# Patient Record
Sex: FEMALE | Race: WHITE | NOT HISPANIC OR LATINO | Employment: OTHER | URBAN - METROPOLITAN AREA
[De-identification: names, ages, dates, MRNs, and addresses within clinical notes are randomized per-mention and may not be internally consistent; named-entity substitution may affect disease eponyms.]

---

## 2017-01-03 ENCOUNTER — OFFICE VISIT (OUTPATIENT)
Dept: FAMILY MEDICINE | Facility: CLINIC | Age: 82
End: 2017-01-03
Payer: COMMERCIAL

## 2017-01-03 VITALS
HEART RATE: 69 BPM | OXYGEN SATURATION: 94 % | WEIGHT: 260 LBS | TEMPERATURE: 97.6 F | SYSTOLIC BLOOD PRESSURE: 138 MMHG | BODY MASS INDEX: 40.71 KG/M2 | DIASTOLIC BLOOD PRESSURE: 72 MMHG | RESPIRATION RATE: 18 BRPM

## 2017-01-03 DIAGNOSIS — I25.708: Primary | ICD-10-CM

## 2017-01-03 DIAGNOSIS — R20.2 NUMBNESS AND TINGLING OF HAND: ICD-10-CM

## 2017-01-03 DIAGNOSIS — E78.5 HYPERLIPIDEMIA WITH TARGET LDL LESS THAN 70: ICD-10-CM

## 2017-01-03 DIAGNOSIS — F41.9 ANXIETY: ICD-10-CM

## 2017-01-03 DIAGNOSIS — R20.0 NUMBNESS AND TINGLING OF HAND: ICD-10-CM

## 2017-01-03 DIAGNOSIS — I50.42 CHRONIC COMBINED SYSTOLIC AND DIASTOLIC CONGESTIVE HEART FAILURE (H): ICD-10-CM

## 2017-01-03 DIAGNOSIS — R73.09 ABNORMAL GLUCOSE: ICD-10-CM

## 2017-01-03 LAB
ALT SERPL W P-5'-P-CCNC: 42 U/L (ref 0–50)
ANION GAP SERPL CALCULATED.3IONS-SCNC: 8 MMOL/L (ref 3–14)
BUN SERPL-MCNC: 19 MG/DL (ref 7–30)
CALCIUM SERPL-MCNC: 10.1 MG/DL (ref 8.5–10.1)
CHLORIDE SERPL-SCNC: 103 MMOL/L (ref 94–109)
CO2 SERPL-SCNC: 26 MMOL/L (ref 20–32)
CREAT SERPL-MCNC: 0.82 MG/DL (ref 0.52–1.04)
ERYTHROCYTE [DISTWIDTH] IN BLOOD BY AUTOMATED COUNT: 14.2 % (ref 10–15)
GFR SERPL CREATININE-BSD FRML MDRD: 67 ML/MIN/1.7M2
GLUCOSE SERPL-MCNC: 117 MG/DL (ref 70–99)
HBA1C MFR BLD: 5.7 % (ref 4.3–6)
HCT VFR BLD AUTO: 44.8 % (ref 35–47)
HGB BLD-MCNC: 14.7 G/DL (ref 11.7–15.7)
MCH RBC QN AUTO: 28.3 PG (ref 26.5–33)
MCHC RBC AUTO-ENTMCNC: 32.8 G/DL (ref 31.5–36.5)
MCV RBC AUTO: 86 FL (ref 78–100)
PLATELET # BLD AUTO: 315 10E9/L (ref 150–450)
POTASSIUM SERPL-SCNC: 4.1 MMOL/L (ref 3.4–5.3)
RBC # BLD AUTO: 5.19 10E12/L (ref 3.8–5.2)
SODIUM SERPL-SCNC: 137 MMOL/L (ref 133–144)
WBC # BLD AUTO: 8.2 10E9/L (ref 4–11)

## 2017-01-03 PROCEDURE — 36415 COLL VENOUS BLD VENIPUNCTURE: CPT | Performed by: INTERNAL MEDICINE

## 2017-01-03 PROCEDURE — 99214 OFFICE O/P EST MOD 30 MIN: CPT | Performed by: INTERNAL MEDICINE

## 2017-01-03 PROCEDURE — 84460 ALANINE AMINO (ALT) (SGPT): CPT | Performed by: INTERNAL MEDICINE

## 2017-01-03 PROCEDURE — 83036 HEMOGLOBIN GLYCOSYLATED A1C: CPT | Performed by: INTERNAL MEDICINE

## 2017-01-03 PROCEDURE — 80048 BASIC METABOLIC PNL TOTAL CA: CPT | Performed by: INTERNAL MEDICINE

## 2017-01-03 PROCEDURE — 85027 COMPLETE CBC AUTOMATED: CPT | Performed by: INTERNAL MEDICINE

## 2017-01-03 RX ORDER — AMOXICILLIN 500 MG/1
CAPSULE ORAL
Refills: 3 | COMMUNITY
Start: 2016-10-12 | End: 2020-12-29

## 2017-01-03 ASSESSMENT — ANXIETY QUESTIONNAIRES
2. NOT BEING ABLE TO STOP OR CONTROL WORRYING: NOT AT ALL
6. BECOMING EASILY ANNOYED OR IRRITABLE: NOT AT ALL
GAD7 TOTAL SCORE: 0
3. WORRYING TOO MUCH ABOUT DIFFERENT THINGS: NOT AT ALL
7. FEELING AFRAID AS IF SOMETHING AWFUL MIGHT HAPPEN: NOT AT ALL
1. FEELING NERVOUS, ANXIOUS, OR ON EDGE: NOT AT ALL
5. BEING SO RESTLESS THAT IT IS HARD TO SIT STILL: NOT AT ALL
IF YOU CHECKED OFF ANY PROBLEMS ON THIS QUESTIONNAIRE, HOW DIFFICULT HAVE THESE PROBLEMS MADE IT FOR YOU TO DO YOUR WORK, TAKE CARE OF THINGS AT HOME, OR GET ALONG WITH OTHER PEOPLE: NOT DIFFICULT AT ALL

## 2017-01-03 ASSESSMENT — PAIN SCALES - GENERAL: PAINLEVEL: NO PAIN (0)

## 2017-01-03 ASSESSMENT — PATIENT HEALTH QUESTIONNAIRE - PHQ9: 5. POOR APPETITE OR OVEREATING: NOT AT ALL

## 2017-01-03 NOTE — NURSING NOTE
"Chief Complaint   Patient presents with     Recheck Medication       Initial /70 mmHg  Pulse 69  Temp(Src) 97.6  F (36.4  C) (Oral)  Resp 18  Wt 260 lb (117.935 kg)  SpO2 94%  Breastfeeding? No Estimated body mass index is 40.71 kg/(m^2) as calculated from the following:    Height as of 3/15/16: 5' 7\" (1.702 m).    Weight as of this encounter: 260 lb (117.935 kg).  BP completed using cuff size: nae Buenrostro CMA      "

## 2017-01-03 NOTE — MR AVS SNAPSHOT
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 Patty Shaw NP       7am-7pm  Monday - Thursday   7am-5pm  Fridays  [phone]  (Appointment scheduling available 24/7)    Questions about your visit?  Team Line  [phone]   Urgent Care - [place] - 11am-9pm Monday-Friday Saturday-Sunday- 9am-5pm   [place] - 5pm-9pm Monday-Friday Saturday-Sunday- 9am-5pm  [phone] - [place]   [phone] - [place]       What options do I have for visits at the clinic other than the traditional office visit?  To expand how we care for you, many of our providers are utilizing electronic visits (e-visits) and telephone visits, when medically appropriate, for interactions with their patients rather than a visit in the clinic.   We also offer nurse visits for many medical concerns. Just like any other service, we will bill your insurance company for this type of visit based on time spent on the phone with your provider. Not all insurance companies cover these visits. Please check with your medical insurance if this type of visit is covered. You will be responsible for any charges that are not paid  by your insurance.      E-visits via [employer]:  generally incur a $35.00 fee.  Telephone visits:  Time spent on the phone: *charged based on time that is spent on the phone in increments of 10 minutes. Estimated cost:   5-10 mins $30.00   11-20 mins. $59.00   21-30 mins. $85.00   Use [employer] (secure email communication and access to your chart) to send your primary care provider a message or make an appointment. Ask someone on your Team how to sign up for [employer].    For a Price Quote for your services, please call our [employer] Line at [phone].    As always, Thank you for trusting us with your health care needs!    Discharged by FIDEL FRANK          Follow-ups after your visit        Who to contact     If you have questions or need follow up information about today's clinic visit or your schedule please contact [facility] directly at [phone].  Normal or non-critical lab and imaging results will be communicated to you by [employer], letter or phone within 4 business days after the clinic has received the results. If you do not hear from us within 7 days, please contact the clinic through [employer] or phone. If you have a critical or abnormal lab result, we will notify you by phone as soon as possible.  Submit refill requests through [employer] or call your pharmacy and they will forward the refill request to us. Please allow 3 business days for your refill to be completed.          Additional Information About Your Visit        [employer] Information     [employer] gives you secure access to your electronic health record. If you see a primary care provider, you can also send messages to your care team and make appointments. If you have questions, please call your primary care clinic.  If you do not have a primary care provider, please call [phone] and they will assist you.        Care EveryWhere ID     This is your Care EveryWhere ID. This could be used by other organizations to access your [place]  medical records  [id number]        Your Vitals Were     Pulse Temperature Respirations Pulse Oximetry Breastfeeding?       69 97.6  F (36.4  C) (Oral) 18 94% No        Blood Pressure from Last 3 Encounters:   01/03/17 142/70   03/15/16 120/64   02/12/16 136/80    Weight from Last 3 Encounters:   01/03/17 260 lb (117.935 kg)   03/15/16 247 lb (112.038 kg)   02/12/16 252 lb (114.306 kg)              We Performed the Following     ALT     BASIC METABOLIC PANEL     CBC with platelets     DEPRESSION ACTION PLAN (DAP) Order [[id number]]     Hemoglobin A1c        Primary Care Provider Office Phone # Fax #    Ziyad Isaac [phone]       [address] [address]        Thank you!     Thank you for choosing [facility]  for your care. Our goal is always to provide you with excellent care. Hearing back from our patients is one way we can continue to improve our services. Please take a few minutes to complete the written survey that you may receive in the mail after your visit with us. Thank you!             Your Updated Medication List - Protect others around you: Learn how to safely use, store and throw away your medicines at www.disposemymeds.org.          This list is accurate as of: 1/3/17 10:50 AM.  Always use your most recent med list.                   Brand Name Dispense Instructions for use    amoxicillin 500 MG capsule    AMOXIL     TK 4 CS PO 1 HOUR BEFORE DENTAL APPOINTMENT       aspirin 162 MG EC tablet     90 tablet    Take 1 tablet by mouth daily.       betamethasone dipropionate 0.05 % lotion    DIPROSONE    60 mL    Apply  topically as needed.       Biotin 10 MG Tabs tablet      Take 10,000 mcg by mouth daily       citalopram 20 MG tablet    celeXA    90 tablet    TAKE 1 TABLET BY MOUTH DAILY       Fish Oil 1200 MG Caps      Take 1 capsule by mouth 2 times daily       ketoconazole 2 % shampoo    NIZORAL    120 mL    Apply  topically  daily as needed.       levothyroxine 125 MCG tablet    SYNTHROID/LEVOTHROID    90 tablet    TAKE ONE TABLET BY MOUTH EVERY DAY       LORazepam 0.5 MG tablet    ATIVAN    20 tablet    Take 1 tablet (0.5 mg) by mouth every 8 hours as needed for anxiety       losartan-hydrochlorothiazide 100-25 MG per tablet    HYZAAR    90 tablet    TAKE 1 TABLET BY MOUTH DAILY       metoprolol 50 MG tablet    LOPRESSOR    180 tablet    Take 1 tablet (50 mg) by mouth 2 times daily       order for DME          vitamin D 2000 UNITS tablet      Take 1 tablet by mouth daily.

## 2017-01-03 NOTE — Clinical Note
[address] [place] [address]    January 4, 2017    Lisa Ferguson  [address] [facility] [place] [address]          Dear Lisa,    These laboratory studies are fine. Specifically no evidence of a diabetes mellitus situation. Just a slightly elevated glucose [ sugar test ] with an hemoglobin a1c  [ diabetes test ] that is essentially normal. Good !    Lets keep an eye on things.  Results for orders placed or performed in visit on 01/03/17   ALT   Result Value Ref Range    ALT 42 0 - 50 U/L   BASIC METABOLIC PANEL   Result Value Ref Range    Sodium 137 133 - 144 mmol/L    Potassium 4.1 3.4 - 5.3 mmol/L    Chloride 103 94 - 109 mmol/L    Carbon Dioxide 26 20 - 32 mmol/L    Anion Gap 8 3 - 14 mmol/L    Glucose 117 (H) 70 - 99 mg/dL    Urea Nitrogen 19 7 - 30 mg/dL    Creatinine 0.82 0.52 - 1.04 mg/dL    GFR Estimate 67 >60 mL/min/1.7m2    GFR Estimate If Black 82 >60 mL/min/1.7m2    Calcium 10.1 8.5 - 10.1 mg/dL   Hemoglobin A1c   Result Value Ref Range    Hemoglobin A1C 5.7 4.3 - 6.0 %   CBC with platelets   Result Value Ref Range    WBC 8.2 4.0 - 11.0 10e9/L    RBC Count 5.19 3.8 - 5.2 10e12/L    Hemoglobin 14.7 11.7 - 15.7 g/dL    Hematocrit 44.8 35.0 - 47.0 %    MCV 86 78 - 100 fl    MCH 28.3 26.5 - 33.0 pg    MCHC 32.8 31.5 - 36.5 g/dL    RDW 14.2 10.0 - 15.0 %    Platelet Count 315 150 - 450 10e9/L       If you have any questions or concerns, please me or my clinic team at [phone].      Sincerely,        Ziyad Isaac MD/bt

## 2017-01-03 NOTE — PROGRESS NOTES
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 A 81 years old white female, history of coronary artery disease and congestive heart failure. Follows longterm with Dr. Carey Newell, [facility]. She is feeling some age related issues here and recognizes she's not as strong as she used to be , says she's \" on the downslide\" .    Musculoskeletal issues ; she's had a total knee replacement , right sided, this was in February of 2016. This never healed so well, with continued pain, 6-8 weeks of physical therapy were tried. She then stopped the Crestor [ rosuvastatin ] which even at 2 days per week, was too much for her she swears. She quit taking her Crestor [ rosuvastatin ] and then saw a dramatically improved status with her leg pain. Dr. Carey Newell had pushed her to take the statin medications, but now she's asking what about zetia [ ezetimibe ] ? As it turns out myalgias is a recorded side effect with this medication as well.. Perhaps equally importantly, this is apparently not covered by her health insurance  And it may cost her over a 100 dollars a month. She's not keen on this idea and is asking me questions about lipid lowering therapy. We also did discuss Repatha  (Evolocumab) the new mononclonal antibody derived infusion for lipid lowering therapy.     Depression and Anxiety Follow-Up    Status since last visit: No change    Other associated symptoms:None    Complicating factors:     Significant life event: No     Current substance abuse: None    Mostly " anxiety with panic attacks, can't decreased the medications !   PHQ-9 SCORE 3/25/2016 12/30/2016 1/3/2017   Total Score - - -   Total Score 0 0 3     MARY-7 SCORE 7/16/2015 1/3/2017   Total Score 7 -   Total Score - 0        PHQ-9  English      PHQ-9   Any Language     GAD7     There are some additional symptoms where she has had some hand numbness and foot numbness as well as some basilar thumb joint osteoarthritis symptoms for a few months .    Wt Readings from Last 5 Encounters:   01/03/17 260 lb (117.935 kg)   03/15/16 247 lb (112.038 kg)   02/12/16 252 lb (114.306 kg)   10/22/15 249 lb (112.946 kg)   07/09/15 245 lb (111.131 kg)     Body mass index is 40.71 kg/(m^2).    A lot more sedentary behavior some modest weight gain. Denies polydipsia, polyuria and polyphagia, she does have a family history of diabetes mellitus type 2 .     A1C      5.5   2/12/2016  A1C      5.6   10/22/2015  A1C      5.9   3/23/2015        Amount of exercise or physical activity: None    Problems taking medications regularly: No    Medication side effects: none    Diet: regular (no restrictions)      Problem list and histories reviewed & adjusted, as indicated.  Additional history: as documented    Patient Active Problem List   Diagnosis     Obesity     PUD (peptic ulcer disease)     Hyperlipidemia with target LDL less than 70     Mitral regurgitation     DJD (degenerative joint disease)     Fibroadenoma of breast     Diverticulosis     Sciatica neuralgia     IBS (irritable bowel syndrome)     RBBB (right bundle branch block)     GLEN (obstructive sleep apnea)     Balance disorder     Anxiety     Advanced directives, counseling/discussion     Ventricular tachycardia (paroxysmal) (H)     Corneal ulcer of left eye     Blepharitis     Hypercalcemia     Disorder of skin or subcutaneous tissue     Scar condition and fibrosis of skin     OA (osteoarthritis) of knee - bilateral     Fracture of proximal humerus     CHF (congestive heart failure)  (H)     Shoulder fracture     Abnormal glucose     Coronary artery disease involving 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)     S/p total knee replacement, bilateral     Acquired hypothyroidism     Essential hypertension with goal blood pressure less than 130/80     Past Surgical History   Procedure Laterality Date     Surgical history of -        bilateral meniscal tears and surgery on these     Hc dilation/curettage diag/ther non ob       Hc excis interdigital neuroma,ea       mortons neuroma excision     C cabg, arterial, three  1999     LIMA-LAD, SVG-DIagnoal, SVG-OM, previous LAD-PCI, sees Dr Banuelos     Surgical history of -   1999 or so     one parathyroid removed     C appendectomy       Tonsillectomy       Tubal ligation       C removal gallbladder  1994     Hernia repair, inguinal rt/lt       Arthroscopy knee rt/lt       bilateral     Colonoscopy  6/2010     negative       Social History   Substance Use Topics     Smoking status: Former Smoker -- 20 years     Types: Cigarettes     Quit date: 07/11/1978     Smokeless tobacco: Never Used     Alcohol Use: Yes      Comment: Rare alcohol use.     Family History   Problem Relation Age of Onset     C.A.D. Mother      Arthritis Mother      Cardiovascular Mother      HEART DISEASE Mother      Obesity Mother      Thyroid Disease Mother      C.A.D. Father      DIABETES Father      Hypertension Father      Alcohol/Drug Father      Alzheimer Disease Father      Cardiovascular Father      Circulatory Father      GASTROINTESTINAL DISEASE Father      HEART DISEASE Father      Lipids Father      Obesity Father      Cardiovascular Maternal Grandmother      Depression Maternal Grandmother      Obesity Maternal Grandmother      Thyroid Disease Maternal Grandmother      Cardiovascular Maternal Grandfather      HEART DISEASE Maternal Grandfather      Obesity Maternal Grandfather      Obesity Paternal Grandmother      DIABETES Paternal Grandfather       Alcohol/Drug Paternal Grandfather      Obesity Paternal Grandfather      Breast Cancer Sister      CANCER Sister      Obesity Sister      Thyroid Disease Sister      Alcohol/Drug Son      Allergies Son      Allergies Daughter      Depression Sister      Eye Disorder Sister      GASTROINTESTINAL DISEASE Sister      Thyroid Disease Sister      Obesity No family hx of          Current Outpatient Prescriptions   Medication Sig Dispense Refill     order for DME        losartan-hydrochlorothiazide (HYZAAR) 100-25 MG per tablet TAKE 1 TABLET BY MOUTH DAILY 90 tablet 0     citalopram (CELEXA) 20 MG tablet TAKE 1 TABLET BY MOUTH DAILY 90 tablet 0     levothyroxine (SYNTHROID, LEVOTHROID) 125 MCG tablet TAKE ONE TABLET BY MOUTH EVERY DAY 90 tablet 0     metoprolol (LOPRESSOR) 50 MG tablet Take 1 tablet (50 mg) by mouth 2 times daily 180 tablet 3     Omega-3 Fatty Acids (FISH OIL) 1200 MG CAPS Take 1 capsule by mouth 2 times daily       LORazepam (ATIVAN) 0.5 MG tablet Take 1 tablet (0.5 mg) by mouth every 8 hours as needed for anxiety 20 tablet 0     Biotin 10 MG TABS tablet Take 10,000 mcg by mouth daily       betamethasone dipropionate (DIPROSONE) 0.05 % lotion Apply  topically as needed. 60 mL 1     ketoconazole (NIZORAL) 2 % shampoo Apply  topically daily as needed. 120 mL 1     aspirin 162 MG EC tablet Take 1 tablet by mouth daily. 90 tablet 3     Cholecalciferol (VITAMIN D) 2000 UNITS tablet Take 1 tablet by mouth daily.       amoxicillin (AMOXIL) 500 MG capsule TK 4 CS PO 1 HOUR BEFORE DENTAL APPOINTMENT  3     Allergies   Allergen Reactions     Atorvastatin Other (See Comments)     Myalgia     Cortisone      Insomnia, burning in chest, flushed     Nickel      rash     Tetracycline Diarrhea     Vicodin [Hydrocodone-Acetaminophen] Other (See Comments)     Hallucinations     Recent Labs   Lab Test  01/03/17   1106  10/21/16   1030  02/12/16   1127  02/12/16   1108  10/22/15   1254  03/23/15   1213   07/08/14   1419   04/23/13   A1C  5.7   --   5.5   --   5.6  5.9   < >   --    --    --    LDL   --    --    --   128*   --   69   --    --    --   66   HDL   --    --    --   48*   --   50*   --    --    --   51   TRIG   --    --    --   237*   --   217*   --    --    --   194   ALT  42   --    --    --    --   36   --   43   < >  23   CR  0.82   --   0.71   --   0.73  0.75   --   0.84   < >   --    GFRESTIMATED  67   --   80   --   77  75   --   66   < >   --    GFRESTBLACK  82   --   >90   GFR Calc     --   >90   GFR Calc    >90   GFR Calc     --   80   < >   --    POTASSIUM  4.1   --   4.0   --   3.9  4.1   --   4.1   < >   --    TSH   --   4.10*   --    --    --   4.84*   --    --    < >   --     < > = values in this interval not displayed.      BP Readings from Last 3 Encounters:   01/03/17 142/70   03/15/16 120/64   02/12/16 136/80    Wt Readings from Last 3 Encounters:   01/03/17 260 lb (117.935 kg)   03/15/16 247 lb (112.038 kg)   02/12/16 252 lb (114.306 kg)                  Labs reviewed in EPIC  Problem list, Medication list, Allergies, and Medical/Social/Surgical histories reviewed in [facility] and updated as appropriate.    ROS:  Constitutional, HEENT, cardiovascular, pulmonary, gi and gu systems are negative, except as otherwise noted.    OBJECTIVE:                                                    /72 mmHg  Pulse 69  Temp(Src) 97.6  F (36.4  C) (Oral)  Resp 18  Wt 260 lb (117.935 kg)  SpO2 94%  Breastfeeding? No  Body mass index is 40.71 kg/(m^2).  GENERAL APPEARANCE: healthy, alert and no distress, appears her stated age , answers all questions appropriately, talkative and appropriate, normal grooming and affect   EYES: Eyes grossly normal to inspection, PERRL and conjunctivae and sclerae normal  RESP: lungs clear to auscultation - no rales, rhonchi or wheezes  CV: regular rates and rhythm, normal S1 S2, no S3 or S4 and no murmur, click or rub  MS: extremities  normal- no gross deformities noted, mild tenderness to palpation of the bilateral basilar thumb joints, negative Tinels and Phalen's test bilateral.   PSYCH: mentation appears normal and affect normal/bright    Diagnostic test results:  Diagnostic Test Results:  Orders Placed This Encounter   Procedures     DEPRESSION ACTION PLAN (DAP) Order [[id number]]     ALT     BASIC METABOLIC PANEL     Hemoglobin A1c     CBC with platelets          ASSESSMENT/PLAN:                                                    1. 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 Follow up with Dr. Banuelos in September 2017 unless she would follow up sooner regarding her questions about lipid lowering therapy   - BASIC METABOLIC PANEL  - CBC with platelets    2. Hyperlipidemia with target LDL less than 70  This led to a long discussion. She says she plans to wait to see if the zetia [ ezetimibe ] goes generic which it is supposed to do in a few months   - ALT    3. Anxiety  She feels she is in a stable phase of chronic illness here  - DEPRESSION ACTION PLAN (DAP) Order [[id number]]    4. Numbness and tingling of hand  We agreed to screen for diabetes mellitus. Further follow up depending on the test results   - CBC with platelets    5. Abnormal glucose  As above   - BASIC METABOLIC PANEL  - Hemoglobin A1c    6. Chronic combined systolic and diastolic congestive heart failure (H)  Noted as a point of historical importance , she is in a stable phase of chronic illness here today       Follow up with Provider - 6 -12 months / depending on the test results      Ziyad Isaac MD  [facility]

## 2017-01-03 NOTE — PATIENT INSTRUCTIONS
[facility]    If you have any questions regarding to your visit please contact your care team:     Team Pink:   Clinic Hours Telephone Number   Internal Medicine:  Dr. Patty Shaw NP       7am-7pm  Monday - Thursday   7am-5pm  Fridays  [phone]  (Appointment scheduling available 24/7)    Questions about your visit?  Team Line  [phone]   Urgent Care - [place] [facility] - 11am-9pm Monday-Friday Saturday-Sunday- 9am-5pm   [place] - 5pm-9pm Monday-Friday Saturday-Sunday- 9am-5pm  [phone] - [place]   [phone] - [place]       What options do I have for visits at the clinic other than the traditional office visit?  To expand how we care for you, many of our providers are utilizing electronic visits (e-visits) and telephone visits, when medically appropriate, for interactions with their patients rather than a visit in the clinic.   We also offer nurse visits for many medical concerns. Just like any other service, we will bill your insurance company for this type of visit based on time spent on the phone with your provider. Not all insurance companies cover these visits. Please check with your medical insurance if this type of visit is covered. You will be responsible for any charges that are not paid by your insurance.      E-visits via [employer]:  generally incur a $35.00 fee.  Telephone visits:  Time spent on the phone: *charged based on time that is spent on the phone in increments of 10 minutes. Estimated cost:   5-10 mins $30.00   11-20 mins. $59.00   21-30 mins. $85.00   Use [employer] (secure email communication and access to your chart) to send your primary care provider a message or make an appointment. Ask someone on your Team how to sign up for [employer].    For a Price Quote for your services, please call our Consumer Price Line at [phone].    As always, Thank you for trusting us with your health care  needs!    Discharged by SK, CMA

## 2017-01-04 ASSESSMENT — ANXIETY QUESTIONNAIRES: GAD7 TOTAL SCORE: 0

## 2017-01-04 ASSESSMENT — PATIENT HEALTH QUESTIONNAIRE - PHQ9: SUM OF ALL RESPONSES TO PHQ QUESTIONS 1-9: 3

## 2017-02-01 DIAGNOSIS — E03.9 ACQUIRED HYPOTHYROIDISM: Primary | ICD-10-CM

## 2017-02-02 NOTE — TELEPHONE ENCOUNTER
levothyroxine (SYNTHROID, LEVOTHROID) 125 MCG tablet    Last Written Prescription Date: 9/19/16  Last Quantity: 90, # refills: 0  Last Office Visit with [facility], [facility] or [facility] prescribing provider: 1/3/17        TSH   Date Value Ref Range Status   10/21/2016 4.10* 0.40 - 4.00 mU/L Final

## 2017-02-03 RX ORDER — LEVOTHYROXINE SODIUM 125 UG/1
TABLET ORAL
Qty: 90 TABLET | Refills: 1 | Status: SHIPPED | OUTPATIENT
Start: 2017-02-03 | End: 2017-08-03

## 2017-02-03 NOTE — TELEPHONE ENCOUNTER
Signed Prescriptions:                        Disp   Refills    levothyroxine (SYNTHROID/LEVOTHROID) 125 M*90 tab*1        Sig: TAKE 1 TABLET BY MOUTH EVERY DAY  Authorizing Provider: BRANDEE MAHER  Ordering User: YANG DUKE RN refilled requested medication per [facility] protocol.  Yang Duke RN

## 2017-03-24 DIAGNOSIS — I25.708: ICD-10-CM

## 2017-03-24 DIAGNOSIS — I10 ESSENTIAL HYPERTENSION WITH GOAL BLOOD PRESSURE LESS THAN 130/80: ICD-10-CM

## 2017-03-24 NOTE — TELEPHONE ENCOUNTER
losartan-hydrochlorothiazide (HYZAAR) 100-25 MG per tablet      Last Written Prescription Date: 12/30/16  Last Fill Quantity: 90, # refills: 0  Last Office Visit with [facility], [facility] or [facility] prescribing provider: 1/3/17       Potassium   Date Value Ref Range Status   01/03/2017 4.1 3.4 - 5.3 mmol/L Final     Creatinine   Date Value Ref Range Status   01/03/2017 0.82 0.52 - 1.04 mg/dL Final     BP Readings from Last 3 Encounters:   01/03/17 138/72   03/15/16 120/64   02/12/16 136/80

## 2017-03-27 RX ORDER — LOSARTAN POTASSIUM AND HYDROCHLOROTHIAZIDE 25; 100 MG/1; MG/1
TABLET ORAL
Qty: 90 TABLET | Refills: 3 | Status: SHIPPED | OUTPATIENT
Start: 2017-03-27 | End: 2018-03-30

## 2017-03-28 DIAGNOSIS — F41.9 ANXIETY: ICD-10-CM

## 2017-03-29 NOTE — TELEPHONE ENCOUNTER
citalopram (CELEXA) 20 MG tablet     Last Written Prescription Date: 12/30/2016  Last Fill Quantity: 90, # refills: 0  Last Office Visit with [facility] primary care provider:  1/3/2017        Last PHQ-9 score on record=   PHQ-9 SCORE 1/3/2017   Total Score -   Total Score 3

## 2017-03-30 RX ORDER — CITALOPRAM HYDROBROMIDE 20 MG/1
TABLET ORAL
Qty: 90 TABLET | Refills: 1 | Status: SHIPPED | OUTPATIENT
Start: 2017-03-30 | End: 2017-09-26

## 2017-03-30 NOTE — TELEPHONE ENCOUNTER
Prescription approved per [facility] Refill Protocol.  Bj Rolle RN

## 2017-04-06 ENCOUNTER — TELEPHONE (OUTPATIENT)
Dept: FAMILY MEDICINE | Facility: CLINIC | Age: 82
End: 2017-04-06

## 2017-04-06 NOTE — TELEPHONE ENCOUNTER
Reason for Call:  Other call back    Detailed comments: Patient calling and stating her handicap sign will be  in May and would like it renewed. Patient would like to know what she needs to do. Please contact patient to discuss further.    Phone Number Patient can be reached at: Home number on file [phone] (home)    Best Time: any time    Can we leave a detailed message on this number? YES    Call taken on 2017 at 4:39 PM by Crystal Simon

## 2017-04-10 DIAGNOSIS — I10 HYPERTENSION GOAL BP (BLOOD PRESSURE) < 130/80: ICD-10-CM

## 2017-04-10 NOTE — TELEPHONE ENCOUNTER
Called patient left message that new form was filled out and that I would be mailing it to her home so she can finish filling out the top half and sign and date.  Ariana Rollins,

## 2017-04-10 NOTE — TELEPHONE ENCOUNTER
metoprolol (LOPRESSOR) 50 MG tablet      Last Written Prescription Date: 03/31/2016  Last Fill Quantity: 180, # refills: 3    Last Office Visit with OMAR, FELICIA or [facility] prescribing provider:  01/03/2017   Future Office Visit:        BP Readings from Last 3 Encounters:   01/03/17 138/72   03/15/16 120/64   02/12/16 136/80     Christy Lilly MA

## 2017-04-11 RX ORDER — METOPROLOL TARTRATE 50 MG
TABLET ORAL
Qty: 180 TABLET | Refills: 2 | Status: SHIPPED | OUTPATIENT
Start: 2017-04-11 | End: 2018-01-11

## 2017-04-11 NOTE — TELEPHONE ENCOUNTER
Prescription approved per [facility] Refill Protocol.  Dee Louie, RN - BC

## 2017-07-11 ENCOUNTER — TRANSFERRED RECORDS (OUTPATIENT)
Dept: HEALTH INFORMATION MANAGEMENT | Facility: CLINIC | Age: 82
End: 2017-07-11

## 2017-07-18 ENCOUNTER — MEDICAL CORRESPONDENCE (OUTPATIENT)
Dept: HEALTH INFORMATION MANAGEMENT | Facility: CLINIC | Age: 82
End: 2017-07-18

## 2017-07-21 ENCOUNTER — MEDICAL CORRESPONDENCE (OUTPATIENT)
Dept: HEALTH INFORMATION MANAGEMENT | Facility: CLINIC | Age: 82
End: 2017-07-21

## 2017-07-25 NOTE — PROGRESS NOTES
[facility]  [address] [place] [id number]  [phone]  Dept: [phone]    PRE-OP EVALUATION:  Today's date: 2017    Lisa Ferguson (: 1935) presents for pre-operative evaluation assessment as requested by Dr. Zach Tavares.  She requires evaluation and anesthesia risk assessment prior to undergoing surgery/procedure for treatment of Back .  Proposed procedure: Rt L3/4 hemilaminotomy and excision of cyst     Date of Surgery/ Procedure: 17  Time of Surgery/ Procedure: 7:30 AM  Hospital/Surgical Facility: [facility]  Fax number for surgical facility: [phone]  Primary Physician: Ziyad Isaac  Type of Anesthesia Anticipated: to be determined    Patient has a Health Care Directive or Living Will:  YES     1. YES - DO YOU HAVE A HISTORY OF HEART ATTACK, STROKE, STENT, BYPASS OR SURGERY ON AN ARTERY IN THE HEAD, NECK, HEART OR LEG? History of bypass and stents. Quiescent disease at this point and very rare angina pectoralis that resolves with rest only within 3-5 minutes.  2.YES - Do you ever have any pain or discomfort in your chest? Mild angina intermittently  3. YES - Do you have a history of  Heart Failure? History of congestive heart failure, she is following up with cardiologist and is currently stable.  4. YES - Are you troubled by shortness of breath when: walking on the level, up a slight hill or at night? Deconditioning / multifactorial   5. NO - Do you currently have a cold, bronchitis or other respiratory infection?  6. YES - Do you have a cough, shortness of breath or wheezing? She has some SOB.  7. YES - Do you sometimes get pains in the calves of your legs when you walk? Aging and decondition, no evidence of PVD.  8. NO - Do you or anyone in your family have previous history of blood clots?  9. NO - Do you or does anyone in your family have a serious bleeding problem such as prolonged bleeding following surgeries or cuts?  10. NO - Have you  ever had problems with anemia or been told to take iron pills?  11. YES - Have you had any abnormal blood loss such as black, tarry or bloody stools, or abnormal vaginal bleeding? Distant history only  12. NO - Have you ever had a blood transfusion?  13. NO - Have you or any of your relatives ever had problems with anesthesia?  14. YES - Do you have sleep apnea, excessive snoring or daytime drowsiness?  15. NO - Do you have any prosthetic heart valves?  16. YES - Do you have prosthetic joints? Bilateral knee arthroplasty  17. NO - Is there any chance that you may be pregnant?    HPI:                                                      Brief HPI related to upcoming procedure: Lisa Ferguson is a 81 year old female who presents to the clinic for a pre op for a right sided L3-4 hemilaminotomy. She has pain and sciatica and has tried cortisone injections.       BP Readings from Last 3 Encounters:   08/01/17 128/66   01/03/17 138/72   03/15/16 120/64     See problem list for active medical problems.  Problems all longstanding and stable, except as noted/documented.  See ROS for pertinent symptoms related to these conditions.                                                MEDICAL HISTORY:                                                    Patient Active Problem List    Diagnosis Date Noted     Essential hypertension with goal blood pressure less than 130/80 09/28/2016     Priority: Medium     Acquired hypothyroidism 09/19/2016     Priority: Medium     S/p total knee replacement, bilateral 03/15/2016     Priority: Medium     Coronary artery disease involving 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) 02/12/2016     Priority: Medium     Coronary angiogram in 2002 and a total of 3 coronary angiograms in all. History of 3 vessel coronary artery bypass surgery in 1998 , preceded by 2 stents [ they wanted to do 4 bypasses but had only 3 due to technical problems ]. So there is always worry about angina  pectoralis and recurrent ischemic coronary artery disease symptoms so despite several workups for chest symptoms , no documented recurrence of ischemic coronary artery disease lesions have been noted. See care everywhere for [facility] office visit notes.       Abnormal glucose 03/23/2015     Priority: Medium     Problem list name updated by automated process. Provider to review       Shoulder fracture 08/08/2014     Priority: Medium     CHF (congestive heart failure) (H) 07/08/2014     Priority: Medium     Exam Date: 02/20/2014 10:17 Gender: F Sonographer: ADELE  Accession #: [id number] Height: 66 in BSA: 2.25 m  BP: 128 / 62  Weight: 263 lbs BMI: 42.4 kg/m   Location: Outpatient  Procedure: ECHO COMPLETE W CONTRAST Rhythm: Normal Sinus Rhythm  Indications: Mitral regurgitation  Technical Quality: Fair Contrast: Definity    Final Conclusion  Technically difficult study. Definity contrast was used to enhance endocardial definition due   to suboptimal image quality.  Normal LV size and systolic function with estimated ejection fraction of 60-65%.  Mild concentric LVH.  Moderate left atrial dilatation.  Mild mitral regurgitation.  Mild right ventricular dilatation; unable to accurately assess RV systolic function due to   image quality.  Right ventricular systolic pressure estimated to be elevated at 35 mmHg + RAP.  Estimated EF: 60-65%         Fracture of proximal humerus 07/01/2014     Priority: Medium     OA (osteoarthritis) of knee - bilateral 11/11/2013     Priority: Medium     Scar condition and fibrosis of skin 11/07/2013     Priority: Medium     Disorder of skin or subcutaneous tissue 09/19/2013     Priority: Medium     Problem list name updated by automated process. Provider to review       Corneal ulcer of left eye 09/03/2013     Priority: Medium     Blepharitis 09/03/2013     Priority: Medium     Imo Update utility  Problem list name updated by automated process. Provider to review        Hypercalcemia 09/03/2013     Priority: Medium     Diagnosis updated by automated process. Provider to review and confirm.       Advanced directives, counseling/discussion 10/08/2012     Priority: Medium     Patient states has Advance Directive and will bring in a copy to clinic. 10/8/2012          Ventricular tachycardia (paroxysmal) (H) 10/08/2012     Priority: Medium     Had an Electrophysiology study 2013 and her ventricular tachycardia was not inducible so no indication for automatic implanted cardio-defibrillator , per Dr. Bernard Rolle, [facility], electrophysiology cardiologist         Balance disorder 07/12/2012     Priority: Medium     Patient blames this on her spinal stenosis  History. 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 Also has spinal stenosis and some chronic low back pain issues and slight affect on her balance       Obesity      Priority: Medium     PUD (peptic ulcer disease)      Priority: Medium     h/o duodenal ulcer       Hyperlipidemia with target LDL less than 70      Priority: Medium     CHOL      153   1/17/2011  HDL       42   1/17/2011  LDL       75   1/17/2011  TRIG      179    1/17/2011  CHOLHDLRATIO      3.6   1/17/2011    Diagnosis updated by automated process. Provider to review and confirm.       Mitral regurgitation      Priority: Medium     Diverticulosis 01/01/2000     Priority: Medium     Noted on colonoscopy , is asymptomatic         Past Medical History:   Diagnosis Date     Anxiety      CAD (coronary artery disease)     angio 2002, h/o cabg, sees Luisana Nelson NP, they follow the cholesterol     CHF (congestive heart failure) (H) 7/8/2014     DJD (degenerative joint disease)      History of colonoscopy jan 2000    due jan 2010     Hyperlipidemia LDL goal < 70     sees [facility]     Hypertension goal BP (blood pressure) < 140/90      Hypothyroidism      IBS (irritable bowel syndrome)      Mitral regurgitation      Obesity      GLEN (obstructive sleep apnea) 7/11/2011     PUD (peptic ulcer disease)     h/o duodenal ulcer     RBBB (right bundle branch block)      Sciatica neuralgia november 209    MRI shows spinal stenosis and a cyst on the spine, has received an epidural steroid injection     Past Surgical History:   Procedure Laterality Date     ARTHROSCOPY KNEE RT/LT      bilateral     C APPENDECTOMY       C CABG, ARTERIAL, THREE  1999    LIMA-LAD, SVG-DIagnoal, SVG-OM, previous LAD-PCI, sees Dr Banuelos     COLONOSCOPY  6/2010    negative     HC DILATION/CURETTAGE DIAG/THER NON OB       HC EXCIS INTERDIGITAL NEUROMA,EA      mortons neuroma excision     HC REMOVAL GALLBLADDER  1994     HERNIA REPAIR, INGUINAL RT/LT       SURGICAL HISTORY OF -       bilateral meniscal tears and surgery on these     SURGICAL HISTORY OF -   1999 or so    one parathyroid removed     TONSILLECTOMY       TUBAL LIGATION       Current Outpatient Prescriptions   Medication Sig Dispense Refill     LORazepam (ATIVAN) 0.5 MG tablet Take 1 tablet (0.5 mg) by mouth every 8 hours as needed for anxiety 20 tablet 0     ezetimibe (ZETIA) 10 MG tablet Take 10 mg by mouth       metoprolol (LOPRESSOR) 50 MG  tablet TAKE 1 TABLET BY MOUTH TWICE DAILY 180 tablet 2     citalopram (CELEXA) 20 MG tablet TAKE 1 TABLET BY MOUTH EVERY DAY 90 tablet 1     losartan-hydrochlorothiazide (HYZAAR) 100-25 MG per tablet TAKE 1 TABLET BY MOUTH DAILY 90 tablet 3     levothyroxine (SYNTHROID/LEVOTHROID) 125 MCG tablet TAKE 1 TABLET BY MOUTH EVERY DAY 90 tablet 1     order for DME        amoxicillin (AMOXIL) 500 MG capsule TK 4 CS PO 1 HOUR BEFORE DENTAL APPOINTMENT  3     Omega-3 Fatty Acids (FISH OIL) 1200 MG CAPS Take 1 capsule by mouth 2 times daily       Biotin 10 MG TABS tablet Take 10,000 mcg by mouth daily       betamethasone dipropionate (DIPROSONE) 0.05 % lotion Apply  topically as needed. 60 mL 1     Cholecalciferol (VITAMIN D) 2000 UNITS tablet Take 1 tablet by mouth daily.       ketoconazole (NIZORAL) 2 % shampoo Apply  topically daily as needed. (Patient not taking: Reported on 8/1/2017) 120 mL 1     aspirin 162 MG EC tablet Take 1 tablet by mouth daily. 90 tablet 3     OTC products: None, except as noted above    Allergies   Allergen Reactions     Atorvastatin Other (See Comments)     Myalgia     Cortisone      Insomnia, burning in chest, flushed     Nickel      rash     Tetracycline Diarrhea     Vicodin [Hydrocodone-Acetaminophen] Other (See Comments)     Hallucinations      Latex Allergy: NO    Social History   Substance Use Topics     Smoking status: Former Smoker     Years: 20.00     Types: Cigarettes     Quit date: 7/11/1978     Smokeless tobacco: Never Used     Alcohol use Yes      Comment: Rare alcohol use.     History   Drug Use No       REVIEW OF SYSTEMS:                                                    Constitutional, HEENT, cardiovascular, pulmonary, GI, , musculoskeletal, neuro, skin, endocrine and psych systems are negative, except as in HPI or otherwise noted     This document serves as a record of the services and decisions personally performed and made by Ziyad Isaac MD. It was created on their behalf  "by Denny Nunn, a trained medical scribe. The creation of this document is based the provider's statements to the medical scribe.  Denny Nunn August 1, 2017 10:59 AM    EXAM:                                                    /66  Pulse 65  Temp 97  F (36.1  C) (Oral)  Ht 1.676 m (5' 6\")  Wt 117.5 kg (259 lb)  SpO2 94%  BMI 41.8 kg/m2    GENERAL APPEARANCE: healthy, alert and no distress, appears her stated age , talkative and appropriate      EYES: EOMI, PERRL     HENT: ear canals and TM's normal and nose and mouth without ulcers or lesions, no posterior oropharynx     NECK: no adenopathy, no asymmetry, masses, or scars and thyroid normal to palpation     RESP: lungs clear to auscultation - no rales, rhonchi or wheezes     CV: regular rates and rhythm, normal S1 S2, no S3 or S4 and no murmur, click or rub     ABDOMEN:  soft, nontender, no HSM or masses and bowel sounds normal, normal liver edge     MS: extremities normal- no gross deformities noted, no evidence of inflammation in joints, FROM in all extremities., trivial bilateral lower extremity edema     SKIN: no suspicious lesions or rashes to visible skin, saphenous vein dissection scar on right lower extremity     NEURO: mentation intact and speech normal     PSYCH: mentation appears normal. and affect normal/bright     LYMPHATICS: No axillary, cervical, or supraclavicular nodes    DIAGNOSTICS:                                                      Orders Placed This Encounter   Procedures     BASIC METABOLIC PANEL     Lipid panel reflex to direct LDL     Hemoglobin A1c     CBC with platelets differential     TSH with free T4 reflex     EKG 12-lead complete w/read - Clinics         Recent Labs   Lab Test  01/03/17   1106  02/12/16   1127   04/13/15   1443   HGB  14.7  14.0   < >  10.1*   PLT  315   --    --   699*   NA  137  136   < >   --    POTASSIUM  4.1  4.0   < >   --    CR  0.82  0.71   < >   --    A1C  5.7  5.5   < >   --     < > = " values in this interval not displayed.     IMPRESSION:                                                    Reason for surgery/procedure: back pain  Diagnosis/reason for consult: assess and minimize preoperative risk per consulting surgeon     The proposed surgical procedure is considered INTERMEDIATE risk.    REVISED CARDIAC RISK INDEX  The patient has the following serious cardiovascular risks for perioperative complications such as (MI, PE, VFib and 3  AV Block):  Coronary Artery Disease (MI, positive stress test, angina, Qs on EKG)  INTERPRETATION: 2 risks: Class III (moderate risk - 6.6% complication rate)    The patient has the following additional risks for perioperative complications:  No identified additional risks      ICD-10-CM    1. Preop general physical exam Z01.818 BASIC METABOLIC PANEL     EKG 12-lead complete w/read - Clinics     CBC with platelets differential   2. Acquired hypothyroidism E03.9 BASIC METABOLIC PANEL     TSH with free T4 reflex   3. Abnormal glucose R73.09 BASIC METABOLIC PANEL     Hemoglobin A1c   4. Hyperlipidemia with target LDL less than 70 E78.5 Lipid panel reflex to direct LDL   5. Anxiety F41.9 LORazepam (ATIVAN) 0.5 MG tablet     RECOMMENDATIONS:                                                      --Consult hospital rounder / IM to assist post-op medical management    Cardiovascular Risk  Patient is already on a Beta Blocker. Continue Betablocker therapy after surgery, using Beta blocker order set as necessary for NPO status.      Obstructive Sleep Apnea (or suspected sleep apnea)  Patient is to bring their home CPAP with them on the day of surgery      --Patient is to take all scheduled medications on the day of surgery EXCEPT for modifications listed below.    APPROVAL GIVEN to proceed with proposed procedure, without further diagnostic evaluation   The information in this document, created by the medical scribe for me, accurately reflects the services I personally  performed and the decisions made by me. I have reviewed and approved this document for accuracy.   Ziyad Isaac MD     Signed Electronically by: Ziyad Isaac MD    Copy of this evaluation report is provided to requesting physician.    Clear Preop Guidelines

## 2017-07-28 ENCOUNTER — MEDICAL CORRESPONDENCE (OUTPATIENT)
Dept: HEALTH INFORMATION MANAGEMENT | Facility: CLINIC | Age: 82
End: 2017-07-28

## 2017-08-01 ENCOUNTER — OFFICE VISIT (OUTPATIENT)
Dept: INTERNAL MEDICINE | Facility: CLINIC | Age: 82
End: 2017-08-01
Payer: COMMERCIAL

## 2017-08-01 VITALS
WEIGHT: 259 LBS | HEART RATE: 65 BPM | DIASTOLIC BLOOD PRESSURE: 66 MMHG | TEMPERATURE: 97 F | OXYGEN SATURATION: 94 % | SYSTOLIC BLOOD PRESSURE: 128 MMHG | HEIGHT: 66 IN | BODY MASS INDEX: 41.62 KG/M2

## 2017-08-01 DIAGNOSIS — F41.9 ANXIETY: ICD-10-CM

## 2017-08-01 DIAGNOSIS — Z01.818 PREOP GENERAL PHYSICAL EXAM: Primary | ICD-10-CM

## 2017-08-01 DIAGNOSIS — E78.5 HYPERLIPIDEMIA WITH TARGET LDL LESS THAN 70: ICD-10-CM

## 2017-08-01 DIAGNOSIS — E03.9 ACQUIRED HYPOTHYROIDISM: ICD-10-CM

## 2017-08-01 DIAGNOSIS — R73.09 ABNORMAL GLUCOSE: ICD-10-CM

## 2017-08-01 LAB
ANION GAP SERPL CALCULATED.3IONS-SCNC: 14 MMOL/L (ref 3–14)
BASOPHILS # BLD AUTO: 0.1 10E9/L (ref 0–0.2)
BASOPHILS NFR BLD AUTO: 0.7 %
BUN SERPL-MCNC: 15 MG/DL (ref 7–30)
CALCIUM SERPL-MCNC: 10.1 MG/DL (ref 8.5–10.1)
CHLORIDE SERPL-SCNC: 101 MMOL/L (ref 94–109)
CHOLEST SERPL-MCNC: 235 MG/DL
CO2 SERPL-SCNC: 22 MMOL/L (ref 20–32)
CREAT SERPL-MCNC: 0.75 MG/DL (ref 0.52–1.04)
DIFFERENTIAL METHOD BLD: NORMAL
EOSINOPHIL # BLD AUTO: 0.4 10E9/L (ref 0–0.7)
EOSINOPHIL NFR BLD AUTO: 5.2 %
ERYTHROCYTE [DISTWIDTH] IN BLOOD BY AUTOMATED COUNT: 14.1 % (ref 10–15)
GFR SERPL CREATININE-BSD FRML MDRD: 74 ML/MIN/1.7M2
GLUCOSE SERPL-MCNC: 108 MG/DL (ref 70–99)
HBA1C MFR BLD: 5.4 % (ref 4.3–6)
HCT VFR BLD AUTO: 42.2 % (ref 35–47)
HDLC SERPL-MCNC: 45 MG/DL
HGB BLD-MCNC: 14.4 G/DL (ref 11.7–15.7)
LDLC SERPL CALC-MCNC: 129 MG/DL
LYMPHOCYTES # BLD AUTO: 2.8 10E9/L (ref 0.8–5.3)
LYMPHOCYTES NFR BLD AUTO: 33.3 %
MCH RBC QN AUTO: 29.8 PG (ref 26.5–33)
MCHC RBC AUTO-ENTMCNC: 34.1 G/DL (ref 31.5–36.5)
MCV RBC AUTO: 87 FL (ref 78–100)
MONOCYTES # BLD AUTO: 1.2 10E9/L (ref 0–1.3)
MONOCYTES NFR BLD AUTO: 14.5 %
NEUTROPHILS # BLD AUTO: 3.9 10E9/L (ref 1.6–8.3)
NEUTROPHILS NFR BLD AUTO: 46.3 %
NONHDLC SERPL-MCNC: 190 MG/DL
PLATELET # BLD AUTO: 311 10E9/L (ref 150–450)
POTASSIUM SERPL-SCNC: 3.8 MMOL/L (ref 3.4–5.3)
RBC # BLD AUTO: 4.84 10E12/L (ref 3.8–5.2)
SODIUM SERPL-SCNC: 137 MMOL/L (ref 133–144)
TRIGL SERPL-MCNC: 306 MG/DL
TSH SERPL DL<=0.005 MIU/L-ACNC: 2.66 MU/L (ref 0.4–4)
WBC # BLD AUTO: 8.5 10E9/L (ref 4–11)

## 2017-08-01 PROCEDURE — 83036 HEMOGLOBIN GLYCOSYLATED A1C: CPT | Performed by: INTERNAL MEDICINE

## 2017-08-01 PROCEDURE — 93000 ELECTROCARDIOGRAM COMPLETE: CPT | Performed by: INTERNAL MEDICINE

## 2017-08-01 PROCEDURE — 85025 COMPLETE CBC W/AUTO DIFF WBC: CPT | Performed by: INTERNAL MEDICINE

## 2017-08-01 PROCEDURE — 99214 OFFICE O/P EST MOD 30 MIN: CPT | Performed by: INTERNAL MEDICINE

## 2017-08-01 PROCEDURE — 80048 BASIC METABOLIC PNL TOTAL CA: CPT | Performed by: INTERNAL MEDICINE

## 2017-08-01 PROCEDURE — 36415 COLL VENOUS BLD VENIPUNCTURE: CPT | Performed by: INTERNAL MEDICINE

## 2017-08-01 PROCEDURE — 84443 ASSAY THYROID STIM HORMONE: CPT | Performed by: INTERNAL MEDICINE

## 2017-08-01 PROCEDURE — 80061 LIPID PANEL: CPT | Performed by: INTERNAL MEDICINE

## 2017-08-01 RX ORDER — LORAZEPAM 0.5 MG/1
0.5 TABLET ORAL EVERY 8 HOURS PRN
Qty: 20 TABLET | Refills: 0 | Status: SHIPPED | OUTPATIENT
Start: 2017-08-01 | End: 2020-07-29

## 2017-08-01 RX ORDER — EZETIMIBE 10 MG/1
10 TABLET ORAL
COMMUNITY
Start: 2017-07-13 | End: 2019-10-28

## 2017-08-01 NOTE — MR AVS SNAPSHOT
After Visit Summary   8/1/2017    Lisa Ferguson    MRN: [id number]           Patient Information     Date Of Birth          1935        Visit Information        Provider Department      8/1/2017 10:10 AM Ziyad Isaac MD [facility]        Today's Diagnoses     Preop general physical exam    -  1    Acquired hypothyroidism        Abnormal glucose        Hyperlipidemia with target LDL less than 70        Anxiety          Care Instructions    - Take all medications normally, unless told otherwise by your surgeon.    Before Your Surgery      Call your surgeon if there is any change in your health. This includes signs of a cold or flu (such as a sore throat, runny nose, cough, rash or fever).    Do not smoke, drink alcohol or take over the counter medicine (unless your surgeon or primary care doctor tells you to) for the 24 hours before and after surgery.    If you take prescribed drugs: Follow your doctor s orders about which medicines to take and which to stop until after surgery.    Eating and drinking prior to surgery: follow the instructions from your surgeon    Take a shower or bath the night before surgery. Use the soap your surgeon gave you to gently clean your skin. If you do not have soap from your surgeon, use your regular soap. Do not shave or scrub the surgery site.  Wear clean pajamas and have clean sheets on your bed.     [facility]    If you have any questions regarding to your visit please contact your care team:     Team Pink:   Clinic Hours Telephone Number   Internal Medicine:  Dr. Patty Shaw NP       7am-7pm  Monday - Thursday   7am-5pm  Fridays  [phone]  (Appointment scheduling available 24/7)    Questions about your visit?  Team Line  [phone]   Urgent Care - [place] - 11am-9pm Monday-Friday Saturday-Sunday- 9am-5pm   [place] - 5pm-9pm Monday-Friday Saturday-Sunday-  9am-5pm  [phone] - [place]   [phone] - [place]       What options do I have for visits at the clinic other than the traditional office visit?  To expand how we care for you, many of our providers are utilizing electronic visits (e-visits) and telephone visits, when medically appropriate, for interactions with their patients rather than a visit in the clinic.   We also offer nurse visits for many medical concerns. Just like any other service, we will bill your insurance company for this type of visit based on time spent on the phone with your provider. Not all insurance companies cover these visits. Please check with your medical insurance if this type of visit is covered. You will be responsible for any charges that are not paid by your insurance.      E-visits via [employer]:  generally incur a $35.00 fee.  Telephone visits:  Time spent on the phone: *charged based on time that is spent on the phone in increments of 10 minutes. Estimated cost:   5-10 mins $30.00   11-20 mins. $59.00   21-30 mins. $85.00   Use [employer] (secure email communication and access to your chart) to send your primary care provider a message or make an appointment. Ask someone on your Team how to sign up for [employer].    For a Price Quote for your services, please call our [employer] Price Line at [phone].    As always, Thank you for trusting us with your health care needs!    Lavern Sesay, [facility]            Follow-ups after your visit        Who to contact     If you have questions or need follow up information about today's clinic visit or your schedule please contact [facility] directly at [phone].  Normal or non-critical lab and imaging results will be communicated to you by MyChart, letter or phone within 4 business days after the clinic has received the results. If you do not hear from us within 7 days, please contact the clinic through Emotifyhart or phone. If you have a critical or abnormal lab result, we will  "notify you by phone as soon as possible.  Submit refill requests through Rapidlea or call your pharmacy and they will forward the refill request to us. Please allow 3 business days for your refill to be completed.          Additional Information About Your Visit        InfogramharKngroo Information     Rapidlea gives you secure access to your electronic health record. If you see a primary care provider, you can also send messages to your care team and make appointments. If you have questions, please call your primary care clinic.  If you do not have a primary care provider, please call [phone] and they will assist you.        Care EveryWhere ID     This is your Care EveryWhere ID. This could be used by other organizations to access your [facility] medical records  [id number]        Your Vitals Were     Pulse Temperature Height Pulse Oximetry BMI (Body Mass Index)       65 97  F (36.1  C) (Oral) 5' 6\" (1.676 m) 94% 41.8 kg/m2        Blood Pressure from Last 3 Encounters:   08/01/17 128/66   01/03/17 138/72   03/15/16 120/64    Weight from Last 3 Encounters:   08/01/17 259 lb (117.5 kg)   01/03/17 260 lb (117.9 kg)   03/15/16 247 lb (112 kg)              We Performed the Following     BASIC METABOLIC PANEL     CBC with platelets differential     EKG 12-lead complete w/read - Clinics     Hemoglobin A1c     Lipid panel reflex to direct LDL          Where to get your medicines      Some of these will need a paper prescription and others can be bought over the counter.  Ask your nurse if you have questions.     Bring a paper prescription for each of these medications     LORazepam 0.5 MG tablet          Primary Care Provider Office Phone # Fax #    Ziyad Isaac [phone]       [address] [address]        Equal Access to Services     RIC BELLA AH: Robert Teixeira, shiv kramer, mikaela jose. " So [facility] [phone].    ATENCIÓN: Si karlie ortiz, tiene a hogan disposición servicios gratuitos de asistencia lingüística. Eva paula [phone].    We comply with applicable federal civil rights laws and [place] laws. We do not discriminate on the basis of race, color, national origin, age, disability sex, sexual orientation or gender identity.            Thank you!     Thank you for choosing [facility]  for your care. Our goal is always to provide you with excellent care. Hearing back from our patients is one way we can continue to improve our services. Please take a few minutes to complete the written survey that you may receive in the mail after your visit with us. Thank you!             Your Updated Medication List - Protect others around you: Learn how to safely use, store and throw away your medicines at www.disposemymeds.org.          This list is accurate as of: 8/1/17 11:07 AM.  Always use your most recent med list.                   Brand Name Dispense Instructions for use Diagnosis    amoxicillin 500 MG capsule    AMOXIL     TK 4 CS PO 1 HOUR BEFORE DENTAL APPOINTMENT        aspirin 162 MG EC tablet     90 tablet    Take 1 tablet by mouth daily.        betamethasone dipropionate 0.05 % lotion    DIPROSONE    60 mL    Apply  topically as needed.    Seborrhea       Biotin 10 MG Tabs tablet      Take 10,000 mcg by mouth daily        citalopram 20 MG tablet    celeXA    90 tablet    TAKE 1 TABLET BY MOUTH EVERY DAY    Anxiety       ezetimibe 10 MG tablet    ZETIA     Take 10 mg by mouth        Fish Oil 1200 MG Caps      Take 1 capsule by mouth 2 times daily        ketoconazole 2 % shampoo    NIZORAL    120 mL    Apply  topically daily as needed.    Seborrhea       levothyroxine 125 MCG tablet    SYNTHROID/LEVOTHROID    90 tablet    TAKE 1 TABLET BY MOUTH EVERY DAY    Acquired hypothyroidism       LORazepam 0.5 MG tablet    ATIVAN    20 tablet    Take 1 tablet (0.5 mg) by mouth every 8 hours as  needed for anxiety    Anxiety       losartan-hydrochlorothiazide 100-25 MG per tablet    HYZAAR    90 tablet    TAKE 1 TABLET BY MOUTH DAILY    Essential hypertension with goal blood pressure less than 130/80, Coronary artery disease involving 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)       metoprolol 50 MG tablet    LOPRESSOR    180 tablet    TAKE 1 TABLET BY MOUTH TWICE DAILY    Hypertension goal BP (blood pressure) < 130/80       order for DME           vitamin D 2000 UNITS tablet      Take 1 tablet by mouth daily.

## 2017-08-01 NOTE — Clinical Note
I believe she said she doesn't use CPAP [ continuous positive airway pressure] device anymore but I am reminding her that if she has obstructive sleep apnea they are going to be all over that at the hospital so she should consider bringing the machine if she has it.  Preoperative history and physical examination done 8/1/2017

## 2017-08-01 NOTE — LETTER
[facility]  [address] [place] [address]. [place] [address]    August 2, 2017    Lisa Ferguson  [address] [place] [address]          Dear Lisa,  All of these tests are within acceptable limits , ok for surgery.  Results for orders placed or performed in visit on 08/01/17   BASIC METABOLIC PANEL   Result Value Ref Range    Sodium 137 133 - 144 mmol/L    Potassium 3.8 3.4 - 5.3 mmol/L    Chloride 101 94 - 109 mmol/L    Carbon Dioxide 22 20 - 32 mmol/L    Anion Gap 14 3 - 14 mmol/L    Glucose 108 (H) 70 - 99 mg/dL    Urea Nitrogen 15 7 - 30 mg/dL    Creatinine 0.75 0.52 - 1.04 mg/dL    GFR Estimate 74 >60 mL/min/1.7m2    GFR Estimate If Black 89 >60 mL/min/1.7m2    Calcium 10.1 8.5 - 10.1 mg/dL   Lipid panel reflex to direct LDL   Result Value Ref Range    Cholesterol 235 (H) <200 mg/dL    Triglycerides 306 (H) <150 mg/dL    HDL Cholesterol 45 (L) >49 mg/dL    LDL Cholesterol Calculated 129 (H) <100 mg/dL    Non HDL Cholesterol 190 (H) <130 mg/dL   Hemoglobin A1c   Result Value Ref Range    Hemoglobin A1C 5.4 4.3 - 6.0 %   CBC with platelets differential   Result Value Ref Range    WBC 8.5 4.0 - 11.0 10e9/L    RBC Count 4.84 3.8 - 5.2 10e12/L    Hemoglobin 14.4 11.7 - 15.7 g/dL    Hematocrit 42.2 35.0 - 47.0 %    MCV 87 78 - 100 fl    MCH 29.8 26.5 - 33.0 pg    MCHC 34.1 31.5 - 36.5 g/dL    RDW 14.1 10.0 - 15.0 %    Platelet Count 311 150 - 450 10e9/L    Diff Method Automated Method     % Neutrophils 46.3 %    % Lymphocytes 33.3 %    % Monocytes 14.5 %    % Eosinophils 5.2 %    % Basophils 0.7 %    Absolute Neutrophil 3.9 1.6 - 8.3 10e9/L    Absolute Lymphocytes 2.8 0.8 - 5.3 10e9/L    Absolute Monocytes 1.2 0.0 - 1.3 10e9/L    Absolute Eosinophils 0.4 0.0 - 0.7 10e9/L    Absolute Basophils 0.1 0.0 - 0.2 10e9/L   TSH with free T4 reflex   Result Value Ref Range    TSH 2.66 0.40 - 4.00 mU/L       If you have any questions or concerns,  please me or my clinic team at [phone].      Sincerely,        Ziyad Isaac MD/bt

## 2017-08-01 NOTE — NURSING NOTE
"Chief Complaint   Patient presents with     Pre-Op Exam     8/16/2017, Abelardo Concepcion       Initial /66  Pulse 65  Temp 97  F (36.1  C) (Oral)  Ht 5' 6\" (1.676 m)  Wt 259 lb (117.5 kg)  SpO2 94%  BMI 41.8 kg/m2 Estimated body mass index is 41.8 kg/(m^2) as calculated from the following:    Height as of this encounter: 5' 6\" (1.676 m).    Weight as of this encounter: 259 lb (117.5 kg).  Medication Reconciliation: complete   Discharged By:  BOUBACAR ROD    "

## 2017-08-03 DIAGNOSIS — E03.9 ACQUIRED HYPOTHYROIDISM: ICD-10-CM

## 2017-08-03 RX ORDER — LEVOTHYROXINE SODIUM 125 UG/1
TABLET ORAL
Qty: 90 TABLET | Refills: 0 | Status: SHIPPED | OUTPATIENT
Start: 2017-08-03 | End: 2017-11-09

## 2017-08-03 NOTE — TELEPHONE ENCOUNTER
levothyroxine (SYNTHROID/LEVOTHROID) 125 MCG tablet  Last Written Prescription Date: 02/03/2017  Last Quantity: 90, # refills: 1  Last Office Visit with [facility], [facility] or [facility] prescribing provider: 01/03/2017        TSH   Date Value Ref Range Status   08/01/2017 2.66 0.40 - 4.00 mU/L Final     Rachel Mitchell MA

## 2017-08-03 NOTE — TELEPHONE ENCOUNTER
Prescription approved per [facility] Refill Protocol.  Dee Louie, RN - BC

## 2017-08-16 ENCOUNTER — TRANSFERRED RECORDS (OUTPATIENT)
Dept: HEALTH INFORMATION MANAGEMENT | Facility: CLINIC | Age: 82
End: 2017-08-16

## 2017-08-20 ENCOUNTER — TRANSFERRED RECORDS (OUTPATIENT)
Dept: HEALTH INFORMATION MANAGEMENT | Facility: CLINIC | Age: 82
End: 2017-08-20

## 2017-08-21 ENCOUNTER — TRANSFERRED RECORDS (OUTPATIENT)
Dept: HEALTH INFORMATION MANAGEMENT | Facility: CLINIC | Age: 82
End: 2017-08-21

## 2017-08-22 NOTE — PROGRESS NOTES
SUBJECTIVE:   Lisa Ferguson is a 81 year old female who presents to clinic today for the following health issues:          Hospital Follow-up Visit:    Hospital/Nursing Home/IP Rehab Facility: [facility]   Date of Admission: 08/16/2017 in and out of hospital   Date of Discharge: 08/23/2017  Reason(s) for Admission: Synovial cyst, blood clot             Problems taking medications regularly:  None       Medication changes since discharge: see current med list       Problems adhering to non-medication therapy:  None    Summary of hospitalization:  CareEverywhere information obtained and reviewed  Diagnostic Tests/Treatments reviewed.  Follow up needed: none  Other Healthcare Providers Involved in Patient s Care:         Homecare  Update since discharge: improved.     From Care Everywhere 8/16:    BRIEF HOSPITAL COURSE:   This 81 y.o. female was admitted to [facility] on 8/16/2017 to undergo the above listed procedure. Intraoperatively, there were no complications and the patient tolerated procedure well. After recovery in the PACU, the patient was then transferred to [address] for continued nursing care. The patient had a stable post operative course. Standard prophylactic antibiotics were administered and the patient received DVT prophylaxis per service protocol. The patient's pain was initially controlled on intravenous pain medications and then weaned to oral medications prior to discharge. Strength, sensation, and radicular pain improved following surgery. The patient was advised on activity limitations, wound care, and instructed to follow up in the office in 4 weeks with Dr. Tavares. Ultimately, the patient was discharged in good condition with good prognosis.     From Care Everywhere 8/20    BRIEF HOSPITAL COURSE     Lisa Ferguson is a 81 y.o. female with a history of HTN, CAD s/p CABG, CHF, GLEN, Depression, DJD, knee replacement, and recent right L3-L4 synocial cyst removal  with hemilaminectomy was admitted to [facility] on 08/21/17 after presenting to [facility] ED for evaluation of back pain with associated headaches, nausea, and leg spasms.    Neurosurgery consulted. MR lumbar spine done on 8/21 and showed small hematoma likely compressing the nerve. She was given steroids and improved overnight and discharged with a short course of steroids. She will have HHC: PT, aide, RN visits.      Patient is doing well at home.  Her pain has mostly subsided since starting the steroid.  She is weaning off of dilauded.  She denies weakness, numbness, tingling to her bilateral lower extremities.  She denies drainage from her incision or any surrounding erythema.  Patient denies constipation, dysuria.  She is eating and drinking per normal.    Post Discharge Medication Reconciliation: discharge medications reconciled and changed, per note/orders (see AVS).  Plan of care communicated with patient and family     Coding guidelines for this visit:  Type of Medical   Decision Making Face-to-Face Visit       within 7 Days of discharge Face-to-Face Visit        within 14 days of discharge   Moderate Complexity 34304 [id number]   High Complexity 38529 25055              Problem list and histories reviewed & adjusted, as indicated.  Additional history: as documented    Patient Active Problem List   Diagnosis     Obesity     PUD (peptic ulcer disease)     Hyperlipidemia with target LDL less than 70     Mitral regurgitation     DJD (degenerative joint disease)     Fibroadenoma of breast     Diverticulosis     Sciatica neuralgia     IBS (irritable bowel syndrome)     RBBB (right bundle branch block)     GLEN (obstructive sleep apnea)     Balance disorder     Anxiety     Advanced directives, counseling/discussion     Ventricular tachycardia (paroxysmal) (H)     Corneal ulcer of left eye     Blepharitis     Hypercalcemia     Disorder of skin or subcutaneous tissue     Scar condition and fibrosis of skin     OA (osteoarthritis) of  knee - bilateral     Fracture of proximal humerus     CHF (congestive heart failure) (H)     Shoulder fracture     Abnormal glucose     Coronary artery disease involving 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)     S/p total knee replacement, bilateral     Acquired hypothyroidism     Essential hypertension with goal blood pressure less than 130/80     Morbid obesity (H)     Past Surgical History:   Procedure Laterality Date     ARTHROSCOPY KNEE RT/LT      bilateral     C APPENDECTOMY       C CABG, ARTERIAL, THREE  1999    LIMA-LAD, SVG-DIagnoal, SVG-OM, previous LAD-PCI, sees Dr Banuelos     COLONOSCOPY  6/2010    negative     HC DILATION/CURETTAGE DIAG/THER NON OB       HC EXCIS INTERDIGITAL NEUROMA,EA      mortons neuroma excision     HC REMOVAL GALLBLADDER  1994     HERNIA REPAIR, INGUINAL RT/LT       SURGICAL HISTORY OF -       bilateral meniscal tears and surgery on these     SURGICAL HISTORY OF -   1999 or so    one parathyroid removed     TONSILLECTOMY       TUBAL LIGATION         Social History   Substance Use Topics     Smoking status: Former Smoker     Years: 20.00     Types: Cigarettes     Quit date: 7/11/1978     Smokeless tobacco: Never Used     Alcohol use Yes      Comment: Rare alcohol use.     Family History   Problem Relation Age of Onset     C.A.D. Mother      Arthritis Mother      Cardiovascular Mother      HEART DISEASE Mother      Obesity Mother      Thyroid Disease Mother      C.A.D. Father      DIABETES Father      Hypertension Father      Alcohol/Drug Father      Alzheimer Disease Father      Cardiovascular Father      Circulatory Father      GASTROINTESTINAL DISEASE Father      HEART DISEASE Father      Lipids Father      Obesity Father      Cardiovascular Maternal Grandmother      Depression Maternal Grandmother      Obesity Maternal Grandmother      Thyroid Disease Maternal Grandmother      Cardiovascular Maternal Grandfather      HEART DISEASE Maternal Grandfather       Obesity Maternal Grandfather      Obesity Paternal Grandmother      DIABETES Paternal Grandfather      Alcohol/Drug Paternal Grandfather      Obesity Paternal Grandfather      Breast Cancer Sister      CANCER Sister      Obesity Sister      Thyroid Disease Sister      Alcohol/Drug Son      Allergies Son      Allergies Daughter      Depression Sister      Eye Disorder Sister      GASTROINTESTINAL DISEASE Sister      Thyroid Disease Sister          Current Outpatient Prescriptions   Medication Sig Dispense Refill     HYDROmorphone (DILAUDID) 2 MG tablet Take 1 mg by mouth       dexamethasone (DECADRON) 2 MG tablet Take 2 mg by mouth       acetaminophen (TYLENOL) 500 MG tablet Take 1,000 mg by mouth       levothyroxine (SYNTHROID/LEVOTHROID) 125 MCG tablet TAKE 1 TABLET BY MOUTH EVERY DAY 90 tablet 0     LORazepam (ATIVAN) 0.5 MG tablet Take 1 tablet (0.5 mg) by mouth every 8 hours as needed for anxiety 20 tablet 0     ezetimibe (ZETIA) 10 MG tablet Take 10 mg by mouth       metoprolol (LOPRESSOR) 50 MG tablet TAKE 1 TABLET BY MOUTH TWICE DAILY 180 tablet 2     citalopram (CELEXA) 20 MG tablet TAKE 1 TABLET BY MOUTH EVERY DAY 90 tablet 1     losartan-hydrochlorothiazide (HYZAAR) 100-25 MG per tablet TAKE 1 TABLET BY MOUTH DAILY 90 tablet 3     order for DME        amoxicillin (AMOXIL) 500 MG capsule TK 4 CS PO 1 HOUR BEFORE DENTAL APPOINTMENT  3     Omega-3 Fatty Acids (FISH OIL) 1200 MG CAPS Take 1 capsule by mouth 2 times daily       Biotin 10 MG TABS tablet Take 10,000 mcg by mouth daily       betamethasone dipropionate (DIPROSONE) 0.05 % lotion Apply  topically as needed. 60 mL 1     ketoconazole (NIZORAL) 2 % shampoo Apply  topically daily as needed. 120 mL 1     aspirin 162 MG EC tablet Take 1 tablet by mouth daily. 90 tablet 3     Cholecalciferol (VITAMIN D) 2000 UNITS tablet Take 1 tablet by mouth daily.       Allergies   Allergen Reactions     Atorvastatin Other (See Comments)     Myalgia     Cortisone       Insomnia, burning in chest, flushed     Nickel      rash     Tetracycline Diarrhea     Vicodin [Hydrocodone-Acetaminophen] Other (See Comments)     Hallucinations     BP Readings from Last 3 Encounters:   08/25/17 136/64   08/01/17 128/66   01/03/17 138/72    Wt Readings from Last 3 Encounters:   08/25/17 253 lb (114.8 kg)   08/01/17 259 lb (117.5 kg)   01/03/17 260 lb (117.9 kg)                  Labs reviewed in EPIC        Reviewed and updated as needed this visit by clinical staff       Reviewed and updated as needed this visit by Provider         ROS:  Constitutional, HEENT, cardiovascular, pulmonary, gi and gu systems are negative, except as otherwise noted.      OBJECTIVE:   /64  Pulse 63  Temp 98.4  F (36.9  C) (Oral)  Wt 253 lb (114.8 kg)  SpO2 95%  BMI 40.84 kg/m2  Body mass index is 40.84 kg/(m^2).  GENERAL: healthy, alert and no distress  RESP: lungs clear to auscultation - no rales, rhonchi or wheezes  CV: regular rate and rhythm, normal S1 S2, no S3 or S4, no murmur, click or rub, no peripheral edema and peripheral pulses strong  MS: no gross musculoskeletal defects noted, no edema  SKIN: incision is clean, dry, and intact, no drainage, no surrounding erythema or warmth noted  NEURO: Normal strength and tone, sensory exam grossly normal and mentation intact    Diagnostic Test Results:  none     ASSESSMENT/PLAN:     1. Synovial cyst  Patient is doing well s/p removal and subsequent hematoma.  If pain worsens over the weekend she will contact neurosurgery or primary care.  Patient verbalized understanding.     2. Hematoma of lumbar muscle, subsequent encounter  As above.       FUTURE APPOINTMENTS:       - Follow-up for annual visit or as needed    MARY Hannon [facility]

## 2017-08-23 ENCOUNTER — TELEPHONE (OUTPATIENT)
Dept: INTERNAL MEDICINE | Facility: CLINIC | Age: 82
End: 2017-08-23

## 2017-08-23 NOTE — TELEPHONE ENCOUNTER
Reason for Call:  Other returning call    Detailed comments:  Patient returning the call. Please call her.     Phone Number Patient can be reached at: Home number on file [phone] (home)    Best Time:  Any     Can we leave a detailed message on this number? YES    Call taken on 8/23/2017 at 1:23 PM by Nohemy Rodrigues

## 2017-08-23 NOTE — TELEPHONE ENCOUNTER
Reason for Call:  Other prescription    Detailed comments:  Patient calling. She was at [facility]. They did not give her any of the pain medications that she was to have. Please call to advise.     Phone Number Patient can be reached at: Home number on file [phone] (home)    Best Time:  Any     Can we leave a detailed message on this number? YES    Call taken on 8/23/2017 at 11:08 AM by Nohemy Rodrigues

## 2017-08-23 NOTE — TELEPHONE ENCOUNTER
Detailed message left on patient's VM.  Is this post op pain?  If so, she should contact her surgeon's office and update them on her pain and request pain meds   RN hotline number [phone] given for patient to call back with any further questions    Bj Rolle RN

## 2017-08-24 ENCOUNTER — TELEPHONE (OUTPATIENT)
Dept: FAMILY MEDICINE | Facility: CLINIC | Age: 82
End: 2017-08-24

## 2017-08-24 NOTE — TELEPHONE ENCOUNTER
Spoke to pt. Pain meds were taken care of by hospital. Pt does not need anything at this time.   Portia Kline RN

## 2017-08-24 NOTE — TELEPHONE ENCOUNTER
Left message on Sushma's VM with verbal orders as requested.   RN hotline number given for further questions.    Bj Rolle RN

## 2017-08-24 NOTE — TELEPHONE ENCOUNTER
Reason for call:  Order   Order or referral being requested: delayed order to start care on 8/26/2017  Reason for request: patient request  Date needed: as soon as possible  Has the patient been seen by the PCP for this problem? Not Applicable    Additional comments: verbal order needed today      Phone number to reach patient:  Other phone number:  [phone]*    Best Time:  Anytime till 430    Can we leave a detailed message on this number?  YES

## 2017-08-25 ENCOUNTER — OFFICE VISIT (OUTPATIENT)
Dept: INTERNAL MEDICINE | Facility: CLINIC | Age: 82
End: 2017-08-25
Payer: COMMERCIAL

## 2017-08-25 VITALS
SYSTOLIC BLOOD PRESSURE: 136 MMHG | WEIGHT: 253 LBS | TEMPERATURE: 98.4 F | BODY MASS INDEX: 40.84 KG/M2 | HEART RATE: 63 BPM | OXYGEN SATURATION: 95 % | DIASTOLIC BLOOD PRESSURE: 64 MMHG

## 2017-08-25 DIAGNOSIS — M71.30 SYNOVIAL CYST: Primary | ICD-10-CM

## 2017-08-25 DIAGNOSIS — S30.0XXD HEMATOMA OF LUMBAR MUSCLE, SUBSEQUENT ENCOUNTER: ICD-10-CM

## 2017-08-25 PROBLEM — E66.01 MORBID OBESITY (H): Status: ACTIVE | Noted: 2017-08-25

## 2017-08-25 PROCEDURE — 99214 OFFICE O/P EST MOD 30 MIN: CPT | Performed by: NURSE PRACTITIONER

## 2017-08-25 RX ORDER — ACETAMINOPHEN 500 MG
1000 TABLET ORAL
COMMUNITY
End: 2020-12-18

## 2017-08-25 RX ORDER — DEXAMETHASONE 2 MG/1
2 TABLET ORAL
COMMUNITY
Start: 2017-08-22 | End: 2017-08-28

## 2017-08-25 RX ORDER — HYDROMORPHONE HYDROCHLORIDE 2 MG/1
1 TABLET ORAL
COMMUNITY
Start: 2017-08-22 | End: 2018-09-13

## 2017-08-25 NOTE — MR AVS SNAPSHOT
After Visit Summary   8/25/2017    Lisa Ferguson    MRN: [id number]           Patient Information     Date Of Birth          1935        Visit Information        Provider Department      8/25/2017 9:00 AM Shira Shaw APRN [facility]        Today's Diagnoses     Synovial cyst    -  1      Care Instructions    [facility]    If you have any questions regarding to your visit please contact your care team:     Team Pink:   Clinic Hours Telephone Number   Internal Medicine:  Dr. Patty Shaw, NP       7am-7pm  Monday - Thursday   7am-5pm  Fridays  [phone]  (Appointment scheduling available 24/7)    Questions about your visit?  Team Line  [phone]   Urgent Care - [place] and [place] - 11am-9pm Monday-Friday Saturday-Sunday- 9am-5pm   [place] - 5pm-9pm Monday-Friday Saturday-Sunday- 9am-5pm  [phone] - [place]   [phone] - [place]       What options do I have for visits at the clinic other than the traditional office visit?  To expand how we care for you, many of our providers are utilizing electronic visits (e-visits) and telephone visits, when medically appropriate, for interactions with their patients rather than a visit in the clinic.   We also offer nurse visits for many medical concerns. Just like any other service, we will bill your insurance company for this type of visit based on time spent on the phone with your provider. Not all insurance companies cover these visits. Please check with your medical insurance if this type of visit is covered. You will be responsible for any charges that are not paid by your insurance.      E-visits via [employer]:  generally incur a $35.00 fee.  Telephone visits:  Time spent on the phone: *charged based on time that is spent on the phone in increments of 10 minutes. Estimated cost:   5-10 mins $30.00   11-20 mins. $59.00   21-30 mins.  $85.00   Use Zeensharehart (secure email communication and access to your chart) to send your primary care provider a message or make an appointment. Ask someone on your Team how to sign up for Triptelligentt.    For a Price Quote for your services, please call our Consumer Price Line at [phone].    As always, Thank you for trusting us with your health care needs!    Renee HARRIGNTON CMA ([facility])            Follow-ups after your visit        Who to contact     If you have questions or need follow up information about today's clinic visit or your schedule please contact [facility] directly at [phone].  Normal or non-critical lab and imaging results will be communicated to you by MyChart, letter or phone within 4 business days after the clinic has received the results. If you do not hear from us within 7 days, please contact the clinic through Triptelligentt or phone. If you have a critical or abnormal lab result, we will notify you by phone as soon as possible.  Submit refill requests through [employer] or call your pharmacy and they will forward the refill request to us. Please allow 3 business days for your refill to be completed.          Additional Information About Your Visit        [employer] Information     Triptelligentt gives you secure access to your electronic health record. If you see a primary care provider, you can also send messages to your care team and make appointments. If you have questions, please call your primary care clinic.  If you do not have a primary care provider, please call [phone] and they will assist you.        Care EveryWhere ID     This is your Care EveryWhere ID. This could be used by other organizations to access your [place] medical records  [id number]        Your Vitals Were     Pulse Temperature Pulse Oximetry BMI (Body Mass Index)          63 98.4  F (36.9  C) (Oral) 95% 40.84 kg/m2         Blood Pressure from Last 3 Encounters:   08/25/17 136/64   08/01/17 128/66   01/03/17 138/72     Weight from Last 3 Encounters:   08/25/17 253 lb (114.8 kg)   08/01/17 259 lb (117.5 kg)   01/03/17 260 lb (117.9 kg)              Today, you had the following     No orders found for display       Primary Care Provider Office Phone # Fax #    Ziyad Isaac [phone]       [address] [facility] [address]        Equal Access to Services     [facility]: Hadii aad ku hadasho Soomaali, waaxda luqadaha, qaybta kaalmada adeegyada, waxay idiin hayaan adeeg khbrysonbenita laisatu . So [facility] [phone].    ATENCIÓN: Si karlie ortiz, tiene a hogan disposición servicios gratuitos de asistencia lingüística. [facility] [phone].    We comply with applicable federal civil rights laws and [place] laws. We do not discriminate on the basis of race, color, national origin, age, disability sex, sexual orientation or gender identity.            Thank you!     Thank you for choosing [facility]  for your care. Our goal is always to provide you with excellent care. Hearing back from our patients is one way we can continue to improve our services. Please take a few minutes to complete the written survey that you may receive in the mail after your visit with us. Thank you!             Your Updated Medication List - Protect others around you: Learn how to safely use, store and throw away your medicines at www.disposemymeds.org.          This list is accurate as of: 8/25/17  9:33 AM.  Always use your most recent med list.                   Brand Name Dispense Instructions for use Diagnosis    acetaminophen 500 MG tablet    TYLENOL     Take 1,000 mg by mouth        amoxicillin 500 MG capsule    AMOXIL     TK 4 CS PO 1 HOUR BEFORE DENTAL APPOINTMENT        aspirin 162 MG EC tablet     90 tablet    Take 1 tablet by mouth daily.        betamethasone dipropionate 0.05 % lotion    DIPROSONE    60 mL    Apply  topically as needed.    Seborrhea       Biotin 10 MG Tabs tablet      Take 10,000 mcg by mouth daily         citalopram 20 MG tablet    celeXA    90 tablet    TAKE 1 TABLET BY MOUTH EVERY DAY    Anxiety       dexamethasone 2 MG tablet    DECADRON     Take 2 mg by mouth        ezetimibe 10 MG tablet    ZETIA     Take 10 mg by mouth        Fish Oil 1200 MG Caps      Take 1 capsule by mouth 2 times daily        HYDROmorphone 2 MG tablet    DILAUDID     Take 1 mg by mouth        ketoconazole 2 % shampoo    NIZORAL    120 mL    Apply  topically daily as needed.    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

## 2017-08-25 NOTE — NURSING NOTE
"Chief Complaint   Patient presents with     Hospital F/U     PHQ-9       Initial /70  Pulse 63  Temp 98.4  F (36.9  C) (Oral)  Wt 253 lb (114.8 kg)  SpO2 95%  BMI 40.84 kg/m2 Estimated body mass index is 40.84 kg/(m^2) as calculated from the following:    Height as of 8/1/17: 5' 6\" (1.676 m).    Weight as of this encounter: 253 lb (114.8 kg).  Medication Reconciliation: complete   Renee HARRINGTON CMA ([facility])      "

## 2017-08-25 NOTE — PATIENT INSTRUCTIONS
[facility]    If you have any questions regarding to your visit please contact your care team:     Team Pink:   Clinic Hours Telephone Number   Internal Medicine:  Dr. Patty Shaw NP       7am-7pm  Monday - Thursday   7am-5pm  Fridays  [phone]  (Appointment scheduling available 24/7)    Questions about your visit?  Team Line  [phone]   Urgent Care - [place] [facility] - 11am-9pm Monday-Friday Saturday-Sunday- 9am-5pm   [place] - 5pm-9pm Monday-Friday Saturday-Sunday- 9am-5pm  [phone] - [place]   [phone] - [place]       What options do I have for visits at the clinic other than the traditional office visit?  To expand how we care for you, many of our providers are utilizing electronic visits (e-visits) and telephone visits, when medically appropriate, for interactions with their patients rather than a visit in the clinic.   We also offer nurse visits for many medical concerns. Just like any other service, we will bill your insurance company for this type of visit based on time spent on the phone with your provider. Not all insurance companies cover these visits. Please check with your medical insurance if this type of visit is covered. You will be responsible for any charges that are not paid by your insurance.      E-visits via [employer]:  generally incur a $35.00 fee.  Telephone visits:  Time spent on the phone: *charged based on time that is spent on the phone in increments of 10 minutes. Estimated cost:   5-10 mins $30.00   11-20 mins. $59.00   21-30 mins. $85.00   Use creadst (secure email communication and access to your chart) to send your primary care provider a message or make an appointment. Ask someone on your Team how to sign up for [employer].    For a Price Quote for your services, please call our Consumer Price Line at [phone].    As always, Thank you for trusting us with your health care  needs!    Renee HARRINGTON CMA ([facility])

## 2017-08-26 ENCOUNTER — TELEPHONE (OUTPATIENT)
Dept: NURSING | Facility: CLINIC | Age: 82
End: 2017-08-26

## 2017-08-26 ENCOUNTER — NURSE TRIAGE (OUTPATIENT)
Dept: NURSING | Facility: CLINIC | Age: 82
End: 2017-08-26

## 2017-08-26 NOTE — TELEPHONE ENCOUNTER
Clinic Action Needed:Yes, Please call Rachel from [facility] at     Reason for Call:Requesting the following orders on patient:    1. Approval for Home Care Skilled Nursing x 2 times per week for 3 weeks      Home Health Aide x 2 a week for shower x 3 weeks    2. Requesting to discontinue the following medications: Flexeril, Decadron, and Robaxin.      Routed to:Dr Isaac Care Team    Siena Ray RN  [facility]

## 2017-08-26 NOTE — TELEPHONE ENCOUNTER
Clinic Action Needed:Yes, Please call Rachel from [facility] at   Reason for Call:Requesting the following orders on patient:    1. Approval for Home Care Skilled Nursing x 2 times per week for 3 weeks  Home Health Aide x 2 a week for shower x 3 weeks  2. Requesting to discontinue the following medications: Flexeril, Decadron, and Robaxin.      Routed to:Dr Isaac Care Team    Siena Ray RN  [facility]

## 2017-08-28 NOTE — TELEPHONE ENCOUNTER
Verbal orders given as requested.  Rachel verbalized understanding.      FYI: per Rachel, patient reported that she is not using Flexeril, Decadron, and Robaxin. These meds will be d/c from med list    Bj Rolle RN

## 2017-09-15 ENCOUNTER — TRANSFERRED RECORDS (OUTPATIENT)
Dept: HEALTH INFORMATION MANAGEMENT | Facility: CLINIC | Age: 82
End: 2017-09-15

## 2017-09-26 DIAGNOSIS — F41.9 ANXIETY: ICD-10-CM

## 2017-09-26 NOTE — TELEPHONE ENCOUNTER
citalopram (CELEXA) 20 MG tablet   Last Written Prescription Date: 03/30/2017  Last Fill Quantity: 90, # refills: 1  Last Office Visit with [facility] primary care provider:  08/25/2017        Last PHQ-9 score on record=   PHQ-9 SCORE 1/3/2017   Total Score -   Total Score 3     Rachel Mitchell MA

## 2017-09-28 RX ORDER — CITALOPRAM HYDROBROMIDE 20 MG/1
TABLET ORAL
Qty: 90 TABLET | Refills: 1 | Status: SHIPPED | OUTPATIENT
Start: 2017-09-28 | End: 2018-03-30

## 2017-09-28 NOTE — TELEPHONE ENCOUNTER
Prescription approved per [facility] Refill Protocol.    Signed Prescriptions:                        Disp   Refills    citalopram (CELEXA) 20 MG tablet           90 tab*1        Sig: TAKE 1 TABLET BY MOUTH EVERY DAY  Authorizing Provider: BRANDEE MAHER  Ordering User: OSITO JORGENSEN, RN, BSN

## 2017-11-01 ENCOUNTER — TRANSFERRED RECORDS (OUTPATIENT)
Dept: HEALTH INFORMATION MANAGEMENT | Facility: CLINIC | Age: 82
End: 2017-11-01

## 2017-11-08 ENCOUNTER — ALLIED HEALTH/NURSE VISIT (OUTPATIENT)
Dept: NURSING | Facility: CLINIC | Age: 82
End: 2017-11-08
Payer: COMMERCIAL

## 2017-11-08 DIAGNOSIS — Z23 NEED FOR PROPHYLACTIC VACCINATION AND INOCULATION AGAINST INFLUENZA: Primary | ICD-10-CM

## 2017-11-08 PROCEDURE — 99207 ZZC NO CHARGE NURSE ONLY: CPT

## 2017-11-08 PROCEDURE — 90662 IIV NO PRSV INCREASED AG IM: CPT

## 2017-11-08 PROCEDURE — G0008 ADMIN INFLUENZA VIRUS VAC: HCPCS

## 2017-11-08 NOTE — MR AVS SNAPSHOT
After Visit Summary   11/8/2017    Lisa Ferguson    MRN: [id number]           Patient Information     Date Of Birth          1935        Visit Information        Provider Department      11/8/2017 10:00 AM [facility] ANCILLARY [facility]        Today's Diagnoses     Need for prophylactic vaccination and inoculation against influenza    -  1       Follow-ups after your visit        Who to contact     If you have questions or need follow up information about today's clinic visit or your schedule please contact [facility] directly at [phone].  Normal or non-critical lab and imaging results will be communicated to you by FlatClubhart, letter or phone within 4 business days after the clinic has received the results. If you do not hear from us within 7 days, please contact the clinic through Connect[employer] or phone. If you have a critical or abnormal lab result, we will notify you by phone as soon as possible.  Submit refill requests through [employer]aya or call your pharmacy and they will forward the refill request to us. Please allow 3 business days for your refill to be completed.          Additional Information About Your Visit        MyChart Information     navigaya gives you secure access to your electronic health record. If you see a primary care provider, you can also send messages to your care team and make appointments. If you have questions, please call your primary care clinic.  If you do not have a primary care provider, please call [phone] and they will assist you.        Care EveryWhere ID     This is your Care EveryWhere ID. This could be used by other organizations to access your [place] medical records  [id number]         Blood Pressure from Last 3 Encounters:   08/25/17 136/64   08/01/17 128/66   01/03/17 138/72    Weight from Last 3 Encounters:   08/25/17 253 lb (114.8 kg)   08/01/17 259 lb (117.5 kg)   01/03/17 260 lb (117.9 kg)              We Performed the  Following     FLU VACCINE, INCREASED ANTIGEN, PRESV FREE, AGE 65+ [56912]     Vaccine Administration, Initial [24711]        Primary Care Provider Office Phone # Fax #    Ziyad Isaac [phone] [address] [facility] [address]        Equal Access to Services     [facility] : Hadii aad ku hadasho Soomaali, waaxda luqadaha, qaybta kaalmada adeegyada, waxay josephin haysusann aderonan juddbenita laisatu . So [facility] [phone].    ATENCIÓN: Si habla español, tiene a hogan disposición servicios gratuitos de asistencia lingüística. [facility] [phone].    We comply with applicable federal civil rights laws and [place] laws. We do not discriminate on the basis of race, color, national origin, age, disability, sex, sexual orientation, or gender identity.            Thank you!     Thank you for choosing [facility]  for your care. Our goal is always to provide you with excellent care. Hearing back from our patients is one way we can continue to improve our services. Please take a few minutes to complete the written survey that you may receive in the mail after your visit with us. Thank you!             Your Updated Medication List - Protect others around you: Learn how to safely use, store and throw away your medicines at www.disposemymeds.org.          This list is accurate as of: 11/8/17 10:09 AM.  Always use your most recent med list.                   Brand Name Dispense Instructions for use Diagnosis    acetaminophen 500 MG tablet    TYLENOL     Take 1,000 mg by mouth        amoxicillin 500 MG capsule    AMOXIL     TK 4 CS PO 1 HOUR BEFORE DENTAL APPOINTMENT        aspirin 162 MG EC tablet     90 tablet    Take 1 tablet by mouth daily.        betamethasone dipropionate 0.05 % lotion    DIPROSONE    60 mL    Apply  topically as needed.    Seborrhea       Biotin 10 MG Tabs tablet      Take 10,000 mcg by mouth daily        citalopram 20 MG tablet    celeXA    90 tablet    TAKE 1 TABLET  BY MOUTH EVERY DAY    Anxiety       ezetimibe 10 MG tablet    ZETIA     Take 10 mg by mouth        fish Oil 1200 MG capsule      Take 1 capsule by mouth 2 times daily        HYDROmorphone 2 MG tablet    DILAUDID     Take 1 mg by mouth        ketoconazole 2 % shampoo    NIZORAL    120 mL    Apply  topically daily as needed.    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

## 2017-11-08 NOTE — NURSING NOTE
Prior to injection verified patient identity using patient's name and date of birth.  Ana Rosa CHANEY CMA ([facility])

## 2017-11-09 DIAGNOSIS — E03.9 ACQUIRED HYPOTHYROIDISM: ICD-10-CM

## 2017-11-13 RX ORDER — LEVOTHYROXINE SODIUM 125 UG/1
TABLET ORAL
Qty: 90 TABLET | Refills: 2 | Status: SHIPPED | OUTPATIENT
Start: 2017-11-13 | End: 2018-07-28

## 2017-11-13 NOTE — TELEPHONE ENCOUNTER
Prescription approved per [facility] Refill Protocol.    Signed Prescriptions:                        Disp   Refills    levothyroxine (SYNTHROID/LEVOTHROID) 125 M*90 tab*2        Sig: TAKE 1 TABLET BY MOUTH EVERY DAY  Authorizing Provider: BRANDEE MAHER  Ordering User: LATANYA LY, CIPRIANO

## 2018-04-11 ENCOUNTER — TRANSFERRED RECORDS (OUTPATIENT)
Dept: HEALTH INFORMATION MANAGEMENT | Facility: CLINIC | Age: 83
End: 2018-04-11

## 2018-04-16 ENCOUNTER — TELEPHONE (OUTPATIENT)
Dept: INTERNAL MEDICINE | Facility: CLINIC | Age: 83
End: 2018-04-16

## 2018-04-16 DIAGNOSIS — R04.0 EPISTAXIS: Primary | ICD-10-CM

## 2018-04-16 NOTE — TELEPHONE ENCOUNTER
"Per patient, she has been having daily nosebleeds for the past 6 days except on yesterday.  Episodes occur once a day upon waking up and getting out of bed.  She states that there is no nose picking, or blowing of the nose.  She uses a Cpap and it is \"well humidified\"  Patient is able to control bleeding but sitting up, tilting head forward, and pinching the nose for 10min x 2.  She stated that her nasal drainage is pink tinged without active bleeding or dripping of blood x 1.5 hours before the drainage is clear again.  She stated that she continues on ASA daily for many years now and the frequent nosebleeds are new to her    Please advise if she should come in to see PCP or specialist  No symptoms at present    Bj Rolle RN        "

## 2018-04-16 NOTE — TELEPHONE ENCOUNTER
Called patient and gave number to call and make appoointment and will call if anything comes up.     Renee HARRINGTON CMA ([facility])

## 2018-04-16 NOTE — TELEPHONE ENCOUNTER
Reason for call:  Symptom   Symptom or request: Bloody nose    Duration (how long have symptoms been present): the last 6 days  Have you been treated for this before? No    Additional comments: Patient states she has never had a bloody nose in her 82 years.  She said the bloody nose starts when she puts her feet on the floor.  It takes her about 1 and 1/2 hours to get it under control.  She also had one at about 3 a.m., in the morning and woke up because of the gurgling sound.    Patient would like call back to discuss further.    Phone number to reach patient:  Home number on file [phone] (home)    Best Time:  Anytime    Can we leave a detailed message on this number?  YES

## 2018-04-24 ENCOUNTER — OFFICE VISIT (OUTPATIENT)
Dept: OTOLARYNGOLOGY | Facility: CLINIC | Age: 83
End: 2018-04-24
Payer: COMMERCIAL

## 2018-04-24 VITALS
BODY MASS INDEX: 41.37 KG/M2 | DIASTOLIC BLOOD PRESSURE: 67 MMHG | SYSTOLIC BLOOD PRESSURE: 127 MMHG | OXYGEN SATURATION: 94 % | HEIGHT: 66 IN | WEIGHT: 257.4 LBS | RESPIRATION RATE: 20 BRPM | HEART RATE: 73 BPM

## 2018-04-24 DIAGNOSIS — R04.0 EPISTAXIS: Primary | ICD-10-CM

## 2018-04-24 PROCEDURE — 99203 OFFICE O/P NEW LOW 30 MIN: CPT | Performed by: OTOLARYNGOLOGY

## 2018-04-24 NOTE — PATIENT INSTRUCTIONS
General Scheduling Information  To schedule your CT/MRI scan, please contact [place] [facility] at [phone]   [address] [place] [address]    To schedule your Surgery, please contact our Specialty Schedulers at [phone]    ENT Clinic Locations Clinic Hours Telephone Number     [facility]  [address] [place] [address]   Tuesday:       8:00am -- 4:00pm    Wednesday:  8:00am - 4:00pm   To schedule an appointment with   Dr. Johnson,   please contact our   Specialty Scheduling Department at:     [phone]       Cindi Gaitan  [address] [place] [address]   Friday:          8:00am - 4:00pm         Urgent Care Locations Clinic Hours Telephone Numbers     [facility] Nix  [address] [place] [address]     Monday-Friday:     11:00pm - 9:00pm    Saturday-Sunday:  9:00am - 5:00pm   [phone]     [facility] Gaitan  [address] [place] [address]     Monday-Friday:      5:00pm - 9:00pm     Saturday-Sunday:  9:00am - 5:00pm   [phone]

## 2018-04-24 NOTE — LETTER
4/24/2018         RE: Lisa Ferguson  [address]  [place] [address]        Dear Colleague,    Thank you for referring your patient, Lisa Ferguson, to the [facility]. Please see a copy of my visit note below.    Chief Complaint - Epistaxis    History of Present Illness - Lisa Ferguson is a 82 year old female presents with approximately 2 weeks of epistaxis. It occurs on the right side. It usually only comes out the front of the nose, but it has ran down the back of the throat at times. The bleeding occurs approximately daily, but has stopped the last few days. The patient can get the bleeding to stop by pressure. The patient has never gone to the emergency department or required a blood transfusion due to nose bleeding. The patient has no personal or family history of bleeding disorders. The patient takes 1/2 aspirin daily as a blood-thinning medication. The patient has tried moisture in her CPAP. Never had nose bleeds before. Had a recent eye aneurysm treated.     Past Medical History -   Patient Active Problem List   Diagnosis     Obesity     PUD (peptic ulcer disease)     Hyperlipidemia with target LDL less than 70     Mitral regurgitation     DJD (degenerative joint disease)     Fibroadenoma of breast     Diverticulosis     Sciatica neuralgia     IBS (irritable bowel syndrome)     RBBB (right bundle branch block)     GLEN (obstructive sleep apnea)     Balance disorder     Anxiety     Advanced directives, counseling/discussion     Ventricular tachycardia (paroxysmal) (H)     Corneal ulcer of left eye     Blepharitis     Hypercalcemia     Disorder of skin or subcutaneous tissue     Scar condition and fibrosis of skin     OA (osteoarthritis) of knee - bilateral     Fracture of proximal humerus     CHF (congestive heart failure) (H)     Shoulder fracture     Abnormal glucose     Coronary artery disease involving coronary bypass graft with other forms of angina pectoris     S/p total  knee replacement, bilateral     Acquired hypothyroidism     Essential hypertension with goal blood pressure less than 130/80     Morbid obesity (H)       Current Medications -   Current Outpatient Prescriptions:      acetaminophen (TYLENOL) 500 MG tablet, Take 1,000 mg by mouth, Disp: , Rfl:      amoxicillin (AMOXIL) 500 MG capsule, TK 4 CS PO 1 HOUR BEFORE DENTAL APPOINTMENT, Disp: , Rfl: 3     aspirin 162 MG EC tablet, Take 1 tablet by mouth daily., Disp: 90 tablet, Rfl: 3     betamethasone dipropionate (DIPROSONE) 0.05 % lotion, Apply  topically as needed., Disp: 60 mL, Rfl: 1     Biotin 10 MG TABS tablet, Take 10,000 mcg by mouth daily, Disp: , Rfl:      Cholecalciferol (VITAMIN D) 2000 UNITS tablet, Take 1 tablet by mouth daily., Disp: , Rfl:      citalopram (CELEXA) 20 MG tablet, TAKE 1 TABLET BY MOUTH EVERY DAY, Disp: 90 tablet, Rfl: 0     ezetimibe (ZETIA) 10 MG tablet, Take 10 mg by mouth, Disp: , Rfl:      HYDROmorphone (DILAUDID) 2 MG tablet, Take 1 mg by mouth, Disp: , Rfl:      ketoconazole (NIZORAL) 2 % shampoo, Apply  topically daily as needed., Disp: 120 mL, Rfl: 1     levothyroxine (SYNTHROID/LEVOTHROID) 125 MCG tablet, TAKE 1 TABLET BY MOUTH EVERY DAY, Disp: 90 tablet, Rfl: 2     LORazepam (ATIVAN) 0.5 MG tablet, Take 1 tablet (0.5 mg) by mouth every 8 hours as needed for anxiety, Disp: 20 tablet, Rfl: 0     losartan-hydrochlorothiazide (HYZAAR) 100-25 MG per tablet, TAKE 1 TABLET BY MOUTH DAILY, Disp: 90 tablet, Rfl: 0     metoprolol tartrate (LOPRESSOR) 50 MG tablet, TAKE 1 TABLET BY MOUTH TWICE DAILY, Disp: 180 tablet, Rfl: 1     Omega-3 Fatty Acids (FISH OIL) 1200 MG CAPS, Take 1 capsule by mouth 2 times daily, Disp: , Rfl:      order for DME, , Disp: , Rfl:     Allergies -   Allergies   Allergen Reactions     Atorvastatin Other (See Comments)     Myalgia     Cortisone      Insomnia, burning in chest, flushed     Nickel      rash     Tetracycline Diarrhea     Vicodin [Hydrocodone-Acetaminophen]  "Other (See Comments)     Hallucinations       Social History -   Social History     Social History     Marital status:      Spouse name: N/A     Number of children: N/A     Years of education: N/A     Social History Main Topics     Smoking status: Former Smoker     Years: 20.00     Types: Cigarettes     Quit date: 7/11/1978     Smokeless tobacco: Never Used     Alcohol use Yes      Comment: Rare alcohol use.     Drug use: No     Sexual activity: Not Currently     Partners: Male     Other Topics Concern     Not on file     Social History Narrative       Family History -   Family History   Problem Relation Age of Onset     C.A.D. Mother      Arthritis Mother      Cardiovascular Mother      HEART DISEASE Mother      Obesity Mother      Thyroid Disease Mother      C.A.D. Father      DIABETES Father      Hypertension Father      Alcohol/Drug Father      Alzheimer Disease Father      Cardiovascular Father      Circulatory Father      GASTROINTESTINAL DISEASE Father      HEART DISEASE Father      Lipids Father      Obesity Father      Cardiovascular Maternal Grandmother      Depression Maternal Grandmother      Obesity Maternal Grandmother      Thyroid Disease Maternal Grandmother      Cardiovascular Maternal Grandfather      HEART DISEASE Maternal Grandfather      Obesity Maternal Grandfather      Obesity Paternal Grandmother      DIABETES Paternal Grandfather      Alcohol/Drug Paternal Grandfather      Obesity Paternal Grandfather      Breast Cancer Sister      CANCER Sister      Obesity Sister      Thyroid Disease Sister      Alcohol/Drug Son      Allergies Son      Allergies Daughter      Depression Sister      Eye Disorder Sister      GASTROINTESTINAL DISEASE Sister      Thyroid Disease Sister        Review of Systems - As per HPI and PMHx, otherwise 7 system review of the head and neck negative.    Physical Exam  /67  Pulse 73  Resp 20  Ht 1.676 m (5' 6\")  Wt 116.8 kg (257 lb 6.4 oz)  SpO2 94%  " BMI 41.55 kg/m2  General - The patient is in no distress.  Alert and oriented x3, answers questions and cooperates with examination appropriately.   Voice and Breathing - The patient was breathing comfortably without the use of accessory muscles. There was no wheezing, stridor, or stertor.  The patients voice was clear and strong, with no dysphonia.  Head and Face - Normocephalic and atraumatic.    Eyes - Extraocular movements intact. Sclera were not icteric or injected, conjunctiva were pink and moist.  Neurologic - Cranial nerves II-XII are grossly intact. Specifically, the facial nerve is intact, House-Brackmann grade 1 of 6.   Nose - No significant external deformity. The nasal mucosa along the anterior septum on the right side show a small blood vessel superficially located. The left side was mostly normal.  The septum was midline, turbinates are of normal size and position.  No polyps, masses, or purulence.  Mouth - Examination of the oral cavity showed pink, healthy oral mucosa. No lesions or ulcerations noted.  The tongue was mobile and protrudes midline.   Oropharynx - The walls of the oropharynx were smooth, symmetric, and had no lesions or ulcerations. The uvula was midline and the palate raised symmetrically. No blood.  Neck -  Large neck. Palpation of the occipital, submental, submandibular, internal jugular chain, and supraclavicular nodes did not demonstrate any abnormal lymph nodes or masses. The parotid glands were without masses. Palpation of the thyroid was soft and smooth, with no nodules or goiter appreciated.  The trachea was midline.    A/P - Lisa Ferguson is a 82 year old female with epistaxis. This is almost certainly coming from the right anterior septum. We discussed restrictions on manipulation and picking of the nose. I explained using vaseline twice daily to the anterior septum, nasal saline spray 3-5 times per day, and a humidifier at the bedside at night.    I also explained  applying digital pressure to the nasal tip for a minimum of 15-20 minutes to stop a nose bleed. If that doesn't stop the bleeding the patient needs to proceed to the nearest emergency department. Return for cautery if this continues to bleed.     Xander Johnson MD  Otolaryngology  [facility]      Again, thank you for allowing me to participate in the care of your patient.        Sincerely,        Xander Johnson MD

## 2018-04-24 NOTE — MR AVS SNAPSHOT
After Visit Summary   4/24/2018    Lisa Ferguson    MRN: [id number]           Patient Information     Date Of Birth          1935        Visit Information        Provider Department      4/24/2018 1:45 PM Xander Johnson MD [facility]        Today's Diagnoses     Epistaxis    -  1      Care Instructions    General Scheduling Information  To schedule your CT/MRI scan, please contact [place] [facility] at [phone] [address] [place] [address]    To schedule your Surgery, please contact our Specialty Schedulers at [phone]    ENT Clinic Locations Clinic Hours Telephone Number     [facility] [place] [place]  [address]  [place] [address]   Tuesday:       8:00am -- 4:00pm    Wednesday:  8:00am - 4:00pm   To schedule an appointment with   Dr. Johnson,   please contact our   Specialty Scheduling Department at:     [phone]       [facility]  [address] [place] [address]   Friday:          8:00am - 4:00pm         Urgent Care Locations Clinic Hours Telephone Numbers     [facility] [place]  [address] [place] [address]     Monday-Friday:     11:00pm - 9:00pm    Saturday-Sunday:  9:00am - 5:00pm   [phone]     [facility] [place]  [address]   [place] [address]     Monday-Friday:      5:00pm - 9:00pm     Saturday-Sunday:  9:00am - 5:00pm   [phone]                 Follow-ups after your visit        Who to contact     If you have questions or need follow up information about today's clinic visit or your schedule please contact [facility] directly at [phone].  Normal or non-critical lab and imaging results will be communicated to you by MyChart, letter or phone within 4 business days after the clinic has received the results. If you do not hear from us within 7 days, please contact the clinic through MyChart or phone. If you have a critical or abnormal lab result, we will notify you by phone as soon  "as possible.  Submit refill requests through [employer] or call your pharmacy and they will forward the refill request to us. Please allow 3 business days for your refill to be completed.          Additional Information About Your Visit        [employer] Information     [employer] gives you secure access to your electronic health record. If you see a primary care provider, you can also send messages to your care team and make appointments. If you have questions, please call your primary care clinic.  If you do not have a primary care provider, please call [phone] and they will assist you.        Care EveryWhere ID     This is your Care EveryWhere ID. This could be used by other organizations to access your [place] medical records  [id number]        Your Vitals Were     Pulse Respirations Height Pulse Oximetry BMI (Body Mass Index)       73 20 1.676 m (5' 6\") 94% 41.55 kg/m2        Blood Pressure from Last 3 Encounters:   04/24/18 127/67   08/25/17 136/64   08/01/17 128/66    Weight from Last 3 Encounters:   04/24/18 116.8 kg (257 lb 6.4 oz)   08/25/17 114.8 kg (253 lb)   08/01/17 117.5 kg (259 lb)              Today, you had the following     No orders found for display       Primary Care Provider Office Phone # Fax #    Ziyad Isaac [phone] [address] [address]        Equal Access to Services     [facility]: Hadii aad ku hadasho Soomaali, waaxda luqadaha, qaybta kaalmada adeegyada, mikaela calero . So [facility] [phone].    ATENCIÓN: Si habla español, tiene a hogan disposición servicios gratuitos de asistencia lingüística. Llame al [phone].    We comply with applicable federal civil rights laws and [place] laws. We do not discriminate on the basis of race, color, national origin, age, disability, sex, sexual orientation, or gender identity.            Thank you!     Thank you for choosing [facility]  for your care. Our goal " is always to provide you with excellent care. Hearing back from our patients is one way we can continue to improve our services. Please take a few minutes to complete the written survey that you may receive in the mail after your visit with us. Thank you!             Your Updated Medication List - Protect others around you: Learn how to safely use, store and throw away your medicines at www.disposemymeds.org.          This list is accurate as of 4/24/18  2:11 PM.  Always use your most recent med list.                   Brand Name Dispense Instructions for use Diagnosis    acetaminophen 500 MG tablet    TYLENOL     Take 1,000 mg by mouth        amoxicillin 500 MG capsule    AMOXIL     TK 4 CS PO 1 HOUR BEFORE DENTAL APPOINTMENT        aspirin 162 MG EC tablet     90 tablet    Take 1 tablet by mouth daily.        betamethasone dipropionate 0.05 % lotion    DIPROSONE    60 mL    Apply  topically as needed.    Seborrhea       Biotin 10 MG Tabs tablet      Take 10,000 mcg by mouth twice a week        citalopram 20 MG tablet    celeXA    90 tablet    TAKE 1 TABLET BY MOUTH EVERY DAY    Anxiety       ezetimibe 10 MG tablet    ZETIA     Take 10 mg by mouth        fish Oil 1200 MG capsule      Take 1 capsule by mouth 2 times daily        HYDROmorphone 2 MG tablet    DILAUDID     Take 1 mg by mouth        ketoconazole 2 % shampoo    NIZORAL    120 mL    Apply  topically daily as needed.    Seborrhea       levothyroxine 125 MCG tablet    SYNTHROID/LEVOTHROID    90 tablet    TAKE 1 TABLET BY MOUTH EVERY DAY    Acquired hypothyroidism       LORazepam 0.5 MG tablet    ATIVAN    20 tablet    Take 1 tablet (0.5 mg) by mouth every 8 hours as needed for anxiety    Anxiety       losartan-hydrochlorothiazide 100-25 MG per tablet    HYZAAR    90 tablet    TAKE 1 TABLET BY MOUTH DAILY    Essential hypertension with goal blood pressure less than 130/80       metoprolol tartrate 50 MG tablet    LOPRESSOR    180 tablet    TAKE 1 TABLET BY  MOUTH TWICE DAILY    Hypertension goal BP (blood pressure) < 130/80       order for DME           vitamin D 2000 units tablet      Take 1 tablet by mouth daily.

## 2018-04-24 NOTE — PROGRESS NOTES
Chief Complaint - Epistaxis    History of Present Illness - Lisa Ferguson is a 82 year old female presents with approximately 2 weeks of epistaxis. It occurs on the right side. It usually only comes out the front of the nose, but it has ran down the back of the throat at times. The bleeding occurs approximately daily, but has stopped the last few days. The patient can get the bleeding to stop by pressure. The patient has never gone to the emergency department or required a blood transfusion due to nose bleeding. The patient has no personal or family history of bleeding disorders. The patient takes 1/2 aspirin daily as a blood-thinning medication. The patient has tried moisture in her CPAP. Never had nose bleeds before. Had a recent eye aneurysm treated.     Past Medical History -   Patient Active Problem List   Diagnosis     Obesity     PUD (peptic ulcer disease)     Hyperlipidemia with target LDL less than 70     Mitral regurgitation     DJD (degenerative joint disease)     Fibroadenoma of breast     Diverticulosis     Sciatica neuralgia     IBS (irritable bowel syndrome)     RBBB (right bundle branch block)     GLEN (obstructive sleep apnea)     Balance disorder     Anxiety     Advanced directives, counseling/discussion     Ventricular tachycardia (paroxysmal) (H)     Corneal ulcer of left eye     Blepharitis     Hypercalcemia     Disorder of skin or subcutaneous tissue     Scar condition and fibrosis of skin     OA (osteoarthritis) of knee - bilateral     Fracture of proximal humerus     CHF (congestive heart failure) (H)     Shoulder fracture     Abnormal glucose     Coronary artery disease involving coronary bypass graft with other forms of angina pectoris     S/p total knee replacement, bilateral     Acquired hypothyroidism     Essential hypertension with goal blood pressure less than 130/80     Morbid obesity (H)       Current Medications -   Current Outpatient Prescriptions:      acetaminophen (TYLENOL) 500  MG tablet, Take 1,000 mg by mouth, Disp: , Rfl:      amoxicillin (AMOXIL) 500 MG capsule, TK 4 CS PO 1 HOUR BEFORE DENTAL APPOINTMENT, Disp: , Rfl: 3     aspirin 162 MG EC tablet, Take 1 tablet by mouth daily., Disp: 90 tablet, Rfl: 3     betamethasone dipropionate (DIPROSONE) 0.05 % lotion, Apply  topically as needed., Disp: 60 mL, Rfl: 1     Biotin 10 MG TABS tablet, Take 10,000 mcg by mouth daily, Disp: , Rfl:      Cholecalciferol (VITAMIN D) 2000 UNITS tablet, Take 1 tablet by mouth daily., Disp: , Rfl:      citalopram (CELEXA) 20 MG tablet, TAKE 1 TABLET BY MOUTH EVERY DAY, Disp: 90 tablet, Rfl: 0     ezetimibe (ZETIA) 10 MG tablet, Take 10 mg by mouth, Disp: , Rfl:      HYDROmorphone (DILAUDID) 2 MG tablet, Take 1 mg by mouth, Disp: , Rfl:      ketoconazole (NIZORAL) 2 % shampoo, Apply  topically daily as needed., Disp: 120 mL, Rfl: 1     levothyroxine (SYNTHROID/LEVOTHROID) 125 MCG tablet, TAKE 1 TABLET BY MOUTH EVERY DAY, Disp: 90 tablet, Rfl: 2     LORazepam (ATIVAN) 0.5 MG tablet, Take 1 tablet (0.5 mg) by mouth every 8 hours as needed for anxiety, Disp: 20 tablet, Rfl: 0     losartan-hydrochlorothiazide (HYZAAR) 100-25 MG per tablet, TAKE 1 TABLET BY MOUTH DAILY, Disp: 90 tablet, Rfl: 0     metoprolol tartrate (LOPRESSOR) 50 MG tablet, TAKE 1 TABLET BY MOUTH TWICE DAILY, Disp: 180 tablet, Rfl: 1     Omega-3 Fatty Acids (FISH OIL) 1200 MG CAPS, Take 1 capsule by mouth 2 times daily, Disp: , Rfl:      order for DME, , Disp: , Rfl:     Allergies -   Allergies   Allergen Reactions     Atorvastatin Other (See Comments)     Myalgia     Cortisone      Insomnia, burning in chest, flushed     Nickel      rash     Tetracycline Diarrhea     Vicodin [Hydrocodone-Acetaminophen] Other (See Comments)     Hallucinations       Social History -   Social History     Social History     Marital status:      Spouse name: N/A     Number of children: N/A     Years of education: N/A     Social History Main Topics      "Smoking status: Former Smoker     Years: 20.00     Types: Cigarettes     Quit date: 7/11/1978     Smokeless tobacco: Never Used     Alcohol use Yes      Comment: Rare alcohol use.     Drug use: No     Sexual activity: Not Currently     Partners: Male     Other Topics Concern     Not on file     Social History Narrative       Family History -   Family History   Problem Relation Age of Onset     C.A.D. Mother      Arthritis Mother      Cardiovascular Mother      HEART DISEASE Mother      Obesity Mother      Thyroid Disease Mother      C.A.D. Father      DIABETES Father      Hypertension Father      Alcohol/Drug Father      Alzheimer Disease Father      Cardiovascular Father      Circulatory Father      GASTROINTESTINAL DISEASE Father      HEART DISEASE Father      Lipids Father      Obesity Father      Cardiovascular Maternal Grandmother      Depression Maternal Grandmother      Obesity Maternal Grandmother      Thyroid Disease Maternal Grandmother      Cardiovascular Maternal Grandfather      HEART DISEASE Maternal Grandfather      Obesity Maternal Grandfather      Obesity Paternal Grandmother      DIABETES Paternal Grandfather      Alcohol/Drug Paternal Grandfather      Obesity Paternal Grandfather      Breast Cancer Sister      CANCER Sister      Obesity Sister      Thyroid Disease Sister      Alcohol/Drug Son      Allergies Son      Allergies Daughter      Depression Sister      Eye Disorder Sister      GASTROINTESTINAL DISEASE Sister      Thyroid Disease Sister        Review of Systems - As per HPI and PMHx, otherwise 7 system review of the head and neck negative.    Physical Exam  /67  Pulse 73  Resp 20  Ht 1.676 m (5' 6\")  Wt 116.8 kg (257 lb 6.4 oz)  SpO2 94%  BMI 41.55 kg/m2  General - The patient is in no distress.  Alert and oriented x3, answers questions and cooperates with examination appropriately.   Voice and Breathing - The patient was breathing comfortably without the use of accessory " muscles. There was no wheezing, stridor, or stertor.  The patients voice was clear and strong, with no dysphonia.  Head and Face - Normocephalic and atraumatic.    Eyes - Extraocular movements intact. Sclera were not icteric or injected, conjunctiva were pink and moist.  Neurologic - Cranial nerves II-XII are grossly intact. Specifically, the facial nerve is intact, House-Brackmann grade 1 of 6.   Nose - No significant external deformity. The nasal mucosa along the anterior septum on the right side show a small blood vessel superficially located. The left side was mostly normal.  The septum was midline, turbinates are of normal size and position.  No polyps, masses, or purulence.  Mouth - Examination of the oral cavity showed pink, healthy oral mucosa. No lesions or ulcerations noted.  The tongue was mobile and protrudes midline.   Oropharynx - The walls of the oropharynx were smooth, symmetric, and had no lesions or ulcerations. The uvula was midline and the palate raised symmetrically. No blood.  Neck -  Large neck. Palpation of the occipital, submental, submandibular, internal jugular chain, and supraclavicular nodes did not demonstrate any abnormal lymph nodes or masses. The parotid glands were without masses. Palpation of the thyroid was soft and smooth, with no nodules or goiter appreciated.  The trachea was midline.    A/P - Lisa Ferguson is a 82 year old female with epistaxis. This is almost certainly coming from the right anterior septum. We discussed restrictions on manipulation and picking of the nose. I explained using vaseline twice daily to the anterior septum, nasal saline spray 3-5 times per day, and a humidifier at the bedside at night.    I also explained applying digital pressure to the nasal tip for a minimum of 15-20 minutes to stop a nose bleed. If that doesn't stop the bleeding the patient needs to proceed to the nearest emergency department. Return for cautery if this continues to bleed.      Xander Johnson MD  Otolaryngology  [facility]

## 2018-07-09 ENCOUNTER — OFFICE VISIT (OUTPATIENT)
Dept: FAMILY MEDICINE | Facility: CLINIC | Age: 83
End: 2018-07-09
Payer: COMMERCIAL

## 2018-07-09 VITALS
DIASTOLIC BLOOD PRESSURE: 64 MMHG | TEMPERATURE: 97 F | HEART RATE: 72 BPM | SYSTOLIC BLOOD PRESSURE: 130 MMHG | RESPIRATION RATE: 16 BRPM | BODY MASS INDEX: 40.82 KG/M2 | WEIGHT: 254 LBS | HEIGHT: 66 IN | OXYGEN SATURATION: 95 %

## 2018-07-09 DIAGNOSIS — N39.0 URINARY TRACT INFECTION WITHOUT HEMATURIA, SITE UNSPECIFIED: Primary | ICD-10-CM

## 2018-07-09 PROCEDURE — 99213 OFFICE O/P EST LOW 20 MIN: CPT | Performed by: INTERNAL MEDICINE

## 2018-07-09 ASSESSMENT — ANXIETY QUESTIONNAIRES
GAD7 TOTAL SCORE: 3
6. BECOMING EASILY ANNOYED OR IRRITABLE: NOT AT ALL
IF YOU CHECKED OFF ANY PROBLEMS ON THIS QUESTIONNAIRE, HOW DIFFICULT HAVE THESE PROBLEMS MADE IT FOR YOU TO DO YOUR WORK, TAKE CARE OF THINGS AT HOME, OR GET ALONG WITH OTHER PEOPLE: NOT DIFFICULT AT ALL
7. FEELING AFRAID AS IF SOMETHING AWFUL MIGHT HAPPEN: NOT AT ALL
1. FEELING NERVOUS, ANXIOUS, OR ON EDGE: SEVERAL DAYS
2. NOT BEING ABLE TO STOP OR CONTROL WORRYING: SEVERAL DAYS
3. WORRYING TOO MUCH ABOUT DIFFERENT THINGS: SEVERAL DAYS
5. BEING SO RESTLESS THAT IT IS HARD TO SIT STILL: NOT AT ALL

## 2018-07-09 ASSESSMENT — PATIENT HEALTH QUESTIONNAIRE - PHQ9: 5. POOR APPETITE OR OVEREATING: NOT AT ALL

## 2018-07-09 NOTE — MR AVS SNAPSHOT
After Visit Summary   7/9/2018    Lisa Ferguson    MRN: [id number]           Patient Information     Date Of Birth          1935        Visit Information        Provider Department      7/9/2018 3:50 PM Ziyad Isaac MD [facility]        Today's Diagnoses     Urinary tract infection without hematuria, site unspecified    -  1      Care Instructions    We discussed urinary tract infections and sometimes it's a good idea to recheck urine analysis 10-14 days after completion of all antibiotics [ to exclude the possibility of a bladder colonization picture ]        [facility]    If you have any questions regarding to your visit please contact your care team:     Team Pink:   Clinic Hours Telephone Number   Internal Medicine:  Dr. Patty Shaw NP       7am-7pm  Monday - Thursday   7am-5pm  Fridays  [phone]  (Appointment scheduling available 24/7)    Questions about your recent visit?  Team Line  [phone]   Urgent Care - [place] - 11am-9pm Monday-Friday Saturday-Sunday- 9am-5pm   [place] - 5pm-9pm Monday-Friday Saturday-Sunday- 9am-5pm  [phone] - [place]  [phone] - [place]       What options do I have for a visit other than an office visit? We offer electronic visits (e-visits) and telephone visits, when medically appropriate.  Please check with your medical insurance to see if these types of visits are covered, as you will be responsible for any charges that are not paid by your insurance.      You can use [employer] (secure electronic communication) to access to your chart, send your primary care provider a message, or make an appointment. Ask a team member how to get started.     For a price quote for your services, please call our Consumer Price Line at [phone] or our Imaging Cost estimation line at [phone] (for imaging tests).  Renee HARRINGTON CMA ([facility])             "Follow-ups after your visit        Future tests that were ordered for you today     Open Future Orders        Priority Expected Expires Ordered    UA with Microscopic reflex to Culture Routine 7/9/2018 7/9/2019 7/9/2018            Who to contact     If you have questions or need follow up information about today's clinic visit or your schedule please contact [facility] [place] directly at [phone].  Normal or non-critical lab and imaging results will be communicated to you by [facility] [facility], letter or phone within 4 business days after the clinic has received the results. If you do not hear from us within 7 days, please contact the clinic through [email or web address] or phone. If you have a critical or abnormal lab result, we will notify you by phone as soon as possible.  Submit refill requests through [employer] or call your pharmacy and they will forward the refill request to us. Please allow 3 business days for your refill to be completed.          Additional Information About Your Visit        [employer] Information     [employer] gives you secure access to your electronic health record. If you see a primary care provider, you can also send messages to your care team and make appointments. If you have questions, please call your primary care clinic.  If you do not have a primary care provider, please call [phone] and they will assist you.        Care EveryWhere ID     This is your Care EveryWhere ID. This could be used by other organizations to access your [facility] medical records  [id number]        Your Vitals Were     Pulse Temperature Respirations Height Pulse Oximetry BMI (Body Mass Index)    72 97  F (36.1  C) (Oral) 16 5' 6\" (1.676 m) 95% 41 kg/m2       Blood Pressure from Last 3 Encounters:   07/09/18 130/64   04/24/18 127/67   08/25/17 136/64    Weight from Last 3 Encounters:   07/09/18 254 lb (115.2 kg)   04/24/18 257 lb 6.4 oz (116.8 kg)   08/25/17 253 lb (114.8 kg)               Primary Care Provider Office Phone " # Fax #    Ziyad Isaac [phone] [address] [facility]  [place] [address]        Equal Access to Services     BONITACECI JENA : Hadchilo michelle andrade lilliamo Sosondra, waaxda luqadaha, qaybta kaalmada madyson, mikaela millard laManasaiesha carter. So [facility] [phone].    ATENCIÓN: Si habla español, tiene a hogan disposición servicios gratuitos de asistencia lingüística. Llame al [phone].    We comply with applicable federal civil rights laws and [place] laws. We do not discriminate on the basis of race, color, national origin, age, disability, sex, sexual orientation, or gender identity.            Thank you!     Thank you for choosing [facility]  for your care. Our goal is always to provide you with excellent care. Hearing back from our patients is one way we can continue to improve our services. Please take a few minutes to complete the written survey that you may receive in the mail after your visit with us. Thank you!             Your Updated Medication List - Protect others around you: Learn how to safely use, store and throw away your medicines at www.disposemymeds.org.          This list is accurate as of 7/9/18  4:29 PM.  Always use your most recent med list.                   Brand Name Dispense Instructions for use Diagnosis    acetaminophen 500 MG tablet    TYLENOL     Take 1,000 mg by mouth        amoxicillin 500 MG capsule    AMOXIL     TK 4 CS PO 1 HOUR BEFORE DENTAL APPOINTMENT        aspirin 162 MG EC tablet     90 tablet    Take 1 tablet by mouth daily.        betamethasone dipropionate 0.05 % lotion    DIPROSONE    60 mL    Apply  topically as needed.    Seborrhea       Biotin 10 MG Tabs tablet      Take 10,000 mcg by mouth twice a week        citalopram 20 MG tablet    celeXA    90 tablet    TAKE 1 TABLET BY MOUTH EVERY DAY    Anxiety       ezetimibe 10 MG tablet    ZETIA     Take 10 mg by mouth        fish Oil 1200 MG capsule      Take 1 capsule by mouth 2  times daily        HYDROmorphone 2 MG tablet    DILAUDID     Take 1 mg by mouth        ketoconazole 2 % shampoo    NIZORAL    120 mL    Apply  topically daily as needed.    Seborrhea       levothyroxine 125 MCG tablet    SYNTHROID/LEVOTHROID    90 tablet    TAKE 1 TABLET BY MOUTH EVERY DAY    Acquired hypothyroidism       LORazepam 0.5 MG tablet    ATIVAN    20 tablet    Take 1 tablet (0.5 mg) by mouth every 8 hours as needed for anxiety    Anxiety       losartan-hydrochlorothiazide 100-25 MG per tablet    HYZAAR    90 tablet    TAKE 1 TABLET BY MOUTH DAILY    Essential hypertension with goal blood pressure less than 130/80       metoprolol tartrate 50 MG tablet    LOPRESSOR    180 tablet    TAKE 1 TABLET BY MOUTH TWICE DAILY    Hypertension goal BP (blood pressure) < 130/80       order for DME           vitamin D 2000 units tablet      Take 1 tablet by mouth daily.

## 2018-07-09 NOTE — LETTER
My Depression Action Plan  Name: Lisa Ferguson   Date of Birth 1935  Date: 7/9/2018    My doctor: Ziyad Isaac   My clinic: [address]  [place] [address]  [phone]          GREEN    ZONE   Good Control    What it looks like:     Things are going generally well. You have normal up s and down s. You may even feel depressed from time to time, but bad moods usually last less than a day.   What you need to do:  1. Continue to care for yourself (see self care plan)  2. Check your depression survival kit and update it as needed  3. Follow your physician s recommendations including any medication.  4. Do not stop taking medication unless you consult with your physician first.           YELLOW         ZONE Getting Worse    What it looks like:     Depression is starting to interfere with your life.     It may be hard to get out of bed; you may be starting to isolate yourself from others.    Symptoms of depression are starting to last most all day and this has happened for several days.     You may have suicidal thoughts but they are not constant.   What you need to do:     1. Call your care team, your response to treatment will improve if you keep your care team informed of your progress. Yellow periods are signs an adjustment may need to be made.     2. Continue your self-care, even if you have to fake it!    3. Talk to someone in your support network    4. Open up your depression survival kit           RED    ZONE Medical Alert - Get Help    What it looks like:     Depression is seriously interfering with your life.     You may experience these or other symptoms: You can t get out of bed most days, can t work or engage in other necessary activities, you have trouble taking care of basic hygiene, or basic responsibilities, thoughts of suicide or death that will not go away, self-injurious behavior.     What you need to do:  1. Call your care team and request a  same-day appointment. If they are not available (weekends or after hours) call your local crisis line, emergency room or 911.            Depression Self Care Plan / Survival Kit    Self-Care for Depression  Here s the deal. Your body and mind are really not as separate as most people think.  What you do and think affects how you feel and how you feel influences what you do and think. This means if you do things that people who feel good do, it will help you feel better.  Sometimes this is all it takes.  There is also a place for medication and therapy depending on how severe your depression is, so be sure to consult with your medical provider and/ or Behavioral Health Consultant if your symptoms are worsening or not improving.     In order to better manage my stress, I will:    Exercise  Get some form of exercise, every day. This will help reduce pain and release endorphins, the  feel good  chemicals in your brain. This is almost as good as taking antidepressants!  This is not the same as joining a gym and then never going! (they count on that by the way ) It can be as simple as just going for a walk or doing some gardening, anything that will get you moving.      Hygiene   Maintain good hygiene (Get out of bed in the morning, Make your bed, Brush your teeth, Take a shower, and Get dressed like you were going to work, even if you are unemployed).  If your clothes don't fit try to get ones that do.    Diet  I will strive to eat foods that are good for me, drink plenty of water, and avoid excessive sugar, caffeine, alcohol, and other mood-altering substances.  Some foods that are helpful in depression are: complex carbohydrates, B vitamins, flaxseed, fish or fish oil, fresh fruits and vegetables.    Psychotherapy  I agree to participate in Individual Therapy (if recommended).    Medication  If prescribed medications, I agree to take them.  Missing doses can result in serious side effects.  I understand that drinking  alcohol, or other illicit drug use, may cause potential side effects.  I will not stop my medication abruptly without first discussing it with my provider.    Staying Connected With Others  I will stay in touch with my friends, family members, and my primary care provider/team.    Use your imagination  Be creative.  We all have a creative side; it doesn t matter if it s oil painting, sand castles, or mud pies! This will also kick up the endorphins.    Witness Beauty  (AKA stop and smell the roses) Take a look outside, even in mid-winter. Notice colors, textures. Watch the squirrels and birds.     Service to others  Be of service to others.  There is always someone else in need.  By helping others we can  get out of ourselves  and remember the really important things.  This also provides opportunities for practicing all the other parts of the program.    Humor  Laugh and be silly!  Adjust your TV habits for less news and crime-drama and more comedy.    Control your stress  Try breathing deep, massage therapy, biofeedback, and meditation. Find time to relax each day.     My support system    Clinic Contact:  Phone number:    Contact 1:  Phone number:    Contact 2:  Phone number:    Orthodoxy/:  Phone number:    Therapist:  Phone number:    Local crisis center:    Phone number:    Other community support:  Phone number:

## 2018-07-09 NOTE — PROGRESS NOTES
SUBJECTIVE:   Lisa Ferguson is a 82 year old female who presents to clinic today for the following health issues:    Urinary tract infection without hematuria, site unspecified      ED/UC Followup:    Facility:  [facility]   Date of visit: 07/05/2018  Reason for visit: UTI  Current Status: better still tired with cramping       Symptoms - see care everywhere. Patient had dysuria and bladder symptoms. Is status post total abdominal hysterectomy, once had a situation with septic shock from urine so she's got a heightened sense of concern. Admittedly the urine at the emergency room was not cultured, but there was increased white blood cell count and positive leucocyte esterase [ moderate ]. She was treated with antibiotics and feels better. She wanted to follow up with me and review the issues . She's actually due for further follow up but declines laboratory studies and other steps today. We agree to need for a further follow up with me in roughly 1-2 months       Problem list and histories reviewed & adjusted, as indicated.  Additional history: as documented    Patient Active Problem List   Diagnosis     Obesity     PUD (peptic ulcer disease)     Hyperlipidemia with target LDL less than 70     Mitral regurgitation     DJD (degenerative joint disease)     Fibroadenoma of breast     Diverticulosis     Sciatica neuralgia     IBS (irritable bowel syndrome)     RBBB (right bundle branch block)     GLEN (obstructive sleep apnea)     Balance disorder     Anxiety     Advanced directives, counseling/discussion     Ventricular tachycardia (paroxysmal) (H)     Corneal ulcer of left eye     Blepharitis     Hypercalcemia     Disorder of skin or subcutaneous tissue     Scar condition and fibrosis of skin     OA (osteoarthritis) of knee - bilateral     Fracture of proximal humerus     CHF (congestive heart failure) (H)     Shoulder fracture     Abnormal glucose     Coronary artery disease involving coronary bypass graft with  other forms of angina pectoris     S/p total knee replacement, bilateral     Acquired hypothyroidism     Essential hypertension with goal blood pressure less than 130/80     Morbid obesity (H)     Past Surgical History:   Procedure Laterality Date     ARTHROSCOPY KNEE RT/LT      bilateral     C APPENDECTOMY       C CABG, ARTERIAL, THREE  1999    LIMA-LAD, SVG-DIagnoal, SVG-OM, previous LAD-PCI, sees Dr Banuelos     COLONOSCOPY  6/2010    negative     HC DILATION/CURETTAGE DIAG/THER NON OB       HC EXCIS INTERDIGITAL NEUROMA,EA      mortons neuroma excision     HC REMOVAL GALLBLADDER  1994     HERNIA REPAIR, INGUINAL RT/LT       SURGICAL HISTORY OF -       bilateral meniscal tears and surgery on these     SURGICAL HISTORY OF -   1999 or so    one parathyroid removed     TONSILLECTOMY       TUBAL LIGATION         Social History   Substance Use Topics     Smoking status: Former Smoker     Years: 20.00     Types: Cigarettes     Quit date: 7/11/1978     Smokeless tobacco: Never Used     Alcohol use Yes      Comment: Rare alcohol use.     Family History   Problem Relation Age of Onset     C.A.D. Mother      Arthritis Mother      Cardiovascular Mother      HEART DISEASE Mother      Obesity Mother      Thyroid Disease Mother      C.A.D. Father      Diabetes Father      Hypertension Father      Alcohol/Drug Father      Alzheimer Disease Father      Cardiovascular Father      Circulatory Father      GASTROINTESTINAL DISEASE Father      HEART DISEASE Father      Lipids Father      Obesity Father      Cardiovascular Maternal Grandmother      Depression Maternal Grandmother      Obesity Maternal Grandmother      Thyroid Disease Maternal Grandmother      Cardiovascular Maternal Grandfather      HEART DISEASE Maternal Grandfather      Obesity Maternal Grandfather      Obesity Paternal Grandmother      Diabetes Paternal Grandfather      Alcohol/Drug Paternal Grandfather      Obesity Paternal Grandfather      Breast Cancer Sister       Cancer Sister      Obesity Sister      Thyroid Disease Sister      Alcohol/Drug Son      Allergies Son      Allergies Daughter      Depression Sister      Eye Disorder Sister      GASTROINTESTINAL DISEASE Sister      Thyroid Disease Sister          Current Outpatient Prescriptions   Medication Sig Dispense Refill     acetaminophen (TYLENOL) 500 MG tablet Take 1,000 mg by mouth       amoxicillin (AMOXIL) 500 MG capsule TK 4 CS PO 1 HOUR BEFORE DENTAL APPOINTMENT  3     aspirin 162 MG EC tablet Take 1 tablet by mouth daily. 90 tablet 3     betamethasone dipropionate (DIPROSONE) 0.05 % lotion Apply  topically as needed. 60 mL 1     Biotin 10 MG TABS tablet Take 10,000 mcg by mouth twice a week        Cholecalciferol (VITAMIN D) 2000 UNITS tablet Take 1 tablet by mouth daily.       citalopram (CELEXA) 20 MG tablet TAKE 1 TABLET BY MOUTH EVERY DAY 90 tablet 0     ezetimibe (ZETIA) 10 MG tablet Take 10 mg by mouth       HYDROmorphone (DILAUDID) 2 MG tablet Take 1 mg by mouth       ketoconazole (NIZORAL) 2 % shampoo Apply  topically daily as needed. 120 mL 1     levothyroxine (SYNTHROID/LEVOTHROID) 125 MCG tablet TAKE 1 TABLET BY MOUTH EVERY DAY 90 tablet 2     LORazepam (ATIVAN) 0.5 MG tablet Take 1 tablet (0.5 mg) by mouth every 8 hours as needed for anxiety 20 tablet 0     losartan-hydrochlorothiazide (HYZAAR) 100-25 MG per tablet TAKE 1 TABLET BY MOUTH DAILY 90 tablet 0     metoprolol tartrate (LOPRESSOR) 50 MG tablet TAKE 1 TABLET BY MOUTH TWICE DAILY 180 tablet 1     Omega-3 Fatty Acids (FISH OIL) 1200 MG CAPS Take 1 capsule by mouth 2 times daily       order for DME        Allergies   Allergen Reactions     Atorvastatin Other (See Comments)     Myalgia     Cortisone      Insomnia, burning in chest, flushed     Nickel      rash     Tetracycline Diarrhea     Vicodin [Hydrocodone-Acetaminophen] Other (See Comments)     Hallucinations     Recent Labs   Lab Test  08/01/17   1117  01/03/17   1106  10/21/16   1030   "02/12/16   1127  02/12/16   1108   03/23/15   1213  07/08/14   1419   A1C  5.4  5.7   --   5.5   --    < >  5.9   --    LDL  129*   --    --    --   128*   --   69   --    HDL  45*   --    --    --   48*   --   50*   --    TRIG  306*   --    --    --   237*   --   217*   --    ALT   --   42   --    --    --    --   36  43   CR  0.75  0.82   --   0.71   --    < >  0.75  0.84   GFRESTIMATED  74  67   --   80   --    < >  75  66   GFRESTBLACK  89  82   --   >90   GFR Calc     --    < >  >90   GFR Calc    80   POTASSIUM  3.8  4.1   --   4.0   --    < >  4.1  4.1   TSH  2.66   --   4.10*   --    --    --   4.84*   --     < > = values in this interval not displayed.      BP Readings from Last 3 Encounters:   07/09/18 130/64   04/24/18 127/67   08/25/17 136/64    Wt Readings from Last 3 Encounters:   07/09/18 254 lb (115.2 kg)   04/24/18 257 lb 6.4 oz (116.8 kg)   08/25/17 253 lb (114.8 kg)                  Labs reviewed in EPIC    Reviewed and updated as needed this visit by clinical staff       Reviewed and updated as needed this visit by Provider         ROS:  Constitutional, HEENT, cardiovascular, pulmonary, gi and gu systems are negative, except as otherwise noted.    OBJECTIVE:                                                    /64  Pulse 72  Temp 97  F (36.1  C) (Oral)  Resp 16  Ht 5' 6\" (1.676 m)  Wt 254 lb (115.2 kg)  SpO2 95%  BMI 41 kg/m2  Body mass index is 41 kg/(m^2).  GENERAL APPEARANCE: healthy, alert and no distress  ABDOMEN: soft, nontender, without hepatosplenomegaly or masses and bowel sounds normal    Diagnostic test results:  Diagnostic Test Results:  none      ASSESSMENT/PLAN:                                                    1. Urinary tract infection without hematuria, site unspecified  Orders Placed This Encounter   Procedures     UA with Microscopic reflex to Culture     We agreed to do a recheck urine analysis and urine culture in roughly 10-20 days " to establish with greater confidence that the pathogenic bacteria  are eradicated. Otherwise will recheck with patient as detailed above    - UA with Microscopic reflex to Culture; Future      Follow up with Provider - as above      Ziyad Isaac MD  [facility]

## 2018-07-09 NOTE — PATIENT INSTRUCTIONS
We discussed urinary tract infections and sometimes it's a good idea to recheck urine analysis 10-14 days after completion of all antibiotics [ to exclude the possibility of a bladder colonization picture ]        [facility]    If you have any questions regarding to your visit please contact your care team:     Team Pink:   Clinic Hours Telephone Number   Internal Medicine:  Dr. Patty Shaw, NP       7am-7pm  Monday - Thursday   7am-5pm  Fridays  [phone]  (Appointment scheduling available 24/7)    Questions about your recent visit?  Team Line  [phone]   Urgent Care - [place] and [facility] - 11am-9pm Monday-Friday Saturday-Sunday- 9am-5pm   [place] - 5pm-9pm Monday-Friday Saturday-Sunday- 9am-5pm  [phone] - [place]  [phone] - [place]       What options do I have for a visit other than an office visit? We offer electronic visits (e-visits) and telephone visits, when medically appropriate.  Please check with your medical insurance to see if these types of visits are covered, as you will be responsible for any charges that are not paid by your insurance.      You can use [employer] (secure electronic communication) to access to your chart, send your primary care provider a message, or make an appointment. Ask a team member how to get started.     For a price quote for your services, please call our Consumer Price Line at [phone] or our Imaging Cost estimation line at [phone] (for imaging tests).  Renee HARRINGTON CMA ([facility])

## 2018-07-10 ASSESSMENT — ANXIETY QUESTIONNAIRES: GAD7 TOTAL SCORE: 3

## 2018-07-10 ASSESSMENT — PATIENT HEALTH QUESTIONNAIRE - PHQ9: SUM OF ALL RESPONSES TO PHQ QUESTIONS 1-9: 1

## 2018-07-19 DIAGNOSIS — I10 HYPERTENSION GOAL BP (BLOOD PRESSURE) < 130/80: ICD-10-CM

## 2018-07-19 RX ORDER — METOPROLOL TARTRATE 50 MG
TABLET ORAL
Qty: 180 TABLET | Refills: 3 | Status: SHIPPED | OUTPATIENT
Start: 2018-07-19 | End: 2019-07-09

## 2018-07-19 NOTE — TELEPHONE ENCOUNTER
"Requested Prescriptions   Signed Prescriptions Disp Refills     metoprolol tartrate (LOPRESSOR) 50 MG tablet 180 tablet 3     Sig: TAKE 1 TABLET BY MOUTH TWICE DAILY    Beta-Blockers Protocol Passed    7/19/2018  1:45 PM       Passed - Blood pressure under 140/90 in past 12 months    BP Readings from Last 3 Encounters:   07/09/18 130/64   04/24/18 127/67   08/25/17 136/64                Passed - Patient is age 6 or older       Passed - Recent (12 mo) or future (30 days) visit within the authorizing provider's specialty    Patient had office visit in the last 12 months or has a visit in the next 30 days with authorizing provider or within the authorizing provider's specialty.  See \"Patient Info\" tab in inbasket, or \"Choose Columns\" in Meds & Orders section of the refill encounter.            Medication filled per [facility] protocol.     Ruma Mesa RN    "

## 2018-07-24 DIAGNOSIS — N39.0 URINARY TRACT INFECTION WITHOUT HEMATURIA, SITE UNSPECIFIED: ICD-10-CM

## 2018-07-24 LAB
ALBUMIN UR-MCNC: NEGATIVE MG/DL
APPEARANCE UR: CLEAR
BILIRUB UR QL STRIP: NEGATIVE
COLOR UR AUTO: YELLOW
GLUCOSE UR STRIP-MCNC: NEGATIVE MG/DL
HGB UR QL STRIP: NEGATIVE
KETONES UR STRIP-MCNC: NEGATIVE MG/DL
LEUKOCYTE ESTERASE UR QL STRIP: ABNORMAL
NITRATE UR QL: NEGATIVE
PH UR STRIP: 5.5 PH (ref 5–7)
RBC #/AREA URNS AUTO: ABNORMAL /HPF
SOURCE: ABNORMAL
SP GR UR STRIP: 1.02 (ref 1–1.03)
UROBILINOGEN UR STRIP-ACNC: 0.2 EU/DL (ref 0.2–1)
WBC #/AREA URNS AUTO: ABNORMAL /HPF

## 2018-07-24 PROCEDURE — 81001 URINALYSIS AUTO W/SCOPE: CPT | Performed by: INTERNAL MEDICINE

## 2018-09-13 ENCOUNTER — OFFICE VISIT (OUTPATIENT)
Dept: FAMILY MEDICINE | Facility: CLINIC | Age: 83
End: 2018-09-13
Payer: COMMERCIAL

## 2018-09-13 VITALS
TEMPERATURE: 98.4 F | BODY MASS INDEX: 41.62 KG/M2 | HEART RATE: 76 BPM | OXYGEN SATURATION: 96 % | WEIGHT: 259 LBS | RESPIRATION RATE: 16 BRPM | DIASTOLIC BLOOD PRESSURE: 82 MMHG | SYSTOLIC BLOOD PRESSURE: 136 MMHG | HEIGHT: 66 IN

## 2018-09-13 DIAGNOSIS — E78.5 HYPERLIPIDEMIA WITH TARGET LDL LESS THAN 70: ICD-10-CM

## 2018-09-13 DIAGNOSIS — I10 ESSENTIAL HYPERTENSION WITH GOAL BLOOD PRESSURE LESS THAN 130/80: ICD-10-CM

## 2018-09-13 DIAGNOSIS — Z23 NEED FOR SHINGLES VACCINE: ICD-10-CM

## 2018-09-13 DIAGNOSIS — E83.52 HYPERCALCEMIA: ICD-10-CM

## 2018-09-13 DIAGNOSIS — Z90.89 H/O PARATHYROIDECTOMY: ICD-10-CM

## 2018-09-13 DIAGNOSIS — I50.42 CHRONIC COMBINED SYSTOLIC AND DIASTOLIC CONGESTIVE HEART FAILURE (H): ICD-10-CM

## 2018-09-13 DIAGNOSIS — E66.01 MORBID OBESITY (H): ICD-10-CM

## 2018-09-13 DIAGNOSIS — G47.33 OSA (OBSTRUCTIVE SLEEP APNEA): ICD-10-CM

## 2018-09-13 DIAGNOSIS — Z95.1 S/P CABG (CORONARY ARTERY BYPASS GRAFT): ICD-10-CM

## 2018-09-13 DIAGNOSIS — Z98.890 H/O PARATHYROIDECTOMY: ICD-10-CM

## 2018-09-13 DIAGNOSIS — Z23 NEED FOR PROPHYLACTIC VACCINATION AND INOCULATION AGAINST INFLUENZA: ICD-10-CM

## 2018-09-13 DIAGNOSIS — H93.13 TINNITUS, BILATERAL: ICD-10-CM

## 2018-09-13 DIAGNOSIS — R73.09 ABNORMAL GLUCOSE: ICD-10-CM

## 2018-09-13 DIAGNOSIS — Z00.00 ROUTINE GENERAL MEDICAL EXAMINATION AT A HEALTH CARE FACILITY: Primary | ICD-10-CM

## 2018-09-13 DIAGNOSIS — Z12.31 ENCOUNTER FOR SCREENING MAMMOGRAM FOR BREAST CANCER: ICD-10-CM

## 2018-09-13 DIAGNOSIS — E03.9 ACQUIRED HYPOTHYROIDISM: ICD-10-CM

## 2018-09-13 LAB
ALT SERPL W P-5'-P-CCNC: 35 U/L (ref 0–50)
ANION GAP SERPL CALCULATED.3IONS-SCNC: 8 MMOL/L (ref 3–14)
BASOPHILS # BLD AUTO: 0.1 10E9/L (ref 0–0.2)
BASOPHILS NFR BLD AUTO: 0.7 %
BUN SERPL-MCNC: 21 MG/DL (ref 7–30)
CALCIUM SERPL-MCNC: 10 MG/DL (ref 8.5–10.1)
CHLORIDE SERPL-SCNC: 103 MMOL/L (ref 94–109)
CO2 SERPL-SCNC: 27 MMOL/L (ref 20–32)
CREAT SERPL-MCNC: 0.71 MG/DL (ref 0.52–1.04)
DIFFERENTIAL METHOD BLD: ABNORMAL
EOSINOPHIL # BLD AUTO: 0.7 10E9/L (ref 0–0.7)
EOSINOPHIL NFR BLD AUTO: 7.5 %
ERYTHROCYTE [DISTWIDTH] IN BLOOD BY AUTOMATED COUNT: 14.4 % (ref 10–15)
GFR SERPL CREATININE-BSD FRML MDRD: 78 ML/MIN/1.7M2
GLUCOSE SERPL-MCNC: 106 MG/DL (ref 70–99)
HBA1C MFR BLD: 5.7 % (ref 0–5.6)
HCT VFR BLD AUTO: 40.9 % (ref 35–47)
HGB BLD-MCNC: 13.8 G/DL (ref 11.7–15.7)
LYMPHOCYTES # BLD AUTO: 2.8 10E9/L (ref 0.8–5.3)
LYMPHOCYTES NFR BLD AUTO: 31 %
MCH RBC QN AUTO: 29.5 PG (ref 26.5–33)
MCHC RBC AUTO-ENTMCNC: 33.7 G/DL (ref 31.5–36.5)
MCV RBC AUTO: 87 FL (ref 78–100)
MONOCYTES # BLD AUTO: 1.4 10E9/L (ref 0–1.3)
MONOCYTES NFR BLD AUTO: 15.7 %
NEUTROPHILS # BLD AUTO: 4.1 10E9/L (ref 1.6–8.3)
NEUTROPHILS NFR BLD AUTO: 45.1 %
PLATELET # BLD AUTO: 291 10E9/L (ref 150–450)
POTASSIUM SERPL-SCNC: 3.9 MMOL/L (ref 3.4–5.3)
PTH-INTACT SERPL-MCNC: 89 PG/ML (ref 18–80)
RBC # BLD AUTO: 4.68 10E12/L (ref 3.8–5.2)
SODIUM SERPL-SCNC: 138 MMOL/L (ref 133–144)
TSH SERPL DL<=0.005 MIU/L-ACNC: 2.66 MU/L (ref 0.4–4)
WBC # BLD AUTO: 9.1 10E9/L (ref 4–11)

## 2018-09-13 PROCEDURE — G0008 ADMIN INFLUENZA VIRUS VAC: HCPCS | Performed by: INTERNAL MEDICINE

## 2018-09-13 PROCEDURE — 85025 COMPLETE CBC W/AUTO DIFF WBC: CPT | Performed by: INTERNAL MEDICINE

## 2018-09-13 PROCEDURE — 36415 COLL VENOUS BLD VENIPUNCTURE: CPT | Performed by: INTERNAL MEDICINE

## 2018-09-13 PROCEDURE — 99397 PER PM REEVAL EST PAT 65+ YR: CPT | Mod: 25 | Performed by: INTERNAL MEDICINE

## 2018-09-13 PROCEDURE — 82306 VITAMIN D 25 HYDROXY: CPT | Performed by: INTERNAL MEDICINE

## 2018-09-13 PROCEDURE — 83970 ASSAY OF PARATHORMONE: CPT | Performed by: INTERNAL MEDICINE

## 2018-09-13 PROCEDURE — 83036 HEMOGLOBIN GLYCOSYLATED A1C: CPT | Performed by: INTERNAL MEDICINE

## 2018-09-13 PROCEDURE — 90662 IIV NO PRSV INCREASED AG IM: CPT | Performed by: INTERNAL MEDICINE

## 2018-09-13 PROCEDURE — 84460 ALANINE AMINO (ALT) (SGPT): CPT | Performed by: INTERNAL MEDICINE

## 2018-09-13 PROCEDURE — 84443 ASSAY THYROID STIM HORMONE: CPT | Performed by: INTERNAL MEDICINE

## 2018-09-13 PROCEDURE — 80048 BASIC METABOLIC PNL TOTAL CA: CPT | Performed by: INTERNAL MEDICINE

## 2018-09-13 NOTE — PATIENT INSTRUCTIONS
Preventive Health Recommendations    Female Ages 65 +    Yearly exam:     See your health care provider every year in order to  o Review health changes.   o Discuss preventive care.    o Review your medicines if your doctor has prescribed any.      You no longer need a yearly Pap test unless you've had an abnormal Pap test in the past 10 years. If you have vaginal symptoms, such as bleeding or discharge, be sure to talk with your provider about a Pap test.      Every 1 to 2 years, have a mammogram.  If you are over 69, talk with your health care provider about whether or not you want to continue having screening mammograms.      Every 10 years, have a colonoscopy. Or, have a yearly FIT test (stool test). These exams will check for colon cancer.       Have a cholesterol test every 5 years, or more often if your doctor advises it.       Have a diabetes test (fasting glucose) every three years. If you are at risk for diabetes, you should have this test more often.       At age 65, have a bone density scan (DEXA) to check for osteoporosis (brittle bone disease).    Shots:    Get a flu shot each year.    Get a tetanus shot every 10 years.    Talk to your doctor about your pneumonia vaccines. There are now two you should receive - Pneumovax (PPSV 23) and Prevnar (PCV 13).    Talk to your pharmacist about the shingles vaccine.    Talk to your doctor about the hepatitis B vaccine.    Nutrition:     Eat at least 5 servings of fruits and vegetables each day.      Eat whole-grain bread, whole-wheat pasta and brown rice instead of white grains and rice.      Get adequate Calcium and Vitamin D.     Lifestyle    Exercise at least 150 minutes a week (30 minutes a day, 5 days a week). This will help you control your weight and prevent disease.      Limit alcohol to one drink per day.      No smoking.       Wear sunscreen to prevent skin cancer.       See your dentist twice a year for an exam and cleaning.      See your eye doctor  every 1 to 2 years to screen for conditions such as glaucoma, macular degeneration and cataracts.      [facility]    If you have any questions regarding to your visit please contact your care team:     Team Pink:   Clinic Hours Telephone Number   Internal Medicine:  Dr. Patty Shaw NP 7am-7pm  Monday - Thursday   7am-5pm  Fridays  [phone]  (Appointment scheduling available 24/7)   Urgent Care - [place] [facility] - 11am-9pm Monday-Friday Saturday-Sunday- 9am-5pm   [place] - 5pm-9pm Monday-Friday Saturday-Sunday- 9am-5pm  [phone] - [place]  [phone] - [place]       What options do I have for a visit other than an office visit? We offer electronic visits (e-visits) and telephone visits, when medically appropriate.  Please check with your medical insurance to see if these types of visits are covered, as you will be responsible for any charges that are not paid by your insurance.      You can use diaDexus (secure electronic communication) to access to your chart, send your primary care provider a message, or make an appointment. Ask a team member how to get started.     For a price quote for your services, please call our Consumer Price Line at [phone] or our Imaging Cost estimation line at [phone] (for imaging tests).  Renee HARRINGTON CMA ([facility])

## 2018-09-13 NOTE — PROGRESS NOTES
SUBJECTIVE:   Lisa Ferguson is a 83 year old female who presents for Preventive Visit.    Are you in the first 12 months of your Medicare Part B coverage?  No    Healthy Habits:    Do you get at least three servings of calcium containing foods daily (dairy, green leafy vegetables, etc.)? yes    Amount of exercise or daily activities, outside of work: none     Problems taking medications regularly No    Medication side effects: No    Have you had an eye exam in the past two years? yes    Do you see a dentist twice per year? yes    Do you have sleep apnea, excessive snoring or daytime drowsiness?yes      Ability to successfully perform activities of daily living: No, needs assistance with: Housework (cleaning). Who helps? House keeper once a month for deep cleaning     Home safety:  none identified     Hearing impairment: Yes, Difficulty following a conversation in a noisy restaurant or crowded room.    Difficult to understand a speaker at a public meeting or [employer] service. Has tinnitis, getting worse     Fall risk:  Fallen 2 or more times in the past year?: No  Any fall with injury in the past year?: No    COGNITIVE SCREEN  1) Repeat 3 items (Leader, Season, Table)    2) Clock draw: NORMAL  3) 3 item recall: Recalls 3 objects  Results: 3 items recalled: COGNITIVE IMPAIRMENT LESS LIKELY    Mini-CogTM Copyright S Olya. Licensed by the author for use in [facility]; reprinted with permission (gloria@.[email or web address]). All rights reserved.      Concerns:  She was going to chair yoga, but the instructor left and it was replaced with an aerobics class. She has been looking into some classes, but hasn't christie very ena finding something within her driving range.   She has some concerns about a mild cognitive impairment. Notes that when she is having a conversation she has a hard time remembering the words she wanted to say. Her friends have to fill in for her.   She does have a diagnosis of Sleep Apnea, but  hasn't been in to see a sleep specialist for at least 8 years.   She also mentions that she still deals with tinnitus, and it is getting worse during there day.   She would like a flu shot today, but declines a shingles shot.       Reviewed and updated as needed this visit by clinical staff  Tobacco  Allergies  Med Hx  Surg Hx  Fam Hx  Soc Hx      Reviewed and updated as needed this visit by Provider        Social History   Substance Use Topics     Smoking status: Former Smoker     Years: 20.00     Types: Cigarettes     Quit date: 7/11/1978     Smokeless tobacco: Never Used     Alcohol use Yes      Comment: Rare alcohol use.       If you drink alcohol do you typically have >3 drinks per day or >7 drinks per week? No                        Today's PHQ-2 Score:   PHQ-2 ( 1999 Pfizer) 9/13/2018 2/12/2016   Q1: Little interest or pleasure in doing things 0 0   Q2: Feeling down, depressed or hopeless 0 0   PHQ-2 Score 0 0     Do you feel safe in your environment - Yes    Do you have a Health Care Directive?: Yes: Patient states has Advance Directive and will bring in a copy to clinic.    Current providers sharing in care for this patient include:   Patient Care Team:  Ziyad Isaac MD as PCP - General    The following health maintenance items are reviewed in Epic and correct as of today:  Health Maintenance   Topic Date Due     ADVANCE DIRECTIVE PLANNING Q5 YRS  10/08/2017     ALT Q1 YR  01/03/2018     BMP Q6 MOS  02/01/2018     HF ACTION PLAN Q3 YR  07/09/2018     TSH Q1 YEAR  08/01/2018     BMP Q1 YR  08/01/2018     LIPID MONITORING Q1 YEAR  08/01/2018     CBC Q1 YR  08/01/2018     INFLUENZA VACCINE (1) 09/01/2018     PHQ-9 Q6 MONTHS  01/09/2019     FALL RISK ASSESSMENT  07/09/2019     DEPRESSION ACTION PLAN Q1 YR  07/09/2019     TETANUS IMMUNIZATION (SYSTEM ASSIGNED)  07/11/2021     DEXA SCAN SCREENING (SYSTEM ASSIGNED)  Completed     PNEUMOCOCCAL  Completed     Labs reviewed in EPIC  BP Readings from Last 3  "Encounters:   09/13/18 136/82   07/09/18 130/64   04/24/18 127/67    Wt Readings from Last 3 Encounters:   09/13/18 117.5 kg (259 lb)   07/09/18 115.2 kg (254 lb)   04/24/18 116.8 kg (257 lb 6.4 oz)         Pneumonia Vaccine: Completed    ROS:  Constitutional, HEENT, cardiovascular, pulmonary, GI, , musculoskeletal, neuro, skin, endocrine and psych systems are negative, except as otherwise noted.    This document serves as a record of the services and decisions personally performed and made by Ziyad Isaac MD. It was created on his behalf by Daija Cartagena, a trained medical scribe. The creation of this document is based on the provider's statements to the medical scribe.  Daija Cartagena September 13, 2018 10:48 AM    OBJECTIVE:   Pulse 76  Temp 98.4  F (36.9  C) (Oral)  Resp 16  Ht 5' 6\" (1.676 m)  Wt 259 lb (117.5 kg)  SpO2 96%  BMI 41.8 kg/m2 Estimated body mass index is 41.8 kg/(m^2) as calculated from the following:    Height as of this encounter: 5' 6\" (1.676 m).    Weight as of this encounter: 259 lb (117.5 kg).  EXAM:   GENERAL APPEARANCE: healthy, alert and no distress, obese  EYES: Eyes grossly normal to inspection, PERRL and conjunctivae and sclerae normal  HENT: ear canals and TM's normal, nose and mouth without ulcers or lesions, oropharynx clear and oral mucous membranes moist  NECK: no adenopathy, no asymmetry, masses, or scars and thyroid normal to palpation  RESP: lungs clear to auscultation - no rales, rhonchi or wheezes  CV: regular rate and rhythm, normal S1 S2, no S3 or S4, no murmur, click or rub, no peripheral edema and peripheral pulses strong  ABDOMEN: soft, nontender, no hepatosplenomegaly, no masses and bowel sounds normal  MS: no musculoskeletal defects are noted and gait is age appropriate without ataxia  SKIN: dark colored seborrheic keratosis on upper back.   NEURO: Normal strength and tone, sensory exam grossly normal, mentation intact and speech normal  PSYCH: mentation appears " normal and affect normal/bright    Diagnostic Test Results:  No results found for this or any previous visit (from the past 24 hour(s)).    ASSESSMENT / PLAN:     (Z00.00) Routine general medical examination at a health care facility  (primary encounter diagnosis)  Comment: Normal screening exam.   Plan: ALT, BASIC METABOLIC PANEL, CBC with platelets         differential          (G47.33) GLEN (obstructive sleep apnea)  Comment: Patient has not been seen by sleep specialist in several years, will want to get her back in to talk to the sleep specialist and adjust her CPAP pressure as needed.   Plan: SLEEP EVALUATION & MANAGEMENT REFERRAL - [facility] Locations         - [phone]          (H93.13) Tinnitus, bilateral  Comment: discussed   Plan: further workup declines     (E78.5) Hyperlipidemia with target LDL less than 70  Comment: last fasting lipid panel was done with [facility] 2 months ago  Plan: will attempt to abstract    (Z23) Need for shingles vaccine  Comment: Defers shingles vaccine today  Plan: Offer at later date.     (Z95.1) S/P CABG (coronary artery bypass graft)  Comment: noted as a point of historical importance   Plan: see care everywhere for cardiology  notes    (E03.9) Acquired hypothyroidism  Comment: Recheck thyroid levels today. Will refill thyroid medication when results are available.   Plan: TSH WITH FREE T4 REFLEX          (I10) Essential hypertension with goal blood pressure less than 130/80  Comment: Due for annual BMP labs. Blood pressure today is within normal ranges, and historically within goal.   Plan: BASIC METABOLIC PANEL          (R73.09) Abnormal glucose  Comment: Recheck diabetes labs.   Plan: BASIC METABOLIC PANEL, Hemoglobin A1c          (I50.42) Chronic combined systolic and diastolic congestive heart failure (H)  Comment: see office visit notes with [facility] [place]   Plan: stable phase of chronic  "illness     (E83.52) Hypercalcemia  Comment: Labs today to recheck her calcium levels. Actually she has quite probably got continued hyperparathyroidism   Plan: BASIC METABOLIC PANEL, Parathyroid Hormone         Intact, Vitamin D Deficiency          (Z12.31) Encounter for screening mammogram for breast cancer  Comment: She would like to have her mammogram done today.   Plan: *MA Screening Digital Bilateral        Same day scheduling if possible    (E66.01) Morbid obesity (H)  Comment: Discussed regular exercise routine and encouraged her to find an exercise class that is low impact and appeals to her. Recommended she look into [employer] or something similar if she cannot find a yoga class.   Plan: as above     (E89.2) H/O parathyroidectomy (H)  Comment: prior to 2008. Recheck today.   Plan: Parathyroid Hormone Intact, Vitamin D         Deficiency          End of Life Planning:  Patient currently has an advanced directive: Yes: Patient states has Advance Directive and will bring in a copy to clinic.    COUNSELING:  Reviewed preventive health counseling, as reflected in patient instructions    BP Readings from Last 1 Encounters:   07/09/18 130/64     Estimated body mass index is 41.8 kg/(m^2) as calculated from the following:    Height as of this encounter: 5' 6\" (1.676 m).    Weight as of this encounter: 259 lb (117.5 kg).      Weight management plan: Discussed healthy diet and exercise guidelines and patient will follow up in 12 months in clinic to re-evaluate.     reports that she quit smoking about 40 years ago. Her smoking use included Cigarettes. She quit after 20.00 years of use. She has never used smokeless tobacco.      Appropriate preventive services were discussed with this patient, including applicable screening as appropriate for cardiovascular disease, diabetes, osteopenia/osteoporosis, and glaucoma.  As appropriate for age/gender, discussed screening for colorectal cancer, prostate cancer, breast cancer, " and cervical cancer. Checklist reviewing preventive services available has been given to the patient.    Reviewed patients plan of care and provided an AVS. The Basic Care Plan (routine screening as documented in Health Maintenance) for Lisa meets the Care Plan requirement. This Care Plan has been established and reviewed with the Patient.    Counseling Resources:  ATP IV Guidelines  Pooled Cohorts Equation Calculator  Breast Cancer Risk Calculator  FRAX Risk Assessment  ICSI Preventive Guidelines  Dietary Guidelines for Americans, 2010  USDA's MyPlate  ASA Prophylaxis  Lung CA Screening    The information in this document, created by the medical scribe for me, accurately reflects the services I personally performed and the decisions made by me. I have reviewed and approved this document for accuracy.   MD Ziyad Lam MD  [facility]

## 2018-09-13 NOTE — PROGRESS NOTES

## 2018-09-13 NOTE — MR AVS SNAPSHOT
After Visit Summary   9/13/2018    Lisa Ferguson    MRN: [id number]           Patient Information     Date Of Birth          1935        Visit Information        Provider Department      9/13/2018 10:30 AM Ziyad Isaac MD [facility]        Today's Diagnoses     Routine general medical examination at a health care facility    -  1    GLEN (obstructive sleep apnea)        Tinnitus, bilateral        Hyperlipidemia with target LDL less than 70        Need for shingles vaccine        S/P CABG (coronary artery bypass graft)        Acquired hypothyroidism        Essential hypertension with goal blood pressure less than 130/80        Abnormal glucose        Chronic combined systolic and diastolic congestive heart failure (H)        Hypercalcemia        Encounter for screening mammogram for breast cancer        Morbid obesity (H)        H/O parathyroidectomy (H)          Care Instructions      Preventive Health Recommendations    Female Ages 65 +    Yearly exam:     See your health care provider every year in order to  o Review health changes.   o Discuss preventive care.    o Review your medicines if your doctor has prescribed any.      You no longer need a yearly Pap test unless you've had an abnormal Pap test in the past 10 years. If you have vaginal symptoms, such as bleeding or discharge, be sure to talk with your provider about a Pap test.      Every 1 to 2 years, have a mammogram.  If you are over 69, talk with your health care provider about whether or not you want to continue having screening mammograms.      Every 10 years, have a colonoscopy. Or, have a yearly FIT test (stool test). These exams will check for colon cancer.       Have a cholesterol test every 5 years, or more often if your doctor advises it.       Have a diabetes test (fasting glucose) every three years. If you are at risk for diabetes, you should have this test more often.       At age 65, have a bone density  scan (DEXA) to check for osteoporosis (brittle bone disease).    Shots:    Get a flu shot each year.    Get a tetanus shot every 10 years.    Talk to your doctor about your pneumonia vaccines. There are now two you should receive - Pneumovax (PPSV 23) and Prevnar (PCV 13).    Talk to your pharmacist about the shingles vaccine.    Talk to your doctor about the hepatitis B vaccine.    Nutrition:     Eat at least 5 servings of fruits and vegetables each day.      Eat whole-grain bread, whole-wheat pasta and brown rice instead of white grains and rice.      Get adequate Calcium and Vitamin D.     Lifestyle    Exercise at least 150 minutes a week (30 minutes a day, 5 days a week). This will help you control your weight and prevent disease.      Limit alcohol to one drink per day.      No smoking.       Wear sunscreen to prevent skin cancer.       See your dentist twice a year for an exam and cleaning.      See your eye doctor every 1 to 2 years to screen for conditions such as glaucoma, macular degeneration and cataracts.      [facility]    If you have any questions regarding to your visit please contact your care team:     Team Pink:   Clinic Hours Telephone Number   Internal Medicine:  Dr. Patty Shaw, NP 7am-7pm  Monday - Thursday   7am-5pm  Fridays  [phone]  (Appointment scheduling available 24/7)   [facility] - 11am-9pm Monday-Friday Saturday-Sunday- 9am-5pm   [place] - 5pm-9pm Monday-Friday Saturday-Sunday- 9am-5pm  [phone] - [place]  [phone] - [place]       What options do I have for a visit other than an office visit? We offer electronic visits (e-visits) and telephone visits, when medically appropriate.  Please check with your medical insurance to see if these types of visits are covered, as you will be responsible for any charges that are not paid by your insurance.      You can use JourneyPure  (secure electronic communication) to access to your chart, send your primary care provider a message, or make an appointment. Ask a team member how to get started.     For a price quote for your services, please call our Consumer Price Line at [phone] or our Imaging Cost estimation line at [phone] (for imaging tests).  Renee HARRINGTON CMA ([facility])            Follow-ups after your visit        Additional Services     SLEEP EVALUATION & MANAGEMENT REFERRAL - [facility] - [phone]       Please be aware that coverage of these services is subject to the terms and limitations of your health insurance plan.  Call member services at your health plan with any benefit or coverage questions.      Please bring the following to your appointment:    >>   List of current medications   >>   This referral request   >>   Any documents/labs given to you for this referral                      Future tests that were ordered for you today     Open Future Orders        Priority Expected Expires Ordered    SLEEP EVALUATION & MANAGEMENT REFERRAL - [facility] - [phone] Routine  9/13/2019 9/13/2018    *MA Screening Digital Bilateral Routine  9/13/2019 9/13/2018            Who to contact     If you have questions or need follow up information about today's clinic visit or your schedule please contact [facility] directly at [phone].  Normal or non-critical lab and imaging results will be communicated to you by MyChart, letter or phone within 4 business days after the clinic has received the results. If you do not hear from us within 7 days, please contact the clinic through MyChart or phone. If you have a critical or abnormal lab result, we will notify you by phone as soon as possible.  Submit refill requests through [employer] or call your pharmacy and they will forward the refill request to us. Please allow 3 business days for your  "refill to be completed.          Additional Information About Your Visit        MyChart Information     TourPal gives you secure access to your electronic health record. If you see a primary care provider, you can also send messages to your care team and make appointments. If you have questions, please call your primary care clinic.  If you do not have a primary care provider, please call [phone] and they will assist you.        Care EveryWhere ID     This is your Care EveryWhere ID. This could be used by other organizations to access your [place] medical records  [id number]        Your Vitals Were     Pulse Temperature Respirations Height Pulse Oximetry BMI (Body Mass Index)    76 98.4  F (36.9  C) (Oral) 16 5' 6\" (1.676 m) 96% 41.8 kg/m2       Blood Pressure from Last 3 Encounters:   09/13/18 136/82   07/09/18 130/64   04/24/18 127/67    Weight from Last 3 Encounters:   09/13/18 259 lb (117.5 kg)   07/09/18 254 lb (115.2 kg)   04/24/18 257 lb 6.4 oz (116.8 kg)              We Performed the Following     ALT     BASIC METABOLIC PANEL     CBC with platelets differential     Hemoglobin A1c     Parathyroid Hormone Intact     TSH WITH FREE T4 REFLEX     Vitamin D Deficiency        Primary Care Provider Office Phone # Fax #    Ziyad Isaac [phone]       [address] [address]        Equal Access to Services     [facility]: Hadii aad ku hadasho Soomaali, waaxda luqadaha, qaybta kaalmada adeegyada, mikaela calero . So [facility] [phone].    ATENCIÓN: Si habla español, tiene a hogan disposición servicios gratuitos de asistencia lingüística. Llame al [phone].    We comply with applicable federal civil rights laws and [place] laws. We do not discriminate on the basis of race, color, national origin, age, disability, sex, sexual orientation, or gender identity.            Thank you!     Thank you for choosing [facility]  for your " care. Our goal is always to provide you with excellent care. Hearing back from our patients is one way we can continue to improve our services. Please take a few minutes to complete the written survey that you may receive in the mail after your visit with us. Thank you!             Your Updated Medication List - Protect others around you: Learn how to safely use, store and throw away your medicines at www.disposemymeds.org.          This list is accurate as of 9/13/18 11:10 AM.  Always use your most recent med list.                   Brand Name Dispense Instructions for use Diagnosis    acetaminophen 500 MG tablet    TYLENOL     Take 1,000 mg by mouth        amoxicillin 500 MG capsule    AMOXIL     TK 4 CS PO 1 HOUR BEFORE DENTAL APPOINTMENT        aspirin 162 MG EC tablet     90 tablet    Take 1 tablet by mouth daily.        betamethasone dipropionate 0.05 % lotion    DIPROSONE    60 mL    Apply  topically as needed.    Seborrhea       Biotin 10 MG Tabs tablet      Take 10,000 mcg by mouth twice a week        citalopram 20 MG tablet    celeXA    90 tablet    TAKE 1 TABLET BY MOUTH EVERY DAY    Anxiety       ezetimibe 10 MG tablet    ZETIA     Take 10 mg by mouth        fish Oil 1200 MG capsule      Take 1 capsule by mouth 2 times daily        levothyroxine 125 MCG tablet    SYNTHROID/LEVOTHROID    90 tablet    TAKE 1 TABLET BY MOUTH EVERY DAY    Acquired hypothyroidism       LORazepam 0.5 MG tablet    ATIVAN    20 tablet    Take 1 tablet (0.5 mg) by mouth every 8 hours as needed for anxiety    Anxiety       losartan-hydrochlorothiazide 100-25 MG per tablet    HYZAAR    90 tablet    TAKE 1 TABLET BY MOUTH DAILY    Essential hypertension with goal blood pressure less than 130/80       metoprolol tartrate 50 MG tablet    LOPRESSOR    180 tablet    TAKE 1 TABLET BY MOUTH TWICE DAILY    Hypertension goal BP (blood pressure) < 130/80       order for DME           vitamin D 2000 units tablet      Take 1 tablet by mouth  daily.

## 2018-09-13 NOTE — LETTER
[address] [place] [address]    September 17, 2018    Lisa Ferguson  [address] [facility] [place] [address]          Dear Lisa,    All of these tests are within acceptable limits . There are some nonspecific trivial variations from the normal range but we can recheck these in 6-12 months     Enclosed is a copy of your results.     Results for orders placed or performed in visit on 09/13/18   ALT   Result Value Ref Range    ALT 35 0 - 50 U/L   BASIC METABOLIC PANEL   Result Value Ref Range    Sodium 138 133 - 144 mmol/L    Potassium 3.9 3.4 - 5.3 mmol/L    Chloride 103 94 - 109 mmol/L    Carbon Dioxide 27 20 - 32 mmol/L    Anion Gap 8 3 - 14 mmol/L    Glucose 106 (H) 70 - 99 mg/dL    Urea Nitrogen 21 7 - 30 mg/dL    Creatinine 0.71 0.52 - 1.04 mg/dL    GFR Estimate 78 >60 mL/min/1.7m2    GFR Estimate If Black >90 >60 mL/min/1.7m2    Calcium 10.0 8.5 - 10.1 mg/dL   TSH WITH FREE T4 REFLEX   Result Value Ref Range    TSH 2.66 0.40 - 4.00 mU/L   Hemoglobin A1c   Result Value Ref Range    Hemoglobin A1C 5.7 (H) 0 - 5.6 %   CBC with platelets differential   Result Value Ref Range    WBC 9.1 4.0 - 11.0 10e9/L    RBC Count 4.68 3.8 - 5.2 10e12/L    Hemoglobin 13.8 11.7 - 15.7 g/dL    Hematocrit 40.9 35.0 - 47.0 %    MCV 87 78 - 100 fl    MCH 29.5 26.5 - 33.0 pg    MCHC 33.7 31.5 - 36.5 g/dL    RDW 14.4 10.0 - 15.0 %    Platelet Count 291 150 - 450 10e9/L    % Neutrophils 45.1 %    % Lymphocytes 31.0 %    % Monocytes 15.7 %    % Eosinophils 7.5 %    % Basophils 0.7 %    Absolute Neutrophil 4.1 1.6 - 8.3 10e9/L    Absolute Lymphocytes 2.8 0.8 - 5.3 10e9/L    Absolute Monocytes 1.4 (H) 0.0 - 1.3 10e9/L    Absolute Eosinophils 0.7 0.0 - 0.7 10e9/L    Absolute Basophils 0.1 0.0 - 0.2 10e9/L    Diff Method Automated Method    Parathyroid Hormone Intact   Result Value Ref Range    Parathyroid Hormone Intact 89 (H) 18 - 80 pg/mL    Vitamin D Deficiency   Result Value Ref Range    Vitamin D Deficiency screening 34 20 - 75 ug/L       If you have any questions or concerns, please call myself or my nurse at [phone].      Sincerely,        Ziyad Isaac MD/byron

## 2018-09-14 LAB — DEPRECATED CALCIDIOL+CALCIFEROL SERPL-MC: 34 UG/L (ref 20–75)

## 2018-09-18 ENCOUNTER — MEDICAL CORRESPONDENCE (OUTPATIENT)
Dept: HEALTH INFORMATION MANAGEMENT | Facility: CLINIC | Age: 83
End: 2018-09-18

## 2018-09-19 ENCOUNTER — RADIANT APPOINTMENT (OUTPATIENT)
Dept: MAMMOGRAPHY | Facility: CLINIC | Age: 83
End: 2018-09-19
Attending: INTERNAL MEDICINE
Payer: COMMERCIAL

## 2018-09-19 DIAGNOSIS — Z12.31 ENCOUNTER FOR SCREENING MAMMOGRAM FOR BREAST CANCER: ICD-10-CM

## 2018-09-19 PROCEDURE — 77067 SCR MAMMO BI INCL CAD: CPT | Mod: TC

## 2018-09-26 DIAGNOSIS — I10 ESSENTIAL HYPERTENSION WITH GOAL BLOOD PRESSURE LESS THAN 130/80: ICD-10-CM

## 2018-09-26 DIAGNOSIS — F41.9 ANXIETY: ICD-10-CM

## 2018-09-26 RX ORDER — CITALOPRAM HYDROBROMIDE 20 MG/1
TABLET ORAL
Qty: 90 TABLET | Refills: 1 | Status: SHIPPED | OUTPATIENT
Start: 2018-09-26 | End: 2019-03-22

## 2018-09-27 RX ORDER — LOSARTAN POTASSIUM AND HYDROCHLOROTHIAZIDE 25; 100 MG/1; MG/1
TABLET ORAL
Qty: 90 TABLET | Refills: 3 | Status: SHIPPED | OUTPATIENT
Start: 2018-09-27 | End: 2019-07-08

## 2018-10-12 ENCOUNTER — TRANSFERRED RECORDS (OUTPATIENT)
Dept: HEALTH INFORMATION MANAGEMENT | Facility: CLINIC | Age: 83
End: 2018-10-12

## 2018-10-17 ENCOUNTER — OFFICE VISIT (OUTPATIENT)
Dept: FAMILY MEDICINE | Facility: CLINIC | Age: 83
End: 2018-10-17
Payer: COMMERCIAL

## 2018-10-17 VITALS
RESPIRATION RATE: 20 BRPM | TEMPERATURE: 99 F | SYSTOLIC BLOOD PRESSURE: 138 MMHG | OXYGEN SATURATION: 94 % | HEART RATE: 68 BPM | DIASTOLIC BLOOD PRESSURE: 70 MMHG

## 2018-10-17 DIAGNOSIS — M10.072 ACUTE IDIOPATHIC GOUT OF LEFT FOOT: Primary | ICD-10-CM

## 2018-10-17 LAB
BASOPHILS # BLD AUTO: 0 10E9/L (ref 0–0.2)
BASOPHILS NFR BLD AUTO: 0.3 %
DIFFERENTIAL METHOD BLD: ABNORMAL
EOSINOPHIL # BLD AUTO: 0.2 10E9/L (ref 0–0.7)
EOSINOPHIL NFR BLD AUTO: 1.9 %
ERYTHROCYTE [DISTWIDTH] IN BLOOD BY AUTOMATED COUNT: 14.1 % (ref 10–15)
HCT VFR BLD AUTO: 40.2 % (ref 35–47)
HGB BLD-MCNC: 13.4 G/DL (ref 11.7–15.7)
LYMPHOCYTES # BLD AUTO: 2.7 10E9/L (ref 0.8–5.3)
LYMPHOCYTES NFR BLD AUTO: 23 %
MCH RBC QN AUTO: 29.6 PG (ref 26.5–33)
MCHC RBC AUTO-ENTMCNC: 33.3 G/DL (ref 31.5–36.5)
MCV RBC AUTO: 89 FL (ref 78–100)
MONOCYTES # BLD AUTO: 1.7 10E9/L (ref 0–1.3)
MONOCYTES NFR BLD AUTO: 14.2 %
NEUTROPHILS # BLD AUTO: 7.2 10E9/L (ref 1.6–8.3)
NEUTROPHILS NFR BLD AUTO: 60.6 %
PLATELET # BLD AUTO: 392 10E9/L (ref 150–450)
RBC # BLD AUTO: 4.53 10E12/L (ref 3.8–5.2)
URATE SERPL-MCNC: 8.6 MG/DL (ref 2.6–6)
WBC # BLD AUTO: 11.9 10E9/L (ref 4–11)

## 2018-10-17 PROCEDURE — 99213 OFFICE O/P EST LOW 20 MIN: CPT | Performed by: NURSE PRACTITIONER

## 2018-10-17 PROCEDURE — 84550 ASSAY OF BLOOD/URIC ACID: CPT | Performed by: NURSE PRACTITIONER

## 2018-10-17 PROCEDURE — 85025 COMPLETE CBC W/AUTO DIFF WBC: CPT | Performed by: NURSE PRACTITIONER

## 2018-10-17 PROCEDURE — 36415 COLL VENOUS BLD VENIPUNCTURE: CPT | Performed by: NURSE PRACTITIONER

## 2018-10-17 RX ORDER — PREDNISONE 20 MG/1
20 TABLET ORAL DAILY
Qty: 5 TABLET | Refills: 0 | Status: SHIPPED | OUTPATIENT
Start: 2018-10-17 | End: 2019-07-01

## 2018-10-17 ASSESSMENT — PAIN SCALES - GENERAL: PAINLEVEL: SEVERE PAIN (6)

## 2018-10-17 NOTE — PATIENT INSTRUCTIONS
Gout Diet  Gout is a painful condition caused by an excess of uric acid, a waste product made by the body. Uric acid forms crystals that collect in the joints. The immune response to these crystals brings on symptoms of joint pain and swelling. This is called a gout attack. Often, medications and diet changes are combined to manage gout. Below are some guidelines for changing your diet to help you manage gout and prevent attacks. Your health care provider will help you determine the best eating plan for you.     Eating to manage gout  Weight loss for those who are overweight may help reduce gout attacks.  Eat less of these foods  Eating too many foods containing purines may raise the levels of uric acid in your body. This raises your risk for a gout attack. Try to limit these foods and drinks:    Alcohol, such as beer and red wine. You may be told to avoid alcohol completely.    Soft drinks that contain sugar or high fructose corn syrup    Certain fish, including anchovies, sardines, fish eggs, and herring    Shellfish    Certain meats, such as red meat, hot dogs, luncheon meats, and turkey    Organ meats, such as liver, kidneys, and sweetbreads    Legumes, such as dried beans and peas    Other high fat foods such as gravy, whole milk, and high fat cheeses    Vegetables such as asparagus, cauliflower, spinach, and mushrooms used to be thought to contribute to an increased risk for a gout attack, but recent studies show that high purine vegetables don't increase the risk for a gout attack.  Eat more of these foods  Other foods may be helpful for people with gout. Add some of these foods to your diet:    Cherries contain chemicals that may lower uric acid.    Omega fatty acids. These are found in some fatty fish such as salmon, certain oils (flax, olive, or nut), and nuts themselves. Omega fatty acids may help prevent inflammation due to gout.    Dairy products that are low-fat or fat-free, such as cheese and  yogurt    Complex carbohydrate foods, including whole grains, brown rice, oats, and beans    Coffee, in moderation    Water, approximately 64 ounces per day  Follow-up care  Follow up with your healthcare provider as advised.  When to seek medical advice  Call your healthcare provider right away if any of these occur:    Return of gout symptoms, usually at night:    Severe pain, swelling, and heat in a joint, especially the base of the big toe    Affected joint is hard to move    Skin of the affected joint is purple or red    Fever of 100.4 F (38 C) or higher    Pain that doesn't get better even with prescribed medicine   Date Last Reviewed: 1/12/2016 2000-2017 [employer]. [address] [place] [address], [place] [address]. All rights reserved. This information is not intended as a substitute for professional medical care. Always follow your healthcare professional's instructions.      [facility]    If you have any questions regarding to your visit please contact your care team:     Team Pink:   Clinic Hours Telephone Number   Internal Medicine:  Dr. Patty Shaw NP 7am-7pm  Monday - Thursday   7am-5pm  Fridays  [phone]  (Appointment scheduling available 24/7)   Urgent Care - [place] and [place] - 11am-9pm Monday-Friday Saturday-Sunday- 9am-5pm   [place] - 5pm-9pm Monday-Friday Saturday-Sunday- 9am-5pm  [phone] - [place]  [phone] - [place]       What options do I have for a visit other than an office visit? We offer electronic visits (e-visits) and telephone visits, when medically appropriate.  Please check with your medical insurance to see if these types of visits are covered, as you will be responsible for any charges that are not paid by your insurance.      You can use Shutl (secure electronic communication) to access to your chart, send your primary care provider a message, or make an appointment. Ask a  team member how to get started.     For a price quote for your services, please call our Consumer Price Line at [phone] or our Imaging Cost estimation line at [phone] (for imaging tests).    BOUBACAR Lynn

## 2018-10-17 NOTE — PROGRESS NOTES
SUBJECTIVE:   Lisa Ferguson is a 83 year old female who presents to clinic today for the following health issues:        Joint Pain    Onset: last wednesday    Description:   Location: left foot  Character: Sharp and Burning    Intensity: severe    Progression of Symptoms: worse    Accompanying Signs & Symptoms:  Other symptoms: numbness, warmth, swelling and redness    History:   Previous similar pain: no       Precipitating factors:   Trauma or overuse: YES    Alleviating factors:  Improved by: immobilization    Therapies Tried and outcome: Tylenols extra strength      Patient saw [facility] over the weekend and had x-rays and was diagnosed with a sprain. She was given a Cam Walker boot.  She notes continued erythema, warmth, pain.  She denies fever.      Problem list and histories reviewed & adjusted, as indicated.  Additional history: as documented    Patient Active Problem List   Diagnosis     PUD (peptic ulcer disease)     Hyperlipidemia with target LDL less than 70     Mitral regurgitation     DJD (degenerative joint disease)     Fibroadenoma of breast     Diverticulosis     Sciatica neuralgia     IBS (irritable bowel syndrome)     RBBB (right bundle branch block)     GLEN (obstructive sleep apnea)     Balance disorder     Anxiety     Advanced directives, counseling/discussion     Ventricular tachycardia (paroxysmal) (H)     Corneal ulcer of left eye     Blepharitis     Hypercalcemia     Disorder of skin or subcutaneous tissue     Scar condition and fibrosis of skin     OA (osteoarthritis) of knee - bilateral     Fracture of proximal humerus     CHF (congestive heart failure) (H)     Shoulder fracture     Abnormal glucose     S/P CABG (coronary artery bypass graft)     S/p total knee replacement, bilateral     Acquired hypothyroidism     Essential hypertension with goal blood pressure less than 130/80     Morbid obesity (H)     H/O parathyroidectomy     Past Surgical History:   Procedure  Laterality Date     ARTHROSCOPY KNEE RT/LT      bilateral     C APPENDECTOMY       C CABG, ARTERIAL, THREE  1999    LIMA-LAD, SVG-DIagnoal, SVG-OM, previous LAD-PCI, sees Dr Banuelos     COLONOSCOPY  6/2010    negative     HC DILATION/CURETTAGE DIAG/THER NON OB       HC EXCIS INTERDIGITAL NEUROMA,EA      mortons neuroma excision     HC REMOVAL GALLBLADDER  1994     HERNIA REPAIR, INGUINAL RT/LT       SURGICAL HISTORY OF -       bilateral meniscal tears and surgery on these     SURGICAL HISTORY OF -   1999 or so    one parathyroid removed     TONSILLECTOMY       TUBAL LIGATION         Social History   Substance Use Topics     Smoking status: Former Smoker     Years: 20.00     Types: Cigarettes     Quit date: 7/11/1978     Smokeless tobacco: Never Used     Alcohol use Yes      Comment: Rare alcohol use.     Family History   Problem Relation Age of Onset     C.A.D. Mother      Arthritis Mother      Cardiovascular Mother      HEART DISEASE Mother      Obesity Mother      Thyroid Disease Mother      C.A.D. Father      Diabetes Father      Hypertension Father      Alcohol/Drug Father      Alzheimer Disease Father      Cardiovascular Father      Circulatory Father      GASTROINTESTINAL DISEASE Father      HEART DISEASE Father      Lipids Father      Obesity Father      Cardiovascular Maternal Grandmother      Depression Maternal Grandmother      Obesity Maternal Grandmother      Thyroid Disease Maternal Grandmother      Cardiovascular Maternal Grandfather      HEART DISEASE Maternal Grandfather      Obesity Maternal Grandfather      Obesity Paternal Grandmother      Diabetes Paternal Grandfather      Alcohol/Drug Paternal Grandfather      Obesity Paternal Grandfather      Breast Cancer Sister      Cancer Sister      Obesity Sister      Thyroid Disease Sister      Alcohol/Drug Son      Allergies Son      Allergies Daughter      Depression Sister      Eye Disorder Sister      GASTROINTESTINAL DISEASE Sister      Thyroid  Disease Sister          Current Outpatient Prescriptions   Medication Sig Dispense Refill     acetaminophen (TYLENOL) 500 MG tablet Take 1,000 mg by mouth       amoxicillin (AMOXIL) 500 MG capsule TK 4 CS PO 1 HOUR BEFORE DENTAL APPOINTMENT  3     aspirin 162 MG EC tablet Take 1 tablet by mouth daily. 90 tablet 3     betamethasone dipropionate (DIPROSONE) 0.05 % lotion Apply  topically as needed. 60 mL 1     Biotin 10 MG TABS tablet Take 10,000 mcg by mouth twice a week        Cholecalciferol (VITAMIN D) 2000 UNITS tablet Take 1 tablet by mouth daily.       citalopram (CELEXA) 20 MG tablet TAKE 1 TABLET BY MOUTH EVERY DAY 90 tablet 1     ezetimibe (ZETIA) 10 MG tablet Take 10 mg by mouth       levothyroxine (SYNTHROID/LEVOTHROID) 125 MCG tablet TAKE 1 TABLET BY MOUTH EVERY DAY 90 tablet 0     LORazepam (ATIVAN) 0.5 MG tablet Take 1 tablet (0.5 mg) by mouth every 8 hours as needed for anxiety 20 tablet 0     losartan-hydrochlorothiazide (HYZAAR) 100-25 MG per tablet TAKE 1 TABLET BY MOUTH DAILY 90 tablet 3     metoprolol tartrate (LOPRESSOR) 50 MG tablet TAKE 1 TABLET BY MOUTH TWICE DAILY 180 tablet 3     Omega-3 Fatty Acids (FISH OIL) 1200 MG CAPS Take 1 capsule by mouth 2 times daily       order for DME        predniSONE (DELTASONE) 20 MG tablet Take 1 tablet (20 mg) by mouth daily 5 tablet 0     Allergies   Allergen Reactions     Atorvastatin Other (See Comments) and Muscle Pain (Myalgia)     lipitor  Myalgia     Cortisone      Insomnia, burning in chest, flushed     Nickel Other (See Comments)     Raw weepy skin  rash     Tetracycline Diarrhea     Vicodin [Hydrocodone-Acetaminophen] Other (See Comments)     Hallucinations     BP Readings from Last 3 Encounters:   10/17/18 138/70   09/13/18 136/82   07/09/18 130/64    Wt Readings from Last 3 Encounters:   09/13/18 259 lb (117.5 kg)   07/09/18 254 lb (115.2 kg)   04/24/18 257 lb 6.4 oz (116.8 kg)                  Labs reviewed in EPIC    Reviewed and updated as  needed this visit by clinical staff       Reviewed and updated as needed this visit by Provider         ROS:  Constitutional, HEENT, cardiovascular, pulmonary, gi and gu systems are negative, except as otherwise noted.    OBJECTIVE:     /70  Pulse 68  Temp 99  F (37.2  C) (Oral)  Resp 20  SpO2 94%  There is no height or weight on file to calculate BMI.  GENERAL: healthy, alert and no distress  RESP: lungs clear to auscultation - no rales, rhonchi or wheezes  CV: regular rate and rhythm, normal S1 S2, no S3 or S4, no murmur, click or rub, no peripheral edema and peripheral pulses strong  MS: Erythema, warmth, tenderness noted to palpation of left medial foot    Diagnostic Test Results:  pending    ASSESSMENT/PLAN:     1. Acute idiopathic gout of left foot  Patient to monitor closely for s/s of CHF exacerbation with prednisone.  She had an isolated incident of hypertension with cortisone injection in the past.  She will monitor blood pressure at home.  - Uric acid  - CBC with platelets and differential  - predniSONE (DELTASONE) 20 MG tablet; Take 1 tablet (20 mg) by mouth daily  Dispense: 5 tablet; Refill: 0    FUTURE APPOINTMENTS:       - Follow-up for annual visit or as needed    MARY Hannon CNP  [facility]

## 2018-10-17 NOTE — MR AVS SNAPSHOT
After Visit Summary   10/17/2018    Lisa Ferguson    MRN: [id number]           Patient Information     Date Of Birth          1935        Visit Information        Provider Department      10/17/2018 11:40 AM Shira Shaw APRN [facility]        Today's Diagnoses     Acute idiopathic gout of left foot    -  1      Care Instructions      Gout Diet  Gout is a painful condition caused by an excess of uric acid, a waste product made by the body. Uric acid forms crystals that collect in the joints. The immune response to these crystals brings on symptoms of joint pain and swelling. This is called a gout attack. Often, medications and diet changes are combined to manage gout. Below are some guidelines for changing your diet to help you manage gout and prevent attacks. Your health care provider will help you determine the best eating plan for you.     Eating to manage gout  Weight loss for those who are overweight may help reduce gout attacks.  Eat less of these foods  Eating too many foods containing purines may raise the levels of uric acid in your body. This raises your risk for a gout attack. Try to limit these foods and drinks:    Alcohol, such as beer and red wine. You may be told to avoid alcohol completely.    Soft drinks that contain sugar or high fructose corn syrup    Certain fish, including anchovies, sardines, fish eggs, and herring    Shellfish    Certain meats, such as red meat, hot dogs, luncheon meats, and turkey    Organ meats, such as liver, kidneys, and sweetbreads    Legumes, such as dried beans and peas    Other high fat foods such as gravy, whole milk, and high fat cheeses    Vegetables such as asparagus, cauliflower, spinach, and mushrooms used to be thought to contribute to an increased risk for a gout attack, but recent studies show that high purine vegetables don't increase the risk for a gout attack.  Eat more of these foods  Other foods may be  helpful for people with gout. Add some of these foods to your diet:    Cherries contain chemicals that may lower uric acid.    Omega fatty acids. These are found in some fatty fish such as salmon, certain oils (flax, olive, or nut), and nuts themselves. Omega fatty acids may help prevent inflammation due to gout.    Dairy products that are low-fat or fat-free, such as cheese and yogurt    Complex carbohydrate foods, including whole grains, brown rice, oats, and beans    Coffee, in moderation    Water, approximately 64 ounces per day  Follow-up care  Follow up with your healthcare provider as advised.  When to seek medical advice  Call your healthcare provider right away if any of these occur:    Return of gout symptoms, usually at night:    Severe pain, swelling, and heat in a joint, especially the base of the big toe    Affected joint is hard to move    Skin of the affected joint is purple or red    Fever of 100.4 F (38 C) or higher    Pain that doesn't get better even with prescribed medicine   Date Last Reviewed: 1/12/2016 2000-2017 [employer]. [address] [place] [address]. All rights reserved. This information is not intended as a substitute for professional medical care. Always follow your healthcare professional's instructions.      [facility]    If you have any questions regarding to your visit please contact your care team:     Team Pink:   Clinic Hours Telephone Number   Internal Medicine:  Dr. Patty Shaw, NP 7am-7pm  Monday - Thursday   7am-5pm  Fridays  [phone]  (Appointment scheduling available 24/7)   Urgent Care - [place] and [place] - 11am-9pm Monday-Friday Saturday-Sunday- 9am-5pm   [place] - 5pm-9pm Monday-Friday Saturday-Sunday- 9am-5pm  [phone] - [place]  [phone] - [place]       What options do I have for a visit other than an office visit? We offer electronic visits  (e-visits) and telephone visits, when medically appropriate.  Please check with your medical insurance to see if these types of visits are covered, as you will be responsible for any charges that are not paid by your insurance.      You can use ePantry (secure electronic communication) to access to your chart, send your primary care provider a message, or make an appointment. Ask a team member how to get started.     For a price quote for your services, please call our Consumer Price Line at [phone] or our Imaging Cost estimation line at [phone] (for imaging tests).    BOUBACAR Lynn            Follow-ups after your visit        Who to contact     If you have questions or need follow up information about today's clinic visit or your schedule please contact [facility] directly at [phone].  Normal or non-critical lab and imaging results will be communicated to you by Proximexhart, letter or phone within 4 business days after the clinic has received the results. If you do not hear from us within 7 days, please contact the clinic through BlenderHouset or phone. If you have a critical or abnormal lab result, we will notify you by phone as soon as possible.  Submit refill requests through ePantry or call your pharmacy and they will forward the refill request to us. Please allow 3 business days for your refill to be completed.          Additional Information About Your Visit        ePantry Information     ePantry gives you secure access to your electronic health record. If you see a primary care provider, you can also send messages to your care team and make appointments. If you have questions, please call your primary care clinic.  If you do not have a primary care provider, please call [phone] and they will assist you.        Care EveryWhere ID     This is your Care EveryWhere ID. This could be used by other organizations to access your [place] medical records  [id number]        Your Vitals Were      Pulse Temperature Respirations Pulse Oximetry          68 99  F (37.2  C) (Oral) 20 94%         Blood Pressure from Last 3 Encounters:   10/17/18 138/70   09/13/18 136/82   07/09/18 130/64    Weight from Last 3 Encounters:   09/13/18 259 lb (117.5 kg)   07/09/18 254 lb (115.2 kg)   04/24/18 257 lb 6.4 oz (116.8 kg)              We Performed the Following     CBC with platelets and differential     Uric acid          Today's Medication Changes          These changes are accurate as of 10/17/18 12:16 PM.  If you have any questions, ask your nurse or doctor.               Start taking these medicines.        Dose/Directions    predniSONE 20 MG tablet   Commonly known as:  DELTASONE   Used for:  Acute idiopathic gout of left foot   Started by:  Shira Shaw APRN CNP        Dose:  20 mg   Take 1 tablet (20 mg) by mouth daily   Quantity:  5 tablet   Refills:  0            Where to get your medicines      [facility] [address] [place] [address] [facility] AT [facility] [address]  [address] [facility] [address]     Phone:  [phone]     predniSONE 20 MG tablet                Primary Care Provider Office Phone # Fax #    Ziyad Isaac [phone]        [facility] [address]        Equal Access to Services     [facility] AH: Hadii aad ku hadasho Soomaali, waaxda luqadaha, qaybta kaalmada adeegyada, mikaela franco hayiesha calero . So [facility] [phone].    ATENCIÓN: Si habla español, tiene a hogan disposición servicios gratuitos de asistencia lingüística. Llame al [phone].    We comply with applicable federal civil rights laws and [place] laws. We do not discriminate on the basis of race, color, national origin, age, disability, sex, sexual orientation, or gender identity.            Thank you!     Thank you for choosing [facility]  for your care. Our goal is always to provide you with  excellent care. Hearing back from our patients is one way we can continue to improve our services. Please take a few minutes to complete the written survey that you may receive in the mail after your visit with us. Thank you!             Your Updated Medication List - Protect others around you: Learn how to safely use, store and throw away your medicines at www.disposemymeds.org.          This list is accurate as of 10/17/18 12:16 PM.  Always use your most recent med list.                   Brand Name Dispense Instructions for use Diagnosis    acetaminophen 500 MG tablet    TYLENOL     Take 1,000 mg by mouth        amoxicillin 500 MG capsule    AMOXIL     TK 4 CS PO 1 HOUR BEFORE DENTAL APPOINTMENT        aspirin 162 MG EC tablet     90 tablet    Take 1 tablet by mouth daily.        betamethasone dipropionate 0.05 % lotion    DIPROSONE    60 mL    Apply  topically as needed.    Seborrhea       Biotin 10 MG Tabs tablet      Take 10,000 mcg by mouth twice a week        citalopram 20 MG tablet    celeXA    90 tablet    TAKE 1 TABLET BY MOUTH EVERY DAY    Anxiety       ezetimibe 10 MG tablet    ZETIA     Take 10 mg by mouth        fish Oil 1200 MG capsule      Take 1 capsule by mouth 2 times daily        levothyroxine 125 MCG tablet    SYNTHROID/LEVOTHROID    90 tablet    TAKE 1 TABLET BY MOUTH EVERY DAY    Acquired hypothyroidism       LORazepam 0.5 MG tablet    ATIVAN    20 tablet    Take 1 tablet (0.5 mg) by mouth every 8 hours as needed for anxiety    Anxiety       losartan-hydrochlorothiazide 100-25 MG per tablet    HYZAAR    90 tablet    TAKE 1 TABLET BY MOUTH DAILY    Essential hypertension with goal blood pressure less than 130/80       metoprolol tartrate 50 MG tablet    LOPRESSOR    180 tablet    TAKE 1 TABLET BY MOUTH TWICE DAILY    Hypertension goal BP (blood pressure) < 130/80       order for DME           predniSONE 20 MG tablet    DELTASONE    5 tablet    Take 1 tablet (20 mg) by mouth daily    Acute  idiopathic gout of left foot       vitamin D 2000 units tablet      Take 1 tablet by mouth daily.

## 2018-10-18 NOTE — PROGRESS NOTES
Dear Lisa,    Your recent test results are attached.      Your uric acid level is elevated, consistent with gout.  Please let me know if your pain is not improving.  Your WBC count is mildly elevated as well.  I am not overly concerned about this at this time.  Please let me know if you develop a fever.    If you have any questions please feel free to contact [phone] or myself via [employer] CRM[employer].    Sincerely,  Shira Shaw, CNP

## 2018-11-03 DIAGNOSIS — E03.9 ACQUIRED HYPOTHYROIDISM: ICD-10-CM

## 2018-11-05 RX ORDER — LEVOTHYROXINE SODIUM 125 UG/1
TABLET ORAL
Qty: 90 TABLET | Refills: 3 | Status: SHIPPED | OUTPATIENT
Start: 2018-11-05 | End: 2019-07-23

## 2018-12-11 ENCOUNTER — TELEPHONE (OUTPATIENT)
Dept: FAMILY MEDICINE | Facility: CLINIC | Age: 83
End: 2018-12-11

## 2018-12-11 DIAGNOSIS — M10.9 GOUT: Primary | ICD-10-CM

## 2018-12-11 RX ORDER — PREDNISONE 20 MG/1
20 TABLET ORAL 2 TIMES DAILY
Qty: 10 TABLET | Refills: 0 | Status: SHIPPED | OUTPATIENT
Start: 2018-12-11 | End: 2018-12-16

## 2018-12-11 NOTE — TELEPHONE ENCOUNTER
Called and spoke with patient.   Reports she thinks she's having another gout flare up. Seen on 10/17/18 for same issue.  Reports it is in the arch of her R foot (same as last time) that started yesterday.  Reports it is becoming very painful and hard to walk/stand on it.   Pt denies fever, redness/swelling to area.  Pt wanting to know if she can get medication again for this before it gets as bad as it did last time.    Please advise.     Cecelia Chacko, RN    
Called and spoke with patient. Gave information below.   Scheduled pt for f/u appt and Rx sent to pharmacy.   Pt verbalized understanding & no further questions.     Cecelia Chacko RN    
Please have patient start prednisone 20 mg x 5 days.  Please have her schedule follow-up to discuss gout prophylaxis at her convenience or follow-up sooner if not improving.    Thanks,  Shira Shaw, CNP    
Reason for call:  Patient reporting a symptom    Symptom or request: Gout     Duration (how long have symptoms been present): 24 hours     Have you been treated for this before? Yes    Additional comments: Patient was originally requesting a phone visit but is just wondering if she can get medication today. Please advise.     Phone Number patient can be reached at:  Home number on file [phone] (home)    Best Time:  Any     Can we leave a detailed message on this number:  YES    Call taken on 12/11/2018 at 9:05 AM by Jack Groves    
Universal Safety Interventions

## 2018-12-19 ENCOUNTER — OFFICE VISIT (OUTPATIENT)
Dept: FAMILY MEDICINE | Facility: CLINIC | Age: 83
End: 2018-12-19
Payer: COMMERCIAL

## 2018-12-19 VITALS
RESPIRATION RATE: 13 BRPM | OXYGEN SATURATION: 99 % | SYSTOLIC BLOOD PRESSURE: 132 MMHG | WEIGHT: 258 LBS | DIASTOLIC BLOOD PRESSURE: 84 MMHG | BODY MASS INDEX: 41.46 KG/M2 | HEIGHT: 66 IN | TEMPERATURE: 96.6 F | HEART RATE: 65 BPM

## 2018-12-19 DIAGNOSIS — M10.071 ACUTE IDIOPATHIC GOUT OF RIGHT FOOT: Primary | ICD-10-CM

## 2018-12-19 PROCEDURE — 99213 OFFICE O/P EST LOW 20 MIN: CPT | Performed by: NURSE PRACTITIONER

## 2018-12-19 RX ORDER — PREDNISONE 20 MG/1
TABLET ORAL
Qty: 8 TABLET | Refills: 0 | Status: SHIPPED | OUTPATIENT
Start: 2018-12-19 | End: 2018-12-29

## 2018-12-19 RX ORDER — ALLOPURINOL 100 MG/1
100 TABLET ORAL DAILY
Qty: 90 TABLET | Refills: 0 | Status: SHIPPED | OUTPATIENT
Start: 2018-12-19 | End: 2019-03-12

## 2018-12-19 ASSESSMENT — MIFFLIN-ST. JEOR: SCORE: 1652.03

## 2018-12-19 NOTE — PATIENT INSTRUCTIONS
Complete course of prednisone.  Start allopurinol after gout has fully resolved.  Return 1 month after start of allopurinol for lab only appointment for uric acid level check.    [facility] (formerly [facility])[3]    If you have any questions regarding to your visit please contact your care team:     Team Pink:   Clinic Hours Telephone Number   Internal Medicine:  Dr. Patty Shaw, NP       7am-7pm  Monday - Thursday   7am-5pm  Fridays  [phone]  (Appointment scheduling available 24/7)    Questions about your visit?  Team Line  [phone]   Urgent Care - [place] - 11am-9pm Monday-Friday Saturday-Sunday- 9am-5pm   [place] - 5pm-9pm Monday-Friday Saturday-Sunday- 9am-5pm  [phone] - [place]   [phone] - [place]       What options do I have for visits at the clinic other than the traditional office visit?  To expand how we care for you, many of our providers are utilizing electronic visits (e-visits) and telephone visits, when medically appropriate, for interactions with their patients rather than a visit in the clinic.   We also offer nurse visits for many medical concerns. Just like any other service, we will bill your insurance company for this type of visit based on time spent on the phone with your provider. Not all insurance companies cover these visits. Please check with your medical insurance if this type of visit is covered. You will be responsible for any charges that are not paid by your insurance.      E-visits via Intal[employer]:  generally incur a $35.00 fee.  Telephone visits:  Time spent on the phone: *charged based on time that is spent on the phone in increments of 10 minutes. Estimated cost:   5-10 mins $30.00   11-20 mins. $59.00   21-30 mins. $85.00   Use [employer] (secure email communication and access to your chart) to send your primary care provider a message or make an appointment. Ask someone on your Team how to sign up for  Agustín.    For a Price Quote for your services, please call our Consumer Price Line at [phone].    As always, Thank you for trusting us with your health care needs    Lavern Sesay CMA

## 2018-12-19 NOTE — NURSING NOTE
"Chief Complaint   Patient presents with     Arthritis     Initial /84 (BP Location: Left arm, Patient Position: Chair, Cuff Size: Adult Regular)   Pulse 65   Temp 96.6  F (35.9  C) (Oral)   Resp 13   Ht 1.676 m (5' 6\")   Wt 117 kg (258 lb)   SpO2 99%   Breastfeeding? No   BMI 41.64 kg/m   Estimated body mass index is 41.64 kg/m  as calculated from the following:    Height as of this encounter: 1.676 m (5' 6\").    Weight as of this encounter: 117 kg (258 lb).  BP completed using cuff size: regular    Michael Rubalcava  "

## 2018-12-19 NOTE — PROGRESS NOTES
SUBJECTIVE:   Lisa Ferguson is a 83 year old female who presents to clinic today for the following health issues:    Gout  Duration: x 2 weeks  Has kidney stones so uric acid has been elevated   Description   Location: foot - started in the left, and now the right  Joint Swelling: no   Redness: YES  Pain intensity:  moderate  Accompanying signs and symptoms: pain in movement, Fatigue cause of having troubles sleeping  History  Previous history of gout: YES- over 10 years ago in the left knee   Trauma to the area: no   Precipitating factors:   Alcohol usage: Occassional  Diuretic use: YES- Losartan/HCTZ  Recent illness: no   Therapies tried and outcome: Tylenol    Patient notes pain improved to right great toe with prednisone use.  After stopping prednisone, her pain returned.  She still notes some intermittent pain to left midfoot.       Problem list and histories reviewed & adjusted, as indicated.  Additional history: as documented    Patient Active Problem List   Diagnosis     PUD (peptic ulcer disease)     Hyperlipidemia with target LDL less than 70     Mitral regurgitation     DJD (degenerative joint disease)     Fibroadenoma of breast     Diverticulosis     Sciatica neuralgia     IBS (irritable bowel syndrome)     RBBB (right bundle branch block)     GLEN (obstructive sleep apnea)     Balance disorder     Anxiety     Advanced directives, counseling/discussion     Ventricular tachycardia (paroxysmal) (H)     Corneal ulcer of left eye     Blepharitis     Hypercalcemia     Disorder of skin or subcutaneous tissue     Scar condition and fibrosis of skin     OA (osteoarthritis) of knee - bilateral     Fracture of proximal humerus     CHF (congestive heart failure) (H)     Shoulder fracture     Abnormal glucose     S/P CABG (coronary artery bypass graft)     S/p total knee replacement, bilateral     Acquired hypothyroidism     Essential hypertension with goal blood pressure less than 130/80     Morbid obesity (H)      H/O parathyroidectomy     Past Surgical History:   Procedure Laterality Date     ARTHROSCOPY KNEE RT/LT      bilateral     C APPENDECTOMY       C CABG, ARTERIAL, THREE      LIMA-LAD, SVG-DIagnoal, SVG-OM, previous LAD-PCI, sees Dr Banuelos     COLONOSCOPY  2010    negative     HC DILATION/CURETTAGE DIAG/THER NON OB       HC EXCIS INTERDIGITAL NEUROMA,EA      mortons neuroma excision     HC REMOVAL GALLBLADDER       HERNIA REPAIR, INGUINAL RT/LT       SURGICAL HISTORY OF -       bilateral meniscal tears and surgery on these     SURGICAL HISTORY OF -    or so    one parathyroid removed     TONSILLECTOMY       TUBAL LIGATION         Social History     Tobacco Use     Smoking status: Former Smoker     Years: 20.00     Types: Cigarettes     Last attempt to quit: 1978     Years since quittin.4     Smokeless tobacco: Never Used   Substance Use Topics     Alcohol use: Yes     Comment: Rare alcohol use.     Family History   Problem Relation Age of Onset     C.A.D. Mother      Arthritis Mother      Cardiovascular Mother      Heart Disease Mother      Obesity Mother      Thyroid Disease Mother      C.A.D. Father      Diabetes Father      Hypertension Father      Alcohol/Drug Father      Alzheimer Disease Father      Cardiovascular Father      Circulatory Father      Gastrointestinal Disease Father      Heart Disease Father      Lipids Father      Obesity Father      Cardiovascular Maternal Grandmother      Depression Maternal Grandmother      Obesity Maternal Grandmother      Thyroid Disease Maternal Grandmother      Cardiovascular Maternal Grandfather      Heart Disease Maternal Grandfather      Obesity Maternal Grandfather      Obesity Paternal Grandmother      Diabetes Paternal Grandfather      Alcohol/Drug Paternal Grandfather      Obesity Paternal Grandfather      Breast Cancer Sister      Cancer Sister      Obesity Sister      Thyroid Disease Sister      Alcohol/Drug Son      Allergies Son       Allergies Daughter      Depression Sister      Eye Disorder Sister      Gastrointestinal Disease Sister      Thyroid Disease Sister          Current Outpatient Medications   Medication Sig Dispense Refill     acetaminophen (TYLENOL) 500 MG tablet Take 1,000 mg by mouth       allopurinol (ZYLOPRIM) 100 MG tablet Take 1 tablet (100 mg) by mouth daily 90 tablet 0     amoxicillin (AMOXIL) 500 MG capsule TK 4 CS PO 1 HOUR BEFORE DENTAL APPOINTMENT  3     aspirin 162 MG EC tablet Take 81 mg by mouth daily  90 tablet 3     betamethasone dipropionate (DIPROSONE) 0.05 % lotion Apply  topically as needed. 60 mL 1     Biotin 10 MG TABS tablet Take 10,000 mcg by mouth twice a week        Cholecalciferol (VITAMIN D) 2000 UNITS tablet Take 1 tablet by mouth daily.       citalopram (CELEXA) 20 MG tablet TAKE 1 TABLET BY MOUTH EVERY DAY 90 tablet 1     ezetimibe (ZETIA) 10 MG tablet Take 10 mg by mouth       levothyroxine (SYNTHROID/LEVOTHROID) 125 MCG tablet TAKE 1 TABLET BY MOUTH EVERY DAY 90 tablet 3     LORazepam (ATIVAN) 0.5 MG tablet Take 1 tablet (0.5 mg) by mouth every 8 hours as needed for anxiety 20 tablet 0     losartan-hydrochlorothiazide (HYZAAR) 100-25 MG per tablet TAKE 1 TABLET BY MOUTH DAILY 90 tablet 3     metoprolol tartrate (LOPRESSOR) 50 MG tablet TAKE 1 TABLET BY MOUTH TWICE DAILY 180 tablet 3     Omega-3 Fatty Acids (FISH OIL) 1200 MG CAPS Take 1 capsule by mouth 2 times daily       order for DME        predniSONE (DELTASONE) 20 MG tablet Take 20 mg by mouth daily for 5 days, THEN 10 mg daily for 5 days. 8 tablet 0     predniSONE (DELTASONE) 20 MG tablet Take 1 tablet (20 mg) by mouth daily 5 tablet 0     Allergies   Allergen Reactions     Atorvastatin Other (See Comments) and Muscle Pain (Myalgia)     lipitor  Myalgia     Cortisone      Insomnia, burning in chest, flushed     Nickel Other (See Comments)     Raw weepy skin  rash     Tetracycline Diarrhea     Vicodin [Hydrocodone-Acetaminophen] Other (See  "Comments)     Hallucinations     BP Readings from Last 3 Encounters:   12/19/18 132/84   10/17/18 138/70   09/13/18 136/82    Wt Readings from Last 3 Encounters:   12/19/18 117 kg (258 lb)   09/13/18 117.5 kg (259 lb)   07/09/18 115.2 kg (254 lb)                  Labs reviewed in EPIC    Reviewed and updated as needed this visit by clinical staff  Tobacco  Allergies  Meds  Med Hx  Surg Hx  Fam Hx  Soc Hx      Reviewed and updated as needed this visit by Provider       ROS:  Constitutional, HEENT, cardiovascular, pulmonary, gi and gu systems are negative, except as otherwise noted.    OBJECTIVE:     /84 (BP Location: Left arm, Patient Position: Chair, Cuff Size: Adult Regular)   Pulse 65   Temp 96.6  F (35.9  C) (Oral)   Resp 13   Ht 1.676 m (5' 6\")   Wt 117 kg (258 lb)   SpO2 99%   Breastfeeding? No   BMI 41.64 kg/m    Body mass index is 41.64 kg/m .  GENERAL: healthy, alert and no distress  RESP: lungs clear to auscultation - no rales, rhonchi or wheezes  CV: regular rate and rhythm, normal S1 S2, no S3 or S4, no murmur, click or rub, no peripheral edema and peripheral pulses strong  MS: Right 1st MTP joint- full range of motion with pain, erythema and tenderness present    Diagnostic Test Results:  none     ASSESSMENT/PLAN:     1. Acute idiopathic gout of right foot  Patient to start allopurinol at low dose after acute flare resolves.  Will recheck uric acid in 1 month and titrate up allopurinol as needed.  - predniSONE (DELTASONE) 20 MG tablet; Take 20 mg by mouth daily for 5 days, THEN 10 mg daily for 5 days.  Dispense: 8 tablet; Refill: 0  - allopurinol (ZYLOPRIM) 100 MG tablet; Take 1 tablet (100 mg) by mouth daily  Dispense: 90 tablet; Refill: 0  - Uric acid; Future    FUTURE APPOINTMENTS:       - Follow-up for annual visit or as needed    MARY Hannon [facility]  "

## 2019-03-11 ENCOUNTER — TELEPHONE (OUTPATIENT)
Dept: FAMILY MEDICINE | Facility: CLINIC | Age: 84
End: 2019-03-11

## 2019-03-11 DIAGNOSIS — M10.071 ACUTE IDIOPATHIC GOUT OF RIGHT FOOT: ICD-10-CM

## 2019-03-11 PROCEDURE — 36415 COLL VENOUS BLD VENIPUNCTURE: CPT | Performed by: NURSE PRACTITIONER

## 2019-03-11 PROCEDURE — 84550 ASSAY OF BLOOD/URIC ACID: CPT | Performed by: NURSE PRACTITIONER

## 2019-03-11 NOTE — TELEPHONE ENCOUNTER
Patient left message on RN Hotline returning call  Called patient back    She asked that Shira Shaw NP review her uric acid labs she had drawn today and advise further  She is hoping that Shira Shaw NP address this without asking her to come in for an OV    Bj Rolle RN

## 2019-03-11 NOTE — TELEPHONE ENCOUNTER
Patient was started on Allopurinol in Dec 2018 and was to return for uric acid recheck in Jan 2019  However, d/t the winter weather, she did not come in for this.  She has a lab appointment today  Has been taking Allopurinol daily consistently   Noted pain to the top of the right foot.  No redness noted and not sure if there is swelling, however, the skin on the foot is itchy and she thinks this is because there is possible swelling and stretching of the skin causing the itchiness    She is requesting meds for gout flare up  Please advise    [facility]    Bj Rolle RN

## 2019-03-11 NOTE — TELEPHONE ENCOUNTER
FYI, pain in the foot is present (see note below)  No redness noted  Possibly some swelling but patient unsure.    LM on patient's VM advising her to schedule an OV for further assessment./  Scheduling number left on her VM  If patient calls back, please help her schedule an OV with a provider    Bj Rolle RN

## 2019-03-11 NOTE — TELEPHONE ENCOUNTER
Reason for call:  Symptom   Symptom or request: Gout    Duration (how long have symptoms been present): 5 days  Have you been treated for this before? Yes    Additional comments: Patient would like to get a prescription for the gout attack. She is coming in for lab work today. Please call.    Phone number to reach patient:  Home number on file [phone] (home)    Best Time:  Before 11:45 am today    Can we leave a detailed message on this number?  YES

## 2019-03-11 NOTE — TELEPHONE ENCOUNTER
Gout flares are red and painful.  A nonpainful swollen foot that itches would not be gout.  Should be seen.  Dr. Ponce

## 2019-03-12 DIAGNOSIS — M10.071 ACUTE IDIOPATHIC GOUT OF RIGHT FOOT: ICD-10-CM

## 2019-03-12 LAB — URATE SERPL-MCNC: 6.6 MG/DL (ref 2.6–6)

## 2019-03-12 RX ORDER — ALLOPURINOL 100 MG/1
200 TABLET ORAL DAILY
Qty: 90 TABLET | Refills: 1 | Status: SHIPPED | OUTPATIENT
Start: 2019-03-12 | End: 2019-03-12

## 2019-03-12 NOTE — TELEPHONE ENCOUNTER
Called and spoke with patient and gave information.   Rx sent to pharmacy and lab order placed. Stated she would call back to schedule appointment.   Offered to schedule appointment with PCP but patient declined and stated she would wait to see how things go and if they improve or not.   No further questions.     Cecelia Chacko RN

## 2019-03-12 NOTE — TELEPHONE ENCOUNTER
Without swelling and redness, I do not think her symptoms are gout.  I would agree with dr. Ponce that she would need to be seen for an accurate diagnosis.  Her recent uric acid level was much improved to 6.6 (last was 8.6).  I would like her to increase her allopurinol to 200 mg daily  And repeat a uric acid level again in 1 month (#90, 1 RF, indication gout).    Shira Shaw, CNP

## 2019-03-13 RX ORDER — ALLOPURINOL 100 MG/1
TABLET ORAL
Qty: 180 TABLET | Refills: 1 | Status: SHIPPED | OUTPATIENT
Start: 2019-03-13 | End: 2019-05-23

## 2019-03-22 DIAGNOSIS — F41.9 ANXIETY: ICD-10-CM

## 2019-03-22 RX ORDER — CITALOPRAM HYDROBROMIDE 20 MG/1
TABLET ORAL
Qty: 90 TABLET | Refills: 1 | Status: SHIPPED | OUTPATIENT
Start: 2019-03-22 | End: 2019-07-23

## 2019-03-22 NOTE — TELEPHONE ENCOUNTER
Prescription approved per [facility].    Erika Abel RN

## 2019-05-16 ENCOUNTER — TRANSFERRED RECORDS (OUTPATIENT)
Dept: HEALTH INFORMATION MANAGEMENT | Facility: CLINIC | Age: 84
End: 2019-05-16

## 2019-05-22 DIAGNOSIS — M10.071 ACUTE IDIOPATHIC GOUT OF RIGHT FOOT: ICD-10-CM

## 2019-05-22 LAB — URATE SERPL-MCNC: 6.3 MG/DL (ref 2.6–6)

## 2019-05-22 PROCEDURE — 84550 ASSAY OF BLOOD/URIC ACID: CPT | Performed by: NURSE PRACTITIONER

## 2019-05-22 PROCEDURE — 36415 COLL VENOUS BLD VENIPUNCTURE: CPT | Performed by: NURSE PRACTITIONER

## 2019-05-23 ENCOUNTER — TELEPHONE (OUTPATIENT)
Dept: INTERNAL MEDICINE | Facility: CLINIC | Age: 84
End: 2019-05-23

## 2019-05-23 DIAGNOSIS — M10.071 ACUTE IDIOPATHIC GOUT OF RIGHT FOOT: ICD-10-CM

## 2019-05-23 RX ORDER — ALLOPURINOL 300 MG/1
300 TABLET ORAL DAILY
Qty: 90 TABLET | Refills: 1 | Status: SHIPPED | OUTPATIENT
Start: 2019-05-23 | End: 2019-07-23

## 2019-05-23 NOTE — TELEPHONE ENCOUNTER
Left message on voicemail for patient to return call to RN hotline at # [phone].    [place] message sent  Prescription sent for Allopurinol 300mg dose    Bj Rolle RN

## 2019-05-23 NOTE — TELEPHONE ENCOUNTER
----- Message from MARY Andres CNP sent at 5/23/2019  7:10 AM CDT -----  Please call patient-    Her uric acid level has improved, but is not yet normal.  I would recommend increasing her allopurinol to 300 mg at this time.  Uric acid can be rechecked at her physical in September (#90, 1 RF, indication gout).    Thanks,  Shira Shaw CNP

## 2019-05-28 NOTE — TELEPHONE ENCOUNTER
Patient notified of providers message as written.  Patient verbalized understanding, no further questions or concerns.    Erika Abel RN

## 2019-06-26 ENCOUNTER — TELEPHONE (OUTPATIENT)
Dept: INTERNAL MEDICINE | Facility: CLINIC | Age: 84
End: 2019-06-26

## 2019-06-26 NOTE — TELEPHONE ENCOUNTER
I received call from Dr. Arias, podiatrist that he was starting patient on medrol dose camille for recurrent gout.  He is also sending her ultrasound to rule out DVT.  He advised her to follow-up in primary care.    Shira Shaw, CNP

## 2019-07-01 ENCOUNTER — TELEPHONE (OUTPATIENT)
Dept: FAMILY MEDICINE | Facility: CLINIC | Age: 84
End: 2019-07-01

## 2019-07-01 DIAGNOSIS — M10.071 ACUTE IDIOPATHIC GOUT OF RIGHT FOOT: Primary | ICD-10-CM

## 2019-07-01 RX ORDER — PREDNISONE 10 MG/1
TABLET ORAL
Qty: 15 TABLET | Refills: 0 | Status: SHIPPED | OUTPATIENT
Start: 2019-07-01 | End: 2019-07-23

## 2019-07-01 NOTE — TELEPHONE ENCOUNTER
Detailed message left on patient's VM with note as written below.  Asked that she call the RN hotline back to verify pharmacy    Bj Rolle RN

## 2019-07-01 NOTE — TELEPHONE ENCOUNTER
Let's have patient try a taper of prednisone.  I've tarsha'd up dose, please verify pharmacy and send.    Shira Shaw, CNP

## 2019-07-01 NOTE — TELEPHONE ENCOUNTER
Patient returned call to RN hotline. States she uses [facility] in [place].   Rx sent.     Cecelia Chacko RN

## 2019-07-01 NOTE — TELEPHONE ENCOUNTER
"Called and spoke with patient. Reports she is still dealing with her gout in her R foot and unsure of next steps.   Reports that the pain is OK in the morning but then by the afternoon it's back to being painful but states the pain isn't as bad as it was in the past but still very bothersome.  Reports the pain is mostly in the arch of her foot and is mildly swollen. Denies any redness or pain in toes.  Patient unable to describe pain, states it \"just hurts\".   Patient saw Dr. Wayne Arias at [facility].  States she is still taking allopurinol daily and was prescribed medrol dose camille but the pain is still present.  Patient has appointment scheduled for 7/8/19.    Cecelia Chacko RN  "

## 2019-07-01 NOTE — TELEPHONE ENCOUNTER
Reason for call:  Patient reporting a symptom    Symptom or request: gout     Duration (how long have symptoms been present):  14 days     Have you been treated for this before? Yes    Additional comments: still using walker. Foot painful.     Phone Number patient can be reached at:  Home number on file [phone] (home)    Best Time:  Any     Can we leave a detailed message on this number:  YES    Call taken on 7/1/2019 at 8:18 AM by Nohemy Rodrigues

## 2019-07-05 NOTE — PROGRESS NOTES
Subjective     Lisa Ferguson is a 83 year old female who presents to clinic today for the following health issues:    HPI   Chief Complaint   Patient presents with     Arthritis     in both feet, both knees,lower back pain, and stiffness in feets x 3 weeks     Health Maintenance     Zoster immun, ACD, HF Action Plan, Lipid, Phq9, BMP     Patient is here for follow-up of gout to her right mid foot diagnosed by her podiatrist.  She is completing a second course of prednisone and notes pain has nearly resolved.  She is on allopurinol for elevated uric acid and notes 4 episode of gout in the past year.  She is avoiding foods that my increase risk of gout.  She wonders what else she can do to decrease her risk of recurrence.      Patient notes lower abdomen cramping for the past 3 days.  She notes some discomfort with bowel movements.  She denies constipation, diarrhea, melena, hematochezia, hematuria, dysuria.  She denies fever.        Patient Active Problem List   Diagnosis     PUD (peptic ulcer disease)     Hyperlipidemia with target LDL less than 70     Mitral regurgitation     DJD (degenerative joint disease)     Fibroadenoma of breast     Diverticulosis     Sciatica neuralgia     IBS (irritable bowel syndrome)     RBBB (right bundle branch block)     GLEN (obstructive sleep apnea)     Balance disorder     Anxiety     Advanced directives, counseling/discussion     Ventricular tachycardia (paroxysmal) (H)     Corneal ulcer of left eye     Blepharitis     Hypercalcemia     Disorder of skin or subcutaneous tissue     Scar condition and fibrosis of skin     OA (osteoarthritis) of knee - bilateral     Fracture of proximal humerus     CHF (congestive heart failure) (H)     Shoulder fracture     Abnormal glucose     S/P CABG (coronary artery bypass graft)     S/p total knee replacement, bilateral     Acquired hypothyroidism     Essential hypertension with goal blood pressure less than 130/80     Morbid obesity (H)     H/O  parathyroidectomy     Past Surgical History:   Procedure Laterality Date     ARTHROSCOPY KNEE RT/LT      bilateral     C APPENDECTOMY       C CABG, ARTERIAL, THREE      LIMA-LAD, SVG-DIagnoal, SVG-OM, previous LAD-PCI, sees Dr Banuelos     COLONOSCOPY  2010    negative     HC DILATION/CURETTAGE DIAG/THER NON OB       HC EXCIS INTERDIGITAL NEUROMA,EA      mortons neuroma excision     HC REMOVAL GALLBLADDER       HERNIA REPAIR, INGUINAL RT/LT       SURGICAL HISTORY OF -       bilateral meniscal tears and surgery on these     SURGICAL HISTORY OF -    or so    one parathyroid removed     TONSILLECTOMY       TUBAL LIGATION         Social History     Tobacco Use     Smoking status: Former Smoker     Years: 20.00     Types: Cigarettes     Last attempt to quit: 1978     Years since quittin.0     Smokeless tobacco: Never Used   Substance Use Topics     Alcohol use: Yes     Comment: Rare alcohol use.     Family History   Problem Relation Age of Onset     C.A.D. Mother      Arthritis Mother      Cardiovascular Mother      Heart Disease Mother      Obesity Mother      Thyroid Disease Mother      C.A.D. Father      Diabetes Father      Hypertension Father      Alcohol/Drug Father      Alzheimer Disease Father      Cardiovascular Father      Circulatory Father      Gastrointestinal Disease Father      Heart Disease Father      Lipids Father      Obesity Father      Cardiovascular Maternal Grandmother      Depression Maternal Grandmother      Obesity Maternal Grandmother      Thyroid Disease Maternal Grandmother      Cardiovascular Maternal Grandfather      Heart Disease Maternal Grandfather      Obesity Maternal Grandfather      Obesity Paternal Grandmother      Diabetes Paternal Grandfather      Alcohol/Drug Paternal Grandfather      Obesity Paternal Grandfather      Breast Cancer Sister      Cancer Sister      Obesity Sister      Thyroid Disease Sister      Alcohol/Drug Son      Allergies Son       Allergies Daughter      Depression Sister      Eye Disorder Sister      Gastrointestinal Disease Sister      Thyroid Disease Sister          Current Outpatient Medications   Medication Sig Dispense Refill     acetaminophen (TYLENOL) 500 MG tablet Take 1,000 mg by mouth       allopurinol (ZYLOPRIM) 300 MG tablet Take 1 tablet (300 mg) by mouth daily 90 tablet 1     amoxicillin (AMOXIL) 500 MG capsule TK 4 CS PO 1 HOUR BEFORE DENTAL APPOINTMENT  3     aspirin 162 MG EC tablet Take 81 mg by mouth daily  90 tablet 3     betamethasone dipropionate (DIPROSONE) 0.05 % lotion Apply  topically as needed. 60 mL 1     Biotin 10 MG TABS tablet Take 10,000 mcg by mouth twice a week        Cholecalciferol (VITAMIN D) 2000 UNITS tablet Take 1 tablet by mouth daily.       citalopram (CELEXA) 20 MG tablet TAKE 1 TABLET BY MOUTH EVERY DAY 90 tablet 1     ezetimibe (ZETIA) 10 MG tablet Take 10 mg by mouth       gemfibrozil (LOPID) 600 MG tablet Take 600 mg by mouth       levothyroxine (SYNTHROID/LEVOTHROID) 125 MCG tablet TAKE 1 TABLET BY MOUTH EVERY DAY 90 tablet 3     LORazepam (ATIVAN) 0.5 MG tablet Take 1 tablet (0.5 mg) by mouth every 8 hours as needed for anxiety 20 tablet 0     losartan (COZAAR) 100 MG tablet Take 1 tablet (100 mg) by mouth daily 90 tablet 0     metoprolol tartrate (LOPRESSOR) 50 MG tablet TAKE 1 TABLET BY MOUTH TWICE DAILY 180 tablet 3     Omega-3 Fatty Acids (FISH OIL) 1200 MG CAPS Take 1 capsule by mouth 2 times daily       order for DME        predniSONE (DELTASONE) 10 MG tablet Take 2 tablets (20 mg) by mouth daily for 5 days, THEN 1 tablet (10 mg) daily for 5 days. 15 tablet 0     Allergies   Allergen Reactions     Atorvastatin Other (See Comments) and Muscle Pain (Myalgia)     lipitor  Myalgia     Cortisone      Insomnia, burning in chest, flushed     Nickel Other (See Comments)     Raw weepy skin  rash     Tetracycline Diarrhea     Vicodin [Hydrocodone-Acetaminophen] Other (See Comments)      "Hallucinations     BP Readings from Last 3 Encounters:   07/08/19 130/80   12/19/18 132/84   10/17/18 138/70    Wt Readings from Last 3 Encounters:   07/08/19 114.9 kg (253 lb 3.2 oz)   12/19/18 117 kg (258 lb)   09/13/18 117.5 kg (259 lb)                    Reviewed and updated as needed this visit by Provider         Review of Systems   ROS COMP: Constitutional, HEENT, cardiovascular, pulmonary, gi and gu systems are negative, except as otherwise noted.      Objective    /80   Pulse 76   Temp 99.2  F (37.3  C) (Oral)   Resp 16   Ht 1.676 m (5' 6\")   Wt 114.9 kg (253 lb 3.2 oz)   SpO2 96%   BMI 40.87 kg/m    Body mass index is 40.87 kg/m .  Physical Exam   GENERAL: healthy, alert and no distress  RESP: lungs clear to auscultation - no rales, rhonchi or wheezes  CV: regular rate and rhythm, normal S1 S2, no S3 or S4, no murmur, click or rub, no peripheral edema and peripheral pulses strong  ABDOMEN: tenderness suprapubic, no organomegaly or masses, bowel sounds normal and no palpable or pulsatile masses  MS: Mild tenderness to palpation of right mid foot, no erythema, edema noted to area.    Diagnostic Test Results:  Labs reviewed in Epic        Assessment & Plan     1. Acute idiopathic gout of right foot  Patient to stop losartan/hctz to see if discontinuing thiazide diuretic will decrease gout incident.  Patient to closely monitor for weight gain, edema with stopping diuretic.  She also has a history of calcium oxalate renal calculi and will monitor for recurrence of stones with stopping thiazide.  Patient to continue allopurinol.  Will recheck uric acid at follow-up appointment once acute attack has passed and titrate allopurinol is able.  Consider rheumatology follow-up if gout attacks continue.  - Urine Microscopic    2. Bilateral lower abdominal cramping  Patient declines additional workup beyond UA today.  She would like to monitor.  - UA reflex to Microscopic and Culture    3. Essential " hypertension with goal blood pressure less than 130/80  As above.   - losartan (COZAAR) 100 MG tablet; Take 1 tablet (100 mg) by mouth daily  Dispense: 90 tablet; Refill: 0    4. Abnormal urine finding    - Urine Culture Aerobic Bacterial               Return in about 2 weeks (around 7/22/2019) for BP Recheck.    MARY Hannon CNP  [facility]

## 2019-07-08 ENCOUNTER — OFFICE VISIT (OUTPATIENT)
Dept: FAMILY MEDICINE | Facility: CLINIC | Age: 84
End: 2019-07-08
Payer: MEDICARE

## 2019-07-08 VITALS
HEIGHT: 66 IN | BODY MASS INDEX: 40.69 KG/M2 | HEART RATE: 76 BPM | SYSTOLIC BLOOD PRESSURE: 130 MMHG | WEIGHT: 253.2 LBS | OXYGEN SATURATION: 96 % | RESPIRATION RATE: 16 BRPM | TEMPERATURE: 99.2 F | DIASTOLIC BLOOD PRESSURE: 80 MMHG

## 2019-07-08 DIAGNOSIS — R82.90 ABNORMAL URINE FINDING: ICD-10-CM

## 2019-07-08 DIAGNOSIS — R10.32 BILATERAL LOWER ABDOMINAL CRAMPING: ICD-10-CM

## 2019-07-08 DIAGNOSIS — M10.071 ACUTE IDIOPATHIC GOUT OF RIGHT FOOT: Primary | ICD-10-CM

## 2019-07-08 DIAGNOSIS — R10.31 BILATERAL LOWER ABDOMINAL CRAMPING: ICD-10-CM

## 2019-07-08 DIAGNOSIS — I10 ESSENTIAL HYPERTENSION WITH GOAL BLOOD PRESSURE LESS THAN 130/80: ICD-10-CM

## 2019-07-08 LAB
ALBUMIN UR-MCNC: NEGATIVE MG/DL
APPEARANCE UR: CLEAR
BACTERIA #/AREA URNS HPF: ABNORMAL /HPF
BILIRUB UR QL STRIP: NEGATIVE
COLOR UR AUTO: YELLOW
GLUCOSE UR STRIP-MCNC: NEGATIVE MG/DL
HGB UR QL STRIP: NEGATIVE
KETONES UR STRIP-MCNC: NEGATIVE MG/DL
LEUKOCYTE ESTERASE UR QL STRIP: ABNORMAL
NITRATE UR QL: NEGATIVE
NON-SQ EPI CELLS #/AREA URNS LPF: ABNORMAL /LPF
PH UR STRIP: 5.5 PH (ref 5–7)
RBC #/AREA URNS AUTO: ABNORMAL /HPF
SOURCE: ABNORMAL
SP GR UR STRIP: 1.01 (ref 1–1.03)
UROBILINOGEN UR STRIP-ACNC: 0.2 EU/DL (ref 0.2–1)
WBC #/AREA URNS AUTO: ABNORMAL /HPF

## 2019-07-08 PROCEDURE — 99214 OFFICE O/P EST MOD 30 MIN: CPT | Performed by: NURSE PRACTITIONER

## 2019-07-08 PROCEDURE — 87086 URINE CULTURE/COLONY COUNT: CPT | Performed by: NURSE PRACTITIONER

## 2019-07-08 PROCEDURE — 81001 URINALYSIS AUTO W/SCOPE: CPT | Performed by: NURSE PRACTITIONER

## 2019-07-08 RX ORDER — GEMFIBROZIL 600 MG/1
600 TABLET, FILM COATED ORAL
COMMUNITY
Start: 2018-12-05 | End: 2020-12-08

## 2019-07-08 RX ORDER — LOSARTAN POTASSIUM 100 MG/1
100 TABLET ORAL DAILY
Qty: 90 TABLET | Refills: 0 | Status: SHIPPED | OUTPATIENT
Start: 2019-07-08 | End: 2020-12-29

## 2019-07-08 ASSESSMENT — MIFFLIN-ST. JEOR: SCORE: 1630.26

## 2019-07-08 ASSESSMENT — PATIENT HEALTH QUESTIONNAIRE - PHQ9: SUM OF ALL RESPONSES TO PHQ QUESTIONS 1-9: 6

## 2019-07-08 NOTE — RESULT ENCOUNTER NOTE
Dear Lisa,    Your recent test results are attached.      No blood in urine, but some white blood cells. I am sending your urine for culture to evaluate for infection.  I think kidney stones are unlikely at this time.    If you have any questions please feel free to contact [phone] or myself via [employer].    Sincerely,  Shira Shaw, CNP

## 2019-07-09 DIAGNOSIS — I10 HYPERTENSION GOAL BP (BLOOD PRESSURE) < 130/80: ICD-10-CM

## 2019-07-09 LAB
BACTERIA SPEC CULT: NO GROWTH
SPECIMEN SOURCE: NORMAL

## 2019-07-09 NOTE — RESULT ENCOUNTER NOTE
Dear Lisa,    Your recent test results are attached.      No urinary tract infection present.    If you have any questions please feel free to contact [phone] or myself via [employer].    Sincerely,  Shira Shaw, CNP

## 2019-07-11 RX ORDER — METOPROLOL TARTRATE 50 MG
TABLET ORAL
Qty: 180 TABLET | Refills: 0 | Status: SHIPPED | OUTPATIENT
Start: 2019-07-11 | End: 2019-07-23

## 2019-07-11 NOTE — TELEPHONE ENCOUNTER
Prescription approved per [facility] Refill Protocol.  Portia Kline RN

## 2019-07-23 ENCOUNTER — OFFICE VISIT (OUTPATIENT)
Dept: FAMILY MEDICINE | Facility: CLINIC | Age: 84
End: 2019-07-23
Payer: MEDICARE

## 2019-07-23 VITALS
HEART RATE: 73 BPM | DIASTOLIC BLOOD PRESSURE: 56 MMHG | BODY MASS INDEX: 41.19 KG/M2 | OXYGEN SATURATION: 98 % | TEMPERATURE: 97 F | RESPIRATION RATE: 18 BRPM | WEIGHT: 255.2 LBS | SYSTOLIC BLOOD PRESSURE: 114 MMHG

## 2019-07-23 DIAGNOSIS — I10 HYPERTENSION GOAL BP (BLOOD PRESSURE) < 130/80: ICD-10-CM

## 2019-07-23 DIAGNOSIS — I50.42 CHRONIC COMBINED SYSTOLIC AND DIASTOLIC CONGESTIVE HEART FAILURE (H): ICD-10-CM

## 2019-07-23 DIAGNOSIS — E66.01 MORBID OBESITY (H): ICD-10-CM

## 2019-07-23 DIAGNOSIS — I25.708 ATHEROSCLEROSIS OF CORONARY ARTERY BYPASS GRAFT(S), UNSPECIFIED, WITH OTHER FORMS OF ANGINA PECTORIS (H): ICD-10-CM

## 2019-07-23 DIAGNOSIS — E03.9 ACQUIRED HYPOTHYROIDISM: ICD-10-CM

## 2019-07-23 DIAGNOSIS — E78.5 HYPERLIPIDEMIA WITH TARGET LDL LESS THAN 70: ICD-10-CM

## 2019-07-23 DIAGNOSIS — M10.071 ACUTE IDIOPATHIC GOUT OF RIGHT FOOT: Primary | ICD-10-CM

## 2019-07-23 DIAGNOSIS — F41.9 ANXIETY: ICD-10-CM

## 2019-07-23 DIAGNOSIS — I10 ESSENTIAL HYPERTENSION WITH GOAL BLOOD PRESSURE LESS THAN 130/80: ICD-10-CM

## 2019-07-23 DIAGNOSIS — Z71.89 ADVANCED DIRECTIVES, COUNSELING/DISCUSSION: ICD-10-CM

## 2019-07-23 DIAGNOSIS — G47.33 OSA (OBSTRUCTIVE SLEEP APNEA): ICD-10-CM

## 2019-07-23 DIAGNOSIS — Z23 NEED FOR SHINGLES VACCINE: ICD-10-CM

## 2019-07-23 PROCEDURE — 99214 OFFICE O/P EST MOD 30 MIN: CPT | Performed by: INTERNAL MEDICINE

## 2019-07-23 RX ORDER — LOSARTAN POTASSIUM AND HYDROCHLOROTHIAZIDE 25; 100 MG/1; MG/1
1 TABLET ORAL
Status: CANCELLED | OUTPATIENT
Start: 2019-07-23

## 2019-07-23 RX ORDER — ALLOPURINOL 300 MG/1
300 TABLET ORAL DAILY
Qty: 90 TABLET | Refills: 1 | Status: SHIPPED | OUTPATIENT
Start: 2019-07-23 | End: 2020-03-13

## 2019-07-23 RX ORDER — METOPROLOL TARTRATE 50 MG
50 TABLET ORAL 2 TIMES DAILY
Qty: 180 TABLET | Refills: 0 | Status: SHIPPED | OUTPATIENT
Start: 2019-07-23 | End: 2020-01-10

## 2019-07-23 RX ORDER — ALLOPURINOL 100 MG/1
100 TABLET ORAL DAILY
Qty: 90 TABLET | Refills: 3 | Status: SHIPPED | OUTPATIENT
Start: 2019-07-23 | End: 2020-07-01

## 2019-07-23 RX ORDER — LEVOTHYROXINE SODIUM 125 UG/1
125 TABLET ORAL DAILY
Qty: 90 TABLET | Refills: 3 | Status: SHIPPED | OUTPATIENT
Start: 2019-07-23 | End: 2020-03-13

## 2019-07-23 RX ORDER — LOSARTAN POTASSIUM AND HYDROCHLOROTHIAZIDE 25; 100 MG/1; MG/1
1 TABLET ORAL
COMMUNITY
Start: 2013-08-26 | End: 2019-10-28

## 2019-07-23 RX ORDER — CITALOPRAM HYDROBROMIDE 20 MG/1
20 TABLET ORAL DAILY
Qty: 90 TABLET | Refills: 1 | Status: SHIPPED | OUTPATIENT
Start: 2019-07-23 | End: 2020-03-10

## 2019-07-23 RX ORDER — HYDROCHLOROTHIAZIDE 25 MG/1
25 TABLET ORAL DAILY
Qty: 90 TABLET | Refills: 0 | Status: SHIPPED | OUTPATIENT
Start: 2019-07-23 | End: 2020-07-29

## 2019-07-23 NOTE — LETTER
My Heart Failure Action Plan   Name: Lisa Ferguson    YOB: 1935   Date: 7/22/2019    My doctor: Ziyad Isaac[address]  [place] [id number] [phone]  My Diagnosis: Combined - EF: Over 50%   My Exercise Goal: 30 minutes daily  .     My Weight Plan:   Wt Readings from Last 2 Encounters:   07/08/19 114.9 kg (253 lb 3.2 oz)   12/19/18 117 kg (258 lb)     Weigh yourself daily using the same scale. If you gain more than 2 pounds in 24 hours or 5 pounds in a week call the clinic    My Diet Goal: No added salt    Emergency Room Visits:    Our goal is to improve your quality of life and help you avoid a visit to the emergency room or hospital.  If we work together, we can achieve this goal. But, if you feel you need to call 911 or go to the emergency room, please do so.  If you go to the emergency room, please bring your list of medicines and your daily weight chart with you.       GREEN ZONE     Doing well today    Weight gained is no more than 2 pounds a day or 5 pounds a week.    No swelling in feet, ankles, legs or stomach.    No more swelling than usual.    No more trouble breathing than usual.    No change in my sleep.    No other problems. Actions:    I am doing fine.  I will take my medicine, follow my diet, see my doctor, exercise, and watch for symptoms.           YELLOW ZONE         Having a bad day or flare up    Weight gain of more than 2 pounds in one day or 5 pounds in one week.    New swelling in ankle, leg, knee or thigh.    Bloating in belly, pants feel tighter.    Swelling in hands or face.    Coughing or trouble breathing while walking or talking.    Harder to breathe last night.    Have trouble sleeping, wake up short of breath.    Much more tired than usual.    Not eating.    Pain in my chest or bad leg cramps.    Feel weak or dizzy. Actions:    I need to take action and call my doctor or nurse today.                 RED ZONE          Need medical care now    Weight gain of 5 pounds overnight.    Chest pain or pressure that does not go away.    Feel less alert.    Wheezing or have trouble breathing when at rest.    Cannot sleep lying down.    Cannot take my water pill.    Pass out or faint. Actions:    I need to call my doctor or nurse now!    Call 911 if I have chest pain or cannot breathe.

## 2019-07-23 NOTE — PROGRESS NOTES
Subjective     Lisa Ferguson is a 83 year old female who presents to clinic today for the following health issues:    HPI   Obstructive Sleep Apnea   She has a diagnosis of Sleep Apnea and we reviewed her diagnosis at today's visit. She is in compliance with use of her CPAP machine which gives her much benefit. We will fax a copy of today's note to Doreen Garcia at [phone].    Gout Flares  She reports that hydrochlorothiazide was stopped after last gout flare. She noted weight gain with stopping this medication, which seemed to be in the abdomen rather than the lower extremities. She returned to using hydrochlorothiazide. Denies weight loss, unexplained bruising. Over the last 9 months she has had 4 attacks of gout. She notes that her symptoms are often with the entire foot and have been with the large toe once. I note her uric acid level  Isn't sufficiently reduced yet and see assessment and plan section of this office visit note.    Wt Readings from Last 5 Encounters:   07/23/19 115.8 kg (255 lb 3.2 oz)   07/08/19 114.9 kg (253 lb 3.2 oz)   12/19/18 117 kg (258 lb)   09/13/18 117.5 kg (259 lb)   07/09/18 115.2 kg (254 lb)     CHF, Hypertension   She reports that she is often short of breath when she tries to exercise and so she is not exerting herself.  See notes with Dr. Carey Newell, [facility] on care everywhere     BP Readings from Last 6 Encounters:   07/23/19 114/56   07/08/19 130/80   12/19/18 132/84   10/17/18 138/70   09/13/18 136/82   07/09/18 130/64     Additional Information   New shingrix vaccine discussed with patient today.     Patient Active Problem List   Diagnosis     PUD (peptic ulcer disease)     Hyperlipidemia with target LDL less than 70     Mitral regurgitation     DJD (degenerative joint disease)     Fibroadenoma of breast     Diverticulosis     Sciatica neuralgia     IBS (irritable bowel syndrome)     RBBB (right bundle branch block)     GLEN (obstructive  sleep apnea)     Balance disorder     Anxiety     Advanced directives, counseling/discussion     Ventricular tachycardia (paroxysmal) (H)     Corneal ulcer of left eye     Blepharitis     Hypercalcemia     Disorder of skin or subcutaneous tissue     Scar condition and fibrosis of skin     OA (osteoarthritis) of knee - bilateral     Fracture of proximal humerus     CHF (congestive heart failure) (H)     Shoulder fracture     Abnormal glucose     S/P CABG (coronary artery bypass graft)     S/p total knee replacement, bilateral     Acquired hypothyroidism     Essential hypertension with goal blood pressure less than 130/80     Morbid obesity (H)     H/O parathyroidectomy     Past Surgical History:   Procedure Laterality Date     ARTHROSCOPY KNEE RT/LT      bilateral     C APPENDECTOMY       C CABG, ARTERIAL, THREE      LIMA-LAD, SVG-DIagnoal, SVG-OM, previous LAD-PCI, sees Dr Banuelos     COLONOSCOPY  2010    negative     HC DILATION/CURETTAGE DIAG/THER NON OB       HC EXCIS INTERDIGITAL NEUROMA,EA      mortons neuroma excision     HC REMOVAL GALLBLADDER       HERNIA REPAIR, INGUINAL RT/LT       SURGICAL HISTORY OF -       bilateral meniscal tears and surgery on these     SURGICAL HISTORY OF -    or so    one parathyroid removed     TONSILLECTOMY       TUBAL LIGATION         Social History     Tobacco Use     Smoking status: Former Smoker     Years: 20.00     Types: Cigarettes     Last attempt to quit: 1978     Years since quittin.0     Smokeless tobacco: Never Used   Substance Use Topics     Alcohol use: Yes     Comment: Rare alcohol use.     Family History   Problem Relation Age of Onset     C.A.D. Mother      Arthritis Mother      Cardiovascular Mother      Heart Disease Mother      Obesity Mother      Thyroid Disease Mother      C.A.D. Father      Diabetes Father      Hypertension Father      Alcohol/Drug Father      Alzheimer Disease Father      Cardiovascular Father      Circulatory Father       Gastrointestinal Disease Father      Heart Disease Father      Lipids Father      Obesity Father      Cardiovascular Maternal Grandmother      Depression Maternal Grandmother      Obesity Maternal Grandmother      Thyroid Disease Maternal Grandmother      Cardiovascular Maternal Grandfather      Heart Disease Maternal Grandfather      Obesity Maternal Grandfather      Obesity Paternal Grandmother      Diabetes Paternal Grandfather      Alcohol/Drug Paternal Grandfather      Obesity Paternal Grandfather      Breast Cancer Sister      Cancer Sister      Obesity Sister      Thyroid Disease Sister      Alcohol/Drug Son      Allergies Son      Allergies Daughter      Depression Sister      Eye Disorder Sister      Gastrointestinal Disease Sister      Thyroid Disease Sister          BP Readings from Last 3 Encounters:   07/23/19 114/56   07/08/19 130/80   12/19/18 132/84    Wt Readings from Last 3 Encounters:   07/23/19 115.8 kg (255 lb 3.2 oz)   07/08/19 114.9 kg (253 lb 3.2 oz)   12/19/18 117 kg (258 lb)         Reviewed and updated as needed this visit by Provider       Review of Systems   ROS COMP: Constitutional, HEENT, cardiovascular, pulmonary, GI, , musculoskeletal, neuro, skin, endocrine and psych systems are negative, except as otherwise noted.    This document serves as a record of the services and decisions personally performed and made by Ziyad Isaac MD. It was created on his behalf by Leon Cross, a trained medical scribe. The creation of this document is based on the provider's statements to the medical scribe.  Leon Cross 9:59 AM July 23, 2019      Objective    /56   Pulse 73   Temp 97  F (36.1  C) (Oral)   Resp 18   Wt 115.8 kg (255 lb 3.2 oz)   SpO2 98%   BMI 41.19 kg/m    Body mass index is 41.19 kg/m .  Physical Exam   GENERAL: healthy, alert and no distress  EYES: Eyes grossly normal to inspection, PERRL and conjunctivae and sclerae normal  SKIN: no suspicious lesions or  rashes to visible skin   NEURO: Normal strength and tone, mentation intact and speech normal  PSYCH: mentation appears normal, affect normal/bright    Diagnostic Test Results:  Labs reviewed in Epic  No results found for this or any previous visit (from the past 24 hour(s)).        Assessment & Plan     (M10.071) Acute idiopathic gout of right foot  (primary encounter diagnosis)  Comment: we are bump upwards with the Zyloprim (allopurinol) to 400 milligrams to try to get uric acid level  Below 5. We need a recheck uric acid level  In roughly 1-2 months , future ordered for this  Plan: allopurinol (ZYLOPRIM) 300 MG tablet,         allopurinol (ZYLOPRIM) 100 MG tablet, **Uric         acid FUTURE 2mo            (I10) Essential hypertension with goal blood pressure less than 130/80  Comment: controlled within acceptable limits. We gave medications to account for a shortfall with new losartan ( Cozaar ) prescription as opposed to Hyzaar (Losartan Potassium-Hydrochlorothiazide) which she had before  Plan: Basic metabolic panel, hydrochlorothiazide         (HYDRODIURIL) 25 MG tablet            (Z71.89) Advanced directives, counseling/discussion  Comment: discussed   Plan:     (E78.5) Hyperlipidemia with target LDL less than 70  Comment: recheck   Plan: Lipid panel reflex to direct LDL Fasting            (Z23) Need for shingles vaccine  Comment: pharmacy router form given   Plan:     (F41.9) Anxiety  Comment: refills provided   Plan: citalopram (CELEXA) 20 MG tablet            (E03.9) Acquired hypothyroidism  Comment: refills provided   Plan: levothyroxine (SYNTHROID/LEVOTHROID) 125 MCG         tablet            (I10) Hypertension goal BP (blood pressure) < 130/80  Comment: refills provided m  Plan: metoprolol tartrate (LOPRESSOR) 50 MG tablet            (I50.42) Chronic combined systolic and diastolic congestive heart failure (H)  Comment: noted as a point of historical importance   Plan:     (E66.01) Morbid obesity  (H)  Comment: noted as a point of historical importance   Plan:     (I25.708) Atherosclerosis of coronary artery bypass graft(s), unspecified, with other forms of angina pectoris (H)  Comment: stable phase of chronic illness, see care everywhere / [facility] [place]   Plan:     (G47.33) GLEN (obstructive sleep apnea)  Comment: as detailed above   Plan: continue current plan of care       No follow-ups on file.    The information in this document, created by the medical scribe for me, accurately reflects the services I personally performed and the decisions made by me. I have reviewed and approved this document for accuracy prior to leaving the patient care area.  July 23, 2019 9:59 AM    Ziyad Isaac MD  [facility]

## 2019-07-23 NOTE — PROGRESS NOTES
Last appointment with me was 9-    Preventative healthcare maintenance goals including      Health Maintenance   Topic Date Due     ZOSTER IMMUNIZATION (2 of 3) 09/18/2008     ADVANCE CARE PLANNING  10/08/2017     HF ACTION PLAN  07/09/2018     LIPID  08/01/2018     BMP  03/13/2019     INFLUENZA VACCINE (1) 09/01/2019     MEDICARE ANNUAL WELLNESS VISIT  09/13/2019     ALT  09/13/2019     TSH W/FREE T4 REFLEX  09/13/2019     FALL RISK ASSESSMENT  09/13/2019     CBC  10/17/2019     PHQ-9  01/08/2020     DTAP/TDAP/TD IMMUNIZATION (3 - Td) 07/11/2021     DEXA  Completed     DEPRESSION ACTION PLAN  Completed     IPV IMMUNIZATION  Aged Out     MENINGITIS IMMUNIZATION  Aged Out

## 2019-07-23 NOTE — PATIENT INSTRUCTIONS
1) You should increase allopurinol 100mg pill for a total of 400mg daily. Come back in the next 2 months to just see the lab to check uric acid level. This order is placed already and doesn't need to be scheduled as a face to face visit. You can do all your labs at this same visit.     2) If you go without a gout attack over the next 3 months we would likely start lisinopril hydrochlorothiazide combined medication as you were on before rather than keeping this separate.     3) Today we discussed the new shingles vaccine (ShingRx) which you can get at any pharmacy. You would then follow-up 2-6 months later for a second booster. Let me know if you decide to get this vaccine.     4) Schedule a physical exam in about 3 months.     5) See the diet sheet for gout below and if you have another episode of gout try to take notice of the things you have recently eaten.      Patient Education     Eating to Prevent Gout  Gout is a painful form of arthritis caused by an excess of uric acid. This is a waste product made by the body. It builds up in the body and forms crystals that collect in the joints, causing a gout attack. Alcohol and certain foods can trigger a gout attack. Below are some guidelines for changing your diet to help you manage gout. Your healthcare provider can work with you to determine the best eating plan for you. Know that diet is only one part of managing gout. Take your medicines as prescribed and follow the other guidelines your healthcare provider has given you.  Foods to limit  Eating too many foods containing purines may increase the levels of uric acid in your body and increase your risk for a gout attack. It may be best to limit these high-purine foods:    Alcohol (beer and red wine). You may be told to avoid alcohol completely.    Certain fish (anchovies, sardines, fish roes, herring, tuna, mussels, codfish, scallops, trout, and jasmin)    Certain meats (red meat, processed meat, de jesus, turkey,  wild game, and goose)    Sauces and gravies made with meat    Organ meats (such as liver, kidneys, sweetbreads, and tripe)    Legumes (such as dried beans and peas)    Mushrooms, spinach, asparagus, and cauliflower    Yeast and yeast extract supplements  Foods to try  Some foods may be helpful for people with gout. You may want to try adding some of the following foods to your diet:    Dark berries. These include blueberries, blackberries, and cherries. These berries contain chemicals that may lower uric acid.    Tofu. Tofu, which is made from soy, is a good source of protein. Studies have shown that it may be a better choice than meat for people with gout.    Omega fatty acids. These acids are found in fatty fish (such as salmon), certain oils (such as flax, olive, or nut oils), or nuts. They may help prevent inflammation due to gout.  The following guidelines are recommended by the American Medical Association for people with gout. Your diet should be:    High in fiber, whole grains, fruits, and vegetables.    Low in protein (15% of calories should come from protein. Choose lean sources, such as soy, lean meats, and poultry).    Low in fat (no more than 30% of calories should come from fat, with only 10% coming from animal, or saturated, fat).   Date Last Reviewed: 11/1/2017 2000-2018 [employer]. [address] [place] [address], [place] [address]. All rights reserved. This information is not intended as a substitute for professional medical care. Always follow your healthcare professional's instructions.

## 2019-08-15 ENCOUNTER — TELEPHONE (OUTPATIENT)
Dept: FAMILY MEDICINE | Facility: CLINIC | Age: 84
End: 2019-08-15

## 2019-08-15 DIAGNOSIS — G47.33 OSA (OBSTRUCTIVE SLEEP APNEA): Primary | ICD-10-CM

## 2019-08-15 NOTE — TELEPHONE ENCOUNTER
Reason for Call:  Other call back    Detailed comments:  [facility] calling. They need a Rx for the CPAP supplies, office visit from 7-23-19 for use of the CPAP.  Please fax to [phone].    Phone Number Patient can be reached at: Home number on file [phone] (home)    Best Time: any     Can we leave a detailed message on this number? NO    Call taken on 8/15/2019 at 8:47 AM by Nohemy Rodrigues

## 2019-09-21 DIAGNOSIS — I10 ESSENTIAL HYPERTENSION WITH GOAL BLOOD PRESSURE LESS THAN 130/80: ICD-10-CM

## 2019-09-23 RX ORDER — LOSARTAN POTASSIUM AND HYDROCHLOROTHIAZIDE 25; 100 MG/1; MG/1
TABLET ORAL
Qty: 90 TABLET | Refills: 0 | Status: SHIPPED | OUTPATIENT
Start: 2019-09-23 | End: 2019-10-28

## 2019-09-23 NOTE — TELEPHONE ENCOUNTER
Hydrochlorothiazide-losartan 100-25 was discontinued on 7/8/19 and patient was put onto hydrochlorothiazide 25 mg daily and losartan 100 mg daily.   Patient requesting to go back on combination pill as she thinks taking them individually has caused her to gain weight.     Cecelia Little RN

## 2019-09-23 NOTE — TELEPHONE ENCOUNTER
Please call patient and see if she is taking this medication. This was discontinued on 7/8/2019.    Cecelia Little RN

## 2019-09-23 NOTE — TELEPHONE ENCOUNTER
Spoke to patient and she stop losartan and hydrochlorothiazide the pills that are not combine together because it made her gain weight so she went back to the pill with the combination of two   Jordin Lilly CMA on 9/23/2019 at 1:45 PM

## 2019-09-30 DIAGNOSIS — L21.9 SEBORRHEA: ICD-10-CM

## 2019-09-30 NOTE — TELEPHONE ENCOUNTER
Reason for Call:  Medication or medication refill:    Do you use a [facility]?  Name of the pharmacy and phone number for the current request:  [facility] [phone]    Name of the medication requested:  betamethason dp aug solution    Other request:  Na     Can we leave a detailed message on this number? YES    Phone number patient can be reached at: Home number on file [phone] (home)    Best Time:  Any     Call taken on 9/30/2019 at 10:59 AM by Nohemy Rodrigues

## 2019-10-01 NOTE — TELEPHONE ENCOUNTER
Requested Prescriptions   Pending Prescriptions Disp Refills     betamethasone dipropionate (DIPROSONE) 0.05 % external lotion 60 mL 1     Sig: Apply topically as needed       There is no refill protocol information for this order        Routing refill request to provider for review/approval because:  Drug not on the [facility] refill protocol       Ruma Mesa RN

## 2019-10-03 RX ORDER — BETAMETHASONE DIPROPIONATE 0.5 MG/G
LOTION TOPICAL PRN
Qty: 60 ML | Refills: 1 | Status: SHIPPED | OUTPATIENT
Start: 2019-10-03 | End: 2022-06-30

## 2019-10-04 DIAGNOSIS — E78.5 HYPERLIPIDEMIA WITH TARGET LDL LESS THAN 70: ICD-10-CM

## 2019-10-04 DIAGNOSIS — I10 ESSENTIAL HYPERTENSION WITH GOAL BLOOD PRESSURE LESS THAN 130/80: ICD-10-CM

## 2019-10-04 DIAGNOSIS — M10.071 ACUTE IDIOPATHIC GOUT OF RIGHT FOOT: ICD-10-CM

## 2019-10-04 LAB
ANION GAP SERPL CALCULATED.3IONS-SCNC: 9 MMOL/L (ref 3–14)
BUN SERPL-MCNC: 22 MG/DL (ref 7–30)
CALCIUM SERPL-MCNC: 10.9 MG/DL (ref 8.5–10.1)
CHLORIDE SERPL-SCNC: 103 MMOL/L (ref 94–109)
CHOLEST SERPL-MCNC: 240 MG/DL
CO2 SERPL-SCNC: 26 MMOL/L (ref 20–32)
CREAT SERPL-MCNC: 0.95 MG/DL (ref 0.52–1.04)
GFR SERPL CREATININE-BSD FRML MDRD: 56 ML/MIN/{1.73_M2}
GLUCOSE SERPL-MCNC: 106 MG/DL (ref 70–99)
HDLC SERPL-MCNC: 56 MG/DL
LDLC SERPL CALC-MCNC: 161 MG/DL
NONHDLC SERPL-MCNC: 184 MG/DL
POTASSIUM SERPL-SCNC: 4 MMOL/L (ref 3.4–5.3)
SODIUM SERPL-SCNC: 138 MMOL/L (ref 133–144)
TRIGL SERPL-MCNC: 117 MG/DL
URATE SERPL-MCNC: 5.4 MG/DL (ref 2.6–6)

## 2019-10-04 PROCEDURE — 36415 COLL VENOUS BLD VENIPUNCTURE: CPT | Performed by: INTERNAL MEDICINE

## 2019-10-04 PROCEDURE — 80048 BASIC METABOLIC PNL TOTAL CA: CPT | Performed by: INTERNAL MEDICINE

## 2019-10-04 PROCEDURE — 80061 LIPID PANEL: CPT | Performed by: INTERNAL MEDICINE

## 2019-10-04 PROCEDURE — 84550 ASSAY OF BLOOD/URIC ACID: CPT | Performed by: INTERNAL MEDICINE

## 2019-10-07 DIAGNOSIS — R94.4 DECREASED GFR: Primary | ICD-10-CM

## 2019-10-25 DIAGNOSIS — R94.4 DECREASED GFR: ICD-10-CM

## 2019-10-25 LAB
ANION GAP SERPL CALCULATED.3IONS-SCNC: 8 MMOL/L (ref 3–14)
BUN SERPL-MCNC: 15 MG/DL (ref 7–30)
CALCIUM SERPL-MCNC: 10.4 MG/DL (ref 8.5–10.1)
CHLORIDE SERPL-SCNC: 106 MMOL/L (ref 94–109)
CO2 SERPL-SCNC: 25 MMOL/L (ref 20–32)
CREAT SERPL-MCNC: 0.67 MG/DL (ref 0.52–1.04)
GFR SERPL CREATININE-BSD FRML MDRD: 82 ML/MIN/{1.73_M2}
GLUCOSE SERPL-MCNC: 99 MG/DL (ref 70–99)
POTASSIUM SERPL-SCNC: 4.3 MMOL/L (ref 3.4–5.3)
SODIUM SERPL-SCNC: 139 MMOL/L (ref 133–144)

## 2019-10-25 PROCEDURE — 80048 BASIC METABOLIC PNL TOTAL CA: CPT | Performed by: INTERNAL MEDICINE

## 2019-10-25 PROCEDURE — 36415 COLL VENOUS BLD VENIPUNCTURE: CPT | Performed by: INTERNAL MEDICINE

## 2019-10-28 ENCOUNTER — OFFICE VISIT (OUTPATIENT)
Dept: INTERNAL MEDICINE | Facility: CLINIC | Age: 84
End: 2019-10-28
Payer: MEDICARE

## 2019-10-28 VITALS
DIASTOLIC BLOOD PRESSURE: 80 MMHG | TEMPERATURE: 98.1 F | HEIGHT: 66 IN | SYSTOLIC BLOOD PRESSURE: 138 MMHG | BODY MASS INDEX: 40.98 KG/M2 | WEIGHT: 255 LBS | OXYGEN SATURATION: 95 % | HEART RATE: 71 BPM

## 2019-10-28 DIAGNOSIS — Z23 NEED FOR SHINGLES VACCINE: ICD-10-CM

## 2019-10-28 DIAGNOSIS — I10 ESSENTIAL HYPERTENSION WITH GOAL BLOOD PRESSURE LESS THAN 130/80: ICD-10-CM

## 2019-10-28 DIAGNOSIS — Z00.00 ENCOUNTER FOR MEDICARE ANNUAL WELLNESS EXAM: Primary | ICD-10-CM

## 2019-10-28 DIAGNOSIS — E78.5 HYPERLIPIDEMIA WITH TARGET LDL LESS THAN 70: ICD-10-CM

## 2019-10-28 DIAGNOSIS — N25.81 SECONDARY HYPERPARATHYROIDISM OF RENAL ORIGIN (H): ICD-10-CM

## 2019-10-28 DIAGNOSIS — Z98.890 H/O PARATHYROIDECTOMY: ICD-10-CM

## 2019-10-28 DIAGNOSIS — I50.9 CONGESTIVE HEART FAILURE, UNSPECIFIED HF CHRONICITY, UNSPECIFIED HEART FAILURE TYPE (H): ICD-10-CM

## 2019-10-28 DIAGNOSIS — E83.52 HYPERCALCEMIA: ICD-10-CM

## 2019-10-28 DIAGNOSIS — R73.09 ABNORMAL GLUCOSE: ICD-10-CM

## 2019-10-28 DIAGNOSIS — G47.33 OSA (OBSTRUCTIVE SLEEP APNEA): ICD-10-CM

## 2019-10-28 DIAGNOSIS — E03.9 ACQUIRED HYPOTHYROIDISM: ICD-10-CM

## 2019-10-28 DIAGNOSIS — Z90.89 H/O PARATHYROIDECTOMY: ICD-10-CM

## 2019-10-28 DIAGNOSIS — Z23 NEEDS FLU SHOT: ICD-10-CM

## 2019-10-28 LAB
ALT SERPL W P-5'-P-CCNC: 35 U/L (ref 0–50)
BASOPHILS # BLD AUTO: 0.1 10E9/L (ref 0–0.2)
BASOPHILS NFR BLD AUTO: 0.8 %
DIFFERENTIAL METHOD BLD: ABNORMAL
EOSINOPHIL # BLD AUTO: 0.4 10E9/L (ref 0–0.7)
EOSINOPHIL NFR BLD AUTO: 4.1 %
ERYTHROCYTE [DISTWIDTH] IN BLOOD BY AUTOMATED COUNT: 15.3 % (ref 10–15)
HCT VFR BLD AUTO: 40.5 % (ref 35–47)
HGB BLD-MCNC: 13.1 G/DL (ref 11.7–15.7)
LYMPHOCYTES # BLD AUTO: 2.8 10E9/L (ref 0.8–5.3)
LYMPHOCYTES NFR BLD AUTO: 31.4 %
MCH RBC QN AUTO: 29.4 PG (ref 26.5–33)
MCHC RBC AUTO-ENTMCNC: 32.3 G/DL (ref 31.5–36.5)
MCV RBC AUTO: 91 FL (ref 78–100)
MONOCYTES # BLD AUTO: 1.4 10E9/L (ref 0–1.3)
MONOCYTES NFR BLD AUTO: 15.6 %
NEUTROPHILS # BLD AUTO: 4.3 10E9/L (ref 1.6–8.3)
NEUTROPHILS NFR BLD AUTO: 48.1 %
PLATELET # BLD AUTO: 362 10E9/L (ref 150–450)
PTH-INTACT SERPL-MCNC: 90 PG/ML (ref 18–80)
RBC # BLD AUTO: 4.46 10E12/L (ref 3.8–5.2)
TSH SERPL DL<=0.005 MIU/L-ACNC: 1.75 MU/L (ref 0.4–4)
WBC # BLD AUTO: 8.9 10E9/L (ref 4–11)

## 2019-10-28 PROCEDURE — G0439 PPPS, SUBSEQ VISIT: HCPCS | Performed by: INTERNAL MEDICINE

## 2019-10-28 PROCEDURE — 84443 ASSAY THYROID STIM HORMONE: CPT | Performed by: INTERNAL MEDICINE

## 2019-10-28 PROCEDURE — 83970 ASSAY OF PARATHORMONE: CPT | Performed by: INTERNAL MEDICINE

## 2019-10-28 PROCEDURE — 84460 ALANINE AMINO (ALT) (SGPT): CPT | Performed by: INTERNAL MEDICINE

## 2019-10-28 PROCEDURE — 36415 COLL VENOUS BLD VENIPUNCTURE: CPT | Performed by: INTERNAL MEDICINE

## 2019-10-28 PROCEDURE — 82306 VITAMIN D 25 HYDROXY: CPT | Performed by: INTERNAL MEDICINE

## 2019-10-28 PROCEDURE — 99213 OFFICE O/P EST LOW 20 MIN: CPT | Mod: 25 | Performed by: INTERNAL MEDICINE

## 2019-10-28 PROCEDURE — 85025 COMPLETE CBC W/AUTO DIFF WBC: CPT | Performed by: INTERNAL MEDICINE

## 2019-10-28 ASSESSMENT — ENCOUNTER SYMPTOMS
FEVER: 0
ARTHRALGIAS: 1
HEMATOCHEZIA: 0
DYSURIA: 0
CONSTIPATION: 0
PALPITATIONS: 0
NAUSEA: 0
SORE THROAT: 0
HEARTBURN: 0
FREQUENCY: 0
ABDOMINAL PAIN: 0
HEADACHES: 1
WEAKNESS: 0
BREAST MASS: 0
COUGH: 1
MYALGIAS: 1
EYE PAIN: 1
NERVOUS/ANXIOUS: 1
DIARRHEA: 0
DIZZINESS: 0
JOINT SWELLING: 1
CHILLS: 0
HEMATURIA: 0
SHORTNESS OF BREATH: 1
PARESTHESIAS: 1

## 2019-10-28 ASSESSMENT — ACTIVITIES OF DAILY LIVING (ADL): CURRENT_FUNCTION: NO ASSISTANCE NEEDED

## 2019-10-28 ASSESSMENT — MIFFLIN-ST. JEOR: SCORE: 1633.42

## 2019-10-28 NOTE — LETTER
[address] [place] [address]    October 29, 2019    Lisa Ferguson  [address] [facility] [place] [address]          Dear Lisa,    All of these tests are within acceptable limits , things look good !     Enclosed is a copy of your results.     Results for orders placed or performed in visit on 10/28/19   CBC with platelets differential   Result Value Ref Range    WBC 8.9 4.0 - 11.0 10e9/L    RBC Count 4.46 3.8 - 5.2 10e12/L    Hemoglobin 13.1 11.7 - 15.7 g/dL    Hematocrit 40.5 35.0 - 47.0 %    MCV 91 78 - 100 fl    MCH 29.4 26.5 - 33.0 pg    MCHC 32.3 31.5 - 36.5 g/dL    RDW 15.3 (H) 10.0 - 15.0 %    Platelet Count 362 150 - 450 10e9/L    % Neutrophils 48.1 %    % Lymphocytes 31.4 %    % Monocytes 15.6 %    % Eosinophils 4.1 %    % Basophils 0.8 %    Absolute Neutrophil 4.3 1.6 - 8.3 10e9/L    Absolute Lymphocytes 2.8 0.8 - 5.3 10e9/L    Absolute Monocytes 1.4 (H) 0.0 - 1.3 10e9/L    Absolute Eosinophils 0.4 0.0 - 0.7 10e9/L    Absolute Basophils 0.1 0.0 - 0.2 10e9/L    Diff Method Automated Method    ALT   Result Value Ref Range    ALT 35 0 - 50 U/L   TSH with free T4 reflex   Result Value Ref Range    TSH 1.75 0.40 - 4.00 mU/L   Parathyroid Hormone Intact   Result Value Ref Range    Parathyroid Hormone Intact 90 (H) 18 - 80 pg/mL   Vitamin D Deficiency   Result Value Ref Range    Vitamin D Deficiency screening 38 20 - 75 ug/L       If you have any questions or concerns, please call myself or my nurse at [phone].      Sincerely,        Ziyad Isaac MD/byron     SUBJECTIVE:   Linda Campuzano is a 55 year old female who presents to clinic today for the following health issues:      Acute Illness   Acute illness concerns: fever/chills, muscle aches, cough  Onset: 5 days    Fever: YES    Chills/Sweats: YES    Headache (location?): no    Sinus Pressure:no    Conjunctivitis:  no    Ear Pain: no    Rhinorrhea: YES    Congestion: YES    Sore Throat: no     Cough: YES    Wheeze: no    Decreased Appetite: YES    Nausea: YES    Vomiting: no    Diarrhea:  no    Dysuria/Freq.: no    Fatigue/Achiness: YES    Sick/Strep Exposure: no     Therapies Tried and outcome: Tyl 500mg daily and Aleve         Problem list and histories reviewed & adjusted, as indicated.  Additional history: as documented    Patient Active Problem List   Diagnosis     Other kyphoscoliosis and scoliosis     Family history of colon cancer     Schatzki's ring     Oral contraceptive pill surveillance     Past Surgical History:   Procedure Laterality Date     COLONOSCOPY      + FH. NL- repeat 5 years     HC ENLARGE BREAST WITH IMPLANT      saline     HC REPAIR ING HERNIA,6MO-5YR,REDUC      bilateral     LASIK BILATERAL       UPPER GI ENDOSCOPY      acid reflux and Schatzki's ring       Social History   Substance Use Topics     Smoking status: Never Smoker     Smokeless tobacco: Never Used     Alcohol use 3.0 - 3.6 oz/week     5 - 6 Standard drinks or equivalent per week      Comment: weekends moderate     Family History   Problem Relation Age of Onset     CANCER Father      lung & brain     Cancer - colorectal Father       age 52     Hypertension Mother      GASTROINTESTINAL DISEASE Mother       of ischemic bowel, age 75     C.A.D. Maternal Grandfather          Current Outpatient Prescriptions   Medication Sig Dispense Refill     azithromycin (ZITHROMAX) 250 MG tablet Two tablets first day, then one tablet daily for four days. 6 tablet 0     levonorgestrel-ethinyl estradiol (ALTAVERA) 0.15-30 MG-MCG  per tablet Take 1 tablet by mouth daily 84 tablet 3     albuterol (PROAIR HFA) 108 (90 BASE) MCG/ACT Inhaler Inhale 2 puffs into the lungs every 4 hours as needed 1 Inhaler 3     Calcium-Magnesium-Vitamin D (CALCIUM MAGNESIUM PO) Take by mouth daily       MULTIVITAMINS OR TABS 1 Daily 0 0     VITAMIN E 400 IU OR CAPS 1 Daily 0 0     VITAMIN C TABS   OR 1-2 Daily 0 0       Reviewed and updated as needed this visit by clinical staff       Reviewed and updated as needed this visit by Provider         ROS:  CONSTITUTIONAL: NEGATIVE for fever, chills, change in weight  ENT/MOUTH: NEGATIVE for ear, mouth and throat problems  RESP:POSITIVE for cough-non productive  CV: NEGATIVE for chest pain, palpitations or peripheral edema    OBJECTIVE:                                                    /60  Pulse 69  Temp 99  F (37.2  C) (Tympanic)  Wt 122 lb (55.3 kg)  LMP 03/04/2018  SpO2 98%  BMI 23.05 kg/m2  Body mass index is 23.05 kg/(m^2).   GENERAL: healthy, alert, well nourished, well hydrated, no distress  HENT: ear canals- normal; TMs- normal; Nose- normal; Mouth- no ulcers, no lesions  NECK: no tenderness, no adenopathy, no asymmetry, no masses, no stiffness; thyroid- normal to palpation  RESP: lungs clear to auscultation - no rales, no rhonchi, no wheezes  CV: regular rates and rhythm, normal S1 S2, no S3 or S4 and no murmur, no click or rub -         ASSESSMENT/PLAN:                                                        ICD-10-CM    1. Acute bronchitis, unspecified organism J20.9 azithromycin (ZITHROMAX) 250 MG tablet     Patient has symptoms of upper respiratory most likely bronchitis.  I would suggest patient to use azithromycin if symptoms are not improving in the next 2 days.  Continue using symptomatic care.  Follow-up if symptoms are not getting better.  Suhail Duran MD  [facility]

## 2019-10-28 NOTE — PATIENT INSTRUCTIONS
1) Remember that we have an audiology service to evaluate hearing loss if you feel that hearing aids might be a good idea.     2) Today we discussed and advised the new shingles vaccine (ShingRx) which you can get at any pharmacy. You will then follow-up 2-6 months later for a second booster. Consider getting this vaccine.     3) We will include PTH (parathyroid hormone) to your last blood sample. If these labs are still abnormal then I will contact you with your options regarding this.      4) Return to use of hydrochlorothiazide. This is known to be a trigger for gout in rare cases and so if you have a new gout flare on this we should stop taking this and we will consider a different medication. If shortness of breath symptoms don't improve in the next 2 months when back on hydrochlorothiazide then we should evaluate with further testing.     5) Follow-up with Neurosurgery if burning and numbness symptoms with the right leg are worse or painful.     Patient Education   Personalized Prevention Plan  You are due for the preventive services outlined below.  Your care team is available to assist you in scheduling these services.  If you have already completed any of these items, please share that information with your care team to update in your medical record.  Health Maintenance Due   Topic Date Due     Zoster (Shingles) Vaccine (2 of 3) 09/18/2008     Flu Vaccine (1) 09/01/2019     Annual Wellness Visit  09/13/2019     Liver Monitoring Lab  09/13/2019     Thyroid Function Lab  09/13/2019     FALL RISK ASSESSMENT  09/13/2019     Complete Blood Count  10/17/2019

## 2019-10-28 NOTE — LETTER
[address] [place] [address]    October 29, 2019    Lisa Ferguson  [address] [facility] [place] [address]          Dear Lisa,    All of these tests are within acceptable limits , things look good !     Enclosed is a copy of your results.     Results for orders placed or performed in visit on 10/28/19   CBC with platelets differential   Result Value Ref Range    WBC 8.9 4.0 - 11.0 10e9/L    RBC Count 4.46 3.8 - 5.2 10e12/L    Hemoglobin 13.1 11.7 - 15.7 g/dL    Hematocrit 40.5 35.0 - 47.0 %    MCV 91 78 - 100 fl    MCH 29.4 26.5 - 33.0 pg    MCHC 32.3 31.5 - 36.5 g/dL    RDW 15.3 (H) 10.0 - 15.0 %    Platelet Count 362 150 - 450 10e9/L    % Neutrophils 48.1 %    % Lymphocytes 31.4 %    % Monocytes 15.6 %    % Eosinophils 4.1 %    % Basophils 0.8 %    Absolute Neutrophil 4.3 1.6 - 8.3 10e9/L    Absolute Lymphocytes 2.8 0.8 - 5.3 10e9/L    Absolute Monocytes 1.4 (H) 0.0 - 1.3 10e9/L    Absolute Eosinophils 0.4 0.0 - 0.7 10e9/L    Absolute Basophils 0.1 0.0 - 0.2 10e9/L    Diff Method Automated Method    ALT   Result Value Ref Range    ALT 35 0 - 50 U/L   TSH with free T4 reflex   Result Value Ref Range    TSH 1.75 0.40 - 4.00 mU/L   Parathyroid Hormone Intact   Result Value Ref Range    Parathyroid Hormone Intact 90 (H) 18 - 80 pg/mL   Vitamin D Deficiency   Result Value Ref Range    Vitamin D Deficiency screening 38 20 - 75 ug/L       If you have any questions or concerns, please call myself or my nurse at [phone].      Sincerely,        Ziyad Isaac MD/byron

## 2019-10-28 NOTE — PROGRESS NOTES
"SUBJECTIVE:   Lisa Ferguson is a 84 year old female who presents for Preventive Visit.  Are you in the first 12 months of your Medicare coverage?  No    Healthy Habits:     In general, how would you rate your overall health?  Fair    Frequency of exercise:  2-3 days/week    Duration of exercise:  15-30 minutes    Do you usually eat at least 4 servings of fruit and vegetables a day, include whole grains    & fiber and avoid regularly eating high fat or \"junk\" foods?  No    Taking medications regularly:  Yes    Medication side effects:  No significant flushing    Ability to successfully perform activities of daily living:  No assistance needed    Home Safety:  No safety concerns identified    Hearing Impairment:  Difficulty following a conversation in a noisy restaurant or crowded room, feel that people are mumbling or not speaking clearly, need to ask people to speak up or repeat themselves and difficulty understanding soft or whispered speech    In the past 6 months, have you been bothered by leaking of urine?  No    In general, how would you rate your overall mental or emotional health?  Good      PHQ-2 Total Score: 1    Additional concerns today:  Yes    Shortness of breath   Stopped hydrochlorothiazide and since then she has appreciated increased shortness of breath symptoms. Continues with losartan. She appreciated some lower extremity edema also when she stopped hydrochlorothiazide but this seemed to resolve over the last few weeks. Weight has been stable. Blood pressure is up a bit but still in the normal range. With last cardiology appointment she was told she is in a stable phase as of last December 2018. CAD with CABG 20 years ago without recurrent procedures. Has some very occasional chest pain symptoms. Heart failure with preserved EF diagnosis since 2013. Her last echocardiogram it appears was 2014. Has pectus excavatum. Denies lung issues, but has 20 years of smoking history. Has a history of a " parathyroidectomy which raises the possibility that her elevated calcium was actually recurrent primary hyperparathyroidism      BP Readings from Last 6 Encounters:   10/28/19 138/80   19 114/56   19 130/80   18 132/84   10/17/18 138/70   18 136/82     Hearing Loss  Reports that this doesnt seem likely to warrant hearing aids at this time.     Obstructive Sleep Apnea   Treats this sleep apnea with CPAP machine nightly.     Fall  Tripped over a cord a few weeks ago and fell face first. Still having some minor residual pain with the left hip.     Right thigh numbness  Had a synovial cyst procedure with spine. Over the last year she has had symptoms of burning and numbness with the right buttock to the knee. These symptoms often lasts 15-30 minutes probably along the L2-L3 distribution.     Do you feel safe in your environment? Yes    Do you have a Health Care Directive? Yes: Advance Directive has been received and scanned.    Fall risk  Fallen 2 or more times in the past year?: No  Any fall with injury in the past year?: No  click delete button to remove this line now  Cognitive Screening   1) Repeat 3 items (Leader, Season, Table)    2) Clock draw: NORMAL  3) 3 item recall: Recalls 1 object   Results: NORMAL clock, 1-2 items recalled: COGNITIVE IMPAIRMENT LESS LIKELY    Mini-CogTM Copyright MICHAEL Dobson. Licensed by the author for use in [facility]; reprinted with permission (gloria@.[facility]). All rights reserved.      Do you have sleep apnea, excessive snoring or daytime drowsiness?: Sleep Apnea    Reviewed and updated as needed this visit by clinical staff  Tobacco  Allergies  Meds       Reviewed and updated as needed this visit by Provider        Social History     Tobacco Use     Smoking status: Former Smoker     Years: 20.00     Types: Cigarettes     Last attempt to quit: 1978     Years since quittin.3     Smokeless tobacco: Never Used   Substance Use Topics     Alcohol  use: Yes     Comment: Rare alcohol use.     If you drink alcohol do you typically have >3 drinks per day or >7 drinks per week? Not applicable    Alcohol Use 10/28/2019   Prescreen: >3 drinks/day or >7 drinks/week? Not Applicable   Prescreen: >3 drinks/day or >7 drinks/week? -   No flowsheet data found.    -------------------------------------    Current providers sharing in care for this patient include:   Patient Care Team:  Ziyad Isaac MD as PCP - General  Shira Shaw APRN CNP as Assigned PCP    The following health maintenance items are reviewed in Epic and correct as of today:  Health Maintenance   Topic Date Due     ZOSTER IMMUNIZATION (2 of 3) 09/18/2008     INFLUENZA VACCINE (1) 09/01/2019     MEDICARE ANNUAL WELLNESS VISIT  09/13/2019     ALT  09/13/2019     TSH W/FREE T4 REFLEX  09/13/2019     FALL RISK ASSESSMENT  09/13/2019     CBC  10/17/2019     PHQ-9  01/08/2020     BMP  04/25/2020     LIPID  10/04/2020     DTAP/TDAP/TD IMMUNIZATION (3 - Td) 07/11/2021     HF ACTION PLAN  07/23/2022     ADVANCE CARE PLANNING  07/23/2024     DEXA  Completed     DEPRESSION ACTION PLAN  Completed     PNEUMOCOCCAL IMMUNIZATION 65+ LOW/MEDIUM RISK  Completed     IPV IMMUNIZATION  Aged Out     MENINGITIS IMMUNIZATION  Aged Out     BP Readings from Last 3 Encounters:   10/28/19 138/80   07/23/19 114/56   07/08/19 130/80    Wt Readings from Last 3 Encounters:   10/28/19 115.7 kg (255 lb)   07/23/19 115.8 kg (255 lb 3.2 oz)   07/08/19 114.9 kg (253 lb 3.2 oz)           Patient Active Problem List   Diagnosis     PUD (peptic ulcer disease)     Hyperlipidemia with target LDL less than 70     Mitral regurgitation     DJD (degenerative joint disease)     Fibroadenoma of breast     Diverticulosis     Sciatica neuralgia     IBS (irritable bowel syndrome)     RBBB (right bundle branch block)     GLEN (obstructive sleep apnea)     Balance disorder     Anxiety     Advanced directives, counseling/discussion      Ventricular tachycardia (paroxysmal) (H)     Corneal ulcer of left eye     Blepharitis     Hypercalcemia     Disorder of skin or subcutaneous tissue     Scar condition and fibrosis of skin     OA (osteoarthritis) of knee - bilateral     Fracture of proximal humerus     CHF (congestive heart failure) (H)     Shoulder fracture     Abnormal glucose     S/P CABG (coronary artery bypass graft)     S/p total knee replacement, bilateral     Acquired hypothyroidism     Essential hypertension with goal blood pressure less than 130/80     Morbid obesity (H)     H/O parathyroidectomy     Atherosclerosis of coronary artery bypass graft(s), unspecified, with other forms of angina pectoris (H)     Past Surgical History:   Procedure Laterality Date     ARTHROSCOPY KNEE RT/LT      bilateral     C APPENDECTOMY       C CABG, ARTERIAL, THREE      LIMA-LAD, SVG-DIagnoal, SVG-OM, previous LAD-PCI, sees Dr Banuelos     COLONOSCOPY  2010    negative     HC DILATION/CURETTAGE DIAG/THER NON OB       HC EXCIS INTERDIGITAL NEUROMA,EA      mortons neuroma excision     HC REMOVAL GALLBLADDER       HERNIA REPAIR, INGUINAL RT/LT       SURGICAL HISTORY OF -       bilateral meniscal tears and surgery on these     SURGICAL HISTORY OF -    or so    one parathyroid removed     TONSILLECTOMY       TUBAL LIGATION         Social History     Tobacco Use     Smoking status: Former Smoker     Years: 20.00     Types: Cigarettes     Last attempt to quit: 1978     Years since quittin.3     Smokeless tobacco: Never Used   Substance Use Topics     Alcohol use: Yes     Comment: Rare alcohol use.     Family History   Problem Relation Age of Onset     C.A.D. Mother      Arthritis Mother      Cardiovascular Mother      Heart Disease Mother      Obesity Mother      Thyroid Disease Mother      C.A.D. Father      Diabetes Father      Hypertension Father      Alcohol/Drug Father      Alzheimer Disease Father      Cardiovascular Father       Circulatory Father      Gastrointestinal Disease Father      Heart Disease Father      Lipids Father      Obesity Father      Cardiovascular Maternal Grandmother      Depression Maternal Grandmother      Obesity Maternal Grandmother      Thyroid Disease Maternal Grandmother      Cardiovascular Maternal Grandfather      Heart Disease Maternal Grandfather      Obesity Maternal Grandfather      Obesity Paternal Grandmother      Diabetes Paternal Grandfather      Alcohol/Drug Paternal Grandfather      Obesity Paternal Grandfather      Breast Cancer Sister      Cancer Sister      Obesity Sister      Thyroid Disease Sister      Alcohol/Drug Son      Allergies Son      Allergies Daughter      Depression Sister      Eye Disorder Sister      Gastrointestinal Disease Sister      Thyroid Disease Sister          Review of Systems   Constitutional: Negative for chills and fever.   HENT: Positive for hearing loss. Negative for congestion, ear pain and sore throat.    Eyes: Positive for pain. Negative for visual disturbance.   Respiratory: Positive for cough and shortness of breath.    Cardiovascular: Positive for peripheral edema. Negative for chest pain and palpitations.   Gastrointestinal: Negative for abdominal pain, constipation, diarrhea, heartburn, hematochezia and nausea.   Breasts:  Negative for tenderness, breast mass and discharge.   Genitourinary: Positive for urgency. Negative for dysuria, frequency, genital sores, hematuria, pelvic pain, vaginal bleeding and vaginal discharge.   Musculoskeletal: Positive for arthralgias, joint swelling and myalgias.   Skin: Positive for rash.   Neurological: Positive for headaches and paresthesias. Negative for dizziness and weakness.   Psychiatric/Behavioral: Negative for mood changes. The patient is nervous/anxious.      This document serves as a record of the services and decisions personally performed and made by Ziyad Isaac MD. It was created on his behalf by trudy Flaherty  "trained medical scribe. The creation of this document is based on the provider's statements to the medical scribe.  Leon Cross 10:47 AM October 28, 2019    OBJECTIVE:   /80   Pulse 71   Temp 98.1  F (36.7  C) (Oral)   Ht 1.676 m (5' 6\")   Wt 115.7 kg (255 lb)   SpO2 95%   BMI 41.16 kg/m   Estimated body mass index is 41.16 kg/m  as calculated from the following:    Height as of this encounter: 1.676 m (5' 6\").    Weight as of this encounter: 115.7 kg (255 lb).  Physical Exam  GENERAL: healthy, alert and no distress, morbid obesity body habitus   EYES: Eyes grossly normal to inspection, PERRL and conjunctivae and sclerae normal  HENT: ear canals and TM's normal, nose and mouth without ulcers or lesions  NECK: no adenopathy, no asymmetry, masses, or scars and thyroid normal to palpation  RESP: lungs clear to auscultation - no rales, rhonchi or wheezes  CV: regular rate and rhythm, normal S1 S2, no S3 or S4, no murmur, click or rub, no peripheral edema and peripheral pulses strong  ABDOMEN: soft, nontender, no hepatosplenomegaly, no masses and bowel sounds normal  MS: no gross musculoskeletal defects noted, 1+ pitting pedal edema bilaterally   SKIN: eczema with the external ear canals bilaterally, scars on abdomen from hernia repair and 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   NEURO: Normal strength and tone, mentation intact and speech normal  PSYCH: mentation appears normal, affect normal/bright    Diagnostic Test Results:  Labs reviewed in Epic  No results found for this or any previous visit (from the past 24 hour(s)).    ASSESSMENT / PLAN:   (Z00.00) Encounter for Medicare annual wellness exam  (primary encounter diagnosis)  Comment: negative screening physical exam today, except for edema and benign skin findings    Plan:  Follow-up in 1 year      (E78.5) Hyperlipidemia with target LDL less than 70  Comment:   LDL Cholesterol Calculated   Date Value Ref Range Status   10/04/2019 161 (H) <100 " mg/dL Final     Comment:     Above desirable:  100-129 mg/dl  Borderline High:  130-159 mg/dL  High:             160-189 mg/dL  Very high:       >189 mg/dl       Plan: ALT [ liver test ], fasting lipid panel already measured last week    (G47.33) GLEN (obstructive sleep apnea)  Comment: uses CPAP machine with great benefit  Plan: continue current treatment     (E83.52) Hypercalcemia  Comment: possibly secondary to hydrochlorothiazide or with recurrent hyperparathyroidism.   Plan: return to use of hydrochlorothiazide. Will follow-up with results of any abnormal labs.    (I50.9) Congestive heart failure, unspecified HF chronicity, unspecified heart failure type (H)  Comment: stable at last cardiology appointment almost a year ago per cardiologist. Last echocardiogram normal EF in 2014. Having more shortness of breath since off hydrochlorothiazide.  Plan: CBC with platelets differential, ALT        Return to use of hydrochlorothiazide and lets monitor shortness of breath symptoms. If not improved consider further workup and probably a repeat echocardiogram.     (R73.09) Abnormal glucose  Comment: last blood glucose 99 and hemoglobin A1C tests have been normal  Plan: continue monitoring.    (E03.9) Acquired hypothyroidism  Comment:   TSH   Date Value Ref Range Status   09/13/2018 2.66 0.40 - 4.00 mU/L Final     Plan: TSH with free T4 reflex            (I10) Essential hypertension with goal blood pressure less than 130/80  Comment: borderline controlled.  Plan: return to hydrochlorothiazide as above. Recheck blood pressure in the next 4 weeks.    (Z90.09) H/O parathyroidectomy  Comment: as detailed above   Plan: Parathyroid Hormone Intact, Vitamin D         Deficiency            (N25.81) Secondary hyperparathyroidism of renal origin (H)   Comment: as above   Plan: Vitamin D Deficiency            (Z23) Needs flu shot  Comment: given   Plan: HC FLU VACCINE, INCREASED ANTIGEN, PRESV FREE         [[id number]]            (Z23) Need  "for shingles vaccine  Comment: pharmacy router form given   Plan:     End of Life Planning:  Patient currently has an advanced directive: Yes.  Practitioner is supportive of decision.    COUNSELING:  Reviewed preventive health counseling, as reflected in patient instructions  Special attention given to:       Regular exercise       Healthy diet/nutrition       Bladder control    Estimated body mass index is 41.16 kg/m  as calculated from the following:    Height as of this encounter: 1.676 m (5' 6\").    Weight as of this encounter: 115.7 kg (255 lb).    Weight management plan: Discussed healthy diet and exercise guidelines     reports that she quit smoking about 41 years ago. Her smoking use included cigarettes. She quit after 20.00 years of use. She has never used smokeless tobacco.    Appropriate preventive services were discussed with this patient, including applicable screening as appropriate for cardiovascular disease, diabetes, osteopenia/osteoporosis, and glaucoma.  As appropriate for age/gender, discussed screening for colorectal cancer, prostate cancer, breast cancer, and cervical cancer. Checklist reviewing preventive services available has been given to the patient.    Reviewed patients plan of care and provided an AVS. The Complex Care Plan (for patients with higher acuity and needing more deliberate coordination of services) for Lisa meets the Care Plan requirement. This Care Plan has been established and reviewed with the Patient.    Counseling Resources:  ATP IV Guidelines  Pooled Cohorts Equation Calculator  Breast Cancer Risk Calculator  FRAX Risk Assessment  ICSI Preventive Guidelines  Dietary Guidelines for Americans, 2010  USDA's MyPlate  ASA Prophylaxis  Lung CA Screening    The information in this document, created by the medical scribe for me, accurately reflects the services I personally performed and the decisions made by me. I have reviewed and approved this document for accuracy prior to " leaving the patient care area.  October 28, 2019 11:18 AM     Ziyad Isaac MD  [facility]    Identified Health Risks:

## 2019-10-29 LAB — DEPRECATED CALCIDIOL+CALCIFEROL SERPL-MC: 38 UG/L (ref 20–75)

## 2019-11-04 ENCOUNTER — HEALTH MAINTENANCE LETTER (OUTPATIENT)
Age: 84
End: 2019-11-04

## 2019-11-21 ENCOUNTER — TRANSFERRED RECORDS (OUTPATIENT)
Dept: HEALTH INFORMATION MANAGEMENT | Facility: CLINIC | Age: 84
End: 2019-11-21

## 2019-12-09 ENCOUNTER — MEDICAL CORRESPONDENCE (OUTPATIENT)
Dept: HEALTH INFORMATION MANAGEMENT | Facility: CLINIC | Age: 84
End: 2019-12-09

## 2019-12-27 ENCOUNTER — OFFICE VISIT (OUTPATIENT)
Dept: URGENT CARE | Facility: URGENT CARE | Age: 84
End: 2019-12-27
Payer: MEDICARE

## 2019-12-27 VITALS
WEIGHT: 252 LBS | BODY MASS INDEX: 40.67 KG/M2 | OXYGEN SATURATION: 95 % | HEART RATE: 77 BPM | RESPIRATION RATE: 12 BRPM | SYSTOLIC BLOOD PRESSURE: 128 MMHG | DIASTOLIC BLOOD PRESSURE: 78 MMHG | TEMPERATURE: 98.9 F

## 2019-12-27 DIAGNOSIS — R30.0 DYSURIA: ICD-10-CM

## 2019-12-27 DIAGNOSIS — N30.00 ACUTE CYSTITIS WITHOUT HEMATURIA: Primary | ICD-10-CM

## 2019-12-27 DIAGNOSIS — R82.90 NONSPECIFIC FINDING ON EXAMINATION OF URINE: ICD-10-CM

## 2019-12-27 LAB

## 2019-12-27 PROCEDURE — 99213 OFFICE O/P EST LOW 20 MIN: CPT | Performed by: PHYSICIAN ASSISTANT

## 2019-12-27 PROCEDURE — 81001 URINALYSIS AUTO W/SCOPE: CPT | Performed by: PHYSICIAN ASSISTANT

## 2019-12-27 PROCEDURE — 87086 URINE CULTURE/COLONY COUNT: CPT | Performed by: PHYSICIAN ASSISTANT

## 2019-12-27 RX ORDER — LOSARTAN POTASSIUM AND HYDROCHLOROTHIAZIDE 25; 100 MG/1; MG/1
TABLET ORAL
COMMUNITY
Start: 2019-12-18 | End: 2020-12-18

## 2019-12-27 RX ORDER — SULFAMETHOXAZOLE/TRIMETHOPRIM 800-160 MG
1 TABLET ORAL 2 TIMES DAILY
Qty: 14 TABLET | Refills: 0 | Status: SHIPPED | OUTPATIENT
Start: 2019-12-27 | End: 2020-01-03

## 2019-12-27 NOTE — PROGRESS NOTES
S: 84-year-old female presents for evaluation of pubic and bladder pressure on and off for the last 2 weeks but worse over the last day or so.  She denies any dysuria or frequency.  No flank pain.  No fever.  No nausea or vomiting.  Had many kidney stones in 2017.  Has had some chills today and sweats early this morning.  No chest pain or shortness of breath.  Her urologist is Dr. Corbin over by [facility].        Allergies   Allergen Reactions     Adhesive Tape      Atorvastatin Other (See Comments) and Muscle Pain (Myalgia)     lipitor  Myalgia     Cortisone      Insomnia, burning in chest, flushed     Nickel Other (See Comments)     Raw weepy skin  rash     Tetracycline Diarrhea     Vicodin [Hydrocodone-Acetaminophen] Other (See Comments)     Hallucinations     Liquid Adhesive      Other reaction(s): Contact Dermatitis       Past Medical History:   Diagnosis Date     Anxiety      CAD (coronary artery disease)     angio 2002, h/o cabg, sees Luisana Nelson NP, they follow the cholesterol     CHF (congestive heart failure) (H) 7/8/2014     DJD (degenerative joint disease)      History of colonoscopy jan 2000    due jan 2010     Hyperlipidemia LDL goal < 70     sees [facility]     Hypertension goal BP (blood pressure) < 140/90      Hypothyroidism      IBS (irritable bowel syndrome)      Mitral regurgitation      Obesity      GLEN (obstructive sleep apnea) 7/11/2011     PUD (peptic ulcer disease)     h/o duodenal ulcer     RBBB (right bundle branch block)      Sciatica neuralgia november 209    MRI shows spinal stenosis and a cyst on the spine, has received an epidural steroid injection       allopurinol (ZYLOPRIM) 100 MG tablet, Take 1 tablet (100 mg) by mouth daily  allopurinol (ZYLOPRIM) 300 MG tablet, Take 1 tablet (300 mg) by mouth daily  aspirin 162 MG EC tablet, Take 81 mg by mouth daily   Biotin 10 MG TABS tablet, Take 10,000 mcg by mouth twice a week   Cholecalciferol (VITAMIN D) 2000 UNITS tablet,  Take 1 tablet by mouth daily.  citalopram (CELEXA) 20 MG tablet, Take 1 tablet (20 mg) by mouth daily  gemfibrozil (LOPID) 600 MG tablet, Take 600 mg by mouth  levothyroxine (SYNTHROID/LEVOTHROID) 125 MCG tablet, Take 1 tablet (125 mcg) by mouth daily  losartan-hydrochlorothiazide (HYZAAR) 100-25 MG tablet,   metoprolol tartrate (LOPRESSOR) 50 MG tablet, Take 1 tablet (50 mg) by mouth 2 times daily  Omega-3 Fatty Acids (FISH OIL) 1200 MG CAPS, Take 1 capsule by mouth 2 times daily  order for DME, Equipment being ordered: CPAP  acetaminophen (TYLENOL) 500 MG tablet, Take 1,000 mg by mouth  amoxicillin (AMOXIL) 500 MG capsule, TK 4 CS PO 1 HOUR BEFORE DENTAL APPOINTMENT  betamethasone dipropionate (DIPROSONE) 0.05 % external lotion, Apply topically as needed (Patient not taking: Reported on 2019)  hydrochlorothiazide (HYDRODIURIL) 25 MG tablet, Take 1 tablet (25 mg) by mouth daily (Patient not taking: Reported on 10/28/2019)  LORazepam (ATIVAN) 0.5 MG tablet, Take 1 tablet (0.5 mg) by mouth every 8 hours as needed for anxiety (Patient not taking: Reported on 2019)  losartan (COZAAR) 100 MG tablet, Take 1 tablet (100 mg) by mouth daily (Patient not taking: Reported on 2019)    No current facility-administered medications on file prior to visit.       Social History     Tobacco Use     Smoking status: Former Smoker     Years: 20.00     Types: Cigarettes     Last attempt to quit: 1978     Years since quittin.4     Smokeless tobacco: Never Used   Substance Use Topics     Alcohol use: Yes     Comment: Rare alcohol use.     Drug use: No       ROS:  General: negative for fever  ABD: Denies abd pain  : as above    OBJECTIVE:  /78   Pulse 77   Temp 98.9  F (37.2  C) (Oral)   Resp 12   Wt 114.3 kg (252 lb)   SpO2 95%   BMI 40.67 kg/m     General:   awake, alert, and cooperative.  NAD.   Head: Normocephalic, atraumatic.  Eyes: Conjunctiva clear, non icteric.   ABD: soft, mild suprapubic  and pubic tenderness to palpation.  No rigidity, guarding or rebound . No CVAT.  Normal active bowel sounds.  Neuro: Alert and oriented - normal speech.   Results for orders placed or performed in visit on 12/27/19   UA reflex to Microscopic and Culture     Status: Abnormal   Result Value Ref Range    Color Urine Yellow     Appearance Urine Clear     Glucose Urine Negative NEG^Negative mg/dL    Bilirubin Urine Negative NEG^Negative    Ketones Urine Negative NEG^Negative mg/dL    Specific Gravity Urine 1.020 1.003 - 1.035    Blood Urine Moderate (A) NEG^Negative    pH Urine 6.0 5.0 - 7.0 pH    Protein Albumin Urine Negative NEG^Negative mg/dL    Urobilinogen Urine 0.2 0.2 - 1.0 EU/dL    Nitrite Urine Negative NEG^Negative    Leukocyte Esterase Urine Trace (A) NEG^Negative    Source Midstream Urine    Urine Microscopic     Status: Abnormal   Result Value Ref Range    WBC Urine 10-25 (A) OTO5^0 - 5 /HPF    RBC Urine 5-10 (A) OTO2^O - 2 /HPF    Squamous Epithelial /LPF Urine Few FEW^Few /LPF    Bacteria Urine Few (A) NEG^Negative /HPF       ASSESSMENT:      ICD-10-CM    1. Acute cystitis without hematuria N30.00 sulfamethoxazole-trimethoprim (BACTRIM DS) 800-160 MG tablet   2. Dysuria R30.0 UA reflex to Microscopic and Culture     Urine Microscopic   3. Nonspecific finding on examination of urine R82.90 Urine Culture Aerobic Bacterial             PLAN: Had a normal creatinine in October 2018.  Urine culture is pending.  She does have suprapubic tenderness today.  No flank pain.  See Dr. Pope next week if not improving.  He may need to do further test to evaluate for stones.  As per ordered above.  Drink plenty of fluids.  Prevention and treatment of UTI's discussed. Follow up with primary care physician if not improving.  Advised about symptoms which might herald more serious problems.      Drea Talamantes PA-C

## 2019-12-29 LAB
BACTERIA SPEC CULT: NORMAL
SPECIMEN SOURCE: NORMAL

## 2020-01-09 DIAGNOSIS — I10 HYPERTENSION GOAL BP (BLOOD PRESSURE) < 130/80: ICD-10-CM

## 2020-01-09 NOTE — TELEPHONE ENCOUNTER
Disp Refills Start End IRIS   metoprolol tartrate (LOPRESSOR) 50 MG tablet 180 tablet 0 7/23/2019  No   Sig - Route: Take 1 tablet (50 mg) by mouth 2 times daily - Oral   Sent to pharmacy as: metoprolol tartrate (LOPRESSOR) 50 MG tablet   Class: E-Prescribe   Order: [id number]   E-Prescribing Status: Receipt confirmed by pharmacy (7/23/2019 10:16 AM CDT)     Cailin Little MA on 1/9/2020 at 10:46 AM

## 2020-01-10 RX ORDER — METOPROLOL TARTRATE 50 MG
TABLET ORAL
Qty: 180 TABLET | Refills: 1 | Status: SHIPPED | OUTPATIENT
Start: 2020-01-10 | End: 2020-07-01

## 2020-02-06 ENCOUNTER — OFFICE VISIT (OUTPATIENT)
Dept: FAMILY MEDICINE | Facility: CLINIC | Age: 85
End: 2020-02-06
Payer: MEDICARE

## 2020-02-06 VITALS
SYSTOLIC BLOOD PRESSURE: 131 MMHG | BODY MASS INDEX: 39.7 KG/M2 | DIASTOLIC BLOOD PRESSURE: 59 MMHG | OXYGEN SATURATION: 92 % | TEMPERATURE: 100.7 F | WEIGHT: 247 LBS | HEART RATE: 77 BPM | HEIGHT: 66 IN

## 2020-02-06 DIAGNOSIS — I25.708 ATHEROSCLEROSIS OF CORONARY ARTERY BYPASS GRAFT(S), UNSPECIFIED, WITH OTHER FORMS OF ANGINA PECTORIS (H): ICD-10-CM

## 2020-02-06 DIAGNOSIS — J40 BRONCHITIS: Primary | ICD-10-CM

## 2020-02-06 DIAGNOSIS — I50.9 CONGESTIVE HEART FAILURE, UNSPECIFIED HF CHRONICITY, UNSPECIFIED HEART FAILURE TYPE (H): ICD-10-CM

## 2020-02-06 DIAGNOSIS — E66.01 MORBID OBESITY (H): ICD-10-CM

## 2020-02-06 DIAGNOSIS — G47.33 OSA (OBSTRUCTIVE SLEEP APNEA): ICD-10-CM

## 2020-02-06 PROCEDURE — 99214 OFFICE O/P EST MOD 30 MIN: CPT | Performed by: FAMILY MEDICINE

## 2020-02-06 RX ORDER — AZITHROMYCIN 250 MG/1
TABLET, FILM COATED ORAL
Qty: 6 TABLET | Refills: 0 | Status: SHIPPED | OUTPATIENT
Start: 2020-02-06 | End: 2020-07-29

## 2020-02-06 RX ORDER — TORSEMIDE 10 MG/1
10 TABLET ORAL
COMMUNITY
Start: 2020-01-08 | End: 2020-12-29

## 2020-02-06 RX ORDER — ROSUVASTATIN CALCIUM 5 MG/1
2.5 TABLET, COATED ORAL
COMMUNITY
Start: 2020-01-23 | End: 2020-12-08

## 2020-02-06 ASSESSMENT — PAIN SCALES - GENERAL: PAINLEVEL: NO PAIN (0)

## 2020-02-06 ASSESSMENT — MIFFLIN-ST. JEOR: SCORE: 1599.38

## 2020-02-06 NOTE — PROGRESS NOTES
Subjective     Lisa Ferguson is a 84 year old female who presents to clinic today for the following health issues:    HPI   Acute Illness   Acute illness concerns: Cough  Onset: 4 days    Fever: YES- 100.7, 102.0 temp on last Monday    Chills/Sweats: YES    Headache (location?): no    Sinus Pressure:no    Conjunctivitis:  no    Ear Pain: no    Rhinorrhea: no    Congestion: YES- chest    Sore Throat: no     Cough: YES-non-productive    Wheeze: YES    Decreased Appetite: YES    Nausea: no    Vomiting: no    Diarrhea:  no    Dysuria/Freq.: no    Fatigue/Achiness: YES    Sick/Strep Exposure: YES- family     Therapies Tried and outcome: nothing but tylenol    She did have an influenza vaccine in the fall.  She is not especially prone to pulmonary infections.  She does have a history of CHF and other cardiac issues as per her chart.  She is on numerous baseline medications as listed below.  She normally receives care from Dr. Isaac at the [facility].    Patient Active Problem List   Diagnosis     PUD (peptic ulcer disease)     Hyperlipidemia with target LDL less than 70     Mitral regurgitation     DJD (degenerative joint disease)     Fibroadenoma of breast     Diverticulosis     Sciatica neuralgia     IBS (irritable bowel syndrome)     RBBB (right bundle branch block)     GLEN (obstructive sleep apnea)     Balance disorder     Anxiety     Advanced directives, counseling/discussion     Ventricular tachycardia (paroxysmal) (H)     Corneal ulcer of left eye     Blepharitis     Hypercalcemia     Disorder of skin or subcutaneous tissue     Scar condition and fibrosis of skin     OA (osteoarthritis) of knee - bilateral     Fracture of proximal humerus     CHF (congestive heart failure) (H)     Shoulder fracture     Abnormal glucose     S/P CABG (coronary artery bypass graft)     S/p total knee replacement, bilateral     Acquired hypothyroidism     Essential hypertension with goal blood pressure less than 130/80      Morbid obesity (H)     H/O parathyroidectomy     Atherosclerosis of coronary artery bypass graft(s), unspecified, with other forms of angina pectoris (H)     Past Surgical History:   Procedure Laterality Date     ARTHROSCOPY KNEE RT/LT      bilateral     C APPENDECTOMY       C CABG, ARTERIAL, THREE      LIMA-LAD, SVG-DIagnoal, SVG-OM, previous LAD-PCI, sees Dr Banuelos     COLONOSCOPY  2010    negative     HC DILATION/CURETTAGE DIAG/THER NON OB       HC EXCIS INTERDIGITAL NEUROMA,EA      mortons neuroma excision     HC REMOVAL GALLBLADDER       HERNIA REPAIR, INGUINAL RT/LT       SURGICAL HISTORY OF -       bilateral meniscal tears and surgery on these     SURGICAL HISTORY OF -    or so    one parathyroid removed     TONSILLECTOMY       TUBAL LIGATION         Social History     Tobacco Use     Smoking status: Former Smoker     Years: 20.00     Types: Cigarettes     Last attempt to quit: 1978     Years since quittin.6     Smokeless tobacco: Never Used   Substance Use Topics     Alcohol use: Yes     Comment: Rare alcohol use.     Family History   Problem Relation Age of Onset     C.A.D. Mother      Arthritis Mother      Cardiovascular Mother      Heart Disease Mother      Obesity Mother      Thyroid Disease Mother      C.A.D. Father      Diabetes Father      Hypertension Father      Alcohol/Drug Father      Alzheimer Disease Father      Cardiovascular Father      Circulatory Father      Gastrointestinal Disease Father      Heart Disease Father      Lipids Father      Obesity Father      Cardiovascular Maternal Grandmother      Depression Maternal Grandmother      Obesity Maternal Grandmother      Thyroid Disease Maternal Grandmother      Cardiovascular Maternal Grandfather      Heart Disease Maternal Grandfather      Obesity Maternal Grandfather      Obesity Paternal Grandmother      Diabetes Paternal Grandfather      Alcohol/Drug Paternal Grandfather      Obesity Paternal Grandfather       Breast Cancer Sister      Cancer Sister      Obesity Sister      Thyroid Disease Sister      Alcohol/Drug Son      Allergies Son      Allergies Daughter      Depression Sister      Eye Disorder Sister      Gastrointestinal Disease Sister      Thyroid Disease Sister          Current Outpatient Medications   Medication Sig Dispense Refill     acetaminophen (TYLENOL) 500 MG tablet Take 1,000 mg by mouth       allopurinol (ZYLOPRIM) 100 MG tablet Take 1 tablet (100 mg) by mouth daily 90 tablet 3     allopurinol (ZYLOPRIM) 300 MG tablet Take 1 tablet (300 mg) by mouth daily 90 tablet 1     aspirin 162 MG EC tablet Take 81 mg by mouth daily  90 tablet 3       0     betamethasone dipropionate (DIPROSONE) 0.05 % external lotion Apply topically as needed 60 mL 1     Biotin 10 MG TABS tablet Take 10,000 mcg by mouth twice a week        Cholecalciferol (VITAMIN D) 2000 UNITS tablet Take 1 tablet by mouth daily.       citalopram (CELEXA) 20 MG tablet Take 1 tablet (20 mg) by mouth daily 90 tablet 1     hydrochlorothiazide (HYDRODIURIL) 25 MG tablet Take 1 tablet (25 mg) by mouth daily 90 tablet 0     levothyroxine (SYNTHROID/LEVOTHROID) 125 MCG tablet Take 1 tablet (125 mcg) by mouth daily 90 tablet 3     losartan (COZAAR) 100 MG tablet Take 1 tablet (100 mg) by mouth daily 90 tablet 0     metoprolol tartrate (LOPRESSOR) 50 MG tablet TAKE 1 TABLET(50 MG) BY MOUTH TWICE DAILY 180 tablet 1     Omega-3 Fatty Acids (FISH OIL) 1200 MG CAPS Take 1 capsule by mouth 2 times daily       order for DME Equipment being ordered: CPAP 1 each 0     rosuvastatin (CRESTOR) 5 MG tablet Take 2.5 mg by mouth every 7 days       torsemide (DEMADEX) 10 MG tablet Take 10 mg by mouth       amoxicillin (AMOXIL) 500 MG capsule TK 4 CS PO 1 HOUR BEFORE DENTAL APPOINTMENT  3     gemfibrozil (LOPID) 600 MG tablet Take 600 mg by mouth       LORazepam (ATIVAN) 0.5 MG tablet Take 1 tablet (0.5 mg) by mouth every 8 hours as needed for anxiety (Patient not  "taking: Reported on 2/6/2020) 20 tablet 0     losartan-hydrochlorothiazide (HYZAAR) 100-25 MG tablet        Allergies   Allergen Reactions     Adhesive Tape      Atorvastatin Other (See Comments) and Muscle Pain (Myalgia)     lipitor  Myalgia     Cortisone      Insomnia, burning in chest, flushed     Nickel Other (See Comments)     Raw weepy skin  rash     Tetracycline Diarrhea     Vicodin [Hydrocodone-Acetaminophen] Other (See Comments)     Hallucinations     Liquid Adhesive      Other reaction(s): Contact Dermatitis         Reviewed and updated as needed this visit by Provider         Review of Systems   ROS COMP: Constitutional, HEENT, cardiovascular, pulmonary, gi and gu systems are negative, except as otherwise noted.      Objective    /59 (BP Location: Left arm, Patient Position: Chair, Cuff Size: Adult Large)   Pulse 77   Temp 100.7  F (38.2  C) (Oral)   Ht 1.68 m (5' 6.14\")   Wt 112 kg (247 lb)   SpO2 92%   Breastfeeding No   BMI 39.70 kg/m    Body mass index is 39.7 kg/m .  Physical Exam   GENERAL: Pleasant, alert, no distress and obese  EYES: Eyes grossly normal to inspection, PERRL and conjunctivae and sclerae normal  HENT: ear canals and TM's normal, nose and mouth without ulcers or lesions  NECK: no adenopathy, no asymmetry, masses, or scars and thyroid normal to palpation  RESP: Some scattered wheezes, but no rales or rhonchi    Diagnostic Test Results:  none         Assessment & Plan       ICD-10-CM    1. Bronchitis J40 azithromycin (ZITHROMAX) 250 MG tablet   2. Congestive heart failure, unspecified HF chronicity, unspecified heart failure type (H) I50.9    3. Morbid obesity (H) E66.01    4. Atherosclerosis of coronary artery bypass graft(s), unspecified, with other forms of angina pectoris (H) I25.708    5. GLEN (obstructive sleep apnea) G47.33      She is febrile with a mildly reduced oxygen saturation  Discussed that this may represent influenza and we considered doing lab and x-ray " testing, but given her symptoms and vital signs and underlying comorbidities as listed above, I think she warrants treatment with a course of antibiotics regardless of what those studies would show, so we will proceed with empiric treatment with azithromycin today as above  She will continue her same baseline meds  We will look for improvement over the next few days    Return for a recheck if symptoms worsen or fail to improve.    Wayne Downs MD  [facility]

## 2020-03-06 DIAGNOSIS — F41.9 ANXIETY: ICD-10-CM

## 2020-03-10 RX ORDER — CITALOPRAM HYDROBROMIDE 20 MG/1
TABLET ORAL
Qty: 90 TABLET | Refills: 1 | Status: SHIPPED | OUTPATIENT
Start: 2020-03-10 | End: 2020-08-20

## 2020-03-11 DIAGNOSIS — M10.071 ACUTE IDIOPATHIC GOUT OF RIGHT FOOT: ICD-10-CM

## 2020-03-11 DIAGNOSIS — I10 HYPERTENSION GOAL BP (BLOOD PRESSURE) < 130/80: ICD-10-CM

## 2020-03-11 DIAGNOSIS — E03.9 ACQUIRED HYPOTHYROIDISM: ICD-10-CM

## 2020-03-11 DIAGNOSIS — F41.9 ANXIETY: ICD-10-CM

## 2020-03-11 RX ORDER — ALLOPURINOL 100 MG/1
TABLET ORAL
Qty: 90 TABLET | Refills: 3 | OUTPATIENT
Start: 2020-03-11

## 2020-03-11 NOTE — TELEPHONE ENCOUNTER
Duplicate, 1st request.    citalopram (CELEXA) 20 MG tablet  90 tablet  1  3/10/2020   No    Sig: TAKE 1 TABLET(20 MG) BY MOUTH DAILY    Sent to pharmacy as: citalopram (CELEXA) 20 MG tablet    Class: E-Prescribe    Order: [id number]    E-Prescribing Status: Receipt confirmed by pharmacy (3/10/2020 10:32 AM CDT)      metoprolol tartrate (LOPRESSOR) 50 MG tablet  180 tablet  1  1/10/2020   No    Sig: TAKE 1 TABLET(50 MG) BY MOUTH TWICE DAILY    Sent to pharmacy as: metoprolol tartrate (LOPRESSOR) 50 MG tablet    Class: E-Prescribe    Order: [id number]    E-Prescribing Status: Receipt confirmed by pharmacy (1/10/2020 10:12 AM CST)      levothyroxine (SYNTHROID/LEVOTHROID) 125 MCG tablet  90 tablet  3  7/23/2019   No    Sig - Route: Take 1 tablet (125 mcg) by mouth daily - Oral    Sent to pharmacy as: levothyroxine (SYNTHROID/LEVOTHROID) 125 MCG tablet    Class: E-Prescribe    Order: [id number]    E-Prescribing Status: Receipt confirmed by pharmacy (7/23/2019 10:16 AM CDT)        Ana Rosa CHANEY CMA ([facility])

## 2020-03-11 NOTE — TELEPHONE ENCOUNTER
Duplicate, 1st request.    allopurinol (ZYLOPRIM) 100 MG tablet  90 tablet  3  7/23/2019   --    Sig - Route: Take 1 tablet (100 mg) by mouth daily - Oral    Sent to pharmacy as: allopurinol (ZYLOPRIM) 100 MG tablet    Class: E-Prescribe    Order: [id number]    E-Prescribing Status: Receipt confirmed by pharmacy (7/23/2019 10:16 AM CDT)      Ana Rosa CHANEY CMA ([facility])

## 2020-03-13 RX ORDER — CITALOPRAM HYDROBROMIDE 20 MG/1
TABLET ORAL
Qty: 90 TABLET | Refills: 1 | OUTPATIENT
Start: 2020-03-13

## 2020-03-13 RX ORDER — ALLOPURINOL 300 MG/1
TABLET ORAL
Qty: 90 TABLET | Refills: 0 | Status: SHIPPED | OUTPATIENT
Start: 2020-03-13 | End: 2020-07-06

## 2020-03-13 RX ORDER — METOPROLOL TARTRATE 50 MG
TABLET ORAL
Qty: 180 TABLET | Refills: 1 | OUTPATIENT
Start: 2020-03-13

## 2020-03-13 RX ORDER — LEVOTHYROXINE SODIUM 125 UG/1
TABLET ORAL
Qty: 90 TABLET | Refills: 0 | Status: SHIPPED | OUTPATIENT
Start: 2020-03-13 | End: 2020-07-05

## 2020-03-13 NOTE — TELEPHONE ENCOUNTER
"Prescription approved per [facility] Refill Protocol.    Declined celexa with note to pharmacy: Rx was sent electronically for a 6 month supply 03/10/2020. Please dispense. E-Prescribing Status: Receipt confirmed by pharmacy (3/10/2020 10:32 AM CDT)     Declined metoprolol with note: Rx was sent 01/10/2020 for a 6 month supply. Please dispense. E-Prescribing Status: Receipt confirmed by pharmacy (1/10/2020 10:12 AM CST)    Requested Prescriptions   Pending Prescriptions Disp Refills     levothyroxine (SYNTHROID/LEVOTHROID) 125 MCG tablet [Pharmacy Med Name: LEVOTHYROXINE 0.125MG (125MCG) TAB] 90 tablet 3     Sig: TAKE 1 TABLET BY MOUTH EVERY DAY       Thyroid Protocol Passed - 3/11/2020  7:31 AM        Passed - Patient is 12 years or older        Passed - Recent (12 mo) or future (30 days) visit within the authorizing provider's specialty     Patient has had an office visit with the authorizing provider or a provider within the authorizing providers department within the previous 12 mos or has a future within next 30 days. See \"Patient Info\" tab in inbasket, or \"Choose Columns\" in Meds & Orders section of the refill encounter.              Passed - Medication is active on med list        Passed - Normal TSH on file in past 12 months     Recent Labs   Lab Test 10/28/19  1135   TSH 1.75              Passed - No active pregnancy on record     If patient is pregnant or has had a positive pregnancy test, please check TSH.          Passed - No positive pregnancy test in past 12 months     If patient is pregnant or has had a positive pregnancy test, please check TSH.             allopurinol (ZYLOPRIM) 300 MG tablet [Pharmacy Med Name: ALLOPURINOL 300MG TABLETS] 90 tablet 1     Sig: TAKE 1 TABLET(300 MG) BY MOUTH DAILY       Gout Agents Protocol Passed - 3/11/2020  7:31 AM        Passed - CBC on file in past 12 months     Recent Labs   Lab Test 10/28/19  1135   WBC 8.9   RBC 4.46   HGB 13.1   HCT 40.5                    " "Passed - ALT on file in past 12 months     Recent Labs   Lab Test 10/28/19  1135   ALT 35             Passed - Has Uric Acid on file in past 12 months and value is less than 6     Recent Labs   Lab Test 10/04/19  0952   URIC 5.4     If level is 6mg/dL or greater, ok to refill one time and refer to provider.           Passed - Recent (12 mo) or future (30 days) visit within the authorizing provider's specialty     Patient has had an office visit with the authorizing provider or a provider within the authorizing providers department within the previous 12 mos or has a future within next 30 days. See \"Patient Info\" tab in inbasket, or \"Choose Columns\" in Meds & Orders section of the refill encounter.              Passed - Medication is active on med list        Passed - Patient is age 18 or older        Passed - No active pregnancy on record        Passed - Normal serum creatinine on file in the past 12 months     Recent Labs   Lab Test 10/25/19  0947   CR 0.67       Ok to refill medication if creatinine is low          Passed - No positive pregnancy test in past year           metoprolol tartrate (LOPRESSOR) 50 MG tablet [Pharmacy Med Name: METOPROLOL TARTRATE 50MG TABLETS] 180 tablet 1     Sig: TAKE 1 TABLET BY MOUTH TWICE DAILY       Beta-Blockers Protocol Passed - 3/11/2020  7:31 AM        Passed - Blood pressure under 140/90 in past 12 months     BP Readings from Last 3 Encounters:   02/06/20 131/59   12/27/19 128/78   10/28/19 138/80                 Passed - Patient is age 6 or older        Passed - Recent (12 mo) or future (30 days) visit within the authorizing provider's specialty     Patient has had an office visit with the authorizing provider or a provider within the authorizing providers department within the previous 12 mos or has a future within next 30 days. See \"Patient Info\" tab in inbasket, or \"Choose Columns\" in Meds & Orders section of the refill encounter.              Passed - Medication is active " "on med list           citalopram (CELEXA) 20 MG tablet [Pharmacy Med Name: CITALOPRAM 20MG TABLETS] 90 tablet 1     Sig: TAKE 1 TABLET BY MOUTH EVERY DAY       SSRIs Protocol Passed - 3/11/2020  7:31 AM        Passed - Recent (12 mo) or future (30 days) visit within the authorizing provider's specialty     Patient has had an office visit with the authorizing provider or a provider within the authorizing providers department within the previous 12 mos or has a future within next 30 days. See \"Patient Info\" tab in inbasket, or \"Choose Columns\" in Meds & Orders section of the refill encounter.              Passed - Medication is active on med list        Passed - Patient is age 18 or older        Passed - No active pregnancy on record        Passed - No positive pregnancy test in last 12 months           "

## 2020-05-28 ENCOUNTER — TRANSFERRED RECORDS (OUTPATIENT)
Dept: HEALTH INFORMATION MANAGEMENT | Facility: CLINIC | Age: 85
End: 2020-05-28

## 2020-07-01 DIAGNOSIS — I10 HYPERTENSION GOAL BP (BLOOD PRESSURE) < 130/80: ICD-10-CM

## 2020-07-01 DIAGNOSIS — M10.071 ACUTE IDIOPATHIC GOUT OF RIGHT FOOT: ICD-10-CM

## 2020-07-01 RX ORDER — METOPROLOL TARTRATE 50 MG
TABLET ORAL
Qty: 180 TABLET | Refills: 0 | Status: SHIPPED | OUTPATIENT
Start: 2020-07-01 | End: 2020-12-18

## 2020-07-01 RX ORDER — ALLOPURINOL 100 MG/1
TABLET ORAL
Qty: 90 TABLET | Refills: 0 | Status: SHIPPED | OUTPATIENT
Start: 2020-07-01 | End: 2020-07-06

## 2020-07-01 NOTE — TELEPHONE ENCOUNTER
07/23/19:  (M10.071) Acute idiopathic gout of right foot  (primary encounter diagnosis)  Comment: we are bump upwards with the Zyloprim (allopurinol) to 400 milligrams to try to get uric acid level  Below 5. We need a recheck uric acid level  In roughly 1-2 months , future ordered for this  Plan: allopurinol (ZYLOPRIM) 300 MG tablet,         allopurinol (ZYLOPRIM) 100 MG tablet, **Uric         acid FUTURE 2mo

## 2020-07-02 DIAGNOSIS — M10.071 ACUTE IDIOPATHIC GOUT OF RIGHT FOOT: ICD-10-CM

## 2020-07-03 DIAGNOSIS — E03.9 ACQUIRED HYPOTHYROIDISM: ICD-10-CM

## 2020-07-05 RX ORDER — LEVOTHYROXINE SODIUM 125 UG/1
TABLET ORAL
Qty: 90 TABLET | Refills: 0 | Status: SHIPPED | OUTPATIENT
Start: 2020-07-05 | End: 2020-09-21

## 2020-07-05 NOTE — TELEPHONE ENCOUNTER
Routing refill request to provider for review/approval because:  Please clarify dose-med list has 2 different strengths listed.  Portia Kline RN

## 2020-07-05 NOTE — TELEPHONE ENCOUNTER
Prescription approved per [facility] Refill Protocol.  Portia Kline RN

## 2020-07-06 DIAGNOSIS — M10.071 ACUTE IDIOPATHIC GOUT OF RIGHT FOOT: ICD-10-CM

## 2020-07-06 RX ORDER — ALLOPURINOL 100 MG/1
100 TABLET ORAL DAILY
Qty: 90 TABLET | Refills: 0 | Status: SHIPPED | OUTPATIENT
Start: 2020-07-06 | End: 2020-12-18

## 2020-07-06 RX ORDER — ALLOPURINOL 300 MG/1
1 TABLET ORAL DAILY
Qty: 90 TABLET | Refills: 0 | Status: SHIPPED | OUTPATIENT
Start: 2020-07-06 | End: 2020-09-23

## 2020-07-07 RX ORDER — ALLOPURINOL 300 MG/1
TABLET ORAL
Qty: 90 TABLET | Refills: 0 | OUTPATIENT
Start: 2020-07-07

## 2020-07-28 ENCOUNTER — TELEPHONE (OUTPATIENT)
Dept: FAMILY MEDICINE | Facility: CLINIC | Age: 85
End: 2020-07-28

## 2020-07-28 NOTE — TELEPHONE ENCOUNTER
Patient thinks she has a gout flare, for 3 days she has had pain in her right foot. She takes 400mg of allopurinol daily and denies missing any doses.  She has swelling in right foot, slightly pink. She feels a sharp pain that radiates across the top of her foot. She would like order for prednisone to help with the flare.     Erika Abel RN

## 2020-07-28 NOTE — TELEPHONE ENCOUNTER
Reason for call:  Other   Patient called regarding (reason for call): prescription  Additional comments: Patient is calling requesting prednisone regarding her gout flare up,please call to advise.     Phone number to reach patient:  Home number on file [phone] (home)    Best Time:  Any     Can we leave a detailed message on this number?  YES    Travel screening: Not Applicable

## 2020-07-29 ENCOUNTER — OFFICE VISIT (OUTPATIENT)
Dept: FAMILY MEDICINE | Facility: CLINIC | Age: 85
End: 2020-07-29
Payer: MEDICARE

## 2020-07-29 VITALS
BODY MASS INDEX: 41.29 KG/M2 | SYSTOLIC BLOOD PRESSURE: 120 MMHG | RESPIRATION RATE: 14 BRPM | OXYGEN SATURATION: 94 % | DIASTOLIC BLOOD PRESSURE: 68 MMHG | TEMPERATURE: 98.3 F | HEART RATE: 73 BPM | WEIGHT: 247.8 LBS | HEIGHT: 65 IN

## 2020-07-29 DIAGNOSIS — M10.9 ACUTE GOUTY ARTHRITIS: Primary | ICD-10-CM

## 2020-07-29 PROCEDURE — 99213 OFFICE O/P EST LOW 20 MIN: CPT | Performed by: NURSE PRACTITIONER

## 2020-07-29 RX ORDER — PREDNISONE 20 MG/1
20 TABLET ORAL DAILY
Qty: 5 TABLET | Refills: 0 | Status: SHIPPED | OUTPATIENT
Start: 2020-07-29 | End: 2020-08-03

## 2020-07-29 ASSESSMENT — PAIN SCALES - GENERAL: PAINLEVEL: MODERATE PAIN (4)

## 2020-07-29 ASSESSMENT — MIFFLIN-ST. JEOR: SCORE: 1587.11

## 2020-07-29 NOTE — PROGRESS NOTES
Subjective     Lisa Ferguson is a 84 year old female who presents to clinic today for the following health issues:    HPI       Musculoskeletal problem/pain       Duration: 5 days     Description  Location: Right ankle    Intensity:  4/10,last night 10/10    Accompanying signs and symptoms: warmth, swelling and redness    History  Previous similar problem: YES, history of gout.  Previous evaluation:  none    Precipitating or alleviating factors:  Trauma or overuse: no   Aggravating factors include: walking    Therapies tried and outcome: acetaminophen, ice and allopurinol helps a liitle.     Patient denies fever.  She feels that calves may be swollen.      Patient Active Problem List   Diagnosis     PUD (peptic ulcer disease)     Hyperlipidemia with target LDL less than 70     Mitral regurgitation     DJD (degenerative joint disease)     Fibroadenoma of breast     Diverticulosis     Sciatica neuralgia     IBS (irritable bowel syndrome)     RBBB (right bundle branch block)     GLEN (obstructive sleep apnea)     Balance disorder     Anxiety     Advanced directives, counseling/discussion     Ventricular tachycardia (paroxysmal) (H)     Corneal ulcer of left eye     Blepharitis     Hypercalcemia     Disorder of skin or subcutaneous tissue     Scar condition and fibrosis of skin     OA (osteoarthritis) of knee - bilateral     Fracture of proximal humerus     CHF (congestive heart failure) (H)     Shoulder fracture     Abnormal glucose     S/P CABG (coronary artery bypass graft)     S/p total knee replacement, bilateral     Acquired hypothyroidism     Essential hypertension with goal blood pressure less than 130/80     Morbid obesity (H)     H/O parathyroidectomy     Atherosclerosis of coronary artery bypass graft(s), unspecified, with other forms of angina pectoris (H)     Past Surgical History:   Procedure Laterality Date     ARTHROSCOPY KNEE RT/LT      bilateral     C APPENDECTOMY       C CABG, ARTERIAL, THREE  1999     LIMA-LAD, SVG-DIagnoal, SVG-OM, previous LAD-PCI, sees Dr Banuelos     COLONOSCOPY  2010    negative     HC DILATION/CURETTAGE DIAG/THER NON OB       HC EXCIS INTERDIGITAL NEUROMA,EA      mortons neuroma excision     HC REMOVAL GALLBLADDER       HERNIA REPAIR, INGUINAL RT/LT       SURGICAL HISTORY OF -       bilateral meniscal tears and surgery on these     SURGICAL HISTORY OF -    or so    one parathyroid removed     TONSILLECTOMY       TUBAL LIGATION         Social History     Tobacco Use     Smoking status: Former Smoker     Years: 20.00     Types: Cigarettes     Last attempt to quit: 1978     Years since quittin.0     Smokeless tobacco: Never Used   Substance Use Topics     Alcohol use: Yes     Comment: Rare alcohol use.     Family History   Problem Relation Age of Onset     C.A.D. Mother      Arthritis Mother      Cardiovascular Mother      Heart Disease Mother      Obesity Mother      Thyroid Disease Mother      C.A.D. Father      Diabetes Father      Hypertension Father      Alcohol/Drug Father      Alzheimer Disease Father      Cardiovascular Father      Circulatory Father      Gastrointestinal Disease Father      Heart Disease Father      Lipids Father      Obesity Father      Cardiovascular Maternal Grandmother      Depression Maternal Grandmother      Obesity Maternal Grandmother      Thyroid Disease Maternal Grandmother      Cardiovascular Maternal Grandfather      Heart Disease Maternal Grandfather      Obesity Maternal Grandfather      Obesity Paternal Grandmother      Diabetes Paternal Grandfather      Alcohol/Drug Paternal Grandfather      Obesity Paternal Grandfather      Breast Cancer Sister      Cancer Sister      Obesity Sister      Thyroid Disease Sister      Alcohol/Drug Son      Allergies Son      Allergies Daughter      Depression Sister      Eye Disorder Sister      Gastrointestinal Disease Sister      Thyroid Disease Sister          Current Outpatient Medications    Medication Sig Dispense Refill     acetaminophen (TYLENOL) 500 MG tablet Take 1,000 mg by mouth       allopurinol (ZYLOPRIM) 100 MG tablet Take 1 tablet (100 mg) by mouth daily Taken with 300 milligram tab to = total daily dose of 400 milligrams 90 tablet 0     allopurinol (ZYLOPRIM) 300 MG tablet Take 1 tablet (300 mg) by mouth daily Taken with 100 milligram tab to = total daily dose of 400 milligrams 90 tablet 0     aspirin 162 MG EC tablet Take 81 mg by mouth daily  90 tablet 3     betamethasone dipropionate (DIPROSONE) 0.05 % external lotion Apply topically as needed 60 mL 1     Biotin 10 MG TABS tablet Take 10,000 mcg by mouth twice a week        Cholecalciferol (VITAMIN D) 2000 UNITS tablet Take 1 tablet by mouth daily.       citalopram (CELEXA) 20 MG tablet TAKE 1 TABLET(20 MG) BY MOUTH DAILY 90 tablet 1     levothyroxine (SYNTHROID/LEVOTHROID) 125 MCG tablet TAKE 1 TABLET BY MOUTH EVERY DAY 90 tablet 0     losartan (COZAAR) 100 MG tablet Take 1 tablet (100 mg) by mouth daily 90 tablet 0     metoprolol tartrate (LOPRESSOR) 50 MG tablet TAKE 1 TABLET(50 MG) BY MOUTH TWICE DAILY 180 tablet 0     order for DME Equipment being ordered: CPAP 1 each 0     predniSONE (DELTASONE) 20 MG tablet Take 1 tablet (20 mg) by mouth daily for 5 days 5 tablet 0     torsemide (DEMADEX) 10 MG tablet Take 10 mg by mouth       amoxicillin (AMOXIL) 500 MG capsule TK 4 CS PO 1 HOUR BEFORE DENTAL APPOINTMENT  3     gemfibrozil (LOPID) 600 MG tablet Take 600 mg by mouth       losartan-hydrochlorothiazide (HYZAAR) 100-25 MG tablet        Omega-3 Fatty Acids (FISH OIL) 1200 MG CAPS Take 1 capsule by mouth 2 times daily       rosuvastatin (CRESTOR) 5 MG tablet Take 2.5 mg by mouth every 7 days       Allergies   Allergen Reactions     Adhesive Tape      Atorvastatin Other (See Comments) and Muscle Pain (Myalgia)     lipitor  Myalgia     Cortisone      Insomnia, burning in chest, flushed     Nickel Other (See Comments)     Raw weepy  "skin  rash     Tetracycline Diarrhea     Vicodin [Hydrocodone-Acetaminophen] Other (See Comments)     Hallucinations     Liquid Adhesive      Other reaction(s): Contact Dermatitis     BP Readings from Last 3 Encounters:   07/29/20 120/68   02/06/20 131/59   12/27/19 128/78    Wt Readings from Last 3 Encounters:   07/29/20 112.4 kg (247 lb 12.8 oz)   02/06/20 112 kg (247 lb)   12/27/19 114.3 kg (252 lb)                    Reviewed and updated as needed this visit by Provider  Tobacco  Allergies  Meds  Problems  Med Hx  Surg Hx  Fam Hx         Review of Systems   Constitutional, HEENT, cardiovascular, pulmonary, gi and gu systems are negative, except as otherwise noted.      Objective    /68   Pulse 73   Temp 98.3  F (36.8  C) (Oral)   Resp 14   Ht 1.655 m (5' 5.14\")   Wt 112.4 kg (247 lb 12.8 oz)   SpO2 94%   BMI 41.06 kg/m    Body mass index is 41.06 kg/m .  Physical Exam   GENERAL: healthy, alert and no distress  RESP: lungs clear to auscultation - no rales, rhonchi or wheezes  CV: regular rate and rhythm, normal S1 S2, no S3 or S4, no murmur, click or rub, no peripheral edema and peripheral pulses strong  MS: bilateral calves measure 42 cm; erythema and tenderness noted to right medial malleolus; pain present with range of motion.    Diagnostic Test Results:  none         Assessment & Plan     1. Acute gouty arthritis    - predniSONE (DELTASONE) 20 MG tablet; Take 1 tablet (20 mg) by mouth daily for 5 days  Dispense: 5 tablet; Refill: 0            Return in about 3 months (around 10/29/2020) for Medication Recheck, with PCP..    MARY Hannon [facility]    "

## 2020-07-29 NOTE — TELEPHONE ENCOUNTER
Called patient. Left detailed VM with Hernando's reply and scheduling number to call [phone].

## 2020-08-20 DIAGNOSIS — F41.9 ANXIETY: ICD-10-CM

## 2020-08-20 RX ORDER — CITALOPRAM HYDROBROMIDE 20 MG/1
TABLET ORAL
Qty: 90 TABLET | Refills: 0 | Status: SHIPPED | OUTPATIENT
Start: 2020-08-20 | End: 2020-11-24

## 2020-09-21 DIAGNOSIS — E03.9 ACQUIRED HYPOTHYROIDISM: ICD-10-CM

## 2020-09-22 DIAGNOSIS — M10.071 ACUTE IDIOPATHIC GOUT OF RIGHT FOOT: ICD-10-CM

## 2020-09-23 RX ORDER — ALLOPURINOL 300 MG/1
1 TABLET ORAL DAILY
Qty: 90 TABLET | Refills: 0 | Status: SHIPPED | OUTPATIENT
Start: 2020-09-23 | End: 2021-01-04

## 2020-09-23 RX ORDER — LEVOTHYROXINE SODIUM 125 UG/1
125 TABLET ORAL DAILY
Qty: 90 TABLET | Refills: 0 | Status: SHIPPED | OUTPATIENT
Start: 2020-09-23 | End: 2020-12-18

## 2020-09-23 NOTE — TELEPHONE ENCOUNTER
Medication is being filled for 1 time refill only due to:  Patient needs to be seen because need recheck.   Portia Kline RN

## 2020-10-16 ENCOUNTER — ALLIED HEALTH/NURSE VISIT (OUTPATIENT)
Dept: NURSING | Facility: CLINIC | Age: 85
End: 2020-10-16
Payer: MEDICARE

## 2020-10-16 VITALS
DIASTOLIC BLOOD PRESSURE: 48 MMHG | TEMPERATURE: 98 F | RESPIRATION RATE: 16 BRPM | HEART RATE: 64 BPM | SYSTOLIC BLOOD PRESSURE: 132 MMHG

## 2020-10-16 DIAGNOSIS — Z01.30 BLOOD PRESSURE CHECK: Primary | ICD-10-CM

## 2020-10-16 DIAGNOSIS — Z23 NEED FOR PROPHYLACTIC VACCINATION AND INOCULATION AGAINST INFLUENZA: ICD-10-CM

## 2020-10-16 PROCEDURE — 90662 IIV NO PRSV INCREASED AG IM: CPT

## 2020-10-16 PROCEDURE — G0008 ADMIN INFLUENZA VIRUS VAC: HCPCS

## 2020-10-16 NOTE — PROGRESS NOTES
Lisa Ferguson is a 85 year old patient who comes in today for a Blood Pressure check.  Initial BP:  /48   Pulse 64   Temp 98  F (36.7  C)   Resp 16      Data Unavailable  Disposition: results routed to provider    Patient blood pressure reading on left arm was 148/75 @1:34pm and 140/72 @ 1:37pm    Maricarmen Garza. MA

## 2020-11-12 ENCOUNTER — TELEPHONE (OUTPATIENT)
Dept: FAMILY MEDICINE | Facility: CLINIC | Age: 85
End: 2020-11-12

## 2020-11-12 DIAGNOSIS — N64.59 ABNORMAL BREAST TISSUE: Primary | ICD-10-CM

## 2020-11-12 NOTE — TELEPHONE ENCOUNTER
Diagnostic 3D mammogram and accompanying Left breast US placed    Please notify patient    Bj Wilder RN

## 2020-11-12 NOTE — TELEPHONE ENCOUNTER
Reason for Call: Request for an order or referral:    Order or referral being requested: 3D Mammogram    Date needed: as soon as possible    Has the patient been seen by the PCP for this problem? YES    Additional comments: na    Phone number Patient can be reached at:  Home number on file [phone] (home)    Best Time:  any    Can we leave a detailed message on this number?  YES    Call taken on 11/12/2020 at 11:14 AM by Toyin Dorantes

## 2020-11-12 NOTE — TELEPHONE ENCOUNTER
Called patient and informed of message below and nothing else needed.         Renee HERRERA CMA ([facility])

## 2020-11-12 NOTE — TELEPHONE ENCOUNTER
Patient called to schedule mammogram and was told it would be best if she had a 3D mammogram.  She recently noticed a dimple on her left breast and feels like the muscle is thickening on this side.  Denies pain or feeling any lumps.   Erika Abel RN

## 2020-11-16 ENCOUNTER — ANCILLARY PROCEDURE (OUTPATIENT)
Dept: MAMMOGRAPHY | Facility: CLINIC | Age: 85
End: 2020-11-16
Attending: INTERNAL MEDICINE
Payer: MEDICARE

## 2020-11-16 ENCOUNTER — ANCILLARY PROCEDURE (OUTPATIENT)
Dept: ULTRASOUND IMAGING | Facility: CLINIC | Age: 85
End: 2020-11-16
Attending: INTERNAL MEDICINE
Payer: MEDICARE

## 2020-11-16 DIAGNOSIS — N64.59 ABNORMAL BREAST TISSUE: ICD-10-CM

## 2020-11-16 PROCEDURE — 76642 ULTRASOUND BREAST LIMITED: CPT | Mod: LT | Performed by: RADIOLOGY

## 2020-11-16 PROCEDURE — G0279 TOMOSYNTHESIS, MAMMO: HCPCS | Performed by: RADIOLOGY

## 2020-11-16 PROCEDURE — 77066 DX MAMMO INCL CAD BI: CPT | Performed by: RADIOLOGY

## 2020-11-20 ENCOUNTER — ANCILLARY PROCEDURE (OUTPATIENT)
Dept: ULTRASOUND IMAGING | Facility: CLINIC | Age: 85
End: 2020-11-20
Attending: INTERNAL MEDICINE
Payer: MEDICARE

## 2020-11-20 ENCOUNTER — ANCILLARY PROCEDURE (OUTPATIENT)
Dept: MAMMOGRAPHY | Facility: CLINIC | Age: 85
End: 2020-11-20
Attending: INTERNAL MEDICINE
Payer: MEDICARE

## 2020-11-20 DIAGNOSIS — N63.21 MASS OF UPPER OUTER QUADRANT OF LEFT BREAST: ICD-10-CM

## 2020-11-20 DIAGNOSIS — N63.22 MASS OF UPPER INNER QUADRANT OF LEFT BREAST: ICD-10-CM

## 2020-11-20 PROCEDURE — 88342 IMHCHEM/IMCYTCHM 1ST ANTB: CPT | Mod: 59 | Performed by: RADIOLOGY

## 2020-11-20 PROCEDURE — 19084 BX BREAST ADD LESION US IMAG: CPT | Mod: LT | Performed by: RADIOLOGY

## 2020-11-20 PROCEDURE — 19083 BX BREAST 1ST LESION US IMAG: CPT | Mod: LT | Performed by: RADIOLOGY

## 2020-11-20 PROCEDURE — 88305 TISSUE EXAM BY PATHOLOGIST: CPT | Performed by: RADIOLOGY

## 2020-11-20 PROCEDURE — 88377 M/PHMTRC ALYS ISHQUANT/SEMIQ: CPT | Performed by: RADIOLOGY

## 2020-11-20 PROCEDURE — 88360 TUMOR IMMUNOHISTOCHEM/MANUAL: CPT | Performed by: RADIOLOGY

## 2020-11-20 PROCEDURE — 99N1019 PR STATISTIC H-FISH PROCESS B/S: Performed by: RADIOLOGY

## 2020-11-20 PROCEDURE — 99N1020 PR STATISTIC H-SEND OUTS PREP: Performed by: RADIOLOGY

## 2020-11-20 RX ORDER — LIDOCAINE HYDROCHLORIDE 10 MG/ML
9 INJECTION, SOLUTION EPIDURAL; INFILTRATION; INTRACAUDAL; PERINEURAL ONCE
Status: COMPLETED | OUTPATIENT
Start: 2020-11-20 | End: 2020-11-20

## 2020-11-20 RX ADMIN — LIDOCAINE HYDROCHLORIDE 9 ML: 10 INJECTION, SOLUTION EPIDURAL; INFILTRATION; INTRACAUDAL; PERINEURAL at 09:59

## 2020-11-24 ENCOUNTER — TELEPHONE (OUTPATIENT)
Dept: GENERAL RADIOLOGY | Facility: CLINIC | Age: 85
End: 2020-11-24

## 2020-11-24 DIAGNOSIS — C50.919 BREAST CANCER (H): ICD-10-CM

## 2020-11-24 DIAGNOSIS — F41.9 ANXIETY: ICD-10-CM

## 2020-11-24 RX ORDER — CITALOPRAM HYDROBROMIDE 20 MG/1
20 TABLET ORAL DAILY
Qty: 30 TABLET | Refills: 0 | Status: SHIPPED | OUTPATIENT
Start: 2020-11-24 | End: 2021-01-20

## 2020-11-24 NOTE — TELEPHONE ENCOUNTER
Routing refill request to provider for review/approval because:  Patient needs to be seen because it has been more than 1 year since last office visit.    No appointment pending at this time.  Routing to provider to advise.    Linda Fletcher BSN, RN

## 2020-11-24 NOTE — TELEPHONE ENCOUNTER
Spoke with patient regarding left breast biopsy results, which indicate two areas of invasive cancer, one is lobular and the other is ductal. Notified pt that the radiologist recommendation is surgical and oncologic consultations. Pt verbalized understanding of these results and all questions answered to her satisfaction. She has been scheduled for surgical consultation with Dr. Garcia on 12/10 at [place]. Referral placed for oncology scheduling.

## 2020-11-25 LAB
COPATH REPORT: NORMAL
COPATH REPORT: NORMAL

## 2020-11-27 ENCOUNTER — PRE VISIT (OUTPATIENT)
Dept: ONCOLOGY | Facility: CLINIC | Age: 85
End: 2020-11-27

## 2020-11-27 NOTE — TELEPHONE ENCOUNTER
ONCOLOGY INTAKE: Records Information      APPT INFORMATION:  Referring provider:  Dr. Garcia  Referring provider s clinic:  [facility]  Reason for visit/diagnosis:  breast cancer  Has patient been notified of appointment date and time?: yes    RECORDS INFORMATION:  Were the records received with the referral (via [employer])? no    Has patient been seen for any external appt for this diagnosis? no    If yes, where? n/a    Has patient had any imaging or procedures outside of [facility] for this condition? no      If Yes, where? n/a    ADDITIONAL INFORMATION:  none

## 2020-11-30 NOTE — TELEPHONE ENCOUNTER
RECORDS STATUS - BREAST    RECORDS REQUESTED FROM: EPIC   DATE REQUESTED: 12/8/2020   NOTES DETAILS STATUS   OFFICE NOTE from referring provider     OFFICE NOTE from medical oncologist N/A    OFFICE NOTE from surgeon Complete See Biopsy Report Below    OFFICE NOTE from radiation oncologist     DISCHARGE SUMMARY from hospital N/A    DISCHARGE REPORT from the ER     OPERATIVE REPORT Complete See Biopsy Report Below    MEDICATION LIST Complete [facility]   CLINICAL TRIAL TREATMENTS TO DATE     LABS     PATHOLOGY REPORTS  (Tissue diagnosis, Stage, ER/OK percentage positive and intensity of staining, HER2 IHC, FISH, and all biopsies from breast and any distant metastasis)                 Complete 11/20/2020 Surgical Path   GENONOMIC TESTING     TYPE:   (Next Generation Sequencing, including Foundation One testing, and Oncotype score) Complete Her 2 Manish Fish 11/20/2020   IMAGING (NEED IMAGES & REPORT)     CT SCANS     MRI     MAMMO Complete MA Post 11/20/2020 11/16/2020,  9/19/2018   ULTRASOUND COmplete US Breast Biopsy 11/20/2020 11/16/2020    PET     BONE SCAN     BRAIN MRI

## 2020-12-08 ENCOUNTER — PRE VISIT (OUTPATIENT)
Dept: ONCOLOGY | Facility: CLINIC | Age: 85
End: 2020-12-08

## 2020-12-08 ENCOUNTER — VIRTUAL VISIT (OUTPATIENT)
Dept: ONCOLOGY | Facility: CLINIC | Age: 85
End: 2020-12-08
Attending: INTERNAL MEDICINE
Payer: MEDICARE

## 2020-12-08 VITALS — BODY MASS INDEX: 39.37 KG/M2 | WEIGHT: 245 LBS | HEIGHT: 66 IN

## 2020-12-08 DIAGNOSIS — Z17.0 MALIGNANT NEOPLASM OF LEFT BREAST IN FEMALE, ESTROGEN RECEPTOR POSITIVE, UNSPECIFIED SITE OF BREAST (H): Primary | ICD-10-CM

## 2020-12-08 DIAGNOSIS — C50.912 MALIGNANT NEOPLASM OF LEFT BREAST IN FEMALE, ESTROGEN RECEPTOR POSITIVE, UNSPECIFIED SITE OF BREAST (H): Primary | ICD-10-CM

## 2020-12-08 PROCEDURE — 99205 OFFICE O/P NEW HI 60 MIN: CPT | Mod: 95 | Performed by: INTERNAL MEDICINE

## 2020-12-08 ASSESSMENT — MIFFLIN-ST. JEOR: SCORE: 1583.06

## 2020-12-08 ASSESSMENT — PAIN SCALES - GENERAL: PAINLEVEL: NO PAIN (0)

## 2020-12-08 NOTE — NURSING NOTE
SYMPTOM QUESTIONNAIRE    Pain: no    Nausea/Vomiting: no    Mouth Sores: no    Shortness of Breath: no    Smoking: no    Fever or Chills: no    Hard Stools: no    Soft Stools: no    Weight Loss: no    Weakness: no    Burning, numbness or tingling in hands or feet: feet- not new    Problems with skin or swelling: no    Memory Loss: no    Anxiety or Depression: anxiety    Trouble Sleeping: occasional    Carol Elias LPN on 12/8/2020 at 12:38 PM

## 2020-12-08 NOTE — LETTER
12/8/2020         RE: Lisa Ferguson  [address]  [place] [address]        Dear Colleague,    Thank you for referring your patient, Lisa Freguson, to the [facility]. Please see a copy of my visit note below.    Note Dictated      Again, thank you for allowing me to participate in the care of your patient.        Sincerely,        Mimi Rodriguez MD

## 2020-12-09 NOTE — PROGRESS NOTES
"Visit Date:   2020            Ziyad Isaac MD   [facility] [address] [place] [address]      [facility]   [address]   [place] [address]      PATIENT:  Lisa Ferguson   MRN:  [id number]   :  1935      Dear Dr. Isaac and [facility]:      Thank you for asking us to see your patient, Lisa Ferguson, in consultation for further evaluation and management of her recently diagnosed left breast cancer.  Please see enclosed note for details of our visit on 2020, as well as recommendations.      HISTORY OF PRESENT ILLNESS:  Lisa Ferguson is an 85-year-old female who presents to [facility] for further evaluation of her left breast cancer.  The patient states a few weeks ago, while brushing her teeth, she noticed thickening in the left breast.  It had a \"rope-like\" appearance.  There were no nipple changes or discharge.  Did think there was slight dimpling of the left breast.  She went on to have this further evaluated. Diagnostic mammogram was performed 2020, as well as ultrasound, which showed at the 11 o'clock position 10 cm from the nipple, a 1.5 x 1.1 x 1.4 cm hypoechoic mass at the 3 o'clock position.  There was a 1.9 x 1.4 x 1.6 cm irregular hypoechoic mass and a 2.3 cm medial to this at the 3 o'clock position 8 cm from the nipple and additional tiny irregular hypoechoic mass, measuring 4 x 5 x 4 mm, likely a satellite lesion.  The patient underwent a biopsy, which showed at the 3 o'clock position, an invasive lobular carcinoma with pleomorphic features, Ute grade 2, at least 10 mm in extent.  At the 11 o'clock position was invasive ductal carcinoma, grade 2, at least 11 mm in extent.  The second specimen was estrogen, progesterone receptor-positive.  HER2 was equivocal by IHC and in group 4, pathology comment was this is negative.  The patient now " presents to clinic to discuss next steps.  On today's visit, the patient reports some numbness in her feet.  This is not new and is longstanding.  Otherwise, has some anxiety.  Denies any fevers, chills, nausea, vomiting, chest pain, shortness of breath, or cough.  The remainder of comprehensive review of systems is negative.      PAST MEDICAL HISTORY:   1.  Newly diagnosed left breast cancer, see above.   2.  Coronary artery disease.   3.  Obesity.   4.  Peptic ulcer disease.   5.  Anxiety.   6.  Hyperlipidemia.   7.  Mitral regurgitation.   8.  Hypertension.   9.  DJD.   10.  Right bundle branch block.   11.  Irritable bowel syndrome.   12.  Sciatica.   13.  Hypothyroidism.   14.  Obstructive sleep apnea.   15.  Congestive heart failure.   16.  Gout.     17.  Nephrolithiasis.      ALLERGIES:  ADHESIVE TAPE, ATORVASTATIN, CORTISONE, NICKEL, TETRACYCLINE, VICODIN, LIQUID ADHESIVE.      FAMILY HISTORY:  Sister  at 62 of breast cancer.  Maternal first cousin  at 35 of breast cancer.  Niece was found to have BRCA mutation and had bilateral mastectomies.      SOCIAL HISTORY:  , has 3 children.  Retired RN.  Quit tobacco 40 years ago.  Occasional alcohol use. The patient is currently on Doximity video visit with Vasquez and Waleska aceves.        GYNECOLOGIC HISTORY:  Menarche at 15.  Menopause in her 50s.   4, para 4.  One child  prematurely after birth.  No history of fertility drugs.  Did use hormone replacement therapy for 8 years.      PHYSICAL EXAM:  GENERAL: Comfortable, no acute distress.   HEAD: Atraumatic/Normocephalic.  EYES: Sclera non-icteric. Grossly normal.  RESPIRATORY: Normal breathing, non-labored. No audible wheezes/cough.  SKIN: No jaundice, discoloration or obvious lesions.  NERUO: No tremors visible. Normal speech.  PSYCH: Alert, oriented. Answered questions appropriately.    The rest of a comprehensive physical examination is deferred due to PHE (public health emergency) video  visit restrictions.     IMAGING:  As stated in the HPI.      PATHOLOGY:  As stated in the HPI.      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  On today's visit, I reviewed with the patient the pathology findings.  I explained that the patient would require surgery, plus or minus chemotherapy, plus or minus radiation and endocrine therapy.  Typically surgery is first.  However, if our surgeon does not feel they would be able to completely excise the entire tumor, we do sometimes consider neoadjuvant chemotherapy. I will be discussing the patient's case with my colleague, Dr. Garcia and hopefully, we will be able to take her straight to surgery.  The patient already has an appointment scheduled with Dr. Garcia this coming Friday, 12/10.  I will plan to see the patient the following Tuesday to finalize plan.      On today's visit, we discussed the importance of healthy lifestyle with diet and exercise.  Also discussed the importance of following COVID precautions.  The patient states that she had a quiet Thanksgiving with her neighbor and friend Vasquez and Vasquez's parents.  She will have the same members for Wilber as well.         PLAN:   1.  Genetics referral.   2.  The patient will meet with Dr. Garcia this coming Thursday.  We will plan to follow-up with the patient the following Tuesday.  If the patient is going straight to surgery, we can reschedule this appointment until after her surgery.      At the end of the discussion, all questions addressed to the best of my ability.  The patient has verbalized understanding and was agreeable with the plan.      Thank you for giving me the opportunity to participate in Ms. Ferguson's care.  If you have any questions, please feel free to contact me.      Video-Visit Details    Type of service:  Video Visit    Video Start and End Time:  1:04 p.m. to  1:34 p.m.    Originating Location (pt. Location): Home    Distant Location (provider location):  [facility]     Mode of Communication:  Video Conference via  Doximity video visit from her homoe. The patient was in her home with her friend, Vasquez present.      RITA CULP MD             D: 2020   T: 2020   MT:       Name:     ROBERT RIZO   MRN:      [id number]        Account:      [id number]   :      1935           Visit Date:   2020      Document: [id number]       cc: Copy for Provider        [facility]        Ziyad Isaac MD

## 2020-12-10 ENCOUNTER — OFFICE VISIT (OUTPATIENT)
Dept: SURGERY | Facility: CLINIC | Age: 85
End: 2020-12-10
Payer: MEDICARE

## 2020-12-10 DIAGNOSIS — Z17.0 MALIGNANT NEOPLASM OF UPPER-OUTER QUADRANT OF LEFT BREAST IN FEMALE, ESTROGEN RECEPTOR POSITIVE (H): Primary | ICD-10-CM

## 2020-12-10 DIAGNOSIS — C50.412 MALIGNANT NEOPLASM OF UPPER-OUTER QUADRANT OF LEFT BREAST IN FEMALE, ESTROGEN RECEPTOR POSITIVE (H): Primary | ICD-10-CM

## 2020-12-10 PROCEDURE — 99205 OFFICE O/P NEW HI 60 MIN: CPT | Performed by: SURGERY

## 2020-12-10 NOTE — LETTER
12/10/2020         RE: Lias Ferguson  [address]  [place] [address]        Dear Colleague,    Thank you for referring your patient, Lisa Ferguson, to the [facility]. Please see a copy of my visit note below.    [facility] Breast Surgery Consultation    HPI:   Lisa Ferguson is a 85 year old female who is seen in consultation at the request of Dr. Isaacfor evaluation of newly diagnosed multifocal left breast cancer. She had biopsy of two areas on her left breast on 2020. Biopsy revealed at 3:00, 9cm FN a grade 2 invasive LOBULAR carcinoma measuring 1.9cm and at 11:00, 10cm FN grade 2 invasive DUCTAL carcinoma measuring 1.5cm, ER 80% positive, ME 80% positive, HER 2 negative (equivocal by both IHC and FISH).     She presented for a diagnostic mammogram on 2020 due to left lateral breast dimpling which she had noticed a few weeks prior. She has family history of breast cancer including a sister and niece who are BRCA 1 positive. She reports her breast feeling more ropey laterally. No nipple drainage or discharge. She has had a prior excisional biopsy on the right breast which was benign. No prior needle biopsies.     Mammogram revealed in the left upper inner breast a spiculated mass measuring 1.9cm as well as an additional irregular mass with calcifications in the outer left breast measuring 2.2cm. US revealed at 11:00, 10cm FN a 1.5cm hypoechoic mass and at 3:00, 9cm FN a 1.9cm hypoechoic mass as well as at 3:00, 8cm FN an additional 5mm hypoechoic mass (this was NOT biopsied) and felt to represent a satellite lesion. US of the left axilla did not reveal any adenopathy.     Hormonal history:  menarche 15, 4 children,  Post menopausal, 8 years HRT, no fertility treatment.     Family history of breast cancer: Yes - sister  at 62, maternal first cousin  at 35, niece BRCA 1 positive  Family history of ovarian cancer:  No    Past Medical History:    has a past medical history of Anxiety, CAD (coronary artery disease), CHF (congestive heart failure) (H) (7/8/2014), DJD (degenerative joint disease), History of colonoscopy (jan 2000), Hyperlipidemia LDL goal < 70, Hypertension goal BP (blood pressure) < 140/90, Hypothyroidism, IBS (irritable bowel syndrome), Mitral regurgitation, Obesity, GLEN (obstructive sleep apnea) (7/11/2011), PUD (peptic ulcer disease), RBBB (right bundle branch block), and Sciatica neuralgia (november 209).      Current Outpatient Medications:      acetaminophen (TYLENOL) 500 MG tablet, Take 1,000 mg by mouth, Disp: , Rfl:      allopurinol (ZYLOPRIM) 100 MG tablet, Take 1 tablet (100 mg) by mouth daily Taken with 300 milligram tab to = total daily dose of 400 milligrams, Disp: 90 tablet, Rfl: 0     allopurinol (ZYLOPRIM) 300 MG tablet, Take 1 tablet (300 mg) by mouth daily +++Need recheck+++Taken with 100 milligram tab to = total daily dose of 400 milligrams, Disp: 90 tablet, Rfl: 0     amoxicillin (AMOXIL) 500 MG capsule, TK 4 CS PO 1 HOUR BEFORE DENTAL APPOINTMENT, Disp: , Rfl: 3     aspirin 162 MG EC tablet, Take 81 mg by mouth daily , Disp: 90 tablet, Rfl: 3     betamethasone dipropionate (DIPROSONE) 0.05 % external lotion, Apply topically as needed (Patient not taking: Reported on 12/8/2020), Disp: 60 mL, Rfl: 1     Biotin 10 MG TABS tablet, Take 10,000 mcg by mouth twice a week , Disp: , Rfl:      Cholecalciferol (VITAMIN D) 2000 UNITS tablet, Take 1 tablet by mouth daily., Disp: , Rfl:      citalopram (CELEXA) 20 MG tablet, Take 1 tablet (20 mg) by mouth daily Please make appointment within 30 days, Disp: 30 tablet, Rfl: 0     levothyroxine (SYNTHROID/LEVOTHROID) 125 MCG tablet, Take 1 tablet (125 mcg) by mouth daily, Disp: 90 tablet, Rfl: 0     losartan (COZAAR) 100 MG tablet, Take 1 tablet (100 mg) by mouth daily (Patient taking differently: Take 50 mg by mouth daily ), Disp: 90 tablet, Rfl: 0     losartan-hydrochlorothiazide  (HYZAAR) 100-25 MG tablet, , Disp: , Rfl:      metoprolol tartrate (LOPRESSOR) 50 MG tablet, TAKE 1 TABLET(50 MG) BY MOUTH TWICE DAILY, Disp: 180 tablet, Rfl: 0     order for DME, Equipment being ordered: CPAP, Disp: 1 each, Rfl: 0     torsemide (DEMADEX) 10 MG tablet, Take 10 mg by mouth, Disp: , Rfl:     Past Surgical History:  Past Surgical History:   Procedure Laterality Date     ARTHROSCOPY KNEE RT/LT      bilateral     C APPENDECTOMY       C CABG, ARTERIAL, THREE  1999    LIMA-LAD, SVG-DIagnoal, SVG-OM, previous LAD-PCI, sees Dr Banuelos     COLONOSCOPY  6/2010    negative     HC DILATION/CURETTAGE DIAG/THER NON OB       HC EXCIS INTERDIGITAL NEUROMA,EA      mortons neuroma excision     HC REMOVAL GALLBLADDER  1994     HERNIA REPAIR, INGUINAL RT/LT       SURGICAL HISTORY OF -       bilateral meniscal tears and surgery on these     SURGICAL HISTORY OF -   1999 or so    one parathyroid removed     TONSILLECTOMY       TUBAL LIGATION             Allergies   Allergen Reactions     Adhesive Tape      Atorvastatin Other (See Comments) and Muscle Pain (Myalgia)     lipitor  Myalgia     Cortisone      Insomnia, burning in chest, flushed     Nickel Other (See Comments)     Raw weepy skin  rash     Tetracycline Diarrhea     Vicodin [Hydrocodone-Acetaminophen] Other (See Comments)     Hallucinations     Liquid Adhesive      Other reaction(s): Contact Dermatitis        Social History:  Social History     Socioeconomic History     Marital status:      Spouse name: Not on file     Number of children: Not on file     Years of education: Not on file     Highest education level: Not on file   Occupational History     Not on file   Social Needs     Financial resource strain: Not on file     Food insecurity     Worry: Not on file     Inability: Not on file     Transportation needs     Medical: Not on file     Non-medical: Not on file   Tobacco Use     Smoking status: Former Smoker     Years: 20.00     Types: Cigarettes      Quit date: 1978     Years since quittin.4     Smokeless tobacco: Never Used   Substance and Sexual Activity     Alcohol use: Yes     Comment: Rare alcohol use.     Drug use: No     Sexual activity: Not Currently     Partners: Male   Lifestyle     Physical activity     Days per week: Not on file     Minutes per session: Not on file     Stress: Not on file   Relationships     Social connections     Talks on phone: Not on file     Gets together: Not on file     Attends [employer]ism service: Not on file     Active member of club or organization: Not on file     Attends meetings of clubs or organizations: Not on file     Relationship status: Not on file     Intimate partner violence     Fear of current or ex partner: Not on file     Emotionally abused: Not on file     Physically abused: Not on file     Forced sexual activity: Not on file   Other Topics Concern     Parent/sibling w/ CABG, MI or angioplasty before 65F 55M? Not Asked   Social History Narrative     Not on file        ROS:  The 10 point review of systems is negative other than noted in the HPI and above.    PE:  General appearance: well-nourished, sitting comfortably, no apparent distress  Psych: normal affect, pleasant  HEENT:  Head normocephalic and atraumatic, pupils equal and round, conjunctivae clear, mucous membranes moist, external ears and nose normal  Neck: Supple without thyromegaly or masses  Lungs: Respirations unlabored  Lymphatic: No cervical, or supraclavicular lymphadenopathy  Extremities: Without edema  Musculoskeletal:  Normal station and gait  Neurologic: nonfocal, grossly intact times four extremities, alert and oriented times three  Psychiatric: Mood and affect are appropriate  Skin: Without lesions or rashes    Breast:  A bilateral breast exam was performed in the supine position.. Bilateral breasts were palpated in a circumferential clockwise fashion including the supraclavicular and axillary areas.   Left breast is slightly  larger than right breast. In sitting position, there is mild dimpling of the skin noted laterally. The skin itself is normal. No skin thickening or erythema. In supine position, there is a palpable mass at 3:00, 8-10cm FN which feels to be 3-4cm in size. On the left upper inner breast is a smaller palpable mass which is 2cm in size. There is no skin ecchymosis. Right breast is normal. Nipples are everted bilaterally. Right breast tissue is heterogeneously dense.     Lymph:       No supraclavicular/infraclavicular adenopathy.   Axillary adenopathy: none    Assessment:  multifocal left breast cancer. She had biopsy of two areas on her left breast on 11/20/2020. Biopsy revealed at 3:00, 9cm FN a grade 2 invasive LOBULAR carcinoma measuring 1.9cm and at 11:00, 10cm FN grade 2 invasive DUCTAL carcinoma measuring 1.5cm, ER 80% positive, HI 80% positive, HER 2 negative (equivocal by both IHC and FISH).     Plan:   Lisa Ferguson is a 85 year old female has newly diagnosed multifocal left breast cancer.  I reviewed the imaging and pathology reports with her and explained the findings.  We talked about the fact that this is invasive ductal carcinoma as well as invasive lobular carcinoma  that is multifocal and in separate quadrants of the breast.    We discussed the receptor status of ER/HI positive, HER 2 negative. We discussed that breast cancer is treated in a multidisciplinary fashion and she will also meet with oncology as well as radiation oncology pending surgical decision making and final pathology results. She is well aware of this as she has already met with Dr. Rodriguez with medical oncology.     We next discussed the surgical options for treatment.  I described the procedures for lumpectomy with sentinel lymph node biopsy and mastectomy with sentinel lymph node biopsy, possible axillary node dissection including the details of the procedures, the risks, anesthesia and expected recovery.  Given the multi focality  of the cancer and site of respective lesions relative to her breast size, I would recommend mastectomy. Typically with lobular cancer, I would recommend breast MRI prior to surgery: however, as she is not a candidate for lumpectomy, this is not necessary. Her right breast has been imaged with diagnostic mammogram and was benign.     I advised that lymph node biopsy is recommended whenever we are dealing with invasive breast cancer and described the procedure for sentinel lymph node biopsy.  We talked about the risk for lymphedema which is small with removal of only a few nodes, but certainly not zero.  we discussed that the lymph nodes would be sent for frozen evaluation and if positive, I would remove additional nodes and lymphedema risk is higher in this situation. We discussed that I would leave a drain in place at the time of surgery.     We talked about post-lumpectomy radiation, the course and usual side effects. We discussed that with lumpectomy, radiation is typically recommended to decrease risk of recurrence. It may be necessary following mastectomy depending on final pathology and if marisela involvement is present.     We also talked about post-mastectomy reconstruction and the stages involved. She is not interested in reconstruction.     In addition, I have recommended genetic counseling. A referral was already placed by Dr. Rodriguez.  She would be a candidate in that situation based on family history and relative known to be BRCA 1 positive.  The natural history of BRCA mutations and breast cancer were discussed with the patient.  We also reviewed the risk reduction benefits of a prophylactic mastectomy in this situation. She is interested in bilateral mastectomies regardless of her genetic testing as she never wants to go through this again. We discussed the risk/benefit of prophylactic right mastectomy given her age and comorbidities and she would like to proceed.     We discussed the role of oncotype for  hormone positive, HER 2 negative cancers with negative sentinel nodes or 1-3 positive nodes.     Plan:   Bilateral mastectomies with left sentinel lymph node biopsy, possible node dissection    Time spent with the patient with greater that 50% of the time in discussion was 60 minutes.    Beata Garcia MD      Please route or send letter to:  Primary Care Provider (PCP) and Referring Provider           Again, thank you for allowing me to participate in the care of your patient.        Sincerely,        Beata Garcia MD

## 2020-12-10 NOTE — PROGRESS NOTES
[facility] Breast Surgery Consultation    HPI:   Lisa Ferguson is a 85 year old female who is seen in consultation at the request of Dr. Isaacfor evaluation of newly diagnosed multifocal left breast cancer. She had biopsy of two areas on her left breast on 2020. Biopsy revealed at 3:00, 9cm FN a grade 2 invasive LOBULAR carcinoma measuring 1.9cm and at 11:00, 10cm FN grade 2 invasive DUCTAL carcinoma measuring 1.5cm, ER 80% positive, LA 80% positive, HER 2 negative (equivocal by both IHC and FISH).     She presented for a diagnostic mammogram on 2020 due to left lateral breast dimpling which she had noticed a few weeks prior. She has family history of breast cancer including a sister and niece who are BRCA 1 positive. She reports her breast feeling more ropey laterally. No nipple drainage or discharge. She has had a prior excisional biopsy on the right breast which was benign. No prior needle biopsies.     Mammogram revealed in the left upper inner breast a spiculated mass measuring 1.9cm as well as an additional irregular mass with calcifications in the outer left breast measuring 2.2cm. US revealed at 11:00, 10cm FN a 1.5cm hypoechoic mass and at 3:00, 9cm FN a 1.9cm hypoechoic mass as well as at 3:00, 8cm FN an additional 5mm hypoechoic mass (this was NOT biopsied) and felt to represent a satellite lesion. US of the left axilla did not reveal any adenopathy.     Hormonal history:  menarche 15, 4 children,  Post menopausal, 8 years HRT, no fertility treatment.     Family history of breast cancer: Yes - sister  at 62, maternal first cousin  at 35, niece BRCA 1 positive  Family history of ovarian cancer:  No    Past Medical History:   has a past medical history of Anxiety, CAD (coronary artery disease), CHF (congestive heart failure) (H) (2014), DJD (degenerative joint disease), History of colonoscopy (2000), Hyperlipidemia LDL goal < 70, Hypertension goal BP (blood pressure) <  140/90, Hypothyroidism, IBS (irritable bowel syndrome), Mitral regurgitation, Obesity, GLEN (obstructive sleep apnea) (7/11/2011), PUD (peptic ulcer disease), RBBB (right bundle branch block), and Sciatica neuralgia (november 209).      Current Outpatient Medications:      acetaminophen (TYLENOL) 500 MG tablet, Take 1,000 mg by mouth, Disp: , Rfl:      allopurinol (ZYLOPRIM) 100 MG tablet, Take 1 tablet (100 mg) by mouth daily Taken with 300 milligram tab to = total daily dose of 400 milligrams, Disp: 90 tablet, Rfl: 0     allopurinol (ZYLOPRIM) 300 MG tablet, Take 1 tablet (300 mg) by mouth daily +++Need recheck+++Taken with 100 milligram tab to = total daily dose of 400 milligrams, Disp: 90 tablet, Rfl: 0     amoxicillin (AMOXIL) 500 MG capsule, TK 4 CS PO 1 HOUR BEFORE DENTAL APPOINTMENT, Disp: , Rfl: 3     aspirin 162 MG EC tablet, Take 81 mg by mouth daily , Disp: 90 tablet, Rfl: 3     betamethasone dipropionate (DIPROSONE) 0.05 % external lotion, Apply topically as needed (Patient not taking: Reported on 12/8/2020), Disp: 60 mL, Rfl: 1     Biotin 10 MG TABS tablet, Take 10,000 mcg by mouth twice a week , Disp: , Rfl:      Cholecalciferol (VITAMIN D) 2000 UNITS tablet, Take 1 tablet by mouth daily., Disp: , Rfl:      citalopram (CELEXA) 20 MG tablet, Take 1 tablet (20 mg) by mouth daily Please make appointment within 30 days, Disp: 30 tablet, Rfl: 0     levothyroxine (SYNTHROID/LEVOTHROID) 125 MCG tablet, Take 1 tablet (125 mcg) by mouth daily, Disp: 90 tablet, Rfl: 0     losartan (COZAAR) 100 MG tablet, Take 1 tablet (100 mg) by mouth daily (Patient taking differently: Take 50 mg by mouth daily ), Disp: 90 tablet, Rfl: 0     losartan-hydrochlorothiazide (HYZAAR) 100-25 MG tablet, , Disp: , Rfl:      metoprolol tartrate (LOPRESSOR) 50 MG tablet, TAKE 1 TABLET(50 MG) BY MOUTH TWICE DAILY, Disp: 180 tablet, Rfl: 0     order for DME, Equipment being ordered: CPAP, Disp: 1 each, Rfl: 0     torsemide (DEMADEX) 10 MG  tablet, Take 10 mg by mouth, Disp: , Rfl:     Past Surgical History:  Past Surgical History:   Procedure Laterality Date     ARTHROSCOPY KNEE RT/LT      bilateral     C APPENDECTOMY       C CABG, ARTERIAL, THREE      LIMA-LAD, SVG-DIagnoal, SVG-OM, previous LAD-PCI, sees Dr Banuelos     COLONOSCOPY  2010    negative     HC DILATION/CURETTAGE DIAG/THER NON OB       HC EXCIS INTERDIGITAL NEUROMA,EA      mortons neuroma excision     HC REMOVAL GALLBLADDER       HERNIA REPAIR, INGUINAL RT/LT       SURGICAL HISTORY OF -       bilateral meniscal tears and surgery on these     SURGICAL HISTORY OF -    or so    one parathyroid removed     TONSILLECTOMY       TUBAL LIGATION             Allergies   Allergen Reactions     Adhesive Tape      Atorvastatin Other (See Comments) and Muscle Pain (Myalgia)     lipitor  Myalgia     Cortisone      Insomnia, burning in chest, flushed     Nickel Other (See Comments)     Raw weepy skin  rash     Tetracycline Diarrhea     Vicodin [Hydrocodone-Acetaminophen] Other (See Comments)     Hallucinations     Liquid Adhesive      Other reaction(s): Contact Dermatitis        Social History:  Social History     Socioeconomic History     Marital status:      Spouse name: Not on file     Number of children: Not on file     Years of education: Not on file     Highest education level: Not on file   Occupational History     Not on file   Social Needs     Financial resource strain: Not on file     Food insecurity     Worry: Not on file     Inability: Not on file     Transportation needs     Medical: Not on file     Non-medical: Not on file   Tobacco Use     Smoking status: Former Smoker     Years: 20.00     Types: Cigarettes     Quit date: 1978     Years since quittin.4     Smokeless tobacco: Never Used   Substance and Sexual Activity     Alcohol use: Yes     Comment: Rare alcohol use.     Drug use: No     Sexual activity: Not Currently     Partners: Male   Lifestyle      Physical activity     Days per week: Not on file     Minutes per session: Not on file     Stress: Not on file   Relationships     Social connections     Talks on phone: Not on file     Gets together: Not on file     Attends Yarsanism service: Not on file     Active member of club or organization: Not on file     Attends meetings of clubs or organizations: Not on file     Relationship status: Not on file     Intimate partner violence     Fear of current or ex partner: Not on file     Emotionally abused: Not on file     Physically abused: Not on file     Forced sexual activity: Not on file   Other Topics Concern     Parent/sibling w/ CABG, MI or angioplasty before 65F 55M? Not Asked   Social History Narrative     Not on file        ROS:  The 10 point review of systems is negative other than noted in the HPI and above.    PE:  General appearance: well-nourished, sitting comfortably, no apparent distress  Psych: normal affect, pleasant  HEENT:  Head normocephalic and atraumatic, pupils equal and round, conjunctivae clear, mucous membranes moist, external ears and nose normal  Neck: Supple without thyromegaly or masses  Lungs: Respirations unlabored  Lymphatic: No cervical, or supraclavicular lymphadenopathy  Extremities: Without edema  Musculoskeletal:  Normal station and gait  Neurologic: nonfocal, grossly intact times four extremities, alert and oriented times three  Psychiatric: Mood and affect are appropriate  Skin: Without lesions or rashes    Breast:  A bilateral breast exam was performed in the supine position.. Bilateral breasts were palpated in a circumferential clockwise fashion including the supraclavicular and axillary areas.   Left breast is slightly larger than right breast. In sitting position, there is mild dimpling of the skin noted laterally. The skin itself is normal. No skin thickening or erythema. In supine position, there is a palpable mass at 3:00, 8-10cm FN which feels to be 3-4cm in size. On the  left upper inner breast is a smaller palpable mass which is 2cm in size. There is no skin ecchymosis. Right breast is normal. Nipples are everted bilaterally. Right breast tissue is heterogeneously dense.     Lymph:       No supraclavicular/infraclavicular adenopathy.   Axillary adenopathy: none    Assessment:  multifocal left breast cancer. She had biopsy of two areas on her left breast on 11/20/2020. Biopsy revealed at 3:00, 9cm FN a grade 2 invasive LOBULAR carcinoma measuring 1.9cm and at 11:00, 10cm FN grade 2 invasive DUCTAL carcinoma measuring 1.5cm, ER 80% positive, NM 80% positive, HER 2 negative (equivocal by both IHC and FISH).     Plan:   Lisa Ferguson is a 85 year old female has newly diagnosed multifocal left breast cancer.  I reviewed the imaging and pathology reports with her and explained the findings.  We talked about the fact that this is invasive ductal carcinoma as well as invasive lobular carcinoma  that is multifocal and in separate quadrants of the breast.    We discussed the receptor status of ER/NM positive, HER 2 negative. We discussed that breast cancer is treated in a multidisciplinary fashion and she will also meet with oncology as well as radiation oncology pending surgical decision making and final pathology results. She is well aware of this as she has already met with Dr. Rodriguez with medical oncology.     We next discussed the surgical options for treatment.  I described the procedures for lumpectomy with sentinel lymph node biopsy and mastectomy with sentinel lymph node biopsy, possible axillary node dissection including the details of the procedures, the risks, anesthesia and expected recovery.  Given the multi focality of the cancer and site of respective lesions relative to her breast size, I would recommend mastectomy. Typically with lobular cancer, I would recommend breast MRI prior to surgery: however, as she is not a candidate for lumpectomy, this is not necessary. Her  right breast has been imaged with diagnostic mammogram and was benign.     I advised that lymph node biopsy is recommended whenever we are dealing with invasive breast cancer and described the procedure for sentinel lymph node biopsy.  We talked about the risk for lymphedema which is small with removal of only a few nodes, but certainly not zero.  we discussed that the lymph nodes would be sent for frozen evaluation and if positive, I would remove additional nodes and lymphedema risk is higher in this situation. We discussed that I would leave a drain in place at the time of surgery.     We talked about post-lumpectomy radiation, the course and usual side effects. We discussed that with lumpectomy, radiation is typically recommended to decrease risk of recurrence. It may be necessary following mastectomy depending on final pathology and if marisela involvement is present.     We also talked about post-mastectomy reconstruction and the stages involved. She is not interested in reconstruction.     In addition, I have recommended genetic counseling. A referral was already placed by Dr. Rodriguez.  She would be a candidate in that situation based on family history and relative known to be BRCA 1 positive.  The natural history of BRCA mutations and breast cancer were discussed with the patient.  We also reviewed the risk reduction benefits of a prophylactic mastectomy in this situation. She is interested in bilateral mastectomies regardless of her genetic testing as she never wants to go through this again. We discussed the risk/benefit of prophylactic right mastectomy given her age and comorbidities and she would like to proceed.     We discussed the role of oncotype for hormone positive, HER 2 negative cancers with negative sentinel nodes or 1-3 positive nodes.     Plan:   Bilateral mastectomies with left sentinel lymph node biopsy, possible node dissection    Time spent with the patient with greater that 50% of the time in  discussion was 60 minutes.    Beata Garcia MD      Please route or send letter to:  Primary Care Provider (PCP) and Referring Provider

## 2020-12-10 NOTE — NURSING NOTE
Lisa Ferguson's goals for this visit include:   Chief Complaint   Patient presents with     Consult     breast cancer        She requests these members of her care team be copied on today's visit information: no    PCP: Ziyad Isaac    Referring Provider:  No referring provider defined for this encounter.    There were no vitals taken for this visit.    Do you need any medication refills at today's visit? No    Bela Abraham LPN

## 2020-12-11 ENCOUNTER — TELEPHONE (OUTPATIENT)
Dept: SURGERY | Facility: CLINIC | Age: 85
End: 2020-12-11

## 2020-12-11 DIAGNOSIS — Z17.0 MALIGNANT NEOPLASM OF UPPER-OUTER QUADRANT OF LEFT BREAST IN FEMALE, ESTROGEN RECEPTOR POSITIVE (H): Primary | ICD-10-CM

## 2020-12-11 DIAGNOSIS — C50.412 MALIGNANT NEOPLASM OF UPPER-OUTER QUADRANT OF LEFT BREAST IN FEMALE, ESTROGEN RECEPTOR POSITIVE (H): Primary | ICD-10-CM

## 2020-12-11 NOTE — TELEPHONE ENCOUNTER
Call placed to Lisa at request of Dr. Garcia. Introduced self and explain role as breast nurse navigator to Lisa.    The following information was mailed to [employer]: For Women Facing Breast Cancer, What You Need to Know about Mastectomy Surgery, Fighting Cancer through Diet and Lifestyle, and Cancer Risk Management.     At the end of the consultation, we reviewed plan of care and education. Lisa is interested in bilateral mastectomies with left sentinel lymph node biopsy.     Lisa has my contact information and knows to contact me in the future with any questions/concerns. I will follow up with Lisa once surgery has been scheduled.     Thu Chen RN, BSN, OCN  Oncology Care Coordinator  [facility]  [facility]   Phone: [phone]

## 2020-12-13 ENCOUNTER — TELEPHONE (OUTPATIENT)
Dept: SURGERY | Facility: PHYSICIAN GROUP | Age: 85
End: 2020-12-13

## 2020-12-13 DIAGNOSIS — Z11.59 ENCOUNTER FOR SCREENING FOR OTHER VIRAL DISEASES: Primary | ICD-10-CM

## 2020-12-13 NOTE — TELEPHONE ENCOUNTER
Type of surgery: Bilateral mastectomy with left sentinel lymph node biosy  Location of surgery: [facility]  Date and time of surgery: 1/6/21 at 1:30pm  Surgeon: Dr. Beata Garcia  Pre-Op Appt Date: Patient to schedule  Post-Op Appt Date: Patient to schedule   Packet sent out: Yes  Pre-cert/Authorization completed:  Not Applicable  Date: 12/13/20

## 2020-12-16 DIAGNOSIS — E03.9 ACQUIRED HYPOTHYROIDISM: ICD-10-CM

## 2020-12-16 DIAGNOSIS — M10.071 ACUTE IDIOPATHIC GOUT OF RIGHT FOOT: ICD-10-CM

## 2020-12-17 ENCOUNTER — VIRTUAL VISIT (OUTPATIENT)
Dept: ONCOLOGY | Facility: CLINIC | Age: 85
End: 2020-12-17
Attending: INTERNAL MEDICINE
Payer: MEDICARE

## 2020-12-17 DIAGNOSIS — Z80.0 FAMILY HISTORY OF MALIGNANT NEOPLASM OF STOMACH: ICD-10-CM

## 2020-12-17 DIAGNOSIS — Z84.81 FAMILY HISTORY OF CARRIER OF GENETIC DISEASE: ICD-10-CM

## 2020-12-17 DIAGNOSIS — C50.912 MALIGNANT NEOPLASM OF LEFT BREAST IN FEMALE, ESTROGEN RECEPTOR POSITIVE, UNSPECIFIED SITE OF BREAST (H): Primary | ICD-10-CM

## 2020-12-17 DIAGNOSIS — Z80.3 FAMILY HISTORY OF MALIGNANT NEOPLASM OF BREAST: ICD-10-CM

## 2020-12-17 DIAGNOSIS — Z17.0 MALIGNANT NEOPLASM OF LEFT BREAST IN FEMALE, ESTROGEN RECEPTOR POSITIVE, UNSPECIFIED SITE OF BREAST (H): Primary | ICD-10-CM

## 2020-12-17 PROCEDURE — 98968 PH1 ASSMT&MGMT NQHP 21-30: CPT | Performed by: GENETIC COUNSELOR, MS

## 2020-12-17 NOTE — PATIENT INSTRUCTIONS
Assessing Cancer Risk  Only about 5-10% of cancers are thought to be due to an inherited cancer susceptibility gene.    These families often have:    Several people with the same or related types of cancer    Cancers diagnosed at a young age (before age 50)    Individuals with more than one primary cancer    Multiple generations of the family affected with cancer    Some people may be candidates for genetic testing of more than one gene.  For these families, genetic testing using a cancer panel may be offered.  These panels will test different genes known to increase the risk for breast, ovarian, uterine, and/or other cancers. All of the genes discussed below have published clinical management guidelines for individuals who are found to carry a mutation. The purpose of this handout is to serve as a brief summary of the genes analyzed by the panels used to inquire about hereditary breast and gynecologic cancer:  FERNANDEZ, BRCA1, BRCA2, BRIP1, CDH1, CHEK2, MLH1, MSH2, MSH6, PMS2, EPCAM, PTEN, PALB2, RAD51C, RAD51D, and TP53.  ______________________________________________________________________________  Hereditary Breast and Ovarian Cancer Syndrome   (BRCA1 and BRCA2)  A single mutation in one of the copies of BRCA1 or BRCA2 increases the risk for breast and ovarian cancer, among others.  The risk for pancreatic cancer and melanoma may also be slightly increased in some families.  The chart below shows the chance that someone with a BRCA mutation would develop cancer in his or her lifetime1,2,3,4.        A person s ethnic background is also important to consider, as individuals of Ashkenazi Bahai ancestry have a higher chance of having a BRCA gene mutation.  There are three BRCA mutations that occur more frequently in this population.    Amaro Syndrome   (MLH1, MSH2, MSH6, PMS2, and EPCAM)  Currently five genes are known to cause Amaro Syndrome: MLH1, MSH2, MSH6, PMS2, and EPCAM.  A single mutation in one of the  Amaro Syndrome genes increases the risk for colon, endometrial, ovarian, and stomach cancers.  Other cancers that occur less commonly in Amaro Syndrome include urinary tract, skin, and brain cancers.  The chart below shows the chance that a person with Amaro syndrome would develop cancer in his or her lifetime5.      *Cancer risk varies depending on Amaro syndrome gene found    Cowden Syndrome   (PTEN)  Cowden syndrome is a hereditary condition that increases the risk for breast, thyroid, endometrial, colon, and kidney cancer.  Cowden syndrome is caused by a mutation in the PTEN gene.  A single mutation in one of the copies of PTEN causes Cowden syndrome and increases cancer risk.  The chart below shows the chance that someone with a PTEN mutation would develop cancer in their lifetime6,7.  Other benign features seen in some individuals with Cowden syndrome include benign skin lesions (facial papules, keratoses, lipomas), learning disability, autism, thyroid nodules, colon polyps, and larger head size.      *One recent study found breast cancer risk to be increased to 85%    Li-Fraumeni Syndrome   (TP53)  Li-Fraumeni Syndrome (LFS) is a cancer predisposition syndrome caused by a mutation in the TP53 gene. A single mutation in one of the copies of TP53 increases the risk for multiple cancers. Individuals with LFS are at an increased risk for developing cancer at a young age. The lifetime risk for development of a LFS-associated cancer is 50% by age 30 and 90% by age 60.   Core Cancers: Sarcomas, Breast, Brain, Lung, Leukemias/Lymphomas, Adrenocortical carcinomas  Other Cancers: Gastrointestinal, Thyroid, Skin, Genitourinary    Hereditary Diffuse Gastric Cancer   (CDH1)  Currently, one gene is known to cause hereditary diffuse gastric cancer (HDGC): CDH1.  Individuals with HDGC are at increased risk for diffuse gastric cancer and lobular breast cancer. Of people diagnosed with HDGC, 30-50% have a mutation in the CDH1  gene.  This suggests there are likely other genes that may cause HDGC that have not been identified yet.      Lifetime Cancer Risks    General Population HDGC    Diffuse Gastric  <1% ~80%   Breast 12% 39-52%         Additional Genes  FERNANDEZ  FERNANDEZ is a moderate-risk breast cancer gene. Women who have a mutation in FERNANDEZ can have between a 2-4 fold increased risk for breast cancer compared to the general population8. FERNANDEZ mutations have also been associated with increased risk for pancreatic cancer, however an estimate of this cancer risk is not well understood9. Individuals who inherit two FERNANDEZ mutations have a condition called ataxia-telangiectasia (AT).  This rare autosomal recessive condition affects the nervous system and immune system, and is associated with progressive cerebellar ataxia beginning in childhood.  Individuals with ataxia-telangiectasia often have a weakened immune system and have an increased risk for childhood cancers.    PALB2  Mutations in PALB2 have been shown to increase the risk of breast cancer up to 33-58% in some families; where individuals fall within this risk range is dependent upon family [id number]. PALB2 mutations have also been associated with increased risk for pancreatic cancer, although this risk has not been quantified yet.  Individuals who inherit two PALB2 mutations--one from their mother and one from their father--have a condition called Fanconi Anemia.  This rare autosomal recessive condition is associated with short stature, developmental delay, bone marrow failure, and increased risk for childhood cancers.    CHEK2   CHEK2 is a moderate-risk breast cancer gene.  Women who have a mutation in CHEK2 have around a 2-fold increased risk for breast cancer compared to the general population, and this risk may be higher depending upon family history.11,12,13 Mutations in CHEK2 have also been shown to increase the risk of a number of other cancers, including colon and prostate, however  these cancer risks are currently not well understood.    BRIP1, RAD51C and RAD51D  Mutations in BRIP1, RAD51C, and RAD51D have been shown to increase the risk of ovarian cancer and possibly female breast cancer as well14,15 .       Lifetime Cancer Risk    General Population BRIP1 RAD51C RAD51D   Ovarian 1-2% ~5-8% ~5-9% ~7-15%           Inheritance  All of the cancer syndromes reviewed above are inherited in an autosomal dominant pattern.  This means that if a parent has a mutation, each of his or her children will have a 50% chance of inheriting that same mutation.  Therefore, each child--male or female--would have a 50% chance of being at increased risk for developing cancer.      Image obtained from Genetics Home Reference, 2013     Mutations in some genes can occur de katie, which means that a person s mutation occurred for the first time in them and was not inherited from a parent.  Now that they have the mutation, however, it can be passed on to future generations.    Genetic Testing  Genetic testing involves a blood test and will look at the genetic information in the FERNANDEZ, BRCA1, BRCA2, BRIP1, CDH1, CHEK2, MLH1, MSH2, MSH6, PMS2, EPCAM, PTEN, PALB2, RAD51C, RAD51D, and TP53 genes for any harmful mutations that are associated with increased cancer risk.  If possible, it is recommended that the person(s) who has had cancer be tested before other family members.  That person will give us the most useful information about whether or not a specific gene is associated with the cancer in the family.    Results  There are three possible results of genetic testing:    Positive--a harmful mutation was identified in one or more of the genes    Negative--no mutation was identified in any of the genes on this panel    Variant of unknown significance--a variation in one of the genes was identified, but it is unclear how this impacts cancer risk in the family    Advantages and Disadvantages   There are advantages and  disadvantages to genetic testing.    Advantages    May clarify your cancer risk    Can help you make medical decisions    May explain the cancers in your family    May give useful information to your family members (if you share your results)    Disadvantages    Possible negative emotional impact of learning about inherited cancer risk    Uncertainty in interpreting a negative test result in some situations    Possible genetic discrimination concerns (see below)    Genetic Information Nondiscrimination Act (FRENCH)  FRENCH is a federal law that protects individuals from health insurance or employment discrimination based on a genetic test result alone.  Although rare, there are currently no legal discrimination protections in terms of life insurance, long term care, or disability insurances.  Visit the [employer] website to learn more.    Reducing Cancer Risk  All of the genes described above have nationally recognized cancer screening guidelines that would be recommended for individuals who test positive.  In addition to increased cancer screening, surgeries may be offered or recommended to reduce cancer risk.  Recommendations are based upon an individual s genetic test result as well as their personal and family history of cancer.    Questions to Think About Regarding Genetic Testing:    What effect will the test result have on me and my relationship with my family members if I have an inherited gene mutation?  If I don t have a gene mutation?    Should I share my test results, and how will my family react to this news, which may also affect them?    Are my children ready to learn new information that may one day affect their own health?    Hereditary Cancer Resources    FORCE: Facing Our Risk of Cancer Empowered facingourrisk.org   Bright Pink bebrightpink.org   Li-Fraumeni Syndrome Association lfsassociation.org   PTEN World PTENworld.com   No stomach for cancer, Inc.  nostomachforcancer.org   Stomach cancer relief network Scrnet.org   Collaborative Group of the Americas on Inherited Colorectal Cancer (CGA) cgaicc.com    Cancer Care cancercare.org   American Cancer Society (ACS) cancer.org   National Cancer [place] (NCI) cancer.gov     Please call us if you have any questions or concerns.   Cancer Risk Management Program [phone]-CANCER ([phone])  ? Hernando Nelson, MS, [facility] [phone]  ? Maribeth Fitzgerald, MS, [facility]  [phone]  ? Marni Ventura, MS, [facility]  [phone]  ? Lena Quinones, MS, [facility] [phone]  ? Mattie Danitza, MS, [facility] [phone]  ? Antoine Armstrong, MS, [facility]  [phone]  ? Regine Linares, MS, [facility]  [phone]    References  1. Thuy DONNELLY, Joellen PDP, Jurgen S, Loyda AMADOR, Law JE, Lidia JL, Sherrie N, Pedro H, Kaden O, Richard A, Edinini B, Radigordon P, Mananthonykizacarias S, Brooke DM, Prieto N, Jesi E, Juju H, Alex E, Jermaine J, Gronzuleima J, Antonio B, Kassidyus H, Thorlacius S, Eerola H, Nevzacariaslinna H, Zohra K, Nayely OP. Average risks of breast and ovarian cancer associated with BRCA1 or BRCA2 mutations detected in case series unselected for family history: a combined analysis of 222 studies. Am J Hum Jade. 2003;72:1117-30.  2. Mohan N, Caren M, Richardson G.  BRCA1 and BRCA2 Hereditary Breast and Ovarian Cancer. Gene Reviews online. 2013.  3. Meliton YC, Carson S, Geoffrey G, Gallegos S. Breast cancer risk among male BRCA1 and BRCA2 mutation carriers. J Natl Cancer Inst. 2007;99:1811-4.  4. Natalio WATKINS, Therese I, Dequan J, Fabian E, Luz Maria ER, Patricia F. Risk of breast cancer in male BRCA2 carriers. J Med Jade. 2010;47:710-1.  5. National Comprehensive Cancer Network. Clinical practice guidelines in oncology, colorectal cancer screening. Available online (registration required). 2015.  6. Cali WADSWORTH, Haylee J, Randi J, Charli LA, Marlon LATIF, Maddi C. Lifetime cancer risks in individuals with germline PTEN mutations. Clin Cancer Res. 2012;18:400-7.  7. Pilarski R. Cowden  Syndrome: A Critical Review of the Clinical Literature. J Jade . 2009:18:13-27.  8. Josue A, Augustin D, Mami S, Angelique P, Halley T, Jeannine M, Pablito B, Teddy H, Michael R, Sammy K, Karen L, Natalio DG, Brooke D, Rosalino DF, Quyen MR, The Breast Cancer Susceptibility Collaboration ([place]) & Andrade CARMONA. FERNANDEZ mutations that cause ataxia-telangiectasia are breast cancer susceptibility alleles. Nature Genetics. 2006;38:873-875  9. Iftikhar N , Leroy Y, Kristina J, Vidal L, Navjot GM , Gina ML, Gallinger S, Alcala AG, Syngal S, Lesvia ML, Kandace J , Socorro R, Yanelis SZ, Lul JR, Sebastián VE, Modesto M, Vobruna B, Joy N, Vilma RH, Mindy KW, and Violet AP. FERNANDEZ mutations in patients with hereditary pancreatic cancer. Cancer Discover. 2012;2:41-46  10. Thuy GONZALES, et al. Breast-Cancer Risk in Families with Mutations in PALB2. NEJM. 2014; 371(6):497-506.  11. CHEK2 Breast Cancer Case-Control Consortium. CHEK2*1100delC and susceptibility to breast cancer: A collaborative analysis involving 10,860 breast cancer cases and 9,065 controls from 10 studies. Am J Hum Jade, 74 (2004), pp. 9516-3810  12. Chris T, Fernando S, Josue K, et al. Spectrum of Mutations in BRCA1, BRCA2, CHEK2, and TP53 in Families at High Risk of Breast Cancer. EVER. 2006;295(12):6609-8121.   13. Terrell C, Deandra D, Marilyn A, et al. Risk of breast cancer in women with a CHEK2 mutation with and without a family history of breast cancer. J Clin Oncol. 2011;29:6070-9144.  14. Sarabjit H, Sherri E, Rebeca SJ, et al. Contribution of germline mutations in the RAD51B, RAD51C, and RAD51D genes to ovarian cancer in the population. J Clin Oncol. 2015;33(26):0947-0105. Doi:10.1200/JCO.2015.61.2408.  15. eCd T, Kellee SIDDIQUI, Avi P, et al. Mutations in BRIP1 confer high risk of ovarian cancer. Jessica Jade. 2011;43(11):7800-8699. doi:10.1038/ng.955.

## 2020-12-17 NOTE — LETTER
Assessing Cancer Risk  Only about 5-10% of cancers are thought to be due to an inherited cancer susceptibility gene.    These families often have:    Several people with the same or related types of cancer    Cancers diagnosed at a young age (before age 50)    Individuals with more than one primary cancer    Multiple generations of the family affected with cancer    Some people may be candidates for genetic testing of more than one gene.  For these families, genetic testing using a cancer panel may be offered.  These panels will test different genes known to increase the risk for breast, ovarian, uterine, and/or other cancers. All of the genes discussed below have published clinical management guidelines for individuals who are found to carry a mutation. The purpose of this handout is to serve as a brief summary of the genes analyzed by the panels used to inquire about hereditary breast and gynecologic cancer:  FERNANDEZ, BRCA1, BRCA2, BRIP1, CDH1, CHEK2, MLH1, MSH2, MSH6, PMS2, EPCAM, PTEN, PALB2, RAD51C, RAD51D, and TP53.  ______________________________________________________________________________  Hereditary Breast and Ovarian Cancer Syndrome   (BRCA1 and BRCA2)  A single mutation in one of the copies of BRCA1 or BRCA2 increases the risk for breast and ovarian cancer, among others.  The risk for pancreatic cancer and melanoma may also be slightly increased in some families.  The chart below shows the chance that someone with a BRCA mutation would develop cancer in his or her lifetime1,2,3,4.        A person s ethnic background is also important to consider, as individuals of Ashkenazi Christian ancestry have a higher chance of having a BRCA gene mutation.  There are three BRCA mutations that occur more frequently in this population.    Amaro Syndrome   (MLH1, MSH2, MSH6, PMS2, and EPCAM)  Currently five genes are known to cause Amaro Syndrome: MLH1, MSH2, MSH6, PMS2, and EPCAM.  A single mutation in one of the Amaro  Syndrome genes increases the risk for colon, endometrial, ovarian, and stomach cancers.  Other cancers that occur less commonly in Amaro Syndrome include urinary tract, skin, and brain cancers.  The chart below shows the chance that a person with Amaro syndrome would develop cancer in his or her lifetime5.      *Cancer risk varies depending on Amaro syndrome gene found    Cowden Syndrome   (PTEN)  Cowden syndrome is a hereditary condition that increases the risk for breast, thyroid, endometrial, colon, and kidney cancer.  Cowden syndrome is caused by a mutation in the PTEN gene.  A single mutation in one of the copies of PTEN causes Cowden syndrome and increases cancer risk.  The chart below shows the chance that someone with a PTEN mutation would develop cancer in their lifetime6,7.  Other benign features seen in some individuals with Cowden syndrome include benign skin lesions (facial papules, keratoses, lipomas), learning disability, autism, thyroid nodules, colon polyps, and larger head size.      *One recent study found breast cancer risk to be increased to 85%    Li-Fraumeni Syndrome   (TP53)  Li-Fraumeni Syndrome (LFS) is a cancer predisposition syndrome caused by a mutation in the TP53 gene. A single mutation in one of the copies of TP53 increases the risk for multiple cancers. Individuals with LFS are at an increased risk for developing cancer at a young age. The lifetime risk for development of a LFS-associated cancer is 50% by age 30 and 90% by age 60.   Core Cancers: Sarcomas, Breast, Brain, Lung, Leukemias/Lymphomas, Adrenocortical carcinomas  Other Cancers: Gastrointestinal, Thyroid, Skin, Genitourinary    Hereditary Diffuse Gastric Cancer   (CDH1)  Currently, one gene is known to cause hereditary diffuse gastric cancer (HDGC): CDH1.  Individuals with HDGC are at increased risk for diffuse gastric cancer and lobular breast cancer. Of people diagnosed with HDGC, 30-50% have a mutation in the CDH1 gene.   This suggests there are likely other genes that may cause HDGC that have not been identified yet.      Lifetime Cancer Risks    General Population HDGC    Diffuse Gastric  <1% ~80%   Breast 12% 39-52%           Additional Genes  FERNANDEZ  FERNANDEZ is a moderate-risk breast cancer gene. Women who have a mutation in FERNANDEZ can have between a 2-4 fold increased risk for breast cancer compared to the general population8. FERNANDEZ mutations have also been associated with increased risk for pancreatic cancer, however an estimate of this cancer risk is not well understood9. Individuals who inherit two FERNANDEZ mutations have a condition called ataxia-telangiectasia (AT).  This rare autosomal recessive condition affects the nervous system and immune system, and is associated with progressive cerebellar ataxia beginning in childhood.  Individuals with ataxia-telangiectasia often have a weakened immune system and have an increased risk for childhood cancers.    PALB2  Mutations in PALB2 have been shown to increase the risk of breast cancer up to 33-58% in some families; where individuals fall within this risk range is dependent upon family [id number]. PALB2 mutations have also been associated with increased risk for pancreatic cancer, although this risk has not been quantified yet.  Individuals who inherit two PALB2 mutations--one from their mother and one from their father--have a condition called Fanconi Anemia.  This rare autosomal recessive condition is associated with short stature, developmental delay, bone marrow failure, and increased risk for childhood cancers.    CHEK2   CHEK2 is a moderate-risk breast cancer gene.  Women who have a mutation in CHEK2 have around a 2-fold increased risk for breast cancer compared to the general population, and this risk may be higher depending upon family history.11,12,13 Mutations in CHEK2 have also been shown to increase the risk of a number of other cancers, including colon and prostate, however these  cancer risks are currently not well understood.    BRIP1, RAD51C and RAD51D  Mutations in BRIP1, RAD51C, and RAD51D have been shown to increase the risk of ovarian cancer and possibly female breast cancer as well14,15 .       Lifetime Cancer Risk    General Population BRIP1 RAD51C RAD51D   Ovarian 1-2% ~5-8% ~5-9% ~7-15%           Inheritance  All of the cancer syndromes reviewed above are inherited in an autosomal dominant pattern.  This means that if a parent has a mutation, each of his or her children will have a 50% chance of inheriting that same mutation.  Therefore, each child--male or female--would have a 50% chance of being at increased risk for developing cancer.      Image obtained from Genetics Home Reference, 2013     Mutations in some genes can occur de katie, which means that a person s mutation occurred for the first time in them and was not inherited from a parent.  Now that they have the mutation, however, it can be passed on to future generations.    Genetic Testing  Genetic testing involves a blood test and will look at the genetic information in the FERNANDEZ, BRCA1, BRCA2, BRIP1, CDH1, CHEK2, MLH1, MSH2, MSH6, PMS2, EPCAM, PTEN, PALB2, RAD51C, RAD51D, and TP53 genes for any harmful mutations that are associated with increased cancer risk.  If possible, it is recommended that the person(s) who has had cancer be tested before other family members.  That person will give us the most useful information about whether or not a specific gene is associated with the cancer in the family.    Results  There are three possible results of genetic testing:    Positive--a harmful mutation was identified in one or more of the genes    Negative--no mutation was identified in any of the genes on this panel    Variant of unknown significance--a variation in one of the genes was identified, but it is unclear how this impacts cancer risk in the family    Advantages and Disadvantages   There are advantages and disadvantages  to genetic testing.    Advantages    May clarify your cancer risk    Can help you make medical decisions    May explain the cancers in your family    May give useful information to your family members (if you share your results)    Disadvantages    Possible negative emotional impact of learning about inherited cancer risk    Uncertainty in interpreting a negative test result in some situations    Possible genetic discrimination concerns (see below)    Genetic Information Nondiscrimination Act (FRENCH)  FRENCH is a federal law that protects individuals from health insurance or employment discrimination based on a genetic test result alone.  Although rare, there are currently no legal discrimination protections in terms of life insurance, long term care, or disability insurances.  Visit the [employer] Wiley website to learn more.    Reducing Cancer Risk  All of the genes described above have nationally recognized cancer screening guidelines that would be recommended for individuals who test positive.  In addition to increased cancer screening, surgeries may be offered or recommended to reduce cancer risk.  Recommendations are based upon an individual s genetic test result as well as their personal and family history of cancer.    Questions to Think About Regarding Genetic Testing:    What effect will the test result have on me and my relationship with my family members if I have an inherited gene mutation?  If I don t have a gene mutation?    Should I share my test results, and how will my family react to this news, which may also affect them?    Are my children ready to learn new information that may one day affect their own health?    Hereditary Cancer Resources    FORCE: Facing Our Risk of Cancer Empowered facingourrisk.org   Bright Pink bebrightpink.org   Li-Fraumeni Syndrome Association lfsassociation.org   PTEN World PTENworld.[email or web address]   No stomach for cancer, Inc. nostomachforcancer.org   Stomach  cancer relief network Scrnet.org   Collaborative Group of the Americas on Inherited Colorectal Cancer (CGA) cgaicc.com    Cancer Care cancercare.org   American Cancer Society (ACS) cancer.org   National Cancer [place] (NCI) cancer.gov     Please call us if you have any questions or concerns.   Cancer Risk Management Program [phone]-[phone]-CANCER ([phone])  ? Hernando Nelson, MS, [facility] [phone]  ? Maribeth Fitzgerald, MS, [facility]  [phone]  ? Marni Ventura, MS, [facility]  [phone]  ? Lena Quinones, MS, [facility] [phone]  ? Mattie Danitza, MS, [facility] [phone]  ? Antoine Armstrong, MS, [facility]  [phone]  ? Regine Linares, MS, [facility]  [phone]    References  1. Thuy A, Joellen PDP, Jurgen S, Loyda AMADOR, Law JE, Lidia JL, Sherrie N, Pedro H, Kaden O, Richard A, Lul B, Radigordon P, Manmalachi S, Brooke DM, Prieto N, Jesi E, Juju H, Alex E, Jermaine J, Gronzuleima J, Antonio B, Kiara H, Thorlacius S, Eerola H, Nevmeghnana H, Zohra K, Nayely OP. Average risks of breast and ovarian cancer associated with BRCA1 or BRCA2 mutations detected in case series unselected for family history: a combined analysis of 222 studies. Am J Hum Jade. 2003;72:1117-30.  2. Mohan N, Caren M, Debra G.  BRCA1 and BRCA2 Hereditary Breast and Ovarian Cancer. Gene Reviews online. 2013.  3. Meliton YC, Carson S, Geoffrey G, Gallegos S. Breast cancer risk among male BRCA1 and BRCA2 mutation carriers. J Natl Cancer Inst. 2007;99:1811-4.  4. Natalio WATKINS, Therese I, Dequan J, Fabian E, Luz Maria ER, Patricia F. Risk of breast cancer in male BRCA2 carriers. J Med Jade. 2010;47:710-1.  5. National Comprehensive Cancer Network. Clinical practice guidelines in oncology, colorectal cancer screening. Available online (registration required). 2015.  6. Cali WADSWORTH, Haylee J, Randi J, Charli LA, Marlon LATIF, Eng C. Lifetime cancer risks in individuals with germline PTEN mutations. Clin Cancer Res. 2012;18:400-7.  7. Laina BRADSHAW. Cowden Syndrome: A Critical Review of  the Clinical Literature. J Jade . 2009:18:13-27.  8. Josue DONNELLY, Augustin D, Mami S, Angelique P, Halley T, Jeannine M, Pablito B, Teddy H, Michael R, Sammy K, Karen L, Natalio DG, Brooke D, Rosalino DF, Quyen MR, The Breast Cancer Susceptibility Collaboration () & Andrade CARMONA. FERNANDEZ mutations that cause ataxia-telangiectasia are breast cancer susceptibility alleles. Nature Genetics. 2006;38:873-875  9. Iftikhar N , Leroy Y, Kristina J, Vidal L, Navjot GM , Gina ML, Gallinger S, Alcala AG, Syngal S, Lesvia ML, Kandace J , Socorro R, Yanelis SZ, Lul JR, Sebastián VE, Modesto M, Vobruna B, Joy N, Vilma RH, Mindy KW, and Violet AP. FERNANDEZ mutations in patients with hereditary pancreatic cancer. Cancer Discover. 2012;2:41-46  10. Thuy GONZALES, et al. Breast-Cancer Risk in Families with Mutations in PALB2. NEJM. 2014; 371(6):497-506.  11. CHEK2 Breast Cancer Case-Control Consortium. CHEK2*1100delC and susceptibility to breast cancer: A collaborative analysis involving 10,860 breast cancer cases and 9,065 controls from 10 studies. Am J Hum Jade, 74 (2004), pp. 9746-1503  12. Chris T, Fernando S, Josue K, et al. Spectrum of Mutations in BRCA1, BRCA2, CHEK2, and TP53 in Families at High Risk of Breast Cancer. EVER. 2006;295(12):7657-3363.   13. Terrell C, Deandra D, Marilyn A, et al. Risk of breast cancer in women with a CHEK2 mutation with and without a family history of breast cancer. J Clin Oncol. 2011;29:3722-8805.  14. Sarabjit H, Sherri E, Rebeca SJ, et al. Contribution of germline mutations in the RAD51B, RAD51C, and RAD51D genes to ovarian cancer in the population. J Clin Oncol. 2015;33(26):5368-1854. Doi:10.1200/JCO.2015.61.2408.  15. Ced T, Kellee SIDDIQUI, Avi P, et al. Mutations in BRIP1 confer high risk of ovarian cancer. Jessica Jade. 2011;43(11):2448-2164. doi:10.1038/ng.955.

## 2020-12-17 NOTE — LETTER
"    Cancer Risk Management  Program [facility]  [facility]  Mailing Address  Cancer Risk Management Program  [facility]  [address] [facility] [address]  [place] [address]    New patient appointments  [phone]  December 17, 2020    Lisa Ferguson  [address] [facility]  [place] [address]    Dear Lisa,    It was a pleasure speaking with you on the phone on 12/17/2020. Here is a copy of the progress note from our discussion. If you have any additional questions, please feel free to call.    Lisa Ferguson is a 85 year old female who is being evaluated via a billable telephone visit. This was initially scheduled as a video visit but was switched to a telephone appointment given issues with audio.    The patient has been notified of following:     \"This telephone visit will be conducted via a call between you and your physician/provider. We have found that certain health care needs can be provided without the need for a physical exam.  This service lets us provide the care you need with a short phone conversation.  If a prescription is necessary we can send it directly to your pharmacy.  If lab work is needed we can place an order for that and you can then stop by our lab to have the test done at a later time.    Telephone visits are billed at different rates depending on your insurance coverage. During this emergency period, for some insurers they may be billed the same as an in-person visit.  Please reach out to your insurance provider with any questions.    If during the course of the call the physician/provider feels a telephone visit is not appropriate, you will not be charged for this service.\"    Patient has given verbal consent for Telephone visit?  Yes    What phone number would you like to be contacted at? " [phone]    How would you like to obtain your AVS? [facility]    Referring Provider: Mimi Rodriguez MD    Presenting Information:   Given concerns regarding the potential for COVID-19 exposure during a clinic visit, Lisa elected for a telephone genetic counseling visit through the Cancer Risk Management Program to discuss her personal history of breast cancer and family history of breast and stomach cancer. Today we reviewed this history, cancer screening recommendations, and available genetic testing options.    Personal History:  Lisa is a 85 year old year old female. 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 The cancer cells were noted to be ER and WV positive and HER2 negative. Treatment will include a bilateral mastectomy on 21.    She had her first menstrual period at age 15, her first child at age 28, and she completed menopause in her 50's. 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 She reports that she has used hormone replacement therapy for 8 years to help with menopausal symptoms.      She has regular clinical breast exams and mammograms; her most recent mammogram in 2020 found a spiculated mass and a irregular mass in her left breast that were identified to be ILC and IDC respectively. Lisa's most recent colonoscopy in 2010 found diverticulosis and follow-up was recommended in 10 years. She does not regularly do any other cancer screening at this time.     Family History: (Please see scanned pedigree for detailed family history information)    Lisa's sister was diagnosed with breast cancer in her early 50's and  at age 62. She is reported to either have a BRCA1 or BRCA2 mutation. Lisa does not have access to these records.    A maternal cousin  from breast cancer at age 35.    A paternal aunt  from stomach cancer at an unknown age.    A  paternal uncle  from throat cancer at an unknown age.    Her maternal and paternal ethnicity is Kyr[place]z. There is no known Ashkenazi Mormon ancestry on either side of her family. There is no reported consanguinity.    Discussion:    Lisa's personal and family history of breast and stomach cancer is suggestive of a hereditary cancer syndrome.    We reviewed the features of sporadic, familial, and hereditary cancers. We discussed that mutations in either BRCA1 or BRCA2 could be possible hereditary explanations for her family history of breast cancer. Mutations in the BRCA1 or BRCA2 gene are known to cause Hereditary Breast and Ovarian Cancer Syndrome (HBOC). HBOC typically presents with multiple family members diagnosed with breast cancer before age 50 and/or ovarian cancer. Other cancer risks associated with HBOC include male breast cancer, prostate cancer, pancreatic cancer, and melanoma.     We discussed her sister's genetic test results. Lisa reports that she was tested a long time ago and that she has no way of access any of her records. Given that, Lisa could consider more comprehensive genetic testing to better understand her risk for hereditary cancer.    We discussed the natural history and genetics of hereditary cancer. A detailed handout regarding hereditary cancer, along with the other information we discussed, will be mailed to Lisa at the end of our appointment today and can be found in the after visit summary. Topics included: inheritance pattern, cancer risks, cancer screening recommendations, and also risks, benefits and limitations of testing.    Based on her personal and family history, Lisa meets current National Comprehensive Cancer Network (NCCN) criteria for genetic testing of BRCA1/2 along with other high-penetrance breast and/or ovarian cancer susceptibility genes.    We discussed that there are additional genes that could cause increased risk for breast and/or stomach cancer. As  many of these genes present with overlapping features in a family and accurate cancer risk cannot always be established based upon the pedigree analysis alone, it would be reasonable for Lisa to consider panel genetic testing to analyze multiple genes at once.    The [facility] high/moderate risk breast and gynecologic cancer panel includes the following 19 genes: FERNANDEZ, BRCA1, BRCA2, BRIP1, CDH1, CHEK2, EPCAM, MLH1, MSH2, MSH6, NBN, NF1, PALB2, PMS2, PTEN, RAD51C, RAD51D, STK11, and TP53. This custom panel is a combination of the BRCANext panel + STK11 gene.    Some of the genes on this custom panel are associated with specific hereditary cancer syndromes and have published management guidelines: Hereditary Breast and Ovarian Cancer syndrome (BRCA1, BRCA2), Amaro syndrome (EPCAM, MLH1, MSH2, MSH6, PMS2), Hereditary Diffuse Gastric Cancer (CDH1), Cowden Syndrome (PTEN), Peutz-Jeghers syndrome, Neurofibromatosis type 1 (NF1), and Li Fraumeni syndrome (TP53).       Risk-reducing salpingo-oophorectomy can be considered in women with mutations in BRIP1, RAD51C, or RAD51D. FERNANDEZ, CHEK2, NBN, and PALB2 are associated with breast cancer risk and have published screening guidelines.    Lisa would like to submit a blood sample for her genetic testing and would like this performed at the [facility]. I will message the scheduling team there to call the patient and get a blood draw appointment scheduled.    Verbal consent was given over the phone and written on the consent form due to COVID-19 restrictions. Turnaround time is 4-5 weeks once the lab receives the sample.    Medical Management: For Lisa, we reviewed that the information from genetic testing may determine:    surgery to treat Lisa's active cancer diagnosis (i.e. lumpectomy versus bilateral mastectomy),    additional cancer screening for which Lisa may qualify (i.e. mammogram and breast MRI, more frequent colonoscopies, more frequent  dermatologic exams, etc.),    options for risk reducing surgeries Lisa could consider (i.e. bilateral mastectomy),      and targeted chemotherapies for Lisa's active cancer, or if she were to develop certain cancers in the future (i.e. immunotherapy for individuals with Amaro syndrome, PARP inhibitors, etc.).     These recommendations and possible targeted chemotherapies will be discussed in detail once genetic testing is completed.     Plan:  1) Today Lisa elected to proceed with [facility].  2) A copy of the consent form and the after visit summary will be mailed to Lisa.  3) This information should be available in 4-5 weeks, once the lab receives the saliva sample.  4) I will call Lisa with the results once they become available.    Time spent on the phone: 40 minutes    Hernando Nelson MS, [facility]  Licensed, Certified Genetic Counselor  (P): [phone]  (F): [phone]

## 2020-12-17 NOTE — PROGRESS NOTES
"12/17/2020    Lisa Ferguson is a 85 year old female who is being evaluated via a billable telephone visit. This was initially scheduled as a video visit but was switched to a telephone appointment given issues with audio.    The patient has been notified of following:     \"This telephone visit will be conducted via a call between you and your physician/provider. We have found that certain health care needs can be provided without the need for a physical exam.  This service lets us provide the care you need with a short phone conversation.  If a prescription is necessary we can send it directly to your pharmacy.  If lab work is needed we can place an order for that and you can then stop by our lab to have the test done at a later time.    Telephone visits are billed at different rates depending on your insurance coverage. During this emergency period, for some insurers they may be billed the same as an in-person visit.  Please reach out to your insurance provider with any questions.    If during the course of the call the physician/provider feels a telephone visit is not appropriate, you will not be charged for this service.\"    Patient has given verbal consent for Telephone visit?  Yes    What phone number would you like to be contacted at? [phone]    How would you like to obtain your AVS? MyChart    Referring Provider: Mimi Rodriguez MD    Presenting Information:   Given concerns regarding the potential for COVID-19 exposure during a clinic visit, Lisa elected for a telephone genetic counseling visit through the [facility] to discuss her personal history of breast cancer and family history of breast and stomach cancer. Today we reviewed this history, cancer screening recommendations, and available genetic testing options.    Personal History:  Lisa is a 85 year old year old female. 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 The cancer cells were noted to be ER and AZ positive and HER2 negative. Treatment will include a bilateral mastectomy on 21.    She had her first menstrual period at age 15, her first child at age 28, and she completed menopause in her 50's. 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 She reports that she has used hormone replacement therapy for 8 years to help with menopausal symptoms.      She has regular clinical breast exams and mammograms; her most recent mammogram in 2020 found a spiculated mass and a irregular mass in her left breast that were identified to be ILC and IDC respectively. Lisa's most recent colonoscopy in 2010 found diverticulosis and follow-up was recommended in 10 years. She does not regularly do any other cancer screening at this time.     Family History: (Please see scanned pedigree for detailed family history information)    Lisa's sister was diagnosed with breast cancer in her early 50's and  at age 62. She is reported to either have a BRCA1 or BRCA2 mutation. Lisa does not have access to these records.    A maternal cousin  from breast cancer at age 35.    A paternal aunt  from stomach cancer at an unknown age.    A paternal uncle  from throat cancer at an unknown age.    Her maternal and paternal ethnicity is [place]. There is no known Ashkenazi Caodaism ancestry on either side of her family. There is no reported consanguinity.    Discussion:    Lisa's personal and family history of breast and stomach cancer is suggestive of a hereditary cancer syndrome.    We reviewed the features of sporadic, familial, and hereditary cancers. We discussed that mutations in either BRCA1 or BRCA2 could be possible hereditary explanations for her family history of breast cancer. Mutations in the BRCA1 or BRCA2 gene are known to cause Hereditary Breast and Ovarian Cancer Syndrome (HBOC). HBOC typically  presents with multiple family members diagnosed with breast cancer before age 50 and/or ovarian cancer. Other cancer risks associated with HBOC include male breast cancer, prostate cancer, pancreatic cancer, and melanoma.     We discussed her sister's genetic test results. Lisa reports that she was tested a long time ago and that she has no way of access any of her records. Given that, Lisa could consider more comprehensive genetic testing to better understand her risk for hereditary cancer.    We discussed the natural history and genetics of hereditary cancer. A detailed handout regarding hereditary cancer, along with the other information we discussed, will be mailed to Lisa at the end of our appointment today and can be found in the after visit summary. Topics included: inheritance pattern, cancer risks, cancer screening recommendations, and also risks, benefits and limitations of testing.    Based on her personal and family history, Lisa meets current National Comprehensive Cancer Network (NCCN) criteria for genetic testing of BRCA1/2 along with other high-penetrance breast and/or ovarian cancer susceptibility genes.    We discussed that there are additional genes that could cause increased risk for breast and/or stomach cancer. As many of these genes present with overlapping features in a family and accurate cancer risk cannot always be established based upon the pedigree analysis alone, it would be reasonable for Lisa to consider panel genetic testing to analyze multiple genes at once.    The [facility] high/moderate risk breast and gynecologic cancer panel includes the following 19 genes: FERNANDEZ, BRCA1, BRCA2, BRIP1, CDH1, CHEK2, EPCAM, MLH1, MSH2, MSH6, NBN, NF1, PALB2, PMS2, PTEN, RAD51C, RAD51D, STK11, and TP53. This custom panel is a combination of the BRCANext panel + STK11 gene.    Some of the genes on this custom panel are associated with specific hereditary cancer syndromes and have  published management guidelines: Hereditary Breast and Ovarian Cancer syndrome (BRCA1, BRCA2), Amaro syndrome (EPCAM, MLH1, MSH2, MSH6, PMS2), Hereditary Diffuse Gastric Cancer (CDH1), Cowden Syndrome (PTEN), Peutz-Jeghers syndrome, Neurofibromatosis type 1 (NF1), and Li Fraumeni syndrome (TP53).       Risk-reducing salpingo-oophorectomy can be considered in women with mutations in BRIP1, RAD51C, or RAD51D. FERNANDEZ, CHEK2, NBN, and PALB2 are associated with breast cancer risk and have published screening guidelines.    Lisa would like to submit a blood sample for her genetic testing and would like this performed at the [facility]. I will message the scheduling team there to call the patient and get a blood draw appointment scheduled.    Verbal consent was given over the phone and written on the consent form due to COVID-19 restrictions. Turnaround time is 4-5 weeks once the lab receives the sample.    Medical Management: For Lisa, we reviewed that the information from genetic testing may determine:    surgery to treat Lisa's active cancer diagnosis (i.e. lumpectomy versus bilateral mastectomy),    additional cancer screening for which Lisa may qualify (i.e. mammogram and breast MRI, more frequent colonoscopies, more frequent dermatologic exams, etc.),    options for risk reducing surgeries Lisa could consider (i.e. bilateral mastectomy),      and targeted chemotherapies for Lisa's active cancer, or if she were to develop certain cancers in the future (i.e. immunotherapy for individuals with Amaro syndrome, PARP inhibitors, etc.).     These recommendations and possible targeted chemotherapies will be discussed in detail once genetic testing is completed.     Plan:  1) Today Lisa elected to proceed with [facility].  2) A copy of the consent form and the after visit summary will be mailed to Lisa.  3) This information should be available in 4-5 weeks, once the lab receives the saliva  sample.  4) I will call Lisa with the results once they become available.    Time spent on the phone: 40 minutes    Hernando Nelson MS, [facility] [place]  Licensed, Certified Genetic Counselor  (P): [phone]  (F): [phone]

## 2020-12-18 DIAGNOSIS — R73.09 ABNORMAL GLUCOSE: ICD-10-CM

## 2020-12-18 DIAGNOSIS — M10.071 ACUTE IDIOPATHIC GOUT OF RIGHT FOOT: ICD-10-CM

## 2020-12-18 DIAGNOSIS — I10 ESSENTIAL HYPERTENSION WITH GOAL BLOOD PRESSURE LESS THAN 130/80: ICD-10-CM

## 2020-12-18 DIAGNOSIS — E03.9 ACQUIRED HYPOTHYROIDISM: ICD-10-CM

## 2020-12-18 DIAGNOSIS — E78.5 HYPERLIPIDEMIA WITH TARGET LDL LESS THAN 70: Primary | ICD-10-CM

## 2020-12-18 RX ORDER — LEVOTHYROXINE SODIUM 125 UG/1
125 TABLET ORAL DAILY
Qty: 30 TABLET | Refills: 0 | Status: SHIPPED | OUTPATIENT
Start: 2020-12-18 | End: 2020-12-23

## 2020-12-18 RX ORDER — ALLOPURINOL 100 MG/1
100 TABLET ORAL DAILY
Qty: 30 TABLET | Refills: 0 | Status: SHIPPED | OUTPATIENT
Start: 2020-12-18 | End: 2020-12-23

## 2020-12-18 NOTE — TELEPHONE ENCOUNTER
"Requested Prescriptions   Pending Prescriptions Disp Refills    levothyroxine (SYNTHROID/LEVOTHROID) 125 MCG tablet 90 tablet 0     Sig: Take 1 tablet (125 mcg) by mouth daily       Thyroid Protocol Failed - 12/16/2020  6:07 PM        Failed - Normal TSH on file in past 12 months     Recent Labs   Lab Test 10/28/19  1135   TSH 1.75              Passed - Patient is 12 years or older        Passed - Recent (12 mo) or future (30 days) visit within the authorizing provider's specialty     Patient has had an office visit with the authorizing provider or a provider within the authorizing providers department within the previous 12 mos or has a future within next 30 days. See \"Patient Info\" tab in inbasket, or \"Choose Columns\" in Meds & Orders section of the refill encounter.              Passed - Medication is active on med list        Passed - No active pregnancy on record     If patient is pregnant or has had a positive pregnancy test, please check TSH.          Passed - No positive pregnancy test in past 12 months     If patient is pregnant or has had a positive pregnancy test, please check TSH.            allopurinol (ZYLOPRIM) 100 MG tablet 90 tablet 0     Sig: Take 1 tablet (100 mg) by mouth daily Taken with 300 milligram tab to = total daily dose of 400 milligrams       Gout Agents Protocol Failed - 12/16/2020  6:07 PM        Failed - CBC on file in past 12 months     Recent Labs   Lab Test 10/28/19  1135   WBC 8.9   RBC 4.46   HGB 13.1   HCT 40.5                    Failed - ALT on file in past 12 months     Recent Labs   Lab Test 10/28/19  1135   ALT 35             Failed - Has Uric Acid on file in past 12 months and value is less than 6     Recent Labs   Lab Test 10/04/19  0952   URIC 5.4     If level is 6mg/dL or greater, ok to refill one time and refer to provider.           Failed - Normal serum creatinine on file in the past 12 months     Recent Labs   Lab Test 10/25/19  0947   CR 0.67       Ok to " "refill medication if creatinine is low          Passed - Recent (12 mo) or future (30 days) visit within the authorizing provider's specialty     Patient has had an office visit with the authorizing provider or a provider within the authorizing providers department within the previous 12 mos or has a future within next 30 days. See \"Patient Info\" tab in inbasket, or \"Choose Columns\" in Meds & Orders section of the refill encounter.              Passed - Medication is active on med list        Passed - Patient is age 18 or older        Passed - No active pregnancy on record        Passed - No positive pregnancy test in past year           Routing refill request to provider for review/approval because:  Failed protocol.  Ashley SHINN-RN  [facility]    "

## 2020-12-22 DIAGNOSIS — Z84.81 FAMILY HISTORY OF CARRIER OF GENETIC DISEASE: ICD-10-CM

## 2020-12-22 DIAGNOSIS — C50.912 MALIGNANT NEOPLASM OF LEFT BREAST IN FEMALE, ESTROGEN RECEPTOR POSITIVE, UNSPECIFIED SITE OF BREAST (H): ICD-10-CM

## 2020-12-22 DIAGNOSIS — Z17.0 MALIGNANT NEOPLASM OF LEFT BREAST IN FEMALE, ESTROGEN RECEPTOR POSITIVE, UNSPECIFIED SITE OF BREAST (H): ICD-10-CM

## 2020-12-22 DIAGNOSIS — Z80.3 FAMILY HISTORY OF MALIGNANT NEOPLASM OF BREAST: ICD-10-CM

## 2020-12-22 DIAGNOSIS — Z80.0 FAMILY HISTORY OF MALIGNANT NEOPLASM OF STOMACH: ICD-10-CM

## 2020-12-22 LAB — MISCELLANEOUS TEST: NORMAL

## 2020-12-23 RX ORDER — ALLOPURINOL 100 MG/1
TABLET ORAL
Qty: 90 TABLET | Refills: 0 | Status: SHIPPED | OUTPATIENT
Start: 2020-12-23 | End: 2021-03-25

## 2020-12-23 RX ORDER — LEVOTHYROXINE SODIUM 125 UG/1
TABLET ORAL
Qty: 90 TABLET | Refills: 0 | Status: SHIPPED | OUTPATIENT
Start: 2020-12-23 | End: 2020-12-30

## 2020-12-29 ENCOUNTER — OFFICE VISIT (OUTPATIENT)
Dept: INTERNAL MEDICINE | Facility: CLINIC | Age: 85
End: 2020-12-29
Payer: MEDICARE

## 2020-12-29 VITALS
DIASTOLIC BLOOD PRESSURE: 62 MMHG | RESPIRATION RATE: 16 BRPM | HEART RATE: 77 BPM | OXYGEN SATURATION: 94 % | TEMPERATURE: 97.8 F | BODY MASS INDEX: 40.35 KG/M2 | SYSTOLIC BLOOD PRESSURE: 133 MMHG | WEIGHT: 250 LBS

## 2020-12-29 DIAGNOSIS — R25.1 TREMOR: ICD-10-CM

## 2020-12-29 DIAGNOSIS — E78.5 HYPERLIPIDEMIA WITH TARGET LDL LESS THAN 70: ICD-10-CM

## 2020-12-29 DIAGNOSIS — R73.09 ABNORMAL GLUCOSE: ICD-10-CM

## 2020-12-29 DIAGNOSIS — Z87.39 HISTORY OF GOUT: ICD-10-CM

## 2020-12-29 DIAGNOSIS — Z79.2 PREVENTIVE ANTIBIOTIC: ICD-10-CM

## 2020-12-29 DIAGNOSIS — E03.9 ACQUIRED HYPOTHYROIDISM: ICD-10-CM

## 2020-12-29 DIAGNOSIS — F41.9 ANXIETY: ICD-10-CM

## 2020-12-29 DIAGNOSIS — Z01.818 PREOP GENERAL PHYSICAL EXAM: Primary | ICD-10-CM

## 2020-12-29 DIAGNOSIS — I10 ESSENTIAL HYPERTENSION WITH GOAL BLOOD PRESSURE LESS THAN 130/80: ICD-10-CM

## 2020-12-29 LAB
ANION GAP SERPL CALCULATED.3IONS-SCNC: 5 MMOL/L (ref 3–14)
BASOPHILS # BLD AUTO: 0.1 10E9/L (ref 0–0.2)
BASOPHILS NFR BLD AUTO: 0.6 %
BUN SERPL-MCNC: 20 MG/DL (ref 7–30)
CALCIUM SERPL-MCNC: 9.9 MG/DL (ref 8.5–10.1)
CHLORIDE SERPL-SCNC: 102 MMOL/L (ref 94–109)
CHOLEST SERPL-MCNC: 296 MG/DL
CO2 SERPL-SCNC: 29 MMOL/L (ref 20–32)
CREAT SERPL-MCNC: 0.8 MG/DL (ref 0.52–1.04)
DIFFERENTIAL METHOD BLD: NORMAL
EOSINOPHIL # BLD AUTO: 0.5 10E9/L (ref 0–0.7)
EOSINOPHIL NFR BLD AUTO: 4.3 %
ERYTHROCYTE [DISTWIDTH] IN BLOOD BY AUTOMATED COUNT: 15 % (ref 10–15)
GFR SERPL CREATININE-BSD FRML MDRD: 68 ML/MIN/{1.73_M2}
GLUCOSE SERPL-MCNC: 97 MG/DL (ref 70–99)
HBA1C MFR BLD: 5.7 % (ref 0–5.6)
HCT VFR BLD AUTO: 40.5 % (ref 35–47)
HDLC SERPL-MCNC: 51 MG/DL
HGB BLD-MCNC: 12.9 G/DL (ref 11.7–15.7)
LDLC SERPL CALC-MCNC: 200 MG/DL
LYMPHOCYTES # BLD AUTO: 3 10E9/L (ref 0.8–5.3)
LYMPHOCYTES NFR BLD AUTO: 27.7 %
MCH RBC QN AUTO: 28.9 PG (ref 26.5–33)
MCHC RBC AUTO-ENTMCNC: 31.9 G/DL (ref 31.5–36.5)
MCV RBC AUTO: 91 FL (ref 78–100)
MONOCYTES # BLD AUTO: 1 10E9/L (ref 0–1.3)
MONOCYTES NFR BLD AUTO: 9.3 %
NEUTROPHILS # BLD AUTO: 6.3 10E9/L (ref 1.6–8.3)
NEUTROPHILS NFR BLD AUTO: 58.1 %
NONHDLC SERPL-MCNC: 245 MG/DL
PLATELET # BLD AUTO: 337 10E9/L (ref 150–450)
POTASSIUM SERPL-SCNC: 4.2 MMOL/L (ref 3.4–5.3)
RBC # BLD AUTO: 4.46 10E12/L (ref 3.8–5.2)
SODIUM SERPL-SCNC: 136 MMOL/L (ref 133–144)
T4 FREE SERPL-MCNC: 0.59 NG/DL (ref 0.76–1.46)
TRIGL SERPL-MCNC: 227 MG/DL
TSH SERPL DL<=0.005 MIU/L-ACNC: 23.61 MU/L (ref 0.4–4)
URATE SERPL-MCNC: 4.3 MG/DL (ref 2.6–6)
WBC # BLD AUTO: 10.8 10E9/L (ref 4–11)

## 2020-12-29 PROCEDURE — 85025 COMPLETE CBC W/AUTO DIFF WBC: CPT | Performed by: INTERNAL MEDICINE

## 2020-12-29 PROCEDURE — 80048 BASIC METABOLIC PNL TOTAL CA: CPT | Performed by: INTERNAL MEDICINE

## 2020-12-29 PROCEDURE — 84443 ASSAY THYROID STIM HORMONE: CPT | Performed by: INTERNAL MEDICINE

## 2020-12-29 PROCEDURE — 84550 ASSAY OF BLOOD/URIC ACID: CPT | Performed by: INTERNAL MEDICINE

## 2020-12-29 PROCEDURE — 83036 HEMOGLOBIN GLYCOSYLATED A1C: CPT | Performed by: INTERNAL MEDICINE

## 2020-12-29 PROCEDURE — 84439 ASSAY OF FREE THYROXINE: CPT | Performed by: INTERNAL MEDICINE

## 2020-12-29 PROCEDURE — 36415 COLL VENOUS BLD VENIPUNCTURE: CPT | Performed by: INTERNAL MEDICINE

## 2020-12-29 PROCEDURE — 80061 LIPID PANEL: CPT | Performed by: INTERNAL MEDICINE

## 2020-12-29 PROCEDURE — 93000 ELECTROCARDIOGRAM COMPLETE: CPT | Performed by: INTERNAL MEDICINE

## 2020-12-29 PROCEDURE — 99215 OFFICE O/P EST HI 40 MIN: CPT | Performed by: INTERNAL MEDICINE

## 2020-12-29 RX ORDER — METOPROLOL TARTRATE 25 MG/1
TABLET, FILM COATED ORAL
Qty: 90 TABLET | Refills: 0
Start: 2020-12-29 | End: 2021-01-05

## 2020-12-29 RX ORDER — TORSEMIDE 10 MG/1
30 TABLET ORAL DAILY
Qty: 90 TABLET | Refills: 0
Start: 2020-12-29 | End: 2021-01-05

## 2020-12-29 RX ORDER — LOSARTAN POTASSIUM 50 MG/1
50 TABLET ORAL DAILY
Qty: 30 TABLET | Refills: 0
Start: 2020-12-29 | End: 2021-01-05

## 2020-12-29 RX ORDER — AMOXICILLIN 500 MG/1
CAPSULE ORAL
Qty: 4 CAPSULE | Refills: 3 | Status: SHIPPED | OUTPATIENT
Start: 2020-12-29 | End: 2022-07-06

## 2020-12-29 RX ORDER — LORAZEPAM 0.5 MG/1
0.5 TABLET ORAL EVERY 6 HOURS PRN
Qty: 10 TABLET | Refills: 1 | Status: SHIPPED | OUTPATIENT
Start: 2020-12-29 | End: 2022-10-04

## 2020-12-29 NOTE — PROGRESS NOTES
[facility]  [address]  [facility] [address]  [phone]  Dept: [phone]    PRE-OP EVALUATION:  Today's date: 2020    Lisa Ferguson (: 1935) presents for pre-operative evaluation assessment as requested by Dr. Garcia.  She requires evaluation and anesthesia risk assessment prior to undergoing surgery/procedure for treatment of breast cancer .    Fax number for surgical facility: [facility]   Primary Physician: Ziyad Isaac  Type of Anesthesia Anticipated: General    Preop Questionnnaire:  Pre-op Questionnaire 2020   1. Have you ever had a heart attack or stroke? No   2. Have you ever had surgery on your heart or blood vessels, such as a stent placement, a coronary artery bypass, or surgery on an artery in your head, neck, heart, or legs? YES - history of 3 vessel coronary artery bypass surgery , prior to CABG had some intraluminal coronary stent .    3. Do you have chest pain with activity? YES - has chronic stable angina pectoralis, not even using nitroglycerin these days. Her anginal symptoms typically only come on with unusual level of exertion and resolve just with rest within 5 minutes    4. Do you have a history of  heart failure? YES - echocardiogram within normal limits as of 2020. Her occasional volume overloaded status is managed adequately just with outpatient diuretic therapy.    5. Do you currently have a cold, bronchitis or symptoms of other infection? No   6. Do you have a cough, shortness of breath, or wheezing? YES - she has chronic shortness of breath , worsened with physical activity . This is chronic and stable. No symptoms now and not even using any gastroesophageal reflux disease medications   7. Do you or anyone in your family have previous history of blood clots? No   8. Do you or does anyone in your family have a serious bleeding problem such as prolonged bleeding following surgeries or cuts? No   9.  Have you ever had problems with anemia or been told to take iron pills? YES - remote history of duodenal ulcer    10. Have you had any abnormal blood loss such as black, tarry or bloody stools, or abnormal vaginal bleeding? YES - remote history . Most recent previous hemoglobin was 13.5   11. Have you ever had a blood transfusion? No   12. Are you willing to have a blood transfusion if it is medically needed before, during, or after your surgery? Yes   13. Have you or any of your relatives ever had problems with anesthesia? UNKNOWN -    14. Do you have sleep apnea, excessive snoring or daytime drowsiness? YES - yes and patient uses CPAP [ continuous positive airway pressure] religiously and will bring CPAP [ continuous positive airway pressure] machine,   14a. Do you have a CPAP machine? Yes   15. Do you have any artifical heart valves or other implanted medical devices like a pacemaker, defibrillator, or continuous glucose monitor? No   16. Do you have artificial joints? YES - bilateral total knee replacements   17. Are you allergic to latex? No         HPI:     HPI related to upcoming procedure: new breast cancer detected by patient self-exam with follow up mammogram       See problem list for active medical problems.  Problems all longstanding and stable, except as noted/documented.  See ROS for pertinent symptoms related to these conditions.      MEDICAL HISTORY:     Patient Active Problem List    Diagnosis Date Noted     Malignant neoplasm of upper-outer quadrant of left breast in female, estrogen receptor positive (H) 12/11/2020     Priority: Medium     Added automatically from request for surgery [id number]       Atherosclerosis of coronary artery bypass graft(s), unspecified, with other forms of angina pectoris (H) 07/23/2019     Priority: Medium     H/O parathyroidectomy 09/13/2018     Priority: Medium     prior to 2008       Morbid obesity (H) 08/25/2017     Priority: Medium     Essential hypertension with  goal blood pressure less than 130/80 09/28/2016     Priority: Medium     Acquired hypothyroidism 09/19/2016     Priority: Medium     S/p total knee replacement, bilateral 03/15/2016     Priority: Medium     S/P CABG (coronary artery bypass graft) 02/12/2016     Priority: Medium     Coronary angiogram in 2002 and a total of 3 coronary angiograms in all. History of 3 vessel coronary artery bypass surgery in 1998 , preceded by 2 stents [ they wanted to do 4 bypasses but had only 3 due to technical problems ]. So there is always worry about angina pectoralis and recurrent ischemic coronary artery disease symptoms so despite several workups for chest symptoms , no documented recurrence of ischemic coronary artery disease lesions have been noted. See care everywhere for [facility] office visit notes.       Abnormal glucose 03/23/2015     Priority: Medium     Problem list name updated by automated process. Provider to review       Shoulder fracture 08/08/2014     Priority: Medium     CHF (congestive heart failure) (H) 07/08/2014     Priority: Medium     Exam Date: 02/20/2014 10:17 Gender: F Sonographer: ADELE  Accession #: [id number] Height: 66 in BSA: 2.25 m  BP: 128 / 62  Weight: 263 lbs BMI: 42.4 kg/m   Location: Outpatient  Procedure: ECHO COMPLETE W CONTRAST Rhythm: Normal Sinus Rhythm  Indications: Mitral regurgitation  Technical Quality: Fair Contrast: Definity    Final Conclusion  Technically difficult study. Definity contrast was used to enhance endocardial definition due   to suboptimal image quality.  Normal LV size and systolic function with estimated ejection fraction of 60-65%.  Mild concentric LVH.  Moderate left atrial dilatation.  Mild mitral regurgitation.  Mild right ventricular dilatation; unable to accurately assess RV systolic function due to   image quality.  Right ventricular systolic pressure estimated to be elevated at 35 mmHg + RAP.  Estimated EF: 60-65%         Fracture of  proximal humerus 07/01/2014     Priority: Medium     OA (osteoarthritis) of knee - bilateral 11/11/2013     Priority: Medium     Scar condition and fibrosis of skin 11/07/2013     Priority: Medium     Disorder of skin or subcutaneous tissue 09/19/2013     Priority: Medium     Problem list name updated by automated process. Provider to review       Corneal ulcer of left eye 09/03/2013     Priority: Medium     Blepharitis 09/03/2013     Priority: Medium     Imo Update utility  Problem list name updated by automated process. Provider to review       Hypercalcemia 09/03/2013     Priority: Medium     Diagnosis updated by automated process. Provider to review and confirm.       Advanced directives, counseling/discussion 10/08/2012     Priority: Medium     Patient states has Advance Directive and will bring in a copy to clinic. 10/8/2012          Ventricular tachycardia (paroxysmal) (H) 10/08/2012     Priority: Medium     Had an Electrophysiology study 2013 and her ventricular tachycardia was not inducible so no indication for automatic implanted cardio-defibrillator , per Dr. Bernard Rolle, [facility], electrophysiology cardiologist         Balance disorder 07/12/2012     Priority: Medium     Patient blames this on her spinal stenosis  History. We mostly reviewed this at the office visit from 7/12/2012       Anxiety 07/12/2012     Priority: Medium     GLEN (obstructive sleep apnea) 07/11/2011     Priority: Medium     Last visit with a sleep disorder specialist was about 2010       Sciatica neuralgia      Priority: Medium     MRI shows spinal stenosis and a cyst on the spine, has received an epidural steroid injection       IBS (irritable bowel syndrome)      Priority: Medium     Chronic recurrent gastrointestinal symptoms , but negative workups       RBBB (right bundle branch block)      Priority: Medium     Fibroadenoma of breast 12/01/2010     Priority: Medium     Discovered during workup for an  abnormal mammogram        DJD (degenerative joint disease)      Priority: Medium     Ankles and feet cause problems , especially since the knee surgery [ she had a medial meniscus tear surgery bilateral , see past surgical history ]. Also has spinal stenosis and some chronic low back pain issues and slight affect on her balance       PUD (peptic ulcer disease)      Priority: Medium     h/o duodenal ulcer       Hyperlipidemia with target LDL less than 70      Priority: Medium     CHOL      153   1/17/2011  HDL       42   1/17/2011  LDL       75   1/17/2011  TRIG      179   1/17/2011  CHOLHDLRATIO      3.6   1/17/2011    Diagnosis updated by automated process. Provider to review and confirm.       Mitral regurgitation      Priority: Medium     Diverticulosis 01/01/2000     Priority: Medium     Noted on colonoscopy , is asymptomatic         Past Medical History:   Diagnosis Date     Anxiety      CAD (coronary artery disease)     angio 2002, h/o cabg, sees Luisana Nelson NP, they follow the cholesterol     CHF (congestive heart failure) (H) 7/8/2014     DJD (degenerative joint disease)      History of colonoscopy jan 2000    due jan 2010     Hyperlipidemia LDL goal < 70     sees [facility]     Hypertension goal BP (blood pressure) < 140/90      Hypothyroidism      IBS (irritable bowel syndrome)      Mitral regurgitation      Obesity      GLEN (obstructive sleep apnea) 7/11/2011     PUD (peptic ulcer disease)     h/o duodenal ulcer     RBBB (right bundle branch block)      Sciatica neuralgia november 209    MRI shows spinal stenosis and a cyst on the spine, has received an epidural steroid injection     Past Surgical History:   Procedure Laterality Date     ARTHROSCOPY KNEE RT/LT      bilateral     C APPENDECTOMY       C CABG, ARTERIAL, THREE  1999    LIMA-LAD, SVG-DIagnoal, SVG-OM, previous LAD-PCI, sees Dr Banuelos     COLONOSCOPY  6/2010    negative     HC DILATION/CURETTAGE DIAG/THER NON OB       HC EXCIS  INTERDIGITAL NEUROMA,EA      mortons neuroma excision     HC REMOVAL GALLBLADDER  1994     HERNIA REPAIR, INGUINAL RT/LT       SURGICAL HISTORY OF -       bilateral meniscal tears and surgery on these     SURGICAL HISTORY OF -   1999 or so    one parathyroid removed     TONSILLECTOMY       TUBAL LIGATION       Current Outpatient Medications   Medication Sig Dispense Refill     allopurinol (ZYLOPRIM) 100 MG tablet TAKE 1 TABLET BY MOUTH DAILY. TAKE ALONG WITH THE 300MG TABLET FOR A TOTAL DAILY DOSE OF 400MG. 90 tablet 0     allopurinol (ZYLOPRIM) 300 MG tablet Take 1 tablet (300 mg) by mouth daily +++Need recheck+++Taken with 100 milligram tab to = total daily dose of 400 milligrams 90 tablet 0     amoxicillin (AMOXIL) 500 MG capsule TK 4 CS PO 1 HOUR BEFORE DENTAL APPOINTMENT  3     betamethasone dipropionate (DIPROSONE) 0.05 % external lotion Apply topically as needed 60 mL 1     citalopram (CELEXA) 20 MG tablet Take 1 tablet (20 mg) by mouth daily Please make appointment within 30 days 30 tablet 0     levothyroxine (SYNTHROID/LEVOTHROID) 125 MCG tablet TAKE 1 TABLET(125 MCG) BY MOUTH DAILY 90 tablet 0     order for DME Equipment being ordered: CPAP 1 each 0     losartan (COZAAR) 100 MG tablet Take 1 tablet (100 mg) by mouth daily (Patient taking differently: Take 50 mg by mouth daily ) 90 tablet 0     torsemide (DEMADEX) 10 MG tablet Take 10 mg by mouth       OTC products: None, except as noted above    Allergies   Allergen Reactions     Adhesive Tape      Atorvastatin Other (See Comments) and Muscle Pain (Myalgia)     lipitor  Myalgia     Cortisone      Insomnia, burning in chest, flushed     Nickel Other (See Comments)     Raw weepy skin  rash     Tetracycline Diarrhea     Vicodin [Hydrocodone-Acetaminophen] Other (See Comments)     Hallucinations     Liquid Adhesive      Other reaction(s): Contact Dermatitis      Latex Allergy: NO    Social History     Tobacco Use     Smoking status: Former Smoker     Years:  20.00     Types: Cigarettes     Quit date: 1978     Years since quittin.4     Smokeless tobacco: Never Used   Substance Use Topics     Alcohol use: Yes     Comment: Rare alcohol use.     History   Drug Use No       REVIEW OF SYSTEMS:   CONSTITUTIONAL: NEGATIVE for fever, chills, change in weight  ENT/MOUTH: NEGATIVE for ear, mouth and throat problems  RESP: NEGATIVE for significant cough or SOB  CV: NEGATIVE for chest pain, palpitations or peripheral edema  Remainder of the review of systems is negative     EXAM:   /62   Pulse 77   Temp 97.8  F (36.6  C) (Oral)   Resp 16   Wt 113.4 kg (250 lb)   SpO2 94%   BMI 40.35 kg/m    GENERAL APPEARANCE: healthy, alert and no distress  HENT: ear canals and TM's normal and nose and mouth without ulcers or lesions  RESP: lungs clear to auscultation - no rales, rhonchi or wheezes  CV: regular rate and rhythm, normal S1 S2, no S3 or S4 and no murmur, click or rub   ABDOMEN: soft, nontender, no HSM or masses and bowel sounds normal  NEURO: Normal strength and tone, sensory exam grossly normal, mentation intact and speech normal    DIAGNOSTICS:       Recent Labs   Lab Test 10/28/19  1135 10/25/19  0947 10/04/19  0952 10/17/18  1220 18  1122 17  1117   HGB 13.1  --   --  13.4 13.8 14.4     --   --  392 291 311   NA  --  139 138  --  138 137   POTASSIUM  --  4.3 4.0  --  3.9 3.8   CR  --  0.67 0.95  --  0.71 0.75   A1C  --   --   --   --  5.7* 5.4        IMPRESSION:   Reason for surgery/procedure: breast cancer   Diagnosis/reason for consult: assess and minimize preoperative risk per consulting surgeon     The proposed surgical procedure is considered INTERMEDIATE risk.    REVISED CARDIAC RISK INDEX  The patient has the following serious cardiovascular risks for perioperative complications such as (MI, PE, VFib and 3  AV Block):  Coronary Artery Disease (MI, positive stress test, angina, Qs on EKG)  Congestive Heart Failure (pulmonary edema,  PND, s3 keegan, CXR with pulmonary congestion, basilar rales)  INTERPRETATION: 2 risks: Class III (moderate risk - 6.6% complication rate)    The patient has the following additional risks for perioperative complications:  Morbid obesity      ICD-10-CM    1. Preop general physical exam  Z01.818 EKG 12-lead complete w/read - Clinics     Basic metabolic panel     CBC with platelets and differential   2. Preventive antibiotic  Z79.2 amoxicillin (AMOXIL) 500 MG capsule   3. Essential hypertension with goal blood pressure less than 130/80  I10 losartan (COZAAR) 50 MG tablet     torsemide (DEMADEX) 10 MG tablet     Basic metabolic panel   4. Anxiety  F41.9 LORazepam (ATIVAN) 0.5 MG tablet   5. Tremor  R25.1 metoprolol tartrate (LOPRESSOR) 25 MG tablet   6. Hyperlipidemia with target LDL less than 70  E78.5 Lipid panel reflex to direct LDL Fasting   7. Acquired hypothyroidism  E03.9 TSH with free T4 reflex   8. Abnormal glucose  R73.09 Basic metabolic panel     Hemoglobin A1c   9. History of gout  Z87.39 Uric acid       RECOMMENDATIONS:     --Consult hospital rounder / IM to assist post-op medical management    --Patient is to take all scheduled medications on the day of surgery EXCEPT for modifications listed below.  Skip Demadex (Torsemide) morning of the surgery   Take prophylactic antibiotic 1 hour before the planned procedure     APPROVAL GIVEN to proceed with proposed procedure, without further diagnostic evaluation       Signed Electronically by: Ziyad Isaac MD    Copy of this evaluation report is provided to requesting physician.    [place] Preop Guidelines    Revised Cardiac Risk Index

## 2020-12-30 ENCOUNTER — MYC MEDICAL ADVICE (OUTPATIENT)
Dept: INTERNAL MEDICINE | Facility: CLINIC | Age: 85
End: 2020-12-30

## 2020-12-30 DIAGNOSIS — E78.5 HYPERLIPIDEMIA WITH TARGET LDL LESS THAN 70: ICD-10-CM

## 2020-12-30 DIAGNOSIS — E03.9 ACQUIRED HYPOTHYROIDISM: ICD-10-CM

## 2020-12-30 DIAGNOSIS — E03.9 ACQUIRED HYPOTHYROIDISM: Primary | ICD-10-CM

## 2020-12-30 DIAGNOSIS — E87.5 HYPERKALEMIA: Primary | ICD-10-CM

## 2020-12-30 DIAGNOSIS — E87.5 SERUM POTASSIUM ELEVATED: ICD-10-CM

## 2020-12-30 RX ORDER — LEVOTHYROXINE SODIUM 137 UG/1
137 TABLET ORAL DAILY
Qty: 90 TABLET | Refills: 0 | Status: SHIPPED | OUTPATIENT
Start: 2020-12-30 | End: 2020-12-31

## 2020-12-31 ENCOUNTER — MYC MEDICAL ADVICE (OUTPATIENT)
Dept: INTERNAL MEDICINE | Facility: CLINIC | Age: 85
End: 2020-12-31

## 2020-12-31 ENCOUNTER — TELEPHONE (OUTPATIENT)
Dept: INTERNAL MEDICINE | Facility: CLINIC | Age: 85
End: 2020-12-31

## 2020-12-31 ENCOUNTER — TELEPHONE (OUTPATIENT)
Dept: SURGERY | Facility: CLINIC | Age: 85
End: 2020-12-31

## 2020-12-31 DIAGNOSIS — E03.9 ACQUIRED HYPOTHYROIDISM: ICD-10-CM

## 2020-12-31 DIAGNOSIS — M10.071 ACUTE IDIOPATHIC GOUT OF RIGHT FOOT: ICD-10-CM

## 2020-12-31 RX ORDER — ROSUVASTATIN CALCIUM 10 MG/1
10 TABLET, COATED ORAL DAILY
Qty: 90 TABLET | Refills: 1 | Status: SHIPPED | OUTPATIENT
Start: 2020-12-31 | End: 2021-02-18

## 2020-12-31 RX ORDER — LEVOTHYROXINE SODIUM 125 UG/1
125 TABLET ORAL DAILY
Qty: 30 TABLET | Refills: 1 | Status: SHIPPED | OUTPATIENT
Start: 2020-12-31 | End: 2021-01-03

## 2020-12-31 NOTE — TELEPHONE ENCOUNTER
"Pt called. States she noticed in her mychart that her potassium is at 4.2. States her potassium has always been around 4.1-4.2, so it's not elevated and doesn't think she would need to recheck this. Pt has a lab appt today at 2pm and doesn't want to do the lab unless it is necessary. Please advise.    Per result note from 12/30/20:  \"These labs show us the following things ;      1. Basic metabolic panel shows an elevated potassium. While this is not a marked elevation it's concerning nevertheless. I would think your surgeon would want to see this corrected prior to the planned procedure . I am recommending therefore that you try to increase fluid intake to 4-6 glasses of liquid per day, make sure to avoid all non-steroidal anti-inflammatory drugs and lets recheck your basic metabolic panel either tomorrow or Monday      2. Your TSH ( thyroid test ) is way off. This means we should change your levothyroxine as follows     A. Stop current dose of 125 micrograms   B. Start new dose of 137 micrograms   C. Come back for TSH ( thyroid test ) recheck in approximately 6-8 weeks for potentially further levothyroxine dosing adjustments     3. You have a truly bad looking set of cholesterol panel numbers . According to my understanding you're currently taking no Statins (or HMG-CoA reductase inhibitor) secondary to a history of some side effects to atorvastatin [ lipitor ] . If this is true then we won't be starting any new treatments but I would wish you could tolerate something , such as Crestor [ rosuvastatin ] or even a mid or low potency Statin (or HMG-CoA reductase inhibitor) such as pravastatin or simvistatin [Zocor]. Let me know what you think. If we don't start any treatment then we need to add a \"statin intentionally not prescribed \" entry to your medication list.       Otherwise these tests are ok.     Ziyad Isaac MD\"      Ana Rosa Hassan RN  [facility]    "

## 2020-12-31 NOTE — TELEPHONE ENCOUNTER
Lisa is scheduled for bilateral mastectomies, left sentinel lymph node biopsy on 1/6/2021 with Dr. Garcia. Pre procedure call placed to patient.     Reviewed the following with Lisa:   -Proceed with COVID-19 testing as scheduled.   -No visitors allowed on hospital campus.   -Please wear a mask to your appointment.   -Please check in at Welcome Desk within [facility] on 1/6/2021 at 11:30 am.  -Post op appointment with Dr. Garcia on 11/21 at 10:45 am.  -Will follow-up with Lisa after surgery to discuss timing of drain removal.    Lisa verbalized understanding to above.    Thu Chen RN, BSN, OCN  Oncology Care Coordinator  [facility]  [facility]  Phone: [phone]

## 2020-12-31 NOTE — TELEPHONE ENCOUNTER
"Because there were 3 different issues here I did call patient and we reviewed some newer information and thoughts on her case    1. With her hypothyroidism she found out she had been inadvertently forgetting to take the levothyroxine. She assumes she missed at the very least 5 full days worth and possibly more of the daily levothyroxine 125 micrograms dosing  . So she doesn't want to take the 137 micrograms dose/ she wants to a. Stay with the same 125 micrograms 1 time per day and b. Return to the laboratory for a TSH ( thyroid test ) recheck in 6-8 weeks , dependent upon we can THEN adjust her levothyroxine if necessary     2. She agrees to start Crestor [ rosuvastatin ] 10 milligrams a day and we therefore won't add \" statin intentionally not prescribed\" to her chart at this point . Further follow up depending on how things go     3. As it turns out the elevated potassium was in fact thought to be secondary to hemolysis and so we are not needing to recheck this level.    Ziyad Isaac MD    "

## 2020-12-31 NOTE — TELEPHONE ENCOUNTER
This message has been resolved. I called patient and corrected all information . A new medication, Crestor [ rosuvastatin ] has been called into her pharmacy     We are staying with the current levothyroxine 125 micrograms and will recheck TSH ( thyroid test ) in 6-8 weeks    The potassium level was a false positive from hemolysis , she it's ok with me for surgery     Ziyad Isaac MD

## 2021-01-02 RX ORDER — ROSUVASTATIN CALCIUM 5 MG/1
TABLET, COATED ORAL
Qty: 10 TABLET | OUTPATIENT
Start: 2021-01-02

## 2021-01-03 DIAGNOSIS — J40 BRONCHITIS: ICD-10-CM

## 2021-01-03 RX ORDER — LEVOTHYROXINE SODIUM 125 UG/1
TABLET ORAL
Qty: 90 TABLET | Refills: 1 | Status: SHIPPED | OUTPATIENT
Start: 2021-01-03 | End: 2021-01-22

## 2021-01-04 DIAGNOSIS — E03.9 ACQUIRED HYPOTHYROIDISM: ICD-10-CM

## 2021-01-04 DIAGNOSIS — Z11.59 ENCOUNTER FOR SCREENING FOR OTHER VIRAL DISEASES: ICD-10-CM

## 2021-01-04 LAB
SARS-COV-2 RNA SPEC QL NAA+PROBE: NORMAL
SPECIMEN SOURCE: NORMAL

## 2021-01-04 PROCEDURE — U0005 INFEC AGEN DETEC AMPLI PROBE: HCPCS | Performed by: SURGERY

## 2021-01-04 PROCEDURE — U0003 INFECTIOUS AGENT DETECTION BY NUCLEIC ACID (DNA OR RNA); SEVERE ACUTE RESPIRATORY SYNDROME CORONAVIRUS 2 (SARS-COV-2) (CORONAVIRUS DISEASE [COVID-19]), AMPLIFIED PROBE TECHNIQUE, MAKING USE OF HIGH THROUGHPUT TECHNOLOGIES AS DESCRIBED BY CMS-2020-01-R: HCPCS | Performed by: SURGERY

## 2021-01-04 RX ORDER — ALLOPURINOL 300 MG/1
1 TABLET ORAL DAILY
Qty: 90 TABLET | Refills: 0 | Status: SHIPPED | OUTPATIENT
Start: 2021-01-04 | End: 2021-03-25

## 2021-01-05 ENCOUNTER — ANESTHESIA EVENT (OUTPATIENT)
Dept: SURGERY | Facility: CLINIC | Age: 86
End: 2021-01-05
Payer: MEDICARE

## 2021-01-05 ENCOUNTER — TELEPHONE (OUTPATIENT)
Dept: SURGERY | Facility: CLINIC | Age: 86
End: 2021-01-05

## 2021-01-05 LAB
LABORATORY COMMENT REPORT: NORMAL
SARS-COV-2 RNA SPEC QL NAA+PROBE: NEGATIVE
SPECIMEN SOURCE: NORMAL

## 2021-01-05 RX ORDER — METOPROLOL TARTRATE 25 MG/1
25 TABLET, FILM COATED ORAL AT BEDTIME
COMMUNITY
End: 2021-12-29

## 2021-01-05 RX ORDER — ASPIRIN 81 MG/1
81 TABLET ORAL EVERY MORNING
COMMUNITY

## 2021-01-05 RX ORDER — METOPROLOL TARTRATE 25 MG/1
TABLET, FILM COATED ORAL
COMMUNITY

## 2021-01-05 RX ORDER — TORSEMIDE 10 MG/1
20 TABLET ORAL EVERY MORNING
COMMUNITY
End: 2022-07-06

## 2021-01-05 RX ORDER — LOSARTAN POTASSIUM 100 MG/1
50 TABLET ORAL EVERY MORNING
COMMUNITY
End: 2022-07-06

## 2021-01-05 RX ORDER — MULTIPLE VITAMINS W/ MINERALS TAB 9MG-400MCG
1 TAB ORAL EVERY MORNING
COMMUNITY
End: 2022-10-04

## 2021-01-05 RX ORDER — TORSEMIDE 10 MG/1
10 TABLET ORAL
COMMUNITY
End: 2021-12-29

## 2021-01-05 NOTE — TELEPHONE ENCOUNTER
Left message for Lisa,    Component 1d ago   SARS-CoV-2 Virus Specimen Source Nasopharyngeal    SARS-CoV-2 PCR Result NEGATIVE    Comment: SARS-CoV2 (COVID-19) RNA not detected, presumed negative.     Ok to proceed with surgery as scheduled. Please check in at welcome desk withing [facility] at 11:30 am. Please wear a mask to your appointment. No visitors are allowed on hospital campus. Encouraged call back with questions.    Thu Chen RN, BSN, OCN  Oncology Care Coordinator  [facility]  [facility]  Phone: [phone]

## 2021-01-05 NOTE — PROGRESS NOTES
PTA medications updated by Medication Scribe prior to surgery via phone call with patient      -LAST DOSES ENTERED BY NURSE-    Comments:    Medication history sources: Patient, Surescripts and H&P  Medication history source reliability: Good  Adherence assessment: N/A Not Observed    Significant changes made to the medication list:  Patient taking meds differently than prescribed; See PTA entries for: Torsemide.      Patient states will start taking rosuvastatin rx post surgery 1/6/2021.      Additional medication history information:   Patient brought own home meds: Patient states will bring amoxicillin in original rx bottle to take 1 hour prior to surgery 1/6/2021 per pre op instructions.        Prior to Admission medications    Medication Sig Last Dose Taking? Auth Provider   allopurinol (ZYLOPRIM) 100 MG tablet TAKE 1 TABLET BY MOUTH DAILY. TAKE ALONG WITH THE 300MG TABLET FOR A TOTAL DAILY DOSE OF 400MG.  Patient taking differently: Take 100 mg by mouth At Bedtime (in addition to 300 mg dose to = 400 mg dose total)  at HS Yes Ziyad Isaac MD   allopurinol (ZYLOPRIM) 300 MG tablet Take 1 tablet (300 mg) by mouth daily Taken with 100 milligram tab to = total daily dose of 400 milligrams  Patient taking differently: Take 300 mg by mouth At Bedtime (Taken with 100 milligram tab to = total daily dose of 400 milligrams)  at HS Yes Ziyad Isaac MD   amoxicillin (AMOXIL) 500 MG capsule TK 4 CS PO 1 HOUR BEFORE DENTAL APPOINTMENT  Patient taking differently: Take 2,000 mg by mouth daily as needed TK 4 CS PO 1 HOUR BEFORE DENTAL APPOINTMENT  at AM Yes Ziyad Isaac MD   aspirin 81 MG EC tablet Take 81 mg by mouth every morning 1/1/2021 at AM Yes Reported, Patient   betamethasone dipropionate (DIPROSONE) 0.05 % external lotion Apply topically as needed  Patient taking differently: Apply topically daily as needed   at PRN Yes Ziyad Isaac MD   citalopram (CELEXA) 20 MG tablet Take 1 tablet (20 mg) by mouth daily  Please make appointment within 30 days  Patient taking differently: Take 20 mg by mouth At Bedtime Please make appointment within 30 days  at HS Yes Ziyad Isaac MD   levothyroxine (SYNTHROID/LEVOTHROID) 125 MCG tablet TAKE 1 TABLET BY MOUTH DAILY. RECHECK TSH(THYROID TEST) IN 6 TO 8 WEEKS  Patient taking differently: Take 125 mcg by mouth every morning   at AM Yes Ziyad Isaac MD   LORazepam (ATIVAN) 0.5 MG tablet Take 1 tablet (0.5 mg) by mouth every 6 hours as needed for anxiety  at PRN Yes Ziyad Isaac MD   losartan (COZAAR) 100 MG tablet Take 50 mg by mouth every morning (0.5 X 100 mg)  at AM Yes Reported, Patient   metoprolol tartrate (LOPRESSOR) 25 MG tablet Take 50 mg by mouth every morning (2 X 25 mg)  (in addition to bedtime dose)  at AM Yes Reported, Patient   metoprolol tartrate (LOPRESSOR) 25 MG tablet Take 25 mg by mouth At Bedtime (in addition to morning dose)  at HS Yes Reported, Patient   multivitamin w/minerals (MULTI-VITAMIN) tablet Take 1 tablet by mouth every morning 1/2/2021 at AM Yes Reported, Patient   torsemide (DEMADEX) 10 MG tablet Take 20 mg by mouth every morning (2 X 10 mg)  (in addition to lunchtime dose)  at AM Yes Reported, Patient   torsemide (DEMADEX) 10 MG tablet Take 10 mg by mouth daily (with lunch) (in addition to morning dose)  at 1200 Yes Reported, Patient   order for DME Equipment being ordered: CPAP   Ziyad Isaac MD   rosuvastatin (CRESTOR) 10 MG tablet Take 1 tablet (10 mg) by mouth daily Not started yet.  Ziyad Isaac MD

## 2021-01-06 ENCOUNTER — TRANSFERRED RECORDS (OUTPATIENT)
Dept: HEALTH INFORMATION MANAGEMENT | Facility: CLINIC | Age: 86
End: 2021-01-06

## 2021-01-06 ENCOUNTER — HOSPITAL ENCOUNTER (OUTPATIENT)
Facility: CLINIC | Age: 86
Discharge: HOME | End: 2021-01-08
Attending: SURGERY | Admitting: SURGERY
Payer: MEDICARE

## 2021-01-06 ENCOUNTER — ANESTHESIA (OUTPATIENT)
Dept: SURGERY | Facility: CLINIC | Age: 86
End: 2021-01-06
Payer: MEDICARE

## 2021-01-06 ENCOUNTER — HOSPITAL ENCOUNTER (OUTPATIENT)
Dept: NUCLEAR MEDICINE | Facility: CLINIC | Age: 86
Setting detail: NUCLEAR MEDICINE
Discharge: HOME | End: 2021-01-06
Attending: SURGERY | Admitting: SURGERY
Payer: MEDICARE

## 2021-01-06 ENCOUNTER — APPOINTMENT (OUTPATIENT)
Dept: SURGERY | Facility: PHYSICIAN GROUP | Age: 86
End: 2021-01-06
Payer: MEDICARE

## 2021-01-06 DIAGNOSIS — Z17.0 MALIGNANT NEOPLASM OF UPPER-OUTER QUADRANT OF LEFT BREAST IN FEMALE, ESTROGEN RECEPTOR POSITIVE (H): ICD-10-CM

## 2021-01-06 DIAGNOSIS — C50.412 MALIGNANT NEOPLASM OF UPPER-OUTER QUADRANT OF LEFT BREAST IN FEMALE, ESTROGEN RECEPTOR POSITIVE (H): ICD-10-CM

## 2021-01-06 DIAGNOSIS — G89.18 POSTOPERATIVE PAIN: Primary | ICD-10-CM

## 2021-01-06 LAB — POTASSIUM SERPL-SCNC: 3.6 MMOL/L (ref 3.4–5.3)

## 2021-01-06 PROCEDURE — 88307 TISSUE EXAM BY PATHOLOGIST: CPT | Mod: TC | Performed by: SURGERY

## 2021-01-06 PROCEDURE — 250N000011 HC RX IP 250 OP 636: Performed by: NURSE ANESTHETIST, CERTIFIED REGISTERED

## 2021-01-06 PROCEDURE — 19303 MAST SIMPLE COMPLETE: CPT | Mod: 50 | Performed by: SURGERY

## 2021-01-06 PROCEDURE — 88305 TISSUE EXAM BY PATHOLOGIST: CPT | Mod: TC | Performed by: SURGERY

## 2021-01-06 PROCEDURE — 88342 IMHCHEM/IMCYTCHM 1ST ANTB: CPT | Mod: 26 | Performed by: PATHOLOGY

## 2021-01-06 PROCEDURE — 38792 RA TRACER ID OF SENTINL NODE: CPT

## 2021-01-06 PROCEDURE — 370N000017 HC ANESTHESIA TECHNICAL FEE, PER MIN: Performed by: SURGERY

## 2021-01-06 PROCEDURE — 88341 IMHCHEM/IMCYTCHM EA ADD ANTB: CPT | Mod: 26 | Performed by: PATHOLOGY

## 2021-01-06 PROCEDURE — 88331 PATH CONSLTJ SURG 1 BLK 1SPC: CPT | Mod: 26 | Performed by: PATHOLOGY

## 2021-01-06 PROCEDURE — 88341 IMHCHEM/IMCYTCHM EA ADD ANTB: CPT | Mod: TC | Performed by: SURGERY

## 2021-01-06 PROCEDURE — 360N000076 HC SURGERY LEVEL 3, PER MIN: Performed by: SURGERY

## 2021-01-06 PROCEDURE — 88331 PATH CONSLTJ SURG 1 BLK 1SPC: CPT | Mod: TC,XS | Performed by: SURGERY

## 2021-01-06 PROCEDURE — 84132 ASSAY OF SERUM POTASSIUM: CPT | Performed by: ANESTHESIOLOGY

## 2021-01-06 PROCEDURE — 99207 PR APP CREDIT; MD BILLING SHARED VISIT: CPT | Performed by: PHYSICIAN ASSISTANT

## 2021-01-06 PROCEDURE — 999N001020 HC STATISTIC H-SEND OUTS PREP: Performed by: SURGERY

## 2021-01-06 PROCEDURE — 38900 IO MAP OF SENT LYMPH NODE: CPT | Mod: 51 | Performed by: SURGERY

## 2021-01-06 PROCEDURE — 19303 MAST SIMPLE COMPLETE: CPT | Mod: 50 | Performed by: PHYSICIAN ASSISTANT

## 2021-01-06 PROCEDURE — 99203 OFFICE O/P NEW LOW 30 MIN: CPT | Performed by: HOSPITALIST

## 2021-01-06 PROCEDURE — 250N000013 HC RX MED GY IP 250 OP 250 PS 637: Performed by: PHYSICIAN ASSISTANT

## 2021-01-06 PROCEDURE — 88305 TISSUE EXAM BY PATHOLOGIST: CPT | Mod: 26 | Performed by: PATHOLOGY

## 2021-01-06 PROCEDURE — 250N000009 HC RX 250: Performed by: SURGERY

## 2021-01-06 PROCEDURE — 250N000011 HC RX IP 250 OP 636: Performed by: SURGERY

## 2021-01-06 PROCEDURE — 258N000003 HC RX IP 258 OP 636: Performed by: ANESTHESIOLOGY

## 2021-01-06 PROCEDURE — 99207 PR CDG-CODE CATEGORY CHANGED: CPT | Performed by: HOSPITALIST

## 2021-01-06 PROCEDURE — 250N000009 HC RX 250: Performed by: NURSE ANESTHETIST, CERTIFIED REGISTERED

## 2021-01-06 PROCEDURE — 272N000001 HC OR GENERAL SUPPLY STERILE: Performed by: SURGERY

## 2021-01-06 PROCEDURE — 88307 TISSUE EXAM BY PATHOLOGIST: CPT | Mod: 26 | Performed by: PATHOLOGY

## 2021-01-06 PROCEDURE — 250N000013 HC RX MED GY IP 250 OP 250 PS 637: Mod: GY | Performed by: PHYSICIAN ASSISTANT

## 2021-01-06 PROCEDURE — 999N000141 HC STATISTIC PRE-PROCEDURE NURSING ASSESSMENT: Performed by: SURGERY

## 2021-01-06 PROCEDURE — 343N000001 HC RX 343: Performed by: SURGERY

## 2021-01-06 PROCEDURE — 710N000009 HC RECOVERY PHASE 1, LEVEL 1, PER MIN: Performed by: SURGERY

## 2021-01-06 PROCEDURE — 38525 BIOPSY/REMOVAL LYMPH NODES: CPT | Mod: 51 | Performed by: SURGERY

## 2021-01-06 PROCEDURE — 250N000025 HC SEVOFLURANE, PER MIN: Performed by: SURGERY

## 2021-01-06 PROCEDURE — 88342 IMHCHEM/IMCYTCHM 1ST ANTB: CPT | Mod: TC | Performed by: SURGERY

## 2021-01-06 PROCEDURE — 36415 COLL VENOUS BLD VENIPUNCTURE: CPT | Performed by: ANESTHESIOLOGY

## 2021-01-06 PROCEDURE — A9520 TC99 TILMANOCEPT DIAG 0.5MCI: HCPCS | Performed by: SURGERY

## 2021-01-06 RX ORDER — FENTANYL CITRATE 50 UG/ML
25-50 INJECTION, SOLUTION INTRAMUSCULAR; INTRAVENOUS
Status: DISCONTINUED | OUTPATIENT
Start: 2021-01-06 | End: 2021-01-06 | Stop reason: HOSPADM

## 2021-01-06 RX ORDER — AMOXICILLIN 250 MG
2 CAPSULE ORAL 2 TIMES DAILY
Status: DISCONTINUED | OUTPATIENT
Start: 2021-01-06 | End: 2021-01-08 | Stop reason: HOSPADM

## 2021-01-06 RX ORDER — ACETAMINOPHEN 325 MG/1
975 TABLET ORAL EVERY 8 HOURS
Status: DISCONTINUED | OUTPATIENT
Start: 2021-01-06 | End: 2021-01-08 | Stop reason: HOSPADM

## 2021-01-06 RX ORDER — SODIUM CHLORIDE, SODIUM LACTATE, POTASSIUM CHLORIDE, CALCIUM CHLORIDE 600; 310; 30; 20 MG/100ML; MG/100ML; MG/100ML; MG/100ML
INJECTION, SOLUTION INTRAVENOUS CONTINUOUS
Status: DISCONTINUED | OUTPATIENT
Start: 2021-01-06 | End: 2021-01-06 | Stop reason: HOSPADM

## 2021-01-06 RX ORDER — NALOXONE HYDROCHLORIDE 0.4 MG/ML
0.2 INJECTION, SOLUTION INTRAMUSCULAR; INTRAVENOUS; SUBCUTANEOUS
Status: DISCONTINUED | OUTPATIENT
Start: 2021-01-06 | End: 2021-01-06 | Stop reason: HOSPADM

## 2021-01-06 RX ORDER — LOSARTAN POTASSIUM 50 MG/1
50 TABLET ORAL EVERY MORNING
Status: DISCONTINUED | OUTPATIENT
Start: 2021-01-07 | End: 2021-01-08 | Stop reason: HOSPADM

## 2021-01-06 RX ORDER — ALLOPURINOL 100 MG/1
100 TABLET ORAL AT BEDTIME
Status: DISCONTINUED | OUTPATIENT
Start: 2021-01-06 | End: 2021-01-08 | Stop reason: HOSPADM

## 2021-01-06 RX ORDER — PROPOFOL 10 MG/ML
INJECTION, EMULSION INTRAVENOUS CONTINUOUS PRN
Status: DISCONTINUED | OUTPATIENT
Start: 2021-01-06 | End: 2021-01-06

## 2021-01-06 RX ORDER — TORSEMIDE 10 MG/1
10 TABLET ORAL
Status: DISCONTINUED | OUTPATIENT
Start: 2021-01-07 | End: 2021-01-08 | Stop reason: HOSPADM

## 2021-01-06 RX ORDER — NALOXONE HYDROCHLORIDE 0.4 MG/ML
0.4 INJECTION, SOLUTION INTRAMUSCULAR; INTRAVENOUS; SUBCUTANEOUS
Status: DISCONTINUED | OUTPATIENT
Start: 2021-01-06 | End: 2021-01-08 | Stop reason: HOSPADM

## 2021-01-06 RX ORDER — DIPHENHYDRAMINE HCL 12.5MG/5ML
12.5 LIQUID (ML) ORAL EVERY 6 HOURS PRN
Status: DISCONTINUED | OUTPATIENT
Start: 2021-01-06 | End: 2021-01-08 | Stop reason: HOSPADM

## 2021-01-06 RX ORDER — FENTANYL CITRATE 50 UG/ML
INJECTION, SOLUTION INTRAMUSCULAR; INTRAVENOUS PRN
Status: DISCONTINUED | OUTPATIENT
Start: 2021-01-06 | End: 2021-01-06

## 2021-01-06 RX ORDER — ACETAMINOPHEN 325 MG/1
650 TABLET ORAL EVERY 4 HOURS PRN
Status: DISCONTINUED | OUTPATIENT
Start: 2021-01-09 | End: 2021-01-08 | Stop reason: HOSPADM

## 2021-01-06 RX ORDER — HYDROMORPHONE HYDROCHLORIDE 1 MG/ML
.2-.3 INJECTION, SOLUTION INTRAMUSCULAR; INTRAVENOUS; SUBCUTANEOUS
Status: DISCONTINUED | OUTPATIENT
Start: 2021-01-06 | End: 2021-01-08 | Stop reason: HOSPADM

## 2021-01-06 RX ORDER — EPHEDRINE SULFATE 50 MG/ML
INJECTION, SOLUTION INTRAMUSCULAR; INTRAVENOUS; SUBCUTANEOUS PRN
Status: DISCONTINUED | OUTPATIENT
Start: 2021-01-06 | End: 2021-01-06

## 2021-01-06 RX ORDER — HYDROMORPHONE HYDROCHLORIDE 1 MG/ML
.3-.5 INJECTION, SOLUTION INTRAMUSCULAR; INTRAVENOUS; SUBCUTANEOUS EVERY 10 MIN PRN
Status: DISCONTINUED | OUTPATIENT
Start: 2021-01-06 | End: 2021-01-06 | Stop reason: HOSPADM

## 2021-01-06 RX ORDER — MEPERIDINE HYDROCHLORIDE 25 MG/ML
12.5 INJECTION INTRAMUSCULAR; INTRAVENOUS; SUBCUTANEOUS
Status: DISCONTINUED | OUTPATIENT
Start: 2021-01-06 | End: 2021-01-06 | Stop reason: HOSPADM

## 2021-01-06 RX ORDER — NALOXONE HYDROCHLORIDE 0.4 MG/ML
0.4 INJECTION, SOLUTION INTRAMUSCULAR; INTRAVENOUS; SUBCUTANEOUS
Status: DISCONTINUED | OUTPATIENT
Start: 2021-01-06 | End: 2021-01-06 | Stop reason: HOSPADM

## 2021-01-06 RX ORDER — NEOSTIGMINE METHYLSULFATE 1 MG/ML
VIAL (ML) INJECTION PRN
Status: DISCONTINUED | OUTPATIENT
Start: 2021-01-06 | End: 2021-01-06

## 2021-01-06 RX ORDER — LORAZEPAM 0.5 MG/1
0.5 TABLET ORAL EVERY 6 HOURS PRN
Status: DISCONTINUED | OUTPATIENT
Start: 2021-01-06 | End: 2021-01-08 | Stop reason: HOSPADM

## 2021-01-06 RX ORDER — METOPROLOL TARTRATE 25 MG/1
25 TABLET, FILM COATED ORAL AT BEDTIME
Status: DISCONTINUED | OUTPATIENT
Start: 2021-01-06 | End: 2021-01-08 | Stop reason: HOSPADM

## 2021-01-06 RX ORDER — MAGNESIUM HYDROXIDE 1200 MG/15ML
LIQUID ORAL PRN
Status: DISCONTINUED | OUTPATIENT
Start: 2021-01-06 | End: 2021-01-06 | Stop reason: HOSPADM

## 2021-01-06 RX ORDER — ONDANSETRON 4 MG/1
4 TABLET, ORALLY DISINTEGRATING ORAL EVERY 30 MIN PRN
Status: DISCONTINUED | OUTPATIENT
Start: 2021-01-06 | End: 2021-01-06 | Stop reason: HOSPADM

## 2021-01-06 RX ORDER — ALLOPURINOL 300 MG/1
300 TABLET ORAL AT BEDTIME
Status: DISCONTINUED | OUTPATIENT
Start: 2021-01-06 | End: 2021-01-08 | Stop reason: HOSPADM

## 2021-01-06 RX ORDER — CITALOPRAM HYDROBROMIDE 20 MG/1
20 TABLET ORAL AT BEDTIME
Status: DISCONTINUED | OUTPATIENT
Start: 2021-01-06 | End: 2021-01-08 | Stop reason: HOSPADM

## 2021-01-06 RX ORDER — LEVOTHYROXINE SODIUM 137 UG/1
TABLET ORAL
Qty: 90 TABLET | Refills: 0 | Status: SHIPPED | OUTPATIENT
Start: 2021-01-06 | End: 2021-01-07

## 2021-01-06 RX ORDER — DEXAMETHASONE SODIUM PHOSPHATE 4 MG/ML
INJECTION, SOLUTION INTRA-ARTICULAR; INTRALESIONAL; INTRAMUSCULAR; INTRAVENOUS; SOFT TISSUE PRN
Status: DISCONTINUED | OUTPATIENT
Start: 2021-01-06 | End: 2021-01-06

## 2021-01-06 RX ORDER — PROPOFOL 10 MG/ML
INJECTION, EMULSION INTRAVENOUS PRN
Status: DISCONTINUED | OUTPATIENT
Start: 2021-01-06 | End: 2021-01-06

## 2021-01-06 RX ORDER — TORSEMIDE 20 MG/1
20 TABLET ORAL EVERY MORNING
Status: DISCONTINUED | OUTPATIENT
Start: 2021-01-07 | End: 2021-01-08 | Stop reason: HOSPADM

## 2021-01-06 RX ORDER — NALOXONE HYDROCHLORIDE 0.4 MG/ML
0.2 INJECTION, SOLUTION INTRAMUSCULAR; INTRAVENOUS; SUBCUTANEOUS
Status: DISCONTINUED | OUTPATIENT
Start: 2021-01-06 | End: 2021-01-08 | Stop reason: HOSPADM

## 2021-01-06 RX ORDER — ROSUVASTATIN CALCIUM 10 MG/1
10 TABLET, COATED ORAL DAILY
Status: DISCONTINUED | OUTPATIENT
Start: 2021-01-06 | End: 2021-01-08 | Stop reason: HOSPADM

## 2021-01-06 RX ORDER — LIDOCAINE HYDROCHLORIDE 20 MG/ML
INJECTION, SOLUTION INFILTRATION; PERINEURAL PRN
Status: DISCONTINUED | OUTPATIENT
Start: 2021-01-06 | End: 2021-01-06

## 2021-01-06 RX ORDER — ONDANSETRON 2 MG/ML
4 INJECTION INTRAMUSCULAR; INTRAVENOUS EVERY 6 HOURS PRN
Status: DISCONTINUED | OUTPATIENT
Start: 2021-01-06 | End: 2021-01-08 | Stop reason: HOSPADM

## 2021-01-06 RX ORDER — AMOXICILLIN 250 MG
1 CAPSULE ORAL 2 TIMES DAILY
Status: DISCONTINUED | OUTPATIENT
Start: 2021-01-06 | End: 2021-01-08 | Stop reason: HOSPADM

## 2021-01-06 RX ORDER — LIDOCAINE 40 MG/G
CREAM TOPICAL
Status: DISCONTINUED | OUTPATIENT
Start: 2021-01-06 | End: 2021-01-08 | Stop reason: HOSPADM

## 2021-01-06 RX ORDER — GLYCOPYRROLATE 0.2 MG/ML
INJECTION, SOLUTION INTRAMUSCULAR; INTRAVENOUS PRN
Status: DISCONTINUED | OUTPATIENT
Start: 2021-01-06 | End: 2021-01-06

## 2021-01-06 RX ORDER — CEFAZOLIN SODIUM 1 G/3ML
1 INJECTION, POWDER, FOR SOLUTION INTRAMUSCULAR; INTRAVENOUS SEE ADMIN INSTRUCTIONS
Status: DISCONTINUED | OUTPATIENT
Start: 2021-01-06 | End: 2021-01-06 | Stop reason: HOSPADM

## 2021-01-06 RX ORDER — PROCHLORPERAZINE MALEATE 5 MG
5 TABLET ORAL EVERY 6 HOURS PRN
Status: DISCONTINUED | OUTPATIENT
Start: 2021-01-06 | End: 2021-01-08 | Stop reason: HOSPADM

## 2021-01-06 RX ORDER — ONDANSETRON 4 MG/1
4 TABLET, ORALLY DISINTEGRATING ORAL EVERY 6 HOURS PRN
Status: DISCONTINUED | OUTPATIENT
Start: 2021-01-06 | End: 2021-01-08 | Stop reason: HOSPADM

## 2021-01-06 RX ORDER — DIPHENHYDRAMINE HYDROCHLORIDE 50 MG/ML
12.5 INJECTION INTRAMUSCULAR; INTRAVENOUS EVERY 6 HOURS PRN
Status: DISCONTINUED | OUTPATIENT
Start: 2021-01-06 | End: 2021-01-08 | Stop reason: HOSPADM

## 2021-01-06 RX ORDER — ONDANSETRON 2 MG/ML
4 INJECTION INTRAMUSCULAR; INTRAVENOUS EVERY 30 MIN PRN
Status: DISCONTINUED | OUTPATIENT
Start: 2021-01-06 | End: 2021-01-06 | Stop reason: HOSPADM

## 2021-01-06 RX ORDER — ONDANSETRON 2 MG/ML
INJECTION INTRAMUSCULAR; INTRAVENOUS PRN
Status: DISCONTINUED | OUTPATIENT
Start: 2021-01-06 | End: 2021-01-06

## 2021-01-06 RX ORDER — METOPROLOL TARTRATE 50 MG
50 TABLET ORAL EVERY MORNING
Status: DISCONTINUED | OUTPATIENT
Start: 2021-01-07 | End: 2021-01-08 | Stop reason: HOSPADM

## 2021-01-06 RX ORDER — AZITHROMYCIN 250 MG/1
TABLET, FILM COATED ORAL
Qty: 6 TABLET | Refills: 0 | Status: SHIPPED | OUTPATIENT
Start: 2021-01-06 | End: 2021-01-07

## 2021-01-06 RX ORDER — BUPIVACAINE HYDROCHLORIDE 5 MG/ML
INJECTION, SOLUTION EPIDURAL; INTRACAUDAL
Status: DISCONTINUED
Start: 2021-01-06 | End: 2021-01-06 | Stop reason: HOSPADM

## 2021-01-06 RX ORDER — CEFAZOLIN SODIUM 2 G/100ML
2 INJECTION, SOLUTION INTRAVENOUS
Status: COMPLETED | OUTPATIENT
Start: 2021-01-06 | End: 2021-01-06

## 2021-01-06 RX ADMIN — PROPOFOL 25 MCG/KG/MIN: 10 INJECTION, EMULSION INTRAVENOUS at 13:15

## 2021-01-06 RX ADMIN — DEXAMETHASONE SODIUM PHOSPHATE 4 MG: 4 INJECTION, SOLUTION INTRA-ARTICULAR; INTRALESIONAL; INTRAMUSCULAR; INTRAVENOUS; SOFT TISSUE at 13:15

## 2021-01-06 RX ADMIN — CEFAZOLIN SODIUM 1 G: 2 INJECTION, SOLUTION INTRAVENOUS at 15:10

## 2021-01-06 RX ADMIN — SUCCINYLCHOLINE CHLORIDE 100 MG: 20 INJECTION, SOLUTION INTRAMUSCULAR; INTRAVENOUS; PARENTERAL at 13:02

## 2021-01-06 RX ADMIN — GLYCOPYRROLATE 0.8 MG: 0.2 INJECTION, SOLUTION INTRAMUSCULAR; INTRAVENOUS at 15:59

## 2021-01-06 RX ADMIN — ONDANSETRON 4 MG: 2 INJECTION INTRAMUSCULAR; INTRAVENOUS at 15:36

## 2021-01-06 RX ADMIN — DOCUSATE SODIUM 50 MG AND SENNOSIDES 8.6 MG 1 TABLET: 8.6; 5 TABLET, FILM COATED ORAL at 20:14

## 2021-01-06 RX ADMIN — ALLOPURINOL 100 MG: 100 TABLET ORAL at 21:25

## 2021-01-06 RX ADMIN — ALLOPURINOL 300 MG: 300 TABLET ORAL at 21:21

## 2021-01-06 RX ADMIN — CITALOPRAM HYDROBROMIDE 20 MG: 20 TABLET ORAL at 21:21

## 2021-01-06 RX ADMIN — Medication 5 MG: at 14:18

## 2021-01-06 RX ADMIN — FENTANYL CITRATE 50 MCG: 50 INJECTION, SOLUTION INTRAMUSCULAR; INTRAVENOUS at 13:48

## 2021-01-06 RX ADMIN — CEFAZOLIN SODIUM 3 G: 2 INJECTION, SOLUTION INTRAVENOUS at 13:10

## 2021-01-06 RX ADMIN — NEOSTIGMINE METHYLSULFATE 5 MG: 1 INJECTION, SOLUTION INTRAVENOUS at 15:59

## 2021-01-06 RX ADMIN — PROPOFOL 50 MG: 10 INJECTION, EMULSION INTRAVENOUS at 13:06

## 2021-01-06 RX ADMIN — FENTANYL CITRATE 50 MCG: 50 INJECTION, SOLUTION INTRAMUSCULAR; INTRAVENOUS at 13:18

## 2021-01-06 RX ADMIN — ACETAMINOPHEN 975 MG: 325 TABLET, FILM COATED ORAL at 20:12

## 2021-01-06 RX ADMIN — HYDROMORPHONE HYDROCHLORIDE 0.5 MG: 1 INJECTION, SOLUTION INTRAMUSCULAR; INTRAVENOUS; SUBCUTANEOUS at 13:47

## 2021-01-06 RX ADMIN — TILMANOCEPT 0.54 MILLICURIE: KIT at 12:50

## 2021-01-06 RX ADMIN — METOPROLOL TARTRATE 25 MG: 25 TABLET, FILM COATED ORAL at 21:21

## 2021-01-06 RX ADMIN — FENTANYL CITRATE 50 MCG: 50 INJECTION, SOLUTION INTRAMUSCULAR; INTRAVENOUS at 12:55

## 2021-01-06 RX ADMIN — LIDOCAINE HYDROCHLORIDE 100 MG: 20 INJECTION, SOLUTION INFILTRATION; PERINEURAL at 13:02

## 2021-01-06 RX ADMIN — PROPOFOL 200 MG: 10 INJECTION, EMULSION INTRAVENOUS at 13:02

## 2021-01-06 RX ADMIN — ROCURONIUM BROMIDE 40 MG: 10 INJECTION INTRAVENOUS at 13:05

## 2021-01-06 RX ADMIN — PROPOFOL 30 MG: 10 INJECTION, EMULSION INTRAVENOUS at 14:35

## 2021-01-06 RX ADMIN — FENTANYL CITRATE 50 MCG: 50 INJECTION, SOLUTION INTRAMUSCULAR; INTRAVENOUS at 14:35

## 2021-01-06 RX ADMIN — SODIUM CHLORIDE, POTASSIUM CHLORIDE, SODIUM LACTATE AND CALCIUM CHLORIDE: 600; 310; 30; 20 INJECTION, SOLUTION INTRAVENOUS at 11:48

## 2021-01-06 RX ADMIN — Medication 5 MG: at 13:24

## 2021-01-06 ASSESSMENT — ENCOUNTER SYMPTOMS: DYSRHYTHMIAS: 1

## 2021-01-06 ASSESSMENT — COPD QUESTIONNAIRES: COPD: 0

## 2021-01-06 ASSESSMENT — MIFFLIN-ST. JEOR: SCORE: 1612.09

## 2021-01-06 NOTE — ANESTHESIA PROCEDURE NOTES
Airway   Date/Time: 1/6/2021 1:08 PM   Patient location during procedure: OR    Staff -   Anesthesiologist:  Rodrigue Mike MD  CRNA: Waleska Plascencia  Performed By: CRNA    Consent for Airway   Urgency: elective    Indications and Patient Condition  Indications for airway management: juany-procedural  Induction type:intravenousMask difficulty assessment: 2 - vent by mask + OA or adjuvant +/- NMBA    Final Airway Details  Final airway type: endotracheal airway  Successful airway:ETT - single  Endotracheal Airway Details   ETT size (mm): 7.0  Cuffed: yes  Successful intubation technique: direct laryngoscopy and video laryngoscopy  Grade View of Cords: 1 (with VL; grade 3 with DL)  Adjucts: stylet  Measured from: lips  Secured at (cm): 22  Secured with: pink tape  Bite block used: None    Post intubation assessment   Placement verified by: capnometry, equal breath sounds and chest rise   Number of attempts at approach: 2  Secured with:pink tape  Ease of procedure: easy (easy glidescope intubation)  Dentition: IntactAdditional Comments  Patient reported very loose back left molar. Caution was taken with ventilation and intubation, examination post-intubation revealed no damage to said tooth or any other part of the mouth.

## 2021-01-06 NOTE — TELEPHONE ENCOUNTER
Routing refill request to provider for review/approval because:  Labs out of range:    TSH   Date Value Ref Range Status   12/29/2020 23.61 (H) 0.40 - 4.00 mU/L Final

## 2021-01-06 NOTE — BRIEF OP NOTE
[facility]    Brief Operative Note    Pre-operative diagnosis: Malignant neoplasm of upper-outer quadrant of left breast in female, estrogen receptor positive (H) [C50.412, Z17.0]  Post-operative diagnosis Same; Left Breast Cancer    Procedure: Procedure(s):  BILATERAL SIMPLE MASTECTOMY WITH LEFT SENTINEL LYMPH NODE BIOPSY     Surgeon: Surgeon(s) and Role:     * Beata Garcia MD - Primary     * Tucker Vidales PA-C - Assisting     Anesthesia: General   Estimated blood loss: Less than 50 ml  Drains: Sekou-Chao x2  Specimens:   ID Type Source Tests Collected by Time Destination   A : LEFT AXILLARY SENTINAL LYMPH NODE #1 Tissue Lymph Node, [place] SURGICAL PATHOLOGY EXAM Beata Garcia MD 1/6/2021  1:29 PM    B : LEFT BREAST Tissue Breast, Left SURGICAL PATHOLOGY EXAM Beata Garcia MD 1/6/2021  2:16 PM    C : LEFT AXILLARY SENTINAL LYMPH NODE #2 Tissue Lymph Node, [place] SURGICAL PATHOLOGY EXAM Beata Garcia MD 1/6/2021  2:18 PM    D : RIGHT BREAST Tissue Breast, Right SURGICAL PATHOLOGY EXAM Beata Garcia MD 1/6/2021  3:00 PM    E : LEFT BREAST EXCESS SKIN Tissue Breast, Left SURGICAL PATHOLOGY EXAM Beata Garcia MD 1/6/2021  3:14 PM      Findings:   None.  Complications: None.  Implants: * No implants in log *

## 2021-01-06 NOTE — ANESTHESIA CARE TRANSFER NOTE
Patient: Lisa Ferguson    Procedure(s):  BILATERAL SIMPLE MASTECTOMY WITH LEFT SENTINEL LYMPH NODE BIOPSY    Diagnosis: Malignant neoplasm of upper-outer quadrant of left breast in female, estrogen receptor positive (H) [C50.412, Z17.0]  Diagnosis Additional Information: No value filed.    Anesthesia Type:   General     Note:  Airway :ETT  Patient transferred to:PACU  Comments: Neuromuscular blockade reversed after TOF 4/4, spontaneous respirations, adequate tidal volumes, followed commands to voice, oropharynx suctioned with soft flexible catheter, extubated atraumatically, extubated with suction, airway patent after extubation.  Oxygen via facemask at 6 liters per minute to PACU. Oxygen tubing connected to wall O2 in PACU, SpO2, NiBP, and EKG monitors and alarms on and functioning, Everardo Hugger warmer connected to patient gown, report on patient's clinical status given to PACU RN.   Handoff Report: Identifed the Patient, Identified the Reponsible Provider, Reviewed the pertinent medical history, Discussed the surgical course, Reviewed Intra-OP anesthesia mangement and issues during anesthesia, Set expectations for post-procedure period and Allowed opportunity for questions and acknowledgement of understanding      Vitals: (Last set prior to Anesthesia Care Transfer)    CRNA VITALS  1/6/2021 1540 - 1/6/2021 1618      1/6/2021             Pulse:  88    SpO2:  90 %    Resp Rate (set):  10                Electronically Signed By: MARY Erickson CRNA  January 6, 2021  4:18 PM

## 2021-01-06 NOTE — ANESTHESIA POSTPROCEDURE EVALUATION
Patient: Lisa Ferguson    Procedure(s):  BILATERAL SIMPLE MASTECTOMY WITH LEFT SENTINEL LYMPH NODE BIOPSY    Diagnosis:Malignant neoplasm of upper-outer quadrant of left breast in female, estrogen receptor positive (H) [C50.412, Z17.0]  Diagnosis Additional Information: No value filed.    Anesthesia Type:  General    Note:  Anesthesia Post Evaluation    Patient location during evaluation: PACU  Patient participation: Able to fully participate in evaluation  Level of consciousness: awake and alert  Pain management: adequate  Airway patency: patent  Cardiovascular status: acceptable  Respiratory status: acceptable  Hydration status: acceptable  PONV: none     Anesthetic complications: None          Last vitals:  Vitals:    01/06/21 1630 01/06/21 1640 01/06/21 1650   BP: (!) 140/62 137/64 131/53   Pulse: 73 74 73   Resp: 11 12 20   Temp:      SpO2: 98% 98% 97%         Electronically Signed By: Pranay James MD  January 6, 2021  5:14 PM

## 2021-01-06 NOTE — OP NOTE
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  Injection of methylene blue dye for lymphatics mapping     Surgeon: Beata Garcia MD   Assistant(s): Tucker Vidales PA-C  The physician s assistant was medically necessary for their expertise in retraction and suctioning     Anesthesia: General    Estimated blood loss:   50 cc     Specimens: ID Type Source Tests Collected by Time Destination   A : LEFT AXILLARY SENTINAL LYMPH NODE #1 Tissue Lymph Node, [place] SURGICAL PATHOLOGY EXAM Beata Garcia MD 1/6/2021  1:29 PM    B : LEFT BREAST Tissue Breast, Left SURGICAL PATHOLOGY EXAM Beata Garcia MD 1/6/2021  2:16 PM    C : LEFT AXILLARY SENTINAL LYMPH NODE #2 Tissue Lymph Node, [place] SURGICAL PATHOLOGY EXAM Beata Garcia MD 1/6/2021  2:18 PM    D : RIGHT BREAST Tissue Breast, Right SURGICAL PATHOLOGY EXAM Beata Garcia MD 1/6/2021  3:00 PM    E : LEFT BREAST EXCESS SKIN Tissue Breast, Left SURGICAL PATHOLOGY EXAM Beata Garcia MD 1/6/2021  3:14 PM         INDICATION: Lisa is an 85yof who presented newly diagnosed multifocal left breast cancer. She had biopsy of two areas on her left breast on 11/20/2020. Biopsy revealed at 3:00, 9cm FN a grade 2 invasive LOBULAR carcinoma measuring 1.9cm and at 11:00, 10cm FN grade 2 invasive DUCTAL carcinoma measuring 1.5cm, ER 80% positive, ME 80% positive, HER 2 negative (equivocal by both IHC and FISH). She presented for a diagnostic mammogram on 11/16/2020 due to left lateral breast dimpling which she had noticed a few weeks prior. She has family history of breast cancer including a sister and niece who are BRCA 1 positive. She elected to proceed with bilateral mastectomies with left SLNB.   DESCRIPTION OF PROCEDURE: The patient was  taken to the operating room and placed on the table in supine position. The bilateral chest, breast, and axilla were prepped and draped in standard sterile fashion. A transverse elliptical incision incorporating the nipple and areola incision on the left breast was made. The incision was carried down into the subcutaneous tissue. Flaps were raised superiorly to the clavicle, medially to the lateral aspect of the sternum, inferiorly to the inframammary fold and laterally down to the chest wall, thereby completely excising the breast tissue. Hemostasis was maintained with electrocautery.The breast was then lifted from the pectoralis muscle including the pectoralis fascia using Bovie electrocautery. The breast was marked with a short suture superiorly and a long suture laterally. The breast was passed off the field as specimen.     We began our left axillary sentinel lymph node biopsy. The patient had been injected with a radionuclear pharmaceutical preoperatively. After prep and drape, we had injected methylene blue dye into the deep dermal tissue of the areola along the lateral aspect. We then used the Neoprobe to localize an area of increased activity in the axilla. We entered the axillary space. We then localized the area of increased activity, and isolated a lymph node from surrounding tissues. This was passed off the field as axillary sentinel lymph node #1. There was another node with counts of 10 which was sent as left axillary sentinel node #2. The remainder of the axilla was negative for blue dye, and there was minimal radioactivity. The frozen section analysis of the sentinel lymph node(s) revealed no evidence of malignancy.  We inspected the field carefully for hemostasis and irrigated with sterile saline.     We then packed the left chest with a moist laparotomy pad and turned our attention to the right breast. A transverse elliptical incision incorporating the nipple and areola incision on the right  breast was made. The incision was carried down into the subcutaneous tissue. Flaps were raised superiorly to the clavicle, medially to the lateral aspect of the sternum, inferiorly to the inframammary fold and laterally down to the chest wall, thereby completely excising the breast tissue. Hemostasis was maintained with electrocautery.The breast was then lifted from the pectoralis muscle including the pectoralis fascia using Bovie electrocautery. The breast was marked with a short suture superiorly and a long suture laterally. The breast was passed off the field as specimen.     A 15 fr round YAHAIRA was placed under the flaps and brought out through a lateral stab incision bilaterally. We excised excess skin bilaterally to allow for a more flat closure and avoid redundant tissue laterally. Once we were satisfied, we closed the deep dermis with interrupted 3-0 vicryl suture and the skin with a running 4-0 Vicryl subcuticular suture. Steri-strips were applied. The patient tolerated the procedure well. Sponge and instrument counts were correct.     Beata Garcia MD

## 2021-01-06 NOTE — ANESTHESIA PREPROCEDURE EVALUATION
Anesthesia Pre-Procedure Evaluation    Patient: Lisa Ferguson   MRN: [id number] : 1935          Preoperative Diagnosis: Malignant neoplasm of upper-outer quadrant of left breast in female, estrogen receptor positive (H) [C50.412, Z17.0]    Procedure(s):  BILATERAL SIMPLE MASTECTOMY WITH LEFT SENTINEL LYMPH NODE BIOPSY    Past Medical History:   Diagnosis Date     Anxiety      CAD (coronary artery disease)     angio , h/o cabg, sees Luisana Nelson NP, they follow the cholesterol     CHF (congestive heart failure) (H) 2014     DJD (degenerative joint disease)      History of colonoscopy 2000    due 2010     Hyperlipidemia LDL goal < 70     [place] [facility]     Hypertension goal BP (blood pressure) < 140/90      Hypothyroidism      IBS (irritable bowel syndrome)      Mitral regurgitation      Obesity      GLEN (obstructive sleep apnea) 2011     PUD (peptic ulcer disease)     h/o duodenal ulcer     RBBB (right bundle branch block)      Sciatica neuralgia     MRI shows spinal stenosis and a cyst on the spine, has received an epidural steroid injection     Past Surgical History:   Procedure Laterality Date     ARTHROSCOPY KNEE RT/LT      bilateral     C APPENDECTOMY       C CABG, ARTERIAL, THREE      LIMA-LAD, SVG-DIagnoal, SVG-OM, previous LAD-PCI, sees Dr Banuelos     COLONOSCOPY  2010    negative     HC DILATION/CURETTAGE DIAG/THER NON OB       HC EXCIS INTERDIGITAL NEUROMA,EA      mortons neuroma excision     HC REMOVAL GALLBLADDER       HERNIA REPAIR, INGUINAL RT/LT       SURGICAL HISTORY OF -       bilateral meniscal tears and surgery on these     SURGICAL HISTORY OF -    or so    one parathyroid removed     TONSILLECTOMY       TUBAL LIGATION         Anesthesia Evaluation     .             ROS/MED HX    ENT/Pulmonary:     (+)sleep apnea, , . .   (-) asthma and COPD   Neurologic:       Cardiovascular: Comment: RBBB    ECHO Final Conclusion   Visually  Estimated EF: 65-70%   Technically difficult study - contrast was used to enhance endocardial definition due to   suboptimal image quality.   Normal LV size and systolic function with estimated ejection fraction of 65-70%.   Mild left ventricular hypertrophy.   Mild biatrial enlargement.   Mild mitral regurgitation.   Mild right ventricular enlargement with  Mildly reduced right ventricular systolic function.   Normal IVC size, >50% collapse with respiration.    (+) Dyslipidemia, hypertension--CAD, angina-CABG-. : . CHF etiology: mildly reduced rv function Last EF: 65% . MEJÍA, . :. dysrhythmias (vtach) Irregular Heartbeat/Palpitations, valvular problems/murmurs type: MR .       METS/Exercise Tolerance:     Hematologic:         Musculoskeletal:         GI/Hepatic: Comment: H/o gastric ulcer    (+) GERD       Renal/Genitourinary:         Endo:     (+) thyroid problem hypothyroidism, Obesity, .      Psychiatric:     (+) psychiatric history anxiety      Infectious Disease:         Malignancy:         Other:                          Physical Exam      Airway   Mallampati: III  TM distance: >3 FB  Neck ROM: full    Dental   (+) upper dentures and partials  Comment: Loose teeth    Cardiovascular   Rhythm and rate: regular      Pulmonary    breath sounds clear to auscultation            Lab Results   Component Value Date    WBC 10.8 12/29/2020    HGB 12.9 12/29/2020    HCT 40.5 12/29/2020     12/29/2020     12/29/2020    POTASSIUM 3.6 01/06/2021    CHLORIDE 102 12/29/2020    CO2 29 12/29/2020    BUN 20 12/29/2020    CR 0.80 12/29/2020    GLC 97 12/29/2020    LUCIANO 9.9 12/29/2020    PHOS 3.9 07/08/2014    ALT 35 10/28/2019    AST 28 03/23/2015    ALKPHOS 99 07/08/2014    BILITOTAL 0.5 01/17/2011    TSH 23.61 (H) 12/29/2020    T4 0.59 (L) 12/29/2020       Preop Vitals  BP Readings from Last 3 Encounters:   01/06/21 (!) 149/66   12/29/20 133/62   10/16/20 132/48    Pulse Readings from Last 3 Encounters:   01/06/21 66  "  12/29/20 77   10/16/20 64      Resp Readings from Last 3 Encounters:   01/06/21 18   12/29/20 16   10/16/20 16    SpO2 Readings from Last 3 Encounters:   01/06/21 96%   12/29/20 94%   07/29/20 94%      Temp Readings from Last 1 Encounters:   01/06/21 36.6  C (97.8  F) (Temporal)    Ht Readings from Last 1 Encounters:   01/06/21 1.676 m (5' 6\")      Wt Readings from Last 1 Encounters:   01/06/21 114 kg (251 lb 6.4 oz)    Estimated body mass index is 40.58 kg/m  as calculated from the following:    Height as of this encounter: 1.676 m (5' 6\").    Weight as of this encounter: 114 kg (251 lb 6.4 oz).       Anesthesia Plan      History & Physical Review  History and physical reviewed and following examination; no interval change.    ASA Status:  3 .    NPO Status:  > 8 hours    Plan for General   PONV prophylaxis:  Ondansetron (or other 5HT-3) and Dexamethasone or Solumedrol         Postoperative Care      Consents  Anesthetic plan, risks, benefits and alternatives discussed with:  Patient..                 Angelique Peralta"

## 2021-01-07 LAB
ANION GAP SERPL CALCULATED.3IONS-SCNC: 4 MMOL/L (ref 3–14)
BUN SERPL-MCNC: 18 MG/DL (ref 7–30)
CALCIUM SERPL-MCNC: 9.3 MG/DL (ref 8.5–10.1)
CHLORIDE SERPL-SCNC: 101 MMOL/L (ref 94–109)
CO2 SERPL-SCNC: 28 MMOL/L (ref 20–32)
CREAT SERPL-MCNC: 0.79 MG/DL (ref 0.52–1.04)
GFR SERPL CREATININE-BSD FRML MDRD: 69 ML/MIN/{1.73_M2}
GLUCOSE SERPL-MCNC: 120 MG/DL (ref 70–99)
HGB BLD-MCNC: 11.2 G/DL (ref 11.7–15.7)
POTASSIUM SERPL-SCNC: 3.7 MMOL/L (ref 3.4–5.3)
SODIUM SERPL-SCNC: 133 MMOL/L (ref 133–144)

## 2021-01-07 PROCEDURE — 99207 PR CDG-CODE CATEGORY CHANGED: CPT | Performed by: PHYSICIAN ASSISTANT

## 2021-01-07 PROCEDURE — 258N000003 HC RX IP 258 OP 636: Performed by: INTERNAL MEDICINE

## 2021-01-07 PROCEDURE — 80048 BASIC METABOLIC PNL TOTAL CA: CPT | Performed by: PHYSICIAN ASSISTANT

## 2021-01-07 PROCEDURE — 85018 HEMOGLOBIN: CPT | Performed by: PHYSICIAN ASSISTANT

## 2021-01-07 PROCEDURE — 99214 OFFICE O/P EST MOD 30 MIN: CPT | Performed by: PHYSICIAN ASSISTANT

## 2021-01-07 PROCEDURE — 36415 COLL VENOUS BLD VENIPUNCTURE: CPT | Performed by: PHYSICIAN ASSISTANT

## 2021-01-07 PROCEDURE — 250N000013 HC RX MED GY IP 250 OP 250 PS 637: Mod: GY | Performed by: PHYSICIAN ASSISTANT

## 2021-01-07 RX ORDER — FENTANYL CITRATE 50 UG/ML
25-50 INJECTION, SOLUTION INTRAMUSCULAR; INTRAVENOUS
Status: DISCONTINUED | OUTPATIENT
Start: 2021-01-07 | End: 2021-01-07

## 2021-01-07 RX ADMIN — DOCUSATE SODIUM 50 MG AND SENNOSIDES 8.6 MG 1 TABLET: 8.6; 5 TABLET, FILM COATED ORAL at 08:37

## 2021-01-07 RX ADMIN — LEVOTHYROXINE SODIUM 125 MCG: 100 TABLET ORAL at 07:00

## 2021-01-07 RX ADMIN — METOPROLOL TARTRATE 50 MG: 50 TABLET, FILM COATED ORAL at 08:37

## 2021-01-07 RX ADMIN — DOCUSATE SODIUM 50 MG AND SENNOSIDES 8.6 MG 2 TABLET: 8.6; 5 TABLET, FILM COATED ORAL at 21:18

## 2021-01-07 RX ADMIN — TORSEMIDE 10 MG: 10 TABLET ORAL at 13:02

## 2021-01-07 RX ADMIN — ACETAMINOPHEN 975 MG: 325 TABLET, FILM COATED ORAL at 21:18

## 2021-01-07 RX ADMIN — SODIUM CHLORIDE, POTASSIUM CHLORIDE, SODIUM LACTATE AND CALCIUM CHLORIDE 500 ML: 600; 310; 30; 20 INJECTION, SOLUTION INTRAVENOUS at 01:14

## 2021-01-07 RX ADMIN — ALLOPURINOL 100 MG: 100 TABLET ORAL at 21:17

## 2021-01-07 RX ADMIN — METOPROLOL TARTRATE 25 MG: 25 TABLET, FILM COATED ORAL at 22:22

## 2021-01-07 RX ADMIN — ACETAMINOPHEN 975 MG: 325 TABLET, FILM COATED ORAL at 12:03

## 2021-01-07 RX ADMIN — ACETAMINOPHEN 975 MG: 325 TABLET, FILM COATED ORAL at 04:11

## 2021-01-07 RX ADMIN — ALLOPURINOL 300 MG: 300 TABLET ORAL at 21:17

## 2021-01-07 RX ADMIN — LOSARTAN POTASSIUM 50 MG: 50 TABLET, FILM COATED ORAL at 08:37

## 2021-01-07 RX ADMIN — CITALOPRAM HYDROBROMIDE 20 MG: 20 TABLET ORAL at 21:17

## 2021-01-07 NOTE — PROGRESS NOTES
[facility]    Medicine Progress Note - Hospitalist Service       Date of Admission:  1/6/2021    Assessment & Plan       Lisa Ferguson is an 85-year-old female with a history significant for breast cancer, coronary artery disease, heart failure with preserved ejection fraction, duodenal ulcer, GLEN, hypertension, hyperlipidemia, obesity, hypothyroidism, hypercalcemia, right bundle branch block, who underwent bilateral simple mastectomies with left sentinel lymph node biopsy for a malignant neoplasm of the left breast on 1/6/2020 with Dr. Garcia.  Hospitalist Service was consulted for postoperative medical co-management.     S/P simple mastectomies with left sentinel lymph node biopsy for a malignant neoplasm of the left breast (1/6/2020):  Procedure was performed by Dr. Garcia under general anesthesia with an estimated blood loss less than 50 mL.  There were no complications.  She is hemodynamically stable postoperatively.   -- Primary management is per Surgical Service including DVT prophylaxis, pain control, and drain management. L drain with 50 ccs serosanguinous fluid and R drain with 25 ccs during my evaluation   -- Low fiber diet     Post-op urinary retention: Low urine output overnight s/p 500 ml LR bolus. Subsequent bladder scan for 458 ccs and straight cath for 400 ccs.   -- Voided without significant PVRs this AM, continue to monitor   -- Bladder scan and straight cath per protocol     Coronary artery disease, status post CABG (1999) with chronic stable angina  Hypertension, dyslipidemia:  Denies any current or recent chest pain.    -- Continue her prior to admission Lopressor 50 mg qam and 25 mg qhs, Losartan 50 mg qam, Crestor 10 mg po qd   -- Defer resumption of ASA 81 mg po every day to primary team     Heart failure with preserved ejection fraction, not in acute exacerbation: Echocardiogram 9/2020 with EF 65-70%, mildly reduced RV function. Appears euvolemic at  present.    -- Resume Torsemide 20 mg qam and 10 mg with lunch on 1/8/2020. Pt has advanced diet.     Obstructive sleep apnea (GLEN):  Continue CPAP per home setting.     Hypothyroidism:  Continue prior to admission levothyroxine 125 mcg daily.    Depression: Continue PTA Celexa 20 mg at bedtime     Gout: No active flares. Continue PTA Allopurinol      Diet: Advance Diet as Tolerated: Low Fiber    DVT Prophylaxis: Pneumatic Compression Devices and Defer to primary service  Oglesby Catheter: not present  Code Status: Full Code           Disposition Plan   Expected discharge: Tomorrow, recommended to prior living arrangement once urinary retention improved and drain output slows down. Per Surgical team. .  Entered: Rosa Johnson PA-C 01/07/2021, 9:15 AM       The patient's care was discussed with the Attending Physician, Dr. Wesley, Bedside Nurse and Patient.    Rosa Johnson PA-C  Hospitalist Service  [facility]  Contact information available via [facility] Paging/Directory  ______________________________________________________________________    Interval History   No acute events overnight. Afebrile. Pain well managed. Pt has no concerns.     Data reviewed today: I reviewed all medications, new labs and imaging results over the last 24 hours. I personally reviewed all labs and imaging to date.     Physical Exam   Vital Signs: Temp: 95.4  F (35.2  C) Temp src: Oral BP: 123/45 Pulse: 60   Resp: 16 SpO2: 92 % O2 Device: None (Room air) Oxygen Delivery: 2 LPM  Weight: 251 lbs 6.4 oz    CONSTITUTIONAL: Pt sitting at the edge of the bed, dressed in hospital garb. Appears comfortable. Cooperative with interview.   HEENT: Normocephalic, atraumatic. Negative for conjunctival redness or scleral icterus.  Oral mucosa pink and moist; negative for ulcerations, erythema, or exudates.  Dentition in good repair.   CARDIOVASCULAR: RRR, no murmurs, rubs, or extra heart sounds  appreciated. Pulses +2/4 and regular in upper and lower extremities, bilaterally.   RESPIRATORY: No increased work of breathing. CTA, bilat; no wheezes, rales, or rhonchi appreciated.  GASTROINTESTINAL:  Abdomen soft, non-distended. BS auscultated in all four quadrants. Negative for tenderness to palpation.  No masses or organomegaly noted.  MUSCULOSKELETAL: No gross deformities noted. Normal muscle tone.   HEMATOLOGIC/LYMPHATIC/IMMUNOLOGIC: Negative for lower extremity edema, bilaterally.  NEUROLOGIC: Alert and oriented to person, place, and time.  strength intact.   SKIN: Surgical sites dressed. Bilateral drains in place, 50 ml serosanguinous output on the left and 25 ml on the right.     Data   Recent Labs   Lab 01/07/21  0739 01/07/21  0738 01/06/21  1116   HGB  --  11.2*  --      --   --    POTASSIUM 3.7  --  3.6   CHLORIDE 101  --   --    CO2 28  --   --    BUN 18  --   --    CR 0.79  --   --    ANIONGAP 4  --   --    LUCIANO 9.3  --   --    *  --   --      Recent Results (from the past 24 hour(s))   NM Lymphoscintigraphy Injection only    Narrative    Examination:  NM LYMPHOSCINTIGRAPHY INJECTION ONLY    Date: 1/6/2021 12:53 PM     Indication:  Malignant neoplasm of upper-outer quadrant of left breast  in female, estrogen receptor positive (H).    Technique:    537uCi Lymphoseek was administered intradermally at the 2:00 position,  periareolar LEFT breast.     Images were not obtained as part of the localization procedure.    LAURENCE WOODSON MD     Medications       acetaminophen  975 mg Oral Q8H     allopurinol  100 mg Oral At Bedtime     allopurinol  300 mg Oral At Bedtime     citalopram  20 mg Oral At Bedtime     levothyroxine  125 mcg Oral QAM AC     losartan  50 mg Oral QAM     metoprolol tartrate  25 mg Oral At Bedtime     metoprolol tartrate  50 mg Oral QAM     rosuvastatin  10 mg Oral Daily     senna-docusate  1 tablet Oral BID    Or     senna-docusate  2 tablet Oral BID     sodium chloride  (PF)  3 mL Intracatheter Q8H     torsemide  10 mg Oral Daily with lunch     torsemide  20 mg Oral QAM

## 2021-01-07 NOTE — PROGRESS NOTES
3812-2335: VSS on RA. Up independently. Pain managed with tylenol and ice. JPs patent, ace wraps in place. IV SL. Low fiber diet. Calls appropriately, very pleasant.

## 2021-01-07 NOTE — PLAN OF CARE
POD #0, arrived from PACU this evening.  Denies pain.  Dressing CDI, 2 JPs present.  IV SL.  2L NC.  Due to void after surgery, no carty present.  Will continue to monitor.

## 2021-01-07 NOTE — PROGRESS NOTES
Hospitalist service cross-cover note:     Ordered 500 cc LR bolus for no urine output since surgery. Bladder scan low.     Hima Bright MD   Hospitalist  [phone]

## 2021-01-07 NOTE — CONSULTS
Consult Date:  01/06/2021      REQUESTING PROVIDER:  Anthony Vidales PA-C.      REASON FOR CONSULTATION:  Medical co-management.      HISTORY OF PRESENT ILLNESS:  Lisa patient is an 85-year-old female with a past medical history significant for breast cancer, coronary artery disease with prior coronary artery bypass graft, heart failure with preserved ejection fraction, GLEN, hypertension, obesity, hypothyroidism, right bundle branch block, hyperlipidemia, hypercalcemia, status post parathyroidectomy, who is postoperative day 0 status post bilateral simple mastectomy with left sentinel lymph node biopsy for a malignant neoplasm of the left breast.  Hospitalist Service was asked to see the patient for postoperative medical co-management.  The procedure was performed by Dr. Garcia under general anesthesia with an estimated blood loss less than 50 mL.  The patient has 2 YAHAIRA drains in place and pathology is pending.  There were no complications identified.  The patient is presently evaluated in her room on the medical floor.  She reports she is doing well after surgery.  Presently rates her pain as 2/10.  She denies any nausea or vomiting.  She has no dizziness.  Denies chest pain or shortness of breath.  She is tolerating a clear liquid diet.  She is due to void.  She did not take her diuretics in the morning of surgery.  She has no concerns at this time.      REVIEW OF SYSTEMS:  A 10-point review of systems was conducted and is negative aside from the information in the HPI.      PAST MEDICAL HISTORY:   1.  Breast cancer.   2.  Coronary artery disease, status post CABG in 1999 with chronic stable angina.   3.  Heart failure with preserved ejection fraction.  Most recent echocardiogram in 09/2020 showed EF of 65% to 70% with mildly reduced RV function.   4.  History of duodenal ulcer.   5.  GLEN utilizing CPAP.   6.  Hypertension.   7.  Obesity.   8.  Hypothyroidism with recent lab values showing elevated TSH and  decreased free T4; however, the patient reports poor compliance and has since been taking medication regularly with plans for recheck.   9.  Hypercalcemia, status post parathyroidectomy.   10.  Right bundle branch block.   11.  Hyperlipidemia.   12.  Mitral regurgitation.      PAST SURGICAL HISTORY:    Past Surgical History:   Procedure Laterality Date     ARTHROSCOPY KNEE RT/LT      bilateral     C APPENDECTOMY       C CABG, ARTERIAL, THREE  1999    LIMA-LAD, SVG-DIagnoal, SVG-OM, previous LAD-PCI, sees Dr Banuelos     COLONOSCOPY  6/2010    negative     HC DILATION/CURETTAGE DIAG/THER NON OB       HC EXCIS INTERDIGITAL NEUROMA,EA      mortons neuroma excision     HC REMOVAL GALLBLADDER  1994     HERNIA REPAIR, INGUINAL RT/LT       MASTECTOMY SIMPLE BILATERAL, SENTINEL NODE BILATERAL, COMBINED Bilateral 1/6/2021    Procedure: BILATERAL SIMPLE MASTECTOMY WITH LEFT SENTINEL LYMPH NODE BIOPSY;  Surgeon: Beata Garcia MD;  Location: [facility] OR     SURGICAL HISTORY OF -       bilateral meniscal tears and surgery on these     SURGICAL HISTORY OF -   1999 or so    one parathyroid removed     TONSILLECTOMY       TUBAL LIGATION            ALLERGIES:       Allergies   Allergen Reactions     Adhesive Tape      Atorvastatin Other (See Comments) and Muscle Pain (Myalgia)     lipitor  Myalgia     Cortisone      Insomnia, burning in chest, flushed     Nickel Other (See Comments)     Raw weepy skin  rash     Tetracycline Diarrhea     Vicodin [Hydrocodone-Acetaminophen] Other (See Comments)     Hallucinations     Liquid Adhesive      Other reaction(s): Contact Dermatitis        PRIOR TO ADMISSION MEDICATIONS:    Medications Prior to Admission   Medication Sig Dispense Refill Last Dose     allopurinol (ZYLOPRIM) 100 MG tablet TAKE 1 TABLET BY MOUTH DAILY. TAKE ALONG WITH THE 300MG TABLET FOR A TOTAL DAILY DOSE OF 400MG. (Patient taking differently: Take 100 mg by mouth At Bedtime (in addition to 300 mg dose to = 400 mg dose total)) 90  tablet 0 1/5/2021 at 2300     allopurinol (ZYLOPRIM) 300 MG tablet Take 1 tablet (300 mg) by mouth daily Taken with 100 milligram tab to = total daily dose of 400 milligrams (Patient taking differently: Take 300 mg by mouth At Bedtime (Taken with 100 milligram tab to = total daily dose of 400 milligrams)) 90 tablet 0 1/5/2021 at 2300     aspirin 81 MG EC tablet Take 81 mg by mouth every morning   1/1/2021 at AM     betamethasone dipropionate (DIPROSONE) 0.05 % external lotion Apply topically as needed (Patient taking differently: Apply topically daily as needed ) 60 mL 1 Past Week at PRN     citalopram (CELEXA) 20 MG tablet Take 1 tablet (20 mg) by mouth daily Please make appointment within 30 days (Patient taking differently: Take 20 mg by mouth At Bedtime Please make appointment within 30 days) 30 tablet 0 1/5/2021 at 2200     levothyroxine (SYNTHROID/LEVOTHROID) 125 MCG tablet TAKE 1 TABLET BY MOUTH DAILY. RECHECK TSH(THYROID TEST) IN 6 TO 8 WEEKS (Patient taking differently: Take 125 mcg by mouth every morning ) 90 tablet 1 1/6/2021 at 0700     losartan (COZAAR) 100 MG tablet Take 50 mg by mouth every morning (0.5 X 100 mg)   1/6/2021 at 0700     metoprolol tartrate (LOPRESSOR) 25 MG tablet Take 50 mg by mouth every morning (2 X 25 mg)  (in addition to bedtime dose)   1/6/2021 at 0700     metoprolol tartrate (LOPRESSOR) 25 MG tablet Take 25 mg by mouth At Bedtime (in addition to morning dose)   1/5/2021 at 2200     multivitamin w/minerals (MULTI-VITAMIN) tablet Take 1 tablet by mouth every morning   1/2/2021 at AM     torsemide (DEMADEX) 10 MG tablet Take 20 mg by mouth every morning (2 X 10 mg)  (in addition to lunchtime dose)   1/5/2021 at 0700     torsemide (DEMADEX) 10 MG tablet Take 10 mg by mouth daily (with lunch) (in addition to morning dose)   1/5/2021 at 0700     amoxicillin (AMOXIL) 500 MG capsule TK 4 CS PO 1 HOUR BEFORE DENTAL APPOINTMENT (Patient taking differently: Take 2,000 mg by mouth daily as  needed TK 4 CS PO 1 HOUR BEFORE DENTAL APPOINTMENT) 4 capsule 3 More than a month at AM     LORazepam (ATIVAN) 0.5 MG tablet Take 1 tablet (0.5 mg) by mouth every 6 hours as needed for anxiety 10 tablet 1 More than a month at PRN     order for DME Equipment being ordered: CPAP 1 each 0      rosuvastatin (CRESTOR) 10 MG tablet Take 1 tablet (10 mg) by mouth daily 90 tablet 1 Not started yet.          SOCIAL HISTORY:  The patient reports social alcohol use, denies drug use.  She is a former smoker who smoked a pack a day for 20 years and quit at age 42.      FAMILY HISTORY:  Father had a history of diabetes, coronary artery disease and hypertension.  Her mother also had hypertension.      LABORATORY EVALUATION:  Preop labs from 01/29/2020 showed a creatinine of 0.80, hemoglobin 12.9, hematocrit 40.5 and platelet count 337.      PHYSICAL EXAMINATION:   VITAL SIGNS:  Temperature 95.7, heart rate 87, blood pressure 167/61, respiratory rate 14, oxygen saturation is 95% on 2 liters nasal cannula.   GENERAL:  Alert and oriented x4, sitting up in bed, appears comfortable, appropriately conversant.   EYES:  Sclerae are anicteric, EOMI.   ENT:  Mucous membranes are moist.   CARDIOVASCULAR:  Heart tones are distant, regular rate and rhythm.   RESPIRATORY:  Lungs are clear to auscultation bilaterally, no increased work of breathing or wheezing.   GASTROINTESTINAL:  Hypoactive bowel sounds.  Abdomen is soft and nontender.   MUSCULOSKELETAL:  The patient moves all 4 extremities.  No significant lower extremity edema.   NEUROLOGIC:  Speech is clear.  Face symmetric.  Follows commands.  No focal deficits.      ASSESSMENT AND PLAN:  Lisa Ferguson is an 85-year-old female with a history significant for breast cancer, coronary artery disease, heart failure with preserved ejection fraction, duodenal ulcer, GLEN, hypertension, hyperlipidemia, obesity, hypothyroidism, hypercalcemia, right bundle branch block, who is postoperative day 0  status post bilateral simple mastectomy with left sentinel lymph node biopsy for a malignant neoplasm of the left breast.  Hospitalist Service was consulted for postoperative medical co-management.   1.  Postoperative day 0 status post bilateral simple mastectomy with left sentinel lymph node biopsy for a malignant neoplasm of the left breast.  Procedure was performed by Dr. Garcia under general anesthesia with an estimated blood loss less than 50 mL.  There were no complications.  She is hemodynamically stable postoperatively.   -- Primary management is per Surgical Service including DVT prophylaxis and pain control.   -- BP management per primary service.   -- Currently on clear liquids with plans to ADAT.   2.  Coronary artery disease, status post CABG with chronic stable angina.  The patient denies any current or recent chest pain.  We will continue her prior to admission metoprolol, losartan, Crestor.  Aspirin to be resumed when okay with Surgery.   3.  Heart failure with preserved ejection fraction.  Appears euvolemic at present.  We will hold her prior to admission torsemide until p.o. intake increases and reevaluate volume status in the morning.  Recommend monitoring intake and output.   4.  Hypertension.  The patient is moderately hypertensive after her procedure.  We will continue her prior to admission metoprolol and losartan with hold parameters.   5.  Obstructive sleep apnea (GLEN).  Continue CPAP per home setting.   6.  Hypothyroidism.  Continue prior to admission levothyroxine.  She is taking 125 mcg daily.   7.  Hyperlipidemia.  Continue admission statin.      Hospitalist Service will continue to follow along.  We appreciate the consultation.      The patient was seen and examined with Dr. Zach Aguilera who agrees with the above plan.         PRASANNA AGUILERA MD       As dictated by AURY HILARIO PA-C            D: 01/06/2021   T: 01/06/2021   MT:       Name:     ROBERT RIZO   MRN:       [id number]        Account:       [id number]   :      1935           Consult Date:  2021      Document: [id number]       cc: BK MIXON PA-C

## 2021-01-07 NOTE — PLAN OF CARE
POD#1 from bilat mastectomy w/ L lymph removal. Pt Aox4, VSS on RA, SBA, CMS intact. Pt tolerating low fiber diet- adequate fluid intake. PIV SL. Pain controlled w/ scheduled tylenol and ice. ACE wrap CDI. JPx2 CDI w/ bloody output. Pt voiding w/o difficulty, + flatus, + BS, - BM. Plan for pt to discharge tomorrow pending pt progress.

## 2021-01-07 NOTE — PROGRESS NOTES
"General Surgery Note    Stable S/P Bilateral Simple Mastectomies with Lt Ax SLN biopsy  POD2    -Discussed surgery findings  -Keep ACE wrap on.  If too bunched up, can take down and reapply when she is sitting up.  -Monitor YAHAIRA drains.  Teach drain care  -Post Op Urinary Retention: Straight cath PRN unit routine.    -Encourage up out of bed  -Appreciate Hospitalist team following  -Dispo: likely home tomorrow if retention better  -Follow up in 2 weeks  -Changed admission status to Inpatient     Doing well.  No pain.  Unable to void.  Denies numbness or tingling to UE    NAD, very pleasant, A&O  /45 (BP Location: Right arm)   Pulse 60   Temp 95.4  F (35.2  C) (Oral)   Resp 16   Ht 1.676 m (5' 6\")   Wt 114 kg (251 lb 6.4 oz)   SpO2 92%   BMI 40.58 kg/m      Intake/Output Summary (Last 24 hours) at 1/7/2021 0826  Last data filed at 1/7/2021 0700  Gross per 24 hour   Intake 90 ml   Output 600 ml   Net -510 ml     Chest: ACE wrapped and taped dressing CDI  YAHAIRA: serosang, 100 and 100mL out since surgery  "

## 2021-01-07 NOTE — PLAN OF CARE
Pt A/O. VSS on RA. On home CPAP overnight. C/o minimal incisional pain, scheduled Tylenol and ice effective. Bilat YAHAIRA drains w/ bloody output. Ace wrap to chest. Capno WNL. PIV SL. Tolerating clears/fulls. BS hypo. Pt unable to void. Bladder scanned for 458 ml, straight cath for 400 ml. Up SBA.

## 2021-01-08 VITALS
HEIGHT: 66 IN | TEMPERATURE: 97.1 F | OXYGEN SATURATION: 95 % | HEART RATE: 61 BPM | WEIGHT: 251.4 LBS | RESPIRATION RATE: 18 BRPM | DIASTOLIC BLOOD PRESSURE: 42 MMHG | SYSTOLIC BLOOD PRESSURE: 126 MMHG | BODY MASS INDEX: 40.4 KG/M2

## 2021-01-08 LAB
GLUCOSE BLDC GLUCOMTR-MCNC: 102 MG/DL (ref 70–99)
LAB SCANNED RESULT: NORMAL

## 2021-01-08 PROCEDURE — 99214 OFFICE O/P EST MOD 30 MIN: CPT | Performed by: PHYSICIAN ASSISTANT

## 2021-01-08 PROCEDURE — 250N000013 HC RX MED GY IP 250 OP 250 PS 637: Mod: GY | Performed by: PHYSICIAN ASSISTANT

## 2021-01-08 PROCEDURE — 999N001017 HC STATISTIC GLUCOSE BY METER IP

## 2021-01-08 PROCEDURE — 99207 PR CDG-CODE CATEGORY CHANGED: CPT | Performed by: PHYSICIAN ASSISTANT

## 2021-01-08 RX ORDER — ACETAMINOPHEN 325 MG/1
650 TABLET ORAL EVERY 4 HOURS PRN
Qty: 30 TABLET | Refills: 0 | COMMUNITY
Start: 2021-01-09 | End: 2021-02-18

## 2021-01-08 RX ORDER — OXYCODONE HYDROCHLORIDE 5 MG/1
2.5 TABLET ORAL EVERY 4 HOURS PRN
Qty: 5 TABLET | Refills: 0 | Status: SHIPPED | OUTPATIENT
Start: 2021-01-08 | End: 2021-01-21

## 2021-01-08 RX ORDER — AMOXICILLIN 250 MG
1 CAPSULE ORAL 2 TIMES DAILY
Qty: 30 TABLET | Refills: 0 | Status: SHIPPED | OUTPATIENT
Start: 2021-01-08 | End: 2021-01-21

## 2021-01-08 RX ADMIN — LEVOTHYROXINE SODIUM 125 MCG: 100 TABLET ORAL at 05:48

## 2021-01-08 RX ADMIN — DOCUSATE SODIUM 50 MG AND SENNOSIDES 8.6 MG 2 TABLET: 8.6; 5 TABLET, FILM COATED ORAL at 08:22

## 2021-01-08 RX ADMIN — METOPROLOL TARTRATE 50 MG: 50 TABLET, FILM COATED ORAL at 08:23

## 2021-01-08 RX ADMIN — LOSARTAN POTASSIUM 50 MG: 50 TABLET, FILM COATED ORAL at 08:22

## 2021-01-08 RX ADMIN — ACETAMINOPHEN 975 MG: 325 TABLET, FILM COATED ORAL at 05:48

## 2021-01-08 RX ADMIN — TORSEMIDE 20 MG: 20 TABLET ORAL at 08:23

## 2021-01-08 NOTE — DISCHARGE INSTRUCTIONS
[facility] - [facility]  Discharge Instructions: Post-Operative Mastectomy    ACTIVITY    Take frequent, short walks and increase your activity gradually.      Avoid strenuous physical activity or heavy lifting greater than 15-20 lbs. for 2 weeks on the side of surgery.  You may climb stairs.     Gentle rotation and stretching of your arms and shoulders will prevent joint stiffness.    You may drive without restrictions when you are not using any prescription pain medication and feel comfortable in a car.    You may return to work/school when you are comfortable without any prescription pain medication.    WOUND CARE    You may remove your ACE wrap/dressing and shower 48 hours after the surgery.  Pat your incisions dry and leave them open to air.  Re-apply dressing (Band-Aids or gauze/tape) as needed for drainage.    You may have steri-strips (looks like white tape) or skin glue on your incisions.  You may peel off the steri-strips 2 weeks after your surgery if they have not peeled off on their own.  If you have skin glue, it will peel up and fall off on its own.    Do not soak your incisions in a tub or pool for 2 weeks.     Do not apply any lotions, creams, or ointments to your incisions.    A ridge under your incisions is normal and will gradually resolve.    Wear the mastectomy camisole for comfort.    You may have 1-2 drains at your surgical site, refer to your drain care instructions.  Record output totals daily.  You will need to schedule an appointment for drain removal when your output is less than 30 mL per day for 1-2 days or 2 weeks, whichever comes first.    DIET    Start with liquids, then gradually resume your regular diet as tolerated.     Drink plenty of fluids to stay hydrated.    PAIN    Expect some tenderness and discomfort at the incision site(s).  Use the prescribed pain medication at your discretion.  Expect gradual resolution of your pain over several days.    You may take  ibuprofen with food (unless you have been told not to) or acetaminophen/Tylenol instead of or in addition to your prescribed pain medication.    Do not drink alcohol or drive while you are taking pain medications.    EXPECTATIONS    Pain medications can cause constipation.  Limit use when possible.  Take over the counter stool softener/stimulant, such as Colace or Senna, 1-2 times a day with plenty of water.  You may take a mild over the counter laxative, such as Miralax or a suppository, as needed.      You may discontinue these medications once you are having regular bowel movements and/or are no longer taking your narcotic pain medication.    Blue dye may have been used during your surgery to locate lymph nodes and can cause your urine to be blue/green for several days after surgery.  This is not a cause for concern and will resolve on its own.     RETURN APPOINTMENT    Follow up in our office when your drain is ready to be removed.  You will also follow up with your surgeon in 2 weeks.  Please call our office at [phone] to schedule your appointment.    CALL OUR OFFICE -[phone] IF YOU HAVE:     Chills or fever above 101 F.    Increased redness, warmth, or drainage at your incisions.    Significant bleeding or swelling.    Pain not relieved by your pain medication or rest.    Increasing pain after the first 48 hours.    Any other concerns or questions.               **If you have concerns or questions about your procedure,    please contact Dr. Garcia at  [phone]**

## 2021-01-08 NOTE — PROGRESS NOTES
[facility]    Medicine Progress Note - Hospitalist Service       Date of Admission:  1/6/2021    Assessment & Plan       Lisa Ferguson is an 85-year-old female with a history significant for breast cancer, coronary artery disease, heart failure with preserved ejection fraction, duodenal ulcer, GLEN, hypertension, hyperlipidemia, obesity, hypothyroidism, hypercalcemia, right bundle branch block, who underwent bilateral simple mastectomies with left sentinel lymph node biopsy for a malignant neoplasm of the left breast on 1/6/2020 with Dr. Garcia.  Hospitalist Service was consulted for postoperative medical co-management.     S/P simple mastectomies with left sentinel lymph node biopsy for a malignant neoplasm of the left breast (1/6/2020):  Procedure was performed by Dr. Garcia under general anesthesia with an estimated blood loss less than 50 mL.  There were no complications.  She is hemodynamically stable postoperatively.   -- Primary management is per Surgical Service including DVT prophylaxis, pain control, and drain management. L drain with 25 ccs serosanguinous fluid and R drain with just under 25 ccs during my evaluation   -- Bowel regimen in place   -- Low fiber diet     Post-op urinary retention, resolved: Low urine output overnight s/p 500 ml LR bolus. Subsequent bladder scan for 458 ccs and straight cath for 400 ccs.   -- Retention resolved throughout the day 1/7  -- Bladder scan and straight cath per protocol     Constipation: No BM since day of surgery. BS active, abdomen soft, non-tender.   -- Bowel regimen in place     Coronary artery disease, status post CABG (1999) with chronic stable angina  Hypertension, dyslipidemia:  Denies any current or recent chest pain.    -- Continue her prior to admission Lopressor 50 mg qam and 25 mg qhs, Losartan 50 mg qam, Crestor 10 mg po qd   -- Defer resumption of ASA 81 mg po every day to primary team     Heart failure with preserved  ejection fraction, not in acute exacerbation: Echocardiogram 9/2020 with EF 65-70%, mildly reduced RV function. Appears euvolemic at present.    -- Continue Torsemide 20 mg qam and 10 mg with lunch    Obstructive sleep apnea (GLEN):  Continue CPAP per home setting.     Hypothyroidism:  Continue prior to admission levothyroxine 125 mcg daily.    Depression: Continue PTA Celexa 20 mg at bedtime     Gout: No active flares. Continue PTA Allopurinol      Diet: Advance Diet as Tolerated: Low Fiber    DVT Prophylaxis: Pneumatic Compression Devices and Defer to primary service  Oglesby Catheter: not present  Code Status: Full Code         Disposition Plan   Expected discharge: Today per Surgery (primary team)  Entered: Rosa Johnson PA-C 01/08/2021, 8:30 AM       The patient's care was discussed with the Attending Physician, Dr. Wesley, Bedside Nurse and Patient.    Rosa Johnson PA-C  Hospitalist Service  [facility]  Contact information available via [facility] Paging/Directory  ______________________________________________________________________    Interval History   No acute events overnight. Afebrile. Pain well managed. Pt has no concerns. Had some issues with hiccups and sensation of food being stuck in her throat after eating yesterday, since resolved this AM. Tolerated breakfast without difficulty. No BM since day of surgery.     Data reviewed today: I reviewed all medications, new labs and imaging results over the last 24 hours. I personally reviewed all labs and imaging to date.     Physical Exam   Vital Signs: Temp: 97.1  F (36.2  C) Temp src: Oral BP: 126/42 Pulse: 61   Resp: 18 SpO2: 95 % O2 Device: None (Room air)    Weight: 251 lbs 6.4 oz    CONSTITUTIONAL: Pt sitting at the edge of the bed, dressed in hospital garb. Appears comfortable. Cooperative with interview.   HEENT: Normocephalic, atraumatic. Negative for conjunctival redness or scleral  icterus.  Oral mucosa pink and moist; negative for ulcerations, erythema, or exudates.  Dentition in good repair.   CARDIOVASCULAR: RRR, no murmurs, rubs, or extra heart sounds appreciated. Pulses +2/4 and regular in upper and lower extremities, bilaterally.   RESPIRATORY: No increased work of breathing. CTA, bilat; no wheezes, rales, or rhonchi appreciated.  GASTROINTESTINAL:  Abdomen soft, non-distended. BS auscultated in all four quadrants. Negative for tenderness to palpation.  No masses or organomegaly noted.  MUSCULOSKELETAL: No gross deformities noted. Normal muscle tone.   HEMATOLOGIC/LYMPHATIC/IMMUNOLOGIC: Negative for lower extremity edema, bilaterally.  NEUROLOGIC: Alert and oriented to person, place, and time.  strength intact.   SKIN: Surgical sites dressed. Bilateral drains in place, 25 ml serosanguinous output on the left and a little less than 25 ml on the right.     Data   Recent Labs   Lab 01/07/21  0739 01/07/21  0738 01/06/21  1116   HGB  --  11.2*  --      --   --    POTASSIUM 3.7  --  3.6   CHLORIDE 101  --   --    CO2 28  --   --    BUN 18  --   --    CR 0.79  --   --    ANIONGAP 4  --   --    LUCIANO 9.3  --   --    *  --   --      No results found for this or any previous visit (from the past 24 hour(s)).  Medications       acetaminophen  975 mg Oral Q8H     allopurinol  100 mg Oral At Bedtime     allopurinol  300 mg Oral At Bedtime     citalopram  20 mg Oral At Bedtime     levothyroxine  125 mcg Oral QAM AC     losartan  50 mg Oral QAM     metoprolol tartrate  25 mg Oral At Bedtime     metoprolol tartrate  50 mg Oral QAM     rosuvastatin  10 mg Oral Daily     senna-docusate  1 tablet Oral BID    Or     senna-docusate  2 tablet Oral BID     sodium chloride (PF)  3 mL Intracatheter Q8H     torsemide  10 mg Oral Daily with lunch     torsemide  20 mg Oral QAM

## 2021-01-08 NOTE — DISCHARGE SUMMARY
[facility] Discharge Summary    Lisa Ferguson MRN# [id number]   Age: 85 year old YOB: 1935     Date of Admission:  1/6/2021  Date of Discharge:  1/8/2021  Admitting Provider:  Beata Garcia MD  Discharge Provider:  Beata Garcia MD  Discharging Service: General Surgery     Primary Provider: Ziyad Isaac  Primary Care Physician Phone Number: [phone]          Admission Diagnoses:   Principle Diagnosis: Malignant neoplasm of upper-outer quadrant of left breast in female, estrogen receptor positive (H) [C50.412, Z17.0]         Discharge Diagnosis:   Malignant neoplasm of upper-outer quadrant of left breast in female, estrogen receptor positive (H) [C50.412, Z17.0]          Procedures:   Procedure(s): Bilateral mastectomies with left SLNB            Discharge Medications:     Current Discharge Medication List      CONTINUE these medications which have NOT CHANGED    Details   !! allopurinol (ZYLOPRIM) 100 MG tablet TAKE 1 TABLET BY MOUTH DAILY. TAKE ALONG WITH THE 300MG TABLET FOR A TOTAL DAILY DOSE OF 400MG.  Qty: 90 tablet, Refills: 0    Comments: **Patient requests 90 days supply**  Associated Diagnoses: Acute idiopathic gout of right foot      !! allopurinol (ZYLOPRIM) 300 MG tablet Take 1 tablet (300 mg) by mouth daily Taken with 100 milligram tab to = total daily dose of 400 milligrams  Qty: 90 tablet, Refills: 0    Associated Diagnoses: Acute idiopathic gout of right foot      aspirin 81 MG EC tablet Take 81 mg by mouth every morning      betamethasone dipropionate (DIPROSONE) 0.05 % external lotion Apply topically as needed  Qty: 60 mL, Refills: 1    Associated Diagnoses: Seborrhea      citalopram (CELEXA) 20 MG tablet Take 1 tablet (20 mg) by mouth daily Please make appointment within 30 days  Qty: 30 tablet, Refills: 0    Associated Diagnoses: Anxiety      levothyroxine (SYNTHROID/LEVOTHROID) 125 MCG tablet TAKE 1 TABLET BY MOUTH DAILY. RECHECK TSH(THYROID TEST) IN 6 TO 8  WEEKS  Qty: 90 tablet, Refills: 1    Comments: **Patient requests 90 days supply**  Associated Diagnoses: Acquired hypothyroidism      losartan (COZAAR) 100 MG tablet Take 50 mg by mouth every morning (0.5 X 100 mg)      !! metoprolol tartrate (LOPRESSOR) 25 MG tablet Take 50 mg by mouth every morning (2 X 25 mg)  (in addition to bedtime dose)      !! metoprolol tartrate (LOPRESSOR) 25 MG tablet Take 25 mg by mouth At Bedtime (in addition to morning dose)      multivitamin w/minerals (MULTI-VITAMIN) tablet Take 1 tablet by mouth every morning      !! torsemide (DEMADEX) 10 MG tablet Take 20 mg by mouth every morning (2 X 10 mg)  (in addition to lunchtime dose)      !! torsemide (DEMADEX) 10 MG tablet Take 10 mg by mouth daily (with lunch) (in addition to morning dose)      amoxicillin (AMOXIL) 500 MG capsule TK 4 CS PO 1 HOUR BEFORE DENTAL APPOINTMENT  Qty: 4 capsule, Refills: 3    Associated Diagnoses: Preventive antibiotic      LORazepam (ATIVAN) 0.5 MG tablet Take 1 tablet (0.5 mg) by mouth every 6 hours as needed for anxiety  Qty: 10 tablet, Refills: 1    Associated Diagnoses: Anxiety      order for DME Equipment being ordered: CPAP  Qty: 1 each, Refills: 0    Associated Diagnoses: GLEN (obstructive sleep apnea)      rosuvastatin (CRESTOR) 10 MG tablet Take 1 tablet (10 mg) by mouth daily  Qty: 90 tablet, Refills: 1    Associated Diagnoses: Hyperlipidemia with target LDL less than 70       !! - Potential duplicate medications found. Please discuss with provider.              Allergies:         Allergies   Allergen Reactions     Adhesive Tape      Atorvastatin Other (See Comments) and Muscle Pain (Myalgia)     lipitor  Myalgia     Cortisone      Insomnia, burning in chest, flushed     Nickel Other (See Comments)     Raw weepy skin  rash     Tetracycline Diarrhea     Vicodin [Hydrocodone-Acetaminophen] Other (See Comments)     Hallucinations     Liquid Adhesive      Other reaction(s): Contact Dermatitis              "Brief History of Illness:   Lisa Ferguson  Presented with left breast cancer.     After discussing the risks, benefits, and possible complications, informed consent was obtained and the patient underwent the above procedure.  There were no complications.  Please see the Operative Report for full details.           Hospital Course:   Lisa Ferguson's hospital course was unremarkable.  She recovered as anticipated and experienced no post-operative complications. She had some urinary retention which resolved.     On the date of discharge, the patient was discharged to home in stable condition and afebrile.  She verbalized understanding of all discharge instructions and felt comfortable with the discharge plan.  She was asked to call with any further questions or concerns.         Condition on Discharge:      Discharge condition: Stable   Discharge vitals: Blood pressure 126/42, pulse 61, temperature 97.1  F (36.2  C), temperature source Oral, resp. rate 18, height 1.676 m (5' 6\"), weight 114 kg (251 lb 6.4 oz), SpO2 95 %, not currently breastfeeding.           Discharge Disposition:   Discharged to home          Discharge Instructions and Follow-Up:      Lisa Ferguson was asked to follow up with surgical team in 1-2 weeks.      Beata Garcia MD  [facility]          "

## 2021-01-08 NOTE — PROGRESS NOTES
"General Surgery Progress Note    Subjective: Lisa is doing well today. Had urinary retention yesterday, this has resolved. Also had hiccups and some difficulty with food sticking and this resolved as well, able to eat breakfast with no issues.     Objective: /42   Pulse 61   Temp 97.1  F (36.2  C) (Oral)   Resp 18   Ht 1.676 m (5' 6\")   Wt 114 kg (251 lb 6.4 oz)   SpO2 95%   BMI 40.58 kg/m    Gen: alert, no distress  Chest: dressings removed and incision looks good. No hematomas, skin flaps healthy, YAHAIRA drains serosanguinous  CV: RRR  Pulm: nonlabored breathing  Abd: soft, nt, nd  Ext: WWP    Assessment/Plan:   Lisa Ferguson  is a 85 year old female POD 2 s/p bilateral mastectomies with left SLNB, doing well. Minimal pain.   - home today  - drain care teaching  - follow up with Tana next week in [place] for drain removal if <30ml/24hr x 2days or in two weeks with me in [place].     Beata Garcia MD  [facility]  [phone]              "

## 2021-01-08 NOTE — PLAN OF CARE
VSS. No complaints of pain or nausea. Tolerating low fiber diet. BM this morning. YAHAIRA drains patent. Voiding adequately. Went over discharge instructions and drain care with patient. Patient able to demonstrate emptying JPs. Supplies given to patient. PIV removed. Awaiting prescriptions from pharmacy and delivery of bra. Plan for follow up in  clinic in 1-2 weeks pending drain output. Patient staying with daughter; daughter updated by phone. Plan to discharge this afternoon home with daughter.

## 2021-01-08 NOTE — PLAN OF CARE
A&Ox4. Titus at times, other VSS on RA (cpap overnight). C/o 1/10 incisional pain, given scheduled Tylenol. Denies nausea but endorses hiccups and a feeling that food is stuck in her chest while/after eating. Bilateral chest dressings CDI, ace wrap on and adjusted. YAHAIRA x 2 w/ small OP. PIV SL. Voiding WDL. Independent. Plan: probable discharge 1/8, YAHAIRA education/practice done w/ pt as she will be d/cing with them.

## 2021-01-08 NOTE — PROGRESS NOTES
Prescriptions given to patient. Received camisole, assisted patient to get dressed. Belongings with patient.  Discharged with daughter, escorted via w/c by CNA

## 2021-01-11 LAB — COPATH REPORT: NORMAL

## 2021-01-12 ENCOUNTER — TELEPHONE (OUTPATIENT)
Dept: SURGERY | Facility: PHYSICIAN GROUP | Age: 86
End: 2021-01-12

## 2021-01-12 ENCOUNTER — TELEPHONE (OUTPATIENT)
Dept: SURGERY | Facility: CLINIC | Age: 86
End: 2021-01-12

## 2021-01-12 NOTE — TELEPHONE ENCOUNTER
I called Lisa and discussed her pathology results after bilateral mastectomies and left SLNB.     It revealed the invasive lobular carcinoma (grade 2) measured 4.5cm and the invasive ductal/lobular (grade 2) measured 2.5cm. 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 Her 1st sentinel node was negative. The 2nd has isolated tumor cells. This is a multifocal T2, N0(i+), MO stage IIA, prognostic stage 1a cancer.     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 There was no in situ or invasive disease in the right breast.     I will see her back next week for drain removal.   We will order an oncotype. She has follow up with Dr. Georges in place.     Beata Garcia MD  [facility]  [phone]

## 2021-01-13 ENCOUNTER — CARE COORDINATION (OUTPATIENT)
Dept: SURGERY | Facility: CLINIC | Age: 86
End: 2021-01-13

## 2021-01-13 NOTE — PROGRESS NOTES
Due to patient's insurance, Medicare, request for Oncotype DX will be submitted on case #:  on 1/20/2021. Reminder made.    Thu Chen RN, BSN, OCN  Breast Nurse Navigator  [facility]  [facility]  Phone: [phone]

## 2021-01-14 ENCOUNTER — MYC MEDICAL ADVICE (OUTPATIENT)
Dept: INTERNAL MEDICINE | Facility: CLINIC | Age: 86
End: 2021-01-14

## 2021-01-15 ENCOUNTER — HEALTH MAINTENANCE LETTER (OUTPATIENT)
Age: 86
End: 2021-01-15

## 2021-01-18 DIAGNOSIS — F41.9 ANXIETY: ICD-10-CM

## 2021-01-20 ENCOUNTER — CARE COORDINATION (OUTPATIENT)
Dept: SURGERY | Facility: CLINIC | Age: 86
End: 2021-01-20

## 2021-01-20 RX ORDER — CITALOPRAM HYDROBROMIDE 20 MG/1
20 TABLET ORAL AT BEDTIME
Qty: 90 TABLET | Refills: 1 | Status: SHIPPED | OUTPATIENT
Start: 2021-01-20 | End: 2021-07-21

## 2021-01-20 NOTE — PROGRESS NOTES
Oncotype DX submitted on case # , for both tumors, at request of Dr. Garcia.    Thu Chen RN, BSN, OCN  Breast Nurse Navigator  [facility]  [facility]  Phone: [phone]

## 2021-01-21 ENCOUNTER — TELEPHONE (OUTPATIENT)
Dept: FAMILY MEDICINE | Facility: CLINIC | Age: 86
End: 2021-01-21

## 2021-01-21 ENCOUNTER — OFFICE VISIT (OUTPATIENT)
Dept: SURGERY | Facility: CLINIC | Age: 86
End: 2021-01-21
Payer: MEDICARE

## 2021-01-21 DIAGNOSIS — Z09 SURGICAL FOLLOW-UP CARE: Primary | ICD-10-CM

## 2021-01-21 DIAGNOSIS — E03.9 ACQUIRED HYPOTHYROIDISM: ICD-10-CM

## 2021-01-21 PROCEDURE — 99024 POSTOP FOLLOW-UP VISIT: CPT | Performed by: SURGERY

## 2021-01-21 RX ORDER — LEVOTHYROXINE SODIUM 125 UG/1
TABLET ORAL
Qty: 90 TABLET | Refills: 1 | OUTPATIENT
Start: 2021-01-21

## 2021-01-21 NOTE — NURSING NOTE
Lsia Ferguson's goals for this visit include:   Chief Complaint   Patient presents with     Surgical Followup     post op mastectomy       She requests these members of her care team be copied on today's visit information: no    PCP: Ziyad Isaac    Referring Provider:  Ziyad Isaac MD  [address] [facility] [address]  [place] [id number]    There were no vitals taken for this visit.    Do you need any medication refills at today's visit? No    Bela Abraham LPN

## 2021-01-21 NOTE — PROGRESS NOTES
[facility] Breast Surgery Postoperative Note    S: Lisa is doing well after her surgery. She has minimal pain. She has been recording her drain outputs. Right drain has been <30 for many days. Left drain was 30ml and 45ml last two days.     Breasts: mastectomy scars are healing well. No erythema or induration. Right YAHAIRA was removed uneventfully.     Pathology:  SPECIMEN(S):   A: [place] lymph node, left #1   B: [place] lymph node, left #2   C: Left breast   D: Right breast   E: Left breast excess skin     FINAL DIAGNOSIS:   A: Lymph node, left axilla, [place] #1: Excision:   - One lymph node, negative for metastatic carcinoma (0/1)     B: Lymph node, left axilla, [place] #2: Excision:   - One lymph node with isolated tumor cells (0/1)     C: Breast, left: Mastectomy:   - Invasive lobular carcinoma, pleomorphic type, [place] grade 2,   multifocal, largest 4.5 cm   - Invasive mammary carcinoma with mixed ductal and lobular features,   [place] grade 2, 2.5 cm   - No angiolymphatic invasion identified   - Ductal carcinoma in situ, 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, cribriform    and papillary types   - Lobular carcinoma in situ, classical type   - Resection margins negative for invasive carcinoma, nearest: 4 mm, lower   anterior and 4 mm, posterior   - Resection margins negative for in situ carcinoma   - See comment and synoptic report for details     D: Breast, right: Mastectomy:   - Myofibroblastoma, 1.5 cm   - Negative for in situ and invasive carcinoma   - Background breast tissue with fibrocystic change and calcifications     E: Skin, left breast excess: Excision:   - Benign skin and underlying soft tissue      A/P  Lisa Ferguson is recovering from a bilateral mastectomies with left SLNB for multifocal left breast cancer with a 4.5cm grade 2 invasive lobular carcinoma and a 2.5cm grade 2 mixed invasive ductal/lobular carcinoma with DCIS and LCIS with SLN #1 negative and SLNB2 with isolated tumor  cells. 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   Oncotype was ordered and pending.   She has appts with medical oncology in place. I do not see any benefit of radiation therapy with cancer <5cm and negative nodes. She will see me back next week for left drain removal.     Thank you for the opportunity to help in her care.    Beata Garcia MD  [facility]  [phone]    Please route or send letter to:  Primary Care Provider (PCP) and Referring Provider

## 2021-01-21 NOTE — LETTER
1/21/2021         RE: Lisa Ferguson  [address]  [place] [address]        Dear Colleague,    Thank you for referring your patient, Lisa Ferguson, to the [facility]. Please see a copy of my visit note below.    [facility] Breast Surgery Postoperative Note    S: Lisa is doing well after her surgery. She has minimal pain. She has been recording her drain outputs. Right drain has been <30 for many days. Left drain was 30ml and 45ml last two days.     Breasts: mastectomy scars are healing well. No erythema or induration. Right YAHAIRA was removed uneventfully.     Pathology:  SPECIMEN(S):   A: [place] lymph node, left #1   B: [place] lymph node, left #2   C: Left breast   D: Right breast   E: Left breast excess skin     FINAL DIAGNOSIS:   A: Lymph node, left axilla, [place] #1: Excision:   - One lymph node, negative for metastatic carcinoma (0/1)     B: Lymph node, left axilla, [place] #2: Excision:   - One lymph node with isolated tumor cells (0/1)     C: Breast, left: Mastectomy:   - Invasive lobular carcinoma, pleomorphic type, [place] grade 2,   multifocal, largest 4.5 cm   - Invasive mammary carcinoma with mixed ductal and lobular features,   [place] grade 2, 2.5 cm   - No angiolymphatic invasion identified   - Ductal carcinoma in situ, 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, cribriform    and papillary types   - Lobular carcinoma in situ, classical type   - Resection margins negative for invasive carcinoma, nearest: 4 mm, lower   anterior and 4 mm, posterior   - Resection margins negative for in situ carcinoma   - See comment and synoptic report for details     D: Breast, right: Mastectomy:   - Myofibroblastoma, 1.5 cm   - Negative for in situ and invasive carcinoma   - Background breast tissue with fibrocystic change and calcifications     E: Skin, left breast excess: Excision:   - Benign skin and underlying soft tissue      A/P  Lisa Ferguson is recovering  from a bilateral mastectomies with left SLNB for multifocal left breast cancer with a 4.5cm grade 2 invasive lobular carcinoma and a 2.5cm grade 2 mixed invasive ductal/lobular carcinoma with DCIS and LCIS with SLN #1 negative and SLNB2 with isolated tumor cells. 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   Oncotype was ordered and pending.   She has appts with medical oncology in place. I do not see any benefit of radiation therapy with cancer <5cm and negative nodes. She will see me back next week for left drain removal.     Thank you for the opportunity to help in her care.    Beata Garcia MD  [facility]  [phone]    Please route or send letter to:  Primary Care Provider (PCP) and Referring Provider        Again, thank you for allowing me to participate in the care of your patient.        Sincerely,        Beata Garcia MD

## 2021-01-21 NOTE — TELEPHONE ENCOUNTER
See results note from 12/30/2020. This current levothyroxine dose was discontinued and she's supposed to be taking the 137 micrograms dose. What I want to know, and I think this is important, from whence did this request come ? Because it is somebody or some system causing a possible error in medical care which I thwarted.    What's happened here ?    Ziyad Isaac MD

## 2021-01-22 ENCOUNTER — VIRTUAL VISIT (OUTPATIENT)
Dept: ONCOLOGY | Facility: CLINIC | Age: 86
End: 2021-01-22
Payer: MEDICARE

## 2021-01-22 DIAGNOSIS — Z17.0 MALIGNANT NEOPLASM OF LEFT BREAST IN FEMALE, ESTROGEN RECEPTOR POSITIVE, UNSPECIFIED SITE OF BREAST (H): Primary | ICD-10-CM

## 2021-01-22 DIAGNOSIS — Z80.0 FAMILY HISTORY OF MALIGNANT NEOPLASM OF STOMACH: ICD-10-CM

## 2021-01-22 DIAGNOSIS — Z80.3 FAMILY HISTORY OF MALIGNANT NEOPLASM OF BREAST: ICD-10-CM

## 2021-01-22 DIAGNOSIS — Z84.81 FAMILY HISTORY OF CARRIER OF GENETIC DISEASE: ICD-10-CM

## 2021-01-22 DIAGNOSIS — C50.912 MALIGNANT NEOPLASM OF LEFT BREAST IN FEMALE, ESTROGEN RECEPTOR POSITIVE, UNSPECIFIED SITE OF BREAST (H): Primary | ICD-10-CM

## 2021-01-22 PROCEDURE — 99207 PR NO CHARGE LOS: CPT | Mod: TEL | Performed by: GENETIC COUNSELOR, MS

## 2021-01-22 RX ORDER — LEVOTHYROXINE SODIUM 125 UG/1
125 TABLET ORAL EVERY MORNING
Qty: 90 TABLET | Refills: 0 | Status: SHIPPED | OUTPATIENT
Start: 2021-01-22 | End: 2021-04-01

## 2021-01-22 NOTE — PROGRESS NOTES
"1/22/2021    Referring Provider: Mimi Rodriguez    Presenting Information:  I spoke to Lisa by phone today to discuss her genetic testing results. Her blood was drawn on 12/22/2020. A CustomNext-Cancer panel was ordered from [employer]. This testing was done because of Lisa's personal history of breast cancer and family history of breast and stomach cancer.    Genetic Testing Result: NEGATIVE  Lisa is negative for mutations in the FERNANDEZ, BRCA1, BRCA2, BRIP1, CDH1, CHEK2, EPCAM, MLH1, MSH2, MSH6, NBN, NF1, PALB2, PMS2, PTEN, RAD51C, RAD51D, STK11, and TP53 genes.  No mutations were found in any of the 19 genes analyzed. This test involved sequencing and deletion/duplication analysis of all genes with the exception of EPCAM (deletions/duplications only).    Testing did not detect an identifiable mutation associated with Hereditary Breast and Ovarian Cancer syndrome (BRCA1, BRCA2), Hereditary Diffuse Gastric Cancer (CDH1), Amaro syndrome (EPCAM, MLH1, MSH2, MSH6, PMS2), Li Fraumeni syndrome (TP53), Peutz-Jeghers syndrome (STK11), Neurofibromatosis type 1 (NF1), or Cowden syndrome (PTEN).    A copy of the test report can be found in the Laboratory tab, dated 12/22/2020, and named \"SEND OUTS MISC TEST\". The report is scanned in as a linked document.    Interpretation:  We discussed several different interpretations of this negative test result.    1. One explanation may be that there is a different gene or combination of genes and environment that are associated with the cancers in this family.  2. It is possible that her sister did have a mutation in one of these genes and she did not inherit it. Of note, Lisa reported at our initial appointment that her sister had a BRCA mutation. A copy of this report was not available.  3. There is also a small possibility that there is a mutation in one of these genes, and the testing laboratory could not find it with their current testing methods.       Screening:  Based " on this negative test result, it is important for Lisa and her relatives to refer back to the family history for appropriate cancer screening.      Lisa s close female relatives remain at increased risk for breast cancer given their family history. Breast cancer screening is generally recommended to begin approximately 10 years younger than the earliest age of breast cancer diagnosis in the family, or at age 40, whichever comes first. Breast screening options should be discussed with an individual's primary care provider and a genetic counselor, to determine at what age to begin screening, what screening is appropriate, and if additional screening (such as breast MRI) is necessary based on personal/family history factors.    Other population cancer screening options, such as those recommended by the American Cancer Society and the National Comprehensive Cancer Network (NCCN), are also appropriate for Lisa and her family. These screening recommendations may change if there are changes to Lisa's personal and/or family history of cancer. Final screening recommendations should be made by each individual's managing physician.      Inheritance:  We reviewed autosomal dominant inheritance. We discussed that Lisa did not pass on an identifiable mutation in these genes to her children based on this test result. Mutations in these genes do not skip generations.      Additional Testing Considerations:  It is possible Lisa does carry a currently identifiable gene or combination of genes and environment that increase her risk for breast and/or stomach cancer. We again reviewed the option of larger gene panels covering more moderate risk genes associated with hereditary breast cancer. Lisa is encouraged to contact me if she wishes to readdress these larger gene panel options.     Although Lisa's genetic testing result was negative, other relatives may still carry a gene mutation associated with breast and/or  stomach cancer. Genetic counseling is recommended for her sister to discuss genetic testing options. If she, or any other relatives do pursue genetic testing, Lisa is encouraged to contact me so that we may review the impact of their test results on her.    Summary:  While no genetic changes were identified, Lisa may still be at risk for certain cancers due to family history, environmental factors, or other genetic causes not identified by this test.  Because of that, it is important that she continue with cancer screening based on her personal and family history as discussed above.    Genetic testing is rapidly advancing, and new cancer susceptibility genes will most likely be identified in the future.  Therefore, I encouraged Lisa to contact me annually or if there are changes in her personal or family history.  This may change how we assess her cancer risk, screening, and the testing we would offer.    Plan:  1. A copy of the test results will be mailed to Lisa.  2. She plans to follow-up with her other providers.  3. She should contact me annually, or sooner if her family history changes.    If Lisa has any further questions, I encouraged her to contact me at [phone].    Time spent on the phone: 10 minutes.    Hernando Nelson MS, [facility]  Licensed, Certified Genetic Counselor  (P): [phone]  (F): [phone]

## 2021-01-22 NOTE — LETTER
"    Cancer Risk Management  Program [facility]  [facility]  Mailing Address  Cancer Risk Management Program  [facility]  [address]  [place] [address]    New patient appointments  [phone]  January 22, 2021    Lisa Ferguson  [address] [place] [address]      Dear Lisa,    It was a pleasure speaking with you on the phone on 1/22/2021. Here is a copy of the progress note from our discussion. If you have any additional questions, please feel free to call.    Referring Provider: Mimi Rodriguez    Presenting Information:  I spoke to Lisa by phone today to discuss her genetic testing results. Her blood was drawn on 12/22/2020. A [facility]-Cancer panel was ordered from [facility]. This testing was done because of Lisa's personal history of breast cancer and family history of breast and stomach cancer.    Genetic Testing Result: NEGATIVE  Lisa is negative for mutations in the FERNANDEZ, BRCA1, BRCA2, BRIP1, CDH1, CHEK2, EPCAM, MLH1, MSH2, MSH6, NBN, NF1, PALB2, PMS2, PTEN, RAD51C, RAD51D, STK11, and TP53 genes.  No mutations were found in any of the 19 genes analyzed. This test involved sequencing and deletion/duplication analysis of all genes with the exception of EPCAM (deletions/duplications only).    Testing did not detect an identifiable mutation associated with Hereditary Breast and Ovarian Cancer syndrome (BRCA1, BRCA2), Hereditary Diffuse Gastric Cancer (CDH1), Amaro syndrome (EPCAM, MLH1, MSH2, MSH6, PMS2), Li Fraumeni syndrome (TP53), Peutz-Jeghers syndrome (STK11), Neurofibromatosis type 1 (NF1), or Cowden syndrome (PTEN).    A copy of the test report can be found in the Laboratory tab, dated 12/22/2020, and named \"SEND OUTS MISC TEST\". The report is scanned in as a linked " document.      Interpretation:  We discussed several different interpretations of this negative test result.    1. One explanation may be that there is a different gene or combination of genes and environment that are associated with the cancers in this family.  2. It is possible that her sister did have a mutation in one of these genes and she did not inherit it. Of note, Lisa reported at our initial appointment that her sister had a BRCA mutation. A copy of this report was not available.  3. There is also a small possibility that there is a mutation in one of these genes, and the testing laboratory could not find it with their current testing methods.       Screening:  Based on this negative test result, it is important for Lisa and her relatives to refer back to the family history for appropriate cancer screening.      Lisa s close female relatives remain at increased risk for breast cancer given their family history. Breast cancer screening is generally recommended to begin approximately 10 years younger than the earliest age of breast cancer diagnosis in the family, or at age 40, whichever comes first. Breast screening options should be discussed with an individual's primary care provider and a genetic counselor, to determine at what age to begin screening, what screening is appropriate, and if additional screening (such as breast MRI) is necessary based on personal/family history factors.    Other population cancer screening options, such as those recommended by the American Cancer Society and the National Comprehensive Cancer Network (NCCN), are also appropriate for Lisa and her family. These screening recommendations may change if there are changes to Lisa's personal and/or family history of cancer. Final screening recommendations should be made by each individual's managing physician.      Inheritance:  We reviewed autosomal dominant inheritance. We discussed that Lisa did not pass on an  identifiable mutation in these genes to her children based on this test result. Mutations in these genes do not skip generations.      Additional Testing Considerations:  It is possible Lisa does carry a currently identifiable gene or combination of genes and environment that increase her risk for breast and/or stomach cancer. We again reviewed the option of larger gene panels covering more moderate risk genes associated with hereditary breast cancer. Lisa is encouraged to contact me if she wishes to readdress these larger gene panel options.     Although Lisa's genetic testing result was negative, other relatives may still carry a gene mutation associated with breast and/or stomach cancer. Genetic counseling is recommended for her sister to discuss genetic testing options. If she, or any other relatives do pursue genetic testing, Lisa is encouraged to contact me so that we may review the impact of their test results on her.    Summary:  While no genetic changes were identified, Lisa may still be at risk for certain cancers due to family history, environmental factors, or other genetic causes not identified by this test.  Because of that, it is important that she continue with cancer screening based on her personal and family history as discussed above.    Genetic testing is rapidly advancing, and new cancer susceptibility genes will most likely be identified in the future.  Therefore, I encouraged Lisa to contact me annually or if there are changes in her personal or family history.  This may change how we assess her cancer risk, screening, and the testing we would offer.    Plan:  1. A copy of the test results will be mailed to Lisa.  2. She plans to follow-up with her other providers.  3. She should contact me annually, or sooner if her family history changes.    If Lisa has any further questions, I encouraged her to contact me at [phone].    Time spent on the phone: 10 minutes.    Hernando  MS Omar, [facility]  Licensed, Certified Genetic Counselor  (P): [phone]  (F): [phone]

## 2021-01-22 NOTE — TELEPHONE ENCOUNTER
"Spoke with Lisa, she is currently taking 125 mcg of levothyroxine and planning to re-check labs on 02/15 and adjust dose if necessary, she has a lab appt scheduled.     We had initially sent in a prescription for 137 mcg dose on 12/30, then patient sent a [facility] 911t message on 12/30 stating that she thought that she had missed her thyroid medication for some time and thought that it could have affected her results ([facility] message 12/30). You discontinued 137 mcg dose on 12/31/2020 and wrote \"We are staying with the current levothyroxine 125 micrograms and will recheck TSH ( thyroid test ) in 6-8 weeks.\" (See telephone encounter 12/31/2020).    "

## 2021-01-22 NOTE — Clinical Note
"Hello,    Please enclose a copy of the test report from the laboratory tab titled \"send out misc test\" dated 12/22/2020 (Order [id number]) to send to the patient along with the letter.    Thank you,  Hernando"

## 2021-01-22 NOTE — TELEPHONE ENCOUNTER
I thank you for the clarification and apologize on my own behalf for having overlooked this. I am not sure why I missed this.    Refills provided     Patient will return for the recheck TSH ( thyroid test ) as described , please notify patient of this information if necessary and otherwise, close this encounter     Ziyad Isaac MD

## 2021-01-25 NOTE — TELEPHONE ENCOUNTER
Levothyroxine 125 mcg tablet prescription signed by Dr. Isaac and faxed to [facility] at [phone].  Hanane Tobar,

## 2021-01-28 ENCOUNTER — VIRTUAL VISIT (OUTPATIENT)
Dept: ONCOLOGY | Facility: CLINIC | Age: 86
End: 2021-01-28
Attending: INTERNAL MEDICINE
Payer: MEDICARE

## 2021-01-28 ENCOUNTER — OFFICE VISIT (OUTPATIENT)
Dept: SURGERY | Facility: CLINIC | Age: 86
End: 2021-01-28
Payer: MEDICARE

## 2021-01-28 VITALS — BODY MASS INDEX: 40.18 KG/M2 | HEIGHT: 66 IN | WEIGHT: 250 LBS

## 2021-01-28 DIAGNOSIS — Z79.811 LONG TERM (CURRENT) USE OF AROMATASE INHIBITORS: ICD-10-CM

## 2021-01-28 DIAGNOSIS — Z17.0 MALIGNANT NEOPLASM OF LEFT BREAST IN FEMALE, ESTROGEN RECEPTOR POSITIVE, UNSPECIFIED SITE OF BREAST (H): ICD-10-CM

## 2021-01-28 DIAGNOSIS — Z09 SURGICAL FOLLOW-UP CARE: Primary | ICD-10-CM

## 2021-01-28 DIAGNOSIS — C50.912 MALIGNANT NEOPLASM OF LEFT BREAST IN FEMALE, ESTROGEN RECEPTOR POSITIVE, UNSPECIFIED SITE OF BREAST (H): ICD-10-CM

## 2021-01-28 PROCEDURE — 99215 OFFICE O/P EST HI 40 MIN: CPT | Mod: 95 | Performed by: INTERNAL MEDICINE

## 2021-01-28 PROCEDURE — 99024 POSTOP FOLLOW-UP VISIT: CPT | Performed by: SURGERY

## 2021-01-28 RX ORDER — ROSUVASTATIN CALCIUM 5 MG/1
TABLET, COATED ORAL
COMMUNITY
Start: 2021-01-09 | End: 2022-07-06

## 2021-01-28 ASSESSMENT — MIFFLIN-ST. JEOR: SCORE: 1605.49

## 2021-01-28 ASSESSMENT — PAIN SCALES - GENERAL: PAINLEVEL: NO PAIN (0)

## 2021-01-28 NOTE — LETTER
1/28/2021         RE: Lisa Ferguson  [address]  [place] [address]        Dear Colleague,    Thank you for referring your patient, Lisa Ferguson, to the [facility]. Please see a copy of my visit note below.    Lisa is a 85 year old who is being evaluated via a billable video visit.      How would you like to obtain your AVS? MyChart  Will anyone else be joining your video visit? No      Video Start Time: 1015  Video-Visit Details    Type of service:  Video Visit    Video End Time:1055    Originating Location (pt. Location): Home    Distant Location (provider location):  [facility]     Platform used for Video Visit: [facility]  MEDICAL ONCOLOGY    NEW PATIENT VISIT NOTE    Reason for visit: breast cancer    Oncology Treatment Summary  1.  Patient self palpated abnormality in left breast in November 2020.  11/16/2020 diagnostic mammogram and ultrasound were done which found 2 lesions within the left breast 1 measuring approximately 1.5 cm and the second 1.9 cm.  Both were biopsied the one at 3:00 was a grade 2 invasive lobular cancer.  The one at 11:00 was a grade 2 invasive ductal cancer.  It was estrogen receptor positive progesterone receptor positive HER-2/kena indeterminate by IHC and negative by ALBERTO  2.  1/6/2021 patient underwent bilateral mastectomies.  Right mastectomy was unremarkable.  Left mastectomy found a multifocal breast cancer the first measuring 4.5 cm is a grade 2 invasive lobular carcinoma, the second was a 2.5 cm grade 2 malignancy.  2 sentinel lymph nodes were removed 1 of which was negative the other 1 had immunohistochemistry positive only cells for a T M2N0i+M0 ER/[place] positive HER-2/kena negative disease  3.  Oncotype is currently pending  4.  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  She is here to review her pathology results.  She is doing well postmastectomy and has a follow-up today with her surgeon.  Overall she is feeling well and denies any nausea vomiting abdominal pain fevers chills or night sweats.  She does have numerous questions regarding treatment and has a friend with her today.     PAST MEDICAL HISTORY  Coronary artery disease, peptic ulcer disease, anxiety, hyperlipidemia, mitral regurgitation, hypertension, degenerative joint disease, right bundle branch block, irritable bowel, sciatica, hypothyroidism, obstructive sleep apnea, congestive heart failure, gout, nephrolithiasis      CURRENT OUTPATIENT MEDICATIONS  Current Outpatient Medications   Medication     acetaminophen (TYLENOL) 325 MG tablet     allopurinol (ZYLOPRIM) 100 MG tablet     allopurinol (ZYLOPRIM) 300 MG tablet     amoxicillin (AMOXIL) 500 MG capsule     aspirin 81 MG EC tablet     betamethasone dipropionate (DIPROSONE) 0.05 % external lotion     citalopram (CELEXA) 20 MG tablet     levothyroxine (SYNTHROID/LEVOTHROID) 125 MCG tablet     LORazepam (ATIVAN) 0.5 MG tablet     losartan (COZAAR) 100 MG tablet     metoprolol tartrate (LOPRESSOR) 25 MG tablet     metoprolol tartrate (LOPRESSOR) 25 MG tablet     multivitamin w/minerals (MULTI-VITAMIN) tablet     order for DME     rosuvastatin (CRESTOR) 5 MG tablet     torsemide (DEMADEX) 10 MG tablet     torsemide (DEMADEX) 10 MG tablet     rosuvastatin (CRESTOR) 10 MG tablet     No current facility-administered medications for this visit.         ALLERGIES     Allergies   Allergen Reactions     Adhesive Tape      Atorvastatin Other (See Comments) and Muscle Pain (Myalgia)     lipitor  Myalgia     Cortisone      Insomnia, burning in chest, flushed     Nickel Other (See Comments)     Raw weepy skin  rash     Tetracycline Diarrhea     Vicodin [Hydrocodone-Acetaminophen]  Other (See Comments)     Hallucinations     Liquid Adhesive      Other reaction(s): Contact Dermatitis        SOCIAL HISTORY  She is , has 3 children and is a retired RN.  She has a history of smoking but quit this 40 years ago.  Occasional alcohol use     FAMILY HISTORY  Her sister  of breast cancer at 62 and did have a BRCA mutation.  Patient's niece also carries a BRCA mutation.  She was tested for this and does not..  Her genetic testing was negative     REVIEW OF SYSTEMS  Review Of Systems  Skin: negative  Eyes: negative  Ears/Nose/Throat: negative  Respiratory: No shortness of breath, dyspnea on exertion, cough, or hemoptysis  Cardiovascular: negative  Gastrointestinal: negative  Genitourinary: negative  Musculoskeletal: negative  Neurologic: negative  Psychiatric: negative  Hematologic/Lymphatic/Immunologic: negative  Endocrine: negative      PHYSICAL EXAM  B/P: Data Unavailable, T: Data Unavailable, P: Data Unavailable, R: Data Unavailable  Wt Readings from Last 3 Encounters:   21 113.4 kg (250 lb)   21 114 kg (251 lb 6.4 oz)   20 113.4 kg (250 lb)       Physical Exam  Constitutional:       Appearance: Normal appearance.   HENT:      Head: Normocephalic and atraumatic.      Nose: Nose normal.   Eyes:      Extraocular Movements: Extraocular movements intact.      Conjunctiva/sclera: Conjunctivae normal.      Pupils: Pupils are equal, round, and reactive to light.   Pulmonary:      Effort: Pulmonary effort is normal.   Neurological:      General: No focal deficit present.      Mental Status: She is alert and oriented to person, place, and time. Mental status is at baseline.   Psychiatric:         Mood and Affect: Mood normal.         Behavior: Behavior normal.         Thought Content: Thought content normal.         Judgment: Judgment normal.              LABORATORY AND IMAGING STUDIES  Recent Labs   Lab Test 21  0739 21  1116 20  1227 10/25/19  0901  10/04/19  0952 09/13/18  1122     --  136 139 138 138   POTASSIUM 3.7 3.6 4.2 4.3 4.0 3.9   CHLORIDE 101  --  102 106 103 103   CO2 28 --  29 25 26 27   ANIONGAP 4  --  5 8 9 8   BUN 18  --  20 15 22 21   CR 0.79  --  0.80 0.67 0.95 0.71   *  --  97 99 106* 106*   LUCIANO 9.3  --  9.9 10.4* 10.9* 10.0     Recent Labs   Lab Test 07/08/14  1419 09/03/13  1037   PHOS 3.9 4.0     Recent Labs   Lab Test 01/07/21  0738 12/29/20  1227 10/28/19  1135 10/17/18  1220 09/13/18  1122 08/01/17  1117   WBC  --  10.8 8.9 11.9* 9.1 8.5   HGB 11.2* 12.9 13.1 13.4 13.8 14.4   PLT  --  337 362 392 291 311   MCV  --  91 91 89 87 87   NEUTROPHIL  --  58.1 48.1 60.6 45.1 46.3     Recent Labs   Lab Test 10/28/19  1135 09/13/18  1122 01/03/17  1106 03/23/15  1213 07/08/14  1419 09/03/13  1037 04/23/13   ALKPHOS  --   --   --   --  99 92  --    ALT 35 35 42 36 43 40 23   AST  --   --   --  28  --   --  19     TSH   Date Value Ref Range Status   12/29/2020 23.61 (H) 0.40 - 4.00 mU/L Final   10/28/2019 1.75 0.40 - 4.00 mU/L Final   09/13/2018 2.66 0.40 - 4.00 mU/L Final     No results for input(s): CEA in the last 41052 hours.  Results for orders placed or performed during the hospital encounter of 01/06/21   NM Lymphoscintigraphy Injection only    Narrative    Examination:  NM LYMPHOSCINTIGRAPHY INJECTION ONLY    Date: 1/6/2021 12:53 PM     Indication:  Malignant neoplasm of upper-outer quadrant of left breast  in female, estrogen receptor positive (H).    Technique:    537uCi Lymphoseek was administered intradermally at the 2:00 position,  periareolar LEFT breast.     Images were not obtained as part of the localization procedure.    LAURENCE WOODSON MD        ASSESSMENT  AND RECOMMENDATIONS:    1. Tm2N0i+M0 ER positive [place] positive HER-2/kena negative breast cancer status post left mastectomy  2.  Numerous other medical problems as delineated above    Plan    I discussed with Lisa and her friend regarding her newly diagnosed breast  "cancer.  We reviewed both local and systemic therapy for breast cancer and reviewed that local therapy is a lumpectomy with clear margins followed by adjuvant radiation or a mastectomy.  We discussed indications for postmastectomy radiation including Clear indications of more than 3 nodes positive, tumor greater than 5 cm, or positive margins.  She did have a multifocal lesion with the larger of the 2 lesions measuring 4.5 cm however was essentially node-negative.  She does not meet strict criteria for the necessity of adjuvant radiation however we discussed there is a bit of a \"gray zone\" we discussed that radiation is meant to decrease the risk of local recurrence within the chest wall and that there may well be some benefit for this in her although it is likely measured in single percentage point digits.  I told her that I would wait for her Oncotype to come back as if she is low risk this may well correlate to a low risk of recurrence in her chest wall versus if she is high risk indeed the same may be true.    We then discussed systemic therapy and have reviewed that systemic therapy is either chemotherapy or endocrine therapy.  We discussed that this is to reduce the risk of systemic recurrence.  We reviewed indications for doing Oncotype testing to help both prognosticate risk of recurrence and predictive benefit to chemotherapy.  Oncotype testing was sent on the 20th and anticipated results are around February 9.  We discussed the indications for Oncotype and the results in general.  I told her I will call her once these are available and we will go over these in more detail to help determine whether or not adjuvant chemotherapy would be of any benefit to her.    We then discussed endocrine therapy and reviewed differences between tamoxifen and Arimidex.  She is leaning towards doing Arimidex however her last bone density scan was approximately 6 years ago and she will have a new one done to see where she is " at.    For the time being we will await her Oncotype test she will do her bone density scan and I will see her back once her Oncotype becomes available.    Tamar Georges MD on 1/28/2021 at 11:02 AM            Again, thank you for allowing me to participate in the care of your patient.        Sincerely,        Tamar Georges MD

## 2021-01-28 NOTE — PROGRESS NOTES
[facility] Breast Surgery Postoperative Note    S: Lisa is doing well. Minimal pain. Drain output on left <5ml for the past several days.     Breasts: bilateral mastectomy scars are healing well. Left YAHAIRA drain removed uneventfully.     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   Oncotype was ordered and pending.   Genetics was negative.   She has appts with medical oncology in place.  She will follow up with me in 6 months for exam, sooner with any areas of concern.   An Rx for bra prosthesis was given today.       Beata Garcia MD  [facility]  [phone]    Please route or send letter to:  Primary Care Provider (PCP) and Referring Provider

## 2021-01-28 NOTE — NURSING NOTE
SYMPTOM QUESTIONNAIRE    Pain: no    Nausea/Vomiting: no    Mouth Sores: no    Shortness of Breath: no    Smoking: no    Fever or Chills: no    Hard Stools: no    Soft Stools: no    Weight Loss: no    Weakness: no    Burning, numbness or tingling in hands or feet: chronic in feet    Problems with skin or swelling: no    Memory Loss: yes    Anxiety or Depression: anxiety    Trouble Sleeping: occasional    Carol Elias LPN on 1/28/2021 at 10:14 AM

## 2021-01-28 NOTE — NURSING NOTE
Lisa Ferguson's goals for this visit include:   Chief Complaint   Patient presents with     RECHECK     drain removal       She requests these members of her care team be copied on today's visit information: no    PCP: Ziyad Isaac    Referring Provider:  No referring provider defined for this encounter.    There were no vitals taken for this visit.    Do you need any medication refills at today's visit? No    Bela Abraham LPN

## 2021-01-28 NOTE — LETTER
1/28/2021         RE: Lisa Ferguson  [address]  [place] [address]        Dear Colleague,    Thank you for referring your patient, Lisa Ferguson, to the [facility]. Please see a copy of my visit note below.    [facility] Breast Surgery Postoperative Note    S: Lisa is doing well. Minimal pain. Drain output on left <5ml for the past several days.     Breasts: bilateral mastectomy scars are healing well. Left YAHAIRA drain removed uneventfully.     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   Oncotype was ordered and pending.   Genetics was negative.   She has appts with medical oncology in place.  She will follow up with me in 6 months for exam, sooner with any areas of concern.   An Rx for bra prosthesis was given today.       Beata Garcia MD  [facility]  [phone]    Please route or send letter to:  Primary Care Provider (PCP) and Referring Provider        Again, thank you for allowing me to participate in the care of your patient.        Sincerely,        Beata Garcia MD

## 2021-02-02 ENCOUNTER — DOCUMENTATION ONLY (OUTPATIENT)
Dept: OTHER | Facility: CLINIC | Age: 86
End: 2021-02-02

## 2021-02-05 ENCOUNTER — TELEPHONE (OUTPATIENT)
Dept: SURGERY | Facility: CLINIC | Age: 86
End: 2021-02-05

## 2021-02-05 NOTE — TELEPHONE ENCOUNTER
Left message for Lisa, Oncotype DX Breast Recurrence Score (Tumor #1) of 6. Absolute chemotherapy benefit <1%. Discuss results and further treatment recommendations in greater detail with medical oncologist, Dr. Georges. Encouraged call back with questions.    Thu Chen RN, BSN, OCN  Breast Nurse Navigator  [facility]  Phone: [phone]

## 2021-02-17 DIAGNOSIS — E03.9 ACQUIRED HYPOTHYROIDISM: ICD-10-CM

## 2021-02-17 LAB — TSH SERPL DL<=0.005 MIU/L-ACNC: 3.8 MU/L (ref 0.4–4)

## 2021-02-17 PROCEDURE — 84443 ASSAY THYROID STIM HORMONE: CPT | Performed by: INTERNAL MEDICINE

## 2021-02-17 PROCEDURE — 36415 COLL VENOUS BLD VENIPUNCTURE: CPT | Performed by: INTERNAL MEDICINE

## 2021-02-18 ENCOUNTER — IMMUNIZATION (OUTPATIENT)
Dept: FAMILY MEDICINE | Facility: CLINIC | Age: 86
End: 2021-02-18
Payer: MEDICARE

## 2021-02-18 ENCOUNTER — VIRTUAL VISIT (OUTPATIENT)
Dept: ONCOLOGY | Facility: CLINIC | Age: 86
End: 2021-02-18
Payer: MEDICARE

## 2021-02-18 DIAGNOSIS — Z17.0 MALIGNANT NEOPLASM OF LEFT BREAST IN FEMALE, ESTROGEN RECEPTOR POSITIVE, UNSPECIFIED SITE OF BREAST (H): Primary | ICD-10-CM

## 2021-02-18 DIAGNOSIS — C50.912 MALIGNANT NEOPLASM OF LEFT BREAST IN FEMALE, ESTROGEN RECEPTOR POSITIVE, UNSPECIFIED SITE OF BREAST (H): Primary | ICD-10-CM

## 2021-02-18 DIAGNOSIS — Z79.811 LONG TERM (CURRENT) USE OF AROMATASE INHIBITORS: ICD-10-CM

## 2021-02-18 PROCEDURE — 0001A PR COVID VAC PFIZER DIL RECON 30 MCG/0.3 ML IM: CPT

## 2021-02-18 PROCEDURE — 91300 PR COVID VAC PFIZER DIL RECON 30 MCG/0.3 ML IM: CPT

## 2021-02-18 PROCEDURE — 99214 OFFICE O/P EST MOD 30 MIN: CPT | Mod: 95 | Performed by: INTERNAL MEDICINE

## 2021-02-18 RX ORDER — ANASTROZOLE 1 MG/1
1 TABLET ORAL DAILY
Qty: 90 TABLET | Refills: 3 | Status: SHIPPED | OUTPATIENT
Start: 2021-02-18 | End: 2022-01-14

## 2021-02-18 NOTE — PROGRESS NOTES
Lisa is a 85 year old who is being evaluated via a billable video visit.      How would you like to obtain your AVS? MyChart  Will anyone else be joining your video visit? No      Video Start Time: 1015  Video-Visit Details    Type of service:  Video Visit    Video End Time:1055    Originating Location (pt. Location): Home    Distant Location (provider location):  [facility]     Platform used for Video Visit: [facility]  MEDICAL ONCOLOGY    NEW PATIENT VISIT NOTE    Reason for visit: breast cancer    Oncology Treatment Summary  1.  Patient self palpated abnormality in left breast in November 2020.  11/16/2020 diagnostic mammogram and ultrasound were done which found 2 lesions within the left breast 1 measuring approximately 1.5 cm and the second 1.9 cm.  Both were biopsied the one at 3:00 was a grade 2 invasive lobular cancer.  The one at 11:00 was a grade 2 invasive ductal cancer.  It was estrogen receptor positive progesterone receptor positive HER-2/kena indeterminate by IHC and negative by ALBERTO  2.  1/6/2021 patient underwent bilateral mastectomies.  Right mastectomy was unremarkable.  Left mastectomy found a multifocal breast cancer the first measuring 4.5 cm is a grade 2 invasive lobular carcinoma, the second was a 2.5 cm grade 2 malignancy.  2 sentinel lymph nodes were removed 1 of which was negative the other 1 had immunohistochemistry positive only cells for a T M2N0i+M0 ER/MS positive HER-2/kena negative disease  3.  Oncotype RS: one was 22 and other was 6 both low risk  4.  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  She is here to discuss her Oncotype results.  I told her her Oncotype came back and one of the tumors as a 22 and and the second 1 it came back as a 6.  That she is  actually on her way to the [facility] to get her first Covid vaccine and is otherwise doing well of note she was called to have a bone density scan done at I told him that she did not need this done as she could not remember why.     PAST MEDICAL HISTORY  Coronary artery disease, peptic ulcer disease, anxiety, hyperlipidemia, mitral regurgitation, hypertension, degenerative joint disease, right bundle branch block, irritable bowel, sciatica, hypothyroidism, obstructive sleep apnea, congestive heart failure, gout, nephrolithiasis      CURRENT OUTPATIENT MEDICATIONS  Current Outpatient Medications   Medication     allopurinol (ZYLOPRIM) 100 MG tablet     allopurinol (ZYLOPRIM) 300 MG tablet     amoxicillin (AMOXIL) 500 MG capsule     aspirin 81 MG EC tablet     betamethasone dipropionate (DIPROSONE) 0.05 % external lotion     citalopram (CELEXA) 20 MG tablet     levothyroxine (SYNTHROID/LEVOTHROID) 125 MCG tablet     LORazepam (ATIVAN) 0.5 MG tablet     losartan (COZAAR) 100 MG tablet     metoprolol tartrate (LOPRESSOR) 25 MG tablet     metoprolol tartrate (LOPRESSOR) 25 MG tablet     multivitamin w/minerals (MULTI-VITAMIN) tablet     order for DME     rosuvastatin (CRESTOR) 5 MG tablet     torsemide (DEMADEX) 10 MG tablet     torsemide (DEMADEX) 10 MG tablet     No current facility-administered medications for this visit.         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  She has a history of smoking but quit this 40 years ago.  Occasional alcohol  use     FAMILY HISTORY  Her sister  of breast cancer at 62 and did have a BRCA mutation.  Patient's niece also carries a BRCA mutation.  She was tested for this and does not..  Her genetic testing was negative     REVIEW OF SYSTEMS  Review Of Systems  Skin: negative  Eyes: negative  Ears/Nose/Throat: negative  Respiratory: No shortness of breath, dyspnea on exertion, cough, or hemoptysis  Cardiovascular: negative  Gastrointestinal: negative  Genitourinary: negative  Musculoskeletal: negative  Neurologic: negative  Psychiatric: negative  Hematologic/Lymphatic/Immunologic: negative  Endocrine: negative      PHYSICAL EXAM  B/P: Data Unavailable, T: Data Unavailable, P: Data Unavailable, R: Data Unavailable  Wt Readings from Last 3 Encounters:   21 113.4 kg (250 lb)   21 114 kg (251 lb 6.4 oz)   20 113.4 kg (250 lb)       No exam done as this was a telephone visit        LABORATORY AND IMAGING STUDIES  Recent Labs   Lab Test 21  0739 21  1116 20  1227 10/25/19  0947 10/04/19  0952 18  1122     --  136 139 138 138   POTASSIUM 3.7 3.6 4.2 4.3 4.0 3.9   CHLORIDE 101  --  102 106 103 103   CO2 28  --  29 25 26 27   ANIONGAP 4  --  5 8 9 8   BUN 18  --  20 15 22 21   CR 0.79  --  0.80 0.67 0.95 0.71   *  --  97 99 106* 106*   LUCIANO 9.3  --  9.9 10.4* 10.9* 10.0     Recent Labs   Lab Test 14  1419 13  1037   PHOS 3.9 4.0     Recent Labs   Lab Test 21  0738 20  1227 10/28/19  1135 10/17/18  1220 18  1122 17  1117   WBC  --  10.8 8.9 11.9* 9.1 8.5   HGB 11.2* 12.9 13.1 13.4 13.8 14.4   PLT  --  337 362 392 291 311   MCV  --  91 91 89 87 87   NEUTROPHIL  --  58.1 48.1 60.6 45.1 46.3     Recent Labs   Lab Test 10/28/19  1135 18  1122 17  1106 03/23/15  1213 14  1419 13  1037 13   ALKPHOS  --   --   --   --  99 92  --    ALT 35 35 42 36 43 40 23   AST  --   --   --  28  --   --  19     TSH   Date Value Ref  Range Status   02/17/2021 3.80 0.40 - 4.00 mU/L Final   12/29/2020 23.61 (H) 0.40 - 4.00 mU/L Final   10/28/2019 1.75 0.40 - 4.00 mU/L Final     No results for input(s): CEA in the last 56322 hours.  Results for orders placed or performed during the hospital encounter of 01/06/21   NM Lymphoscintigraphy Injection only    Narrative    Examination:  NM LYMPHOSCINTIGRAPHY INJECTION ONLY    Date: 1/6/2021 12:53 PM     Indication:  Malignant neoplasm of upper-outer quadrant of left breast  in female, estrogen receptor positive (H).    Technique:    537uCi Lymphoseek was administered intradermally at the 2:00 position,  periareolar LEFT breast.     Images were not obtained as part of the localization procedure.    LAURENCE WOODSON MD        ASSESSMENT  AND RECOMMENDATIONS:    1. Tm2N0i+M0 ER positive SD positive HER-2/kena negative breast cancer status post left mastectomy with both tumors having a low risk Oncotype  2.  Numerous other medical problems as delineated above    Plan      We discussed the results of her Oncotype.  She did fall into the low risk for both lesions that were removed.  We discussed this means there is no statistical benefit to the use of adjuvant chemotherapy and I would not recommend it.  She was very delighted to hear this.  We then discussed adjuvant therapy in the form of endocrine therapy and had previously reviewed the differences between the aromatase inhibitors and tamoxifen and she is interested in starting Arimidex.  I told her I will send a prescription for this to her pharmacy however I would like her to have a new baseline bone density scan.  She now remembers why we wanted it done and she will have this done within the next month or so.  I told her once she starts Arimidex I would have her come for routine follow-up in 4 months and then we will do every 3 to 4-month follow-up for an exam.  We discussed there is no need for the routine use of lab work or x-rays for follow-up of breast  cancer.    Tamar Georges MD on 2/18/2021 at 11:05 AM

## 2021-02-18 NOTE — LETTER
2/18/2021         RE: Lisa Ferguson  [address]  [place] [address]        Dear Colleague,    Thank you for referring your patient, Lisa Ferguson, to the [facility]. Please see a copy of my visit note below.    Lisa is a 85 year old who is being evaluated via a billable video visit.      How would you like to obtain your AVS? MyChart  Will anyone else be joining your video visit? No      Video Start Time: 1015  Video-Visit Details    Type of service:  Video Visit    Video End Time:1055    Originating Location (pt. Location): Home    Distant Location (provider location):  [facility]     Platform used for Video Visit: [facility]  MEDICAL ONCOLOGY    NEW PATIENT VISIT NOTE    Reason for visit: breast cancer    Oncology Treatment Summary  1.  Patient self palpated abnormality in left breast in November 2020.  11/16/2020 diagnostic mammogram and ultrasound were done which found 2 lesions within the left breast 1 measuring approximately 1.5 cm and the second 1.9 cm.  Both were biopsied the one at 3:00 was a grade 2 invasive lobular cancer.  The one at 11:00 was a grade 2 invasive ductal cancer.  It was estrogen receptor positive progesterone receptor positive HER-2/kena indeterminate by IHC and negative by ALBERTO  2.  1/6/2021 patient underwent bilateral mastectomies.  Right mastectomy was unremarkable.  Left mastectomy found a multifocal breast cancer the first measuring 4.5 cm is a grade 2 invasive lobular carcinoma, the second was a 2.5 cm grade 2 malignancy.  2 sentinel lymph nodes were removed 1 of which was negative the other 1 had immunohistochemistry positive only cells for a T M2N0i+M0 ER/IL positive HER-2/kena negative disease  3.  Oncotype RS: one was 22 and other was 6 both low risk  4.  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  She is here to discuss her Oncotype results.  I told her her Oncotype came back and one of the tumors as a 22 and and the second 1 it came back as a 6.  That she is actually on her way to the [facility] to get her first Covid vaccine and is otherwise doing well of note she was called to have a bone density scan done at I told him that she did not need this done as she could not remember why.     PAST MEDICAL HISTORY  Coronary artery disease, peptic ulcer disease, anxiety, hyperlipidemia, mitral regurgitation, hypertension, degenerative joint disease, right bundle branch block, irritable bowel, sciatica, hypothyroidism, obstructive sleep apnea, congestive heart failure, gout, nephrolithiasis      CURRENT OUTPATIENT MEDICATIONS  Current Outpatient Medications   Medication     allopurinol (ZYLOPRIM) 100 MG tablet     allopurinol (ZYLOPRIM) 300 MG tablet     amoxicillin (AMOXIL) 500 MG capsule     aspirin 81 MG EC tablet     betamethasone dipropionate (DIPROSONE) 0.05 % external lotion     citalopram (CELEXA) 20 MG tablet     levothyroxine (SYNTHROID/LEVOTHROID) 125 MCG tablet     LORazepam (ATIVAN) 0.5 MG tablet     losartan (COZAAR) 100 MG tablet     metoprolol tartrate (LOPRESSOR) 25 MG tablet     metoprolol tartrate (LOPRESSOR) 25 MG tablet     multivitamin w/minerals (MULTI-VITAMIN) tablet     order for DME     rosuvastatin (CRESTOR) 5 MG tablet     torsemide (DEMADEX) 10 MG tablet     torsemide (DEMADEX) 10 MG tablet     No current facility-administered medications for this visit.         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  She has a history of smoking but quit this 40 years ago.  Occasional alcohol use     FAMILY HISTORY  Her sister  of breast cancer at 62 and did have a BRCA mutation.  Patient's niece also carries a BRCA mutation.  She was tested for this and does not..  Her genetic testing was negative     REVIEW OF SYSTEMS  Review Of Systems  Skin: negative  Eyes: negative  Ears/Nose/Throat: negative  Respiratory: No shortness of breath, dyspnea on exertion, cough, or hemoptysis  Cardiovascular: negative  Gastrointestinal: negative  Genitourinary: negative  Musculoskeletal: negative  Neurologic: negative  Psychiatric: negative  Hematologic/Lymphatic/Immunologic: negative  Endocrine: negative      PHYSICAL EXAM  B/P: Data Unavailable, T: Data Unavailable, P: Data Unavailable, R: Data Unavailable  Wt Readings from Last 3 Encounters:   21 113.4 kg (250 lb)   21 114 kg (251 lb 6.4 oz)   20 113.4 kg (250 lb)       No exam done as this was a telephone visit        LABORATORY AND IMAGING STUDIES  Recent Labs   Lab Test 21  0739 21  1116 20  1227 10/25/19  0947 10/04/19  0952 18  1122     --  136 139 138 138   POTASSIUM 3.7 3.6 4.2 4.3 4.0 3.9   CHLORIDE 101  --  102 106 103 103   CO2 28  --  29 25 26 27   ANIONGAP 4  --  5 8 9 8   BUN 18  --  20 15 22 21   CR 0.79  --  0.80 0.67 0.95 0.71   *  --  97 99 106* 106*   LUCIANO 9.3  --  9.9 10.4* 10.9* 10.0     Recent Labs   Lab Test 14  1419 13  1037   PHOS 3.9 4.0     Recent Labs   Lab Test 21  0738 20  1227 10/28/19  1135 10/17/18  1220 18  1122 17  1117   WBC  --  10.8 8.9 11.9* 9.1 8.5   HGB 11.2* 12.9 13.1 13.4 13.8 14.4   PLT  --  337 362 392 291 311   MCV  --  91 91 89 87 87   NEUTROPHIL  --   58.1 48.1 60.6 45.1 46.3     Recent Labs   Lab Test 10/28/19  1135 09/13/18  1122 01/03/17  1106 03/23/15  1213 07/08/14  1419 09/03/13  1037 04/23/13   ALKPHOS  --   --   --   --  99 92  --    ALT 35 35 42 36 43 40 23   AST  --   --   --  28  --   --  19     TSH   Date Value Ref Range Status   02/17/2021 3.80 0.40 - 4.00 mU/L Final   12/29/2020 23.61 (H) 0.40 - 4.00 mU/L Final   10/28/2019 1.75 0.40 - 4.00 mU/L Final     No results for input(s): CEA in the last 99126 hours.  Results for orders placed or performed during the hospital encounter of 01/06/21   NM Lymphoscintigraphy Injection only    Narrative    Examination:  NM LYMPHOSCINTIGRAPHY INJECTION ONLY    Date: 1/6/2021 12:53 PM     Indication:  Malignant neoplasm of upper-outer quadrant of left breast  in female, estrogen receptor positive (H).    Technique:    537uCi Lymphoseek was administered intradermally at the 2:00 position,  periareolar LEFT breast.     Images were not obtained as part of the localization procedure.    LAURENCE WOODSON MD        ASSESSMENT  AND RECOMMENDATIONS:    1. Tm2N0i+M0 ER positive SD positive HER-2/kena negative breast cancer status post left mastectomy with both tumors having a low risk Oncotype  2.  Numerous other medical problems as delineated above    Plan      We discussed the results of her Oncotype.  She did fall into the low risk for both lesions that were removed.  We discussed this means there is no statistical benefit to the use of adjuvant chemotherapy and I would not recommend it.  She was very delighted to hear this.  We then discussed adjuvant therapy in the form of endocrine therapy and had previously reviewed the differences between the aromatase inhibitors and tamoxifen and she is interested in starting Arimidex.  I told her I will send a prescription for this to her pharmacy however I would like her to have a new baseline bone density scan.  She now remembers why we wanted it done and she will have this done  within the next month or so.  I told her once she starts Arimidex I would have her come for routine follow-up in 4 months and then we will do every 3 to 4-month follow-up for an exam.  We discussed there is no need for the routine use of lab work or x-rays for follow-up of breast cancer.    Tamar Georges MD on 2/18/2021 at 11:05 AM          Again, thank you for allowing me to participate in the care of your patient.        Sincerely,        Tamar Georges MD

## 2021-02-18 NOTE — NURSING NOTE
Lisa is a 85 year old who is being evaluated via a billable telephone visit.      What phone number would you like to be contacted at? [phone]    How would you like to obtain your AVS? Agustín Hutton [facility]

## 2021-02-24 ENCOUNTER — ANCILLARY PROCEDURE (OUTPATIENT)
Dept: BONE DENSITY | Facility: CLINIC | Age: 86
End: 2021-02-24
Attending: INTERNAL MEDICINE
Payer: MEDICARE

## 2021-02-24 DIAGNOSIS — M81.8 OTHER OSTEOPOROSIS WITHOUT CURRENT PATHOLOGICAL FRACTURE: ICD-10-CM

## 2021-02-24 DIAGNOSIS — Z79.811 LONG TERM (CURRENT) USE OF AROMATASE INHIBITORS: ICD-10-CM

## 2021-02-24 PROCEDURE — 77081 DXA BONE DENSITY APPENDICULR: CPT | Mod: 59 | Performed by: RADIOLOGY

## 2021-02-24 PROCEDURE — 77080 DXA BONE DENSITY AXIAL: CPT | Performed by: RADIOLOGY

## 2021-02-25 ENCOUNTER — TRANSFERRED RECORDS (OUTPATIENT)
Dept: HEALTH INFORMATION MANAGEMENT | Facility: CLINIC | Age: 86
End: 2021-02-25

## 2021-03-11 ENCOUNTER — IMMUNIZATION (OUTPATIENT)
Dept: FAMILY MEDICINE | Facility: CLINIC | Age: 86
End: 2021-03-11
Attending: FAMILY MEDICINE
Payer: MEDICARE

## 2021-03-11 PROCEDURE — 0002A PR COVID VAC PFIZER DIL RECON 30 MCG/0.3 ML IM: CPT

## 2021-03-11 PROCEDURE — 91300 PR COVID VAC PFIZER DIL RECON 30 MCG/0.3 ML IM: CPT

## 2021-03-16 ENCOUNTER — CARE COORDINATION (OUTPATIENT)
Dept: SURGERY | Facility: CLINIC | Age: 86
End: 2021-03-16

## 2021-03-16 DIAGNOSIS — C50.412 MALIGNANT NEOPLASM OF UPPER-OUTER QUADRANT OF LEFT BREAST IN FEMALE, ESTROGEN RECEPTOR POSITIVE (H): ICD-10-CM

## 2021-03-16 DIAGNOSIS — Z17.0 MALIGNANT NEOPLASM OF UPPER-OUTER QUADRANT OF LEFT BREAST IN FEMALE, ESTROGEN RECEPTOR POSITIVE (H): ICD-10-CM

## 2021-03-16 DIAGNOSIS — Z90.13 ACQUIRED ABSENCE OF BREAST AND ABSENT NIPPLE, BILATERAL: Primary | ICD-10-CM

## 2021-03-16 NOTE — PROGRESS NOTES
Order for post mastectomy bra and breast prosthesis faxed to [facility].     Thu Chen RN, BSN, OCN  Breast Nurse Navigator  [facility]  [facility]  Phone: [phone]

## 2021-03-23 DIAGNOSIS — M10.071 ACUTE IDIOPATHIC GOUT OF RIGHT FOOT: ICD-10-CM

## 2021-03-24 DIAGNOSIS — M10.071 ACUTE IDIOPATHIC GOUT OF RIGHT FOOT: ICD-10-CM

## 2021-03-25 RX ORDER — ALLOPURINOL 300 MG/1
TABLET ORAL
Qty: 90 TABLET | Refills: 0 | OUTPATIENT
Start: 2021-03-25

## 2021-03-25 RX ORDER — ALLOPURINOL 100 MG/1
TABLET ORAL
Qty: 90 TABLET | Refills: 0 | OUTPATIENT
Start: 2021-03-25

## 2021-03-25 RX ORDER — ALLOPURINOL 100 MG/1
TABLET ORAL
Qty: 90 TABLET | Refills: 0 | Status: SHIPPED | OUTPATIENT
Start: 2021-03-25 | End: 2021-06-23

## 2021-03-25 RX ORDER — ALLOPURINOL 300 MG/1
TABLET ORAL
Qty: 90 TABLET | Refills: 0 | Status: SHIPPED | OUTPATIENT
Start: 2021-03-25 | End: 2021-04-15

## 2021-03-25 NOTE — TELEPHONE ENCOUNTER
Medication is being filled for 1 time refill only due to:    Lab Results   Component Value Date    ALT 35 10/28/2019

## 2021-03-29 ENCOUNTER — MEDICAL CORRESPONDENCE (OUTPATIENT)
Dept: HEALTH INFORMATION MANAGEMENT | Facility: CLINIC | Age: 86
End: 2021-03-29

## 2021-03-31 DIAGNOSIS — E03.9 ACQUIRED HYPOTHYROIDISM: ICD-10-CM

## 2021-04-01 RX ORDER — LEVOTHYROXINE SODIUM 125 UG/1
125 TABLET ORAL EVERY MORNING
Qty: 90 TABLET | Refills: 3 | Status: SHIPPED | OUTPATIENT
Start: 2021-04-01 | End: 2022-06-14

## 2021-04-14 ENCOUNTER — MYC MEDICAL ADVICE (OUTPATIENT)
Dept: INTERNAL MEDICINE | Facility: CLINIC | Age: 86
End: 2021-04-14

## 2021-04-14 DIAGNOSIS — M10.071 ACUTE IDIOPATHIC GOUT OF RIGHT FOOT: ICD-10-CM

## 2021-04-15 RX ORDER — ALLOPURINOL 300 MG/1
TABLET ORAL
Qty: 90 TABLET | Refills: 1 | Status: SHIPPED | OUTPATIENT
Start: 2021-04-15 | End: 2021-12-21

## 2021-04-15 NOTE — TELEPHONE ENCOUNTER
I am seeing no reasons for laboratory studies right now and this appears to be an old and incorrect message regarding need for laboratory studies. Sorry about any confusion for patient.    From my perspective we could wait for any laboratory studies out to as long as December 2020 or so.    Ziyad Isaac MD

## 2021-06-21 ENCOUNTER — ONCOLOGY VISIT (OUTPATIENT)
Dept: ONCOLOGY | Facility: CLINIC | Age: 86
End: 2021-06-21
Attending: INTERNAL MEDICINE
Payer: MEDICARE

## 2021-06-21 VITALS
TEMPERATURE: 97.4 F | BODY MASS INDEX: 40.27 KG/M2 | HEART RATE: 59 BPM | WEIGHT: 250.6 LBS | SYSTOLIC BLOOD PRESSURE: 144 MMHG | OXYGEN SATURATION: 96 % | HEIGHT: 66 IN | DIASTOLIC BLOOD PRESSURE: 65 MMHG | RESPIRATION RATE: 16 BRPM

## 2021-06-21 DIAGNOSIS — Z79.811 USE OF AROMATASE INHIBITORS: ICD-10-CM

## 2021-06-21 DIAGNOSIS — Z17.0 MALIGNANT NEOPLASM OF UPPER-OUTER QUADRANT OF LEFT BREAST IN FEMALE, ESTROGEN RECEPTOR POSITIVE (H): Primary | ICD-10-CM

## 2021-06-21 DIAGNOSIS — C50.412 MALIGNANT NEOPLASM OF UPPER-OUTER QUADRANT OF LEFT BREAST IN FEMALE, ESTROGEN RECEPTOR POSITIVE (H): Primary | ICD-10-CM

## 2021-06-21 DIAGNOSIS — M10.071 ACUTE IDIOPATHIC GOUT OF RIGHT FOOT: ICD-10-CM

## 2021-06-21 DIAGNOSIS — E66.01 MORBID OBESITY (H): ICD-10-CM

## 2021-06-21 PROCEDURE — 99214 OFFICE O/P EST MOD 30 MIN: CPT | Performed by: NURSE PRACTITIONER

## 2021-06-21 ASSESSMENT — MIFFLIN-ST. JEOR: SCORE: 1608.46

## 2021-06-21 ASSESSMENT — PAIN SCALES - GENERAL: PAINLEVEL: NO PAIN (0)

## 2021-06-21 NOTE — NURSING NOTE
"Oncology Rooming Note    June 21, 2021 10:31 AM   Lisa Ferguson is a 85 year old female who presents for:    Chief Complaint   Patient presents with     Oncology Clinic Visit     Follow up     Initial Vitals: BP (!) 144/65 (BP Location: Right arm, Patient Position: Sitting, Cuff Size: Adult Large)   Pulse 59   Temp 97.4  F (36.3  C) (Oral)   Resp 16   Ht 1.676 m (5' 6\")   Wt 113.7 kg (250 lb 9.6 oz)   SpO2 96%   BMI 40.45 kg/m   Estimated body mass index is 40.45 kg/m  as calculated from the following:    Height as of this encounter: 1.676 m (5' 6\").    Weight as of this encounter: 113.7 kg (250 lb 9.6 oz). Body surface area is 2.3 meters squared.  No Pain (0) Comment: Data Unavailable   No LMP recorded. Patient is postmenopausal.  Allergies reviewed: Yes  Medications reviewed: Yes    Medications: Medication refills not needed today.  Pharmacy name entered into [facility]: [id number] - [place], [place] - [id number] [address]    Clinical concerns: Pain at the base of her neck (both sides),states the pain is better now that she has been doing exercises Linda Huertas was notified.      Hiwot Rizvi, [facility]            "

## 2021-06-21 NOTE — LETTER
6/21/2021         RE: Lisa Ferguson  [id number]  [place] [address]        Dear Colleague,    Thank you for referring your patient, Lisa Ferguson, to the [facility]. Please see a copy of my visit note below.    Oncology Follow Up Visit: June 21, 2021    Oncologist: Dr Tamar Georges  PCP: Ziyad Isaac    Diagnosis: Left Breast Cancer  Lisa Ferguson is an 84 yo female who self palpated abnormality in left breast in November 2020.  11/16/2020 diagnostic mammogram and ultrasound were done which found 2 lesions within the left breast 1 measuring approximately 1.5 cm and the second 1.9 cm.  Both were biopsied the one at 3:00 was a grade 2 invasive lobular cancer.  The one at 11:00 was a grade 2 invasive ductal cancer.  It was estrogen receptor positive progesterone receptor positive HER-2/kena indeterminate by IHC and negative by LABERTO  Genetic testing negative for findings  Oncotype DX= 22-considered low risk  Treatment:   1/6/2021 patient underwent bilateral mastectomies.  Right mastectomy was unremarkable.  Left mastectomy found a multifocal breast cancer the first measuring 4.5 cm is a grade 2 invasive lobular carcinoma, the second was a 2.5 cm grade 2 malignancy.  2 sentinel lymph nodes were removed 1 of which was negative the other 1 had immunohistochemistry positive only cells for a T M2N0i+M0 ER/OH positive HER-2/kena negative disease  2/2021 Began Arimidex    Interval History:Ms. Ferguson is see in today for follow for her breast cancer and use of Arimidex. Pt has now been on the Arimidex for 4 months and reports some hot flashes and some joint aches that may or may not be related to the Arimidex but overall feels she is tolerating the medication and is willing to continue with Arimidex daily. She admits she is not walking regularly any longer but is still active with gardening and is going to be kayaking at her cabin in [place]. She has no new weight loss or  new pain to chest.Has bras and prostheses that area working well for her but feel heavy.  Admits to having congestive heart concerns and is seeing cardiology on a regular basis-no current symptoms of concern.  Rest of comprehensive and complete ROS is reviewed and is negative.   Past Medical History:   Diagnosis Date     Anxiety      CAD (coronary artery disease)     angio 2002, h/o cabg, sees Luisana Nelson NP, they follow the cholesterol     CHF (congestive heart failure) (H) 7/8/2014     DJD (degenerative joint disease)      History of colonoscopy jan 2000    due jan 2010     Hyperlipidemia LDL goal < 70     [place] [facility]     Hypertension goal BP (blood pressure) < 140/90      Hypothyroidism      IBS (irritable bowel syndrome)      Mitral regurgitation      Obesity      GLEN (obstructive sleep apnea) 7/11/2011     PUD (peptic ulcer disease)     h/o duodenal ulcer     RBBB (right bundle branch block)      Sciatica neuralgia november 209    MRI shows spinal stenosis and a cyst on the spine, has received an epidural steroid injection     Current Outpatient Medications   Medication     allopurinol (ZYLOPRIM) 100 MG tablet     allopurinol (ZYLOPRIM) 300 MG tablet     amoxicillin (AMOXIL) 500 MG capsule     anastrozole (ARIMIDEX) 1 MG tablet     aspirin 81 MG EC tablet     betamethasone dipropionate (DIPROSONE) 0.05 % external lotion     citalopram (CELEXA) 20 MG tablet     levothyroxine (SYNTHROID/LEVOTHROID) 125 MCG tablet     LORazepam (ATIVAN) 0.5 MG tablet     losartan (COZAAR) 100 MG tablet     metoprolol tartrate (LOPRESSOR) 25 MG tablet     metoprolol tartrate (LOPRESSOR) 25 MG tablet     multivitamin w/minerals (MULTI-VITAMIN) tablet     order for DME     rosuvastatin (CRESTOR) 5 MG tablet     torsemide (DEMADEX) 10 MG tablet     torsemide (DEMADEX) 10 MG tablet     No current facility-administered medications for this visit.      Allergies   Allergen Reactions     Adhesive Tape      Atorvastatin Other  "(See Comments) and Muscle Pain (Myalgia)     lipitor  Myalgia     Cortisone      Insomnia, burning in chest, flushed     Nickel Other (See Comments)     Raw weepy skin  rash     Tetracycline Diarrhea     Vicodin [Hydrocodone-Acetaminophen] Other (See Comments)     Hallucinations     Liquid Adhesive      Other reaction(s): Contact Dermatitis       Physical Exam:BP (!) 144/65 (BP Location: Right arm, Patient Position: Sitting, Cuff Size: Adult Large)   Pulse 59   Temp 97.4  F (36.3  C) (Oral)   Resp 16   Ht 1.676 m (5' 6\")   Wt 113.7 kg (250 lb 9.6 oz)   SpO2 96%   BMI 40.45 kg/m     Constitutional: Alert, cooperative, and in no distress. Obese but moves well on own  ENT: Eyes bright , No mouth sores  Neck: Supple, No adenopathy.  Cardiac: Heart rate and rhythm is regular and strong without murmur  Respiratory: Breathing easy. Lung sounds clear to auscultation  Chest: note to have pea size hard area to lateral side of incision of the left breast mastectomy- non painful. Right chest has no issues.   GI: Abdomen is soft, non-tender, BS normal. No masses or organomegaly  MS: Muscle tone normal, extremities normal with no edema.   Skin: No suspicious lesions or rashes  Neuro: Sensory grossly WNL, gait normal.   Lymph: Normal ant/post cervical, axillary, supraclavicular nodes  Psych: Mentation appears normal and affect normal/bright and smiling    Laboratory Results: No results found for any visits on 06/21/21.  HISTORY: Long term (current) use of aromatase inhibitors     COMPARISON: 2/27/2015     [age] years[age]  Height: 66.0 inches  Weight: 250.0 pounds  Sex: Female  Ethnicity: White     Image quality: Adequate     Lumbar spine T-score in region of L1-L2 = 0.8   L1-L2 percent change: 11.3%      HIPS:  Mean total hip T-score: 1.3  Mean total hip percent change:-2.6%      Left femoral neck T-score = 0.0  Right femoral neck T-score= 0.5      Radius 33% T-score = 0.6  Radius 33% percent change: NA  Wrist DEXA " performed because of discrepancy between spine and hips.     COMPARISON TO PRIOR:  Percent change in the hips and lumbar spine is significant accounting  to the precision errors for this facility.      FRAX:  10 year probability of major osteoporotic fracture: 7.6%  10 year probability of hip fracture: 1.1%  The 10 year probability of fracture may be lower than reported if the  patient has received treatment. FRAX data should be disregarded in  patient's taking bisphosphonates.     World Health Organization definition of osteoporosis and osteopenia  for  women:   Normal: T-score at or above -1.0  Low Bone Mass (Osteopenia): T-score between -1.0 and -2.5.   Osteoporosis: T-score at or below -2.5   T-scores are reported for postmenopausal women and men over 50 years  of age.                                                                      IMPRESSION:   Normal bone mineral density.     I have personally reviewed the examination and initial interpretation  and I agree with the findings.     AYESHA BUCK MD  Assessment and Plan:   Left Breast Cancer-patient completed bilateral mastectomies and completed Oncotype diagnosis: 22 for low risk.  Pt continues using Arimidex which she appears to be tolerating well though has some hot flashes that are noted though not waking her or interrupting her sleep.  She will continue on the Arimidex daily noting plan for 5 years of use.  She will return in 4 months to see Dr. Georges no labs necessary.  Did share the possibility here of her requesting some knitted knockers.  Abnormality to left breast at incisional site-will complete ultrasound to the area to rule out any concerns but currently is a hard small pea size lump that is nonmobile in the incision.  We will follow with results  Use aromatase inhibitor-discussed need for regular cholesterol exam with primary care provider as well as encouragement for yearly eye exam.  Bone health-most recent DEXA scan completed  2/24/2021 showing normal bone density.  Highly recommend regular use of calcium plus vitamin D product daily and continued dairy use.  Discussed need for regular weightbearing exercises however she is carrying additional body weight which also is helping with bone density.  Obesity-the patient with this is a risk factor for breast cancer as well and highly recommend that she work with a diet low in fat and rich in fruits and vegetables.  The total time of this encounter amounted to 30 minutes. This time included face-to-face time spent with the patient, prep work, ordering tests, and performing post visit documentation.  Linda Huertas Cnp        Again, thank you for allowing me to participate in the care of your patient.        Sincerely,        Linda Huertas, BHUMIKA, APRN CNP

## 2021-06-21 NOTE — PROGRESS NOTES
Oncology Follow Up Visit: June 21, 2021    Oncologist: Dr Tamar Georges  PCP: Ziyad Isaac    Diagnosis: Left Breast Cancer  Lisa Ferguson is an 84 yo female who self palpated abnormality in left breast in November 2020.  11/16/2020 diagnostic mammogram and ultrasound were done which found 2 lesions within the left breast 1 measuring approximately 1.5 cm and the second 1.9 cm.  Both were biopsied the one at 3:00 was a grade 2 invasive lobular cancer.  The one at 11:00 was a grade 2 invasive ductal cancer.  It was estrogen receptor positive progesterone receptor positive HER-2/kena indeterminate by IHC and negative by ALBERTO  Genetic testing negative for findings  Oncotype DX= 22-considered low risk  Treatment:   1/6/2021 patient underwent bilateral mastectomies.  Right mastectomy was unremarkable.  Left mastectomy found a multifocal breast cancer the first measuring 4.5 cm is a grade 2 invasive lobular carcinoma, the second was a 2.5 cm grade 2 malignancy.  2 sentinel lymph nodes were removed 1 of which was negative the other 1 had immunohistochemistry positive only cells for a T M2N0i+M0 ER/NH positive HER-2/kena negative disease  2/2021 Began Arimidex    Interval History:Ms. Ferguson is see in today for follow for her breast cancer and use of Arimidex. Pt has now been on the Arimidex for 4 months and reports some hot flashes and some joint aches that may or may not be related to the Arimidex but overall feels she is tolerating the medication and is willing to continue with Arimidex daily. She admits she is not walking regularly any longer but is still active with gardening and is going to be kayaking at her cabin in [place]. She has no new weight loss or new pain to chest.Has bras and prostheses that area working well for her but feel heavy.  Admits to having congestive heart concerns and is seeing cardiology on a regular basis-no current symptoms of concern.  Rest of comprehensive and complete ROS is reviewed and  is negative.   Past Medical History:   Diagnosis Date     Anxiety      CAD (coronary artery disease)     angio 2002, h/o cabg, sees Luisana Nelson NP, they follow the cholesterol     CHF (congestive heart failure) (H) 7/8/2014     DJD (degenerative joint disease)      History of colonoscopy jan 2000    due jan 2010     Hyperlipidemia LDL goal < 70     sees [facility]     Hypertension goal BP (blood pressure) < 140/90      Hypothyroidism      IBS (irritable bowel syndrome)      Mitral regurgitation      Obesity      GLEN (obstructive sleep apnea) 7/11/2011     PUD (peptic ulcer disease)     h/o duodenal ulcer     RBBB (right bundle branch block)      Sciatica neuralgia november 209    MRI shows spinal stenosis and a cyst on the spine, has received an epidural steroid injection     Current Outpatient Medications   Medication     allopurinol (ZYLOPRIM) 100 MG tablet     allopurinol (ZYLOPRIM) 300 MG tablet     amoxicillin (AMOXIL) 500 MG capsule     anastrozole (ARIMIDEX) 1 MG tablet     aspirin 81 MG EC tablet     betamethasone dipropionate (DIPROSONE) 0.05 % external lotion     citalopram (CELEXA) 20 MG tablet     levothyroxine (SYNTHROID/LEVOTHROID) 125 MCG tablet     LORazepam (ATIVAN) 0.5 MG tablet     losartan (COZAAR) 100 MG tablet     metoprolol tartrate (LOPRESSOR) 25 MG tablet     metoprolol tartrate (LOPRESSOR) 25 MG tablet     multivitamin w/minerals (MULTI-VITAMIN) tablet     order for DME     rosuvastatin (CRESTOR) 5 MG tablet     torsemide (DEMADEX) 10 MG tablet     torsemide (DEMADEX) 10 MG tablet     No current facility-administered medications for this visit.      Allergies   Allergen Reactions     Adhesive Tape      Atorvastatin Other (See Comments) and Muscle Pain (Myalgia)     lipitor  Myalgia     Cortisone      Insomnia, burning in chest, flushed     Nickel Other (See Comments)     Raw weepy skin  rash     Tetracycline Diarrhea     Vicodin [Hydrocodone-Acetaminophen] Other (See Comments)      "Hallucinations     Liquid Adhesive      Other reaction(s): Contact Dermatitis       Physical Exam:BP (!) 144/65 (BP Location: Right arm, Patient Position: Sitting, Cuff Size: Adult Large)   Pulse 59   Temp 97.4  F (36.3  C) (Oral)   Resp 16   Ht 1.676 m (5' 6\")   Wt 113.7 kg (250 lb 9.6 oz)   SpO2 96%   BMI 40.45 kg/m     Constitutional: Alert, cooperative, and in no distress. Obese but moves well on own  ENT: Eyes bright , No mouth sores  Neck: Supple, No adenopathy.  Cardiac: Heart rate and rhythm is regular and strong without murmur  Respiratory: Breathing easy. Lung sounds clear to auscultation  Chest: note to have pea size hard area to lateral side of incision of the left breast mastectomy- non painful. Right chest has no issues.   GI: Abdomen is soft, non-tender, BS normal. No masses or organomegaly  MS: Muscle tone normal, extremities normal with no edema.   Skin: No suspicious lesions or rashes  Neuro: Sensory grossly WNL, gait normal.   Lymph: Normal ant/post cervical, axillary, supraclavicular nodes  Psych: Mentation appears normal and affect normal/bright and smiling    Laboratory Results: No results found for any visits on 06/21/21.  HISTORY: Long term (current) use of aromatase inhibitors     COMPARISON: 2/27/2015     Age[age] years.  Height: 66.0 inches  Weight: 250.0 pounds  Sex: Female  Ethnicity: White     Image quality: Adequate     Lumbar spine T-score in region of L1-L2 = 0.8   L1-L2 percent change: 11.3%      HIPS:  Mean total hip T-score: 1.3  Mean total hip percent change:-2.6%      Left femoral neck T-score = 0.0  Right femoral neck T-score= 0.5      Radius 33% T-score = 0.6  Radius 33% percent change: NA  Wrist DEXA performed because of discrepancy between spine and hips.     COMPARISON TO PRIOR:  Percent change in the hips and lumbar spine is significant accounting  to the precision errors for this facility.      FRAX:  10 year probability of major osteoporotic fracture: 7.6%  10 " year probability of hip fracture: 1.1%  The 10 year probability of fracture may be lower than reported if the  patient has received treatment. FRAX data should be disregarded in  patient's taking bisphosphonates.     World Health Organization definition of osteoporosis and osteopenia  for  women:   Normal: T-score at or above -1.0  Low Bone Mass (Osteopenia): T-score between -1.0 and -2.5.   Osteoporosis: T-score at or below -2.5   T-scores are reported for postmenopausal women and men over 50 years  of age.                                                                      IMPRESSION:   Normal bone mineral density.     I have personally reviewed the examination and initial interpretation  and I agree with the findings.     AYESHA BUCK MD  Assessment and Plan:   Left Breast Cancer-patient completed bilateral mastectomies and completed Oncotype diagnosis: 22 for low risk.  Pt continues using Arimidex which she appears to be tolerating well though has some hot flashes that are noted though not waking her or interrupting her sleep.  She will continue on the Arimidex daily noting plan for 5 years of use.  She will return in 4 months to see Dr. Georges no labs necessary.  Did share the possibility here of her requesting some knitted knockers.  Abnormality to left breast at incisional site-will complete ultrasound to the area to rule out any concerns but currently is a hard small pea size lump that is nonmobile in the incision.  We will follow with results  Use aromatase inhibitor-discussed need for regular cholesterol exam with primary care provider as well as encouragement for yearly eye exam.  Bone health-most recent DEXA scan completed 2/24/2021 showing normal bone density.  Highly recommend regular use of calcium plus vitamin D product daily and continued dairy use.  Discussed need for regular weightbearing exercises however she is carrying additional body weight which also is helping with bone  density.  Obesity-the patient with this is a risk factor for breast cancer as well and highly recommend that she work with a diet low in fat and rich in fruits and vegetables.  The total time of this encounter amounted to 30 minutes. This time included face-to-face time spent with the patient, prep work, ordering tests, and performing post visit documentation.  Linda Huertas,Cnp

## 2021-06-23 RX ORDER — ALLOPURINOL 100 MG/1
TABLET ORAL
Qty: 90 TABLET | Refills: 0 | Status: SHIPPED | OUTPATIENT
Start: 2021-06-23 | End: 2021-09-21

## 2021-06-23 NOTE — TELEPHONE ENCOUNTER
"Routing refill request to provider for review/approval because:  Labs not current:  ALT      Requested Prescriptions   Pending Prescriptions Disp Refills     allopurinol (ZYLOPRIM) 100 MG tablet [Pharmacy Med Name: ALLOPURINOL 100MG TABLETS] 90 tablet 0     Sig: TAKE 1 TABLET BY MOUTH DAILY. TAKE ALONG WITH THE 300MG TABLET FOR A TOTAL DAILY DOSE OF 400MG.       Gout Agents Protocol Failed - 6/21/2021  5:39 PM        Failed - ALT on file in past 12 months     Recent Labs   Lab Test 10/28/19  1135   ALT 35             Passed - CBC on file in past 12 months     Recent Labs   Lab Test 01/07/21  0738 12/29/20  1227   WBC  --  10.8   RBC  --  4.46   HGB 11.2* 12.9   HCT  --  40.5   PLT  --  337                 Passed - Has Uric Acid on file in past 12 months and value is less than 6     Recent Labs   Lab Test 12/29/20  1227   URIC 4.3     If level is 6mg/dL or greater, ok to refill one time and refer to provider.           Passed - Recent (12 mo) or future (30 days) visit within the authorizing provider's specialty     Patient has had an office visit with the authorizing provider or a provider within the authorizing providers department within the previous 12 mos or has a future within next 30 days. See \"Patient Info\" tab in inbasket, or \"Choose Columns\" in Meds & Orders section of the refill encounter.              Passed - Medication is active on med list        Passed - Patient is age 18 or older        Passed - No active pregnancy on record        Passed - Normal serum creatinine on file in the past 12 months     Recent Labs   Lab Test 01/07/21  0739   CR 0.79       Ok to refill medication if creatinine is low          Passed - No positive pregnancy test in past year           Erika Easley RN    "

## 2021-06-28 ENCOUNTER — OFFICE VISIT (OUTPATIENT)
Dept: INTERNAL MEDICINE | Facility: CLINIC | Age: 86
End: 2021-06-28
Payer: MEDICARE

## 2021-06-28 VITALS
SYSTOLIC BLOOD PRESSURE: 137 MMHG | OXYGEN SATURATION: 98 % | BODY MASS INDEX: 40.48 KG/M2 | DIASTOLIC BLOOD PRESSURE: 72 MMHG | HEART RATE: 62 BPM | TEMPERATURE: 98.9 F | RESPIRATION RATE: 14 BRPM | WEIGHT: 250.8 LBS

## 2021-06-28 DIAGNOSIS — I47.29 VENTRICULAR TACHYCARDIA (PAROXYSMAL) (H): ICD-10-CM

## 2021-06-28 DIAGNOSIS — Z00.00 ENCOUNTER FOR MEDICARE ANNUAL WELLNESS EXAM: Primary | ICD-10-CM

## 2021-06-28 DIAGNOSIS — Z71.89 ADVANCED DIRECTIVES, COUNSELING/DISCUSSION: ICD-10-CM

## 2021-06-28 DIAGNOSIS — Z23 NEED FOR SHINGLES VACCINE: ICD-10-CM

## 2021-06-28 DIAGNOSIS — N25.81 SECONDARY HYPERPARATHYROIDISM OF RENAL ORIGIN (H): ICD-10-CM

## 2021-06-28 DIAGNOSIS — G47.33 OSA (OBSTRUCTIVE SLEEP APNEA): ICD-10-CM

## 2021-06-28 PROCEDURE — G0439 PPPS, SUBSEQ VISIT: HCPCS | Performed by: INTERNAL MEDICINE

## 2021-06-28 ASSESSMENT — ENCOUNTER SYMPTOMS
SHORTNESS OF BREATH: 1
DIZZINESS: 0
COUGH: 1
HEARTBURN: 0
ARTHRALGIAS: 1
BREAST MASS: 1
EYE PAIN: 0
SORE THROAT: 0
DYSURIA: 0
ABDOMINAL PAIN: 0
HEMATOCHEZIA: 0
MYALGIAS: 1
CHILLS: 0
FREQUENCY: 0
NAUSEA: 0
NERVOUS/ANXIOUS: 0
PALPITATIONS: 0
HEADACHES: 0
CONSTIPATION: 0
WEAKNESS: 0
HEMATURIA: 0
JOINT SWELLING: 1
DIARRHEA: 0
PARESTHESIAS: 1
FEVER: 0

## 2021-06-28 ASSESSMENT — ACTIVITIES OF DAILY LIVING (ADL): CURRENT_FUNCTION: NO ASSISTANCE NEEDED

## 2021-06-28 NOTE — PROGRESS NOTES
SUBJECTIVE:   Lisa Ferguson is a 85 year old female who presents for Preventive Visit.    Encounter Diagnoses   Name Primary?     Encounter for Medicare annual wellness exam Yes     Ventricular tachycardia (paroxysmal) (H)      Secondary hyperparathyroidism of renal origin (H)         Patient has been advised of split billing requirements and indicates understanding: Yes   Are you in the first 12 months of your Medicare coverage?  No    HPI  Do you feel safe in your environment? Yes    Have you ever done Advance Care Planning? (For example, a Health Directive, POLST, or a discussion with a medical provider or your loved ones about your wishes): Yes, patient states has an Advance Care Planning document and will bring a copy to the clinic.     Fall risk  Fallen 2 or more times in the past year?: No  Any fall with injury in the past year?: No    Cognitive Screening   1) Repeat 3 items (Leader, Season, Table)    2) Clock draw: NORMAL  3) 3 item recall: Recalls 2 objects   Results: NORMAL clock, 1-2 items recalled: COGNITIVE IMPAIRMENT LESS LIKELY    Mini-CogTM Copyright S Olya. Licensed by the author for use in [facility]; reprinted with permission (gloria@[facility]). All rights reserved.      Do you have sleep apnea, excessive snoring or daytime drowsiness?: yes   Patient has documented obstructive sleep apnea and used her cpap machine and mask religiously because of receiving solid benefit from this therapy     Reviewed and updated as needed this visit by clinical staff  Tobacco  Allergies  Meds   Med Hx  Surg Hx  Fam Hx  Soc Hx        Reviewed and updated as needed this visit by Provider                Social History     Tobacco Use     Smoking status: Former Smoker     Years: 20.00     Types: Cigarettes     Quit date: 1978     Years since quittin.9     Smokeless tobacco: Never Used   Substance Use Topics     Alcohol use: Yes     Comment: Rare alcohol use.     If you drink alcohol do you  "typically have >3 drinks per day or >7 drinks per week? No    Alcohol Use 10/28/2019   Prescreen: >3 drinks/day or >7 drinks/week? Not Applicable   Prescreen: >3 drinks/day or >7 drinks/week? -   No flowsheet data found.      Current providers sharing in care for this patient include:   Patient Care Team:  Ziyad Isaac MD as PCP - Beata Camejo MD as Assigned Surgical Provider  Ziyad Isaac MD as Assigned PCP  Tamar Georges MD as MD (Medical Oncology)  Tamar Georges MD as Assigned Cancer Care Provider    The following health maintenance items are reviewed in Epic and correct as of today:  Health Maintenance Due   Topic Date Due     ANNUAL REVIEW OF HM ORDERS  Never done     ZOSTER IMMUNIZATION (2 of 3) 09/18/2008     MEDICARE ANNUAL WELLNESS VISIT  10/28/2020     ALT  10/28/2020     FALL RISK ASSESSMENT  10/28/2020     BMP  07/07/2021     DTAP/TDAP/TD IMMUNIZATION (2 - Td) 07/11/2021       See oncology consultation notes. Had a second primary breast cancer !   Review of Systems  Constitutional, HEENT, cardiovascular, pulmonary, gi and gu systems are negative, except as otherwise noted.    OBJECTIVE:   /72   Pulse 62   Temp 98.9  F (37.2  C) (Oral)   Resp 14   Wt 113.8 kg (250 lb 12.8 oz)   SpO2 98%   BMI 40.48 kg/m   Estimated body mass index is 40.45 kg/m  as calculated from the following:    Height as of 6/21/21: 1.676 m (5' 6\").    Weight as of 6/21/21: 113.7 kg (250 lb 9.6 oz).  Physical Exam  GENERAL: healthy, alert and no distress  EYES: Eyes grossly normal to inspection, PERRL and conjunctivae and sclerae normal  HENT: ear canals and TM's normal, nose and mouth without ulcers or lesions  NECK: no adenopathy, no asymmetry, masses, or scars and thyroid normal to palpation  RESP: lungs clear to auscultation - no rales, rhonchi or wheezes  CV: regular rate and rhythm, normal S1 S2, no S3 or S4, no murmur, click or rub, no peripheral edema and peripheral " "pulses strong  ABDOMEN: soft, nontender, no hepatosplenomegaly, no masses and bowel sounds normal  MS: no gross musculoskeletal defects noted, no edema  SKIN: no suspicious lesions or rashes  NEURO: Normal strength and tone, mentation intact and speech normal  PSYCH: mentation appears normal, affect normal/bright    Diagnostic Test Results:  Labs reviewed in Epic    ASSESSMENT / PLAN:   (Z00.00) Encounter for Medicare annual wellness exam  (primary encounter diagnosis)  Comment: this is a patient who has follow up already established with some key consultant sub-specialists namely, oncologist and cardiologist. The medications and testing are largely directed by these sub-specialists. I had her tested with a complete set of laboratory studies in December 2020 and she doesn't need any laboratory studies from my perspective at all until December 2021.  Plan: see orders section of this encounter     (I47.2) Ventricular tachycardia (paroxysmal) (H)  Comment: problem is stable and ongoing monitoring    Plan: see cardiology consultation office visit notes with care everywhere     (N25.81) Secondary hyperparathyroidism of renal origin (H)  Comment: problem is stable and ongoing monitoring    Plan:     See patient after-visit summary for discussion about living will    We completed new prescription for durable medical equipment or supplies for cpap machine and mask and see form scanned into Epic electronic medical records  And faxed as per patient request  We discussed the need for Sh[employer] vaccination against herpes zoster and she may agree to get this at the [facility] later today     Patient has been advised of split billing requirements and indicates understanding: Yes  COUNSELING:  Reviewed preventive health counseling, as reflected in patient instructions    Estimated body mass index is 40.45 kg/m  as calculated from the following:    Height as of 6/21/21: 1.676 m (5' 6\").    Weight as of 6/21/21: " 113.7 kg (250 lb 9.6 oz).    Weight management plan: Discussed healthy diet and exercise guidelines    She reports that she quit smoking about 42 years ago. Her smoking use included cigarettes. She quit after 20.00 years of use. She has never used smokeless tobacco.      Appropriate preventive services were discussed with this patient, including applicable screening as appropriate for cardiovascular disease, diabetes, osteopenia/osteoporosis, and glaucoma.  As appropriate for age/gender, discussed screening for colorectal cancer, prostate cancer, breast cancer, and cervical cancer. Checklist reviewing preventive services available has been given to the patient.    Reviewed patients plan of care and provided an AVS. The Basic Care Plan (routine screening as documented in Health Maintenance) for Lisa meets the Care Plan requirement. This Care Plan has been established and reviewed with the Patient.    Counseling Resources:  ATP IV Guidelines  Pooled Cohorts Equation Calculator  Breast Cancer Risk Calculator  Breast Cancer: Medication to Reduce Risk  FRAX Risk Assessment  ICSI Preventive Guidelines  Dietary Guidelines for Americans, 2010  [employer]'s MyPlate  ASA Prophylaxis  Lung CA Screening    Ziyad Isaac MD  [facility]    Identified Health Risks:

## 2021-06-28 NOTE — PATIENT INSTRUCTIONS
Patient Education   Personalized Prevention Plan  You are due for the preventive services outlined below.  Your care team is available to assist you in scheduling these services.  If you have already completed any of these items, please share that information with your care team to update in your medical record.  Health Maintenance Due   Topic Date Due     ANNUAL REVIEW OF HM ORDERS  Never done     Zoster (Shingles) Vaccine (2 of 3) 09/18/2008     Liver Monitoring Lab  10/28/2020     FALL RISK ASSESSMENT  10/28/2020     Basic Metabolic Panel  07/07/2021     Diptheria Tetanus Pertussis (DTAP/TDAP/TD) Vaccine (2 - Td) 07/11/2021      we discussed the fact that we don't have a copy of your living will  Document and if you sent this to us we will scan this into Epic electronic medical records

## 2021-07-01 ENCOUNTER — HOSPITAL ENCOUNTER (OUTPATIENT)
Dept: ULTRASOUND IMAGING | Facility: CLINIC | Age: 86
Discharge: HOME | End: 2021-07-01
Attending: NURSE PRACTITIONER | Admitting: NURSE PRACTITIONER
Payer: MEDICARE

## 2021-07-01 DIAGNOSIS — Z17.0 MALIGNANT NEOPLASM OF UPPER-OUTER QUADRANT OF LEFT BREAST IN FEMALE, ESTROGEN RECEPTOR POSITIVE (H): ICD-10-CM

## 2021-07-01 DIAGNOSIS — C50.412 MALIGNANT NEOPLASM OF UPPER-OUTER QUADRANT OF LEFT BREAST IN FEMALE, ESTROGEN RECEPTOR POSITIVE (H): ICD-10-CM

## 2021-07-01 PROCEDURE — 76642 ULTRASOUND BREAST LIMITED: CPT | Mod: LT

## 2021-07-06 DIAGNOSIS — Z17.0 MALIGNANT NEOPLASM OF UPPER-OUTER QUADRANT OF LEFT BREAST IN FEMALE, ESTROGEN RECEPTOR POSITIVE (H): Primary | ICD-10-CM

## 2021-07-06 DIAGNOSIS — C50.412 MALIGNANT NEOPLASM OF UPPER-OUTER QUADRANT OF LEFT BREAST IN FEMALE, ESTROGEN RECEPTOR POSITIVE (H): Primary | ICD-10-CM

## 2021-07-08 ENCOUNTER — OFFICE VISIT (OUTPATIENT)
Dept: SURGERY | Facility: CLINIC | Age: 86
End: 2021-07-08
Payer: MEDICARE

## 2021-07-08 DIAGNOSIS — N63.20 LEFT BREAST MASS: Primary | ICD-10-CM

## 2021-07-08 DIAGNOSIS — Z17.0 MALIGNANT NEOPLASM OF UPPER-INNER QUADRANT OF LEFT BREAST IN FEMALE, ESTROGEN RECEPTOR POSITIVE (H): ICD-10-CM

## 2021-07-08 DIAGNOSIS — C50.212 MALIGNANT NEOPLASM OF UPPER-INNER QUADRANT OF LEFT BREAST IN FEMALE, ESTROGEN RECEPTOR POSITIVE (H): ICD-10-CM

## 2021-07-08 DIAGNOSIS — N63.0 BREAST LUMP: ICD-10-CM

## 2021-07-08 PROCEDURE — 99213 OFFICE O/P EST LOW 20 MIN: CPT | Performed by: SURGERY

## 2021-07-08 NOTE — LETTER
7/8/2021         RE: Lisa Ferguson  [address]  [place] [address]        Dear Colleague,    Thank you for referring your patient, Lisa Ferguson, to the [facility]. Please see a copy of my visit note below.    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 She is here for 6 month follow up. She is on Arimidex and saw Linda Huertas on 6/21/2021, CNP with medical oncology (follows with Dr. Georges).     She reports at her last visit with Linda, she felt a lump on her left chest under her mastectomy incision. This has changed from feeling soft to more firm. She had an US on 7/1 which revealed a 1.4cm oval mixed echogenic mass with a thin hypoechoic capsule thought possibly consistent with fat necrosis or resolving hematoma. US in 6 months was recommended.     She reports she is otherwise doing fine. She has had some issues with bilateral shoulder pain and wonders if related to her breast surgery. She has been in [place] at the [employer] this summer (near [place], [place]) and is kayaking some.     REVIEW OF SYSTEMS:  Constitutional:  Negative for chills, fatigue, fever and weight change.  Eyes:  Negative for blurred vision, eye pain and photophobia.  ENT:  Negative for hearing problems, ENT pain, congestion, rhinorrhea, epistaxis, hoarseness and dental problems.  Cardiovascular:  Negative for chest pain, palpitations, tachycardia, orthopnea and edema.  Respiratory:  Negative for cough, dyspnea and hemoptysis.  Gastrointestinal:   Negative for abdominal pain, heartburn, constipation, diarrhea and stool changes.  Musculoskeletal:  Negative for arthralgias, back pain and myalgias.  Integumentary/Breast:  See HPI.    Past Medical History:   Diagnosis Date     Anxiety      CAD (coronary artery disease)     angio 2002, h/o cabg, sees Luisana Nelson NP, they follow the cholesterol     CHF (congestive heart failure) (H) 7/8/2014     DJD (degenerative joint disease)      History of colonoscopy jan 2000    due jan 2010     Hyperlipidemia LDL goal < 70     [facility]     Hypertension goal BP (blood pressure) < 140/90      Hypothyroidism      IBS (irritable bowel syndrome)      Mitral regurgitation      Obesity      GLEN (obstructive sleep apnea) 7/11/2011     PUD (peptic ulcer disease)     h/o duodenal ulcer     RBBB (right bundle branch block)      Sciatica neuralgia november 209    MRI shows spinal stenosis and a cyst on the spine, has received an epidural steroid injection       Past Surgical History:   Procedure Laterality Date     ARTHROSCOPY KNEE RT/LT      bilateral     C APPENDECTOMY       C CABG, ARTERIAL, THREE  1999    LIMA-LAD, SVG-DIagnoal, SVG-OM, previous LAD-PCI, sees Dr Banuelos     COLONOSCOPY  6/2010    negative     HC DILATION/CURETTAGE DIAG/THER NON OB       HC EXCIS INTERDIGITAL NEUROMA,EA      mortons neuroma excision     HC REMOVAL GALLBLADDER  1994     HERNIA REPAIR, INGUINAL RT/LT       MASTECTOMY SIMPLE BILATERAL, SENTINEL NODE BILATERAL, COMBINED Bilateral 1/6/2021    Procedure: BILATERAL SIMPLE MASTECTOMY WITH LEFT SENTINEL LYMPH NODE BIOPSY;  Surgeon: Beata Garcia MD;  Location: [facility] OR     SURGICAL HISTORY OF -       bilateral meniscal tears and surgery on these     SURGICAL HISTORY OF -   1999 or so    one parathyroid removed     TONSILLECTOMY       TUBAL LIGATION         Family History   Problem Relation Age of Onset     C.A.D. Mother      Arthritis Mother      Cardiovascular Mother      Heart Disease Mother       Obesity Mother      Thyroid Disease Mother      C.A.D. Father      Diabetes Father      Hypertension Father      Alcohol/Drug Father      Alzheimer Disease Father      Cardiovascular Father      Circulatory Father      Gastrointestinal Disease Father      Heart Disease Father      Lipids Father      Obesity Father      Cardiovascular Maternal Grandmother      Depression Maternal Grandmother      Obesity Maternal Grandmother      Thyroid Disease Maternal Grandmother      Cardiovascular Maternal Grandfather      Heart Disease Maternal Grandfather      Obesity Maternal Grandfather      Obesity Paternal Grandmother      Diabetes Paternal Grandfather      Alcohol/Drug Paternal Grandfather      Obesity Paternal Grandfather      Breast Cancer Sister      Cancer Sister      Obesity Sister      Thyroid Disease Sister      Alcohol/Drug Son      Allergies Son      Allergies Daughter      Depression Sister      Eye Disorder Sister      Gastrointestinal Disease Sister      Thyroid Disease Sister        Social History     Tobacco Use     Smoking status: Former Smoker     Years: 20.00     Types: Cigarettes     Quit date: 1978     Years since quittin.0     Smokeless tobacco: Never Used   Substance Use Topics     Alcohol use: Yes     Comment: Rare alcohol use.       Patient Active Problem List   Diagnosis     PUD (peptic ulcer disease)     Hyperlipidemia with target LDL less than 70     Mitral regurgitation     DJD (degenerative joint disease)     Fibroadenoma of breast     Diverticulosis     Sciatica neuralgia     IBS (irritable bowel syndrome)     RBBB (right bundle branch block)     GLEN (obstructive sleep apnea)     Balance disorder     Anxiety     Ventricular tachycardia (paroxysmal) (H)     Corneal ulcer of left eye     Blepharitis     Hypercalcemia     Disorder of skin or subcutaneous tissue     Scar condition and fibrosis of skin     OA (osteoarthritis) of knee - bilateral     Fracture of proximal humerus      CHF (congestive heart failure) (H)     Shoulder fracture     Abnormal glucose     S/P CABG (coronary artery bypass graft)     S/p total knee replacement, bilateral     Acquired hypothyroidism     Essential hypertension with goal blood pressure less than 130/80     Morbid obesity (H)     H/O parathyroidectomy     Atherosclerosis of coronary artery bypass graft(s), unspecified, with other forms of angina pectoris (H)     Malignant neoplasm of upper-outer quadrant of left breast in female, estrogen receptor positive (H)     Secondary hyperparathyroidism of renal origin (H)     Allergies   Allergen Reactions     Adhesive Tape      Atorvastatin Other (See Comments) and Muscle Pain (Myalgia)     lipitor  Myalgia     Cortisone      Insomnia, burning in chest, flushed     Nickel Other (See Comments)     Raw weepy skin  rash     Tetracycline Diarrhea     Vicodin [Hydrocodone-Acetaminophen] Other (See Comments)     Hallucinations     Liquid Adhesive      Other reaction(s): Contact Dermatitis     Current Outpatient Medications   Medication Sig Dispense Refill     allopurinol (ZYLOPRIM) 100 MG tablet TAKE 1 TABLET BY MOUTH DAILY. TAKE ALONG WITH THE 300MG TABLET FOR A TOTAL DAILY DOSE OF 400MG. 90 tablet 0     allopurinol (ZYLOPRIM) 300 MG tablet TAKE 1 TABLET BY MOUTH DAILY ALONG WITH 100MG TABLET TO EQUAL TOTAL DAILY DOSE OF 400MG 90 tablet 1     amoxicillin (AMOXIL) 500 MG capsule TK 4 CS PO 1 HOUR BEFORE DENTAL APPOINTMENT (Patient taking differently: Take 2,000 mg by mouth daily as needed TK 4 CS PO 1 HOUR BEFORE DENTAL APPOINTMENT) 4 capsule 3     anastrozole (ARIMIDEX) 1 MG tablet Take 1 tablet (1 mg) by mouth daily 90 tablet 3     aspirin 81 MG EC tablet Take 81 mg by mouth every morning       betamethasone dipropionate (DIPROSONE) 0.05 % external lotion Apply topically as needed (Patient taking differently: Apply topically daily as needed ) 60 mL 1     citalopram (CELEXA) 20 MG tablet Take 1 tablet (20 mg) by mouth At  Bedtime 90 tablet 1     levothyroxine (SYNTHROID/LEVOTHROID) 125 MCG tablet Take 1 tablet (125 mcg) by mouth every morning 90 tablet 3     LORazepam (ATIVAN) 0.5 MG tablet Take 1 tablet (0.5 mg) by mouth every 6 hours as needed for anxiety 10 tablet 1     losartan (COZAAR) 100 MG tablet Take 50 mg by mouth every morning (0.5 X 100 mg)       metoprolol tartrate (LOPRESSOR) 25 MG tablet Take 50 mg by mouth every morning (2 X 25 mg)  (in addition to bedtime dose)       metoprolol tartrate (LOPRESSOR) 25 MG tablet Take 25 mg by mouth At Bedtime (in addition to morning dose)       multivitamin w/minerals (MULTI-VITAMIN) tablet Take 1 tablet by mouth every morning       order for DME Equipment being ordered: CPAP 1 each 0     rosuvastatin (CRESTOR) 5 MG tablet three times a week        torsemide (DEMADEX) 10 MG tablet Take 20 mg by mouth every morning (2 X 10 mg)  (in addition to lunchtime dose)       torsemide (DEMADEX) 10 MG tablet Take 10 mg by mouth daily (with lunch) (in addition to morning dose)         EXAM:  GENERAL: healthy, alert and no distress   BREAST:  Breasts are surgically absent. Well healed mastectomy scars in place. On the left chest wall at the mid portion of the mastectomy scar is a 1.5cm firm nodule which is well circumscribed. No other masses or areas of concern. Mild tenderness along the right lateral chest wall with pressure.    There is no axillary or supraclavicular lymphadenopathy.  CARDIOVASCULAR:  RRR  RESPIRATORY: nonlabored breathing  NECK: Neck supple. No adenopathy. Thyroid symmetric, normal size,, Carotids without bruits.  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     We discussed options for the palpable lump on her left chest wall. I agree that it is likely fat necrosis. We could consider follow up US in 6 months vs excision vs core needle biopsy. She is feeling the area daily and it does bother her. After further discussion, she would like to proceed with core needle biopsy after her 68th class reunion in a couple weeks. I will call her with results of biopsy. I would like to see her back in 6 months for chest wall exam, sooner with any questions or concerns.           Beata Garcia MD  [facility]  [phone]        Please route or send letter to:  Primary Care Provider (PCP) and Referring Provider          Again, thank you for allowing me to participate in the care of your patient.        Sincerely,        Beata Garcia MD

## 2021-07-08 NOTE — PROGRESS NOTES
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 She is here for 6 month follow up. She is on Arimidex and saw Linda Huertas on 6/21/2021, CNP with medical oncology (follows with Dr. Georges).     She reports at her last visit with Linda, she felt a lump on her left chest under her mastectomy incision. This has changed from feeling soft to more firm. She had an US on 7/1 which revealed a 1.4cm oval mixed echogenic mass with a thin hypoechoic capsule thought possibly consistent with fat necrosis or resolving hematoma. US in 6 months was recommended.     She reports she is otherwise doing fine. She has had some issues with bilateral shoulder pain and wonders if related to her breast surgery. She has been in [place] at the [employer] this summer (near [place], [place]) and is kayaking some.     REVIEW OF SYSTEMS:  Constitutional:  Negative for chills, fatigue, fever and weight change.  Eyes:  Negative for blurred vision, eye pain and photophobia.  ENT:  Negative for hearing problems, ENT pain, congestion, rhinorrhea, epistaxis, hoarseness and dental problems.  Cardiovascular:  Negative for chest pain, palpitations, tachycardia, orthopnea and edema.  Respiratory:  Negative for cough, dyspnea and hemoptysis.  Gastrointestinal:  Negative for abdominal pain, heartburn, constipation, diarrhea and stool changes.  Musculoskeletal:  Negative for arthralgias, back pain and myalgias.  Integumentary/Breast:  See HPI.    Past Medical History:   Diagnosis Date     Anxiety      CAD  (coronary artery disease)     angio 2002, h/o cabg, sees Luisana Nelson NP, they follow the cholesterol     CHF (congestive heart failure) (H) 7/8/2014     DJD (degenerative joint disease)      History of colonoscopy jan 2000    due jan 2010     Hyperlipidemia LDL goal < 70     sees [facility] lipid clinic     Hypertension goal BP (blood pressure) < 140/90      Hypothyroidism      IBS (irritable bowel syndrome)      Mitral regurgitation      Obesity      GLEN (obstructive sleep apnea) 7/11/2011     PUD (peptic ulcer disease)     h/o duodenal ulcer     RBBB (right bundle branch block)      Sciatica neuralgia november 209    MRI shows spinal stenosis and a cyst on the spine, has received an epidural steroid injection       Past Surgical History:   Procedure Laterality Date     ARTHROSCOPY KNEE RT/LT      bilateral     C APPENDECTOMY       C CABG, ARTERIAL, THREE  1999    LIMA-LAD, SVG-DIagnoal, SVG-OM, previous LAD-PCI, sees Dr Banuelos     COLONOSCOPY  6/2010    negative     HC DILATION/CURETTAGE DIAG/THER NON OB       HC EXCIS INTERDIGITAL NEUROMA,EA      mortons neuroma excision     HC REMOVAL GALLBLADDER  1994     HERNIA REPAIR, INGUINAL RT/LT       MASTECTOMY SIMPLE BILATERAL, SENTINEL NODE BILATERAL, COMBINED Bilateral 1/6/2021    Procedure: BILATERAL SIMPLE MASTECTOMY WITH LEFT SENTINEL LYMPH NODE BIOPSY;  Surgeon: Beata Garcia MD;  Location: [facility] OR     SURGICAL HISTORY OF -       bilateral meniscal tears and surgery on these     SURGICAL HISTORY OF -   1999 or so    one parathyroid removed     TONSILLECTOMY       TUBAL LIGATION         Family History   Problem Relation Age of Onset     C.A.D. Mother      Arthritis Mother      Cardiovascular Mother      Heart Disease Mother      Obesity Mother      Thyroid Disease Mother      C.A.D. Father      Diabetes Father      Hypertension Father      Alcohol/Drug Father      Alzheimer Disease Father      Cardiovascular Father      Circulatory Father      Gastrointestinal  Disease Father      Heart Disease Father      Lipids Father      Obesity Father      Cardiovascular Maternal Grandmother      Depression Maternal Grandmother      Obesity Maternal Grandmother      Thyroid Disease Maternal Grandmother      Cardiovascular Maternal Grandfather      Heart Disease Maternal Grandfather      Obesity Maternal Grandfather      Obesity Paternal Grandmother      Diabetes Paternal Grandfather      Alcohol/Drug Paternal Grandfather      Obesity Paternal Grandfather      Breast Cancer Sister      Cancer Sister      Obesity Sister      Thyroid Disease Sister      Alcohol/Drug Son      Allergies Son      Allergies Daughter      Depression Sister      Eye Disorder Sister      Gastrointestinal Disease Sister      Thyroid Disease Sister        Social History     Tobacco Use     Smoking status: Former Smoker     Years: 20.00     Types: Cigarettes     Quit date: 1978     Years since quittin.0     Smokeless tobacco: Never Used   Substance Use Topics     Alcohol use: Yes     Comment: Rare alcohol use.       Patient Active Problem List   Diagnosis     PUD (peptic ulcer disease)     Hyperlipidemia with target LDL less than 70     Mitral regurgitation     DJD (degenerative joint disease)     Fibroadenoma of breast     Diverticulosis     Sciatica neuralgia     IBS (irritable bowel syndrome)     RBBB (right bundle branch block)     GLEN (obstructive sleep apnea)     Balance disorder     Anxiety     Ventricular tachycardia (paroxysmal) (H)     Corneal ulcer of left eye     Blepharitis     Hypercalcemia     Disorder of skin or subcutaneous tissue     Scar condition and fibrosis of skin     OA (osteoarthritis) of knee - bilateral     Fracture of proximal humerus     CHF (congestive heart failure) (H)     Shoulder fracture     Abnormal glucose     S/P CABG (coronary artery bypass graft)     S/p total knee replacement, bilateral     Acquired hypothyroidism     Essential hypertension with goal blood  pressure less than 130/80     Morbid obesity (H)     H/O parathyroidectomy     Atherosclerosis of coronary artery bypass graft(s), unspecified, with other forms of angina pectoris (H)     Malignant neoplasm of upper-outer quadrant of left breast in female, estrogen receptor positive (H)     Secondary hyperparathyroidism of renal origin (H)     Allergies   Allergen Reactions     Adhesive Tape      Atorvastatin Other (See Comments) and Muscle Pain (Myalgia)     lipitor  Myalgia     Cortisone      Insomnia, burning in chest, flushed     Nickel Other (See Comments)     Raw weepy skin  rash     Tetracycline Diarrhea     Vicodin [Hydrocodone-Acetaminophen] Other (See Comments)     Hallucinations     Liquid Adhesive      Other reaction(s): Contact Dermatitis     Current Outpatient Medications   Medication Sig Dispense Refill     allopurinol (ZYLOPRIM) 100 MG tablet TAKE 1 TABLET BY MOUTH DAILY. TAKE ALONG WITH THE 300MG TABLET FOR A TOTAL DAILY DOSE OF 400MG. 90 tablet 0     allopurinol (ZYLOPRIM) 300 MG tablet TAKE 1 TABLET BY MOUTH DAILY ALONG WITH 100MG TABLET TO EQUAL TOTAL DAILY DOSE OF 400MG 90 tablet 1     amoxicillin (AMOXIL) 500 MG capsule TK 4 CS PO 1 HOUR BEFORE DENTAL APPOINTMENT (Patient taking differently: Take 2,000 mg by mouth daily as needed TK 4 CS PO 1 HOUR BEFORE DENTAL APPOINTMENT) 4 capsule 3     anastrozole (ARIMIDEX) 1 MG tablet Take 1 tablet (1 mg) by mouth daily 90 tablet 3     aspirin 81 MG EC tablet Take 81 mg by mouth every morning       betamethasone dipropionate (DIPROSONE) 0.05 % external lotion Apply topically as needed (Patient taking differently: Apply topically daily as needed ) 60 mL 1     citalopram (CELEXA) 20 MG tablet Take 1 tablet (20 mg) by mouth At Bedtime 90 tablet 1     levothyroxine (SYNTHROID/LEVOTHROID) 125 MCG tablet Take 1 tablet (125 mcg) by mouth every morning 90 tablet 3     LORazepam (ATIVAN) 0.5 MG tablet Take 1 tablet (0.5 mg) by mouth every 6 hours as needed for  anxiety 10 tablet 1     losartan (COZAAR) 100 MG tablet Take 50 mg by mouth every morning (0.5 X 100 mg)       metoprolol tartrate (LOPRESSOR) 25 MG tablet Take 50 mg by mouth every morning (2 X 25 mg)  (in addition to bedtime dose)       metoprolol tartrate (LOPRESSOR) 25 MG tablet Take 25 mg by mouth At Bedtime (in addition to morning dose)       multivitamin w/minerals (MULTI-VITAMIN) tablet Take 1 tablet by mouth every morning       order for DME Equipment being ordered: CPAP 1 each 0     rosuvastatin (CRESTOR) 5 MG tablet three times a week        torsemide (DEMADEX) 10 MG tablet Take 20 mg by mouth every morning (2 X 10 mg)  (in addition to lunchtime dose)       torsemide (DEMADEX) 10 MG tablet Take 10 mg by mouth daily (with lunch) (in addition to morning dose)         EXAM:  GENERAL: healthy, alert and no distress   BREAST:  Breasts are surgically absent. Well healed mastectomy scars in place. On the left chest wall at the mid portion of the mastectomy scar is a 1.5cm firm nodule which is well circumscribed. No other masses or areas of concern. Mild tenderness along the right lateral chest wall with pressure.    There is no axillary or supraclavicular lymphadenopathy.  CARDIOVASCULAR:  RRR  RESPIRATORY: nonlabored breathing  NECK: Neck supple. No adenopathy. Thyroid symmetric, normal size,, Carotids without bruits.  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     We discussed options for the palpable lump on her left chest wall. I agree that it is likely fat necrosis. We could consider  follow up US in 6 months vs excision vs core needle biopsy. She is feeling the area daily and it does bother her. After further discussion, she would like to proceed with core needle biopsy after her 68th class reunion in a couple weeks. I will call her with results of biopsy. I would like to see her back in 6 months for chest wall exam, sooner with any questions or concerns.           Beata Garcia MD  [facility]  [phone]        Please route or send letter to:  Primary Care Provider (PCP) and Referring Provider

## 2021-07-08 NOTE — NURSING NOTE
Lisa Ferguson's goals for this visit include:   Chief Complaint   Patient presents with     RECHECK     6 month exam, lump on left chest       She requests these members of her care team be copied on today's visit information: no    PCP: Ziyad Isaac    Referring Provider:  Ziyad Isaca MD  [address]  [place] [id number]    There were no vitals taken for this visit.    Do you need any medication refills at today's visit? No    Bela Abraham LPN

## 2021-07-20 DIAGNOSIS — F41.9 ANXIETY: ICD-10-CM

## 2021-07-21 RX ORDER — CITALOPRAM HYDROBROMIDE 20 MG/1
TABLET ORAL
Qty: 90 TABLET | Refills: 1 | Status: SHIPPED | OUTPATIENT
Start: 2021-07-21 | End: 2022-01-18

## 2021-08-09 ENCOUNTER — ANCILLARY PROCEDURE (OUTPATIENT)
Dept: ULTRASOUND IMAGING | Facility: CLINIC | Age: 86
End: 2021-08-09
Attending: SURGERY
Payer: MEDICARE

## 2021-08-09 DIAGNOSIS — Z17.0 MALIGNANT NEOPLASM OF UPPER-INNER QUADRANT OF LEFT BREAST IN FEMALE, ESTROGEN RECEPTOR POSITIVE (H): ICD-10-CM

## 2021-08-09 DIAGNOSIS — C50.212 MALIGNANT NEOPLASM OF UPPER-INNER QUADRANT OF LEFT BREAST IN FEMALE, ESTROGEN RECEPTOR POSITIVE (H): ICD-10-CM

## 2021-08-09 DIAGNOSIS — N63.20 LEFT BREAST MASS: ICD-10-CM

## 2021-08-09 DIAGNOSIS — N63.0 BREAST LUMP: ICD-10-CM

## 2021-08-09 PROCEDURE — 19083 BX BREAST 1ST LESION US IMAG: CPT | Mod: LT | Performed by: RADIOLOGY

## 2021-08-09 PROCEDURE — 88305 TISSUE EXAM BY PATHOLOGIST: CPT | Performed by: RADIOLOGY

## 2021-08-09 RX ORDER — LIDOCAINE HYDROCHLORIDE 10 MG/ML
9 INJECTION, SOLUTION EPIDURAL; INFILTRATION; INTRACAUDAL; PERINEURAL ONCE
Status: COMPLETED | OUTPATIENT
Start: 2021-08-09 | End: 2021-08-09

## 2021-08-09 RX ADMIN — LIDOCAINE HYDROCHLORIDE 9 ML: 10 INJECTION, SOLUTION EPIDURAL; INFILTRATION; INTRACAUDAL; PERINEURAL at 11:46

## 2021-08-11 ENCOUNTER — LAB (OUTPATIENT)
Dept: LAB | Facility: CLINIC | Age: 86
End: 2021-08-11
Payer: MEDICARE

## 2021-08-11 LAB
PATH REPORT.COMMENTS IMP SPEC: NORMAL
PATH REPORT.COMMENTS IMP SPEC: NORMAL
PATH REPORT.FINAL DX SPEC: NORMAL
PATH REPORT.GROSS SPEC: NORMAL
PATH REPORT.MICROSCOPIC SPEC OTHER STN: NORMAL
PATH REPORT.RELEVANT HX SPEC: NORMAL
PHOTO IMAGE: NORMAL

## 2021-08-12 ENCOUNTER — TELEPHONE (OUTPATIENT)
Dept: GENERAL RADIOLOGY | Facility: CLINIC | Age: 86
End: 2021-08-12

## 2021-08-12 NOTE — TELEPHONE ENCOUNTER
Spoke to patient regarding Left breast biopsy done on 8/9/21 with finding of Fibroadipose tissue with fat necrosis (and associated giant cells  and macrophages) and fibrous scar, suggestive of prior operative site. Notified patient that the Radiologist recommendation is Clinical follow up. Patient verbalized understanding and all questions and concerns answered to patients satisfaction.

## 2021-09-18 ENCOUNTER — HEALTH MAINTENANCE LETTER (OUTPATIENT)
Age: 86
End: 2021-09-18

## 2021-09-19 DIAGNOSIS — M10.071 ACUTE IDIOPATHIC GOUT OF RIGHT FOOT: ICD-10-CM

## 2021-09-21 RX ORDER — ALLOPURINOL 100 MG/1
TABLET ORAL
Qty: 90 TABLET | Refills: 0 | Status: SHIPPED | OUTPATIENT
Start: 2021-09-21 | End: 2021-12-21

## 2021-09-21 NOTE — TELEPHONE ENCOUNTER
"Routing refill request to provider for review/approval because:  Labs not current:    ALT  Requested Prescriptions   Pending Prescriptions Disp Refills     allopurinol (ZYLOPRIM) 100 MG tablet [Pharmacy Med Name: ALLOPURINOL 100MG TABLETS] 90 tablet 0     Sig: TAKE 1 TABLET BY MOUTH DAILY. TAKE ALONG WITH THE 300MG TABLET FOR A TOTAL DAILY DOSE OF 400MG.       Gout Agents Protocol Failed - 9/19/2021  7:05 AM        Failed - ALT on file in past 12 months     Recent Labs   Lab Test 10/28/19  1135   ALT 35             Passed - CBC on file in past 12 months     Recent Labs   Lab Test 01/07/21  0738 12/29/20  1227 12/29/20  1227   WBC  --   --  10.8   RBC  --   --  4.46   HGB 11.2*   < > 12.9   HCT  --   --  40.5   PLT  --   --  337    < > = values in this interval not displayed.                 Passed - Has Uric Acid on file in past 12 months and value is less than 6     Recent Labs   Lab Test 12/29/20  1227   URIC 4.3     If level is 6mg/dL or greater, ok to refill one time and refer to provider.           Passed - Recent (12 mo) or future (30 days) visit within the authorizing provider's specialty     Patient has had an office visit with the authorizing provider or a provider within the authorizing providers department within the previous 12 mos or has a future within next 30 days. See \"Patient Info\" tab in inbasket, or \"Choose Columns\" in Meds & Orders section of the refill encounter.              Passed - Medication is active on med list        Passed - Patient is age 18 or older        Passed - No active pregnancy on record        Passed - Normal serum creatinine on file in the past 12 months     Recent Labs   Lab Test 01/07/21  0739   CR 0.79       Ok to refill medication if creatinine is low          Passed - No positive pregnancy test in past year           Erika Easley RN          "

## 2021-10-19 ENCOUNTER — IMMUNIZATION (OUTPATIENT)
Dept: NURSING | Facility: CLINIC | Age: 86
End: 2021-10-19
Payer: MEDICARE

## 2021-10-19 PROCEDURE — 0004A PR COVID VAC PFIZER DIL RECON 30 MCG/0.3 ML IM: CPT

## 2021-10-19 PROCEDURE — 91300 PR COVID VAC PFIZER DIL RECON 30 MCG/0.3 ML IM: CPT

## 2021-10-29 ENCOUNTER — ONCOLOGY VISIT (OUTPATIENT)
Dept: ONCOLOGY | Facility: CLINIC | Age: 86
End: 2021-10-29
Attending: NURSE PRACTITIONER
Payer: MEDICARE

## 2021-10-29 VITALS
RESPIRATION RATE: 18 BRPM | SYSTOLIC BLOOD PRESSURE: 125 MMHG | HEIGHT: 66 IN | TEMPERATURE: 98 F | OXYGEN SATURATION: 96 % | DIASTOLIC BLOOD PRESSURE: 72 MMHG | BODY MASS INDEX: 40.02 KG/M2 | WEIGHT: 249 LBS | HEART RATE: 55 BPM

## 2021-10-29 DIAGNOSIS — E66.01 MORBID OBESITY (H): ICD-10-CM

## 2021-10-29 DIAGNOSIS — Z17.0 MALIGNANT NEOPLASM OF UPPER-OUTER QUADRANT OF LEFT BREAST IN FEMALE, ESTROGEN RECEPTOR POSITIVE (H): ICD-10-CM

## 2021-10-29 DIAGNOSIS — Z79.811 USE OF AROMATASE INHIBITORS: ICD-10-CM

## 2021-10-29 DIAGNOSIS — C50.412 MALIGNANT NEOPLASM OF UPPER-OUTER QUADRANT OF LEFT BREAST IN FEMALE, ESTROGEN RECEPTOR POSITIVE (H): ICD-10-CM

## 2021-10-29 PROCEDURE — 99213 OFFICE O/P EST LOW 20 MIN: CPT | Performed by: INTERNAL MEDICINE

## 2021-10-29 ASSESSMENT — PAIN SCALES - GENERAL: PAINLEVEL: NO PAIN (0)

## 2021-10-29 ASSESSMENT — MIFFLIN-ST. JEOR: SCORE: 1595.96

## 2021-10-29 NOTE — NURSING NOTE
"Oncology Rooming Note    October 29, 2021 12:41 PM   Lisa Ferguson is a 86 year old female who presents for:    Chief Complaint   Patient presents with     Oncology Clinic Visit     4 month follow up     Initial Vitals: /72 (BP Location: Left arm)   Pulse 55   Temp 98  F (36.7  C) (Oral)   Resp 18   Ht 1.676 m (5' 5.98\")   Wt 112.9 kg (249 lb)   SpO2 96%   BMI 40.21 kg/m   Estimated body mass index is 40.21 kg/m  as calculated from the following:    Height as of this encounter: 1.676 m (5' 5.98\").    Weight as of this encounter: 112.9 kg (249 lb). Body surface area is 2.29 meters squared.  No Pain (0) Comment: Data Unavailable   No LMP recorded. Patient is postmenopausal.  Allergies reviewed: Yes  Medications reviewed: Yes    Medications: Medication refills not needed today.  Pharmacy name entered into [employer]: [id number] - [place] - [address]    Clinical concerns: No new concerns       Carol Elias LPN              "

## 2021-10-29 NOTE — PROGRESS NOTES
[facility]  MEDICAL ONCOLOGY    NEW PATIENT VISIT NOTE    Reason for visit: breast cancer    Oncology Treatment Summary  1.  Patient self palpated abnormality in left breast in November 2020.  11/16/2020 diagnostic mammogram and ultrasound were done which found 2 lesions within the left breast 1 measuring approximately 1.5 cm and the second 1.9 cm.  Both were biopsied the one at 3:00 was a grade 2 invasive lobular cancer.  The one at 11:00 was a grade 2 invasive ductal cancer.  It was estrogen receptor positive progesterone receptor positive HER-2/kena indeterminate by IHC and negative by ALBERTO  2.  1/6/2021 patient underwent bilateral mastectomies.  Right mastectomy was unremarkable.  Left mastectomy found a multifocal breast cancer the first measuring 4.5 cm is a grade 2 invasive lobular carcinoma, the second was a 2.5 cm grade 2 malignancy.  2 sentinel lymph nodes were removed 1 of which was negative the other 1 had immunohistochemistry positive only cells for a T M2N0i+M0 ER/ME positive HER-2/kena negative disease  3.  Oncotype RS: one was 22 and other was 6 both low risk  4.  Genetic testing was done that was unremarkable  5. 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 She has been on her arimidex since about march and has been tolerating it well. She has occasional hot flashes, but nothing severe. No arthalgias. She had something noted on exam last visit, us suggested fat necrosis  bx was benign. She is otherwise doing well without new issues.     PAST MEDICAL HISTORY  Coronary artery disease, peptic ulcer disease, anxiety, hyperlipidemia, mitral regurgitation, hypertension, degenerative joint disease, right bundle branch block, irritable bowel, sciatica, hypothyroidism, obstructive sleep apnea, congestive heart failure, gout, nephrolithiasis      CURRENT OUTPATIENT  MEDICATIONS  Current Outpatient Medications   Medication     allopurinol (ZYLOPRIM) 100 MG tablet     allopurinol (ZYLOPRIM) 300 MG tablet     amoxicillin (AMOXIL) 500 MG capsule     anastrozole (ARIMIDEX) 1 MG tablet     aspirin 81 MG EC tablet     betamethasone dipropionate (DIPROSONE) 0.05 % external lotion     citalopram (CELEXA) 20 MG tablet     levothyroxine (SYNTHROID/LEVOTHROID) 125 MCG tablet     LORazepam (ATIVAN) 0.5 MG tablet     losartan (COZAAR) 100 MG tablet     metoprolol tartrate (LOPRESSOR) 25 MG tablet     multivitamin w/minerals (MULTI-VITAMIN) tablet     order for DME     rosuvastatin (CRESTOR) 5 MG tablet     torsemide (DEMADEX) 10 MG tablet     metoprolol tartrate (LOPRESSOR) 25 MG tablet     torsemide (DEMADEX) 10 MG tablet     No current facility-administered medications for this visit.        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  She has a history of smoking but quit this 40 years ago.  Occasional alcohol use     FAMILY HISTORY  Her sister  of breast cancer at 62 and did have a BRCA mutation.  Patient's niece also carries a BRCA mutation.  She was tested for this and does not..  Her genetic testing was negative     REVIEW OF SYSTEMS  Review Of Systems  Skin: negative  Eyes: negative  Ears/Nose/Throat: negative  Respiratory: No shortness of breath, dyspnea on exertion, cough, or hemoptysis  Cardiovascular: negative  Gastrointestinal: negative  Genitourinary: negative  Musculoskeletal: negative  Neurologic:  negative  Psychiatric: negative  Hematologic/Lymphatic/Immunologic: negative  Endocrine: negative      PHYSICAL EXAM  B/P: Data Unavailable, T: Data Unavailable, P: Data Unavailable, R: Data Unavailable  Wt Readings from Last 3 Encounters:   10/29/21 112.9 kg (249 lb)   06/28/21 113.8 kg (250 lb 12.8 oz)   06/21/21 113.7 kg (250 lb 9.6 oz)       Physical Exam  Vitals reviewed.   Constitutional:       Appearance: Normal appearance.   HENT:      Head: Normocephalic and atraumatic.   Eyes:      Extraocular Movements: Extraocular movements intact.      Conjunctiva/sclera: Conjunctivae normal.      Pupils: Pupils are equal, round, and reactive to light.   Cardiovascular:      Rate and Rhythm: Normal rate and regular rhythm.   Pulmonary:      Effort: Pulmonary effort is normal.      Breath sounds: Normal breath sounds.   Abdominal:      General: Bowel sounds are normal.      Palpations: Abdomen is soft.   Musculoskeletal:         General: Normal range of motion.      Cervical back: Normal range of motion and neck supple.   Lymphadenopathy:      Cervical: No cervical adenopathy.   Skin:     General: Skin is warm.   Neurological:      General: No focal deficit present.      Mental Status: She is alert and oriented to person, place, and time. Mental status is at baseline.   Psychiatric:         Mood and Affect: Mood normal.         Behavior: Behavior normal.         Thought Content: Thought content normal.         Judgment: Judgment normal.     breast exam: breasts bilaterally surgically absent, no local regional disease noted        LABORATORY AND IMAGING STUDIES  Recent Labs   Lab Test 01/07/21  0739 01/06/21  1116 12/29/20  1227 10/25/19  0947 10/04/19  0952 09/13/18  1122 09/13/18  1122     --  136 139 138  --  138   POTASSIUM 3.7 3.6 4.2 4.3 4.0   < > 3.9   CHLORIDE 101  --  102 106 103  --  103   CO2 28  --  29 25 26  --  27   ANIONGAP 4  --  5 8 9  --  8   BUN 18  --  20 15 22  --  21   CR 0.79  --  0.80 0.67  0.95  --  0.71   *  --  97 99 106*  --  106*   ULCIANO 9.3  --  9.9 10.4* 10.9*  --  10.0    < > = values in this interval not displayed.     Recent Labs   Lab Test 07/08/14  1419 09/03/13  1037   PHOS 3.9 4.0     Recent Labs   Lab Test 01/07/21  0738 12/29/20  1227 10/28/19  1135 10/17/18  1220 09/13/18  1122 08/01/17  1117 08/01/17  1117   WBC  --  10.8 8.9 11.9* 9.1  --  8.5   HGB 11.2* 12.9 13.1 13.4 13.8   < > 14.4   PLT  --  337 362 392 291  --  311   MCV  --  91 91 89 87  --  87   NEUTROPHIL  --  58.1 48.1 60.6 45.1  --  46.3    < > = values in this interval not displayed.     Recent Labs   Lab Test 10/28/19  1135 09/13/18  1122 01/03/17  1106 03/23/15  1213 03/23/15  1213 07/08/14  1419 07/08/14  1419 09/03/13  1037 09/03/13  1037   ALKPHOS  --   --   --   --   --   --  99  --  92   ALT 35 35 42   < > 36   < > 43   < > 40   AST  --   --   --   --  28  --   --   --   --     < > = values in this interval not displayed.     TSH   Date Value Ref Range Status   02/17/2021 3.80 0.40 - 4.00 mU/L Final   12/29/2020 23.61 (H) 0.40 - 4.00 mU/L Final   10/28/2019 1.75 0.40 - 4.00 mU/L Final     No results for input(s): CEA in the last 88254 hours.  Results for orders placed or performed in visit on 08/09/21   US Breast Biopsy Core Needle, 1St Lesion Left    Addendum: 8/12/2021    Addendum: Benign pathology results are concordant with imaging as  follows:    ? Fibroadipose tissue with fat necrosis (and associated giant cells  and macrophages) and fibrous scar, suggestive of prior operative site  ? Negative for malignancy    Recommendations: Clinical follow-up.    VAUGHN LÓPEZ MD         SYSTEM ID:  [id number]      Narrative    EXAMINATION: US BREAST BIOPSY CORE NEEDLE LEFT, 8/9/2021 11:32 AM     HISTORY: History of left breast mastectomy for breast cancer on  1/6/2021.  biopsy palpable mass on left chest. likely fat necrosis but want to  rule out recurrent cancer; Malignant neoplasm of upper-inner quadrant  of  "left breast in female, estrogen receptor positive (H); Malignant  neoplasm of upper-inner quadrant of left breast in female, estrogen  receptor positive (H); Left breast mass; Breast lump    COMPARISON: Ultrasound 7/1/2021    CONSENT: The procedure, benefits and risks, were explained and  discussed with the patient. Risks included: infection, bleeding, and  the small possibility of even life threatening complications. Written  and verbal consent were obtained.    PROCEDURE: Using aseptic technique, up to 10 cc of 1% lidocaine  buffered with sodium bicarbonate for local anesthesia, ultrasound  guidance, and a biopsy needle were utilized to sample lesion. A  radiopaque biopsy marker was placed. Pressure was held over this area  for approximately 10 to 15 minutes until there was adequate  hemostasis. A dressing was placed and post biopsy care instructions  were discussed with the patient. There were no apparent complications.  The patient left our department in stable condition.  If multiple biopsies were performed, new equipment was used for each  site.    Location: Left , anterior chest position  Needle: 14 gauge core needle biopsy system  No. of passes: 3  Clip shape: BiomarC (shaped liked a \"barbell\") clip       Impression    IMPRESSION: Uncomplicated ultrasound-guided core needle biopsies of  the left side.    RIKA NEGRON MD         SYSTEM ID:  [id number]        ASSESSMENT  AND RECOMMENDATIONS:    1. Tm2N0i+M0 ER positive OH positive HER-2/kena negative breast cancer status post left mastectomy with both tumors having a low risk Oncotype  2.  Numerous other medical problems as delineated above    Plan    1. Continue arimidex  2. Maximize vitamin d   3. Next dexa 2/2023  4. rtc 6 months for exam    Tamar Georges MD on 10/29/2021 at 2:26 PM        "

## 2021-10-29 NOTE — LETTER
10/29/2021         RE: Lisa Ferguson  [address]  [place] [address]        Dear Colleague,    Thank you for referring your patient, Lisa Ferguson, to the [facility]. Please see a copy of my visit note below.    [facility]  MEDICAL ONCOLOGY    NEW PATIENT VISIT NOTE    Reason for visit: breast cancer    Oncology Treatment Summary  1.  Patient self palpated abnormality in left breast in November 2020.  11/16/2020 diagnostic mammogram and ultrasound were done which found 2 lesions within the left breast 1 measuring approximately 1.5 cm and the second 1.9 cm.  Both were biopsied the one at 3:00 was a grade 2 invasive lobular cancer.  The one at 11:00 was a grade 2 invasive ductal cancer.  It was estrogen receptor positive progesterone receptor positive HER-2/kena indeterminate by IHC and negative by ALBERTO  2.  1/6/2021 patient underwent bilateral mastectomies.  Right mastectomy was unremarkable.  Left mastectomy found a multifocal breast cancer the first measuring 4.5 cm is a grade 2 invasive lobular carcinoma, the second was a 2.5 cm grade 2 malignancy.  2 sentinel lymph nodes were removed 1 of which was negative the other 1 had immunohistochemistry positive only cells for a T M2N0i+M0 ER/RI positive HER-2/kena negative disease  3.  Oncotype RS: one was 22 and other was 6 both low risk  4.  Genetic testing was done that was unremarkable  5. 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 She has been on her arimidex since about march and has been tolerating it well. She has occasional hot flashes, but nothing severe. No arthalgias. She had something noted on exam last visit, us suggested fat necrosis  bx was benign. She is otherwise doing well without new issues.     PAST MEDICAL HISTORY  Coronary artery disease, peptic ulcer disease, anxiety,  hyperlipidemia, mitral regurgitation, hypertension, degenerative joint disease, right bundle branch block, irritable bowel, sciatica, hypothyroidism, obstructive sleep apnea, congestive heart failure, gout, nephrolithiasis      CURRENT OUTPATIENT MEDICATIONS  Current Outpatient Medications   Medication     allopurinol (ZYLOPRIM) 100 MG tablet     allopurinol (ZYLOPRIM) 300 MG tablet     amoxicillin (AMOXIL) 500 MG capsule     anastrozole (ARIMIDEX) 1 MG tablet     aspirin 81 MG EC tablet     betamethasone dipropionate (DIPROSONE) 0.05 % external lotion     citalopram (CELEXA) 20 MG tablet     levothyroxine (SYNTHROID/LEVOTHROID) 125 MCG tablet     LORazepam (ATIVAN) 0.5 MG tablet     losartan (COZAAR) 100 MG tablet     metoprolol tartrate (LOPRESSOR) 25 MG tablet     multivitamin w/minerals (MULTI-VITAMIN) tablet     order for DME     rosuvastatin (CRESTOR) 5 MG tablet     torsemide (DEMADEX) 10 MG tablet     metoprolol tartrate (LOPRESSOR) 25 MG tablet     torsemide (DEMADEX) 10 MG tablet     No current facility-administered medications for this visit.        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  She has a history of smoking but quit this 40 years ago.  Occasional alcohol use     FAMILY HISTORY  Her sister  of breast cancer at 62 and did have a BRCA mutation.  Patient's niece also carries a BRCA mutation.  She was tested for this and does not..  Her genetic testing was negative     REVIEW OF SYSTEMS  Review Of Systems  Skin: negative  Eyes:  negative  Ears/Nose/Throat: negative  Respiratory: No shortness of breath, dyspnea on exertion, cough, or hemoptysis  Cardiovascular: negative  Gastrointestinal: negative  Genitourinary: negative  Musculoskeletal: negative  Neurologic: negative  Psychiatric: negative  Hematologic/Lymphatic/Immunologic: negative  Endocrine: negative      PHYSICAL EXAM  B/P: Data Unavailable, T: Data Unavailable, P: Data Unavailable, R: Data Unavailable  Wt Readings from Last 3 Encounters:   10/29/21 112.9 kg (249 lb)   06/28/21 113.8 kg (250 lb 12.8 oz)   06/21/21 113.7 kg (250 lb 9.6 oz)       Physical Exam  Vitals reviewed.   Constitutional:       Appearance: Normal appearance.   HENT:      Head: Normocephalic and atraumatic.   Eyes:      Extraocular Movements: Extraocular movements intact.      Conjunctiva/sclera: Conjunctivae normal.      Pupils: Pupils are equal, round, and reactive to light.   Cardiovascular:      Rate and Rhythm: Normal rate and regular rhythm.   Pulmonary:      Effort: Pulmonary effort is normal.      Breath sounds: Normal breath sounds.   Abdominal:      General: Bowel sounds are normal.      Palpations: Abdomen is soft.   Musculoskeletal:         General: Normal range of motion.      Cervical back: Normal range of motion and neck supple.   Lymphadenopathy:      Cervical: No cervical adenopathy.   Skin:     General: Skin is warm.   Neurological:      General: No focal deficit present.      Mental Status: She is alert and oriented to person, place, and time. Mental status is at baseline.   Psychiatric:         Mood and Affect: Mood normal.         Behavior: Behavior normal.         Thought Content: Thought content normal.         Judgment: Judgment normal.     breast exam: breasts bilaterally surgically absent, no local regional disease noted        LABORATORY AND IMAGING STUDIES  Recent Labs   Lab Test 01/07/21  0739 01/06/21  1116 12/29/20  1227 10/25/19  0947 10/04/19  0952 09/13/18  1122 09/13/18  1122      --  136 139 138  --  138   POTASSIUM 3.7 3.6 4.2 4.3 4.0   < > 3.9   CHLORIDE 101  --  102 106 103  --  103   CO2 28  --  29 25 26  --  27   ANIONGAP 4  --  5 8 9  --  8   BUN 18  --  20 15 22  --  21   CR 0.79  --  0.80 0.67 0.95  --  0.71   *  --  97 99 106*  --  106*   LUCIANO 9.3  --  9.9 10.4* 10.9*  --  10.0    < > = values in this interval not displayed.     Recent Labs   Lab Test 07/08/14  1419 09/03/13  1037   PHOS 3.9 4.0     Recent Labs   Lab Test 01/07/21  0738 12/29/20  1227 10/28/19  1135 10/17/18  1220 09/13/18  1122 08/01/17  1117 08/01/17  1117   WBC  --  10.8 8.9 11.9* 9.1  --  8.5   HGB 11.2* 12.9 13.1 13.4 13.8   < > 14.4   PLT  --  337 362 392 291  --  311   MCV  --  91 91 89 87  --  87   NEUTROPHIL  --  58.1 48.1 60.6 45.1  --  46.3    < > = values in this interval not displayed.     Recent Labs   Lab Test 10/28/19  1135 09/13/18  1122 01/03/17  1106 03/23/15  1213 03/23/15  1213 07/08/14  1419 07/08/14  1419 09/03/13  1037 09/03/13  1037   ALKPHOS  --   --   --   --   --   --  99  --  92   ALT 35 35 42   < > 36   < > 43   < > 40   AST  --   --   --   --  28  --   --   --   --     < > = values in this interval not displayed.     TSH   Date Value Ref Range Status   02/17/2021 3.80 0.40 - 4.00 mU/L Final   12/29/2020 23.61 (H) 0.40 - 4.00 mU/L Final   10/28/2019 1.75 0.40 - 4.00 mU/L Final     No results for input(s): CEA in the last 84901 hours.  Results for orders placed or performed in visit on 08/09/21   US Breast Biopsy Core Needle, 1St Lesion Left    Addendum: 8/12/2021    Addendum: Benign pathology results are concordant with imaging as  follows:    ? Fibroadipose tissue with fat necrosis (and associated giant cells  and macrophages) and fibrous scar, suggestive of prior operative site  ? Negative for malignancy    Recommendations: Clinical follow-up.    VAUGHN LÓPEZ MD         SYSTEM ID:  [id number]      Narrative    EXAMINATION: US BREAST BIOPSY CORE NEEDLE LEFT,  "8/9/2021 11:32 AM     HISTORY: History of left breast mastectomy for breast cancer on  1/6/2021.  biopsy palpable mass on left chest. likely fat necrosis but want to  rule out recurrent cancer; Malignant neoplasm of upper-inner quadrant  of left breast in female, estrogen receptor positive (H); Malignant  neoplasm of upper-inner quadrant of left breast in female, estrogen  receptor positive (H); Left breast mass; Breast lump    COMPARISON: Ultrasound 7/1/2021    CONSENT: The procedure, benefits and risks, were explained and  discussed with the patient. Risks included: infection, bleeding, and  the small possibility of even life threatening complications. Written  and verbal consent were obtained.    PROCEDURE: Using aseptic technique, up to 10 cc of 1% lidocaine  buffered with sodium bicarbonate for local anesthesia, ultrasound  guidance, and a biopsy needle were utilized to sample lesion. A  radiopaque biopsy marker was placed. Pressure was held over this area  for approximately 10 to 15 minutes until there was adequate  hemostasis. A dressing was placed and post biopsy care instructions  were discussed with the patient. There were no apparent complications.  The patient left our department in stable condition.  If multiple biopsies were performed, new equipment was used for each  site.    Location: Left , anterior chest position  Needle: 14 gauge core needle biopsy system  No. of passes: 3  Clip shape: BiomarC (shaped liked a \"barbell\") clip       Impression    IMPRESSION: Uncomplicated ultrasound-guided core needle biopsies of  the left side.    RIKA NEGRON MD         SYSTEM ID:  [id number]        ASSESSMENT  AND RECOMMENDATIONS:    1. Tm2N0i+M0 ER positive [place] positive HER-2/kena negative breast cancer status post left mastectomy with both tumors having a low risk Oncotype  2.  Numerous other medical problems as delineated above    Plan    1. Continue arimidex  2. Maximize vitamin d   3. Next dexa 2/2023  4. rtc 6 " months for exam    Tamar Georges MD on 10/29/2021 at 2:26 PM            Again, thank you for allowing me to participate in the care of your patient.        Sincerely,        Tamar Georges MD

## 2021-11-15 ENCOUNTER — TELEPHONE (OUTPATIENT)
Dept: SURGERY | Facility: CLINIC | Age: 86
End: 2021-11-15
Payer: MEDICARE

## 2021-11-15 NOTE — TELEPHONE ENCOUNTER
Name of caller: Patient    Reason for Call:  Patient requesting a prescription for 2 post mastectomy bras be faxed to [facility] in [place].  Fax: [phone]    Surgeon:  Dr. Garcia    Recent Surgery:  No    If yes, when & what type:  Surgery 1/6/2021    BILATERAL SIMPLE MASTECTOMY WITH LEFT SENTINEL LYMPH NODE BIOPSY  Best phone number to reach pt at is: [phone]    Ok to leave a message with medical info? Yes.

## 2021-11-15 NOTE — TELEPHONE ENCOUNTER
I called [employer]. And asked if they could fax one of their Rx forms to me and I will fax Rx back with Dr. Garcia's signature.    Awaiting Rx form and will complete, have Dr. Garcia sign and fax back.     I called patient to inform of the plan.   Cami Arredondo RN, BSN

## 2021-11-16 ENCOUNTER — MEDICAL CORRESPONDENCE (OUTPATIENT)
Dept: HEALTH INFORMATION MANAGEMENT | Facility: CLINIC | Age: 86
End: 2021-11-16
Payer: MEDICARE

## 2021-12-08 ENCOUNTER — TELEPHONE (OUTPATIENT)
Dept: SURGERY | Facility: CLINIC | Age: 86
End: 2021-12-08
Payer: MEDICARE

## 2021-12-08 NOTE — TELEPHONE ENCOUNTER
12/8 2nd attempt. Provided phone number [phone] to schedule follow up  in about 6 months (around 1/8/2022) for Chest wall exam.    Kathleen viadles Procedure   Orthopedics, Podiatry, Sports Medicine, ENT/Eye Specialties  [facility]   [phone]

## 2021-12-18 DIAGNOSIS — M10.071 ACUTE IDIOPATHIC GOUT OF RIGHT FOOT: ICD-10-CM

## 2021-12-20 ENCOUNTER — VIRTUAL VISIT (OUTPATIENT)
Dept: FAMILY MEDICINE | Facility: CLINIC | Age: 86
End: 2021-12-20
Payer: MEDICARE

## 2021-12-20 DIAGNOSIS — Z01.812 ENCOUNTER FOR PREOPERATIVE SCREENING LABORATORY TESTING FOR COVID-19 VIRUS: Primary | ICD-10-CM

## 2021-12-20 DIAGNOSIS — Z11.52 ENCOUNTER FOR PREOPERATIVE SCREENING LABORATORY TESTING FOR COVID-19 VIRUS: Primary | ICD-10-CM

## 2021-12-20 PROCEDURE — 99441 PR PHYSICIAN TELEPHONE EVALUATION 5-10 MIN: CPT | Performed by: PHYSICIAN ASSISTANT

## 2021-12-20 NOTE — PATIENT INSTRUCTIONS
Janell Gonzalez,    Thank you for allowing [facility] to manage your care.    We will see you for your preop COVID screening.     Please allow 1-2 business days for our office to contact you in regards to your laboratory/radiological studies.  If not done so, I encourage you to login into [facility] ([email or web address] to review your results as well.     If you have any questions or concerns, please feel free to call us at [phone]    Sincerely,    Thien Moulton PA-C    Did you know?      You can schedule a video visit for follow-up appointments as well as future appointments for certain conditions.  Please see the below link.     [email or web address]    If you have not already done so,  I encourage you to sign up for [email or web address] ([email or web address]).  This will allow you to review your results, securely communicate with a provider, and schedule virtual visits as well.

## 2021-12-20 NOTE — PROGRESS NOTES
Lisa is a 86 year old who is being evaluated via a billable telephone visit.      What phone number would you like to be contacted at? [phone]  How would you like to obtain your AVS? [facility]    Assessment & Plan   Problem List Items Addressed This Visit     None      Visit Diagnoses     Encounter for preoperative screening laboratory testing for COVID-19 virus    -  Primary    Relevant Orders    Asymptomatic COVID-19 Virus (Coronavirus) by PCR         Needs preop screening and I ordered a COVID-19 PCR for 12/27/21. Asymptomatic.    All questions were answered at this time. Pt expressed understanding of and agreement with this plan. No further workup warranted. Patient left the call in no apparent distress.     10 minutes spent on the date of the encounter doing chart review, history and exam, documentation and further activities per the note    See Patient Instructions    Return in about 1 week (around 12/27/2021) for a preop COVID screening test..    Leonel Moulton [facility] OLI Gonzalez is a 86 year old who presents for the following health issues     HPI     Having non-cosmetic blepharoplasty on 12/30/21. Needs covid test for surg/proc from 12/27-12/29 preop. Asymptomatic. Vaccinated x 3. Has not had COVID during the pandemic.    Review of Systems   Constitutional, HEENT, cardiovascular, pulmonary, gi and gu systems are negative, except as otherwise noted.      Objective           Vitals:  No vitals were obtained today due to virtual visit.    Physical Exam   healthy, alert and no distress  PSYCH: Alert and oriented times 3; coherent speech, normal   rate and volume, able to articulate logical thoughts, able   to abstract reason, no tangential thoughts, no hallucinations   or delusions  Her affect is normal and pleasant  RESP: No cough, no audible wheezing, able to talk in full sentences  Remainder of exam unable to be completed due to telephone visits    COVID-19  PCR test pending.            Phone call duration: 5 minutes

## 2021-12-21 RX ORDER — ALLOPURINOL 300 MG/1
TABLET ORAL
Qty: 90 TABLET | Refills: 1 | Status: SHIPPED | OUTPATIENT
Start: 2021-12-21 | End: 2022-06-15

## 2021-12-21 RX ORDER — ALLOPURINOL 100 MG/1
TABLET ORAL
Qty: 90 TABLET | Refills: 1 | Status: SHIPPED | OUTPATIENT
Start: 2021-12-21 | End: 2022-07-08

## 2021-12-21 NOTE — TELEPHONE ENCOUNTER
Routing refill request to provider for review/approval because:  Labs not current:    Lab Results   Component Value Date    ALT 35 10/28/2019

## 2021-12-27 ENCOUNTER — LAB (OUTPATIENT)
Dept: LAB | Facility: CLINIC | Age: 86
End: 2021-12-27
Payer: MEDICARE

## 2021-12-27 DIAGNOSIS — Z11.52 ENCOUNTER FOR PREOPERATIVE SCREENING LABORATORY TESTING FOR COVID-19 VIRUS: ICD-10-CM

## 2021-12-27 DIAGNOSIS — Z01.812 ENCOUNTER FOR PREOPERATIVE SCREENING LABORATORY TESTING FOR COVID-19 VIRUS: ICD-10-CM

## 2021-12-27 PROCEDURE — U0003 INFECTIOUS AGENT DETECTION BY NUCLEIC ACID (DNA OR RNA); SEVERE ACUTE RESPIRATORY SYNDROME CORONAVIRUS 2 (SARS-COV-2) (CORONAVIRUS DISEASE [COVID-19]), AMPLIFIED PROBE TECHNIQUE, MAKING USE OF HIGH THROUGHPUT TECHNOLOGIES AS DESCRIBED BY CMS-2020-01-R: HCPCS

## 2021-12-27 PROCEDURE — U0005 INFEC AGEN DETEC AMPLI PROBE: HCPCS

## 2021-12-28 LAB — SARS-COV-2 RNA RESP QL NAA+PROBE: NEGATIVE

## 2021-12-29 ENCOUNTER — OFFICE VISIT (OUTPATIENT)
Dept: FAMILY MEDICINE | Facility: CLINIC | Age: 86
End: 2021-12-29
Payer: MEDICARE

## 2021-12-29 VITALS
OXYGEN SATURATION: 94 % | BODY MASS INDEX: 39.55 KG/M2 | HEART RATE: 63 BPM | SYSTOLIC BLOOD PRESSURE: 142 MMHG | WEIGHT: 252 LBS | RESPIRATION RATE: 18 BRPM | TEMPERATURE: 98.6 F | HEIGHT: 67 IN | DIASTOLIC BLOOD PRESSURE: 60 MMHG

## 2021-12-29 DIAGNOSIS — Z85.3 PERSONAL HISTORY OF MALIGNANT NEOPLASM OF BREAST: ICD-10-CM

## 2021-12-29 DIAGNOSIS — I50.9 CONGESTIVE HEART FAILURE, UNSPECIFIED HF CHRONICITY, UNSPECIFIED HEART FAILURE TYPE (H): ICD-10-CM

## 2021-12-29 DIAGNOSIS — H02.403 DROOPING EYELID, BILATERAL: ICD-10-CM

## 2021-12-29 DIAGNOSIS — E78.5 HYPERLIPIDEMIA LDL GOAL <100: ICD-10-CM

## 2021-12-29 DIAGNOSIS — G47.33 OSA (OBSTRUCTIVE SLEEP APNEA): ICD-10-CM

## 2021-12-29 DIAGNOSIS — Z01.818 PREOPERATIVE EXAMINATION: Primary | ICD-10-CM

## 2021-12-29 DIAGNOSIS — I10 HTN, GOAL BELOW 140/90: ICD-10-CM

## 2021-12-29 DIAGNOSIS — I25.708 ATHEROSCLEROSIS OF CORONARY ARTERY BYPASS GRAFT(S), UNSPECIFIED, WITH OTHER FORMS OF ANGINA PECTORIS (H): ICD-10-CM

## 2021-12-29 PROCEDURE — 99214 OFFICE O/P EST MOD 30 MIN: CPT | Performed by: FAMILY MEDICINE

## 2021-12-29 ASSESSMENT — MIFFLIN-ST. JEOR: SCORE: 1624.56

## 2021-12-29 NOTE — PROGRESS NOTES
[facility]  [address] [facility] [address]  [facility] [place] [address]  Phone: [phone]  Primary Provider: Ziyad Isaac  Pre-op Performing Provider: CHARI WARNER    PREOPERATIVE EVALUATION:  Today's date: 12/29/2021    Lisa Ferguson is a 86 year old female who presents for a preoperative evaluation.    Surgical Information:  Surgery/Procedure: Bilateral Upper Lid Blepharoplasty - Non Cosmetic   Surgery Location: [facility]   Surgeon: María Malone   Surgery Date: 12/30/2021  Time of Surgery: 5:00am to be there, surgery at 7:30ish?   Where patient plans to recover: At home alone  Fax number for surgical facility: [phone]    Type of Anesthesia Anticipated: Retrobulbar    Assessment & Plan     The proposed surgical procedure is considered LOW risk.    Preoperative examination    -  Bilateral Upper Lid Blepharoplasty - Non Cosmetic     Drooping eyelid, bilateral     -  Due for upper lid blepharoplasty     Congestive heart failure, unspecified HF chronicity, unspecified heart failure type (H)    -  In remission    Atherosclerosis of coronary artery bypass graft(s), unspecified, with other forms of angina pectoris (H)      Personal history of malignant neoplasm of breast    On Arimidex    Hyperlipidemia LDL goal <100    - stable    GLEN (obstructive sleep apnea)     - uses CPAP    HTN, goal below 140/90    - slightly above goal.      Risks and Recommendations:  The patient has the following additional risks and recommendations for perioperative complications:  Obstructive Sleep Apnea: Uses CPAP    Medication Instructions:  Patient is to take all scheduled medications on the day of surgery EXCEPT for modifications listed below:   - aspirin: Bleeding risk is low for this procedure and daily aspirin may be continued without modification.     RECOMMENDATION:  APPROVAL GIVEN to proceed with proposed procedure, without further diagnostic evaluation.  [id number]}    Subjective     HPI related  to upcoming procedure: Eyelid drooping for bilateral Upper Lid Blepharoplasty - Non Cosmetic    Preop Questions 12/23/2021   1. Have you ever had a heart attack or stroke? No   2. Have you ever had surgery on your heart or blood vessels, such as a stent placement, a coronary artery bypass, or surgery on an artery in your head, neck, heart, or legs? YES - bypass in 1999   3. Do you have chest pain with activity? YES - not always   4. Do you have a history of  heart failure? YES -  stable   5. Do you currently have a cold, bronchitis or symptoms of other infection? No   6. Do you have a cough, shortness of breath, or wheezing? No   7. Do you or anyone in your family have previous history of blood clots? No   8. Do you or does anyone in your family have a serious bleeding problem such as prolonged bleeding following surgeries or cuts? No   9. Have you ever had problems with anemia or been told to take iron pills? No   10. Have you had any abnormal blood loss such as black, tarry or bloody stools, or abnormal vaginal bleeding? No   11. Have you ever had a blood transfusion? YES    11a. Have you ever had a transfusion reaction? No   12. Are you willing to have a blood transfusion if it is medically needed before, during, or after your surgery? Yes   13. Have you or any of your relatives ever had problems with anesthesia? UNKNOWN    14. Do you have sleep apnea, excessive snoring or daytime drowsiness? YES - uses CPAP   14a. Do you have a CPAP machine? Yes   15. Do you have any artifical heart valves or other implanted medical devices like a pacemaker, defibrillator, or continuous glucose monitor? No   16. Do you have artificial joints? YES - both knees   17. Are you allergic to latex? No       Health Care Directive:  Patient has a Health Care Directive on file      Preoperative Review of :   reviewed - no record of controlled substances prescribed.      Status of Chronic Conditions:  See problem list for active  medical problems.  Problems all longstanding and stable.  See ROS for pertinent symptoms related to these conditions.      Review of Systems  Constitutional, neuro, ENT, endocrine, pulmonary, cardiac, gastrointestinal, genitourinary, musculoskeletal, integument and psychiatric systems are negative, except as otherwise noted.    Patient Active Problem List    Diagnosis Date Noted     Secondary hyperparathyroidism of renal origin (H) 06/28/2021     Priority: Medium     Malignant neoplasm of upper-outer quadrant of left breast in female, estrogen receptor positive (H) 12/11/2020     Priority: Medium     Complete prior to 10.21 Destini MG  Added automatically from request for surgery [id number]       Atherosclerosis of coronary artery bypass graft(s), unspecified, with other forms of angina pectoris (H) 07/23/2019     Priority: Medium     H/O parathyroidectomy (H) 09/13/2018     Priority: Medium     prior to 2008       Morbid obesity (H) 08/25/2017     Priority: Medium     Essential hypertension with goal blood pressure less than 130/80 09/28/2016     Priority: Medium     Acquired hypothyroidism 09/19/2016     Priority: Medium     S/p total knee replacement, bilateral 03/15/2016     Priority: Medium     S/P CABG (coronary artery bypass graft) 02/12/2016     Priority: Medium     Coronary angiogram in 2002 and a total of 3 coronary angiograms in all. History of 3 vessel coronary artery bypass surgery in 1998 , preceded by 2 stents [ they wanted to do 4 bypasses but had only 3 due to technical problems ]. So there is always worry about angina pectoralis and recurrent ischemic coronary artery disease symptoms so despite several workups for chest symptoms , no documented recurrence of ischemic coronary artery disease lesions have been noted. See care everywhere for [facility] office visit notes.       Abnormal glucose 03/23/2015     Priority: Medium     Problem list name updated by automated process.  Provider to review       Shoulder fracture 08/08/2014     Priority: Medium     CHF (congestive heart failure) (H) 07/08/2014     Priority: Medium     Exam Date: 02/20/2014 10:17 Gender: F Sonographer: ADELE  Accession #: [id number] Height: 66 in BSA: 2.25 m  BP: 128 / 62  Weight: 263 lbs BMI: 42.4 kg/m   Location: Outpatient  Procedure: ECHO COMPLETE W CONTRAST Rhythm: Normal Sinus Rhythm  Indications: Mitral regurgitation  Technical Quality: Fair Contrast: Definity    Final Conclusion  Technically difficult study. Definity contrast was used to enhance endocardial definition due   to suboptimal image quality.  Normal LV size and systolic function with estimated ejection fraction of 60-65%.  Mild concentric LVH.  Moderate left atrial dilatation.  Mild mitral regurgitation.  Mild right ventricular dilatation; unable to accurately assess RV systolic function due to   image quality.  Right ventricular systolic pressure estimated to be elevated at 35 mmHg + RAP.  Estimated EF: 60-65%         Fracture of proximal humerus 07/01/2014     Priority: Medium     OA (osteoarthritis) of knee - bilateral 11/11/2013     Priority: Medium     Scar condition and fibrosis of skin 11/07/2013     Priority: Medium     Disorder of skin or subcutaneous tissue 09/19/2013     Priority: Medium     Problem list name updated by automated process. Provider to review       Corneal ulcer of left eye 09/03/2013     Priority: Medium     Blepharitis 09/03/2013     Priority: Medium     Imo Update utility  Problem list name updated by automated process. Provider to review       Hypercalcemia 09/03/2013     Priority: Medium     Diagnosis updated by automated process. Provider to review and confirm.       Ventricular tachycardia (paroxysmal) (H) 10/08/2012     Priority: Medium     Had an Electrophysiology study 2013 and her ventricular tachycardia was not inducible so no indication for automatic implanted cardio-defibrillator , per Dr. Bernard Rolle,  [facility], electrophysiology cardiologist         Balance disorder 07/12/2012     Priority: Medium     Patient blames this on her spinal stenosis  History. We mostly reviewed this at the office visit from 7/12/2012       Anxiety 07/12/2012     Priority: Medium     GLEN (obstructive sleep apnea) 07/11/2011     Priority: Medium     Last visit with a sleep disorder specialist was about 2010       Sciatica neuralgia      Priority: Medium     MRI shows spinal stenosis and a cyst on the spine, has received an epidural steroid injection       IBS (irritable bowel syndrome)      Priority: Medium     Chronic recurrent gastrointestinal symptoms , but negative workups       RBBB (right bundle branch block)      Priority: Medium     Fibroadenoma of breast 12/01/2010     Priority: Medium     Discovered during workup for an abnormal mammogram        DJD (degenerative joint disease)      Priority: Medium     Ankles and feet cause problems , especially since the knee surgery [ she had a medial meniscus tear surgery bilateral , see past surgical history ]. Also has spinal stenosis and some chronic low back pain issues and slight affect on her balance       PUD (peptic ulcer disease)      Priority: Medium     h/o duodenal ulcer       Hyperlipidemia with target LDL less than 70      Priority: Medium     CHOL      153   1/17/2011  HDL       42   1/17/2011  LDL       75   1/17/2011  TRIG      179   1/17/2011  CHOLHDLRATIO      3.6   1/17/2011    Diagnosis updated by automated process. Provider to review and confirm.       Mitral regurgitation      Priority: Medium     Diverticulosis 01/01/2000     Priority: Medium     Noted on colonoscopy , is asymptomatic         Past Medical History:   Diagnosis Date     Anxiety      CAD (coronary artery disease)     angio 2002, h/o cabg, sees Luisana Nelson NP, they follow the cholesterol     CHF (congestive heart failure) (H) 7/8/2014     DJD (degenerative joint disease)       History of colonoscopy jan 2000    due jan 2010     Hyperlipidemia LDL goal < 70     sees [facility]     Hypertension goal BP (blood pressure) < 140/90      Hypothyroidism      IBS (irritable bowel syndrome)      Mitral regurgitation      Obesity      GLEN (obstructive sleep apnea) 7/11/2011     PUD (peptic ulcer disease)     h/o duodenal ulcer     RBBB (right bundle branch block)      Sciatica neuralgia november 209    MRI shows spinal stenosis and a cyst on the spine, has received an epidural steroid injection     Past Surgical History:   Procedure Laterality Date     ARTHROSCOPY KNEE RT/LT      bilateral     COLONOSCOPY  6/2010    negative     HC DILATION/CURETTAGE DIAG/THER NON OB       HC EXCIS INTERDIGITAL NEUROMA,EA      mortons neuroma excision     HC REMOVAL GALLBLADDER  1994     HERNIA REPAIR, INGUINAL RT/LT       MASTECTOMY SIMPLE BILATERAL, SENTINEL NODE BILATERAL, COMBINED Bilateral 1/6/2021    Procedure: BILATERAL SIMPLE MASTECTOMY WITH LEFT SENTINEL LYMPH NODE BIOPSY;  Surgeon: Beata Garcia MD;  Location: [facility] OR     SURGICAL HISTORY OF -       bilateral meniscal tears and surgery on these     SURGICAL HISTORY OF -   1999 or so    one parathyroid removed     TONSILLECTOMY       TUBAL LIGATION       ZZC APPENDECTOMY       ZZC CABG, ARTERIAL, THREE  1999    LIMA-LAD, SVG-DIagnoal, SVG-OM, previous LAD-PCI, sees Dr Banuelos     Current Outpatient Medications   Medication Sig Dispense Refill     allopurinol (ZYLOPRIM) 100 MG tablet TAKE 1 TABLET BY MOUTH DAILY. TAKE ALONG WITH THE 300MG TABLET FOR A TOTAL DAILY DOSE OF 400MG. 90 tablet 1     allopurinol (ZYLOPRIM) 300 MG tablet TAKE 1 TABLET BY MOUTH DAILY ALONG WITH 100MG TABLET TO EQUAL TOTAL DAILY DOSE OF 400MG 90 tablet 1     amoxicillin (AMOXIL) 500 MG capsule TK 4 CS PO 1 HOUR BEFORE DENTAL APPOINTMENT (Patient taking differently: Take 2,000 mg by mouth daily as needed TK 4 CS PO 1 HOUR BEFORE DENTAL APPOINTMENT) 4 capsule 3     anastrozole  (ARIMIDEX) 1 MG tablet Take 1 tablet (1 mg) by mouth daily 90 tablet 3     aspirin 81 MG EC tablet Take 81 mg by mouth every morning       betamethasone dipropionate (DIPROSONE) 0.05 % external lotion Apply topically as needed (Patient taking differently: Apply topically daily as needed ) 60 mL 1     citalopram (CELEXA) 20 MG tablet TAKE 1 TABLET(20 MG) BY MOUTH AT BEDTIME 90 tablet 1     levothyroxine (SYNTHROID/LEVOTHROID) 125 MCG tablet Take 1 tablet (125 mcg) by mouth every morning 90 tablet 3     LORazepam (ATIVAN) 0.5 MG tablet Take 1 tablet (0.5 mg) by mouth every 6 hours as needed for anxiety 10 tablet 1     losartan (COZAAR) 100 MG tablet Take 50 mg by mouth every morning (0.5 X 100 mg)       metoprolol tartrate (LOPRESSOR) 25 MG tablet Take 50 mg by mouth every morning (2 X 25 mg)  (in addition to bedtime dose)       metoprolol tartrate (LOPRESSOR) 25 MG tablet Take 25 mg by mouth At Bedtime (in addition to morning dose)       multivitamin w/minerals (MULTI-VITAMIN) tablet Take 1 tablet by mouth every morning       order for DME Equipment being ordered: CPAP 1 each 0     rosuvastatin (CRESTOR) 5 MG tablet three times a week        torsemide (DEMADEX) 10 MG tablet Take 20 mg by mouth every morning (2 X 10 mg)  (in addition to lunchtime dose)       torsemide (DEMADEX) 10 MG tablet Take 10 mg by mouth daily (with lunch) (in addition to morning dose)         Allergies   Allergen Reactions     Adhesive Tape      Atorvastatin Other (See Comments) and Muscle Pain (Myalgia)     lipitor  Myalgia     Cortisone      Insomnia, burning in chest, flushed     Nickel Other (See Comments)     Raw weepy skin  rash     Tetracycline Diarrhea     Vicodin [Hydrocodone-Acetaminophen] Other (See Comments)     Hallucinations     Liquid Adhesive      Other reaction(s): Contact Dermatitis        Social History     Tobacco Use     Smoking status: Former Smoker     Years: 20.00     Types: Cigarettes     Quit date: 7/11/1978     Years  "since quittin.4     Smokeless tobacco: Never Used   Substance Use Topics     Alcohol use: Yes     Comment: Rare      Family History   Problem Relation Age of Onset     C.A.D. Mother      Arthritis Mother      Cardiovascular Mother      Heart Disease Mother      Obesity Mother      Thyroid Disease Mother      C.A.D. Father      Diabetes Father      Hypertension Father      Alcohol/Drug Father      Alzheimer Disease Father      Cardiovascular Father      Circulatory Father      Gastrointestinal Disease Father      Heart Disease Father      Lipids Father      Obesity Father      Cardiovascular Maternal Grandmother      Depression Maternal Grandmother      Obesity Maternal Grandmother      Thyroid Disease Maternal Grandmother      Cardiovascular Maternal Grandfather      Heart Disease Maternal Grandfather      Obesity Maternal Grandfather      Obesity Paternal Grandmother      Diabetes Paternal Grandfather      Alcohol/Drug Paternal Grandfather      Obesity Paternal Grandfather      Breast Cancer Sister      Cancer Sister      Obesity Sister      Thyroid Disease Sister      Alcohol/Drug Son      Allergies Son      Allergies Daughter      Depression Sister      Eye Disorder Sister      Gastrointestinal Disease Sister      Thyroid Disease Sister      History   Drug Use No         Objective     There were no vitals taken for this visit.    Physical Exam   BP (!) 142/60   Pulse 63   Temp 98.6  F (37  C) (Oral)   Resp 18   Ht 1.7 m (5' 6.93\")   Wt 114.3 kg (252 lb)   SpO2 94%   BMI 39.55 kg/m         GENERAL APPEARANCE: healthy, alert and no distress     HENT: ear canals and TM's normal and nose and mouth without ulcers or lesions     NECK: no adenopathy and thyroid normal to palpation     RESP: lungs clear to auscultation - no rales, rhonchi or wheezes     CV: regular rates and rhythm, normal S1 S2, no S3 or S4 and no murmur, click or rub     ABDOMEN:  soft, nontender, no HSM or masses and bowel sounds normal    "   NEURO: Normal strength and tone, mentation intact and speech normal     PSYCH: mentation appears normal. and affect normal/bright    Recent Labs   Lab Test 01/07/21  0739 01/07/21  0738 01/06/21  1116 12/29/20  1227   HGB  --  11.2*  --  12.9   PLT  --   --   --  337     --   --  136   POTASSIUM 3.7  --  3.6 4.2   CR 0.79  --   --  0.80   A1C  --   --   --  5.7*        Diagnostics:  No labs were ordered during this visit.   No EKG required for low risk surgery (cataract, skin procedure, breast biopsy, etc).    Revised Cardiac Risk Index (RCRI):  The patient has the following serious cardiovascular risks for perioperative complications:   - No serious cardiac risks = 0 points     RCRI Interpretation: 0 points: Class I (very low risk - 0.4% complication rate)      Signed Electronically by: Skip Venegas MD  Copy of this evaluation report is provided to requesting physician.

## 2022-01-06 ENCOUNTER — OFFICE VISIT (OUTPATIENT)
Dept: SURGERY | Facility: CLINIC | Age: 87
End: 2022-01-06
Payer: MEDICARE

## 2022-01-06 DIAGNOSIS — Z17.0 MALIGNANT NEOPLASM OF UPPER-INNER QUADRANT OF LEFT BREAST IN FEMALE, ESTROGEN RECEPTOR POSITIVE (H): Primary | ICD-10-CM

## 2022-01-06 DIAGNOSIS — C50.212 MALIGNANT NEOPLASM OF UPPER-INNER QUADRANT OF LEFT BREAST IN FEMALE, ESTROGEN RECEPTOR POSITIVE (H): Primary | ICD-10-CM

## 2022-01-06 PROCEDURE — 99212 OFFICE O/P EST SF 10 MIN: CPT | Performed by: SURGERY

## 2022-01-06 NOTE — NURSING NOTE
Lisa Ferguson's goals for this visit include:   Chief Complaint   Patient presents with     RECHECK     6 month breast exam, hx of breast cancer       She requests these members of her care team be copied on today's visit information: no    PCP: Ziyad Isaac    Referring Provider:  Ziyad Isaac MD  [address]  [place] [address]    There were no vitals taken for this visit.    Do you need any medication refills at today's visit? no

## 2022-01-06 NOTE — LETTER
1/6/2022         RE: Lisa Ferguson  [address]  [place] [address]        Dear Colleague,    Thank you for referring your patient, Lisa Ferguson, to the [facility]. Please see a copy of my visit note below.    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 She is here for one year follow up. She is on Arimidex and follows with Dr. Georges.      She had an US on 7/1/2021 which revealed a 1.4cm oval mixed echogenic mass with a thin hypoechoic capsule thought possibly consistent with fat necrosis or resolving hematoma. US in 6 months was recommended. She had a biopsy of this area on 8/9/2021 which revealed fat necrosis and scar.      She reports she is doing well. She has no concerns on her chest. No pain or discomfort. She had eye surgery last week and is recovering well. She had Vega with her daughter and grandchildren who are all vaccinated.       REVIEW OF SYSTEMS:  Constitutional:  Negative for chills, fatigue, fever and weight change.  Eyes:  Negative for blurred vision, eye pain and photophobia.  ENT:  Negative for hearing problems, ENT pain, congestion, rhinorrhea, epistaxis, hoarseness and dental problems.  Cardiovascular:  Negative for chest pain, palpitations, tachycardia, orthopnea and edema.  Respiratory:  Negative for cough, dyspnea and hemoptysis.  Gastrointestinal:  Negative for abdominal pain, heartburn, constipation, diarrhea and stool changes.  Musculoskeletal:  Negative for arthralgias, back pain and  myalgias.  Integumentary/Breast:  See HPI.    Past Medical History:   Diagnosis Date     Anxiety      CAD (coronary artery disease)     angio 2002, h/o cabg, sees Luisana Nelson NP, they follow the cholesterol     CHF (congestive heart failure) (H) 7/8/2014     DJD (degenerative joint disease)      History of colonoscopy jan 2000    due jan 2010     Hyperlipidemia LDL goal < 70     [place] [facility] [place]     Hypertension goal BP (blood pressure) < 140/90      Hypothyroidism      IBS (irritable bowel syndrome)      Mitral regurgitation      Obesity      GLEN (obstructive sleep apnea) 7/11/2011     PUD (peptic ulcer disease)     h/o duodenal ulcer     RBBB (right bundle branch block)      Sciatica neuralgia november 209    MRI shows spinal stenosis and a cyst on the spine, has received an epidural steroid injection       Past Surgical History:   Procedure Laterality Date     ARTHROSCOPY KNEE RT/LT      bilateral     COLONOSCOPY  6/2010    negative     HC DILATION/CURETTAGE DIAG/THER NON OB       HC EXCIS INTERDIGITAL NEUROMA,EA      mortons neuroma excision     HC REMOVAL GALLBLADDER  1994     HERNIA REPAIR, INGUINAL RT/LT       MASTECTOMY SIMPLE BILATERAL, SENTINEL NODE BILATERAL, COMBINED Bilateral 1/6/2021    Procedure: BILATERAL SIMPLE MASTECTOMY WITH LEFT SENTINEL LYMPH NODE BIOPSY;  Surgeon: Beata Garcia MD;  Location: [facility] OR     SURGICAL HISTORY OF -       bilateral meniscal tears and surgery on these     SURGICAL HISTORY OF -   1999 or so    one parathyroid removed     TONSILLECTOMY       TUBAL LIGATION       ZZC APPENDECTOMY       ZC CABG, ARTERIAL, THREE  1999    LIMA-LAD, SVG-DIagnoal, SVG-OM, previous LAD-PCI, sees Dr Banuelos       Family History   Problem Relation Age of Onset     C.A.D. Mother      Arthritis Mother      Cardiovascular Mother      Heart Disease Mother      Obesity Mother      Thyroid Disease Mother      C.A.D. Father      Diabetes Father      Hypertension Father      Alcohol/Drug  Father      Alzheimer Disease Father      Cardiovascular Father      Circulatory Father      Gastrointestinal Disease Father      Heart Disease Father      Lipids Father      Obesity Father      Cardiovascular Maternal Grandmother      Depression Maternal Grandmother      Obesity Maternal Grandmother      Thyroid Disease Maternal Grandmother      Cardiovascular Maternal Grandfather      Heart Disease Maternal Grandfather      Obesity Maternal Grandfather      Obesity Paternal Grandmother      Diabetes Paternal Grandfather      Alcohol/Drug Paternal Grandfather      Obesity Paternal Grandfather      Breast Cancer Sister      Cancer Sister      Obesity Sister      Thyroid Disease Sister      Alcohol/Drug Son      Allergies Son      Allergies Daughter      Depression Sister      Eye Disorder Sister      Gastrointestinal Disease Sister      Thyroid Disease Sister        Social History     Tobacco Use     Smoking status: Former Smoker     Years: 20.00     Types: Cigarettes     Quit date: 1978     Years since quittin.5     Smokeless tobacco: Never Used   Substance Use Topics     Alcohol use: Yes     Comment: Rare        Patient Active Problem List   Diagnosis     PUD (peptic ulcer disease)     Hyperlipidemia with target LDL less than 70     Mitral regurgitation     DJD (degenerative joint disease)     Fibroadenoma of breast     Diverticulosis     Sciatica neuralgia     IBS (irritable bowel syndrome)     RBBB (right bundle branch block)     GLEN (obstructive sleep apnea)     Balance disorder     Anxiety     Ventricular tachycardia (paroxysmal) (H)     Corneal ulcer of left eye     Blepharitis     Hypercalcemia     Disorder of skin or subcutaneous tissue     Scar condition and fibrosis of skin     OA (osteoarthritis) of knee - bilateral     Fracture of proximal humerus     CHF (congestive heart failure) (H)     Shoulder fracture     Abnormal glucose     S/P CABG (coronary artery bypass graft)     S/p total knee  replacement, bilateral     Acquired hypothyroidism     Essential hypertension with goal blood pressure less than 130/80     Morbid obesity (H)     H/O parathyroidectomy (H)     Atherosclerosis of coronary artery bypass graft(s), unspecified, with other forms of angina pectoris (H)     Malignant neoplasm of upper-outer quadrant of left breast in female, estrogen receptor positive (H)     Secondary hyperparathyroidism of renal origin (H)     Allergies   Allergen Reactions     Adhesive Tape      Atorvastatin Other (See Comments) and Muscle Pain (Myalgia)     lipitor  Myalgia     Cortisone      Insomnia, burning in chest, flushed     Nickel Other (See Comments)     Raw weepy skin  rash     Tetracycline Diarrhea     Vicodin [Hydrocodone-Acetaminophen] Other (See Comments)     Hallucinations     Liquid Adhesive      Other reaction(s): Contact Dermatitis     Current Outpatient Medications   Medication Sig Dispense Refill     allopurinol (ZYLOPRIM) 100 MG tablet TAKE 1 TABLET BY MOUTH DAILY. TAKE ALONG WITH THE 300MG TABLET FOR A TOTAL DAILY DOSE OF 400MG. 90 tablet 1     allopurinol (ZYLOPRIM) 300 MG tablet TAKE 1 TABLET BY MOUTH DAILY ALONG WITH 100MG TABLET TO EQUAL TOTAL DAILY DOSE OF 400MG 90 tablet 1     amoxicillin (AMOXIL) 500 MG capsule TK 4 CS PO 1 HOUR BEFORE DENTAL APPOINTMENT (Patient taking differently: Take 2,000 mg by mouth daily as needed TK 4 CS PO 1 HOUR BEFORE DENTAL APPOINTMENT) 4 capsule 3     anastrozole (ARIMIDEX) 1 MG tablet Take 1 tablet (1 mg) by mouth daily 90 tablet 3     aspirin 81 MG EC tablet Take 81 mg by mouth every morning       betamethasone dipropionate (DIPROSONE) 0.05 % external lotion Apply topically as needed (Patient taking differently: Apply topically daily as needed ) 60 mL 1     citalopram (CELEXA) 20 MG tablet TAKE 1 TABLET(20 MG) BY MOUTH AT BEDTIME 90 tablet 1     levothyroxine (SYNTHROID/LEVOTHROID) 125 MCG tablet Take 1 tablet (125 mcg) by mouth every morning 90 tablet 3      LORazepam (ATIVAN) 0.5 MG tablet Take 1 tablet (0.5 mg) by mouth every 6 hours as needed for anxiety (Patient not taking: Reported on 12/29/2021) 10 tablet 1     losartan (COZAAR) 100 MG tablet Take 50 mg by mouth every morning (0.5 X 100 mg)       metoprolol tartrate (LOPRESSOR) 25 MG tablet Take 50 mg by mouth every morning (2 X 25 mg)  (in addition to bedtime dose)       multivitamin w/minerals (MULTI-VITAMIN) tablet Take 1 tablet by mouth every morning       order for DME Equipment being ordered: CPAP 1 each 0     rosuvastatin (CRESTOR) 5 MG tablet three times a week        torsemide (DEMADEX) 10 MG tablet Take 20 mg by mouth every morning (2 X 10 mg)  (in addition to lunchtime dose)         EXAM:  GENERAL: healthy, alert and no distress   BREAST:  Breasts are surgically absent. Well healed mastectomy scars in place. no masses palpable.  Mild tenderness along the left lateral chest wall with pressure.  excess fat tissue bilaterally.   There is no axillary or supraclavicular lymphadenopathy.CARDIOVASCULAR:  RRR  RESPIRATORY: nonlabored breathing  NECK: Neck supple. No adenopathy. Thyroid symmetric, normal size,, Carotids without bruits.  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     Her exam today is benign. I would recommend annual chest wall exam for screening going forward and ongoing follow up with medical oncology. She will see me in October for chest wall exam and then annually going forward.       Beata Garcia MD  [facility]  [phone]        Please route or send  letter to:  Primary Care Provider (PCP) and Referring Provider        Again, thank you for allowing me to participate in the care of your patient.        Sincerely,        Beata Garcia MD

## 2022-01-06 NOTE — PROGRESS NOTES
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 She is here for one year follow up. She is on Arimidex and follows with Dr. Georges.      She had an US on 7/1/2021 which revealed a 1.4cm oval mixed echogenic mass with a thin hypoechoic capsule thought possibly consistent with fat necrosis or resolving hematoma. US in 6 months was recommended. She had a biopsy of this area on 8/9/2021 which revealed fat necrosis and scar.      She reports she is doing well. She has no concerns on her chest. No pain or discomfort. She had eye surgery last week and is recovering well. She had Vega with her daughter and grandchildren who are all vaccinated.       REVIEW OF SYSTEMS:  Constitutional:  Negative for chills, fatigue, fever and weight change.  Eyes:  Negative for blurred vision, eye pain and photophobia.  ENT:  Negative for hearing problems, ENT pain, congestion, rhinorrhea, epistaxis, hoarseness and dental problems.  Cardiovascular:  Negative for chest pain, palpitations, tachycardia, orthopnea and edema.  Respiratory:  Negative for cough, dyspnea and hemoptysis.  Gastrointestinal:  Negative for abdominal pain, heartburn, constipation, diarrhea and stool changes.  Musculoskeletal:  Negative for arthralgias, back pain and myalgias.  Integumentary/Breast:  See HPI.    Past Medical History:   Diagnosis Date     Anxiety      CAD (coronary artery disease)     angio 2002, h/o cabg, sees Luisana Nelson NP, they follow the cholesterol     CHF (congestive heart failure) (H) 7/8/2014      DJD (degenerative joint disease)      History of colonoscopy jan 2000    due jan 2010     Hyperlipidemia LDL goal < 70     sees [facility]     Hypertension goal BP (blood pressure) < 140/90      Hypothyroidism      IBS (irritable bowel syndrome)      Mitral regurgitation      Obesity      GLEN (obstructive sleep apnea) 7/11/2011     PUD (peptic ulcer disease)     h/o duodenal ulcer     RBBB (right bundle branch block)      Sciatica neuralgia november 209    MRI shows spinal stenosis and a cyst on the spine, has received an epidural steroid injection       Past Surgical History:   Procedure Laterality Date     ARTHROSCOPY KNEE RT/LT      bilateral     COLONOSCOPY  6/2010    negative     HC DILATION/CURETTAGE DIAG/THER NON OB       HC EXCIS INTERDIGITAL NEUROMA,EA      mortons neuroma excision     HC REMOVAL GALLBLADDER  1994     HERNIA REPAIR, INGUINAL RT/LT       MASTECTOMY SIMPLE BILATERAL, SENTINEL NODE BILATERAL, COMBINED Bilateral 1/6/2021    Procedure: BILATERAL SIMPLE MASTECTOMY WITH LEFT SENTINEL LYMPH NODE BIOPSY;  Surgeon: Beata Garcia MD;  Location: [facility] OR     SURGICAL HISTORY OF -       bilateral meniscal tears and surgery on these     SURGICAL HISTORY OF -   1999 or so    one parathyroid removed     TONSILLECTOMY       TUBAL LIGATION       ZZC APPENDECTOMY       ZZC CABG, ARTERIAL, THREE  1999    LIMA-LAD, SVG-DIagnoal, SVG-OM, previous LAD-PCI, sees Dr Banuelos       Family History   Problem Relation Age of Onset     C.A.D. Mother      Arthritis Mother      Cardiovascular Mother      Heart Disease Mother      Obesity Mother      Thyroid Disease Mother      C.A.D. Father      Diabetes Father      Hypertension Father      Alcohol/Drug Father      Alzheimer Disease Father      Cardiovascular Father      Circulatory Father      Gastrointestinal Disease Father      Heart Disease Father      Lipids Father      Obesity Father      Cardiovascular Maternal Grandmother      Depression Maternal Grandmother       Obesity Maternal Grandmother      Thyroid Disease Maternal Grandmother      Cardiovascular Maternal Grandfather      Heart Disease Maternal Grandfather      Obesity Maternal Grandfather      Obesity Paternal Grandmother      Diabetes Paternal Grandfather      Alcohol/Drug Paternal Grandfather      Obesity Paternal Grandfather      Breast Cancer Sister      Cancer Sister      Obesity Sister      Thyroid Disease Sister      Alcohol/Drug Son      Allergies Son      Allergies Daughter      Depression Sister      Eye Disorder Sister      Gastrointestinal Disease Sister      Thyroid Disease Sister        Social History     Tobacco Use     Smoking status: Former Smoker     Years: 20.00     Types: Cigarettes     Quit date: 1978     Years since quittin.5     Smokeless tobacco: Never Used   Substance Use Topics     Alcohol use: Yes     Comment: Rare        Patient Active Problem List   Diagnosis     PUD (peptic ulcer disease)     Hyperlipidemia with target LDL less than 70     Mitral regurgitation     DJD (degenerative joint disease)     Fibroadenoma of breast     Diverticulosis     Sciatica neuralgia     IBS (irritable bowel syndrome)     RBBB (right bundle branch block)     GLEN (obstructive sleep apnea)     Balance disorder     Anxiety     Ventricular tachycardia (paroxysmal) (H)     Corneal ulcer of left eye     Blepharitis     Hypercalcemia     Disorder of skin or subcutaneous tissue     Scar condition and fibrosis of skin     OA (osteoarthritis) of knee - bilateral     Fracture of proximal humerus     CHF (congestive heart failure) (H)     Shoulder fracture     Abnormal glucose     S/P CABG (coronary artery bypass graft)     S/p total knee replacement, bilateral     Acquired hypothyroidism     Essential hypertension with goal blood pressure less than 130/80     Morbid obesity (H)     H/O parathyroidectomy (H)     Atherosclerosis of coronary artery bypass graft(s), unspecified, with other forms of angina  pectoris (H)     Malignant neoplasm of upper-outer quadrant of left breast in female, estrogen receptor positive (H)     Secondary hyperparathyroidism of renal origin (H)     Allergies   Allergen Reactions     Adhesive Tape      Atorvastatin Other (See Comments) and Muscle Pain (Myalgia)     lipitor  Myalgia     Cortisone      Insomnia, burning in chest, flushed     Nickel Other (See Comments)     Raw weepy skin  rash     Tetracycline Diarrhea     Vicodin [Hydrocodone-Acetaminophen] Other (See Comments)     Hallucinations     Liquid Adhesive      Other reaction(s): Contact Dermatitis     Current Outpatient Medications   Medication Sig Dispense Refill     allopurinol (ZYLOPRIM) 100 MG tablet TAKE 1 TABLET BY MOUTH DAILY. TAKE ALONG WITH THE 300MG TABLET FOR A TOTAL DAILY DOSE OF 400MG. 90 tablet 1     allopurinol (ZYLOPRIM) 300 MG tablet TAKE 1 TABLET BY MOUTH DAILY ALONG WITH 100MG TABLET TO EQUAL TOTAL DAILY DOSE OF 400MG 90 tablet 1     amoxicillin (AMOXIL) 500 MG capsule TK 4 CS PO 1 HOUR BEFORE DENTAL APPOINTMENT (Patient taking differently: Take 2,000 mg by mouth daily as needed TK 4 CS PO 1 HOUR BEFORE DENTAL APPOINTMENT) 4 capsule 3     anastrozole (ARIMIDEX) 1 MG tablet Take 1 tablet (1 mg) by mouth daily 90 tablet 3     aspirin 81 MG EC tablet Take 81 mg by mouth every morning       betamethasone dipropionate (DIPROSONE) 0.05 % external lotion Apply topically as needed (Patient taking differently: Apply topically daily as needed ) 60 mL 1     citalopram (CELEXA) 20 MG tablet TAKE 1 TABLET(20 MG) BY MOUTH AT BEDTIME 90 tablet 1     levothyroxine (SYNTHROID/LEVOTHROID) 125 MCG tablet Take 1 tablet (125 mcg) by mouth every morning 90 tablet 3     LORazepam (ATIVAN) 0.5 MG tablet Take 1 tablet (0.5 mg) by mouth every 6 hours as needed for anxiety (Patient not taking: Reported on 12/29/2021) 10 tablet 1     losartan (COZAAR) 100 MG tablet Take 50 mg by mouth every morning (0.5 X 100 mg)       metoprolol tartrate  (LOPRESSOR) 25 MG tablet Take 50 mg by mouth every morning (2 X 25 mg)  (in addition to bedtime dose)       multivitamin w/minerals (MULTI-VITAMIN) tablet Take 1 tablet by mouth every morning       order for DME Equipment being ordered: CPAP 1 each 0     rosuvastatin (CRESTOR) 5 MG tablet three times a week        torsemide (DEMADEX) 10 MG tablet Take 20 mg by mouth every morning (2 X 10 mg)  (in addition to lunchtime dose)         EXAM:  GENERAL: healthy, alert and no distress   BREAST:  Breasts are surgically absent. Well healed mastectomy scars in place. no masses palpable.  Mild tenderness along the left lateral chest wall with pressure.  excess fat tissue bilaterally.   There is no axillary or supraclavicular lymphadenopathy.CARDIOVASCULAR:  RRR  RESPIRATORY: nonlabored breathing  NECK: Neck supple. No adenopathy. Thyroid symmetric, normal size,, Carotids without bruits.  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     Her exam today is benign. I would recommend annual chest wall exam for screening going forward and ongoing follow up with medical oncology. She will see me in October for chest wall exam and then annually going forward.       Beata Garcia MD  [facility]  [phone]        Please route or send letter to:  Primary Care Provider (PCP) and Referring Provider

## 2022-01-14 DIAGNOSIS — Z17.0 MALIGNANT NEOPLASM OF LEFT BREAST IN FEMALE, ESTROGEN RECEPTOR POSITIVE, UNSPECIFIED SITE OF BREAST (H): ICD-10-CM

## 2022-01-14 DIAGNOSIS — C50.912 MALIGNANT NEOPLASM OF LEFT BREAST IN FEMALE, ESTROGEN RECEPTOR POSITIVE, UNSPECIFIED SITE OF BREAST (H): ICD-10-CM

## 2022-01-14 RX ORDER — ANASTROZOLE 1 MG/1
1 TABLET ORAL DAILY
Qty: 90 TABLET | Refills: 3 | Status: SHIPPED | OUTPATIENT
Start: 2022-01-14 | End: 2022-11-18

## 2022-01-14 NOTE — TELEPHONE ENCOUNTER
SUBJECTIVE/OBJECTIVE:                                                    Refill request receive for:  Anastrozole    Last refill per fax: not listed  Date of LOV r/t Medication: 10/29/21  Future appt scheduled? Yes: 4/29/22   Date faxed to clinic: 1/13/22    PLAN:                                                      Sent to provider to advise.

## 2022-01-17 DIAGNOSIS — F41.9 ANXIETY: ICD-10-CM

## 2022-01-18 RX ORDER — CITALOPRAM HYDROBROMIDE 20 MG/1
TABLET ORAL
Qty: 90 TABLET | Refills: 1 | Status: SHIPPED | OUTPATIENT
Start: 2022-01-18 | End: 2022-07-06

## 2022-01-18 NOTE — TELEPHONE ENCOUNTER
"Requested Prescriptions   Pending Prescriptions Disp Refills     citalopram (CELEXA) 20 MG tablet [Pharmacy Med Name: CITALOPRAM 20MG TABLETS] 90 tablet 1     Sig: TAKE 1 TABLET(20 MG) BY MOUTH AT BEDTIME       SSRIs Protocol Passed - 1/17/2022  6:07 AM        Passed - Recent (12 mo) or future (30 days) visit within the authorizing provider's specialty     Patient has had an office visit with the authorizing provider or a provider within the authorizing providers department within the previous 12 mos or has a future within next 30 days. See \"Patient Info\" tab in inbasket, or \"Choose Columns\" in Meds & Orders section of the refill encounter.              Passed - Medication is active on med list        Passed - Patient is age 18 or older        Passed - No active pregnancy on record        Passed - No positive pregnancy test in last 12 months           Prescription approved per [facility] Refill Protocol.    "

## 2022-04-06 ENCOUNTER — OFFICE VISIT (OUTPATIENT)
Dept: FAMILY MEDICINE | Facility: CLINIC | Age: 87
End: 2022-04-06
Payer: MEDICARE

## 2022-04-06 VITALS
RESPIRATION RATE: 22 BRPM | BODY MASS INDEX: 39.94 KG/M2 | HEART RATE: 68 BPM | SYSTOLIC BLOOD PRESSURE: 138 MMHG | TEMPERATURE: 98 F | HEIGHT: 67 IN | DIASTOLIC BLOOD PRESSURE: 78 MMHG | OXYGEN SATURATION: 98 % | WEIGHT: 254.5 LBS

## 2022-04-06 DIAGNOSIS — R21 RASH: ICD-10-CM

## 2022-04-06 DIAGNOSIS — Z23 HIGH PRIORITY FOR 2019-NCOV VACCINE: ICD-10-CM

## 2022-04-06 PROCEDURE — 99213 OFFICE O/P EST LOW 20 MIN: CPT | Performed by: FAMILY MEDICINE

## 2022-04-06 PROCEDURE — 91305 COVID-19,PF,PFIZER (12+ YRS): CPT | Performed by: FAMILY MEDICINE

## 2022-04-06 PROCEDURE — 0054A COVID-19,PF,PFIZER (12+ YRS): CPT | Performed by: FAMILY MEDICINE

## 2022-04-06 NOTE — PROGRESS NOTES
"  Assessment & Plan     Rash  Eczema / contact dermatitis  Use sterid Cream sparingly-Not more than 2 weeks  Follow up if not better    High priority for 2019-nCoV vaccine    - COVID-19,PF,PFIZER (12+ Yrs GRAY LABEL)  Plan:    Return in about 2 weeks (around 4/20/2022) for recheck/ sooner if worse or New symptoms.    Neyda Ramsey MD  [facility] NARDA Gonzalez is a 86 year old who presents for the following health issues    HPI     Rash  Onset/Duration: x2 months  Description  Location: bilateral ankles - now also having small red dots on calves  Character: blotchy, burning, red  Itching: mild  Intensity:  mild  Progression of Symptoms:  improving  Accompanying signs and symptoms:   Fever: no  Body aches or joint pain: no  Sore throat symptoms: no  Recent cold symptoms: no  History:           Previous episodes of similar rash: smaller rashes throughout the years, usually uses hydrocortisone cream which is helpful   New exposures:  none  Recent travel: no  Exposure to similar rash: no  Precipitating or alleviating factors: none  Therapies tried and outcome: hydrocortisone cream, desonide cream - somewhat helpful; betamethasone dipropionate ointment - helpful     Pt started using Betamethasone which has helped    Review of Systems GENERAL: healthy, alert and no distress  Rest of the ROS is Negative except see above and Problem list [stable]        Objective    /78 (Patient Position: Sitting, Cuff Size: Adult Large)   Pulse 68   Temp 98  F (36.7  C) (Tympanic)   Resp 22   Ht 1.699 m (5' 6.9\")   Wt 115.4 kg (254 lb 8 oz)   SpO2 98%   BMI 39.98 kg/m    Body mass index is 39.98 kg/m .  Physical Exam   GENERAL: healthy, alert and no distress  Small erythematous rash Right Lower leg        "

## 2022-04-29 ENCOUNTER — ONCOLOGY VISIT (OUTPATIENT)
Dept: ONCOLOGY | Facility: CLINIC | Age: 87
End: 2022-04-29
Attending: INTERNAL MEDICINE
Payer: MEDICARE

## 2022-04-29 VITALS
WEIGHT: 253.5 LBS | DIASTOLIC BLOOD PRESSURE: 80 MMHG | TEMPERATURE: 97.7 F | OXYGEN SATURATION: 98 % | BODY MASS INDEX: 39.82 KG/M2 | HEART RATE: 63 BPM | SYSTOLIC BLOOD PRESSURE: 141 MMHG

## 2022-04-29 DIAGNOSIS — Z17.0 MALIGNANT NEOPLASM OF UPPER-OUTER QUADRANT OF LEFT BREAST IN FEMALE, ESTROGEN RECEPTOR POSITIVE (H): Primary | ICD-10-CM

## 2022-04-29 DIAGNOSIS — C50.412 MALIGNANT NEOPLASM OF UPPER-OUTER QUADRANT OF LEFT BREAST IN FEMALE, ESTROGEN RECEPTOR POSITIVE (H): Primary | ICD-10-CM

## 2022-04-29 PROCEDURE — 99213 OFFICE O/P EST LOW 20 MIN: CPT | Performed by: INTERNAL MEDICINE

## 2022-04-29 ASSESSMENT — PAIN SCALES - GENERAL: PAINLEVEL: NO PAIN (0)

## 2022-04-29 NOTE — NURSING NOTE
"Oncology Rooming Note    April 29, 2022 12:39 PM   Lisa Ferguson is a 86 year old female who presents for:    Chief Complaint   Patient presents with     Oncology Clinic Visit     Initial Vitals: BP (!) 141/80 (BP Location: Left arm)   Pulse 63   Temp 97.7  F (36.5  C) (Oral)   Wt 115 kg (253 lb 8 oz)   SpO2 98%   BMI 39.82 kg/m   Estimated body mass index is 39.82 kg/m  as calculated from the following:    Height as of 4/6/22: 1.699 m (5' 6.9\").    Weight as of this encounter: 115 kg (253 lb 8 oz). Body surface area is 2.33 meters squared.  No Pain (0) Comment: Data Unavailable   No LMP recorded. Patient is postmenopausal.  Allergies reviewed: Yes  Medications reviewed: Yes    Medications: Medication refills not needed today.  Pharmacy name entered into [employer]: [id number] - [place], [place] - [address] [place] [address] AT [address]          Sapna Killian LPN              "

## 2022-04-29 NOTE — PROGRESS NOTES
[facility] PHYSICIANS  MEDICAL ONCOLOGY    RETURN PATIENT VISIT NOTE    Reason for visit: breast cancer    Oncology Treatment Summary  1.  Patient self palpated abnormality in left breast in November 2020.  11/16/2020 diagnostic mammogram and ultrasound were done which found 2 lesions within the left breast 1 measuring approximately 1.5 cm and the second 1.9 cm.  Both were biopsied the one at 3:00 was a grade 2 invasive lobular cancer.  The one at 11:00 was a grade 2 invasive ductal cancer.  It was estrogen receptor positive progesterone receptor positive HER-2/kena indeterminate by IHC and negative by ALBERTO  2.  1/6/2021 patient underwent bilateral mastectomies.  Right mastectomy was unremarkable.  Left mastectomy found a multifocal breast cancer the first measuring 4.5 cm is a grade 2 invasive lobular carcinoma, the second was a 2.5 cm grade 2 malignancy.  2 sentinel lymph nodes were removed 1 of which was negative the other 1 had immunohistochemistry positive only cells for a T M2N0i+M0 ER/DC positive HER-2/kena negative disease  3.  Oncotype RS: one was 22 and other was 6 both low risk  4.  Genetic testing was done that was unremarkable  5. 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  She doesn't have any new side effects or problems.  She has baseline arthalgias but these arent any different. Occasional hot flashes, but nothing significant. No other new problems. She continues to follow-up with PCP regarding her other health problems.     PAST MEDICAL HISTORY  Coronary artery disease, peptic ulcer disease, anxiety, hyperlipidemia, mitral regurgitation, hypertension, degenerative joint disease, right bundle branch block, irritable bowel, sciatica, hypothyroidism, obstructive sleep apnea, congestive heart failure, gout,  nephrolithiasis      CURRENT OUTPATIENT MEDICATIONS  Current Outpatient Medications   Medication     allopurinol (ZYLOPRIM) 100 MG tablet     allopurinol (ZYLOPRIM) 300 MG tablet     amoxicillin (AMOXIL) 500 MG capsule     anastrozole (ARIMIDEX) 1 MG tablet     aspirin 81 MG EC tablet     betamethasone dipropionate (DIPROSONE) 0.05 % external lotion     citalopram (CELEXA) 20 MG tablet     levothyroxine (SYNTHROID/LEVOTHROID) 125 MCG tablet     LORazepam (ATIVAN) 0.5 MG tablet     losartan (COZAAR) 100 MG tablet     metoprolol tartrate (LOPRESSOR) 25 MG tablet     multivitamin w/minerals (MULTI-VITAMIN) tablet     order for DME     rosuvastatin (CRESTOR) 5 MG tablet     torsemide (DEMADEX) 10 MG tablet     No current facility-administered medications for this visit.        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  She has a history of smoking but quit this 40 years ago.  Occasional alcohol use     FAMILY HISTORY  Her sister  of breast cancer at 62 and did have a BRCA mutation.  Patient's niece also carries a BRCA mutation.  She was tested for this and does not..  Her genetic testing was negative     REVIEW OF SYSTEMS  Review Of Systems  Skin: negative  Eyes: negative  Ears/Nose/Throat: negative  Respiratory: No shortness of breath, dyspnea on exertion, cough, or hemoptysis  Cardiovascular: negative  Gastrointestinal: negative  Genitourinary: negative  Musculoskeletal: negative  Neurologic: negative  Psychiatric: negative  Hematologic/Lymphatic/Immunologic:  negative  Endocrine: negative      PHYSICAL EXAM  B/P: Data Unavailable, T: Data Unavailable, P: Data Unavailable, R: Data Unavailable  Wt Readings from Last 3 Encounters:   04/29/22 115 kg (253 lb 8 oz)   04/06/22 115.4 kg (254 lb 8 oz)   12/29/21 114.3 kg (252 lb)       Physical Exam  Vitals reviewed.   Constitutional:       Appearance: Normal appearance.   HENT:      Head: Normocephalic and atraumatic.   Eyes:      Extraocular Movements: Extraocular movements intact.      Conjunctiva/sclera: Conjunctivae normal.      Pupils: Pupils are equal, round, and reactive to light.   Cardiovascular:      Rate and Rhythm: Normal rate and regular rhythm.   Pulmonary:      Effort: Pulmonary effort is normal.      Breath sounds: Normal breath sounds.   Abdominal:      General: Bowel sounds are normal.      Palpations: Abdomen is soft.   Musculoskeletal:         General: Normal range of motion.      Cervical back: Normal range of motion and neck supple.   Lymphadenopathy:      Cervical: No cervical adenopathy.   Skin:     General: Skin is warm.   Neurological:      General: No focal deficit present.      Mental Status: She is alert and oriented to person, place, and time. Mental status is at baseline.   Psychiatric:         Mood and Affect: Mood normal.         Behavior: Behavior normal.         Thought Content: Thought content normal.         Judgment: Judgment normal.     breast exam: breasts bilaterally surgically absent, no local regional disease noted        LABORATORY AND IMAGING STUDIES  Recent Labs   Lab Test 01/07/21  0739 01/06/21  1116 12/29/20  1227 10/25/19  0947 10/04/19  0952 09/13/18  1122     --  136 139 138 138   POTASSIUM 3.7 3.6 4.2 4.3 4.0 3.9   CHLORIDE 101  --  102 106 103 103   CO2 28  --  29 25 26 27   ANIONGAP 4  --  5 8 9 8   BUN 18  --  20 15 22 21   CR 0.79  --  0.80 0.67 0.95 0.71   *  --  97 99 106* 106*   LUCIANO 9.3  --  9.9 10.4* 10.9* 10.0     Recent Labs   Lab Test 07/08/14  1417    PHOS 3.9     Recent Labs   Lab Test 01/07/21  0738 12/29/20  1227 10/28/19  1135 10/17/18  1220 09/13/18  1122 08/01/17  1117   WBC  --  10.8 8.9 11.9* 9.1 8.5   HGB 11.2* 12.9 13.1 13.4 13.8 14.4   PLT  --  337 362 392 291 311   MCV  --  91 91 89 87 87   NEUTROPHIL  --  58.1 48.1 60.6 45.1 46.3     Recent Labs   Lab Test 10/28/19  1135 09/13/18  1122 01/03/17  1106 03/23/15  1213 07/08/14  1419   ALKPHOS  --   --   --   --  99   ALT 35 35 42 36 43   AST  --   --   --  28  --      TSH   Date Value Ref Range Status   02/17/2021 3.80 0.40 - 4.00 mU/L Final   12/29/2020 23.61 (H) 0.40 - 4.00 mU/L Final   10/28/2019 1.75 0.40 - 4.00 mU/L Final     No results for input(s): CEA in the last 98987 hours.  Results for orders placed or performed in visit on 08/09/21   US Breast Biopsy Core Needle, 1St Lesion Left    Addendum: 8/12/2021    Addendum: Benign pathology results are concordant with imaging as  follows:    ? Fibroadipose tissue with fat necrosis (and associated giant cells  and macrophages) and fibrous scar, suggestive of prior operative site  ? Negative for malignancy    Recommendations: Clinical follow-up.    VAUGHN LÓPEZ MD         SYSTEM ID:  [id number]      Narrative    EXAMINATION: US BREAST BIOPSY CORE NEEDLE LEFT, 8/9/2021 11:32 AM     HISTORY: History of left breast mastectomy for breast cancer on  1/6/2021.  biopsy palpable mass on left chest. likely fat necrosis but want to  rule out recurrent cancer; Malignant neoplasm of upper-inner quadrant  of left breast in female, estrogen receptor positive (H); Malignant  neoplasm of upper-inner quadrant of left breast in female, estrogen  receptor positive (H); Left breast mass; Breast lump    COMPARISON: Ultrasound 7/1/2021    CONSENT: The procedure, benefits and risks, were explained and  discussed with the patient. Risks included: infection, bleeding, and  the small possibility of even life threatening complications. Written  and verbal consent were  "obtained.    PROCEDURE: Using aseptic technique, up to 10 cc of 1% lidocaine  buffered with sodium bicarbonate for local anesthesia, ultrasound  guidance, and a biopsy needle were utilized to sample lesion. A  radiopaque biopsy marker was placed. Pressure was held over this area  for approximately 10 to 15 minutes until there was adequate  hemostasis. A dressing was placed and post biopsy care instructions  were discussed with the patient. There were no apparent complications.  The patient left our department in stable condition.  If multiple biopsies were performed, new equipment was used for each  site.    Location: Left , anterior chest position  Needle: 14 gauge core needle biopsy system  No. of passes: 3  Clip shape: BiomarC (shaped liked a \"barbell\") clip       Impression    IMPRESSION: Uncomplicated ultrasound-guided core needle biopsies of  the left side.    RIKA NEGRON MD         SYSTEM ID:  [id number]          ASSESSMENT  AND RECOMMENDATIONS:    1. Tm2N0i+M0 ER positive ND positive HER-2/kena negative breast cancer status post left mastectomy with both tumors having a low risk Oncotype  2.  Numerous other medical problems as delineated above    Plan    1. Continue arimidex  2. Continue calcium and vitamin D   3. Next dexa 2/2023  4. Last colonoscopy in 2010 and she does not plan to do any more.  5. RTC 6 months  6. Continue to follow-up with PCP    Tamar Georges MD on 5/1/2022 at 7:55 PM            "

## 2022-04-29 NOTE — LETTER
4/29/2022         RE: Lisa Ferguson  [address]  [place] [address]        Dear Colleague,    Thank you for referring your patient, Lisa Ferguson, to the [facility]. Please see a copy of my visit note below.    [facility] PHYSICIANS  MEDICAL ONCOLOGY    RETURN PATIENT VISIT NOTE    Reason for visit: breast cancer    Oncology Treatment Summary  1.  Patient self palpated abnormality in left breast in November 2020.  11/16/2020 diagnostic mammogram and ultrasound were done which found 2 lesions within the left breast 1 measuring approximately 1.5 cm and the second 1.9 cm.  Both were biopsied the one at 3:00 was a grade 2 invasive lobular cancer.  The one at 11:00 was a grade 2 invasive ductal cancer.  It was estrogen receptor positive progesterone receptor positive HER-2/kena indeterminate by IHC and negative by ALBERTO  2.  1/6/2021 patient underwent bilateral mastectomies.  Right mastectomy was unremarkable.  Left mastectomy found a multifocal breast cancer the first measuring 4.5 cm is a grade 2 invasive lobular carcinoma, the second was a 2.5 cm grade 2 malignancy.  2 sentinel lymph nodes were removed 1 of which was negative the other 1 had immunohistochemistry positive only cells for a T M2N0i+M0 ER/MI positive HER-2/kena negative disease  3.  Oncotype RS: one was 22 and other was 6 both low risk  4.  Genetic testing was done that was unremarkable  5. 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  She doesn't have any new side effects or problems.  She has baseline arthalgias but these arent any different. Occasional hot flashes, but nothing significant. No other new problems. She continues to follow-up with PCP regarding her other health problems.     PAST MEDICAL HISTORY  Coronary  artery disease, peptic ulcer disease, anxiety, hyperlipidemia, mitral regurgitation, hypertension, degenerative joint disease, right bundle branch block, irritable bowel, sciatica, hypothyroidism, obstructive sleep apnea, congestive heart failure, gout, nephrolithiasis      CURRENT OUTPATIENT MEDICATIONS  Current Outpatient Medications   Medication     allopurinol (ZYLOPRIM) 100 MG tablet     allopurinol (ZYLOPRIM) 300 MG tablet     amoxicillin (AMOXIL) 500 MG capsule     anastrozole (ARIMIDEX) 1 MG tablet     aspirin 81 MG EC tablet     betamethasone dipropionate (DIPROSONE) 0.05 % external lotion     citalopram (CELEXA) 20 MG tablet     levothyroxine (SYNTHROID/LEVOTHROID) 125 MCG tablet     LORazepam (ATIVAN) 0.5 MG tablet     losartan (COZAAR) 100 MG tablet     metoprolol tartrate (LOPRESSOR) 25 MG tablet     multivitamin w/minerals (MULTI-VITAMIN) tablet     order for DME     rosuvastatin (CRESTOR) 5 MG tablet     torsemide (DEMADEX) 10 MG tablet     No current facility-administered medications for this visit.        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  She has a history of smoking but quit this 40 years ago.  Occasional alcohol use     FAMILY HISTORY  Her sister  of breast cancer at 62 and did have a BRCA mutation.  Patient's niece also carries a BRCA mutation.  She was tested for this and does not..  Her genetic testing was negative     REVIEW OF SYSTEMS  Review Of Systems  Skin: negative  Eyes: negative  Ears/Nose/Throat:  negative  Respiratory: No shortness of breath, dyspnea on exertion, cough, or hemoptysis  Cardiovascular: negative  Gastrointestinal: negative  Genitourinary: negative  Musculoskeletal: negative  Neurologic: negative  Psychiatric: negative  Hematologic/Lymphatic/Immunologic: negative  Endocrine: negative      PHYSICAL EXAM  B/P: Data Unavailable, T: Data Unavailable, P: Data Unavailable, R: Data Unavailable  Wt Readings from Last 3 Encounters:   04/29/22 115 kg (253 lb 8 oz)   04/06/22 115.4 kg (254 lb 8 oz)   12/29/21 114.3 kg (252 lb)       Physical Exam  Vitals reviewed.   Constitutional:       Appearance: Normal appearance.   HENT:      Head: Normocephalic and atraumatic.   Eyes:      Extraocular Movements: Extraocular movements intact.      Conjunctiva/sclera: Conjunctivae normal.      Pupils: Pupils are equal, round, and reactive to light.   Cardiovascular:      Rate and Rhythm: Normal rate and regular rhythm.   Pulmonary:      Effort: Pulmonary effort is normal.      Breath sounds: Normal breath sounds.   Abdominal:      General: Bowel sounds are normal.      Palpations: Abdomen is soft.   Musculoskeletal:         General: Normal range of motion.      Cervical back: Normal range of motion and neck supple.   Lymphadenopathy:      Cervical: No cervical adenopathy.   Skin:     General: Skin is warm.   Neurological:      General: No focal deficit present.      Mental Status: She is alert and oriented to person, place, and time. Mental status is at baseline.   Psychiatric:         Mood and Affect: Mood normal.         Behavior: Behavior normal.         Thought Content: Thought content normal.         Judgment: Judgment normal.     breast exam: breasts bilaterally surgically absent, no local regional disease noted        LABORATORY AND IMAGING STUDIES  Recent Labs   Lab Test 01/07/21  0739 01/06/21  1116 12/29/20  1227 10/25/19  0947 10/04/19  0952 09/13/18  1122     --  136 139 138 138   POTASSIUM 3.7 3.6  4.2 4.3 4.0 3.9   CHLORIDE 101  --  102 106 103 103   CO2 28  --  29 25 26 27   ANIONGAP 4  --  5 8 9 8   BUN 18  --  20 15 22 21   CR 0.79  --  0.80 0.67 0.95 0.71   *  --  97 99 106* 106*   LUCIANO 9.3  --  9.9 10.4* 10.9* 10.0     Recent Labs   Lab Test 07/08/14  1419   PHOS 3.9     Recent Labs   Lab Test 01/07/21  0738 12/29/20  1227 10/28/19  1135 10/17/18  1220 09/13/18  1122 08/01/17  1117   WBC  --  10.8 8.9 11.9* 9.1 8.5   HGB 11.2* 12.9 13.1 13.4 13.8 14.4   PLT  --  337 362 392 291 311   MCV  --  91 91 89 87 87   NEUTROPHIL  --  58.1 48.1 60.6 45.1 46.3     Recent Labs   Lab Test 10/28/19  1135 09/13/18  1122 01/03/17  1106 03/23/15  1213 07/08/14  1419   ALKPHOS  --   --   --   --  99   ALT 35 35 42 36 43   AST  --   --   --  28  --      TSH   Date Value Ref Range Status   02/17/2021 3.80 0.40 - 4.00 mU/L Final   12/29/2020 23.61 (H) 0.40 - 4.00 mU/L Final   10/28/2019 1.75 0.40 - 4.00 mU/L Final     No results for input(s): CEA in the last 66830 hours.  Results for orders placed or performed in visit on 08/09/21   US Breast Biopsy Core Needle, 1St Lesion Left    Addendum: 8/12/2021    Addendum: Benign pathology results are concordant with imaging as  follows:    ? Fibroadipose tissue with fat necrosis (and associated giant cells  and macrophages) and fibrous scar, suggestive of prior operative site  ? Negative for malignancy    Recommendations: Clinical follow-up.    VAUGHN LÓPEZ MD         SYSTEM ID:  [id number]      Narrative    EXAMINATION: US BREAST BIOPSY CORE NEEDLE LEFT, 8/9/2021 11:32 AM     HISTORY: History of left breast mastectomy for breast cancer on  1/6/2021.  biopsy palpable mass on left chest. likely fat necrosis but want to  rule out recurrent cancer; Malignant neoplasm of upper-inner quadrant  of left breast in female, estrogen receptor positive (H); Malignant  neoplasm of upper-inner quadrant of left breast in female, estrogen  receptor positive (H); Left breast mass; Breast  "lump    COMPARISON: Ultrasound 7/1/2021    CONSENT: The procedure, benefits and risks, were explained and  discussed with the patient. Risks included: infection, bleeding, and  the small possibility of even life threatening complications. Written  and verbal consent were obtained.    PROCEDURE: Using aseptic technique, up to 10 cc of 1% lidocaine  buffered with sodium bicarbonate for local anesthesia, ultrasound  guidance, and a biopsy needle were utilized to sample lesion. A  radiopaque biopsy marker was placed. Pressure was held over this area  for approximately 10 to 15 minutes until there was adequate  hemostasis. A dressing was placed and post biopsy care instructions  were discussed with the patient. There were no apparent complications.  The patient left our department in stable condition.  If multiple biopsies were performed, new equipment was used for each  site.    Location: Left , anterior chest position  Needle: 14 gauge core needle biopsy system  No. of passes: 3  Clip shape: BiomarC (shaped liked a \"barbell\") clip       Impression    IMPRESSION: Uncomplicated ultrasound-guided core needle biopsies of  the left side.    RIKA NEGRON MD         SYSTEM ID:  [id number]          ASSESSMENT  AND RECOMMENDATIONS:    1. Tm2N0i+M0 ER positive OR positive HER-2/kena negative breast cancer status post left mastectomy with both tumors having a low risk Oncotype  2.  Numerous other medical problems as delineated above    Plan    1. Continue arimidex  2. Continue calcium and vitamin D   3. Next dexa 2/2023  4. Last colonoscopy in 2010 and she does not plan to do any more.  5. RTC 6 months  6. Continue to follow-up with PCP    Tamar Georges MD on 5/1/2022 at 7:55 PM                Again, thank you for allowing me to participate in the care of your patient.        Sincerely,        Tamar Georges MD    "

## 2022-05-02 PROBLEM — C50.412 MALIGNANT NEOPLASM OF UPPER-OUTER QUADRANT OF LEFT BREAST IN FEMALE, ESTROGEN RECEPTOR POSITIVE (H): Status: ACTIVE | Noted: 2020-12-11

## 2022-05-02 PROBLEM — Z17.0 MALIGNANT NEOPLASM OF UPPER-OUTER QUADRANT OF LEFT BREAST IN FEMALE, ESTROGEN RECEPTOR POSITIVE (H): Status: ACTIVE | Noted: 2020-12-11

## 2022-05-10 ENCOUNTER — TELEPHONE (OUTPATIENT)
Dept: INTERNAL MEDICINE | Facility: CLINIC | Age: 87
End: 2022-05-10
Payer: MEDICARE

## 2022-05-10 NOTE — TELEPHONE ENCOUNTER
Routing message to Dr. Isaac to advise if able to complete handicap parking certificate or does patient need appointment?    Hanane Tobar,

## 2022-05-20 ENCOUNTER — MEDICAL CORRESPONDENCE (OUTPATIENT)
Dept: HEALTH INFORMATION MANAGEMENT | Facility: CLINIC | Age: 87
End: 2022-05-20
Payer: MEDICARE

## 2022-05-23 ENCOUNTER — TELEPHONE (OUTPATIENT)
Dept: SURGERY | Facility: CLINIC | Age: 87
End: 2022-05-23
Payer: MEDICARE

## 2022-05-23 NOTE — TELEPHONE ENCOUNTER
5/23 2nd attempt. Provided phone number [phone] follow up  in about 9 months (around 10/6/2022) for Breast Exam.    Kathleen [facility]   Orthopedics, Podiatry, Sports Medicine, ENT/Eye Specialties  [facility]   [phone]

## 2022-06-09 ENCOUNTER — TELEPHONE (OUTPATIENT)
Dept: INTERNAL MEDICINE | Facility: CLINIC | Age: 87
End: 2022-06-09
Payer: MEDICARE

## 2022-06-09 NOTE — TELEPHONE ENCOUNTER
Received call from patient. She states that she has a skin lesion on her L elbow. She would like this evaluated by a provider. She reports it is red and raised, looking like a wart with a red base and yellow creaming top. No pain, fever, chills or other symptoms. Scheduled to be seen on Monday.     SHELBY Aranda RN  [facility], [facility]

## 2022-06-13 ENCOUNTER — OFFICE VISIT (OUTPATIENT)
Dept: FAMILY MEDICINE | Facility: CLINIC | Age: 87
End: 2022-06-13
Payer: MEDICARE

## 2022-06-13 ENCOUNTER — TELEPHONE (OUTPATIENT)
Dept: FAMILY MEDICINE | Facility: CLINIC | Age: 87
End: 2022-06-13

## 2022-06-13 VITALS
HEART RATE: 79 BPM | SYSTOLIC BLOOD PRESSURE: 140 MMHG | BODY MASS INDEX: 39.59 KG/M2 | OXYGEN SATURATION: 95 % | TEMPERATURE: 99.2 F | WEIGHT: 252 LBS | DIASTOLIC BLOOD PRESSURE: 70 MMHG

## 2022-06-13 DIAGNOSIS — L98.9 SKIN LESION OF LEFT ARM: Primary | ICD-10-CM

## 2022-06-13 PROCEDURE — 99213 OFFICE O/P EST LOW 20 MIN: CPT | Performed by: FAMILY MEDICINE

## 2022-06-13 ASSESSMENT — PAIN SCALES - GENERAL: PAINLEVEL: NO PAIN (0)

## 2022-06-13 NOTE — PROGRESS NOTES
Assessment & Plan       ICD-10-CM    1. Skin lesion of left arm  L98.9      I suspect the skin lesion represents a benign keratosis  It certainly does not look like anything contagious, so I reassured her about that  It is not likely to be a skin cancer, but is somewhat inflamed/irritated currently, so it is difficult to assess  I suggested observation of this for now and we will see if it falls off and clears up like the one on her left shoulder did  Discussed possible shave excision and biopsy of the skin lesion if it persists  She can follow-up on this with her PCP in 3 to 4 weeks as scheduled    Return in about 24 days (around 7/7/2022).    Wayne Downs MD  [facility] NARDA Gonzalez is a 86 year old who presents for the following health issues:    History of Present Illness       Reason for visit:  Non healed skin problem  Symptom onset:  More than a month  Symptoms include:  A pox like skin  problem  Symptom intensity:  Mild  Symptom progression:  Improving  Had these symptoms before:  No  What makes it worse:  No  What makes it better:  No    She eats 2-3 servings of fruits and vegetables daily.She exercises with enough effort to increase her heart rate 9 or less minutes per day.  She exercises with enough effort to increase her heart rate 3 or less days per week.   She is taking medications regularly.     She had a small raised growth on her left shoulder a month or 2 ago.  It fell off about a month ago and is healed up nicely.  Soon after that one fell off, she developed a similar growth over her left elbow.  This one seems to be starting to come off as well.  She has a daughter that will be having surgery tomorrow for cancer and the patient was mainly concerned that this was not chickenpox or shingles or something contagious.  She does not want to spread anything to her daughter.    She normally sees Dr. Isaac for primary care.  She has an appointment to see him on July 7  for an annual wellness exam.  Review of her chart shows that she is overdue for a variety of lab tests and immunizations.    She is on numerous baseline medications as below.    Patient Active Problem List   Diagnosis     PUD (peptic ulcer disease)     Hyperlipidemia with target LDL less than 70     Mitral regurgitation     DJD (degenerative joint disease)     Fibroadenoma of breast     Diverticulosis     Sciatica neuralgia     IBS (irritable bowel syndrome)     RBBB (right bundle branch block)     GLEN (obstructive sleep apnea)     Balance disorder     Anxiety     Ventricular tachycardia (paroxysmal) (H)     Corneal ulcer of left eye     Blepharitis     Hypercalcemia     Disorder of skin or subcutaneous tissue     Scar condition and fibrosis of skin     OA (osteoarthritis) of knee - bilateral     Fracture of proximal humerus     CHF (congestive heart failure) (H)     Shoulder fracture     Abnormal glucose     S/P CABG (coronary artery bypass graft)     S/p total knee replacement, bilateral     Acquired hypothyroidism     Essential hypertension with goal blood pressure less than 130/80     Morbid obesity (H)     H/O parathyroidectomy (H)     Atherosclerosis of coronary artery bypass graft(s), unspecified, with other forms of angina pectoris (H)     Malignant neoplasm of upper-outer quadrant of left breast in female, estrogen receptor positive (H)     Secondary hyperparathyroidism of renal origin (H)     Current Outpatient Medications   Medication     allopurinol (ZYLOPRIM) 100 MG tablet     allopurinol (ZYLOPRIM) 300 MG tablet     anastrozole (ARIMIDEX) 1 MG tablet     aspirin 81 MG EC tablet     betamethasone dipropionate (DIPROSONE) 0.05 % external lotion     levothyroxine (SYNTHROID/LEVOTHROID) 125 MCG tablet     LORazepam (ATIVAN) 0.5 MG tablet     losartan (COZAAR) 100 MG tablet     metoprolol tartrate (LOPRESSOR) 25 MG tablet     multivitamin w/minerals (THERA-VIT-M) tablet     order for DME     rosuvastatin  (CRESTOR) 5 MG tablet     torsemide (DEMADEX) 10 MG tablet     amoxicillin (AMOXIL) 500 MG capsule     citalopram (CELEXA) 20 MG tablet     No current facility-administered medications for this visit.         Review of Systems   Noncontributory except as above.      Objective    BP (!) 140/70 (BP Location: Right arm, Patient Position: Sitting, Cuff Size: Adult Regular)   Pulse 79   Temp 99.2  F (37.3  C) (Oral)   Wt 114.3 kg (252 lb)   SpO2 95%   BMI 39.59 kg/m    Body mass index is 39.59 kg/m .  Physical Exam   GENERAL: healthy, alert and no distress  SKIN: She has a roughly 1/2-3/4 cm diam raised flesh-colored skin lesion on the extensor side of the left elbow.  It appears somewhat irritated/inflamed and does appear to be starting to fall off.  It is fairly sharply demarcated.  She does have some typical appearing seborrheic keratoses on her upper back.

## 2022-06-13 NOTE — TELEPHONE ENCOUNTER
Reason for Call:  Form, our goal is to have forms completed with 72 hours, however, some forms may require a visit or additional information.    Type of letter, form or note:  disability    Who is the form from?: Patient    Where did the form come from: Patient or family brought in       What clinic location was the form placed at?: [facility]    Where the form was placed: [address] [place] [address] primary care bin Box/Folder    What number is listed as a contact on the form?: [phone]       Additional comments: Please call patient when form is completed.    Call taken on 6/13/2022 at 4:55 PM by Nuha Chen

## 2022-06-14 ENCOUNTER — TELEPHONE (OUTPATIENT)
Dept: FAMILY MEDICINE | Facility: CLINIC | Age: 87
End: 2022-06-14
Payer: MEDICARE

## 2022-06-14 DIAGNOSIS — M10.071 ACUTE IDIOPATHIC GOUT OF RIGHT FOOT: ICD-10-CM

## 2022-06-14 DIAGNOSIS — E03.9 ACQUIRED HYPOTHYROIDISM: ICD-10-CM

## 2022-06-14 RX ORDER — LEVOTHYROXINE SODIUM 125 UG/1
125 TABLET ORAL EVERY MORNING
Qty: 30 TABLET | Refills: 0 | Status: SHIPPED | OUTPATIENT
Start: 2022-06-14 | End: 2022-07-10

## 2022-06-14 NOTE — TELEPHONE ENCOUNTER
See telephone note 5/10/22. Copy and pasted below        Patient states that she was not told a visit was needed. And the appointment in July had been previous made in April. Can forms be completed prior to visit? Please call patient either way.   Cora Mart MA

## 2022-06-14 NOTE — TELEPHONE ENCOUNTER
Application for Disability Parking Certificate form received and given to Dr. Isaac to complete and sign when he returns to the office on Thursday, June 16th.  Hanane Tobar,

## 2022-06-15 RX ORDER — ALLOPURINOL 300 MG/1
TABLET ORAL
Qty: 90 TABLET | Refills: 1 | Status: SHIPPED | OUTPATIENT
Start: 2022-06-15 | End: 2022-09-30

## 2022-06-15 NOTE — TELEPHONE ENCOUNTER
See 6-, telephone encounter.  Form is on Dr. Isaac's desk to sign when he returns to the office tomorrow.    Hanane Tobar,

## 2022-06-15 NOTE — TELEPHONE ENCOUNTER
"Requested Prescriptions   Pending Prescriptions Disp Refills     allopurinol (ZYLOPRIM) 300 MG tablet [Pharmacy Med Name: ALLOPURINOL 300 MG TABLET] 90 tablet 1     Sig: TAKE 1 TABLET BY MOUTH DAILY ALONG WITH 100MG TABLET FOR TOTAL DAILY DOSE OF 400MG       Gout Agents Protocol Failed - 6/14/2022 12:21 AM        Failed - CBC on file in past 12 months     Recent Labs   Lab Test 01/07/21  0738 12/29/20  1227   WBC  --  10.8   RBC  --  4.46   HGB 11.2* 12.9   HCT  --  40.5   PLT  --  337                 Failed - ALT on file in past 12 months     Recent Labs   Lab Test 10/28/19  1135   ALT 35             Failed - Has Uric Acid on file in past 12 months and value is less than 6     Recent Labs   Lab Test 12/29/20  1227   URIC 4.3     If level is 6mg/dL or greater, ok to refill one time and refer to provider.           Failed - Normal serum creatinine on file in the past 12 months     Recent Labs   Lab Test 01/07/21  0739   CR 0.79       Ok to refill medication if creatinine is low          Passed - Recent (12 mo) or future (30 days) visit within the authorizing provider's specialty     Patient has had an office visit with the authorizing provider or a provider within the authorizing providers department within the previous 12 mos or has a future within next 30 days. See \"Patient Info\" tab in inbasket, or \"Choose Columns\" in Meds & Orders section of the refill encounter.              Passed - Medication is active on med list        Passed - Patient is age 18 or older        Passed - No active pregnancy on record        Passed - No positive pregnancy test in past year           Routing refill request to provider for review/approval because:  Labs not current:  CBC, ALT, Uric acid, Cr    Toyin Hankins RN  [facility]           "

## 2022-06-15 NOTE — TELEPHONE ENCOUNTER
I spoke with patient     1. Where is the form she says she mailed us ? It's the handicapped parking permit application she completed her part  2. Please place on my desk , I will sign when I am back in the office tomorrow     Ziyad Isaac MD

## 2022-06-16 NOTE — TELEPHONE ENCOUNTER
Form completed and signed.  Patient called and informed.    Form mailed to patient per her request.    Hanane Tobar,

## 2022-06-30 ENCOUNTER — TELEPHONE (OUTPATIENT)
Dept: FAMILY MEDICINE | Facility: CLINIC | Age: 87
End: 2022-06-30

## 2022-06-30 DIAGNOSIS — L21.9 SEBORRHEA: ICD-10-CM

## 2022-06-30 RX ORDER — BETAMETHASONE DIPROPIONATE 0.5 MG/G
LOTION TOPICAL DAILY PRN
Qty: 60 ML | Refills: 0 | Status: SHIPPED | OUTPATIENT
Start: 2022-06-30

## 2022-06-30 ASSESSMENT — ENCOUNTER SYMPTOMS
DIZZINESS: 0
EYE PAIN: 0
FREQUENCY: 0
SHORTNESS OF BREATH: 1
BREAST MASS: 0
HEMATOCHEZIA: 0
CONSTIPATION: 1
DYSURIA: 0
PARESTHESIAS: 0
MYALGIAS: 1
COUGH: 0
WEAKNESS: 1
CHILLS: 0
JOINT SWELLING: 0
HEARTBURN: 0
NERVOUS/ANXIOUS: 1
ABDOMINAL PAIN: 0
NAUSEA: 0
PALPITATIONS: 0
SORE THROAT: 0
HEADACHES: 0
FEVER: 0
ARTHRALGIAS: 1
DIARRHEA: 0
HEMATURIA: 0

## 2022-06-30 ASSESSMENT — ACTIVITIES OF DAILY LIVING (ADL): CURRENT_FUNCTION: NO ASSISTANCE NEEDED

## 2022-06-30 NOTE — TELEPHONE ENCOUNTER
Reason for Call:  Medication or medication refill:betamethasone dipropionate (DIPROSONE) 0.05 % external lotion    Do you use a [facility]?  Name of the pharmacy and phone number for the current request:  [id number] - [place], [place] - [address] [place] [address] AT [facility]    Name of the medication requested: betamethasone dipropionate (DIPROSONE) 0.05 % external lotion    Other request: PLEASE call patient back to advise or confirm    Can we leave a detailed message on this number? YES    Phone number patient can be reached at: Home number on file [phone] (home)    Best Time: anytime      Call taken on 6/30/2022 at 11:26 AM by Veronica Harry

## 2022-06-30 NOTE — TELEPHONE ENCOUNTER
Requested Prescriptions   Pending Prescriptions Disp Refills     betamethasone dipropionate (DIPROSONE) 0.05 % external lotion 60 mL 1     Sig: Apply topically as needed       There is no refill protocol information for this order          Routing refill request to provider for review/approval because:  Drug not on the [facility] refill protocol       Toyin Hankins RN  [facility]

## 2022-07-06 DIAGNOSIS — M10.071 ACUTE IDIOPATHIC GOUT OF RIGHT FOOT: ICD-10-CM

## 2022-07-07 ENCOUNTER — OFFICE VISIT (OUTPATIENT)
Dept: INTERNAL MEDICINE | Facility: CLINIC | Age: 87
End: 2022-07-07
Payer: MEDICARE

## 2022-07-07 VITALS
WEIGHT: 249 LBS | OXYGEN SATURATION: 97 % | HEART RATE: 66 BPM | TEMPERATURE: 98.4 F | SYSTOLIC BLOOD PRESSURE: 124 MMHG | BODY MASS INDEX: 39.12 KG/M2 | DIASTOLIC BLOOD PRESSURE: 70 MMHG

## 2022-07-07 DIAGNOSIS — R73.09 ABNORMAL GLUCOSE: ICD-10-CM

## 2022-07-07 DIAGNOSIS — K27.9 PUD (PEPTIC ULCER DISEASE): ICD-10-CM

## 2022-07-07 DIAGNOSIS — M25.551 HIP PAIN, RIGHT: ICD-10-CM

## 2022-07-07 DIAGNOSIS — Z23 NEED FOR DIPHTHERIA-TETANUS-PERTUSSIS (TDAP) VACCINE: ICD-10-CM

## 2022-07-07 DIAGNOSIS — I11.0 HYPERTENSIVE HEART DISEASE WITH HEART FAILURE (H): ICD-10-CM

## 2022-07-07 DIAGNOSIS — E78.5 HYPERLIPIDEMIA WITH TARGET LDL LESS THAN 70: ICD-10-CM

## 2022-07-07 DIAGNOSIS — M10.071 ACUTE IDIOPATHIC GOUT OF RIGHT FOOT: ICD-10-CM

## 2022-07-07 DIAGNOSIS — I25.708 ATHEROSCLEROSIS OF CORONARY ARTERY BYPASS GRAFT(S), UNSPECIFIED, WITH OTHER FORMS OF ANGINA PECTORIS (H): ICD-10-CM

## 2022-07-07 DIAGNOSIS — Z23 NEED FOR SHINGLES VACCINE: ICD-10-CM

## 2022-07-07 DIAGNOSIS — E66.01 MORBID OBESITY (H): ICD-10-CM

## 2022-07-07 DIAGNOSIS — I47.29 VENTRICULAR TACHYCARDIA (PAROXYSMAL) (H): ICD-10-CM

## 2022-07-07 DIAGNOSIS — N25.81 SECONDARY HYPERPARATHYROIDISM OF RENAL ORIGIN (H): ICD-10-CM

## 2022-07-07 DIAGNOSIS — E03.9 ACQUIRED HYPOTHYROIDISM: ICD-10-CM

## 2022-07-07 DIAGNOSIS — F41.9 ANXIETY: ICD-10-CM

## 2022-07-07 DIAGNOSIS — Z00.00 ENCOUNTER FOR SUBSEQUENT ANNUAL WELLNESS VISIT (AWV) IN MEDICARE PATIENT: Primary | ICD-10-CM

## 2022-07-07 LAB
ALT SERPL W P-5'-P-CCNC: 34 U/L (ref 0–50)
ANION GAP SERPL CALCULATED.3IONS-SCNC: 7 MMOL/L (ref 3–14)
BUN SERPL-MCNC: 20 MG/DL (ref 7–30)
CALCIUM SERPL-MCNC: 10.8 MG/DL (ref 8.5–10.1)
CHLORIDE BLD-SCNC: 106 MMOL/L (ref 94–109)
CHOLEST SERPL-MCNC: 175 MG/DL
CO2 SERPL-SCNC: 27 MMOL/L (ref 20–32)
CREAT SERPL-MCNC: 0.82 MG/DL (ref 0.52–1.04)
ERYTHROCYTE [DISTWIDTH] IN BLOOD BY AUTOMATED COUNT: 15 % (ref 10–15)
FASTING STATUS PATIENT QL REPORTED: NO
GFR SERPL CREATININE-BSD FRML MDRD: 70 ML/MIN/1.73M2
GLUCOSE BLD-MCNC: 115 MG/DL (ref 70–99)
HBA1C MFR BLD: 6 % (ref 0–5.6)
HCT VFR BLD AUTO: 39.9 % (ref 35–47)
HDLC SERPL-MCNC: 49 MG/DL
HGB BLD-MCNC: 13.1 G/DL (ref 11.7–15.7)
LDLC SERPL CALC-MCNC: 94 MG/DL
MCH RBC QN AUTO: 29 PG (ref 26.5–33)
MCHC RBC AUTO-ENTMCNC: 32.8 G/DL (ref 31.5–36.5)
MCV RBC AUTO: 88 FL (ref 78–100)
NONHDLC SERPL-MCNC: 126 MG/DL
PLATELET # BLD AUTO: 326 10E3/UL (ref 150–450)
POTASSIUM BLD-SCNC: 3.8 MMOL/L (ref 3.4–5.3)
RBC # BLD AUTO: 4.52 10E6/UL (ref 3.8–5.2)
SODIUM SERPL-SCNC: 140 MMOL/L (ref 133–144)
TRIGL SERPL-MCNC: 160 MG/DL
TSH SERPL DL<=0.005 MIU/L-ACNC: 2.94 MU/L (ref 0.4–4)
URATE SERPL-MCNC: 4.5 MG/DL (ref 2.6–6)
WBC # BLD AUTO: 10.4 10E3/UL (ref 4–11)

## 2022-07-07 PROCEDURE — 80048 BASIC METABOLIC PNL TOTAL CA: CPT | Performed by: INTERNAL MEDICINE

## 2022-07-07 PROCEDURE — G0439 PPPS, SUBSEQ VISIT: HCPCS | Performed by: INTERNAL MEDICINE

## 2022-07-07 PROCEDURE — 84550 ASSAY OF BLOOD/URIC ACID: CPT | Performed by: INTERNAL MEDICINE

## 2022-07-07 PROCEDURE — 99214 OFFICE O/P EST MOD 30 MIN: CPT | Mod: 25 | Performed by: INTERNAL MEDICINE

## 2022-07-07 PROCEDURE — 80061 LIPID PANEL: CPT | Performed by: INTERNAL MEDICINE

## 2022-07-07 PROCEDURE — 36415 COLL VENOUS BLD VENIPUNCTURE: CPT | Performed by: INTERNAL MEDICINE

## 2022-07-07 PROCEDURE — 85027 COMPLETE CBC AUTOMATED: CPT | Performed by: INTERNAL MEDICINE

## 2022-07-07 PROCEDURE — 83036 HEMOGLOBIN GLYCOSYLATED A1C: CPT | Performed by: INTERNAL MEDICINE

## 2022-07-07 PROCEDURE — 84443 ASSAY THYROID STIM HORMONE: CPT | Performed by: INTERNAL MEDICINE

## 2022-07-07 PROCEDURE — 84460 ALANINE AMINO (ALT) (SGPT): CPT | Performed by: INTERNAL MEDICINE

## 2022-07-07 RX ORDER — ROSUVASTATIN CALCIUM 5 MG/1
5 TABLET, COATED ORAL DAILY
COMMUNITY
End: 2022-07-07

## 2022-07-07 RX ORDER — BUSPIRONE HYDROCHLORIDE 5 MG/1
5 TABLET ORAL 3 TIMES DAILY
Qty: 90 TABLET | Refills: 1 | Status: SHIPPED | OUTPATIENT
Start: 2022-07-07 | End: 2022-07-29

## 2022-07-07 RX ORDER — TORSEMIDE 10 MG/1
20 TABLET ORAL DAILY
Qty: 90 TABLET | Refills: 0
Start: 2022-07-07 | End: 2023-07-12

## 2022-07-07 RX ORDER — TORSEMIDE 10 MG/1
10 TABLET ORAL DAILY
COMMUNITY
End: 2022-07-07

## 2022-07-07 RX ORDER — LOSARTAN POTASSIUM 50 MG/1
50 TABLET ORAL DAILY
COMMUNITY

## 2022-07-07 RX ORDER — LOSARTAN POTASSIUM 50 MG/1
100 TABLET ORAL DAILY
COMMUNITY
Start: 2022-01-03 | End: 2022-07-07

## 2022-07-07 RX ORDER — ROSUVASTATIN CALCIUM 5 MG/1
5 TABLET, COATED ORAL EVERY OTHER DAY
Qty: 90 TABLET | Refills: 0
Start: 2022-07-07 | End: 2024-06-07

## 2022-07-07 ASSESSMENT — ENCOUNTER SYMPTOMS
HEADACHES: 0
SORE THROAT: 0
ABDOMINAL PAIN: 0
JOINT SWELLING: 0
EYE PAIN: 0
PARESTHESIAS: 0
HEARTBURN: 0
PALPITATIONS: 0
HEMATOCHEZIA: 0
COUGH: 0
SHORTNESS OF BREATH: 1
DIARRHEA: 0
NERVOUS/ANXIOUS: 1
DIZZINESS: 0
DYSURIA: 0
NAUSEA: 0
CHILLS: 0
ARTHRALGIAS: 1
FEVER: 0
HEMATURIA: 0
MYALGIAS: 1
CONSTIPATION: 1
WEAKNESS: 1
BREAST MASS: 0
FREQUENCY: 0

## 2022-07-07 ASSESSMENT — ACTIVITIES OF DAILY LIVING (ADL): CURRENT_FUNCTION: NO ASSISTANCE NEEDED

## 2022-07-07 NOTE — PROGRESS NOTES
"SUBJECTIVE:   Lisa Ferguson is a 86 year old female who presents for Preventive Visit.      Patient has been advised of split billing requirements and indicates understanding: Yes  Are you in the first 12 months of your Medicare coverage?  No    Healthy Habits:     In general, how would you rate your overall health?  Fair    Frequency of exercise:  None    Do you usually eat at least 4 servings of fruit and vegetables a day, include whole grains    & fiber and avoid regularly eating high fat or \"junk\" foods?  No    Taking medications regularly:  Yes    Medication side effects:  Muscle aches and Other    Ability to successfully perform activities of daily living:  No assistance needed    Home Safety:  No safety concerns identified    Hearing Impairment:  Difficulty following a conversation in a noisy restaurant or crowded room, need to ask people to speak up or repeat themselves and difficulty understanding soft or whispered speech    In the past 6 months, have you been bothered by leaking of urine? Yes    In general, how would you rate your overall mental or emotional health?  Good      PHQ-2 Total Score: 2    Additional concerns today:  Yes    Do you feel safe in your environment? Yes    Have you ever done Advance Care Planning? (For example, a Health Directive, POLST, or a discussion with a medical provider or your loved ones about your wishes): Yes, advance care planning is on file.    She had a story about her recent droopy and excessive eyelid tissue consistent with blepharitis and the surgery for this. After the surgery she had severe constipation and some urinary incontinence lasting for 3 months ! This eventually resolved and is no longer a problem now.     Fall risk  Fallen 2 or more times in the past year?: No  Any fall with injury in the past year?: No    Due to logistical difficulties with getting her citalopram [ Celexa ] prescription, since then she stopped the medication and her tremors resolved. We " did agree then to try busPIRone (BUSPAR)  , see assessment and plan section      Cognitive Screening   1) Repeat 3 items (Leader, Season, Table)    2) Clock draw: NORMAL  3) 3 item recall:Recalls 2 objects   Results: NORMAL clock, 1-2 items recalled: COGNITIVE IMPAIRMENT LESS LIKELY    Mini-CogTM Copyright MICHAEL Dobson. Licensed by the author for use in [facility]; reprinted with permission (gloria@[facility]). All rights reserved.      Do you have sleep apnea, excessive snoring or daytime drowsiness?: yes    Uses the CPAP [ continuous positive airway pressure]     Reviewed and updated as needed this visit by clinical staff                    Reviewed and updated as needed this visit by Provider                   Social History     Tobacco Use     Smoking status: Former Smoker     Years: 20.00     Types: Cigarettes     Quit date: 1978     Years since quittin.0     Smokeless tobacco: Never Used   Substance Use Topics     Alcohol use: Yes     Comment: Rare      If you drink alcohol do you typically have >3 drinks per day or >7 drinks per week? No    Alcohol Use 2022   Prescreen: >3 drinks/day or >7 drinks/week? No   Prescreen: >3 drinks/day or >7 drinks/week? -   No flowsheet data found.    Current providers sharing in care for this patient include:   Patient Care Team:  Ziyad Isaac MD as PCP - Beata Camejo MD as Assigned Surgical Provider  Ziyad Isaac MD as Assigned PCP  Tamar Georges MD as MD (Medical Oncology)  Tamar Georges MD as Assigned Cancer Care Provider    The following health maintenance items are reviewed in Epic and correct as of today:  Health Maintenance Due   Topic Date Due     ANNUAL REVIEW OF  ORDERS  Never done     ZOSTER IMMUNIZATION (2 of 3) 2008     ALT  10/28/2020     BMP  2021     DTAP/TDAP/TD IMMUNIZATION (2 - Td or Tdap) 2021     LIPID  2021     CBC  2021     URIC ACID  2021     MEDICARE  ANNUAL WELLNESS VISIT  06/28/2022     HF ACTION PLAN  07/23/2022     Lab work is in process  Labs reviewed in EPIC  BP Readings from Last 3 Encounters:   07/07/22 124/70   06/13/22 (!) 140/70   04/29/22 (!) 141/80    Wt Readings from Last 3 Encounters:   07/07/22 112.9 kg (249 lb)   06/13/22 114.3 kg (252 lb)   04/29/22 115 kg (253 lb 8 oz)                  Patient Active Problem List   Diagnosis     PUD (peptic ulcer disease)     Hyperlipidemia with target LDL less than 70     Mitral regurgitation     DJD (degenerative joint disease)     Fibroadenoma of breast     Diverticulosis     Sciatica neuralgia     IBS (irritable bowel syndrome)     RBBB (right bundle branch block)     GLEN (obstructive sleep apnea)     Balance disorder     Anxiety     Ventricular tachycardia (paroxysmal) (H)     Corneal ulcer of left eye     Blepharitis     Hypercalcemia     Disorder of skin or subcutaneous tissue     Scar condition and fibrosis of skin     OA (osteoarthritis) of knee - bilateral     Fracture of proximal humerus     CHF (congestive heart failure) (H)     Shoulder fracture     Abnormal glucose     S/P CABG (coronary artery bypass graft)     S/p total knee replacement, bilateral     Acquired hypothyroidism     Essential hypertension with goal blood pressure less than 130/80     Morbid obesity (H)     H/O parathyroidectomy (H)     Atherosclerosis of coronary artery bypass graft(s), unspecified, with other forms of angina pectoris (H)     Malignant neoplasm of upper-outer quadrant of left breast in female, estrogen receptor positive (H)     Secondary hyperparathyroidism of renal origin (H)     Acute idiopathic gout of right foot     Past Surgical History:   Procedure Laterality Date     ARTHROSCOPY KNEE RT/LT      bilateral     COLONOSCOPY  6/2010    negative     HC DILATION/CURETTAGE DIAG/THER NON OB       HC EXCIS INTERDIGITAL NEUROMA,EA      mortons neuroma excision     HC REMOVAL GALLBLADDER  1994     HERNIA REPAIR, INGUINAL  RT/LT       MASTECTOMY SIMPLE BILATERAL, SENTINEL NODE BILATERAL, COMBINED Bilateral 2021    Procedure: BILATERAL SIMPLE MASTECTOMY WITH LEFT SENTINEL LYMPH NODE BIOPSY;  Surgeon: Beata Garcia MD;  Location: [facility] OR     SURGICAL HISTORY OF -       bilateral meniscal tears and surgery on these     SURGICAL HISTORY OF -    or so    one parathyroid removed     TONSILLECTOMY       TUBAL LIGATION       ZZC APPENDECTOMY       ZZC CABG, ARTERIAL, THREE      LIMA-LAD, SVG-DIagnoal, SVG-OM, previous LAD-PCI, sees Dr Banuelos       Social History     Tobacco Use     Smoking status: Former Smoker     Years: 20.00     Types: Cigarettes     Quit date: 1978     Years since quittin.0     Smokeless tobacco: Never Used   Substance Use Topics     Alcohol use: Yes     Comment: Rare      Family History   Problem Relation Age of Onset     C.A.D. Mother      Arthritis Mother      Cardiovascular Mother      Heart Disease Mother      Obesity Mother      Thyroid Disease Mother      C.A.D. Father      Diabetes Father      Hypertension Father      Alcohol/Drug Father      Alzheimer Disease Father      Cardiovascular Father      Circulatory Father      Gastrointestinal Disease Father      Heart Disease Father      Lipids Father      Obesity Father      Cardiovascular Maternal Grandmother      Depression Maternal Grandmother      Obesity Maternal Grandmother      Thyroid Disease Maternal Grandmother      Cardiovascular Maternal Grandfather      Heart Disease Maternal Grandfather      Obesity Maternal Grandfather      Obesity Paternal Grandmother      Diabetes Paternal Grandfather      Alcohol/Drug Paternal Grandfather      Obesity Paternal Grandfather      Breast Cancer Sister      Cancer Sister      Obesity Sister      Thyroid Disease Sister      Alcohol/Drug Son      Allergies Son      Allergies Daughter      Depression Sister      Eye Disorder Sister      Gastrointestinal Disease Sister      Thyroid Disease Sister           Current Outpatient Medications   Medication Sig Dispense Refill     allopurinol (ZYLOPRIM) 100 MG tablet TAKE 1 TABLET BY MOUTH DAILY. TAKE ALONG WITH THE 300MG TABLET FOR A TOTAL DAILY DOSE OF 400MG. 90 tablet 1     allopurinol (ZYLOPRIM) 300 MG tablet TAKE 1 TABLET BY MOUTH DAILY ALONG WITH 100MG TABLET FOR TOTAL DAILY DOSE OF 400MG 90 tablet 1     anastrozole (ARIMIDEX) 1 MG tablet Take 1 tablet (1 mg) by mouth daily 90 tablet 3     aspirin 81 MG EC tablet Take 81 mg by mouth every morning       betamethasone dipropionate (DIPROSONE) 0.05 % external lotion Apply topically daily as needed (seborrhea) 60 mL 0     levothyroxine (SYNTHROID/LEVOTHROID) 125 MCG tablet Take 1 tablet (125 mcg) by mouth every morning +++DUE FOR OV AND LAB RECHECK++++ 30 tablet 0     LORazepam (ATIVAN) 0.5 MG tablet Take 1 tablet (0.5 mg) by mouth every 6 hours as needed for anxiety 10 tablet 1     losartan (COZAAR) 50 MG tablet Take 50 mg by mouth daily       metoprolol tartrate (LOPRESSOR) 25 MG tablet Take 50 mg by mouth every morning (2 X 25 mg)  (in addition to bedtime dose)       multivitamin w/minerals (THERA-VIT-M) tablet Take 1 tablet by mouth every morning       order for DME Equipment being ordered: CPAP 1 each 0     rosuvastatin (CRESTOR) 5 MG tablet Take 5 mg by mouth daily       torsemide (DEMADEX) 10 MG tablet Take 10 mg by mouth daily       Allergies   Allergen Reactions     Adhesive Tape      Atorvastatin Other (See Comments) and Muscle Pain (Myalgia)     lipitor  Myalgia     Cortisone      Insomnia, burning in chest, flushed     Nickel Other (See Comments)     Raw weepy skin  rash     Tetracycline Diarrhea     Vicodin [Hydrocodone-Acetaminophen] Other (See Comments)     Hallucinations     Liquid Adhesive      Other reaction(s): Contact Dermatitis     Recent Labs   Lab Test 02/17/21  1150 01/07/21  0739 01/06/21  1116 12/29/20  1227 10/28/19  1135 10/25/19  0947 10/04/19  0952 09/13/18  1122 08/01/17  1117  01/03/17  1106   A1C  --   --   --  5.7*  --   --   --  5.7* 5.4 5.7   LDL  --   --   --  200*  --   --  161*  --  129*  --    HDL  --   --   --  51  --   --  56  --  45*  --    TRIG  --   --   --  227*  --   --  117  --  306*  --    ALT  --   --   --   --  35  --   --  35  --  42   CR  --  0.79  --  0.80  --    < > 0.95 0.71 0.75 0.82   GFRESTIMATED  --  69  --  68  --    < > 56* 78 74 67   GFRESTBLACK  --  80  --  79  --    < > 65 >90 89 82   POTASSIUM  --  3.7 3.6 4.2  --    < > 4.0 3.9 3.8 4.1   TSH 3.80  --   --  23.61* 1.75  --   --  2.66 2.66  --     < > = values in this interval not displayed.          Mammogram Screening - Mammography discussed, not appropriate due to bilateral mastectomy   Pertinent mammograms are reviewed under the imaging tab.    Review of Systems   Constitutional: Negative for chills and fever.   HENT: Negative for congestion, ear pain, hearing loss and sore throat.    Eyes: Negative for pain and visual disturbance.   Respiratory: Positive for shortness of breath. Negative for cough.    Cardiovascular: Positive for peripheral edema. Negative for chest pain and palpitations.   Gastrointestinal: Positive for constipation. Negative for abdominal pain, diarrhea, heartburn, hematochezia and nausea.   Breasts:  Negative for tenderness, breast mass and discharge.   Genitourinary: Positive for urgency. Negative for dysuria, frequency, genital sores, hematuria, pelvic pain, vaginal bleeding and vaginal discharge.   Musculoskeletal: Positive for arthralgias and myalgias. Negative for joint swelling.   Skin: Positive for rash.   Neurological: Positive for weakness. Negative for dizziness, headaches and paresthesias.   Psychiatric/Behavioral: Negative for mood changes. The patient is nervous/anxious.      Constitutional, HEENT, cardiovascular, pulmonary, gi and gu systems are negative, except as otherwise noted.    OBJECTIVE:   /70 (BP Location: Left arm, Patient Position: Chair, Cuff Size:  "Adult Large)   Pulse 66   Temp 98.4  F (36.9  C) (Temporal)   Wt 112.9 kg (249 lb)   SpO2 97%   BMI 39.12 kg/m   Estimated body mass index is 39.59 kg/m  as calculated from the following:    Height as of 22: 1.699 m (5' 6.9\").    Weight as of 22: 114.3 kg (252 lb).  Physical Exam  GENERAL: healthy, alert and no distress  EYES: Eyes grossly normal to inspection, PERRL and conjunctivae and sclerae normal  HENT: ear canals and TM's normal, nose and mouth without ulcers or lesions  NECK: no adenopathy, no asymmetry, masses, or scars and thyroid normal to palpation  RESP: lungs clear to auscultation - no rales, rhonchi or wheezes  BREAST: status post bilateral mastectomy   CV: regular rate and rhythm, normal S1 S2, no S3 or S4, no murmur, click or rub, no peripheral edema and peripheral pulses strong  ABDOMEN: soft, nontender, no hepatosplenomegaly, no masses and bowel sounds normal  MS: no gross musculoskeletal defects noted, no edema  SKIN: no suspicious lesions or rashes, some benign skin lesions   NEURO: Normal strength and tone, mentation intact and speech normal  PSYCH: mentation appears normal, affect normal/bright    Diagnostic Test Results:  Labs reviewed in Epic  Orders Placed This Encounter   Procedures     REVIEW OF HEALTH MAINTENANCE PROTOCOL ORDERS     ALT     BASIC METABOLIC PANEL     Lipid panel reflex to direct LDL Fasting     CBC with Platelets     Uric acid     Hemoglobin A1c     TSH with free T4 reflex     Physical Therapy Referral         Patient continues with shortness of breath but is seeing her cardiologist in a few months , it's a progression of her conditions, trying to walk is difficult and using a wheeled walker with a seat and sometimes cane, balance problems and \"more of the same \": I don't suspect this is a statin medication side effect.     Also feels mental deterioration is coming. Dad  at 88 with Alzhemiers dementia . Patient noted worsened word finding difficulty. " Mild cognitive impairment is becoming more serious she feels. She is close to tears quite a bit from watching politics and wars and shootings, she can cry at the drop of a hat.    ASSESSMENT / PLAN:   (Z00.00) Encounter for subsequent annual wellness visit (AWV) in Medicare patient  (primary encounter diagnosis)  Comment: routine screening issues   Plan: REVIEW OF HEALTH MAINTENANCE PROTOCOL ORDERS            (E78.5) Hyperlipidemia with target LDL less than 70  Comment: continue current plan of care    Plan: ALT, Lipid panel reflex to direct LDL Fasting,         rosuvastatin (CRESTOR) 5 MG tablet        Follow up as indicated on results     (E03.9) Acquired hypothyroidism  Comment: continue current plan of care    Plan: TSH with free T4 reflex        Follow up as indicated on results     (K27.9) PUD (peptic ulcer disease)  Comment: problem is stable and ongoing monitoring    Plan:     (R73.09) Abnormal glucose  Comment: doubt clinical significance but warrants hemoglobin a1c  [ diabetes test ]   Plan: BASIC METABOLIC PANEL, Hemoglobin A1c        Follow up as indicated on results     (M10.071) Acute idiopathic gout of right foot  Comment:   Plan: CBC with Platelets, Uric acid        Problem is stable and ongoing monitoring      (Z23) Need for diphtheria-tetanus-pertussis (Tdap) vaccine  Comment: declines   Plan:     (Z23) Need for shingles vaccine  Comment: discussed ShingRx vaccination against herpes zoster and she plans this with outside pharmacy   Plan:     (I11.0) Hypertensive heart disease with heart failure (H)  Comment: problem is stable and ongoing monitoring , see cardiology consultation office visit notes   Plan:     (I47.2) Ventricular tachycardia (paroxysmal) (H)  Comment: as above   Plan: her current automatic implanted cardio-defibrillator isn't operational and she has declined having this replaced    (E66.01) Morbid obesity (H)  Comment: current Body mass index is 39.12 kg/m .   Plan: weight loss  "measures reviewed     (N25.81) Secondary hyperparathyroidism of renal origin (H)  Comment: problem is stable and ongoing monitoring    Plan:     (I25.228) Atherosclerosis of coronary artery bypass graft(s), unspecified, with other forms of angina pectoris (H)  Comment: problem is stable and ongoing monitoring    Plan: torsemide (DEMADEX) 10 MG tablet            (M25.551) Hip pain, right  Comment: an unrelated matter , a pulling pain along the medial aspect of her right hip. She's interested to receive an opinion with physical therapy   Plan: Physical Therapy Referral            (F41.9) Anxiety  Comment: with the citalopram [ Celexa ] gone her anxiety is returning. We discussed options and are trying busPIRone (BUSPAR)    Plan: busPIRone (BUSPAR) 5 MG tablet        Further follow up depending on how things go       Patient has been advised of split billing requirements and indicates understanding: Yes    COUNSELING:  Reviewed preventive health counseling, as reflected in patient instructions    Estimated body mass index is 39.59 kg/m  as calculated from the following:    Height as of 4/6/22: 1.699 m (5' 6.9\").    Weight as of 6/13/22: 114.3 kg (252 lb).    Weight management plan: Discussed healthy diet and exercise guidelines    She reports that she quit smoking about 44 years ago. Her smoking use included cigarettes. She quit after 20.00 years of use. She has never used smokeless tobacco.      Appropriate preventive services were discussed with this patient, including applicable screening as appropriate for cardiovascular disease, diabetes, osteopenia/osteoporosis, and glaucoma.  As appropriate for age/gender, discussed screening for colorectal cancer, prostate cancer, breast cancer, and cervical cancer. Checklist reviewing preventive services available has been given to the patient.    Reviewed patients plan of care and provided an AVS. The Basic Care Plan (routine screening as documented in Health Maintenance) for " "Lisa meets the Care Plan requirement. This Care Plan has been established and reviewed with the Patient.    Counseling Resources:  ATP IV Guidelines  Pooled Cohorts Equation Calculator  Breast Cancer Risk Calculator  Breast Cancer: Medication to Reduce Risk  FRAX Risk Assessment  ICSI Preventive Guidelines  Dietary Guidelines for Americans, 2010  [place]'s MyPlate  ASA Prophylaxis  Lung CA Screening    Ziayd Isaac MD  [facility]    Identified Health Risks:Last appointment with me was 6-  Health Maintenance Due   Topic Date Due     ANNUAL REVIEW OF  ORDERS  Never done     ZOSTER IMMUNIZATION (2 of 3) 09/18/2008     ALT  10/28/2020     BMP  07/07/2021     DTAP/TDAP/TD IMMUNIZATION (2 - Td or Tdap) 07/11/2021     LIPID  12/29/2021     CBC  12/29/2021     URIC ACID  12/29/2021     FALL RISK ASSESSMENT  06/28/2022     MEDICARE ANNUAL WELLNESS VISIT  06/28/2022     HF ACTION PLAN  07/23/2022      Need to clarify all medications as I was asked to remove quite a few  Medications per patient   Answers for HPI/ROS submitted by the patient on 6/30/2022  In general, how would you rate your overall physical health?: fair  Frequency of exercise:: None  Do you usually eat at least 4 servings of fruit and vegetables a day, include whole grains & fiber, and avoid regularly eating high fat or \"junk\" foods? : No  Taking medications regularly:: Yes  Medication side effects:: Muscle aches, Other  Activities of Daily Living: no assistance needed  Home safety: no safety concerns identified  Hearing Impairment:: difficulty following a conversation in a noisy restaurant or crowded room, need to ask people to speak up or repeat themselves, difficulty understanding soft or whispered speech  In the past 6 months, have you been bothered by leaking of urine?: Yes  abdominal pain: No  Blood in stool: No  Blood in urine: No  chest pain: No  chills: No  congestion: No  constipation: Yes  cough: No  diarrhea: " No  dizziness: No  ear pain: No  eye pain: No  nervous/anxious: Yes  fever: No  frequency: No  genital sores: No  headaches: No  hearing loss: No  heartburn: No  arthralgias: Yes  joint swelling: No  peripheral edema: Yes  mood changes: No  myalgias: Yes  nausea: No  dysuria: No  palpitations: No  Skin sensation changes: No  sore throat: No  urgency: Yes  rash: Yes  shortness of breath: Yes  visual disturbance: No  weakness: Yes  pelvic pain: No  vaginal bleeding: No  vaginal discharge: No  tenderness: No  breast mass: No  breast discharge: No  In general, how would you rate your overall mental or emotional health?: good  Additional concerns today:: Yes

## 2022-07-08 RX ORDER — ALLOPURINOL 100 MG/1
TABLET ORAL
Qty: 90 TABLET | Refills: 1 | Status: SHIPPED | OUTPATIENT
Start: 2022-07-08 | End: 2022-12-14

## 2022-07-10 DIAGNOSIS — E03.9 ACQUIRED HYPOTHYROIDISM: ICD-10-CM

## 2022-07-10 RX ORDER — LEVOTHYROXINE SODIUM 125 UG/1
125 TABLET ORAL EVERY MORNING
Qty: 90 TABLET | Refills: 1 | Status: SHIPPED | OUTPATIENT
Start: 2022-07-10 | End: 2023-01-10

## 2022-07-19 ENCOUNTER — TELEPHONE (OUTPATIENT)
Dept: INTERNAL MEDICINE | Facility: CLINIC | Age: 87
End: 2022-07-19

## 2022-07-19 NOTE — TELEPHONE ENCOUNTER
"Received call from patient. She states that today she tested positive for COVID-19. Relayed below.    Instructions for Patients  Your COVID-19 test was positive. This means you have the virus. Please follow the \"How can I take care of myself\" and \"How do I self-isolate?\" guidelines in these instructions.    What treatments are available?  Over-the-counter medicines may help with your symptoms such as runny or stuffy nose, cough, chills, and fever. Talk to your care team about your options.     Some people are at high risk for severe illness (for example if you have a weak immune system, you're 65 or older, or you have certain medical problems). If your risk it high and your symptoms started in the last 5 to 7 days, we strongly recommend for you to get COVID treatment as soon as possible before you get really sick. Paxlovid, Molnupiravir and the monoclonal antibody treatments are proven safe and effective, make you feel better faster, and prevent hospitalization and death.       You can schedule an appointment to discuss COVID treatment by requesting an appointment on [facility] by selecting \"schedule COVID-19 Treatment\" or by calling [employer] ([phone]) and pressing 7.    What are the symptoms of COVID-19?  Symptoms can include fever, cough, shortness of breath, chills, headache, muscle pain sore throat, fatigue, runny or stuffy nose, and loss of taste and smell. Other less common symptoms include nausea, vomiting, or diarrhea (watery stools).    Know when to call 911. Emergency warning signs include:    Trouble breathing or shortness of breath    Pain or pressure in the chest that doesn't go away    Feeling confused like you haven't felt before, or not being able to wake up    Bluish-colored lips or face    How can I take care of myself?  1. Get lots of rest. Drink extra fluids (unless a doctor has told you not to).  2. Take Tylenol (acetaminophen) for fever or pain. If you have liver or kidney problems, ask " your family doctor if it's okay to take Tylenol   Adults:   650 mg (two 325 mg pills or tablets) every 4 to 6 hours, or...   1,000 mg (two 500 mg pills or tablets) every 8 hours as needed.  Note: Don't take more than 3,000 mg in one day. Acetaminophen is found in many medicines (both prescribed and over the counter). Read all labels to be sure you don't take too much.  For children, check the Tylenol bottle for the right dose. The dose is based on the child's age or weight.  3. Take over the counter medicines for your symptoms as needed. Talk to your pharmacist.  4. If you have other health problems (like cancer, heart failure, an organ transplant, or severe kidney disease): Call your specialty clinic if you don't feel better in the next 2 days.    These guidelines are for isolating and quarantining before returning to work, school or .     For employers, schools and day cares: This is an official notice for this person's medical guidelines for returning in-person.     For health care sites: The CDC gives different isolation and quarantine guidelines for healthcare sites, please check with these sites before arriving.     How do I self-isolate?  You isolate when you have symptoms of COVID or a test shows you have COVID, even if you don't have symptoms.     If you DO have symptoms:  o Stay home and away from others  - For at least 5 days after your symptoms started, AND   - You are fever free for 24 hours (with no medicine that reduces fever), AND  - Your other symptoms are better.  o Wear a mask for 10 full days any time you are around others.    If you DON'T have symptoms:  o Stay at home and away from others for at least 5 days after your positive test.  o Wear a mask for 10 full days any time you are around others.    How and when do I quarantine?  You quarantine when you may have been exposed to the virus and DON'T have symptoms.     Stay home and away from others.     You must quarantine for 5 days after  your last contact with a person who has COVID.  o Note: If you are fully vaccinated, you don't need to quarantine. You should still follow the steps below.     Wear a mask for 10 full days any time you're around others.    Get tested at least 5 days after you were exposed, even if you don't have symptoms.     If you start to have symptoms, isolate right away and get tested.    Where can I get more information?    [facility] COVID-19 Resource Hub: www.[email or web address]     CDC Quarantine & Isolation: https://www.cdc.gov/coronavirus/2019-ncov/your-health/quarantine-isolation.html     CDC - What to Do If You're Sick: https://www.cdc.gov/coronavirus/2019-ncov/if-you-are-sick/index.html    [facility] clinical trials (COVID-19 research studies): [email or web address]    [place] Department of Health COVID-19 Public Hotline: 1-630.237.1307    ALEIDA ArandaN RN  [facility]

## 2022-07-20 ENCOUNTER — VIRTUAL VISIT (OUTPATIENT)
Dept: FAMILY MEDICINE | Facility: CLINIC | Age: 87
End: 2022-07-20
Payer: MEDICARE

## 2022-07-20 DIAGNOSIS — U07.1 INFECTION DUE TO 2019 NOVEL CORONAVIRUS: Primary | ICD-10-CM

## 2022-07-20 PROCEDURE — 99441 PR PHYSICIAN TELEPHONE EVALUATION 5-10 MIN: CPT | Mod: CS | Performed by: NURSE PRACTITIONER

## 2022-07-20 NOTE — PATIENT INSTRUCTIONS
"Do not take rosuvastatin while on the Paxlovid and for 3 additional days.      Instructions for Patients  Your COVID-19 test was positive. This means you have the virus. Please follow the \"How can I take care of myself\" and \"How do I self-isolate?\" guidelines in these instructions.    What treatments are available?  Over-the-counter medicines may help with your symptoms such as runny or stuffy nose, cough, chills, and fever. Talk to your care team about your options.     Some people are at high risk for severe illness (for example if you have a weak immune system, you're 65 or older, or you have certain medical problems). If your risk it high and your symptoms started in the last 5 to 7 days, we strongly recommend for you to get COVID treatment as soon as possible before you get really sick. Paxlovid, Molnupiravir and the monoclonal antibody treatments are proven safe and effective, make you feel better faster, and prevent hospitalization and death.       You can schedule an appointment to discuss COVID treatment by requesting an [facility] on [facility] by selecting \"schedule COVID-19 Treatment\" or by calling [facility] ([phone]) and pressing 7.    What are the symptoms of COVID-19?  Symptoms can include fever, cough, shortness of breath, chills, headache, muscle pain sore throat, fatigue, runny or stuffy nose, and loss of taste and smell. Other less common symptoms include nausea, vomiting, or diarrhea (watery stools).    Know when to call 911. Emergency warning signs include:  Trouble breathing or shortness of breath  Pain or pressure in the chest that doesn't go away  Feeling confused like you haven't felt before, or not being able to wake up  Bluish-colored lips or face    How can I take care of myself?  Get lots of rest. Drink extra fluids (unless a doctor has told you not to).  Take Tylenol (acetaminophen) for fever or pain. If you have liver or kidney problems, ask your family doctor if it's okay to " take Tylenol   Adults:   650 mg (two 325 mg pills or tablets) every 4 to 6 hours, or...   1,000 mg (two 500 mg pills or tablets) every 8 hours as needed.  Note: Don't take more than 3,000 mg in one day. Acetaminophen is found in many medicines (both prescribed and over the counter). Read all labels to be sure you don't take too much.  For children, check the Tylenol bottle for the right dose. The dose is based on the child's age or weight.  Take over the counter medicines for your symptoms as needed. Talk to your pharmacist.  If you have other health problems (like cancer, heart failure, an organ transplant, or severe kidney disease): Call your specialty clinic if you don't feel better in the next 2 days.    These guidelines are for isolating and quarantining before returning to work, school or .   For employers, schools and day cares: This is an official notice for this person s medical guidelines for returning in-person.   For health care sites: The CDC gives different isolation and quarantine guidelines for healthcare sites, please check with these sites before arriving.     How do I self-isolate?  You isolate when you have symptoms of COVID or a test shows you have COVID, even if you don t have symptoms.   If you DO have symptoms:  Stay home and away from others  For at least 5 days after your symptoms started, AND   You are fever free for 24 hours (with no medicine that reduces fever), AND  Your other symptoms are better.  Wear a mask for 10 full days any time you are around others.  If you DON T have symptoms:  Stay at home and away from others for at least 5 days after your positive test.  Wear a mask for 10 full days any time you are around others.    How and when do I quarantine?  You quarantine when you may have been exposed to the virus and DON T have symptoms.   Stay home and away from others.   You must quarantine for 5 days after your last contact with a person who has COVID.  Note: If you are  fully vaccinated, you don t need to quarantine. You should still follow the steps below.   Wear a mask for 10 full days any time you re around others.  Get tested at least 5 days after you were exposed, even if you don t have symptoms.   If you start to have symptoms, isolate right away and get tested.    Where can I get more information?  [facility] COVID-19 Resource Hub: [email or web address]   CDC Quarantine & Isolation: https://www.cdc.gov/coronavirus/2019-ncov/your-health/quarantine-isolation.html   CDC - What to Do If You're Sick: https://www.cdc.gov/coronavirus/2019-ncov/if-you-are-sick/index.html  [facility] clinical trials (COVID-19 research studies): [email or web address]  [place] Department of Health COVID-19 Public Hotline: 1-567.285.9482

## 2022-07-20 NOTE — PROGRESS NOTES
"Lisa is a 86 year old who is being evaluated via a billable telephone visit.      What phone number would you like to be contacted at? [phone]  How would you like to obtain your AVS? MyChart    Assessment & Plan     Infection due to 2019 novel coronavirus  Symptoms are mild. Vaccinated, 2 boosters. Discussed risk/benefits of antiviral treatment, would like to proceed with Paxlovid. Symptomatic care, quarantine guidance discussed.  - nirmatrelvir and ritonavir (PAXLOVID) therapy pack; Take 3 tablets by mouth 2 times daily for 5 days Take 2 Nirmatrelvir tablets and 1 Ritonavir tablet twice daily for 5 days.       COVID-19 positive patient.  Encounter for consideration of medication intervention. Patient does qualify for a prescription. Full discussion with patient including medication options, risks and benefits. Potential drug interactions reviewed with patient.     Treatment Planned Paxlovid, Rx sent to [facility]  [facility]  Temporary change to home medications: hold rosuvastatin while on Paxlovid and for 3 additional days.    Estimated body mass index is 39.12 kg/m  as calculated from the following:    Height as of 4/6/22: 1.699 m (5' 6.9\").    Weight as of 7/7/22: 112.9 kg (249 lb).  GFR Estimate   Date Value Ref Range Status   07/07/2022 70 >60 mL/min/1.73m2 Final     Comment:     Effective December 21, 2021 eGFRcr in adults is calculated using the 2021 CKD-EPI creatinine equation which includes age and gender (Julia morrison al., NEJM, DOI: 10.1056/ROJTrl7443960)   01/07/2021 69 >60 mL/min/[1.73_m2] Final     Comment:     Non  GFR Calc  Starting 12/18/2018, serum creatinine based estimated GFR (eGFR) will be   calculated using the Chronic Kidney Disease Epidemiology Collaboration   (CKD-EPI) equation.       Lab Results   Component Value Date    [id number] Negative 12/27/2021       Return in about 1 week (around 7/27/2022) for worsening or continued symptoms.    Deborah Laurent, " [facility]    Saúl Gonzalez is a 86 year old, presenting for the following health issues:  Covid Concern      HPI       COVID-19 Symptom Review  How many days ago did these symptoms start? 2 days    Are any of the following symptoms significant for you?    New or worsening difficulty breathing? No    Worsening cough? No    Fever or chills? Yes, the highest temperature was 100    Headache: No    Sore throat: No    Chest pain: No    Diarrhea: No    Body aches? YES    What treatments has patient tried? Acetaminophen   Does patient live in a nursing home, group home, or shelter? No  Does patient have a way to get food/medications during quarantined? Yes, I have a friend or family member who can help me.                Review of Systems   Constitutional, HEENT, cardiovascular, pulmonary, gi and gu systems are negative, except as otherwise noted.      Objective           Vitals:  No vitals were obtained today due to virtual visit.    Physical Exam   healthy, alert and no distress  PSYCH: Alert and oriented times 3; coherent speech, normal   rate and volume, able to articulate logical thoughts, able   to abstract reason, no tangential thoughts, no hallucinations   or delusions  Her affect is normal  RESP: No cough, no audible wheezing, able to talk in full sentences  Remainder of exam unable to be completed due to telephone visits                Phone call duration: 5 minutes    .  ..

## 2022-07-25 ENCOUNTER — MYC MEDICAL ADVICE (OUTPATIENT)
Dept: INTERNAL MEDICINE | Facility: CLINIC | Age: 87
End: 2022-07-25

## 2022-07-26 NOTE — TELEPHONE ENCOUNTER
Pt was given provider's message as written. States she has made some observations and she doesn't feel like she is in her own house, but knows she is and has done some pill rolling. Would like a drug to replace it, but will follow Dr. Isaac's recommendations and will call if symptoms do not improve or worsen.    Ana Rosa Hassan RN  [facility]

## 2022-07-26 NOTE — TELEPHONE ENCOUNTER
Lisa Ferguson Reuben, MD          7:00 PM  I had contacted you earlier today about the tremors in my hands getting worse and wondered whether I should keep on taking it or not. It does scare me because of the tar dive dyskinesia type side effects I had previously. Lisa

## 2022-07-26 NOTE — TELEPHONE ENCOUNTER
For goodness sakes !    If she thinks her tremors began only after taking busPIRone (BUSPAR) and she perceives a connection , she is recommended to stop it immediately and see if her tremors shavonne. I recommend a 1 month drug holiday and follow up with me regarding tremors and her anxiety levels     Tardive dyskinesia is a separate situation which typically takes years to develop and there is actually effective treatment now to make tardive dyskinesia following away [ that's just a discussion about new developments in the Practice of Medicine ]    I hope this helps.     Reroute if additional input requested from me     Ziyad Isaac MD

## 2022-07-26 NOTE — TELEPHONE ENCOUNTER
"Pt calling. States she has sent a [employer]ax[employer]ax message and has not received a response and there has been confusion regarding her Buspar prescribed at office visit 7/7.  Was prescribed Buspar on 7/7 and is on her 18th day of taking Buspar. Has not noticed an effect from medication.    Per office visit 7/7/22: \"(F41.9) Anxiety  Comment: with the citalopram [ Celexa ] gone her anxiety is returning. We discussed options and are trying busPIRone (BUSPAR)    Plan: busPIRone (BUSPAR) 5 MG tablet        Further follow up depending on how things go \"    States she is on her 8th day of covid. Has taken Paxlovid and has completed the course of medication.    Hand tremor started 1 week ago. Thought it was from covid meds and is done with meds now. Is hard to hold a dish without breaking it. If not using her hands, doesn't notice it. Symptoms are deliberate when she goes to do something. No hand pain. No weakness. Pt is concerned because she read that Buspar can cause tardive dyskinesia and is permanent. Please call pt at [phone]. Routing to provider to advise.    Ana Rosa Hassan RN  [facility]    "

## 2022-07-29 DIAGNOSIS — F41.9 ANXIETY: ICD-10-CM

## 2022-07-29 RX ORDER — BUSPIRONE HYDROCHLORIDE 5 MG/1
TABLET ORAL
Qty: 90 TABLET | Refills: 1 | Status: SHIPPED | OUTPATIENT
Start: 2022-07-29 | End: 2022-10-04

## 2022-07-29 NOTE — TELEPHONE ENCOUNTER
"Requested Prescriptions   Signed Prescriptions Disp Refills    busPIRone (BUSPAR) 5 MG tablet 90 tablet 1     Sig: TAKE 1 TABLET BY MOUTH 3 TIMES DAILY.       Atypical Antidepressants Protocol Passed - 7/29/2022 12:36 PM        Passed - Recent (12 mo) or future (30 days) visit within the authorizing provider's specialty     Patient has had an office visit with the authorizing provider or a provider within the authorizing providers department within the previous 12 mos or has a future within next 30 days. See \"Patient Info\" tab in inbasket, or \"Choose Columns\" in Meds & Orders section of the refill encounter.              Passed - Medication active on med list        Passed - Patient is age 18 or older        Passed - No active pregnancy on record        Passed - No positive pregnancy test in past 12 mos           Toyin Hankins RN  [facility]     "

## 2022-08-30 ENCOUNTER — THERAPY VISIT (OUTPATIENT)
Dept: PHYSICAL THERAPY | Facility: CLINIC | Age: 87
End: 2022-08-30
Payer: MEDICARE

## 2022-08-30 DIAGNOSIS — M25.551 HIP PAIN, RIGHT: ICD-10-CM

## 2022-08-30 PROCEDURE — 97110 THERAPEUTIC EXERCISES: CPT | Mod: GP | Performed by: PHYSICAL THERAPIST

## 2022-08-30 PROCEDURE — 97161 PT EVAL LOW COMPLEX 20 MIN: CPT | Mod: GP | Performed by: PHYSICAL THERAPIST

## 2022-08-30 ASSESSMENT — ACTIVITIES OF DAILY LIVING (ADL)
WALKING_UP_STEEP_HILLS: UNABLE TO DO
HOS_ADL_COUNT: 16
GOING_UP_1_FLIGHT_OF_STAIRS: EXTREME DIFFICULTY
DEEP_SQUATTING: UNABLE TO DO
HOS_ADL_HIGHEST_POTENTIAL_SCORE: 64
STANDING_FOR_15_MINUTES: SLIGHT DIFFICULTY
HOS_ADL_ITEM_SCORE_TOTAL: 25
WALKING_APPROXIMATELY_10_MINUTES: MODERATE DIFFICULTY
ROLLING_OVER_IN_BED: NO DIFFICULTY AT ALL
WALKING_15_MINUTES_OR_GREATER: MODERATE DIFFICULTY
HEAVY_WORK: EXTREME DIFFICULTY
WALKING_DOWN_STEEP_HILLS: UNABLE TO DO
PUTTING_ON_SOCKS_AND_SHOES: MODERATE DIFFICULTY
SITTING_FOR_15_MINUTES: NO DIFFICULTY AT ALL
GETTING_INTO_AND_OUT_OF_AN_AVERAGE_CAR: MODERATE DIFFICULTY
WALKING_INITIALLY: MODERATE DIFFICULTY
STEPPING_UP_AND_DOWN_CURBS: MODERATE DIFFICULTY
GOING_DOWN_1_FLIGHT_OF_STAIRS: EXTREME DIFFICULTY
TWISTING/PIVOTING_ON_INVOLVED_LEG: EXTREME DIFFICULTY
LIGHT_TO_MODERATE_WORK: MODERATE DIFFICULTY
HOS_ADL_SCORE(%): 39.06
RECREATIONAL_ACTIVITIES: MODERATE DIFFICULTY

## 2022-08-30 NOTE — PROGRESS NOTES
Physical Therapy Initial Evaluation  Subjective:  The history is provided by the patient. No  was used.   Therapist Generated HPI Evaluation  Problem details: Pt comes to therapy today for right hip pain beginning about 6-9 months ago and may be a result of a soft fall off of her treadmill.  Reports previous sciatica down right leg which has not been an issue after an injection in the past and current symptoms do not feel like this. Does have some distal nerve symptoms but nothing unusual past her baseline presentation. Pain is located mostly in the groin and occasionally on the lateral hip.  Reports some balance issues as well since the fall. Goals are to improve her pain levels with getting into and out of the car and with stairs which are the most provoking activities..         Type of problem:  Right hip.    This is a chronic condition.  Condition occurred with:  A fall/slip.  Where condition occurred: at home.  Patient reports pain:  Greater trochanter and groin.  Pain is described as aching and cramping and is intermittent (worst wuith getting into the car).  Pain radiates to:  No radiation. Pain is worse during the day.  Since onset symptoms are unchanged.  Associated symptoms:  Loss of motion/stiffness. Symptoms are exacerbated by ascending stairs, bending/squatting and descending stairs  and relieved by NSAID's.    Past treatment: none.   Work activity restrictions: retired.  Barriers include:  None as reported by patient.    Patient Health History  Lisa Ferguson being seen for right hip and leg problems.     Date of Onset: about 6-9 months prior to eval    Problem occurred: possibly from a soft fall off of TM but mostly gradually worsening to the point it is now   Pain is reported as 3/10 on pain scale.  General health as reported by patient is fair.  Pertinent medical history includes: cancer, high blood pressure, heart problems, history of fractures, implanted device, kidney  disease, overweight, sleep disorder/apnea, smoking, thyroid problems, numbness/tingling and osteoarthritis. Other medical history details: gout.   Red flags:  Chest pain and progressive neurological deficits.  Medical allergies: adhesives.   Surgeries include:  Cancer surgery, heart surgery and orthopedic surgery. Other surgery history details: mastectomy, triple bypass, right TKA.    Current medications:  Cardiac medication, high blood pressure medication and thyroid medication. Other medications details: gout, cancer medications.       Primary job tasks include:  Lifting/carrying, prolonged sitting, driving and repetitive tasks.                                    Objective:    Gait:    Gait Type:  Normal   Assistive Devices:  None  Deviations:  General Deviations:  Base of support incr and stride length decr               Lumbar/SI Evaluation  ROM:  Arom wnl lumbar: WFL, no pain in hip elicited with all AROM lumbar spine.                                                          Hip Evaluation  Hip PROM:    Flexion: Left: 100 * pain in groin  Right:  Extension: Left: -5 *anterior hip stretch  Right:  Abduction: Left: 30 * groin pain    Right:  Adduction: Left: 20   Right:  Internal Rotation: Left: 10 *groin pain   Right:  External Rotation: Left: 30 *gluteal pain   Right:              Hip Strength:    Flexion:   Right: 4/5    Pain: weak/painful                    Extension:  Right: 4+/5     Pain: weak/pain free    Abduction:  Right: 5/5     Pain:strong/painful  Adduction:  Right: 4/5    Pain:weak/painful  Internal Rotation:  Right: 4+/5    Pain:weak/pain free  External Rotation:  Right: 4/5    Pain: weak/painful            Hip Special Testing:      Right hip negative for the following special tests:  SLR    Hip Palpation:      Right hip tenderness present at:  Greater Trachanter; IT Band; hip flexors; Piriformis; Adductors; Abductors and Gluteus Medius       Knee Evaluation:  ROM:  AROM: normal            Strength:      Extension:  Right: 5-/5   Pain:  Flexion:  Right: 4+/5   Pain:                        General     ROS    Assessment/Plan:    Patient is a 87 year old female with right side hip complaints.    Patient has the following significant findings with corresponding treatment plan.                Diagnosis 1:  Hip pain, right  Pain -  hot/cold therapy, manual therapy, self management and education  Decreased ROM/flexibility - manual therapy and therapeutic exercise  Decreased joint mobility - manual therapy and therapeutic exercise  Decreased strength - therapeutic exercise and therapeutic activities  Impaired balance - neuro re-education and therapeutic activities  Decreased proprioception - neuro re-education and therapeutic activities  Impaired gait - gait training  Impaired muscle performance - neuro re-education  Decreased function - therapeutic activities    Therapy Evaluation Codes:   1) History comprised of:   Personal factors that impact the plan of care:      Time since onset of symptoms.    Comorbidity factors that impact the plan of care are:      Cancer, Chest pain, Heart problems, High blood pressure, Implanted device, Osteoarthritis, Overweight, Pain at night/rest and Sleep disorder/apnea.     Medications impacting care: Cardiac and High blood pressure.  2) Examination of Body Systems comprised of:   Body structures and functions that impact the plan of care:      Hip, Knee and Lumbar spine.   Activity limitations that impact the plan of care are:      Driving.  3) Clinical presentation characteristics are:   Stable/Uncomplicated.  4) Decision-Making    Low complexity using standardized patient assessment instrument and/or measureable assessment of functional outcome.  Cumulative Therapy Evaluation is: Low complexity.    Previous and current functional limitations:  (See Goal Flow Sheet for this information)    Short term and Long term goals: (See Goal Flow Sheet for this information)     Communication  ability:  Patient appears to be able to clearly communicate and understand verbal and written communication and follow directions correctly.  Treatment Explanation - The following has been discussed with the patient:   RX ordered/plan of care  Anticipated outcomes  Possible risks and side effects  This patient would benefit from PT intervention to resume normal activities.   Rehab potential is good.    Frequency:  1 X week, once daily  Duration:  for 8 weeks  Discharge Plan:  Achieve all LTG.  Independent in home treatment program.  Reach maximal therapeutic benefit.    Please refer to the daily flowsheet for treatment today, total treatment time and time spent performing 1:1 timed codes.

## 2022-08-30 NOTE — PROGRESS NOTES
[facility]    OUTPATIENT Physical Therapy ORTHOPEDIC EVALUATION  PLAN OF TREATMENT FOR OUTPATIENT REHABILITATION  (COMPLETE FOR INITIAL CLAIMS ONLY)  Patient's Last Name, First Name, M.I.  YOB: 1935  Lisa Ferguson    Provider s Name:  [facility]   Medical Record No.  [id number]   Start of Care Date:  08/30/22   Onset Date:    7/7/22 (date of referral)   Type:     _X__PT   ___OT Medical Diagnosis:    Encounter Diagnosis   Name Primary?    Hip pain, right         Treatment Diagnosis:  Hip pain, right        Goals:     08/30/22 0500   Body Part   Goals listed below are for Rigth hip   Goal #1   Goal #1 stairs   Previous Functional Level Ascend/descend stairs;in a normal reciprocal pattern;with a railing   Current Functional Level Ascend/descend stairs;one step at a time;with a railing   STG [employer] Ascend/descend stairs;in a normal reciprocal pattern;with a railing   Rationale to enter/leave the house safely;to reach lower level of home safely;for safe community access to buildings;for safe community ambulaton   Due Date 09/20/22   LTG [employer] Ascend/descend stairs;in a normal reciprocal pattern;without a railing   Rationale to enter/leave the house safely;to reach lower level of home safely;for safe community access to buildings;for safe community ambulaton   Due Date 10/25/22   Goal #2   Goal #2 driving/transportation  (pain with entering/exiting vehicle)   Previous Functional Level No restrictions   Current Functional Level   (pain 5/10 with entering/exiting vehicle)   STG [employer]   (pain 4/10 with entering/exiting vehicle)   Rationale for safe driving   Due date 09/20/22   LTG [employer]   (pain 3/10 with entering/exiting vehicle)   Rationale for safe driving   Due date 10/25/22       Therapy Frequency:   1x/week  Predicted Duration of Therapy Intervention:  8 weeks    Riley Clinton, PT                 I CERTIFY THE NEED FOR THESE SERVICES FURNISHED UNDER        THIS PLAN OF TREATMENT AND WHILE UNDER MY CARE     (Physician attestation of this document indicates review and certification of the therapy plan).                     Certification Date From:  08/30/22   Certification Date To:  10/25/22    Referring Provider:  Ziyad Isaac    Initial Assessment        See Epic Evaluation SOC Date: 08/30/22

## 2022-08-30 NOTE — PROGRESS NOTES
[facility]    OUTPATIENT Physical Therapy ORTHOPEDIC EVALUATION  PLAN OF TREATMENT FOR OUTPATIENT REHABILITATION  (COMPLETE FOR INITIAL CLAIMS ONLY)  Patient's Last Name, First Name, M.I.  YOB: 1935  Lisa Ferguson    Provider s Name:  [facility]   Medical Record No.  [id number]   Start of Care Date:  08/30/22   Onset Date:       Type:     _X__PT   ___OT Medical Diagnosis:    Encounter Diagnosis   Name Primary?     Hip pain, right         Treatment Diagnosis:  Hip pain, right        Goals:  Disregard, CERT sent    Therapy Frequency:  1x/week  Predicted Duration of Therapy Intervention:  8 weeks    Riley Clinton, PT                 I CERTIFY THE NEED FOR THESE SERVICES FURNISHED UNDER        THIS PLAN OF TREATMENT AND WHILE UNDER MY CARE     (Physician attestation of this document indicates review and certification of the therapy plan).                       Certification Date From:  08/30/22   Certification Date To:  10/25/22    Referring Provider:  Ziyad Isaac    Initial Assessment        See Epic Evaluation SOC Date: 08/30/22

## 2022-09-08 ENCOUNTER — THERAPY VISIT (OUTPATIENT)
Dept: PHYSICAL THERAPY | Facility: CLINIC | Age: 87
End: 2022-09-08
Payer: MEDICARE

## 2022-09-08 DIAGNOSIS — M25.551 HIP PAIN, RIGHT: Primary | ICD-10-CM

## 2022-09-08 PROCEDURE — 97110 THERAPEUTIC EXERCISES: CPT | Mod: GP | Performed by: PHYSICAL THERAPIST

## 2022-09-14 ENCOUNTER — THERAPY VISIT (OUTPATIENT)
Dept: PHYSICAL THERAPY | Facility: CLINIC | Age: 87
End: 2022-09-14
Payer: MEDICARE

## 2022-09-14 DIAGNOSIS — M25.551 HIP PAIN, RIGHT: Primary | ICD-10-CM

## 2022-09-14 PROCEDURE — 97140 MANUAL THERAPY 1/> REGIONS: CPT | Mod: GP | Performed by: PHYSICAL THERAPIST

## 2022-09-14 PROCEDURE — 97110 THERAPEUTIC EXERCISES: CPT | Mod: GP | Performed by: PHYSICAL THERAPIST

## 2022-09-27 ENCOUNTER — THERAPY VISIT (OUTPATIENT)
Dept: PHYSICAL THERAPY | Facility: CLINIC | Age: 87
End: 2022-09-27
Payer: MEDICARE

## 2022-09-27 DIAGNOSIS — M25.551 HIP PAIN, RIGHT: Primary | ICD-10-CM

## 2022-09-27 PROCEDURE — 97110 THERAPEUTIC EXERCISES: CPT | Mod: GP | Performed by: PHYSICAL THERAPIST

## 2022-09-27 PROCEDURE — 97140 MANUAL THERAPY 1/> REGIONS: CPT | Mod: GP | Performed by: PHYSICAL THERAPIST

## 2022-09-29 DIAGNOSIS — M10.071 ACUTE IDIOPATHIC GOUT OF RIGHT FOOT: ICD-10-CM

## 2022-09-30 RX ORDER — ALLOPURINOL 300 MG/1
TABLET ORAL
Qty: 90 TABLET | Refills: 1 | Status: SHIPPED | OUTPATIENT
Start: 2022-09-30 | End: 2023-06-13

## 2022-10-03 ENCOUNTER — THERAPY VISIT (OUTPATIENT)
Dept: PHYSICAL THERAPY | Facility: CLINIC | Age: 87
End: 2022-10-03
Payer: MEDICARE

## 2022-10-03 DIAGNOSIS — M25.551 HIP PAIN, RIGHT: Primary | ICD-10-CM

## 2022-10-03 PROCEDURE — 97110 THERAPEUTIC EXERCISES: CPT | Mod: GP | Performed by: PHYSICAL THERAPIST

## 2022-10-03 PROCEDURE — 97140 MANUAL THERAPY 1/> REGIONS: CPT | Mod: GP | Performed by: PHYSICAL THERAPIST

## 2022-10-04 ENCOUNTER — E-VISIT (OUTPATIENT)
Dept: FAMILY MEDICINE | Facility: CLINIC | Age: 87
End: 2022-10-04
Payer: MEDICARE

## 2022-10-04 DIAGNOSIS — M54.9 BACK PAIN, UNSPECIFIED BACK LOCATION, UNSPECIFIED BACK PAIN LATERALITY, UNSPECIFIED CHRONICITY: Primary | ICD-10-CM

## 2022-10-04 PROCEDURE — 99421 OL DIG E/M SVC 5-10 MIN: CPT | Performed by: FAMILY MEDICINE

## 2022-10-04 NOTE — PATIENT INSTRUCTIONS
Lisa,    Thank you for choosing us for your care. Given your symptoms, I would like you to do lab-only and xray-only visits for urine test and back x-ray.  I have placed the orders. Please schedule an appointment with the lab right here in [facility], or call [phone]. I will let you know when the results are back.    It would be safe to try physical therapy as well, so I've placed a referral for scheduling outreach.     Continue to take tylenol as needed, use heat or ice as needed, and avoid heavy lifting, twisting or bending at the wait.     Follow-up in person with your primary care provider.     Virginia Villagomez MD    Caring for Your Back    You are not alone.    Low back pain is very common. Nearly half of all adults will have low back pain in any given year. The good news is that back pain is rarely a danger to your health. Most people can manage their back pain on their own. About half of people start feeling better within two weeks. In 9 out of 10 cases, low back pain goes away or no longer limits daily activity within 6 weeks.     Your outlook is good!     Your symptoms tell us that your low back pain is most likely not a danger to you. Most of the time we will not know the exact cause of low back pain, even if you see a doctor or have an MRI. However, treatment can still work without knowing the cause of the pain. Less than 1 in 100 people need surgery for their back pain.     What can I do about my low back pain?     There are three basic things you can do to ease low back pain and help it go away.     Use heat or cold packs.    Take medicine as directed.    Use positions, movements and exercises.    Using heat or cold packs:    Try cold packs or gentle heat to ease your pain.  Use whichever gives you the most relief. Apply the cold pack or heat for 15 minutes at a time, as often as needed.    Taking medicine:    If taking over-the-counter medicine:    Take ibuprofen (Advil, Motrin) 600 mg three times  a day as needed for pain.  OR    Take Aleve (naproxen) 220 to 440 mg two times a day as needed for pain. If your doctor prescribed a muscle relaxant (cyclobenzaprine 10 mg.):    Take   to 1 tablet at bedtime.    Do not drive when taking this medicine. This drug may make you sleepy.     Using positions, movements and exercises:    Research tells us that moving your joints and muscles can help you recover from back pain. Such activity should be simple and gentle. Use the positions below as well as walking to help relieve your pain. Try taking a short walk every 3 to 4 hours during the day. Walk for a few minutes inside your home or take longer walks outside, on a treadmill or at a mall. Slowly increase the amount of time you walk. Expect discomfort when you begin, but it should lessen as your back starts to heal. When your back feels better, walk daily to keep your back and body healthy.    Finding a position that is comfortable:    When your back pain is new, certain positions will ease your pain. Gently try each of the positions below until you find one that is helpful. Once you find a position of comfort, use it as often as you like when you are resting. You will recover faster if you combine rest with activity.    * Lie on your back with your legs bent. You can do this by placing a pillow under your knees or lie on the floor and rest your lower legs on the seat of a chair.  * Lie on your side with your knees bent and place a pillow between your knees.    Lie on your stomach over pillows.       When should I call my doctor?    Your back pain should improve over the first couple of weeks. As it improves, you should be able to return to your normal activities.  But call your doctor if:      You have a sudden change in your ability to control your bladder or bowels.    You begin to feel tingling in your groin or legs.    The pain spreads down your leg and into your foot.    Your toes, feet or leg muscles begin to feel  weak.    You feel generally unwell or sick.    Your pain gets worse.    If you are deaf or hard of hearing, please let us know. We provide many free services including sign language interpreters, oral interpreters, TTYs, telephone amplifiers, note takers and written materials.    For informational purposes only. Not to replace the advice of your health care provider. Copyright   2013 [facility]. All rights reserved. [address] [employer] 666144 - 04/13.

## 2022-10-05 ENCOUNTER — ANCILLARY PROCEDURE (OUTPATIENT)
Dept: GENERAL RADIOLOGY | Facility: CLINIC | Age: 87
End: 2022-10-05
Attending: FAMILY MEDICINE
Payer: MEDICARE

## 2022-10-05 ENCOUNTER — LAB (OUTPATIENT)
Dept: LAB | Facility: CLINIC | Age: 87
End: 2022-10-05

## 2022-10-05 DIAGNOSIS — M54.9 BACK PAIN, UNSPECIFIED BACK LOCATION, UNSPECIFIED BACK PAIN LATERALITY, UNSPECIFIED CHRONICITY: ICD-10-CM

## 2022-10-05 LAB
ALBUMIN UR-MCNC: 100 MG/DL
APPEARANCE UR: CLEAR
BILIRUB UR QL STRIP: NEGATIVE
COLOR UR AUTO: YELLOW
GLUCOSE UR STRIP-MCNC: NEGATIVE MG/DL
HGB UR QL STRIP: NEGATIVE
KETONES UR STRIP-MCNC: NEGATIVE MG/DL
LEUKOCYTE ESTERASE UR QL STRIP: ABNORMAL
NITRATE UR QL: NEGATIVE
PH UR STRIP: 5.5 [PH] (ref 5–7)
RBC #/AREA URNS AUTO: ABNORMAL /HPF
SP GR UR STRIP: 1.02 (ref 1–1.03)
SQUAMOUS #/AREA URNS AUTO: ABNORMAL /LPF
UROBILINOGEN UR STRIP-ACNC: 0.2 E.U./DL
WBC #/AREA URNS AUTO: ABNORMAL /HPF

## 2022-10-05 PROCEDURE — 72100 X-RAY EXAM L-S SPINE 2/3 VWS: CPT | Mod: TC | Performed by: RADIOLOGY

## 2022-10-05 PROCEDURE — 81001 URINALYSIS AUTO W/SCOPE: CPT

## 2022-10-06 ENCOUNTER — OFFICE VISIT (OUTPATIENT)
Dept: SURGERY | Facility: CLINIC | Age: 87
End: 2022-10-06
Payer: MEDICARE

## 2022-10-06 DIAGNOSIS — Z90.13 H/O BILATERAL MASTECTOMY: Primary | ICD-10-CM

## 2022-10-06 PROBLEM — S32.020A CLOSED COMPRESSION FRACTURE OF L2 VERTEBRA (H): Status: ACTIVE | Noted: 2022-10-06

## 2022-10-06 PROBLEM — M25.551 HIP PAIN, RIGHT: Status: RESOLVED | Noted: 2022-08-30 | Resolved: 2022-10-06

## 2022-10-06 PROCEDURE — 99212 OFFICE O/P EST SF 10 MIN: CPT | Performed by: SURGERY

## 2022-10-06 NOTE — RESULT ENCOUNTER NOTE
Please call patient:  Your back xray does show a possible new compression fracture of the L2 vertebrae. Take tylenol 500 - 1000 mg three times daily and avoid aggravating activities like bending, twisting or lifting. Make an in person follow-up appointment with a provider.     Virginia Villagomez MD

## 2022-10-06 NOTE — LETTER
10/6/2022         RE: Lisa Ferguson  [id number]  [place] [address]        Dear Colleague,    Thank you for referring your patient, Lisa Ferguson, to the [facility] [place]. Please see a copy of my visit note below.    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  She is on Arimidex and follows with Dr. Georges. I last saw her on 1/6/2022. She is here for chest wall exam.     She reports she had a fall a few weeks ago and is struggling with low back pain. She had an XR yesterday and we did review the results today. She has no concerns on her chest aside from her prosthesis are very heavy and she is interested in other options.     REVIEW OF SYSTEMS:  Constitutional:  Negative for chills, fatigue, fever and weight change.  Eyes:  Negative for blurred vision, eye pain and photophobia.  ENT:  Negative for hearing problems, ENT pain, congestion, rhinorrhea, epistaxis, hoarseness and dental problems.  Cardiovascular:  Negative for chest pain, palpitations, tachycardia, orthopnea and edema.  Respiratory:  Negative for cough, dyspnea and hemoptysis.  Gastrointestinal:  Negative for abdominal pain, heartburn, constipation, diarrhea and stool changes.  Musculoskeletal:  Negative for arthralgias, back pain and myalgias.  Integumentary/Breast:  See HPI.    Past Medical History:   Diagnosis Date     Anxiety      CAD (coronary artery disease)     angio 2002, h/o cabg, sees Luisana Nelson NP, they follow the cholesterol     CHF (congestive heart  failure) (H) 7/8/2014     DJD (degenerative joint disease)      History of colonoscopy jan 2000    due jan 2010     Hyperlipidemia LDL goal < 70     sees [facility] lipid clinic     Hypertension goal BP (blood pressure) < 140/90      Hypothyroidism      IBS (irritable bowel syndrome)      Mitral regurgitation      Obesity      GLEN (obstructive sleep apnea) 7/11/2011     PUD (peptic ulcer disease)     h/o duodenal ulcer     RBBB (right bundle branch block)      Sciatica neuralgia november 209    MRI shows spinal stenosis and a cyst on the spine, has received an epidural steroid injection       Past Surgical History:   Procedure Laterality Date     ARTHROSCOPY KNEE RT/LT      bilateral     COLONOSCOPY  6/2010    negative     HC DILATION/CURETTAGE DIAG/THER NON OB       HC EXCIS INTERDIGITAL NEUROMA,EA      mortons neuroma excision     HC REMOVAL GALLBLADDER  1994     HERNIA REPAIR, INGUINAL RT/LT       MASTECTOMY SIMPLE BILATERAL, SENTINEL NODE BILATERAL, COMBINED Bilateral 1/6/2021    Procedure: BILATERAL SIMPLE MASTECTOMY WITH LEFT SENTINEL LYMPH NODE BIOPSY;  Surgeon: Beata Garcia MD;  Location: [facility] OR     SURGICAL HISTORY OF -       bilateral meniscal tears and surgery on these     SURGICAL HISTORY OF -   1999 or so    one parathyroid removed     TONSILLECTOMY       TUBAL LIGATION       ZZC APPENDECTOMY       ZZC CABG, ARTERIAL, THREE  1999    LIMA-LAD, SVG-DIagnoal, SVG-OM, previous LAD-PCI, sees Dr Banuelos       Family History   Problem Relation Age of Onset     C.A.D. Mother      Arthritis Mother      Cardiovascular Mother      Heart Disease Mother      Obesity Mother      Thyroid Disease Mother      C.A.D. Father      Diabetes Father      Hypertension Father      Alcohol/Drug Father      Alzheimer Disease Father      Cardiovascular Father      Circulatory Father      Gastrointestinal Disease Father      Heart Disease Father      Lipids Father      Obesity Father      Cardiovascular Maternal Grandmother       Depression Maternal Grandmother      Obesity Maternal Grandmother      Thyroid Disease Maternal Grandmother      Cardiovascular Maternal Grandfather      Heart Disease Maternal Grandfather      Obesity Maternal Grandfather      Obesity Paternal Grandmother      Diabetes Paternal Grandfather      Alcohol/Drug Paternal Grandfather      Obesity Paternal Grandfather      Breast Cancer Sister      Cancer Sister      Obesity Sister      Thyroid Disease Sister      Alcohol/Drug Son      Allergies Son      Allergies Daughter      Depression Sister      Eye Disorder Sister      Gastrointestinal Disease Sister      Thyroid Disease Sister        Social History     Tobacco Use     Smoking status: Former Smoker     Years: 20.00     Types: Cigarettes     Quit date: 1978     Years since quittin.2     Smokeless tobacco: Never Used   Substance Use Topics     Alcohol use: Yes     Comment: Rare        Patient Active Problem List   Diagnosis     PUD (peptic ulcer disease)     Hyperlipidemia with target LDL less than 70     Mitral regurgitation     DJD (degenerative joint disease)     Fibroadenoma of breast     Diverticulosis     Sciatica neuralgia     IBS (irritable bowel syndrome)     RBBB (right bundle branch block)     GLEN (obstructive sleep apnea)     Balance disorder     Anxiety     Ventricular tachycardia (paroxysmal)     Corneal ulcer of left eye     Blepharitis     Hypercalcemia     Disorder of skin or subcutaneous tissue     Scar condition and fibrosis of skin     OA (osteoarthritis) of knee - bilateral     Fracture of proximal humerus     CHF (congestive heart failure) (H)     Shoulder fracture     Abnormal glucose     S/P CABG (coronary artery bypass graft)     S/p total knee replacement, bilateral     Acquired hypothyroidism     Essential hypertension with goal blood pressure less than 130/80     Morbid obesity (H)     H/O parathyroidectomy (H)     Atherosclerosis of coronary artery bypass graft(s), unspecified,  with other forms of angina pectoris (H)     Malignant neoplasm of upper-outer quadrant of left breast in female, estrogen receptor positive (H)     Secondary hyperparathyroidism of renal origin (H)     Acute idiopathic gout of right foot     Hip pain, right     Allergies   Allergen Reactions     Adhesive Tape      Atorvastatin Other (See Comments) and Muscle Pain (Myalgia)     lipitor  Myalgia     Cortisone      Insomnia, burning in chest, flushed     Nickel Other (See Comments)     Raw weepy skin  rash     Tetracycline Diarrhea     Vicodin [Hydrocodone-Acetaminophen] Other (See Comments)     Hallucinations     Liquid Adhesive      Other reaction(s): Contact Dermatitis     Current Outpatient Medications   Medication Sig Dispense Refill     allopurinol (ZYLOPRIM) 100 MG tablet TAKE 1 TABLET BY MOUTH DAILY ALONG WITH 300MG TABLET FOR TOTAL DAILY DOSE OF 400MG 90 tablet 1     allopurinol (ZYLOPRIM) 300 MG tablet TAKE 1 TABLET BY MOUTH DAILY ALONG WITH 100MG TABLET FOR TOTAL DAILY DOSE OF 400MG 90 tablet 1     anastrozole (ARIMIDEX) 1 MG tablet Take 1 tablet (1 mg) by mouth daily 90 tablet 3     aspirin 81 MG EC tablet Take 81 mg by mouth every morning       betamethasone dipropionate (DIPROSONE) 0.05 % external lotion Apply topically daily as needed (seborrhea) 60 mL 0     levothyroxine (SYNTHROID/LEVOTHROID) 125 MCG tablet Take 1 tablet (125 mcg) by mouth every morning 90 tablet 1     losartan (COZAAR) 50 MG tablet Take 50 mg by mouth daily       metoprolol tartrate (LOPRESSOR) 25 MG tablet Take 50 mg by mouth every morning (2 X 25 mg)  (in addition to bedtime dose)       order for DME Equipment being ordered: CPAP 1 each 0     rosuvastatin (CRESTOR) 5 MG tablet Take 1 tablet (5 mg) by mouth every other day 90 tablet 0     torsemide (DEMADEX) 10 MG tablet Take 2 tablets (20 mg) by mouth daily 90 tablet 0     Vitals: There were no vitals taken for this visit.  BMI= There is no height or weight on file to calculate  BMI.    EXAM:  GENERAL: healthy, alert and no distress   BREAST:  The breasts are surgically absent. Incisions are well healed. redundant adipose tissue laterally. No masses. Mild fibrosis bilaterally along lateral edge of pectoralis.   There is no axillary or supraclavicular lymphadenopathy.  CARDIOVASCULAR:  RRR  RESPIRATORY: nonlabored breathing  NECK: Neck supple. No adenopathy. Thyroid symmetric, normal size  SKIN: No suspicious lesions or rashes  LYMPH: Normal cervical lymph nodes      ASSESSMENT/PLAN:  Lisa Ferguson is now 2 years s/p bilateral mastectomies with left SLNB. She is doing well. Her chest wall exam is benign. She will continue to see Dr. Georges going forward and will see me prn. I gave her a handout with options for prosthesis.           Beata Garcia MD  [facility]  [phone]        Please route or send letter to:  Primary Care Provider (PCP) and Referring Provider        Again, thank you for allowing me to participate in the care of your patient.        Sincerely,        Beata Garcia MD

## 2022-10-06 NOTE — PROGRESS NOTES
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  She is on Arimidex and follows with Dr. Georges. I last saw her on 1/6/2022. She is here for chest wall exam.     She reports she had a fall a few weeks ago and is struggling with low back pain. She had an XR yesterday and we did review the results today. She has no concerns on her chest aside from her prosthesis are very heavy and she is interested in other options.     REVIEW OF SYSTEMS:  Constitutional:  Negative for chills, fatigue, fever and weight change.  Eyes:  Negative for blurred vision, eye pain and photophobia.  ENT:  Negative for hearing problems, ENT pain, congestion, rhinorrhea, epistaxis, hoarseness and dental problems.  Cardiovascular:  Negative for chest pain, palpitations, tachycardia, orthopnea and edema.  Respiratory:  Negative for cough, dyspnea and hemoptysis.  Gastrointestinal:  Negative for abdominal pain, heartburn, constipation, diarrhea and stool changes.  Musculoskeletal:  Negative for arthralgias, back pain and myalgias.  Integumentary/Breast:  See HPI.    Past Medical History:   Diagnosis Date     Anxiety      CAD (coronary artery disease)     angio 2002, h/o cabg, sees Luisana Nelson NP, they follow the cholesterol     CHF (congestive heart failure) (H) 7/8/2014     DJD (degenerative joint disease)      History of colonoscopy jan 2000    due jan 2010     Hyperlipidemia LDL goal < 70     sees [facility]     Hypertension goal BP (blood pressure) < 140/90      Hypothyroidism      IBS  (irritable bowel syndrome)      Mitral regurgitation      Obesity      GLEN (obstructive sleep apnea) 7/11/2011     PUD (peptic ulcer disease)     h/o duodenal ulcer     RBBB (right bundle branch block)      Sciatica neuralgia november 209    MRI shows spinal stenosis and a cyst on the spine, has received an epidural steroid injection       Past Surgical History:   Procedure Laterality Date     ARTHROSCOPY KNEE RT/LT      bilateral     COLONOSCOPY  6/2010    negative     HC DILATION/CURETTAGE DIAG/THER NON OB       HC EXCIS INTERDIGITAL NEUROMA,EA      mortons neuroma excision     HC REMOVAL GALLBLADDER  1994     HERNIA REPAIR, INGUINAL RT/LT       MASTECTOMY SIMPLE BILATERAL, SENTINEL NODE BILATERAL, COMBINED Bilateral 1/6/2021    Procedure: BILATERAL SIMPLE MASTECTOMY WITH LEFT SENTINEL LYMPH NODE BIOPSY;  Surgeon: Beata Garcia MD;  Location: [facility] OR     SURGICAL HISTORY OF -       bilateral meniscal tears and surgery on these     SURGICAL HISTORY OF -   1999 or so    one parathyroid removed     TONSILLECTOMY       TUBAL LIGATION       ZZC APPENDECTOMY       ZZC CABG, ARTERIAL, THREE  1999    LIMA-LAD, SVG-DIagnoal, SVG-OM, previous LAD-PCI, sees Dr Banuelos       Family History   Problem Relation Age of Onset     C.A.D. Mother      Arthritis Mother      Cardiovascular Mother      Heart Disease Mother      Obesity Mother      Thyroid Disease Mother      C.A.D. Father      Diabetes Father      Hypertension Father      Alcohol/Drug Father      Alzheimer Disease Father      Cardiovascular Father      Circulatory Father      Gastrointestinal Disease Father      Heart Disease Father      Lipids Father      Obesity Father      Cardiovascular Maternal Grandmother      Depression Maternal Grandmother      Obesity Maternal Grandmother      Thyroid Disease Maternal Grandmother      Cardiovascular Maternal Grandfather      Heart Disease Maternal Grandfather      Obesity Maternal Grandfather      Obesity Paternal Grandmother       Diabetes Paternal Grandfather      Alcohol/Drug Paternal Grandfather      Obesity Paternal Grandfather      Breast Cancer Sister      Cancer Sister      Obesity Sister      Thyroid Disease Sister      Alcohol/Drug Son      Allergies Son      Allergies Daughter      Depression Sister      Eye Disorder Sister      Gastrointestinal Disease Sister      Thyroid Disease Sister        Social History     Tobacco Use     Smoking status: Former Smoker     Years: 20.00     Types: Cigarettes     Quit date: 1978     Years since quittin.2     Smokeless tobacco: Never Used   Substance Use Topics     Alcohol use: Yes     Comment: Rare        Patient Active Problem List   Diagnosis     PUD (peptic ulcer disease)     Hyperlipidemia with target LDL less than 70     Mitral regurgitation     DJD (degenerative joint disease)     Fibroadenoma of breast     Diverticulosis     Sciatica neuralgia     IBS (irritable bowel syndrome)     RBBB (right bundle branch block)     GLEN (obstructive sleep apnea)     Balance disorder     Anxiety     Ventricular tachycardia (paroxysmal)     Corneal ulcer of left eye     Blepharitis     Hypercalcemia     Disorder of skin or subcutaneous tissue     Scar condition and fibrosis of skin     OA (osteoarthritis) of knee - bilateral     Fracture of proximal humerus     CHF (congestive heart failure) (H)     Shoulder fracture     Abnormal glucose     S/P CABG (coronary artery bypass graft)     S/p total knee replacement, bilateral     Acquired hypothyroidism     Essential hypertension with goal blood pressure less than 130/80     Morbid obesity (H)     H/O parathyroidectomy (H)     Atherosclerosis of coronary artery bypass graft(s), unspecified, with other forms of angina pectoris (H)     Malignant neoplasm of upper-outer quadrant of left breast in female, estrogen receptor positive (H)     Secondary hyperparathyroidism of renal origin (H)     Acute idiopathic gout of right foot     Hip pain, right      Allergies   Allergen Reactions     Adhesive Tape      Atorvastatin Other (See Comments) and Muscle Pain (Myalgia)     lipitor  Myalgia     Cortisone      Insomnia, burning in chest, flushed     Nickel Other (See Comments)     Raw weepy skin  rash     Tetracycline Diarrhea     Vicodin [Hydrocodone-Acetaminophen] Other (See Comments)     Hallucinations     Liquid Adhesive      Other reaction(s): Contact Dermatitis     Current Outpatient Medications   Medication Sig Dispense Refill     allopurinol (ZYLOPRIM) 100 MG tablet TAKE 1 TABLET BY MOUTH DAILY ALONG WITH 300MG TABLET FOR TOTAL DAILY DOSE OF 400MG 90 tablet 1     allopurinol (ZYLOPRIM) 300 MG tablet TAKE 1 TABLET BY MOUTH DAILY ALONG WITH 100MG TABLET FOR TOTAL DAILY DOSE OF 400MG 90 tablet 1     anastrozole (ARIMIDEX) 1 MG tablet Take 1 tablet (1 mg) by mouth daily 90 tablet 3     aspirin 81 MG EC tablet Take 81 mg by mouth every morning       betamethasone dipropionate (DIPROSONE) 0.05 % external lotion Apply topically daily as needed (seborrhea) 60 mL 0     levothyroxine (SYNTHROID/LEVOTHROID) 125 MCG tablet Take 1 tablet (125 mcg) by mouth every morning 90 tablet 1     losartan (COZAAR) 50 MG tablet Take 50 mg by mouth daily       metoprolol tartrate (LOPRESSOR) 25 MG tablet Take 50 mg by mouth every morning (2 X 25 mg)  (in addition to bedtime dose)       order for DME Equipment being ordered: CPAP 1 each 0     rosuvastatin (CRESTOR) 5 MG tablet Take 1 tablet (5 mg) by mouth every other day 90 tablet 0     torsemide (DEMADEX) 10 MG tablet Take 2 tablets (20 mg) by mouth daily 90 tablet 0     Vitals: There were no vitals taken for this visit.  BMI= There is no height or weight on file to calculate BMI.    EXAM:  GENERAL: healthy, alert and no distress   BREAST:  The breasts are surgically absent. Incisions are well healed. redundant adipose tissue laterally. No masses. Mild fibrosis bilaterally along lateral edge of pectoralis.   There is no axillary or  supraclavicular lymphadenopathy.  CARDIOVASCULAR:  RRR  RESPIRATORY: nonlabored breathing  NECK: Neck supple. No adenopathy. Thyroid symmetric, normal size  SKIN: No suspicious lesions or rashes  LYMPH: Normal cervical lymph nodes      ASSESSMENT/PLAN:  Lisa Ferguson is now 2 years s/p bilateral mastectomies with left SLNB. She is doing well. Her chest wall exam is benign. She will continue to see Dr. Georges going forward and will see me prn. I gave her a handout with options for prosthesis.           Beata Garcia MD  [facility]  [phone]        Please route or send letter to:  Primary Care Provider (PCP) and Referring Provider

## 2022-10-06 NOTE — NURSING NOTE
Lisa Ferguson's goals for this visit include: .  Chief Complaint   Patient presents with     RECHECK     Breast exam, hx of breast cancer       She requests these members of her care team be copied on today's visit information: no    PCP: Ziyad Isaac    Referring Provider:  Beata Garcia MD  [id number]  [place] [id number]    There were no vitals taken for this visit.    Do you need any medication refills at today's visit? No    Bela Abraham LPN

## 2022-10-11 ENCOUNTER — THERAPY VISIT (OUTPATIENT)
Dept: PHYSICAL THERAPY | Facility: CLINIC | Age: 87
End: 2022-10-11
Payer: MEDICARE

## 2022-10-11 DIAGNOSIS — M54.9 BACK PAIN, UNSPECIFIED BACK LOCATION, UNSPECIFIED BACK PAIN LATERALITY, UNSPECIFIED CHRONICITY: ICD-10-CM

## 2022-10-11 PROCEDURE — 97140 MANUAL THERAPY 1/> REGIONS: CPT | Mod: GP | Performed by: PHYSICAL THERAPIST

## 2022-10-11 PROCEDURE — 97110 THERAPEUTIC EXERCISES: CPT | Mod: GP | Performed by: PHYSICAL THERAPIST

## 2022-10-13 ENCOUNTER — APPOINTMENT (OUTPATIENT)
Dept: URBAN - METROPOLITAN AREA CLINIC 252 | Age: 87
Setting detail: DERMATOLOGY
End: 2022-10-14

## 2022-10-13 VITALS — WEIGHT: 245 LBS | RESPIRATION RATE: 16 BRPM | HEIGHT: 66.5 IN

## 2022-10-13 DIAGNOSIS — B07.8 OTHER VIRAL WARTS: ICD-10-CM

## 2022-10-13 DIAGNOSIS — L57.0 ACTINIC KERATOSIS: ICD-10-CM

## 2022-10-13 DIAGNOSIS — L82.1 OTHER SEBORRHEIC KERATOSIS: ICD-10-CM

## 2022-10-13 DIAGNOSIS — L28.1 PRURIGO NODULARIS: ICD-10-CM

## 2022-10-13 DIAGNOSIS — D36.1 BENIGN NEOPLASM OF PERIPHERAL NERVES AND AUTONOMIC NERVOUS SYSTEM: ICD-10-CM

## 2022-10-13 DIAGNOSIS — L91.8 OTHER HYPERTROPHIC DISORDERS OF THE SKIN: ICD-10-CM

## 2022-10-13 DIAGNOSIS — Z71.89 OTHER SPECIFIED COUNSELING: ICD-10-CM

## 2022-10-13 PROBLEM — D48.5 NEOPLASM OF UNCERTAIN BEHAVIOR OF SKIN: Status: ACTIVE | Noted: 2022-10-13

## 2022-10-13 PROBLEM — D36.13 BENIGN NEOPLASM OF PERIPHERAL NERVES AND AUTONOMIC NERVOUS SYSTEM OF LOWER LIMB, INCLUDING HIP: Status: ACTIVE | Noted: 2022-10-13

## 2022-10-13 PROCEDURE — OTHER COUNSELING: OTHER

## 2022-10-13 PROCEDURE — OTHER BIOPSY BY SHAVE METHOD: OTHER

## 2022-10-13 PROCEDURE — 99203 OFFICE O/P NEW LOW 30 MIN: CPT | Mod: 25

## 2022-10-13 PROCEDURE — OTHER ADDITIONAL NOTES: OTHER

## 2022-10-13 PROCEDURE — 17003 DESTRUCT PREMALG LES 2-14: CPT

## 2022-10-13 PROCEDURE — 11102 TANGNTL BX SKIN SINGLE LES: CPT

## 2022-10-13 PROCEDURE — 17000 DESTRUCT PREMALG LESION: CPT | Mod: 59

## 2022-10-13 PROCEDURE — 11103 TANGNTL BX SKIN EA SEP/ADDL: CPT

## 2022-10-13 PROCEDURE — OTHER LIQUID NITROGEN: OTHER

## 2022-10-13 PROCEDURE — OTHER MIPS QUALITY: OTHER

## 2022-10-13 ASSESSMENT — LOCATION DETAILED DESCRIPTION DERM
LOCATION DETAILED: RIGHT MIDDLE FINGER PROXIMAL INTERPHALANGEAL JOINT
LOCATION DETAILED: RIGHT SUPERIOR UPPER BACK
LOCATION DETAILED: RIGHT SUPERIOR LATERAL BUCCAL CHEEK
LOCATION DETAILED: RIGHT POPLITEAL SKIN
LOCATION DETAILED: RIGHT LATERAL MALAR CHEEK
LOCATION DETAILED: LEFT SUPERIOR LATERAL LOWER BACK
LOCATION DETAILED: LEFT SUPERIOR UPPER BACK
LOCATION DETAILED: RIGHT NASAL SIDEWALL
LOCATION DETAILED: LEFT SUPERIOR CENTRAL MALAR CHEEK
LOCATION DETAILED: LEFT INFERIOR CENTRAL MALAR CHEEK
LOCATION DETAILED: RIGHT ANKLE
LOCATION DETAILED: RIGHT LATERAL BUCCAL CHEEK

## 2022-10-13 ASSESSMENT — LOCATION SIMPLE DESCRIPTION DERM
LOCATION SIMPLE: RIGHT ANKLE
LOCATION SIMPLE: LEFT UPPER BACK
LOCATION SIMPLE: RIGHT NOSE
LOCATION SIMPLE: RIGHT MIDDLE FINGER
LOCATION SIMPLE: RIGHT CHEEK
LOCATION SIMPLE: LEFT LOWER BACK
LOCATION SIMPLE: RIGHT UPPER BACK
LOCATION SIMPLE: RIGHT POPLITEAL SKIN
LOCATION SIMPLE: LEFT CHEEK

## 2022-10-13 ASSESSMENT — LOCATION ZONE DERM
LOCATION ZONE: LEG
LOCATION ZONE: FINGER
LOCATION ZONE: FACE
LOCATION ZONE: NOSE
LOCATION ZONE: TRUNK

## 2022-10-13 NOTE — PROCEDURE: BIOPSY BY SHAVE METHOD
Bill For Surgical Tray: no
Billing Type: Third-Party Bill
Consent: - Verbal and written consent was obtained and risks were reviewed prior to procedure today. \\n- Risks discussed include but are not limited to scarring, infection, bleeding, scabbing, incomplete removal, nerve damage, pain, and allergy to anesthesia.
Hide Anesthesia Volume?: Yes
Type Of Destruction Used: Electrodesiccation
Cryotherapy Text: The wound bed was treated with cryotherapy after the biopsy was performed.
Wound Care: Petrolatum
Biopsy Type: H and E
Electrodesiccation And Curettage Text: The wound bed was treated with electrodesiccation and curettage after the biopsy was performed.
Anesthesia Type: 2% lidocaine with epinephrine
Depth Of Biopsy: dermis
Additional Anesthesia Volume In Cc (Will Not Render If 0): 0
Information: Selecting Yes will display possible errors in your note based on the variables you have selected. This validation is only offered as a suggestion for you. PLEASE NOTE THAT THE VALIDATION TEXT WILL BE REMOVED WHEN YOU FINALIZE YOUR NOTE. IF YOU WANT TO FAX A PRELIMINARY NOTE YOU WILL NEED TO TOGGLE THIS TO 'NO' IF YOU DO NOT WANT IT IN YOUR FAXED NOTE.
Silver Nitrate Text: The wound bed was treated with silver nitrate after the biopsy was performed.
Notification Instructions: - It can take up to 2 weeks to get a biopsy result. \\n- Please refrain from calling to request results until after 2 weeks.
Biopsy Method: Dermablade
Anesthesia Volume In Cc (Will Not Render If 0): 0.5
Electrodesiccation Text: The wound bed was treated with electrodesiccation after the biopsy was performed.
Curettage Text: The wound bed was treated with curettage after the biopsy was performed.
Detail Level: Detailed
Dressing: bandage
Post-Care Instructions: WOUND CARE:\\nDo NOT submerge wound in a bath, swimming pool, or hot tub for at least 1 week or for as long as there is an open wound. Gently cleanse the site daily with mild soap and water. Be careful NOT to allow a forceful stream of water to hit the biopsy site. After cleaning/showering, apply a thin layer of petrolatum ointment or Aquaphor in the wound followed by an adhesive bandage. Continue this process daily until healed. \\n\\nBLEEDING:\\nIf you develop persistent bleeding, apply firm and steady pressure over the dressing with gauze for 15 minutes. If bleeding persists, reapply pressure with an ice pack over the gauze for 15 minutes. NEVER APPLY ICE DIRECTLY TO THE SKIN. Do NOT peek under the gauze during these 15 minutes of pressure.  Call our office at [phone] if you cannot get the bleeding to stop. \\n\\nINFECTION:\\nSigns of infection may include increasing rather than decreasing pain (after the first few days), increasing redness/swelling/heat, draining pus, pink/red streaks around the wound, and fever or chills.  Please call our office immediately at [phone] if infection is suspected. It is normal to have some mild redness on or around the wound edges; this will lighten day by day and will become less tender.\\n\\nPAIN:\\nPain is usually minimal, but if needed you may take acetaminophen (Tylenol) every four hours as needed. Applying an ice pack over the dressing for 15-20 minutes every 2-3 hours will relieve swelling, lessen pain, and help minimize bruising. NEVER APPLY ICE DIRECTLY TO THE SKIN. Avoid ibuprofen (Advil, Motrin) and naproxen (Aleve) for the first 48 hours as these can increase bleeding.\\n\\nSWELLIG AND BRUISING:\\nSwelling and bruising are common and temporary, usually lasting 1 - 2 weeks. It is more common in areas treated around the eyes, hands, and feet. In the days following the procedure, swelling and bruising can be minimized by keeping the affected areas elevated when possible, reducing salty foods, and applying ice packs over the dressing for 15-20 minutes every 2-3 hours. NEVER APPLY ICE DIRECTLY TO THE SKIN.\\n\\nITCHING:\\nItchiness on and around the wound is very common and can last several days to weeks after surgery. Mild itch is normal as the wound is healing. \\n\\nNERVE CHANGES:\\nNumbness is usually temporary, but it may last for several weeks to months. You may also experience sharp pains at the wound sight as it heals.  Mild pain is normal and will gradually improve with time.\\n \\nNO SMOKING:\\nSmoking will delay the healing process. If you smoke, we recommend trying to quit or at minimum reduce how much you smoke following a procedure.
Post-Care Instructions: WOUND CARE:\\nDo NOT submerge wound in a bath, swimming pool, or hot tub for at least 1 week or for as long as there is an open wound. Gently cleanse the site daily with mild soap and water. Be careful NOT to allow a forceful stream of water to hit the biopsy site. After cleaning/showering, apply a thin layer of petrolatum ointment or Aquaphor in the wound followed by an adhesive bandage. Continue this process daily until healed. \\n\\nBLEEDING:\\nIf you develop persistent bleeding, apply firm and steady pressure over the dressing with gauze for 15 minutes. If bleeding persists, reapply pressure with an ice pack over the gauze for 15 minutes. NEVER APPLY ICE DIRECTLY TO THE SKIN. Do NOT peek under the gauze during these 15 minutes of pressure.  Call our office at [phone] if you cannot get the bleeding to stop. \\n\\nINFECTION:\\nSigns of infection may include increasing rather than decreasing pain (after the first few days), increasing redness/swelling/heat, draining pus, pink/red streaks around the wound, and fever or chills.  Please call our office immediately at [phone] if infection is suspected. It is normal to have some mild redness on or around the wound edges; this will lighten day by day and will become less tender.\\n\\nPAIN:\\nPain is usually minimal, but if needed you may take acetaminophen (Tylenol) every four hours as needed. Applying an ice pack over the dressing for 15-20 minutes every 2-3 hours will relieve swelling, lessen pain, and help minimize bruising. NEVER APPLY ICE DIRECTLY TO THE SKIN. Avoid ibuprofen (Advil, Motrin) and naproxen (Aleve) for the first 48 hours as these can increase bleeding.\\n\\nSWELLIG AND BRUISING:\\nSwelling and bruising are common and temporary, usually lasting 1 - 2 weeks. It is more common in areas treated around the eyes, hands, and feet. In the days following the procedure, swelling and bruising can be minimized by keeping the affected areas elevated when possible, reducing salty foods, and applying ice packs over the dressing for 15-20 minutes every 2-3 hours. NEVER APPLY ICE DIRECTLY TO THE SKIN.\\n\\nITCHING:\\nItchiness on and around the wound is very common and can last several days to weeks after surgery. Mild itch is normal as the wound is healing. \\n\\nNERVE CHANGES:\\nNumbness is usually temporary, but it may last for several weeks to months. You may also experience sharp pains at the wound sight as it heals.  Mild pain is normal and will gradually improve with time.\\n \\nNO SMOKING:\\nSmoking will delay the healing process. If you smoke, we recommend trying to quit or at minimum reduce how much you smoke following a procedure.
Hemostasis: Aluminum Chloride

## 2022-10-13 NOTE — PROCEDURE: ADDITIONAL NOTES
Additional Notes: **Patient will bring medical history forms from home.
Render Risk Assessment In Note?: no
Detail Level: Simple

## 2022-10-13 NOTE — PROCEDURE: LIQUID NITROGEN
Render Note In Bullet Format When Appropriate: Yes
Detail Level: Detailed
Consent: - Discussed that these are a result of cumulative sun exposure.\\n- Verbal and written consent was obtained, and risks were reviewed prior to procedure today. \\n- Risks discussed include but are not limited to pain, crusting, scabbing, blistering, scarring, temporary or permanent darker or lighter pigmentary change, recurrence, incomplete resolution, and infection.
Post-Care Instructions: - Patient was instructed to avoid picking at any of the treated lesions.
Duration Of Freeze Thaw-Cycle (Seconds): 0

## 2022-10-13 NOTE — PROCEDURE: COUNSELING
Patient Specific Counseling (Will Not Stick From Patient To Patient): - Advised that this lesion has features that suggest a skin cancer so a biopsy is necessary to confirm the diagnosis and guide treatment.\\n- Patient expressed understanding. \\n- If skin cancer is confirmed, further treatment will be necessary.
Detail Level: Detailed
Patient Specific Counseling (Will Not Stick From Patient To Patient): **patient deferred treatment today.
Detail Level: Zone
Detail Level: Generalized

## 2022-10-19 ENCOUNTER — THERAPY VISIT (OUTPATIENT)
Dept: PHYSICAL THERAPY | Facility: CLINIC | Age: 87
End: 2022-10-19
Payer: MEDICARE

## 2022-10-19 DIAGNOSIS — M25.551 HIP PAIN, RIGHT: Primary | ICD-10-CM

## 2022-10-19 PROCEDURE — 97110 THERAPEUTIC EXERCISES: CPT | Mod: GP | Performed by: PHYSICAL THERAPIST

## 2022-10-19 PROCEDURE — 97140 MANUAL THERAPY 1/> REGIONS: CPT | Mod: GP | Performed by: PHYSICAL THERAPIST

## 2022-10-26 ENCOUNTER — THERAPY VISIT (OUTPATIENT)
Dept: PHYSICAL THERAPY | Facility: CLINIC | Age: 87
End: 2022-10-26
Payer: MEDICARE

## 2022-10-26 DIAGNOSIS — M25.551 HIP PAIN, RIGHT: Primary | ICD-10-CM

## 2022-10-26 PROCEDURE — 97110 THERAPEUTIC EXERCISES: CPT | Mod: GP | Performed by: PHYSICAL THERAPIST

## 2022-10-26 PROCEDURE — 97140 MANUAL THERAPY 1/> REGIONS: CPT | Mod: GP | Performed by: PHYSICAL THERAPIST

## 2022-10-26 NOTE — PROGRESS NOTES
"                                                                           [facility] Re-Certification     OUTPATIENT Physical Therapy ORTHOPEDIC EVALUATION  PLAN OF TREATMENT FOR OUTPATIENT REHABILITATION  (COMPLETE FOR INITIAL CLAIMS ONLY)  Patient's Last Name, First Name, M.I.  YOB: 1935  Lisa Ferguson    Provider s Name:  [facility]   Medical Record No.  [id number]   Start of Care Date:  08/30/22   Onset Date:    7/7/22 (referral date)    Treatment Diagnosis:  Hip pain, right Medical Diagnosis:  Hip pain, right       Goals:     10/26/22 0500   Treating Provider   Treating Provider Riley Clinton, PT, DPT, Cert. DN   Contact Information   Procedure Code: The timed procedures billed today were performed sequentially within this encounter. Therapeutic Exercise   Minutes: Therapeutic exercise 15   Minutes: Manual therapy 20   Rxs Authorized E&T 8 per POC   Rxs Used 8   Diagnosis Hip pain, right   Insurance Medicare   Total Treatment Time (minutes) 40   Timed Code Treatment Minutes 35   SOC Date 08/30/22   Beginning of Cert date period 10/25/22   End of Cert period date 12/07/22   Therapy Frequency 1x/week   Predicted Duration of Therapy Intervention (days/wks) 8 weeks   Date SOAP completed 10/26/22   Subjective Pt reports \"feels likeI have turned the corner\". Was very busy yesterday, more than normal, and her back and hip pain tolerated the increased activity very well. feels like mobility and pain have improved a lot. 95% improvement in back pain. switched from tylenol to advil yesterday.   Initial Pain level 3/10   Current Pain level 1/10   Manual Therapy - Self mobs are part of the HEP unless otherwise noted   Joint Mobilization  right hip LAD in open pack position x 10 min intermittent   Joint Mobilization  light inferior and lateral hip joint mobilizations at 90/90 x 5 min   STM  right hip flexor and anterior hip musculature "   ROM/Stretching - Therapeutic Exercise - All exercises are part of HEP unless otherwise noted   Knee to Chest 3 x 30s passive by PT   Hip Flexor standing hip flexor stretch 2 x 30s   Hamstring 3 x 30s passive by PT   Piriformis 2 x 30s passive by PT   Assessment   Changes in function Yes, see goal flow sheet for change in function   Adverse reactions None   Assessment of progress Pt is progressing slower than anticipated   Rx response: improvement in pain level;ROM;gait;function   Progress towards STG/LTG Continues to require PT intervention to meet STG/LTG   Plan   Plan Continue same Rx plan until Pt demonstrates readiness to progress to more complex exercises       Therapy Frequency:  1x/week  Predicted Duration of Therapy Intervention:  6 additional weeks    Large improvement in function and activity tolerance to date, progress slowed by more recent fall and increased lumbar pain (this is nearly completely resolved).     Riley Clinton, PT                 I CERTIFY THE NEED FOR THESE SERVICES FURNISHED UNDER        THIS PLAN OF TREATMENT AND WHILE UNDER MY CARE     (Physician attestation of this document indicates review and certification of the therapy plan).                     Certification Date From:  10/25/22   Certification Date To:  12/07/22    Referring Provider:  Ziyad Isaac    Initial Assessment        See Epic Evaluation SOC Date: 08/30/22

## 2022-11-02 ENCOUNTER — THERAPY VISIT (OUTPATIENT)
Dept: PHYSICAL THERAPY | Facility: CLINIC | Age: 87
End: 2022-11-02
Payer: MEDICARE

## 2022-11-02 DIAGNOSIS — M25.551 HIP PAIN, RIGHT: Primary | ICD-10-CM

## 2022-11-02 PROCEDURE — 97110 THERAPEUTIC EXERCISES: CPT | Mod: GP | Performed by: PHYSICAL THERAPIST

## 2022-11-02 PROCEDURE — 97140 MANUAL THERAPY 1/> REGIONS: CPT | Mod: GP | Performed by: PHYSICAL THERAPIST

## 2022-11-07 ENCOUNTER — APPOINTMENT (OUTPATIENT)
Dept: URBAN - METROPOLITAN AREA CLINIC 252 | Age: 87
Setting detail: DERMATOLOGY
End: 2022-11-10

## 2022-11-07 VITALS — RESPIRATION RATE: 16 BRPM | WEIGHT: 240 LBS | HEIGHT: 66 IN

## 2022-11-07 DIAGNOSIS — L82.1 OTHER SEBORRHEIC KERATOSIS: ICD-10-CM

## 2022-11-07 DIAGNOSIS — L21.8 OTHER SEBORRHEIC DERMATITIS: ICD-10-CM

## 2022-11-07 PROBLEM — C44.712 BASAL CELL CARCINOMA OF SKIN OF RIGHT LOWER LIMB, INCLUDING HIP: Status: ACTIVE | Noted: 2022-11-07

## 2022-11-07 PROBLEM — C44.319 BASAL CELL CARCINOMA OF SKIN OF OTHER PARTS OF FACE: Status: ACTIVE | Noted: 2022-11-07

## 2022-11-07 PROCEDURE — OTHER PRESCRIPTION: OTHER

## 2022-11-07 PROCEDURE — OTHER COUNSELING: OTHER

## 2022-11-07 PROCEDURE — OTHER LIQUID NITROGEN: OTHER

## 2022-11-07 PROCEDURE — 99213 OFFICE O/P EST LOW 20 MIN: CPT | Mod: 25

## 2022-11-07 PROCEDURE — OTHER CURETTAGE AND DESTRUCTION: OTHER

## 2022-11-07 PROCEDURE — 17261 DSTRJ MAL LES T/A/L .6-1.0CM: CPT

## 2022-11-07 PROCEDURE — 17110 DESTRUCT B9 LESION 1-14: CPT | Mod: 59

## 2022-11-07 RX ORDER — KETOCONAZOLE 20 MG/ML
SHAMPOO, SUSPENSION TOPICAL 2 TIMES WEEKLY
Qty: 120 | Refills: 4 | Status: ERX | COMMUNITY
Start: 2022-11-07

## 2022-11-07 RX ORDER — CLOBETASOL PROPIONATE 0.5 MG/ML
SOLUTION TOPICAL BID
Qty: 50 | Refills: 1 | Status: ERX | COMMUNITY
Start: 2022-11-07

## 2022-11-07 ASSESSMENT — LOCATION DETAILED DESCRIPTION DERM
LOCATION DETAILED: LEFT CENTRAL ZYGOMA
LOCATION DETAILED: LEFT SUPERIOR PARIETAL SCALP

## 2022-11-07 ASSESSMENT — LOCATION SIMPLE DESCRIPTION DERM
LOCATION SIMPLE: LEFT ZYGOMA
LOCATION SIMPLE: SCALP

## 2022-11-07 ASSESSMENT — LOCATION ZONE DERM
LOCATION ZONE: FACE
LOCATION ZONE: SCALP

## 2022-11-07 NOTE — PROCEDURE: COUNSELING
Patient Specific Counseling (Will Not Stick From Patient To Patient): - Reviewed the pathology report with patient and discussed the recommendation for electrodessication and curettage.

## 2022-11-07 NOTE — PROCEDURE: COUNSELING
Patient Specific Counseling (Will Not Stick From Patient To Patient): Informed pt that [facility] has been notified to call pt for appointment. Informed pt that someone from [facility] office should be calling her in the next week. Patient Specific Counseling (Will Not Stick From Patient To Patient): Informed pt that [facility] has been notified to call pt for appointment. Informed pt that someone from [facility] office should be calling her in the next week.

## 2022-11-07 NOTE — PROCEDURE: CURETTAGE AND DESTRUCTION
Hide Accession Number?: No
Anesthesia Type: 2% lidocaine with epinephrine
Size Of Lesion After Curettage: 0.9
Bill As A Line Item Or As Units: Line Item
Render Post-Care Instructions In Note?: yes
Size Of Lesion In Cm: 0.5
Additional Information: (Optional): - Petrolatum ointment followed by a pressure dressing was applied. \\n- Patient was instructed to keep pressure bandage on for 24-48 hours and instructed to contact [facility] any fevers, chills, bleeding, or severe pain develop. \\n- The patient received detailed post-op wound care instructions.
Anesthesia Volume In Cc: 1
Consent: - Verbal and written informed consent was obtained from the patient prior to the procedure today. \\n- The risks, benefits and alternatives to therapy were discussed in detail. \\n- Specifically, the risks of infection, scarring, bleeding, prolonged wound healing, nerve injury, incomplete removal, allergy to anesthesia and recurrence were addressed.  \\n- Prior to the procedure, the treatment site was clearly identified and confirmed by the patient. All components of Universal Protocol/PAUSE Rule completed.
Concentration (Mg/Ml Or Millions Of Plaque Forming Units/Cc): 0.01
Cautery Type: electrodesiccation
Post-Care Instructions: - Keep pressure bandage on for 24-48 hours. Should you develop any fevers, chills, bleeding, severe pain, contact [facility] immediately.\\n\\nWOUND CARE:\\nDo NOT submerge wound in a bath, swimming pool, or hot tub for at least 1 week or for as long as there is an open wound. Gently cleanse the site daily with mild soap and water. Be careful NOT to allow a forceful stream of water to hit the treatment site. After cleaning/showering, apply a thin layer of petrolatum ointment or Aquaphor in the wound followed by an adhesive bandage. Continue this process daily until healed. \\n\\nBLEEDING:\\nIf you develop persistent bleeding, apply firm and steady pressure over the dressing with gauze for 15 minutes. If bleeding persists, reapply pressure with an ice pack over the gauze for 15 minutes. NEVER APPLY ICE DIRECTLY TO THE SKIN. Do NOT peek under the gauze during these 15 minutes of pressure.  Call our office at [phone] if you cannot get the bleeding to stop. \\n\\nINFECTION:\\nSigns of infection may include increasing rather than decreasing pain (after the first few days), increasing redness/swelling/heat, draining pus, pink/red streaks around the wound, and fever or chills.  Please call our office immediately at [phone] if infection is suspected. It is normal to have some mild redness on or around the wound edges; this will lighten day by day and will become less tender.\\n\\nPAIN:\\nPain is usually minimal, but if needed you may take acetaminophen (Tylenol) every four hours as needed. Applying an ice pack over the dressing for 15-20 minutes every 2-3 hours will relieve swelling, lessen pain, and help minimize bruising. NEVER APPLY ICE DIRECTLY TO THE SKIN. Avoid ibuprofen (Advil, Motrin) and naproxen (Aleve) for the first 48 hours as these can increase bleeding.\\n\\nSWELLING AND BRUISING:\\nSwelling and bruising are common and temporary, usually lasting 1 - 2 weeks. It is more common in areas treated around the eyes, hands, and feet. In the days following the procedure, swelling and bruising can be minimized by keeping the affected areas elevated when possible, reducing salty foods, and applying ice packs over the dressing for 15-20 minutes every 2-3 hours. NEVER APPLY ICE DIRECTLY TO THE SKIN.\\n\\nITCHING:\\nItchiness on and around the wound is very common and can last several days to weeks after surgery. Mild itch is normal as the wound is healing. \\n\\nNERVE CHANGES:\\nNumbness is usually temporary, but it may last for several weeks to months. You may also experience sharp pains at the wound sight as it heals.  Mild pain is normal and will gradually improve with time.\\n \\nNO SMOKING:\\nSmoking will delay the healing process. If you smoke, we recommend trying to quit or at minimum reduce how much you smoke following a procedure.
Detail Level: Detailed
Number Of Curettages: 3
What Was Performed First?: Curettage

## 2022-11-07 NOTE — PROCEDURE: LIQUID NITROGEN
Detail Level: Detailed
Medical Necessity Clause: This procedure was medically necessary because the lesions that were treated were:
Show Spray Paint Technique Variable?: Yes
Add 52 Modifier (Optional): no
Consent: The patient's consent was obtained including but not limited to risks of crusting, scabbing, blistering, scarring, darker or lighter pigmentary change, recurrence, incomplete removal and infection.
Post-Care Instructions: I reviewed with the patient in detail post-care instructions. Patient is to wear sunprotection, and avoid picking at any of the treated lesions. Pt may apply Vaseline to crusted or scabbing areas.
Spray Paint Text: The liquid nitrogen was applied to the skin utilizing a spray paint frosting technique.
Medical Necessity Information: It is in your best interest to select a reason for this procedure from the list below. All of these items fulfill various CMS LCD requirements except the new and changing color options.

## 2022-11-10 ENCOUNTER — THERAPY VISIT (OUTPATIENT)
Dept: PHYSICAL THERAPY | Facility: CLINIC | Age: 87
End: 2022-11-10
Payer: MEDICARE

## 2022-11-10 DIAGNOSIS — M25.551 HIP PAIN, RIGHT: Primary | ICD-10-CM

## 2022-11-10 PROCEDURE — 97140 MANUAL THERAPY 1/> REGIONS: CPT | Mod: GP | Performed by: PHYSICAL THERAPIST

## 2022-11-10 PROCEDURE — 97110 THERAPEUTIC EXERCISES: CPT | Mod: GP | Performed by: PHYSICAL THERAPIST

## 2022-11-10 NOTE — PROGRESS NOTES
"Subjective:  HPI  Physical Exam                    Objective:  System    Physical Exam    General     ROS    Assessment/Plan:    PROGRESS  REPORT    Progress reporting period is from 8/30/22 to 11/10/22.       SUBJECTIVE  Subjective changes noted by patient:  Pt reports her symptoms are \"a tad\" better since last visit. states that she had a few instances of the right knee wanting to give out on her. No increased soreness after last visit and did feel better for about a day following the passivfe strechign and interventions at the appointment. Does report that getting into and out of the car has improved and comfortability with lying down. lower back is still doing better ut still some discomfort in her tailbone.       Current pain level is 2/10  .      Initial Pain level: 3/10.   Changes in function:  Yes (See Goal flowsheet attached for changes in current functional level)  Adverse reaction to treatment or activity: None    OBJECTIVE  Changes noted in objective findings:  Yes, less forward trunk lean with ambulation, improved hip mobility into extension. continues to have groin pain and pain over greater trotheranter with palpation         ASSESSMENT/PLAN  Updated problem list and treatment plan: Diagnosis 1:  Hip pain, right  Pain -  hot/cold therapy, self management, education and home program  Decreased ROM/flexibility - manual therapy and therapeutic exercise  Decreased joint mobility - manual therapy and therapeutic exercise  Decreased strength - therapeutic exercise and therapeutic activities  Impaired balance - neuro re-education and therapeutic activities  Impaired gait - gait training  Impaired muscle performance - neuro re-education  Decreased function - therapeutic activities  STG/LTGs have been met or progress has been made towards goals:  Yes (See Goal flow sheet completed today.)  Assessment of Progress: The patient's condition is improving.  The patient's condition has potential to improve.  The " patient's progress has slowed.  The patient has had set backs in their progress.  Self Management Plans:  Patient has been instructed in a home treatment program.  Patient is independent in a home treatment program.  Patient  has been instructed in self management of symptoms.  I have re-evaluated this patient and find that the nature, scope, duration and intensity of the therapy is appropriate for the medical condition of the patient.  Lisa continues to require the following intervention to meet STG and LTG's:  PT    Recommendations:  This patient would benefit from continued therapy.     Frequency:  1 X week, once daily  Duration:  for 4 weeks (if continued improvement would consider continuing formal therapy. If plateau occurs I feel it is warranted to get a radiograph of the hip to assess any significant arthritis that may be contributing to her symptoms.)        Please refer to the daily flowsheet for treatment today, total treatment time and time spent performing 1:1 timed codes.

## 2022-11-17 ENCOUNTER — THERAPY VISIT (OUTPATIENT)
Dept: PHYSICAL THERAPY | Facility: CLINIC | Age: 87
End: 2022-11-17
Payer: MEDICARE

## 2022-11-17 DIAGNOSIS — M25.551 HIP PAIN, RIGHT: Primary | ICD-10-CM

## 2022-11-17 PROCEDURE — 97110 THERAPEUTIC EXERCISES: CPT | Mod: GP | Performed by: PHYSICAL THERAPIST

## 2022-11-17 PROCEDURE — 97140 MANUAL THERAPY 1/> REGIONS: CPT | Mod: GP | Performed by: PHYSICAL THERAPIST

## 2022-11-18 ENCOUNTER — ONCOLOGY VISIT (OUTPATIENT)
Dept: ONCOLOGY | Facility: CLINIC | Age: 87
End: 2022-11-18
Attending: INTERNAL MEDICINE
Payer: MEDICARE

## 2022-11-18 VITALS
TEMPERATURE: 98.3 F | WEIGHT: 241.4 LBS | BODY MASS INDEX: 37.92 KG/M2 | OXYGEN SATURATION: 97 % | HEART RATE: 67 BPM | SYSTOLIC BLOOD PRESSURE: 142 MMHG | DIASTOLIC BLOOD PRESSURE: 79 MMHG

## 2022-11-18 DIAGNOSIS — M81.8 OTHER OSTEOPOROSIS WITHOUT CURRENT PATHOLOGICAL FRACTURE: Primary | ICD-10-CM

## 2022-11-18 DIAGNOSIS — Z17.0 MALIGNANT NEOPLASM OF LEFT BREAST IN FEMALE, ESTROGEN RECEPTOR POSITIVE, UNSPECIFIED SITE OF BREAST (H): ICD-10-CM

## 2022-11-18 DIAGNOSIS — C50.912 MALIGNANT NEOPLASM OF LEFT BREAST IN FEMALE, ESTROGEN RECEPTOR POSITIVE, UNSPECIFIED SITE OF BREAST (H): ICD-10-CM

## 2022-11-18 PROCEDURE — 99213 OFFICE O/P EST LOW 20 MIN: CPT | Performed by: INTERNAL MEDICINE

## 2022-11-18 RX ORDER — ANASTROZOLE 1 MG/1
1 TABLET ORAL DAILY
Qty: 90 TABLET | Refills: 3 | Status: SHIPPED | OUTPATIENT
Start: 2022-11-18 | End: 2023-02-02

## 2022-11-18 ASSESSMENT — PAIN SCALES - GENERAL: PAINLEVEL: MILD PAIN (2)

## 2022-11-18 NOTE — LETTER
11/18/2022         RE: Lisa Ferguson  [address]  [place] [address]        Dear Colleague,    Thank you for referring your patient, Lisa Ferguson, to the [facility]. Please see a copy of my visit note below.    [facility]  MEDICAL ONCOLOGY    RETURN PATIENT VISIT NOTE    Reason for visit: breast cancer    Oncology Treatment Summary  1.  Patient self palpated abnormality in left breast in November 2020.  11/16/2020 diagnostic mammogram and ultrasound were done which found 2 lesions within the left breast 1 measuring approximately 1.5 cm and the second 1.9 cm.  Both were biopsied the one at 3:00 was a grade 2 invasive lobular cancer.  The one at 11:00 was a grade 2 invasive ductal cancer.  It was estrogen receptor positive progesterone receptor positive HER-2/kena indeterminate by IHC and negative by ALBERTO  2.  1/6/2021 patient underwent bilateral mastectomies.  Right mastectomy was unremarkable.  Left mastectomy found a multifocal breast cancer the first measuring 4.5 cm is a grade 2 invasive lobular carcinoma, the second was a 2.5 cm grade 2 malignancy.  2 sentinel lymph nodes were removed 1 of which was negative the other 1 had immunohistochemistry positive only cells for a T M2N0i+M0 ER/AR positive HER-2/kena negative disease  3.  Oncotype RS: one was 22 and other was 6 both low risk  4.  Genetic testing was done that was unremarkable  5. 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 She had a fall this summer with a L2 slight fracture. She has been doing PT since for it. She has had no other new medical problems or issues.     PAST MEDICAL HISTORY  Coronary artery disease, peptic ulcer disease, anxiety, hyperlipidemia, mitral regurgitation, hypertension, degenerative  joint disease, right bundle branch block, irritable bowel, sciatica, hypothyroidism, obstructive sleep apnea, congestive heart failure, gout, nephrolithiasis      CURRENT OUTPATIENT MEDICATIONS  Current Outpatient Medications   Medication     allopurinol (ZYLOPRIM) 100 MG tablet     allopurinol (ZYLOPRIM) 300 MG tablet     anastrozole (ARIMIDEX) 1 MG tablet     aspirin 81 MG EC tablet     betamethasone dipropionate (DIPROSONE) 0.05 % external lotion     levothyroxine (SYNTHROID/LEVOTHROID) 125 MCG tablet     losartan (COZAAR) 50 MG tablet     metoprolol tartrate (LOPRESSOR) 25 MG tablet     rosuvastatin (CRESTOR) 5 MG tablet     torsemide (DEMADEX) 10 MG tablet     order for DME     No current facility-administered medications for this visit.        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  She has a history of smoking but quit this 40 years ago.  Occasional alcohol use     FAMILY HISTORY  Her sister  of breast cancer at 62 and did have a BRCA mutation.  Patient's niece also carries a BRCA mutation.  She was tested for this and does not..  Her genetic testing was negative     REVIEW OF SYSTEMS  Review Of Systems  Skin: negative  Eyes: negative  Ears/Nose/Throat: negative  Respiratory: No shortness of breath, dyspnea on exertion, cough, or hemoptysis  Cardiovascular: negative  Gastrointestinal: negative  Genitourinary: negative  Musculoskeletal: negative  Neurologic: negative  Psychiatric: negative  Hematologic/Lymphatic/Immunologic:  negative  Endocrine: negative      PHYSICAL EXAM  B/P: Data Unavailable, T: Data Unavailable, P: Data Unavailable, R: Data Unavailable  Wt Readings from Last 3 Encounters:   11/18/22 109.5 kg (241 lb 6.4 oz)   07/07/22 112.9 kg (249 lb)   06/13/22 114.3 kg (252 lb)       Physical Exam  Vitals reviewed.   Constitutional:       Appearance: Normal appearance.   HENT:      Head: Normocephalic and atraumatic.   Eyes:      Extraocular Movements: Extraocular movements intact.      Conjunctiva/sclera: Conjunctivae normal.      Pupils: Pupils are equal, round, and reactive to light.   Pulmonary:      Effort: Pulmonary effort is normal.   Neurological:      General: No focal deficit present.      Mental Status: She is alert and oriented to person, place, and time. Mental status is at baseline.   Psychiatric:         Mood and Affect: Mood normal.         Behavior: Behavior normal.         Thought Content: Thought content normal.         Judgment: Judgment normal.             LABORATORY AND IMAGING STUDIES  Recent Labs   Lab Test 07/07/22  1125 01/07/21  0739 01/06/21  1116 12/29/20  1227 10/25/19  0947 10/04/19  0952    133  --  136 139 138   POTASSIUM 3.8 3.7 3.6 4.2 4.3 4.0   CHLORIDE 106 101  --  102 106 103   CO2 27 28  --  29 25 26   ANIONGAP 7 4  --  5 8 9   BUN 20 18  --  20 15 22   CR 0.82 0.79  --  0.80 0.67 0.95   * 120*  --  97 99 106*   LUCIANO 10.8* 9.3  --  9.9 10.4* 10.9*     No results for input(s): MAG, PHOS in the last 63774 hours.  Recent Labs   Lab Test 07/07/22  1125 01/07/21  0738 12/29/20  1227 10/28/19  1135 10/17/18  1220 09/13/18  1122 08/01/17  1117   WBC 10.4  --  10.8 8.9 11.9* 9.1 8.5   HGB 13.1 11.2* 12.9 13.1 13.4 13.8 14.4     --  337 362 392 291 311   MCV 88  --  91 91 89 87 87   NEUTROPHIL  --   --  58.1 48.1 60.6 45.1 46.3     Recent Labs   Lab Test 07/07/22  1125 10/28/19  1135 09/13/18  1122 01/03/17  1106 03/23/15  1213   ALT 34 35 35   < > 36   AST  --   --   --   --  28     < > = values in this interval not displayed.     TSH   Date Value Ref Range Status   07/07/2022 2.94 0.40 - 4.00 mU/L Final   02/17/2021 3.80 0.40 - 4.00 mU/L Final   12/29/2020 23.61 (H) 0.40 - 4.00 mU/L Final   10/28/2019 1.75 0.40 - 4.00 mU/L Final     No results for input(s): CEA in the last 30779 hours.  Results for orders placed or performed in visit on 10/05/22   XR Lumbar Spine 2/3 Views    Narrative    LUMBAR SPINE TWO TO THREE VIEWS  10/5/2022 11:15 AM     HISTORY: Back pain, unspecified back location, unspecified back pain  laterality, unspecified chronicity.    COMPARISON: None.      Impression    IMPRESSION: There are five nonrib-bearing lumbar vertebral bodies.  There is an age-indeterminate superior endplate compression fracture  with mild height loss involving the L2 vertebral body; recent fracture  is not excluded. No obvious fracture fragment retropulsion into the  spinal canal. Shallow concavity of the L3 superior endplate is  nonspecific, but may be due to degenerative endplate remodeling.  Otherwise, no gross vertebral body height loss identified. Grade 1  bordering on grade 2 anterolisthesis of L4 on L5, likely on a  degenerative basis, measuring up to 9-10 mm. Grade 1 presumed  degenerative anterolisthesis of L5 on S1 measuring approximately 4-5  mm. Minimal degenerative anterolisthesis of L3 on L4 measuring  approximately 2 mm and mild levoconvex curvature with apex at L3.  Moderate disc space narrowing at L3-L4 and L4-L5 with mild/mild to  moderate disc space narrowing elsewhere in the lumbar spine.  Multilevel degenerative facet disease, appearing most pronounced in  the lower lumbar spine. Atherosclerotic calcifications of the aorta  and iliac arteries. Right hip osteoarthritis. Nonspecific small  calcification projecting over the right upper quadrant adjacent to  right upper quadrant surgical clips.    KORINA NORRIS MD         SYSTEM ID:  [id number]          ASSESSMENT  AND  RECOMMENDATIONS:    1. Tm2N0i+M0 ER positive DE positive HER-2/kena negative breast cancer status post left mastectomy with both tumors having a low risk Oncotype  2.  Numerous other medical problems as delineated above    Plan    1. Continue arimidex  2. rediscussed taking vitamin D3  3. Next dexa on return in 6 months.  4. Continue to follow-up with PCP regarding other medical issues  5. See me back in 6 months.    Tamar Georges MD on 11/18/2022 at 2:22 PM                      Again, thank you for allowing me to participate in the care of your patient.        Sincerely,        Tamar Georges MD

## 2022-11-18 NOTE — PROGRESS NOTES
[facility] PHYSICIANS  MEDICAL ONCOLOGY    RETURN PATIENT VISIT NOTE    Reason for visit: breast cancer    Oncology Treatment Summary  1.  Patient self palpated abnormality in left breast in November 2020.  11/16/2020 diagnostic mammogram and ultrasound were done which found 2 lesions within the left breast 1 measuring approximately 1.5 cm and the second 1.9 cm.  Both were biopsied the one at 3:00 was a grade 2 invasive lobular cancer.  The one at 11:00 was a grade 2 invasive ductal cancer.  It was estrogen receptor positive progesterone receptor positive HER-2/kena indeterminate by IHC and negative by ALBERTO  2.  1/6/2021 patient underwent bilateral mastectomies.  Right mastectomy was unremarkable.  Left mastectomy found a multifocal breast cancer the first measuring 4.5 cm is a grade 2 invasive lobular carcinoma, the second was a 2.5 cm grade 2 malignancy.  2 sentinel lymph nodes were removed 1 of which was negative the other 1 had immunohistochemistry positive only cells for a T M2N0i+M0 ER/CA positive HER-2/kena negative disease  3.  Oncotype RS: one was 22 and other was 6 both low risk  4.  Genetic testing was done that was unremarkable  5. 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 She had a fall this summer with a L2 slight fracture. She has been doing PT since for it. She has had no other new medical problems or issues.     PAST MEDICAL HISTORY  Coronary artery disease, peptic ulcer disease, anxiety, hyperlipidemia, mitral regurgitation, hypertension, degenerative joint disease, right bundle branch block, irritable bowel, sciatica, hypothyroidism, obstructive sleep apnea, congestive heart failure, gout, nephrolithiasis      CURRENT OUTPATIENT MEDICATIONS  Current Outpatient Medications   Medication     allopurinol (ZYLOPRIM)  100 MG tablet     allopurinol (ZYLOPRIM) 300 MG tablet     anastrozole (ARIMIDEX) 1 MG tablet     aspirin 81 MG EC tablet     betamethasone dipropionate (DIPROSONE) 0.05 % external lotion     levothyroxine (SYNTHROID/LEVOTHROID) 125 MCG tablet     losartan (COZAAR) 50 MG tablet     metoprolol tartrate (LOPRESSOR) 25 MG tablet     rosuvastatin (CRESTOR) 5 MG tablet     torsemide (DEMADEX) 10 MG tablet     order for DME     No current facility-administered medications for this visit.        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  She has a history of smoking but quit this 40 years ago.  Occasional alcohol use     FAMILY HISTORY  Her sister  of breast cancer at 62 and did have a BRCA mutation.  Patient's niece also carries a BRCA mutation.  She was tested for this and does not..  Her genetic testing was negative     REVIEW OF SYSTEMS  Review Of Systems  Skin: negative  Eyes: negative  Ears/Nose/Throat: negative  Respiratory: No shortness of breath, dyspnea on exertion, cough, or hemoptysis  Cardiovascular: negative  Gastrointestinal: negative  Genitourinary: negative  Musculoskeletal: negative  Neurologic: negative  Psychiatric: negative  Hematologic/Lymphatic/Immunologic: negative  Endocrine: negative      PHYSICAL EXAM  B/P: Data Unavailable, T: Data Unavailable, P: Data Unavailable, R: Data Unavailable  Wt Readings from Last 3 Encounters:   22 109.5 kg (241 lb 6.4 oz)   22 112.9 kg (249 lb)   22 114.3 kg (252 lb)        Physical Exam  Vitals reviewed.   Constitutional:       Appearance: Normal appearance.   HENT:      Head: Normocephalic and atraumatic.   Eyes:      Extraocular Movements: Extraocular movements intact.      Conjunctiva/sclera: Conjunctivae normal.      Pupils: Pupils are equal, round, and reactive to light.   Pulmonary:      Effort: Pulmonary effort is normal.   Neurological:      General: No focal deficit present.      Mental Status: She is alert and oriented to person, place, and time. Mental status is at baseline.   Psychiatric:         Mood and Affect: Mood normal.         Behavior: Behavior normal.         Thought Content: Thought content normal.         Judgment: Judgment normal.             LABORATORY AND IMAGING STUDIES  Recent Labs   Lab Test 07/07/22  1125 01/07/21  0739 01/06/21  1116 12/29/20  1227 10/25/19  0947 10/04/19  0952    133  --  136 139 138   POTASSIUM 3.8 3.7 3.6 4.2 4.3 4.0   CHLORIDE 106 101  --  102 106 103   CO2 27 28  --  29 25 26   ANIONGAP 7 4  --  5 8 9   BUN 20 18  --  20 15 22   CR 0.82 0.79  --  0.80 0.67 0.95   * 120*  --  97 99 106*   LUCIANO 10.8* 9.3  --  9.9 10.4* 10.9*     No results for input(s): MAG, PHOS in the last 22610 hours.  Recent Labs   Lab Test 07/07/22  1125 01/07/21  0738 12/29/20  1227 10/28/19  1135 10/17/18  1220 09/13/18  1122 08/01/17  1117   WBC 10.4  --  10.8 8.9 11.9* 9.1 8.5   HGB 13.1 11.2* 12.9 13.1 13.4 13.8 14.4     --  337 362 392 291 311   MCV 88  --  91 91 89 87 87   NEUTROPHIL  --   --  58.1 48.1 60.6 45.1 46.3     Recent Labs   Lab Test 07/07/22  1125 10/28/19  1135 09/13/18  1122 01/03/17  1106 03/23/15  1213   ALT 34 35 35   < > 36   AST  --   --   --   --  28    < > = values in this interval not displayed.     TSH   Date Value Ref Range Status   07/07/2022 2.94 0.40 - 4.00 mU/L Final   02/17/2021 3.80 0.40 - 4.00 mU/L Final   12/29/2020 23.61 (H) 0.40 - 4.00 mU/L Final   10/28/2019 1.75 0.40 - 4.00 mU/L Final     No  results for input(s): CEA in the last 91526 hours.  Results for orders placed or performed in visit on 10/05/22   XR Lumbar Spine 2/3 Views    Narrative    LUMBAR SPINE TWO TO THREE VIEWS  10/5/2022 11:15 AM     HISTORY: Back pain, unspecified back location, unspecified back pain  laterality, unspecified chronicity.    COMPARISON: None.      Impression    IMPRESSION: There are five nonrib-bearing lumbar vertebral bodies.  There is an age-indeterminate superior endplate compression fracture  with mild height loss involving the L2 vertebral body; recent fracture  is not excluded. No obvious fracture fragment retropulsion into the  spinal canal. Shallow concavity of the L3 superior endplate is  nonspecific, but may be due to degenerative endplate remodeling.  Otherwise, no gross vertebral body height loss identified. Grade 1  bordering on grade 2 anterolisthesis of L4 on L5, likely on a  degenerative basis, measuring up to 9-10 mm. Grade 1 presumed  degenerative anterolisthesis of L5 on S1 measuring approximately 4-5  mm. Minimal degenerative anterolisthesis of L3 on L4 measuring  approximately 2 mm and mild levoconvex curvature with apex at L3.  Moderate disc space narrowing at L3-L4 and L4-L5 with mild/mild to  moderate disc space narrowing elsewhere in the lumbar spine.  Multilevel degenerative facet disease, appearing most pronounced in  the lower lumbar spine. Atherosclerotic calcifications of the aorta  and iliac arteries. Right hip osteoarthritis. Nonspecific small  calcification projecting over the right upper quadrant adjacent to  right upper quadrant surgical clips.    KORINA NORRIS MD         SYSTEM ID:  [id number]          ASSESSMENT  AND RECOMMENDATIONS:    1. Tm2N0i+M0 ER positive KS positive HER-2/kena negative breast cancer status post left mastectomy with both tumors having a low risk Oncotype  2.  Numerous other medical problems as delineated above    Plan    1. Continue arimidex  2. rediscussed taking  vitamin D3  3. Next dexa on return in 6 months.  4. Continue to follow-up with PCP regarding other medical issues  5. See me back in 6 months.    Tamar Georges MD on 11/18/2022 at 2:22 PM

## 2022-11-18 NOTE — NURSING NOTE
"Oncology Rooming Note    November 18, 2022 2:00 PM   Lisa Ferguson is a 87 year old female who presents for:    No chief complaint on file.    Initial Vitals: BP (!) 142/79 (BP Location: Left arm, Patient Position: Sitting, Cuff Size: Adult Large)   Pulse 67   Temp 98.3  F (36.8  C) (Oral)   Wt 109.5 kg (241 lb 6.4 oz)   SpO2 97%   BMI 37.92 kg/m   Estimated body mass index is 37.92 kg/m  as calculated from the following:    Height as of 4/6/22: 1.699 m (5' 6.9\").    Weight as of this encounter: 109.5 kg (241 lb 6.4 oz). Body surface area is 2.27 meters squared.  Mild Pain (2) Comment: Data Unavailable   No LMP recorded. Patient is postmenopausal.  Allergies reviewed: Yes  Medications reviewed: Yes    Medications: Medication refills not needed today.  Pharmacy name entered into [employer]: [id number] - [place], [place] - [address] [place] [address] AT [address]SECTION [address]    Clinical concerns: None at this time.    Maine Hooper RN                "

## 2022-11-22 ENCOUNTER — THERAPY VISIT (OUTPATIENT)
Dept: PHYSICAL THERAPY | Facility: CLINIC | Age: 87
End: 2022-11-22
Payer: MEDICARE

## 2022-11-22 DIAGNOSIS — M25.551 HIP PAIN, RIGHT: Primary | ICD-10-CM

## 2022-11-22 PROCEDURE — 97110 THERAPEUTIC EXERCISES: CPT | Mod: GP | Performed by: PHYSICAL THERAPIST

## 2022-11-22 PROCEDURE — 97140 MANUAL THERAPY 1/> REGIONS: CPT | Mod: GP | Performed by: PHYSICAL THERAPIST

## 2022-11-23 ENCOUNTER — E-VISIT (OUTPATIENT)
Dept: FAMILY MEDICINE | Facility: CLINIC | Age: 87
End: 2022-11-23
Payer: MEDICARE

## 2022-11-23 ENCOUNTER — ANCILLARY PROCEDURE (OUTPATIENT)
Dept: GENERAL RADIOLOGY | Facility: CLINIC | Age: 87
End: 2022-11-23
Attending: FAMILY MEDICINE
Payer: MEDICARE

## 2022-11-23 DIAGNOSIS — S32.020G CLOSED COMPRESSION FRACTURE OF L2 LUMBAR VERTEBRA WITH DELAYED HEALING, SUBSEQUENT ENCOUNTER: Primary | ICD-10-CM

## 2022-11-23 DIAGNOSIS — S32.020G CLOSED COMPRESSION FRACTURE OF L2 LUMBAR VERTEBRA WITH DELAYED HEALING, SUBSEQUENT ENCOUNTER: ICD-10-CM

## 2022-11-23 PROBLEM — M16.11 DEGENERATIVE JOINT DISEASE OF RIGHT HIP: Status: ACTIVE | Noted: 2022-11-23

## 2022-11-23 PROCEDURE — 99421 OL DIG E/M SVC 5-10 MIN: CPT | Performed by: FAMILY MEDICINE

## 2022-11-23 PROCEDURE — 73502 X-RAY EXAM HIP UNI 2-3 VIEWS: CPT | Mod: TC | Performed by: RADIOLOGY

## 2022-11-23 NOTE — PATIENT INSTRUCTIONS
Lisa,    Thank you for choosing us for your care. Based on your symptoms and prior evaluation, I recommend a back CT. Call the imaging center at [phone] or  [phone] to schedule your CT.     I also recommend a right hip x-ray and DEXA bone scan (to test for osteoporosis). Call our appointment line at [phone] or go to [email or web address] to schedule these at an x-ray only appointment at [facility].     Please follow the care advise I've provided and use the over the counter medications to help relieve your symptoms.   View your full visit summary for details by clicking on the link below.     If you're not feeling better, please schedule an in person appointment with your provider to consider pain management. You can schedule an appointment right here in [facility], or call [phone]  If the visit is for the same symptoms as your eVisit, we'll refund the cost of your eVisit if seen within seven days.      Virginia Villagomez MD

## 2022-11-23 NOTE — RESULT ENCOUNTER NOTE
Lisa,    You have severe right hip arthritis. I recommend taking over-the-counter tylenol arthritis 650 mg three times daily as needed for pain. Consider seeing an orthopedic surgeon or Sports Medicine provider in consultation, particularly if this is recommended by your primary care provider.     Best,  Virginia Villagomez MD

## 2022-12-01 ENCOUNTER — ANCILLARY PROCEDURE (OUTPATIENT)
Dept: BONE DENSITY | Facility: CLINIC | Age: 87
End: 2022-12-01
Attending: INTERNAL MEDICINE
Payer: MEDICARE

## 2022-12-01 ENCOUNTER — ANCILLARY PROCEDURE (OUTPATIENT)
Dept: CT IMAGING | Facility: CLINIC | Age: 87
End: 2022-12-01
Attending: FAMILY MEDICINE
Payer: MEDICARE

## 2022-12-01 DIAGNOSIS — M81.8 OTHER OSTEOPOROSIS WITHOUT CURRENT PATHOLOGICAL FRACTURE: ICD-10-CM

## 2022-12-01 DIAGNOSIS — S32.020G CLOSED COMPRESSION FRACTURE OF L2 LUMBAR VERTEBRA WITH DELAYED HEALING, SUBSEQUENT ENCOUNTER: ICD-10-CM

## 2022-12-01 PROBLEM — M48.061 LUMBAR SPINAL STENOSIS: Status: ACTIVE | Noted: 2022-12-01

## 2022-12-01 PROCEDURE — G1010 CDSM STANSON: HCPCS | Mod: GC | Performed by: RADIOLOGY

## 2022-12-01 PROCEDURE — 77081 DXA BONE DENSITY APPENDICULR: CPT | Mod: XU | Performed by: RADIOLOGY

## 2022-12-01 PROCEDURE — 72131 CT LUMBAR SPINE W/O DYE: CPT | Mod: ME | Performed by: RADIOLOGY

## 2022-12-01 PROCEDURE — 77080 DXA BONE DENSITY AXIAL: CPT | Performed by: RADIOLOGY

## 2022-12-06 ENCOUNTER — THERAPY VISIT (OUTPATIENT)
Dept: PHYSICAL THERAPY | Facility: CLINIC | Age: 87
End: 2022-12-06
Payer: MEDICARE

## 2022-12-06 DIAGNOSIS — M25.551 HIP PAIN, RIGHT: Primary | ICD-10-CM

## 2022-12-06 PROCEDURE — 97110 THERAPEUTIC EXERCISES: CPT | Mod: GP | Performed by: PHYSICAL THERAPIST

## 2022-12-06 PROCEDURE — 97140 MANUAL THERAPY 1/> REGIONS: CPT | Mod: GP | Performed by: PHYSICAL THERAPIST

## 2022-12-07 ENCOUNTER — TELEPHONE (OUTPATIENT)
Dept: FAMILY MEDICINE | Facility: CLINIC | Age: 87
End: 2022-12-07

## 2022-12-07 NOTE — TELEPHONE ENCOUNTER
Left message on patient's VM requesting a return call to [facility] [phone]     Bj Wilder RN  [facility]

## 2022-12-07 NOTE — TELEPHONE ENCOUNTER
Reason for Call:  Other appointment    Detailed comments: patient had a fall in August and has had PT for 12 weeks, has lost 30# without even trying.  Needs help navigating care for this.  She needs to be seen before Jan.  The next available appointment    Phone Number Patient can be reached at: Cell number on file:    Telephone Information:   Mobile [phone]       Best Time: anytime    Can we leave a detailed message on this number? YES    Call taken on 12/7/2022 at 11:39 AM by Bethany Downs

## 2022-12-08 ENCOUNTER — TELEPHONE (OUTPATIENT)
Dept: FAMILY MEDICINE | Facility: CLINIC | Age: 87
End: 2022-12-08

## 2022-12-08 DIAGNOSIS — M25.551 HIP PAIN, RIGHT: Primary | ICD-10-CM

## 2022-12-08 NOTE — TELEPHONE ENCOUNTER
Patient calling to request next steps for right hip treatment (surgery, PT)?  Patient would like to know if she could get an earlier appointment or telephone visit.    Gave patient [facility] clinic number to call for earlier appointment; this writer only found 1/02/2023 for earliest with PCP.    Patient received info from 12/01/2022 CT Lumbar Spine; waiting on hip imaging results from 12/01/2022.    FYI-Patient wanted Dr. Isaac to know her symptoms resolved overnight after stopping Arimidex yesterday (okay'd per oncology physician per patient); Anxiety, CP, burning sensation in legs all went away.    Viviana Pritchard, RN

## 2022-12-09 ENCOUNTER — MYC MEDICAL ADVICE (OUTPATIENT)
Dept: INTERNAL MEDICINE | Facility: CLINIC | Age: 87
End: 2022-12-09

## 2022-12-09 NOTE — TELEPHONE ENCOUNTER
Patient returning call regarding previous message below.     Patient inquiring regarding next steps for right hip treatment. Patient was informed by PT that requires additional orders to continue PT in clinic.     Patient also would like interpretation of recent imaging results. Relayed provider's message on 12/1 CT lumbar spine imaging, patient inquiring regarding DEXA scans performed on 12/1.    Assisted with booking soonest available appointment on 1/2/23 at 1:40 pm. Patient would like to know if ok to wait until appointment to discuss next steps.     Patient requesting call back on mobile number on file.    Please advise.     Brendan Pardo RN  [facility]

## 2022-12-09 NOTE — TELEPHONE ENCOUNTER
I called patient and we reviewed these issues    End of July - right leg soreness came in for physical therapy , then she had a fall  To her back porch, xrays were ordered , possibly lumbar back fracture ?    She kept working with physical therapy , got worse and worse ,    Lost 30 pounds   Not eating or sleeping   Virginia Gonzalez MD - DEXA scan was ordered and a lumbar spine CT scan - patient unsure of the results     Patient feels unguided with her issues     She saw her oncologist and had her xrays looked at. No improvement with 14 times of physical therapy     She did her own homework and decided to stop taking the Arimidex (anastrozole)  . This medication was stopped 3 days ago and already her pains felt better, especially the left lower extremity  And her appetite  Returned.    See lumbar spine CT scan with L2 vertebral compression fracture    Normal hip xray 11-23-22    DEXA scan within normal limits     We agree at this point with the hip being per pre-eminent concern that she needs an orthopedic consultation for further evaluation and management     Message reviewed , orders signed     Ziyad Isaac MD

## 2022-12-12 ENCOUNTER — OFFICE VISIT (OUTPATIENT)
Dept: ORTHOPEDICS | Facility: CLINIC | Age: 87
End: 2022-12-12
Payer: MEDICARE

## 2022-12-12 ENCOUNTER — MYC MEDICAL ADVICE (OUTPATIENT)
Dept: FAMILY MEDICINE | Facility: CLINIC | Age: 87
End: 2022-12-12

## 2022-12-12 VITALS
WEIGHT: 241 LBS | DIASTOLIC BLOOD PRESSURE: 87 MMHG | SYSTOLIC BLOOD PRESSURE: 167 MMHG | HEART RATE: 66 BPM | BODY MASS INDEX: 37.86 KG/M2

## 2022-12-12 DIAGNOSIS — M25.551 HIP PAIN, RIGHT: ICD-10-CM

## 2022-12-12 DIAGNOSIS — M16.11 ARTHRITIS OF RIGHT HIP: Primary | ICD-10-CM

## 2022-12-12 PROCEDURE — 99203 OFFICE O/P NEW LOW 30 MIN: CPT | Performed by: PEDIATRICS

## 2022-12-12 NOTE — PROGRESS NOTES
ASSESSMENT & PLAN    Lisa was seen today for pain.    Diagnoses and all orders for this visit:    Arthritis of right hip  -     Orthopedic  Referral; Future    Hip pain, right  -     Orthopedic  Referral  -     Orthopedic  Referral; Future      This issue is acute on chronic and Unchanged.    Discussed nature of degenerative arthritis of the hip. Discussed symptom treatment with over-the-counter medications and rest if needed. Discussed benefits of possible physical therapy. Discussed injection therapy. Also briefly discussed future consideration of referral to orthopedic surgery for further evaluation and discussion of arthroplasty.    Patient has a listed allergy to cortisone. This was high blood pressure after an injection. Since then she have had other joint injections and tolerated them without problems. She would like to try one for her hip.    Plan:  - Today's Plan of Care:  Referral for US guided right hip injection  Continue PT and Home Exercise Program  Discussed activity considerations and other supportive care including Ice/Heat, OTC and other topical medications as needed.    -We also discussed other future treatment options:  MRI if more severe pain  Referral to Spine if more leg pain and radiating pain    Follow Up: 6 - 8 weeks and as needed    Concerning signs and symptoms were reviewed.  The patient expressed understanding of this management plan and all questions were answered at this time.    Siena Mathur MD [facility]  Sports Medicine Physician  [facility]      -----  Chief Complaint   Patient presents with     Right Hip - Pain       SUBJECTIVE  Lisa Ferguson is a/an 87 year old female who is seen in consultation at the request of  Ziyad Isaac M.D. for evaluation of right hip pain.     The patient is seen by themselves.    Onset: 3 month(s) ago (8/18/2022). Patient describes injury as she was stepping off her patio and she landed wrong. Can't  remember if she landed on her right side. It's been bothering her even before the fall - at that point couldn't raise her leg up to get into a car.  - Has leg pain as well, reports Arimidex could cause leg pain    Location of Pain: anterior hip pain and radiates into the groin  Worsened by: walking, up and down stairs, standing, sleeping  Better with: pain medication, stretching   Treatments tried: tylenol arthritis 3x a day, physical therapy (14 visits), stretching  Associated symptoms: numbness, tingling and locking or catching    Orthopedic/Surgical history: sciatica in the right leg, had cortisone injections 10+ years ago, cyst removal in LB 10+ years   Social History/Occupation: retired     No family history pertinent to patient's problem today.     REVIEW OF SYSTEMS:  Review of Systems  Skin: no bruising, no swelling  Musculoskeletal: as above  Neurologic: no numbness, paresthesias  Remainder of review of systems is negative including constitutional, CV, pulmonary, GI, except as noted in HPI or medical history.    OBJECTIVE:  BP (!) 167/87 (BP Location: Right arm)   Pulse 66   Wt 109.3 kg (241 lb)   BMI 37.86 kg/m     General: healthy, alert and in no distress  HEENT: no scleral icterus or conjunctival erythema  Skin: no suspicious lesions or rash. No jaundice.  CV: distal perfusion intact  Resp: normal respiratory effort without conversational dyspnea   Psych: normal mood and affect  Gait: antalgic  Neuro: Normal light sensory exam of lower extremity    Bilateral hip exam  Inspection:      no edema or ecchymosis in hip area    Tender:      none bilateral    Non Tender:      remainder of the hip area bilateral    ROM:     Range of motion limited on the right in flexion, internal and external rotation    Strength:      flexion 4/5 right       abduction 5/5 bilateral       adduction 5/5 bilateral    Sensation:      grossly intact in hip and thigh    Special Tests:      neg (-) CRISTÓBAL right       positive (+)  CHANTE right      RADIOLOGY:  I independently visualized and reviewed these images with the patient  Reviewed 2 views of the right hip from 11/23/2022-moderate to severe arthritis in the right hip      Review of prior external note(s) from - PCP notes  Review of the result(s) of each unique test - XR

## 2022-12-12 NOTE — PATIENT INSTRUCTIONS
Discussed nature of degenerative arthritis of the hip. Discussed symptom treatment with over-the-counter medications and rest if needed. Discussed benefits of possible physical therapy. Discussed injection therapy. Also briefly discussed future consideration of referral to orthopedic surgery for further evaluation and discussion of arthroplasty.    Patient has a listed allergy to cortisone. This was high blood pressure after an injection. Since then she have had other joint injections and tolerated them without problems. She would like to try one for her hip.    Plan:  - Today's Plan of Care:  Referral for US guided right hip injection  Continue PT and Home Exercise Program  Discussed activity considerations and other supportive care including Ice/Heat, OTC and other topical medications as needed.    -We also discussed other future treatment options:  MRI if more severe pain  Referral to Spine if more leg pain and radiating pain    Follow Up: 6 - 8 weeks and as needed    If you have any further questions for your physician or physician s care team you can call [phone] and use option 3 to leave a voice message.

## 2022-12-12 NOTE — TELEPHONE ENCOUNTER
[employer] message sent to patient. Closing encounter.     Thanks,  CIPRIANO Deal  [facility]

## 2022-12-12 NOTE — TELEPHONE ENCOUNTER
[employer] message sent to patient.     Thanks,  CIPRIANO Deal  [facility]

## 2022-12-12 NOTE — LETTER
12/12/2022         RE: Lisa Ferguson  [address]  [place] [address]        Dear Colleague,    Thank you for referring your patient, Lisa Ferguson, to the [facility] [place]. Please see a copy of my visit note below.    ASSESSMENT & PLAN    Lisa was seen today for pain.    Diagnoses and all orders for this visit:    Arthritis of right hip  -     Orthopedic  Referral; Future    Hip pain, right  -     Orthopedic  Referral  -     Orthopedic  Referral; Future      This issue is acute on chronic and Unchanged.    Discussed nature of degenerative arthritis of the hip. Discussed symptom treatment with over-the-counter medications and rest if needed. Discussed benefits of possible physical therapy. Discussed injection therapy. Also briefly discussed future consideration of referral to orthopedic surgery for further evaluation and discussion of arthroplasty.    Patient has a listed allergy to cortisone. This was high blood pressure after an injection. Since then she have had other joint injections and tolerated them without problems. She would like to try one for her hip.    Plan:  - Today's Plan of Care:  Referral for US guided right hip injection  Continue PT and Home Exercise Program  Discussed activity considerations and other supportive care including Ice/Heat, OTC and other topical medications as needed.    -We also discussed other future treatment options:  MRI if more severe pain  Referral to Spine if more leg pain and radiating pain    Follow Up: 6 - 8 weeks and as needed    Concerning signs and symptoms were reviewed.  The patient expressed understanding of this management plan and all questions were answered at this time.    Siena Mathur MD [facility]  Sports Medicine Physician  [facility]      -----  Chief Complaint   Patient presents with     Right Hip - Pain       SUBJECTIVE  Lisa Ferguson is a/an 87 year old female  who is seen in consultation at the request of  Ziyad Isaac M.D. for evaluation of right hip pain.     The patient is seen by themselves.    Onset: 3 month(s) ago (8/18/2022). Patient describes injury as she was stepping off her patio and she landed wrong. Can't remember if she landed on her right side. It's been bothering her even before the fall - at that point couldn't raise her leg up to get into a car.  - Has leg pain as well, reports Arimidex could cause leg pain    Location of Pain: anterior hip pain and radiates into the groin  Worsened by: walking, up and down stairs, standing, sleeping  Better with: pain medication, stretching   Treatments tried: tylenol arthritis 3x a day, physical therapy (14 visits), stretching  Associated symptoms: numbness, tingling and locking or catching    Orthopedic/Surgical history: sciatica in the right leg, had cortisone injections 10+ years ago, cyst removal in LB 10+ years   Social History/Occupation: retired     No family history pertinent to patient's problem today.     REVIEW OF SYSTEMS:  Review of Systems  Skin: no bruising, no swelling  Musculoskeletal: as above  Neurologic: no numbness, paresthesias  Remainder of review of systems is negative including constitutional, CV, pulmonary, GI, except as noted in HPI or medical history.    OBJECTIVE:  BP (!) 167/87 (BP Location: Right arm)   Pulse 66   Wt 109.3 kg (241 lb)   BMI 37.86 kg/m     General: healthy, alert and in no distress  HEENT: no scleral icterus or conjunctival erythema  Skin: no suspicious lesions or rash. No jaundice.  CV: distal perfusion intact  Resp: normal respiratory effort without conversational dyspnea   Psych: normal mood and affect  Gait: antalgic  Neuro: Normal light sensory exam of lower extremity    Bilateral hip exam  Inspection:      no edema or ecchymosis in hip area    Tender:      none bilateral    Non Tender:      remainder of the hip area bilateral    ROM:     Range of motion limited on  the right in flexion, internal and external rotation    Strength:      flexion 4/5 right       abduction 5/5 bilateral       adduction 5/5 bilateral    Sensation:      grossly intact in hip and thigh    Special Tests:      neg (-) CRISTÓBAL right       positive (+) FADIR right      RADIOLOGY:  I independently visualized and reviewed these images with the patient  Reviewed 2 views of the right hip from 11/23/2022-moderate to severe arthritis in the right hip      Review of prior external note(s) from - PCP notes  Review of the result(s) of each unique test - XR             Again, thank you for allowing me to participate in the care of your patient.        Sincerely,        Siena Mathur MD

## 2022-12-13 ENCOUNTER — THERAPY VISIT (OUTPATIENT)
Dept: PHYSICAL THERAPY | Facility: CLINIC | Age: 87
End: 2022-12-13
Payer: MEDICARE

## 2022-12-13 DIAGNOSIS — M25.551 HIP PAIN, RIGHT: Primary | ICD-10-CM

## 2022-12-13 PROCEDURE — 97140 MANUAL THERAPY 1/> REGIONS: CPT | Mod: GP | Performed by: PHYSICAL THERAPIST

## 2022-12-14 DIAGNOSIS — M10.071 ACUTE IDIOPATHIC GOUT OF RIGHT FOOT: ICD-10-CM

## 2022-12-14 RX ORDER — ALLOPURINOL 100 MG/1
TABLET ORAL
Qty: 90 TABLET | Refills: 1 | Status: SHIPPED | OUTPATIENT
Start: 2022-12-14 | End: 2023-06-13

## 2022-12-18 NOTE — TELEPHONE ENCOUNTER
Can you please help me with this one ? Patient speaks to an item I am to look at but there is no media page, no jpegs. Can you isolate what I am to be looking at because it isn't clear to me what that is. Please Reroute please with this requested information      Ziyad Isaac MD

## 2022-12-19 NOTE — TELEPHONE ENCOUNTER
So here's what we see    IMPRESSION:  1. Chronic compression fracture of L2 approximately 35% height loss in  the anterior vertebral body. No associated spinal canal stenosis.  2. Multilevel spondylosis most significant at L4-5 with moderate  spinal canal narrowing.     I have personally reviewed the examination and initial interpretation  and I agree with the findings.     YOGI EDWARDS MD     I called patient and we reviewed these findings in significant detail . She's to be receiving a steroid injection in the near future and depending on how things go may pursue further orthopedic consultation and consider total hip replacement IF she is a legitimate candidate     All questions answered     Ziyad Isaac MD

## 2022-12-20 ENCOUNTER — THERAPY VISIT (OUTPATIENT)
Dept: PHYSICAL THERAPY | Facility: CLINIC | Age: 87
End: 2022-12-20
Payer: MEDICARE

## 2022-12-20 ENCOUNTER — VIRTUAL VISIT (OUTPATIENT)
Dept: ONCOLOGY | Facility: CLINIC | Age: 87
End: 2022-12-20
Payer: MEDICARE

## 2022-12-20 DIAGNOSIS — Z17.0 MALIGNANT NEOPLASM OF UPPER-OUTER QUADRANT OF LEFT BREAST IN FEMALE, ESTROGEN RECEPTOR POSITIVE (H): Primary | ICD-10-CM

## 2022-12-20 DIAGNOSIS — C50.412 MALIGNANT NEOPLASM OF UPPER-OUTER QUADRANT OF LEFT BREAST IN FEMALE, ESTROGEN RECEPTOR POSITIVE (H): Primary | ICD-10-CM

## 2022-12-20 DIAGNOSIS — M25.551 HIP PAIN, RIGHT: Primary | ICD-10-CM

## 2022-12-20 PROCEDURE — 97140 MANUAL THERAPY 1/> REGIONS: CPT | Mod: GP | Performed by: PHYSICAL THERAPIST

## 2022-12-20 PROCEDURE — 99214 OFFICE O/P EST MOD 30 MIN: CPT | Mod: 95 | Performed by: INTERNAL MEDICINE

## 2022-12-20 PROCEDURE — 97110 THERAPEUTIC EXERCISES: CPT | Mod: GP | Performed by: PHYSICAL THERAPIST

## 2022-12-20 NOTE — LETTER
12/20/2022         RE: Lisa Ferguson  [address]  [place] [address]        Dear Colleague,    Thank you for referring your patient, Lisa Ferguson, to the [facility]. Please see a copy of my visit note below.    Lisa is a 87 year old who is being evaluated via a billable video visit.   Currently in [place].    How would you like to obtain your AVS? MyChart  If the video visit is dropped, the invitation should be resent by: Text to cell phone: [phone]  Will anyone else be joining your video visit? No     Laney Gardner, TRUNG  17 min.     [facility]  MEDICAL ONCOLOGY    RETURN PATIENT VISIT NOTE    Reason for visit: breast cancer    Oncology Treatment Summary  1.  Patient self palpated abnormality in left breast in November 2020.  11/16/2020 diagnostic mammogram and ultrasound were done which found 2 lesions within the left breast 1 measuring approximately 1.5 cm and the second 1.9 cm.  Both were biopsied the one at 3:00 was a grade 2 invasive lobular cancer.  The one at 11:00 was a grade 2 invasive ductal cancer.  It was estrogen receptor positive progesterone receptor positive HER-2/kena indeterminate by IHC and negative by ALBERTO  2.  1/6/2021 patient underwent bilateral mastectomies.  Right mastectomy was unremarkable.  Left mastectomy found a multifocal breast cancer the first measuring 4.5 cm is a grade 2 invasive lobular carcinoma, the second was a 2.5 cm grade 2 malignancy.  2 sentinel lymph nodes were removed 1 of which was negative the other 1 had immunohistochemistry positive only cells for a T M2N0i+M0 ER/OH positive HER-2/kena negative disease  3.  Oncotype RS: one was 22 and other was 6 both low risk  4.  Genetic testing was done that was unremarkable  5. 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 She had had a fall  this fall with a L2 fx and has been having a lot of pain since. She also has a fair amount of arthritis pain in her hips. Because of the pain her appetite was diminished and she had lost some 20-30 pounds over the last few months. She was also having hot flashes which were excessive and some chest discomfort like panic attack with out any other symptoms such as radiation of the pain or shortness of breath. She has previously had cardiac disease and states this was unlike that pain. She stopped her arimidex and noted the hot flashes and associated symptoms got significantly better. Her aches and pains in her muscles got better and her appetite has improved. She is having a hip injection next week       PAST MEDICAL HISTORY  Coronary artery disease, peptic ulcer disease, anxiety, hyperlipidemia, mitral regurgitation, hypertension, degenerative joint disease, right bundle branch block, irritable bowel, sciatica, hypothyroidism, obstructive sleep apnea, congestive heart failure, gout, nephrolithiasis      CURRENT OUTPATIENT MEDICATIONS  Current Outpatient Medications   Medication     allopurinol (ZYLOPRIM) 100 MG tablet     allopurinol (ZYLOPRIM) 300 MG tablet     aspirin 81 MG EC tablet     betamethasone dipropionate (DIPROSONE) 0.05 % external lotion     levothyroxine (SYNTHROID/LEVOTHROID) 125 MCG tablet     losartan (COZAAR) 50 MG tablet     metoprolol tartrate (LOPRESSOR) 25 MG tablet     order for DME     rosuvastatin (CRESTOR) 5 MG tablet     torsemide (DEMADEX) 10 MG tablet     anastrozole (ARIMIDEX) 1 MG tablet     No current facility-administered medications for this visit.        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  She has a history of smoking but quit this 40 years ago.  Occasional alcohol use     FAMILY HISTORY  Her sister  of breast cancer at 62 and did have a BRCA mutation.  Patient's niece also carries a BRCA mutation.  She was tested for this and does not..  Her genetic testing was negative     REVIEW OF SYSTEMS  Review Of Systems  Skin: negative  Eyes: negative  Ears/Nose/Throat: negative  Respiratory: No shortness of breath, dyspnea on exertion, cough, or hemoptysis  Cardiovascular: negative  Gastrointestinal: negative  Genitourinary: negative  Musculoskeletal: negative  Neurologic: negative  Psychiatric: negative  Hematologic/Lymphatic/Immunologic: negative  Endocrine: negative      PHYSICAL EXAM  B/P: Data Unavailable, T: Data Unavailable, P: Data Unavailable, R: Data Unavailable  Wt Readings from Last 3 Encounters:   22 109.3 kg (241 lb)   22 109.5 kg (241 lb 6.4 oz)   22 112.9 kg (249 lb)       Physical Exam  Vitals reviewed.   Constitutional:       Appearance: Normal appearance.   HENT:      Head: Normocephalic and atraumatic.   Eyes:      Extraocular Movements: Extraocular movements intact.      Conjunctiva/sclera: Conjunctivae normal.      Pupils: Pupils are equal, round, and reactive to light.   Pulmonary:      Effort: Pulmonary effort is normal.   Neurological:      General: No focal deficit present.      Mental Status: She is alert and oriented to person, place, and time. Mental status is at baseline.   Psychiatric:         Mood and Affect: Mood normal.         Behavior: Behavior normal.         Thought Content: Thought content normal.         Judgment: Judgment normal.             LABORATORY AND IMAGING STUDIES  Recent Labs   Lab Test 22  1125 21  0739 21  1116  12/29/20  1227 10/25/19  0947 10/04/19  0952    133  --  136 139 138   POTASSIUM 3.8 3.7 3.6 4.2 4.3 4.0   CHLORIDE 106 101  --  102 106 103   CO2 27 28  --  29 25 26   ANIONGAP 7 4  --  5 8 9   BUN 20 18  --  20 15 22   CR 0.82 0.79  --  0.80 0.67 0.95   * 120*  --  97 99 106*   LUCIANO 10.8* 9.3  --  9.9 10.4* 10.9*     No results for input(s): MAG, PHOS in the last 75742 hours.  Recent Labs   Lab Test 07/07/22  1125 01/07/21  0738 12/29/20  1227 10/28/19  1135 10/17/18  1220 09/13/18  1122 08/01/17  1117   WBC 10.4  --  10.8 8.9 11.9* 9.1 8.5   HGB 13.1 11.2* 12.9 13.1 13.4 13.8 14.4     --  337 362 392 291 311   MCV 88  --  91 91 89 87 87   NEUTROPHIL  --   --  58.1 48.1 60.6 45.1 46.3     Recent Labs   Lab Test 07/07/22  1125 10/28/19  1135 09/13/18  1122 01/03/17  1106 03/23/15  1213   ALT 34 35 35   < > 36   AST  --   --   --   --  28    < > = values in this interval not displayed.     TSH   Date Value Ref Range Status   07/07/2022 2.94 0.40 - 4.00 mU/L Final   02/17/2021 3.80 0.40 - 4.00 mU/L Final   12/29/2020 23.61 (H) 0.40 - 4.00 mU/L Final   10/28/2019 1.75 0.40 - 4.00 mU/L Final     No results for input(s): CEA in the last 81231 hours.  Results for orders placed or performed in visit on 12/01/22   DX Wrist Heel Radius    Narrative    HISTORY: Other osteoporosis without current pathological fracture.  Wrist DEXA performed because of discrepancy between spine and hips.    COMPARISON:   2/24/2021    Age: 87  years.  Height: 66 inches  Weight: 241 pounds  Sex: Female  Ethnicity: White    Image quality: Adequate    Lumbar spine T-score in region of L1-L3 excluding L2 and L4= 2.2   L1-L3 excluding L2 and L4 percent change: 15.2%     HIPS:  Mean total hip T-score: 0.4  Mean total hip percent change: -9.2%     Left femoral neck T-score = -0.7  Right femoral neck T-score= -0.4     Radius 33% T-score = 0.4  Radius 33% percent change: -1.4%     COMPARISON TO PRIOR:  Percent change in the spine and  hips is significant accounting to the  precision errors for this facility. Percent change in the wrist is NOT  significant (or considered invalid) accounting for the precision  errors for this facility.     FRAX:  10 year probability of major osteoporotic fracture: 14.1%  10 year probability of hip fracture: 2.5%  The 10 year probability of fracture may be lower than reported if the  patient has received treatment. FRAX data should be disregarded in  patient's taking bisphosphonates.    World Health Organization definition of osteoporosis and osteopenia  for  women:   Normal: T-score at or above -1.0  Low Bone Mass (Osteopenia): T-score between -1.0 and -2.5.   Osteoporosis: T-score at or below -2.5   T-scores are reported for postmenopausal women and men over 50 years  of age.      Impression    IMPRESSION:  Normal    ALEXANDRU HART MD         SYSTEM ID:  [id number]          ASSESSMENT  AND RECOMMENDATIONS:    1. T2(m2)N0i+M0 ER positive DC positive HER-2/kena negative breast cancer status post left mastectomy with both tumors having a low risk Oncotype  2. L2 chronic compression fracture  3. Numerous other medical problems as delineated above    Plan    1. We discussed her symptoms and options. I suspect it is unlikely her weight loss is related to the arimidex. We reviewed various options such as retrying her arimidex, changing to aromasin, changing to tamoxifen or no therapy. Given her 4.5 cm intermediate oncotype risk we discussed the risk of recurrence and risk of death associated with metastatic disease; however she is also 87 with multiple medical comorbidities and ultimately the choice is her. For now she would like to hold on therapy until she has her hip injection next week and see how her pain is. She will mychart message us mid January with how she is doing and what she would like to do next.  2. I am going to schedule a 6 month routine followup  3. We discussed doing a CT CAP and labs given her  weight loss but she would like to hold until mid January and see how she is doing.    Tamar Georges MD on 12/20/2022 at 8:53 AM                        Again, thank you for allowing me to participate in the care of your patient.        Sincerely,        Tamar Georges MD

## 2022-12-20 NOTE — PROGRESS NOTES
Lisa is a 87 year old who is being evaluated via a billable video visit.   Currently in [place].    How would you like to obtain your AVS? MyChart  If the video visit is dropped, the invitation should be resent by: Text to cell phone: [phone]  Will anyone else be joining your video visit? No     Laney Gardner, TRUNG  17 min.     [facility] PHYSICIANS  MEDICAL ONCOLOGY    RETURN PATIENT VISIT NOTE    Reason for visit: breast cancer    Oncology Treatment Summary  1.  Patient self palpated abnormality in left breast in November 2020.  11/16/2020 diagnostic mammogram and ultrasound were done which found 2 lesions within the left breast 1 measuring approximately 1.5 cm and the second 1.9 cm.  Both were biopsied the one at 3:00 was a grade 2 invasive lobular cancer.  The one at 11:00 was a grade 2 invasive ductal cancer.  It was estrogen receptor positive progesterone receptor positive HER-2/kena indeterminate by IHC and negative by ALBERTO  2.  1/6/2021 patient underwent bilateral mastectomies.  Right mastectomy was unremarkable.  Left mastectomy found a multifocal breast cancer the first measuring 4.5 cm is a grade 2 invasive lobular carcinoma, the second was a 2.5 cm grade 2 malignancy.  2 sentinel lymph nodes were removed 1 of which was negative the other 1 had immunohistochemistry positive only cells for a T M2N0i+M0 ER/AK positive HER-2/kena negative disease  3.  Oncotype RS: one was 22 and other was 6 both low risk  4.  Genetic testing was done that was unremarkable  5. 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 She had had a fall this fall with a L2 fx and has been having a lot of pain since. She also has a fair amount of arthritis pain in her hips. Because of the pain her appetite was diminished and she had lost some 20-30 pounds over the last few months. She was also having hot flashes  which were excessive and some chest discomfort like panic attack with out any other symptoms such as radiation of the pain or shortness of breath. She has previously had cardiac disease and states this was unlike that pain. She stopped her arimidex and noted the hot flashes and associated symptoms got significantly better. Her aches and pains in her muscles got better and her appetite has improved. She is having a hip injection next week       PAST MEDICAL HISTORY  Coronary artery disease, peptic ulcer disease, anxiety, hyperlipidemia, mitral regurgitation, hypertension, degenerative joint disease, right bundle branch block, irritable bowel, sciatica, hypothyroidism, obstructive sleep apnea, congestive heart failure, gout, nephrolithiasis      CURRENT OUTPATIENT MEDICATIONS  Current Outpatient Medications   Medication     allopurinol (ZYLOPRIM) 100 MG tablet     allopurinol (ZYLOPRIM) 300 MG tablet     aspirin 81 MG EC tablet     betamethasone dipropionate (DIPROSONE) 0.05 % external lotion     levothyroxine (SYNTHROID/LEVOTHROID) 125 MCG tablet     losartan (COZAAR) 50 MG tablet     metoprolol tartrate (LOPRESSOR) 25 MG tablet     order for DME     rosuvastatin (CRESTOR) 5 MG tablet     torsemide (DEMADEX) 10 MG tablet     anastrozole (ARIMIDEX) 1 MG tablet     No current facility-administered medications for this visit.        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  She has a history of smoking but quit this 40 years ago.   Occasional alcohol use     FAMILY HISTORY  Her sister  of breast cancer at 62 and did have a BRCA mutation.  Patient's niece also carries a BRCA mutation.  She was tested for this and does not..  Her genetic testing was negative     REVIEW OF SYSTEMS  Review Of Systems  Skin: negative  Eyes: negative  Ears/Nose/Throat: negative  Respiratory: No shortness of breath, dyspnea on exertion, cough, or hemoptysis  Cardiovascular: negative  Gastrointestinal: negative  Genitourinary: negative  Musculoskeletal: negative  Neurologic: negative  Psychiatric: negative  Hematologic/Lymphatic/Immunologic: negative  Endocrine: negative      PHYSICAL EXAM  B/P: Data Unavailable, T: Data Unavailable, P: Data Unavailable, R: Data Unavailable  Wt Readings from Last 3 Encounters:   22 109.3 kg (241 lb)   22 109.5 kg (241 lb 6.4 oz)   22 112.9 kg (249 lb)       Physical Exam  Vitals reviewed.   Constitutional:       Appearance: Normal appearance.   HENT:      Head: Normocephalic and atraumatic.   Eyes:      Extraocular Movements: Extraocular movements intact.      Conjunctiva/sclera: Conjunctivae normal.      Pupils: Pupils are equal, round, and reactive to light.   Pulmonary:      Effort: Pulmonary effort is normal.   Neurological:      General: No focal deficit present.      Mental Status: She is alert and oriented to person, place, and time. Mental status is at baseline.   Psychiatric:         Mood and Affect: Mood normal.         Behavior: Behavior normal.         Thought Content: Thought content normal.         Judgment: Judgment normal.             LABORATORY AND IMAGING STUDIES  Recent Labs   Lab Test 22  1125 21  0739 21  1116 20  1227 10/25/19  0947 10/04/19  0952    133  --  136 139 138   POTASSIUM 3.8 3.7 3.6 4.2 4.3 4.0   CHLORIDE 106 101  --  102 106 103   CO2 27 28  --  29 25 26   ANIONGAP 7 4  --  5 8 9   BUN 20 18  --  20 15 22   CR 0.82 0.79  --  0.80 0.67 0.95   GLC  115* 120*  --  97 99 106*   LUCIANO 10.8* 9.3  --  9.9 10.4* 10.9*     No results for input(s): MAG, PHOS in the last 61412 hours.  Recent Labs   Lab Test 07/07/22  1125 01/07/21  0738 12/29/20  1227 10/28/19  1135 10/17/18  1220 09/13/18  1122 08/01/17  1117   WBC 10.4  --  10.8 8.9 11.9* 9.1 8.5   HGB 13.1 11.2* 12.9 13.1 13.4 13.8 14.4     --  337 362 392 291 311   MCV 88  --  91 91 89 87 87   NEUTROPHIL  --   --  58.1 48.1 60.6 45.1 46.3     Recent Labs   Lab Test 07/07/22  1125 10/28/19  1135 09/13/18  1122 01/03/17  1106 03/23/15  1213   ALT 34 35 35   < > 36   AST  --   --   --   --  28    < > = values in this interval not displayed.     TSH   Date Value Ref Range Status   07/07/2022 2.94 0.40 - 4.00 mU/L Final   02/17/2021 3.80 0.40 - 4.00 mU/L Final   12/29/2020 23.61 (H) 0.40 - 4.00 mU/L Final   10/28/2019 1.75 0.40 - 4.00 mU/L Final     No results for input(s): CEA in the last 02203 hours.  Results for orders placed or performed in visit on 12/01/22   DX Wrist Heel Radius    Narrative    HISTORY: Other osteoporosis without current pathological fracture.  Wrist DEXA performed because of discrepancy between spine and hips.    COMPARISON:   2/24/2021    Age: 87  years.  Height: 66 inches  Weight: 241 pounds  Sex: Female  Ethnicity: White    Image quality: Adequate    Lumbar spine T-score in region of L1-L3 excluding L2 and L4= 2.2   L1-L3 excluding L2 and L4 percent change: 15.2%     HIPS:  Mean total hip T-score: 0.4  Mean total hip percent change: -9.2%     Left femoral neck T-score = -0.7  Right femoral neck T-score= -0.4     Radius 33% T-score = 0.4  Radius 33% percent change: -1.4%     COMPARISON TO PRIOR:  Percent change in the spine and hips is significant accounting to the  precision errors for this facility. Percent change in the wrist is NOT  significant (or considered invalid) accounting for the precision  errors for this facility.     FRAX:  10 year probability of major osteoporotic fracture:  14.1%  10 year probability of hip fracture: 2.5%  The 10 year probability of fracture may be lower than reported if the  patient has received treatment. FRAX data should be disregarded in  patient's taking bisphosphonates.    World Health Organization definition of osteoporosis and osteopenia  for  women:   Normal: T-score at or above -1.0  Low Bone Mass (Osteopenia): T-score between -1.0 and -2.5.   Osteoporosis: T-score at or below -2.5   T-scores are reported for postmenopausal women and men over 50 years  of age.      Impression    IMPRESSION:  Normal    ALEXANDRU HART MD         SYSTEM ID:  [id number]          ASSESSMENT  AND RECOMMENDATIONS:    1. T2(m2)N0i+M0 ER positive MI positive HER-2/kena negative breast cancer status post left mastectomy with both tumors having a low risk Oncotype  2. L2 chronic compression fracture  3. Numerous other medical problems as delineated above    Plan    1. We discussed her symptoms and options. I suspect it is unlikely her weight loss is related to the arimidex. We reviewed various options such as retrying her arimidex, changing to aromasin, changing to tamoxifen or no therapy. Given her 4.5 cm intermediate oncotype risk we discussed the risk of recurrence and risk of death associated with metastatic disease; however she is also 87 with multiple medical comorbidities and ultimately the choice is her. For now she would like to hold on therapy until she has her hip injection next week and see how her pain is. She will mychart message us mid January with how she is doing and what she would like to do next.  2. I am going to schedule a 6 month routine followup  3. We discussed doing a CT CAP and labs given her weight loss but she would like to hold until mid January and see how she is doing.    Tamar Georges MD on 12/20/2022 at 8:53 AM

## 2022-12-20 NOTE — PROGRESS NOTES
[facility]    OUTPATIENT Physical Therapy ORTHOPEDIC EVALUATION  PLAN OF TREATMENT FOR OUTPATIENT REHABILITATION  (COMPLETE FOR INITIAL CLAIMS ONLY)  Patient's Last Name, First Name, M.I.  YOB: 1935  Lisa Ferguson    Provider s Name:  [facility]   Medical Record No.  [id number]   Start of Care Date:  08/30/22   Onset Date:    7/7/22   Treatment Diagnosis:  Hip pain, right Medical Diagnosis:  Hip pain, right       Goals:     12/20/22 0500   Body Part   Goals listed below are for Rig hip   Goal #1   Goal #1 stairs   Previous Functional Level Ascend/descend stairs;in a normal reciprocal pattern;with a railing   Current Functional Level Ascend/descend stairs;one step at a time;with a railing   STG [employer] Ascend/descend stairs;in a normal reciprocal pattern;with a railing   Rationale to enter/leave the house safely;to reach lower level of home safely;for safe community access to buildings;for safe community ambulaton   Due Date 09/20/22   If goal not met, Why? 10/26/22   LTG [employer] Ascend/descend stairs;in a normal reciprocal pattern;without a railing   Rationale to enter/leave the house safely;to reach lower level of home safely;for safe community access to buildings;for safe community ambulaton   Due Date 11/25/22   If goal not met, Why? able to do this with a railing but not yet stable enough to do wihtout UE assist due to increased pain without offloading and fear of falling/leg giving out   Goal #2   Goal #2 driving/transportation  (pain with entering/exiting vehicle)   Previous Functional Level No restrictions   Current Functional Level   (pain 5/10 with entering/exiting vehicle)   STG [employer]   (pain 4/10 with entering/exiting vehicle)   Rationale for safe driving   Due date 09/20/22   Date Goal Met 09/27/22   LTG [employer]   (pain 3/10 with entering/exiting vehicle)   Rationale for safe driving   Due date 10/25/22   Date Goal Met 10/26/22     Pt would benefit from continued therapy to address ongoing pain presentation and strength deficits with activities of daily living. Planned injection next week will hopefully decrease pain levels to allow for increased strengthening to offload arthritic hip.    Therapy Frequency:  1x/week  Predicted Duration of Therapy Intervention: 6- 8 weeks    Riley Clinton, PT                 I CERTIFY THE NEED FOR THESE SERVICES FURNISHED UNDER        THIS PLAN OF TREATMENT AND WHILE UNDER MY CARE     (Physician attestation of this document indicates review and certification of the therapy plan).                     Certification Date From:  12/07/22   Certification Date To:  01/31/23    Referring Provider:  Ziyad Isaac    Initial Assessment        See Epic Evaluation SOC Date: 08/30/22

## 2022-12-27 ENCOUNTER — THERAPY VISIT (OUTPATIENT)
Dept: PHYSICAL THERAPY | Facility: CLINIC | Age: 87
End: 2022-12-27
Payer: MEDICARE

## 2022-12-27 DIAGNOSIS — M25.551 HIP PAIN, RIGHT: Primary | ICD-10-CM

## 2022-12-27 PROCEDURE — 97110 THERAPEUTIC EXERCISES: CPT | Mod: GP | Performed by: PHYSICAL THERAPIST

## 2022-12-27 PROCEDURE — 97140 MANUAL THERAPY 1/> REGIONS: CPT | Mod: GP | Performed by: PHYSICAL THERAPIST

## 2022-12-29 ENCOUNTER — OFFICE VISIT (OUTPATIENT)
Dept: ORTHOPEDICS | Facility: CLINIC | Age: 87
End: 2022-12-29
Payer: MEDICARE

## 2022-12-29 DIAGNOSIS — M25.551 HIP PAIN, RIGHT: ICD-10-CM

## 2022-12-29 DIAGNOSIS — M16.11 ARTHRITIS OF RIGHT HIP: Primary | ICD-10-CM

## 2022-12-29 PROCEDURE — 20611 DRAIN/INJ JOINT/BURSA W/US: CPT | Mod: RT | Performed by: FAMILY MEDICINE

## 2022-12-29 RX ORDER — ROPIVACAINE HYDROCHLORIDE 5 MG/ML
2 INJECTION, SOLUTION EPIDURAL; INFILTRATION; PERINEURAL
Status: DISCONTINUED | OUTPATIENT
Start: 2022-12-29 | End: 2023-05-25

## 2022-12-29 RX ORDER — BETAMETHASONE SODIUM PHOSPHATE AND BETAMETHASONE ACETATE 3; 3 MG/ML; MG/ML
6 INJECTION, SUSPENSION INTRA-ARTICULAR; INTRALESIONAL; INTRAMUSCULAR; SOFT TISSUE
Status: DISCONTINUED | OUTPATIENT
Start: 2022-12-29 | End: 2023-05-25

## 2022-12-29 RX ADMIN — BETAMETHASONE SODIUM PHOSPHATE AND BETAMETHASONE ACETATE 6 MG: 3; 3 INJECTION, SUSPENSION INTRA-ARTICULAR; INTRALESIONAL; INTRAMUSCULAR; SOFT TISSUE at 10:35

## 2022-12-29 RX ADMIN — ROPIVACAINE HYDROCHLORIDE 2 ML: 5 INJECTION, SOLUTION EPIDURAL; INFILTRATION; PERINEURAL at 10:35

## 2022-12-29 NOTE — LETTER
12/29/2022         RE: Lisa Ferguson  [address]  [place] [address]        Dear Colleague,    Thank you for referring your patient, Lisa Ferguson, to the [facility]. Please see a copy of my visit note below.    Large Joint Injection/Arthocentesis: R hip joint    Date/Time: 12/29/2022 10:35 AM  Performed by: Stef Escamilla MD  Authorized by: Stef Escamilla MD     Indications:  Pain and osteoarthritis  Needle Size:  22 G  Guidance: ultrasound    Approach:  Anterior  Location:  Hip      Site:  R hip joint  Medications:  2 mL ropivacaine 5 MG/ML; 6 mg betamethasone acet & sod phos 6 (3-3) MG/ML  Outcome:  Tolerated well, no immediate complications  Procedure discussed: discussed risks, benefits, and alternatives    Consent Given by:  Patient   Ultrasound images of procedure were permanently stored.      Patient reported significant improvement of pain after the numbing portion right hip joint steroid injection.  Ultrasound guided images were permanently stored.  Aftercare instructions given to patient.  Plan to follow-up as previously discussed with referring provider.     Stef Escamilla MD [facility]              Again, thank you for allowing me to participate in the care of your patient.        Sincerely,        Stef Escamilla MD

## 2022-12-29 NOTE — PATIENT INSTRUCTIONS
[facility] [place]jection Discharge Instructions    Procedure: Right hip joint steroid injection    You may shower, however avoid swimming, tub baths or hot tubs for 24 hours following your procedure  You may have a mild to moderate increase in pain for several days following the injection.  It may take up to 14 days for the steroid medication to start working although you may feel the effect as early as a few days after the procedure.  You may use ice packs for 10-15 minutes, 3 to 4 times a day at the injection site for comfort  You may use anti-inflammatory medications (such as Ibuprofen or Aleve or Advil) or Tylenol for pain control if necessary  If you were fasting, you may resume your normal diet and medications after the procedure  If you have diabetes, check your blood sugar more frequently than usual as your blood sugar may be higher than normal for 10-14 days following a steroid injection. Contact your doctor who manages your diabetes if your blood sugar is higher than usual    If you experience any of the following, call [facility] [place] @ [phone] or [phone]  -Fever over 100 degree F  -Swelling, bleeding, redness, drainage, warmth at the injection site  - New or worsening pain    It was great seeing you today!    Stef Escamilla

## 2022-12-29 NOTE — PROGRESS NOTES
Large Joint Injection/Arthocentesis: R hip joint    Date/Time: 12/29/2022 10:35 AM  Performed by: Stef Escamilla MD  Authorized by: Stef Escamilla MD     Indications:  Pain and osteoarthritis  Needle Size:  22 G  Guidance: ultrasound    Approach:  Anterior  Location:  Hip      Site:  R hip joint  Medications:  2 mL ropivacaine 5 MG/ML; 6 mg betamethasone acet & sod phos 6 (3-3) MG/ML  Outcome:  Tolerated well, no immediate complications  Procedure discussed: discussed risks, benefits, and alternatives    Consent Given by:  Patient   Ultrasound images of procedure were permanently stored.      Patient reported significant improvement of pain after the numbing portion right hip joint steroid injection.  Ultrasound guided images were permanently stored.  Aftercare instructions given to patient.  Plan to follow-up as previously discussed with referring provider.     Stef Escamilla MD [facility]

## 2023-01-02 ENCOUNTER — OFFICE VISIT (OUTPATIENT)
Dept: INTERNAL MEDICINE | Facility: CLINIC | Age: 88
End: 2023-01-02
Payer: MEDICARE

## 2023-01-02 ENCOUNTER — TELEPHONE (OUTPATIENT)
Dept: NEUROSURGERY | Facility: CLINIC | Age: 88
End: 2023-01-02

## 2023-01-02 VITALS
WEIGHT: 238.4 LBS | DIASTOLIC BLOOD PRESSURE: 84 MMHG | OXYGEN SATURATION: 96 % | SYSTOLIC BLOOD PRESSURE: 156 MMHG | TEMPERATURE: 97.7 F | BODY MASS INDEX: 37.45 KG/M2 | HEART RATE: 69 BPM

## 2023-01-02 DIAGNOSIS — I25.708 ATHEROSCLEROSIS OF CORONARY ARTERY BYPASS GRAFT(S), UNSPECIFIED, WITH OTHER FORMS OF ANGINA PECTORIS (H): ICD-10-CM

## 2023-01-02 DIAGNOSIS — E03.9 ACQUIRED HYPOTHYROIDISM: ICD-10-CM

## 2023-01-02 DIAGNOSIS — Z23 NEED FOR DIPHTHERIA-TETANUS-PERTUSSIS (TDAP) VACCINE: ICD-10-CM

## 2023-01-02 DIAGNOSIS — C50.412 MALIGNANT NEOPLASM OF UPPER-OUTER QUADRANT OF LEFT BREAST IN FEMALE, ESTROGEN RECEPTOR POSITIVE (H): ICD-10-CM

## 2023-01-02 DIAGNOSIS — E66.01 MORBID OBESITY (H): ICD-10-CM

## 2023-01-02 DIAGNOSIS — I10 ESSENTIAL HYPERTENSION WITH GOAL BLOOD PRESSURE LESS THAN 130/80: Primary | ICD-10-CM

## 2023-01-02 DIAGNOSIS — I50.9 CONGESTIVE HEART FAILURE, UNSPECIFIED HF CHRONICITY, UNSPECIFIED HEART FAILURE TYPE (H): ICD-10-CM

## 2023-01-02 DIAGNOSIS — Z17.0 MALIGNANT NEOPLASM OF UPPER-OUTER QUADRANT OF LEFT BREAST IN FEMALE, ESTROGEN RECEPTOR POSITIVE (H): ICD-10-CM

## 2023-01-02 DIAGNOSIS — M10.071 ACUTE IDIOPATHIC GOUT OF RIGHT FOOT: ICD-10-CM

## 2023-01-02 DIAGNOSIS — N25.81 SECONDARY HYPERPARATHYROIDISM OF RENAL ORIGIN (H): ICD-10-CM

## 2023-01-02 DIAGNOSIS — R73.09 ELEVATED GLUCOSE: ICD-10-CM

## 2023-01-02 DIAGNOSIS — M48.062 SPINAL STENOSIS OF LUMBAR REGION WITH NEUROGENIC CLAUDICATION: ICD-10-CM

## 2023-01-02 LAB
HBA1C MFR BLD: 5.8 % (ref 0–5.6)
PTH-INTACT SERPL-MCNC: 101 PG/ML (ref 15–65)

## 2023-01-02 PROCEDURE — 82306 VITAMIN D 25 HYDROXY: CPT | Performed by: INTERNAL MEDICINE

## 2023-01-02 PROCEDURE — 83970 ASSAY OF PARATHORMONE: CPT | Performed by: INTERNAL MEDICINE

## 2023-01-02 PROCEDURE — 99215 OFFICE O/P EST HI 40 MIN: CPT | Mod: 25 | Performed by: INTERNAL MEDICINE

## 2023-01-02 PROCEDURE — 36415 COLL VENOUS BLD VENIPUNCTURE: CPT | Performed by: INTERNAL MEDICINE

## 2023-01-02 PROCEDURE — 83036 HEMOGLOBIN GLYCOSYLATED A1C: CPT | Performed by: INTERNAL MEDICINE

## 2023-01-02 PROCEDURE — 80048 BASIC METABOLIC PNL TOTAL CA: CPT | Performed by: INTERNAL MEDICINE

## 2023-01-02 PROCEDURE — 84550 ASSAY OF BLOOD/URIC ACID: CPT | Performed by: INTERNAL MEDICINE

## 2023-01-02 NOTE — PROGRESS NOTES
Assessment & Plan     (I10) Essential hypertension with goal blood pressure less than 130/80  (primary encounter diagnosis)  Comment: blood pressure is controlled within acceptable limits. Seeing patient today for follow up and refills and review of multiple issues and concerns . 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 She is interested to be seen by the medication therapy management pharmacist as she has a variety of medication questions  Plan: BASIC METABOLIC PANEL, Med Therapy Management         Referral            (M48.062) Spinal stenosis of lumbar region with neurogenic claudication  Comment: probably the most serious concerns stems from her worsened symptoms with her lower extremities. She has a history as follows   Already had a spinal surgery with removal of a cyst about 8 years ago. She couldn't get off the toilet. She had lower extremities weakness , after surgery 8 years ago and some rehabilitation center things became fine again. Things began to deteriorate since September 4 months ago, after falling. She feels that again her lower extremities . She had a steroid injection to the hip and this was helpful. What remains is muscle pain / myalgia pain. And aggravating factors hard to stand straight up and difficult ambulation , although this is an intermittent problem. Maybe the acetaminophen helps. Mark horses occur multiple times per day since September. Definitely not better even with physical therapy. The hip is better but not these other symptoms.   Plan: Neurosurgery Referral, Med Therapy Management         Referral            (Z23) Need for diphtheria-tetanus-pertussis (Tdap) vaccine  Comment: administered   Plan: TDAP VACCINE (Adacel, Boostrix), Med Therapy         Management Referral            (E03.9) Acquired hypothyroidism  Comment:   TSH   Date Value Ref Range Status   07/07/2022 2.94 0.40 - 4.00 mU/L Final   02/17/2021 3.80 0.40 - 4.00 mU/L Final      Plan:  Med Therapy Management Referral        Not due today for a tsh today     (R73.09) Elevated glucose  Comment: doubt clinical significance but warrants hemoglobin a1c  [ diabetes test ]   Plan: BASIC METABOLIC PANEL, Med Therapy Management         Referral, Hemoglobin A1c            (N25.81) Secondary hyperparathyroidism of renal origin (H)  Comment: she had a prior partial parathyroidectomy but was noted with a significant elevated calcium and I recommended we redo the secondary hyperparathyroidism laboratory studies   Plan: BASIC METABOLIC PANEL, Vitamin D Deficiency,         Parathyroid Hormone Intact        Further follow up depending on the test results     (M10.071) Acute idiopathic gout of right foot  Comment: will recheck uric acid level  But this has been a well controlled issue  Plan:     (I50.9) Congestive heart failure, unspecified HF chronicity, unspecified heart failure type (H)  Comment: problem is stable and ongoing monitoring    Plan: see office visit notes with [facility] via [facility] / care everywhere     (E66.01) Morbid obesity (H)  Comment: weight loss measures reviewed and not realistic for this ill and nonagenarian patient   Plan: as above     (C50.412,  Z17.0) Malignant neoplasm of upper-outer quadrant of left breast in female, estrogen receptor positive (H)  Comment: very remote history but pertinent as we discussed possible side effects to Arimidex (anastrozole)    Plan: she's currently taking a drug holiday form this due the perceived side effects , I recommended discussion with her oncologist     (I25.382) Atherosclerosis of coronary artery bypass graft(s), unspecified, with other forms of angina pectoris (H)  Comment: problem is stable and ongoing monitoring    Plan: as above      Coccygodynia / patient concerned about possible gluteal cleft process. With my female medical assistant as a chaperone we did have patient drop her pants and we did a  detailed exam of backside and find no ulcerations no signs or symptoms of infection and so I have no specific findings to diagnose her right now. This is a posture of observation , we discussed what to be on the watch for and update me if significant changes have taken place . Can use [employer] message with [employer] as an option for follow up     Review of the result(s) of each unique test - todays test results  Prescription drug management  38 minutes spent on the date of the encounter doing chart review, history and exam, documentation and further activities per the note      Return in about 6 months (around 7/2/2023). or sandeep Isaac MD  [facility] NARDA Gonzalez is a 87 year old, presenting for the following health issues:  Follow Up (Right hip treatment) and Wounds (Below tailbone- painful and burning)      History of Present Illness       Back Pain:  She presents for follow up of back pain. Patient's back pain is a chronic problem.  Location of back pain:  Right lower back, right buttock and right hip  Description of back pain: dull ache, gnawing and sharp  Back pain spreads: right buttocks, right thigh and right knee    Since patient first noticed back pain, pain is: gradually improving  Does back pain interfere with her job:  Not applicable      She eats 2-3 servings of fruits and vegetables daily.She consumes 1 sweetened beverage(s) daily.She exercises with enough effort to increase her heart rate 20 to 29 minutes per day.  She exercises with enough effort to increase her heart rate 5 days per week.   She is taking medications regularly.     Had lumbar spine CT scan due to her pain as well as xrays. This was done 12/1/2022    IMPRESSION:  1. Chronic compression fracture of L2 approximately 35% height loss in  the anterior vertebral body. No associated spinal canal stenosis.  2. Multilevel spondylosis most significant at L4-5 with moderate  spinal canal narrowing.     I have  personally reviewed the examination and initial interpretation  and I agree with the findings.     YOGI EDWARDS MD     Also had a DEXA scan said to be normal   Already had a spinal surgery with removal of a cyst about 8 years ago. She couldn't get off the toilet. She had lower extremities weakness , after surgery 8 years ago and some rehabilitation center things became fine again. Things began to deteriorate since September 4 months ago, after falling. She feels that again her lower extremities . She had a steroid injection to the hip and this was helpful. What remains is muscle pain / myalgia pain. And aggravating factors hard to stand straight up and difficult ambulation , although this is an intermittent problem. Maybe the acetaminophen helps. Mark horses occur multiple times per day since September. Definitely not better even with physical therapy. The hip is better but not these other symptoms.     Then concerned about possible side effects of Arimidex (anastrozole)  , she's status post double mastectomy   Having night sweats   Having panic attack symptoms / anxiety   Poor appetite   Wt Readings from Last 5 Encounters:   01/02/23 108.1 kg (238 lb 6.4 oz)   12/12/22 109.3 kg (241 lb)   11/18/22 109.5 kg (241 lb 6.4 oz)   07/07/22 112.9 kg (249 lb)   06/13/22 114.3 kg (252 lb)     This is a modest nonspecific weight loss   Drug holiday = try going without the Arimidex (anastrozole) for a month [ she is actually doing that right now already ]     Skin Lesion  Onset/Duration: 2 weeks  Description  Location: below the tailbone in the crack  Color: cannot see it  Border description: cannot see the wound  Character: cannot see the wound  Itching: no  Bleeding:  No  Intensity:  moderate  Progression of Symptoms:  waxing and waning  Accompanying signs and symptoms:   Bleeding: No  Scaling: crusty (pt can feel crusting  Excessive sun exposure/tanning: No  Sunscreen used: No  History:           Any previous history  of skin cancer: YES  Any family history of melanoma: No  Previous episodes of similar lesion: No  Precipitating or alleviating factors: sitting makes it feel worse  Therapies tried and outcome: only trying to keep it clean and using 4x4 gauze to keep from skin on skin irritation    Pain History:  When did you first notice your pain? - Post-Acute Pain   Have you seen any provider previously for this issue? Yes - physical therapy and orthopedics and dr who gave injection  How has your pain affected your ability to work? Not currently working - unrelated to pain  Where in your body do you have pain? Back Pain  Onset/Duration: muscle cramps back of legs into ankles  Description:   Location of pain: back and legs  Character of pain: cramping  Pain radiation: radiates into the right leg  New numbness or weakness in legs, not attributed to pain: no   Intensity: Currently 7/10, At its worst 7/10  Progression of Symptoms: waxing and waning  History:   Specific cause: nerves from back injury  Pain interferes with job: N/A  History of back problems: previous degenerative joint disease of the lumbar spine  Any previous MRI or X-rays: unsure of where the previous imaging is  Sees a specialist for back pain: No  Alleviating factors:   Improved by: steroid injection    Precipitating factors:  Worsened by: Lifting, Bending, Standing and Walking  Therapies tried and outcome: steroid injection    Accompanying Signs & Symptoms:  Risk of Fracture: None  Risk of Cauda Equina: None  Risk of Infection: None  Risk of Cancer: History of cancer  Risk of Ankylosing Spondylitis: Onset at age <35, male, AND morning back stiffness No        Review of Systems   Constitutional, HEENT, cardiovascular, pulmonary, gi and gu systems are negative, except as otherwise noted.      Objective    BP (!) 156/84   Pulse 69   Temp 97.7  F (36.5  C) (Oral)   Wt 108.1 kg (238 lb 6.4 oz)   SpO2 96%   BMI 37.45 kg/m    Body mass index is 37.45  kg/m .  Physical Exam   GENERAL: healthy, alert and no distress  EYES: Eyes grossly normal to inspection, PERRL and conjunctivae and sclerae normal  RESP: lungs clear to auscultation - no rales, rhonchi or wheezes  CV: regular rate and rhythm, normal S1 S2, no S3 or S4, no murmur, click or rub, no peripheral edema and peripheral pulses strong  MS: no gross musculoskeletal defects noted, no edema  SKIN: no suspicious lesions or rashes    Orders Placed This Encounter   Procedures     TDAP VACCINE (Adacel, Boostrix)     BASIC METABOLIC PANEL     Vitamin D Deficiency     Parathyroid Hormone Intact     Hemoglobin A1c     Neurosurgery Referral     Med Therapy Management Referral

## 2023-01-03 LAB
ANION GAP SERPL CALCULATED.3IONS-SCNC: 5 MMOL/L (ref 3–14)
BUN SERPL-MCNC: 18 MG/DL (ref 7–30)
CALCIUM SERPL-MCNC: 11.1 MG/DL (ref 8.5–10.1)
CHLORIDE BLD-SCNC: 104 MMOL/L (ref 94–109)
CO2 SERPL-SCNC: 31 MMOL/L (ref 20–32)
CREAT SERPL-MCNC: 0.79 MG/DL (ref 0.52–1.04)
DEPRECATED CALCIDIOL+CALCIFEROL SERPL-MC: 32 UG/L (ref 20–75)
GFR SERPL CREATININE-BSD FRML MDRD: 73 ML/MIN/1.73M2
GLUCOSE BLD-MCNC: 98 MG/DL (ref 70–99)
POTASSIUM BLD-SCNC: 3.6 MMOL/L (ref 3.4–5.3)
SODIUM SERPL-SCNC: 140 MMOL/L (ref 133–144)
URATE SERPL-MCNC: 4 MG/DL (ref 2.6–6)

## 2023-01-09 ENCOUNTER — VIRTUAL VISIT (OUTPATIENT)
Dept: PHARMACY | Facility: CLINIC | Age: 88
End: 2023-01-09
Attending: INTERNAL MEDICINE
Payer: COMMERCIAL

## 2023-01-09 DIAGNOSIS — M25.551 HIP PAIN, RIGHT: ICD-10-CM

## 2023-01-09 DIAGNOSIS — Z17.0 MALIGNANT NEOPLASM OF UPPER-OUTER QUADRANT OF LEFT BREAST IN FEMALE, ESTROGEN RECEPTOR POSITIVE (H): ICD-10-CM

## 2023-01-09 DIAGNOSIS — E03.9 ACQUIRED HYPOTHYROIDISM: ICD-10-CM

## 2023-01-09 DIAGNOSIS — M19.90 DJD (DEGENERATIVE JOINT DISEASE): ICD-10-CM

## 2023-01-09 DIAGNOSIS — M10.071 ACUTE IDIOPATHIC GOUT OF RIGHT FOOT: ICD-10-CM

## 2023-01-09 DIAGNOSIS — I25.708 ATHEROSCLEROSIS OF CORONARY ARTERY BYPASS GRAFT(S), UNSPECIFIED, WITH OTHER FORMS OF ANGINA PECTORIS (H): ICD-10-CM

## 2023-01-09 DIAGNOSIS — C50.412 MALIGNANT NEOPLASM OF UPPER-OUTER QUADRANT OF LEFT BREAST IN FEMALE, ESTROGEN RECEPTOR POSITIVE (H): ICD-10-CM

## 2023-01-09 DIAGNOSIS — I10 ESSENTIAL HYPERTENSION WITH GOAL BLOOD PRESSURE LESS THAN 130/80: ICD-10-CM

## 2023-01-09 DIAGNOSIS — F41.9 ANXIETY: Primary | ICD-10-CM

## 2023-01-09 PROCEDURE — 99207 PR NO CHARGE LOS: CPT | Performed by: PHARMACIST

## 2023-01-09 RX ORDER — SENNOSIDES 8.6 MG
1300 CAPSULE ORAL 2 TIMES DAILY PRN
COMMUNITY

## 2023-01-09 RX ORDER — CHOLECALCIFEROL (VITAMIN D3) 50 MCG
1 TABLET ORAL DAILY
COMMUNITY

## 2023-01-09 RX ORDER — SERTRALINE HYDROCHLORIDE 25 MG/1
25 TABLET, FILM COATED ORAL DAILY
Qty: 90 TABLET | Refills: 0 | Status: SHIPPED | OUTPATIENT
Start: 2023-01-09 | End: 2023-04-06

## 2023-01-09 NOTE — Clinical Note
EVERTON GAMBOA note, thanks!  Cyndie Breaux, PharmD Medication Therapy Management Pharmacist [phone]

## 2023-01-09 NOTE — PROGRESS NOTES
Medication Therapy Management (MTM) Encounter    ASSESSMENT:                            Medication Adherence/Access: No issues identified    Anxiety:  Can consider alternate selective serotonin reuptake inhibitor at low dose, such as sertraline, which can have good efficacy in older adults. Will monitor for tremors and stop if they arise. Can titrate up on the dose ever 2-4 weeks, will full efficacy expected closer to 12 weeks.   Could also consider duloxetine or venlafaxine for added pain benefit. Would avoid bupropion for anxiety as this can be more activating.   Can also plan to monitor sodium due to possible risk of hyponatremia in older patients, as well as monitor for sedation and blurred vision.  Reviewed best to avoid benzodiazepines due to risks of falls.    Hip pain/muscle cramps in legs: may benefit from additional acetaminophen - could also consider adding more at bedtime, possibly helpful for anxiety, sleep in older adults.    Double mastectomy 2022:  Discussed can consider alternate therapy, such as tamoxifen, which is another class of medications. Understand both risk of cancer recurrence and side effects. Patient will follow-up with her oncologist.    Hypertension/CAD: blood pressure slightly elevated at last visit, but near goal of < 150/90, may benefit from recheck    Gout: Stable. Patient is at goal of uric acid <6mg/dl.    Hypothyroidism: Stable. Last TSH is within normal limits.     PLAN:                            1. Ok to add in another acetaminophen 650 mg tablet in the morning. (maximum dose of acetaminophen is 3000 mg/24 hours).     2. Start sertraline 25 mg once daily for anxiety.    Follow-up: Return in about 2 weeks (around 1/23/2023) for Medication Therapy Management.    SUBJECTIVE/OBJECTIVE:                          Lisa Ferguson is a 87 year old female called for an initial visit. She was referred to me from Ziyad Isaac for discussion of anxiety treatment options and review all  medication.    Reason for visit: med review.    Allergies/ADRs: Reviewed in chart  Past Medical History: Reviewed in chart  Tobacco: She reports that she quit smoking about 44 years ago. Her smoking use included cigarettes. She has never used smokeless tobacco.  Alcohol: Less than 1 beverage / month  Caffeine: 2-3 cups coffee/day    Medication Adherence/Access:   Patient uses pill box(es).  Per patient, misses medication 0 times per week.    The patient fills medications at [place]: NO, fills medications at [facility].    Anxiety:    For years she was on citalopram (she thinks 20 mg) and it worked well. She was experiencing tardive dyskinesia and wondered it if was from the citalopram, she talked with her doctor, and stopped the citalopram and the tremors stopped immediately. She also has a family history of essential tremor and would like to avoid anything that could contribute tot hat. Buspirone was terrible (she can't recall exactly, but whatever it was was terrible). She is still having quite a bit of anxiety, she's probably had panic attacks at night. She spoke with cardiology team, waking up with chest tightening - it didn't sound like anything cardiac to them. Deep breathing and thinking of pleasant things helps, getting busy or reading also helps. Used to rarely take ativan, which was helpful.    Hip pain/muscle cramps in legs:    Acetaminophen 650 mg three times daily - helps take the edge off.  Cortisone injection in her right hip recently and tolerated this well.    Double mastectomy 2022:    Vitamin D 2000 international unit(s) daily    Anastrozole was recommended for five years, but she had terrible side effects: sweating (had to lay on a beach towel at night), fungus in her skin and hair due to the sweating/wetness. 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 To put the anastrozole on top of that would increase  the pain and she doesn't want that, she'd rather not take it and at her age she'd prefer the increase risk of recurrence vs the side effects.     Hypertension/CAD:   Losartan 50 mg daily   Metoprolol tartrate 50 mg every morning and 25 mg every evening  Torsemide 20 mg daily  Aspirin 81 mg daily    She's had triple bypass in the past. History of stomach bleed related to H pylori in the distant past.  Patient reports the following medication side effects: increased urination with torsemide.  BP Readings from Last 3 Encounters:   01/02/23 (!) 156/84   12/12/22 (!) 167/87   11/18/22 (!) 142/79     Gout:   allopurinol 400 mg daily    Patient reports no current pain concerns. Patient is experiencing the following medication side effects: none.   Uric Acid   Date Value Ref Range Status   01/02/2023 4.0 2.6 - 6.0 mg/dL Final   12/29/2020 4.3 2.6 - 6.0 mg/dL Final     Hypothyroidism:   levothyroxine 125 mcg daily with her other morning pills ~ 9 am (soonest she eats breakfast is 9:30 am)    Patient is having the following symptoms: none.   TSH   Date Value Ref Range Status   07/07/2022 2.94 0.40 - 4.00 mU/L Final   02/17/2021 3.80 0.40 - 4.00 mU/L Final     Today's Vitals: There were no vitals taken for this visit.  ----------------      I spent 50 minutes with this patient today. All changes were made via collaborative practice agreement with Ziyad Isaac MD. A copy of the visit note was provided to the patient's provider(s).    A summary of these recommendations was sent via [employer].    Cyndie Breaux, LakeishaD  Medication Therapy Management Pharmacist  [phone]    Telemedicine Visit Details  Type of service:  Telephone visit  Start Time: 3:36 PM  End Time: 4:26 PM     Medication Therapy Recommendations  Anxiety    Rationale: Untreated condition - Needs additional medication therapy - Indication   Recommendation: Start Medication   Status: Accepted per CPA         Hip pain, right    Current Medication: acetaminophen  (ACETAMINOPHEN 8 HOUR) 650 MG CR tablet   Rationale: Dose too low - Dosage too low - Effectiveness   Recommendation: Increase Dose   Status: Accepted - no CPA Needed

## 2023-01-10 ENCOUNTER — THERAPY VISIT (OUTPATIENT)
Dept: PHYSICAL THERAPY | Facility: CLINIC | Age: 88
End: 2023-01-10
Payer: MEDICARE

## 2023-01-10 DIAGNOSIS — M25.551 HIP PAIN, RIGHT: Primary | ICD-10-CM

## 2023-01-10 PROCEDURE — 97110 THERAPEUTIC EXERCISES: CPT | Mod: GP | Performed by: PHYSICAL THERAPIST

## 2023-01-10 PROCEDURE — 97140 MANUAL THERAPY 1/> REGIONS: CPT | Mod: GP | Performed by: PHYSICAL THERAPIST

## 2023-01-10 RX ORDER — LEVOTHYROXINE SODIUM 125 UG/1
125 TABLET ORAL EVERY MORNING
Qty: 90 TABLET | Refills: 1 | Status: SHIPPED | OUTPATIENT
Start: 2023-01-10 | End: 2023-07-03

## 2023-01-10 NOTE — PATIENT INSTRUCTIONS
"Recommendations from today's MTM visit:                                                         1. Ok to add in another acetaminophen 650 mg tablet in the morning. (maximum dose of acetaminophen is 3000 mg/24 hours).     2. Start sertraline 25 mg once daily for anxiety.    Follow-up: Return in about 2 weeks (around 1/23/2023) for Medication Therapy Management.    It was great speaking with you today.  I value your experience and would be very thankful for your time in providing feedback in our clinic survey. In the next few days, you may receive an email or text message from [employer] with a link to a survey related to your  clinical pharmacist.\"     To schedule another MTM appointment, please call the clinic directly or you may call the MTM scheduling line at [phone] or toll-free at [phone].     My Clinical Pharmacist's contact information:                                                      Please feel free to contact me with any questions or concerns you have.      Cyndie Breaux, PharmD  Medication Therapy Management Pharmacist  [phone]     "

## 2023-01-19 ENCOUNTER — MYC MEDICAL ADVICE (OUTPATIENT)
Dept: INTERNAL MEDICINE | Facility: CLINIC | Age: 88
End: 2023-01-19
Payer: MEDICARE

## 2023-01-20 ENCOUNTER — THERAPY VISIT (OUTPATIENT)
Dept: PHYSICAL THERAPY | Facility: CLINIC | Age: 88
End: 2023-01-20
Payer: MEDICARE

## 2023-01-20 DIAGNOSIS — M25.551 HIP PAIN, RIGHT: Primary | ICD-10-CM

## 2023-01-20 PROCEDURE — 97140 MANUAL THERAPY 1/> REGIONS: CPT | Mod: GP | Performed by: PHYSICAL THERAPIST

## 2023-01-20 PROCEDURE — 97110 THERAPEUTIC EXERCISES: CPT | Mod: GP | Performed by: PHYSICAL THERAPIST

## 2023-01-23 ENCOUNTER — VIRTUAL VISIT (OUTPATIENT)
Dept: PHARMACY | Facility: CLINIC | Age: 88
End: 2023-01-23
Payer: COMMERCIAL

## 2023-01-23 DIAGNOSIS — I10 ESSENTIAL HYPERTENSION WITH GOAL BLOOD PRESSURE LESS THAN 130/80: ICD-10-CM

## 2023-01-23 DIAGNOSIS — M19.90 DJD (DEGENERATIVE JOINT DISEASE): ICD-10-CM

## 2023-01-23 DIAGNOSIS — C50.412 MALIGNANT NEOPLASM OF UPPER-OUTER QUADRANT OF LEFT BREAST IN FEMALE, ESTROGEN RECEPTOR POSITIVE (H): ICD-10-CM

## 2023-01-23 DIAGNOSIS — Z17.0 MALIGNANT NEOPLASM OF UPPER-OUTER QUADRANT OF LEFT BREAST IN FEMALE, ESTROGEN RECEPTOR POSITIVE (H): ICD-10-CM

## 2023-01-23 DIAGNOSIS — M25.551 HIP PAIN, RIGHT: ICD-10-CM

## 2023-01-23 DIAGNOSIS — F41.9 ANXIETY: Primary | ICD-10-CM

## 2023-01-23 DIAGNOSIS — I25.708 ATHEROSCLEROSIS OF CORONARY ARTERY BYPASS GRAFT(S), UNSPECIFIED, WITH OTHER FORMS OF ANGINA PECTORIS (H): ICD-10-CM

## 2023-01-23 PROCEDURE — 99207 PR NO CHARGE LOS: CPT | Performed by: PHARMACIST

## 2023-01-23 NOTE — PATIENT INSTRUCTIONS
"Recommendations from today's MTM visit:                                                         1. Continue sertraline 25 mg daily.    2. Check your blood pressure at home and call me back with the reading.     Follow-up: Return in about 4 weeks (around 2/20/2023) for Medication Therapy Management.    It was great speaking with you today.  I value your experience and would be very thankful for your time in providing feedback in our clinic survey. In the next few days, you may receive an email or text message from [employer] with a link to a survey related to your  clinical pharmacist.\"     To schedule another MTM appointment, please call the clinic directly or you may call the MTM scheduling line at [phone] or toll-free at [phone].     My Clinical Pharmacist's contact information:                                                      Please feel free to contact me with any questions or concerns you have.      Cyndie Breaux, PharmD  Medication Therapy Management Pharmacist  [phone]   "

## 2023-01-23 NOTE — PROGRESS NOTES
Medication Therapy Management (MTM) Encounter    ASSESSMENT:                            Medication Adherence/Access: No issues identified    Anxiety:  May benefit from maintaining current therapy, continue to monitor for worsened tremor. Efficacy may not be seen for another 2-4 weeks, with full efficacy expected closer to 12 weeks. Will also check sodium in another month as well.     If needed in future could also consider duloxetine or venlafaxine for added pain benefit. Would avoid bupropion for anxiety as this can be more activating.     Hip pain/muscle cramps in legs: Stable.     Double mastectomy 2022:  Discussed can consider alternate therapy, such as tamoxifen, which is another class of medications. Understand both risk of cancer recurrence and side effects. Patient will follow-up with her oncologist.    Hypertension/CAD: blood pressure slightly elevated at last visit, but near goal of < 150/90, may benefit from recheck    PLAN:                            1. Continue sertraline 25 mg daily.    2. Check your blood pressure at home and call me back with the reading.     Addendum: Lisa called back with blood pressure readings, at goal < 150/90.  145/73, 57, 138/75, 55    Follow-up: Return in about 4 weeks (around 2/20/2023) for Medication Therapy Management.    SUBJECTIVE/OBJECTIVE:                          iLsa Ferguson is a 87 year old female called for a follow-up visit.  Today's visit is a follow-up MTM visit from 1/9/23.     Reason for visit: follow-up on sertraline start.    Allergies/ADRs: Reviewed in chart  Past Medical History: Reviewed in chart  Tobacco: She reports that she quit smoking about 44 years ago. Her smoking use included cigarettes. She has never used smokeless tobacco.  Alcohol: Less than 1 beverage / month  Caffeine: 2-3 cups coffee/day    Medication Adherence/Access:   Patient uses pill box(es).  Per patient, misses medication 0 times per week.    The patient fills medications at  [place]: NO, fills medications at [facility].    Anxiety:    Sertraline 25 mg daily every evening - new x two weeks. Initially helped her sleep, then has had trouble sleeping. Had diarrhea the first three days, but has now gone away. She noticed a little more tremor in her left hand (but she also has essential tremors at baseline, citalopram worsened these in the past), but she's not sure if she can attribute this to the medication.  Still has about the same amount of anxiety. She also has a family history of essential tremor and would like to avoid anything that could contribute to that. Buspirone was terrible (she can't recall exactly, but whatever it was was terrible).     Hip pain/muscle cramps in legs:    Acetaminophen 650 mg three times daily - helps take the edge off. A few times she took an additional tablet in the morning and it was very helpful.  Cortisone injection in her right hip recently and tolerated this well.    Double mastectomy 2022:    Vitamin D 2000 international unit(s) daily    Anastrozole was recommended for five years, but she had terrible side effects: sweating (had to lay on a beach towel at night), fungus in her skin and hair due to the sweating/wetness. 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 To put the anastrozole on top of that would increase the pain and she doesn't want that, she'd rather not take it and at her age she'd prefer the increase risk of recurrence vs the side effects. She has an appointment coming up with her oncologist and will discuss the option of tamoxifen.     Hypertension/CAD:   Losartan 50 mg daily   Metoprolol tartrate 50 mg every morning and 25 mg every evening  Torsemide 20 mg daily  Aspirin 81 mg daily    She's had triple bypass in the past. History of stomach bleed related to H pylori in the distant past.  She has a blood pressure cuff at home but not routinely using.  Patient reports the following medication side effects: increased urination with torsemide.  BP Readings from Last 3 Encounters:   01/02/23 (!) 156/84   12/12/22 (!) 167/87   11/18/22 (!) 142/79     Today's Vitals: There were no vitals taken for this visit.  ----------------    I spent 16 minutes with this patient today. All changes were made via collaborative practice agreement with Ziyad Isaac MD. A copy of the visit note was provided to the patient's provider(s).    A summary of these recommendations was sent via [employer].    Cyndie Breaux, LakeishaD  Medication Therapy Management Pharmacist  [phone]    Telemedicine Visit Details  Type of service:  Telephone visit  Start Time: 2:31 PM   End Time: 2:47 PM     Medication Therapy Recommendations  Essential hypertension with goal blood pressure less than 130/80    Current Medication: losartan (COZAAR) 50 MG tablet   Rationale: Medication requires monitoring - Needs additional monitoring   Recommendation: Self-Monitoring   Status: Patient Agreed - Adherence/Education

## 2023-01-23 NOTE — Clinical Note
EVERTON GAMBOA note, thanks!  Cyndie Breaux, PharmD Medication Therapy Management Pharmacist [phone]

## 2023-01-26 ENCOUNTER — THERAPY VISIT (OUTPATIENT)
Dept: PHYSICAL THERAPY | Facility: CLINIC | Age: 88
End: 2023-01-26
Payer: MEDICARE

## 2023-01-26 DIAGNOSIS — M25.551 HIP PAIN, RIGHT: Primary | ICD-10-CM

## 2023-01-26 PROCEDURE — 97110 THERAPEUTIC EXERCISES: CPT | Mod: GP | Performed by: PHYSICAL THERAPIST

## 2023-01-26 PROCEDURE — 97140 MANUAL THERAPY 1/> REGIONS: CPT | Mod: GP | Performed by: PHYSICAL THERAPIST

## 2023-01-30 ENCOUNTER — TELEPHONE (OUTPATIENT)
Dept: FAMILY MEDICINE | Facility: CLINIC | Age: 88
End: 2023-01-30
Payer: MEDICARE

## 2023-01-30 DIAGNOSIS — C50.412 MALIGNANT NEOPLASM OF UPPER-OUTER QUADRANT OF LEFT BREAST IN FEMALE, ESTROGEN RECEPTOR POSITIVE (H): ICD-10-CM

## 2023-01-30 DIAGNOSIS — E83.52 HYPERCALCEMIA: Primary | ICD-10-CM

## 2023-01-30 DIAGNOSIS — Z98.890 H/O PARATHYROIDECTOMY: ICD-10-CM

## 2023-01-30 DIAGNOSIS — Z17.0 MALIGNANT NEOPLASM OF UPPER-OUTER QUADRANT OF LEFT BREAST IN FEMALE, ESTROGEN RECEPTOR POSITIVE (H): ICD-10-CM

## 2023-01-30 DIAGNOSIS — Z90.89 H/O PARATHYROIDECTOMY: ICD-10-CM

## 2023-01-30 NOTE — TELEPHONE ENCOUNTER
Called patient. She states that when she visited, there was nothing on the surface of her skin, then within days lumps appeared. Now they are crusted. She thinks it may have been shingles. Pain is much improved it was really burning and painful, but pretty much entirely resolved now, so she just wants provider to know as an FYI that this happened. She does not feel the need at this time to be seen or for treatment. She does not have any drainage as far as she is aware. It is very difficult to see. It is located in between buttocks/but cheeks kind of right where the luis's crack is, but lower.    She would also like PCP to put a referral in for her to endocrinology for the elevated calcium level. She called to schedule, but they need a referral in before she can make an appt.     Tanya Oneill RN   [facility]

## 2023-01-30 NOTE — TELEPHONE ENCOUNTER
FYI - Status Update    Who is Calling: patient, ( is typing for the patient)    Update: Since the last visit 01-02-23, I'm sure you remember checking me for persistant pain in the glutio fold and nothing was found, not to long after seeing you I broke out in skin lesions near that area. I was unable to view them, but I could reach and feel them and they remain very painful, they're crusting over and the pain is decreasing, I think I had or have shingles, should this be added to my medical record or do you want to see them?  I also had two expensive shingles vaccine in 07-22 and 09-22.    I called to make an appt for an endo, and they need a referral. You had given me a name, fax [phone], at [facility].    Does caller want a call/response back: Yes     Could we send this information to you in TuneIn or would you prefer to receive a phone call?:   Patient would prefer a phone call   Okay to leave a detailed message?: Yes at Cell number on file:    Telephone Information:   Mobile [phone]

## 2023-01-31 ENCOUNTER — TELEPHONE (OUTPATIENT)
Dept: ENDOCRINOLOGY | Facility: CLINIC | Age: 88
End: 2023-01-31
Payer: MEDICARE

## 2023-01-31 NOTE — TELEPHONE ENCOUNTER
[facility] Call Center    Phone Message    May a detailed message be left on voicemail: yes     Reason for Call: Other: Patient is being referred to be seen for Hypercalcemia; H/O parathyroidectomy. Ref by Dr Isaac. Patient is currently scheduled on 7/17/23 at 3:30pm with Dr Whitman. Sending encounter to clinic for review. Please call patient to schedule if sooner opening is needed     Action Taken: Message routed to:  Adult Clinics: Endocrinology p [phone]    Travel Screening: Not Applicable

## 2023-02-02 ENCOUNTER — OFFICE VISIT (OUTPATIENT)
Dept: NEUROSURGERY | Facility: CLINIC | Age: 88
End: 2023-02-02
Payer: MEDICARE

## 2023-02-02 VITALS
HEART RATE: 71 BPM | DIASTOLIC BLOOD PRESSURE: 75 MMHG | HEIGHT: 67 IN | SYSTOLIC BLOOD PRESSURE: 160 MMHG | BODY MASS INDEX: 36.1 KG/M2 | WEIGHT: 230 LBS | OXYGEN SATURATION: 93 %

## 2023-02-02 DIAGNOSIS — M48.062 SPINAL STENOSIS OF LUMBAR REGION WITH NEUROGENIC CLAUDICATION: Primary | ICD-10-CM

## 2023-02-02 PROCEDURE — 99214 OFFICE O/P EST MOD 30 MIN: CPT | Performed by: NURSE PRACTITIONER

## 2023-02-02 ASSESSMENT — PAIN SCALES - GENERAL: PAINLEVEL: MILD PAIN (3)

## 2023-02-02 NOTE — PROGRESS NOTES
"Lisa Ferguson is a 87 year old female who presents for:  Chief Complaint   Patient presents with     Back Pain     Spinal stenosis of lumbar region with neurogenic claudication         Initial Vitals:  BP (!) 160/75   Pulse 71   Ht 5' 6.5\" (1.689 m)   Wt 230 lb (104.3 kg)   SpO2 93%   BMI 36.57 kg/m   Estimated body mass index is 36.57 kg/m  as calculated from the following:    Height as of this encounter: 5' 6.5\" (1.689 m).    Weight as of this encounter: 230 lb (104.3 kg).. Body surface area is 2.21 meters squared. BP completed using cuff size: regular  Mild Pain (3)    Nursing Comments:     Meagan Jay MA  "

## 2023-02-02 NOTE — PROGRESS NOTES
Dr. Liborio Aguilar   [facility] Clinic Visit      CC: low back pain, leg pain    Primary Care Provider: Ziyad Isaac    Reason For Visit:   I was asked by Dr. Isaac to consult on the patient for:   M48.062 (ICD-10-CM) - Spinal stenosis of lumbar region with neurogenic claudication    HPI: Lisa Ferguson is an 87 year old female with history of right L3-4 hemilaminotomy for resection of synovial cyst in 2017 with Dr. Tavares at [facility]. Patient was doing well post op but then developed increased low back pain s/p fall in September 2022. She has been working with PT but symptoms persist. Today, patient reports aching midline low back pain that radiates to bilateral posterior legs, right > left. Patient can walk less than 1 block but then has to stop and rest due to pain. She reports improvement in pain when leaning forward such as when using a shopping cart. She reports chronic numbness in bilateral feet. Patient has tried PT, Tylenol, and heat packs. Denies any numbness, foot drop, saddle anesthesia, or bladder/bowel incontinence.     Current pain: 0/10   At worst: 7/10    Past Medical History:   Diagnosis Date     Anxiety      CAD (coronary artery disease)     angio 2002, h/o cabg, sees Luisana Nelson NP, they follow the cholesterol     CHF (congestive heart failure) (H) 07/08/2014     Closed compression fracture of L2 vertebra (H)      Degenerative joint disease of right hip      DJD (degenerative joint disease)      History of colonoscopy 01/01/2000    due jan 2010     Hyperlipidemia LDL goal < 70     sees [facility] lipid clinic     Hypertension goal BP (blood pressure) < 140/90      Hypothyroidism      IBS (irritable bowel syndrome)      Lumbar spinal stenosis      Mitral regurgitation      Obesity      GLEN (obstructive sleep apnea) 07/11/2011     PUD (peptic ulcer disease)     h/o duodenal ulcer     RBBB (right bundle branch block)      Sciatica neuralgia november 209    MRI shows spinal stenosis and  a cyst on the spine, has received an epidural steroid injection       Past Medical History reviewed with patient during visit.    Past Surgical History:   Procedure Laterality Date     ARTHROSCOPY KNEE RT/LT      bilateral     COLONOSCOPY  6/2010    negative     HC DILATION/CURETTAGE DIAG/THER NON OB       HC EXCIS INTERDIGITAL NEUROMA,EA      mortons neuroma excision     HC REMOVAL GALLBLADDER  1994     HERNIA REPAIR, INGUINAL RT/LT       MASTECTOMY SIMPLE BILATERAL, SENTINEL NODE BILATERAL, COMBINED Bilateral 1/6/2021    Procedure: BILATERAL SIMPLE MASTECTOMY WITH LEFT SENTINEL LYMPH NODE BIOPSY;  Surgeon: Beata Garcia MD;  Location: [facility] OR     SURGICAL HISTORY OF -       bilateral meniscal tears and surgery on these     SURGICAL HISTORY OF -   1999 or so    one parathyroid removed     TONSILLECTOMY       TUBAL LIGATION       ZZC APPENDECTOMY       ZZC CABG, ARTERIAL, THREE  1999    LIMA-LAD, SVG-DIagnoal, SVG-OM, previous LAD-PCI, sees Dr Banuelos     Past Surgical History reviewed with patient during visit.    Current Outpatient Medications   Medication     acetaminophen (TYLENOL) 650 MG CR tablet     allopurinol (ZYLOPRIM) 100 MG tablet     allopurinol (ZYLOPRIM) 300 MG tablet     aspirin 81 MG EC tablet     betamethasone dipropionate (DIPROSONE) 0.05 % external lotion     levothyroxine (SYNTHROID/LEVOTHROID) 125 MCG tablet     losartan (COZAAR) 50 MG tablet     metoprolol tartrate (LOPRESSOR) 25 MG tablet     order for DME     rosuvastatin (CRESTOR) 5 MG tablet     sertraline (ZOLOFT) 25 MG tablet     torsemide (DEMADEX) 10 MG tablet     vitamin D3 (CHOLECALCIFEROL) 50 mcg (2000 units) tablet     Current Facility-Administered Medications   Medication     betamethasone acet & sod phos (CELESTONE) injection 6 mg     ropivacaine (NAROPIN) injection 2 mL       Allergies   Allergen Reactions     Adhesive Tape      Atorvastatin Other (See Comments) and Muscle Pain (Myalgia)     lipitor  Myalgia     Buspar  "[Buspirone]      Nickel Other (See Comments)     Raw weepy skin  rash     Vicodin [Hydrocodone-Acetaminophen] Other (See Comments)     Hallucinations     Liquid Adhesive      Other reaction(s): Contact Dermatitis       Social History     Socioeconomic History     Marital status:    Tobacco Use     Smoking status: Former     Years: 20.00     Types: Cigarettes     Quit date: 1978     Years since quittin.5     Smokeless tobacco: Never   Vaping Use     Vaping Use: Never used   Substance and Sexual Activity     Alcohol use: Yes     Comment: Rare      Drug use: No     Sexual activity: Not Currently     Partners: Male       Family History   Problem Relation Age of Onset     C.A.D. Mother      Arthritis Mother      Cardiovascular Mother      Heart Disease Mother      Obesity Mother      Thyroid Disease Mother      C.A.D. Father      Diabetes Father      Hypertension Father      Alcohol/Drug Father      Alzheimer Disease Father      Cardiovascular Father      Circulatory Father      Gastrointestinal Disease Father      Heart Disease Father      Lipids Father      Obesity Father      Cardiovascular Maternal Grandmother      Depression Maternal Grandmother      Obesity Maternal Grandmother      Thyroid Disease Maternal Grandmother      Cardiovascular Maternal Grandfather      Heart Disease Maternal Grandfather      Obesity Maternal Grandfather      Obesity Paternal Grandmother      Diabetes Paternal Grandfather      Alcohol/Drug Paternal Grandfather      Obesity Paternal Grandfather      Breast Cancer Sister      Cancer Sister      Obesity Sister      Thyroid Disease Sister      Alcohol/Drug Son      Allergies Son      Allergies Daughter      Depression Sister      Eye Disorder Sister      Gastrointestinal Disease Sister      Thyroid Disease Sister        ROS: 10 point ROS neg other than the symptoms noted above in the HPI.    Vital Signs: BP (!) 160/75   Pulse 71   Ht 1.689 m (5' 6.5\")   Wt 104.3 kg (230 " lb)   SpO2 93%   BMI 36.57 kg/m      Physical Examination:  Constitutional:  Alert, well nourished, NAD.  HEENT: Normocephalic, atraumatic.   Pulmonary:  Without shortness of breath, normal effort.   Cardiovascular:  No pitting edema of BLE.      Neurological:  Awake  Alert  Oriented x 3  Speech clear  Cranial nerves II - XII grossly intact  PERRL  EOMI  Motor exam:  Iliopsoas  (hip flexion)               Right: 5/5  Left:  5/5  Quadriceps  (knee extension)       Right:  5/5  Left:  5/5  Hamstrings  (knee flexion)            Right:  5/5  Left:  5/5  Gastroc Soleus  (PF)                          Right:  5/5  Left:  5/5  Tibialis Ant  (DF)                          Right:  5/5  Left:  5/5  EHL                          Right:  5/5  Left:  5/5         Sensation normal to BLE     Reflexes are 2+ in the patellar and Achilles. There is no clonus.     Musculoskeletal:  Gait: Able to stand from a seated position. Normal non-antalgic, non-myelopathic gait.   No tenderness of the spine or paraspinous muscles.  Hip height is symmetrical.  Negative SI joint tenderness bilaterally.  Negative straight leg raise bilaterally.      Imaging:   EXAM: CT LUMBAR SPINE W/O CONTRAST  12/1/2022 10:07 AM   IMPRESSION:  1. Chronic compression fracture of L2 approximately 35% height loss in  the anterior vertebral body. No associated spinal canal stenosis.  2. Multilevel spondylosis most significant at L4-5 with moderate  spinal canal narrowing.    DEXA 12/1/22: Normal     Assessment/Plan:   87 year old female with history of right L3-4 hemilaminotomy for resection of synovial cyst in 2017 with Dr. Tavares at [facility]. Patient was doing well post op but then developed increased back and leg pain s/p fall in September 2022. Today, patient reports aching midline low back pain that radiates to bilateral posterior legs and difficulty walking due to pain. She has tried conservative measures but symptoms persist. Lumbar spine CT shows chronic L2  compression fracture and moderate spinal canal stenosis at L4-5. We discussed symptoms, imaging, and next steps.     - Recommend a lumbar spine MRI for further evaluation  - Patient states she may be interested in lumbar BO pending MRI results   - Will call patient with results and next steps     Advised patient to call our clinic with any questions or concerns. Discussed red flag symptoms and advised to seek medical attention if these develop. Patient voiced understanding and agreement.      Padmini Ring CNP  [facility]  [address] [address]  Tel [phone]  Pager [phone]

## 2023-02-02 NOTE — PATIENT INSTRUCTIONS
Order for lumbar spine MRI. You can call to schedule at [phone]. I will call with the results and next steps.     Please call our clinic if symptoms persist, change, or worsen at any time.    [facility]  [address] [address]  Tel [phone]

## 2023-02-08 ENCOUNTER — ANCILLARY PROCEDURE (OUTPATIENT)
Dept: MRI IMAGING | Facility: CLINIC | Age: 88
End: 2023-02-08
Attending: NURSE PRACTITIONER
Payer: MEDICARE

## 2023-02-08 DIAGNOSIS — M48.062 SPINAL STENOSIS OF LUMBAR REGION WITH NEUROGENIC CLAUDICATION: ICD-10-CM

## 2023-02-08 PROCEDURE — G1010 CDSM STANSON: HCPCS | Performed by: RADIOLOGY

## 2023-02-08 PROCEDURE — 72148 MRI LUMBAR SPINE W/O DYE: CPT | Mod: TC | Performed by: RADIOLOGY

## 2023-02-09 ENCOUNTER — APPOINTMENT (OUTPATIENT)
Dept: URBAN - METROPOLITAN AREA CLINIC 255 | Age: 88
Setting detail: DERMATOLOGY
End: 2023-02-11

## 2023-02-09 PROBLEM — C44.319 BASAL CELL CARCINOMA OF SKIN OF OTHER PARTS OF FACE: Status: ACTIVE | Noted: 2023-02-09

## 2023-02-09 PROCEDURE — OTHER MOHS SURGERY: OTHER

## 2023-02-09 PROCEDURE — 13132 CMPLX RPR F/C/C/M/N/AX/G/H/F: CPT

## 2023-02-09 PROCEDURE — 17312 MOHS ADDL STAGE: CPT

## 2023-02-09 PROCEDURE — OTHER MIPS QUALITY: OTHER

## 2023-02-09 PROCEDURE — 17311 MOHS 1 STAGE H/N/HF/G: CPT

## 2023-02-09 NOTE — PROCEDURE: MIPS QUALITY
Quality 111:Pneumonia Vaccination Status For Older Adults: Pneumococcal vaccine (PPSV23) administered on or after patient’s 60th birthday and before the end of the measurement period
Quality 110: Preventive Care And Screening: Influenza Immunization: Influenza Immunization previously received during influenza season
Detail Level: Detailed
Quality 431: Preventive Care And Screening: Unhealthy Alcohol Use - Screening: Patient not identified as an unhealthy alcohol user when screened for unhealthy alcohol use using a systematic screening method
Quality 143: Oncology: Medical And Radiation- Pain Intensity Quantified: Pain severity quantified, no pain present
Quality 226: Preventive Care And Screening: Tobacco Use: Screening And Cessation Intervention: Patient screened for tobacco use and is an ex/non-smoker
Quality 130: Documentation Of Current Medications In The Medical Record: Current Medications Documented

## 2023-02-09 NOTE — PROCEDURE: MOHS SURGERY
Suturegard Retention Suture: 2-0 Nylon Modified Advancement Flap Text: The defect edges were debeveled with a #15 scalpel blade.  Given the location of the defect, shape of the defect and the proximity to free margins a modified advancement flap was deemed most appropriate.  Using a sterile surgical marker, an appropriate advancement flap was drawn incorporating the defect and placing the expected incisions within the relaxed skin tension lines where possible.    The area thus outlined was incised deep to adipose tissue with a #15 scalpel blade.  The skin margins were undermined to an appropriate distance in all directions utilizing iris scissors.

## 2023-02-09 NOTE — PROCEDURE: MOHS SURGERY
Body Location Override (Optional - Billing Will Still Be Based On Selected Body Map Location If Applicable): Left Temple (Zygomatic Cheek)

## 2023-02-14 ENCOUNTER — TELEPHONE (OUTPATIENT)
Dept: NEUROSURGERY | Facility: CLINIC | Age: 88
End: 2023-02-14
Payer: MEDICARE

## 2023-02-14 DIAGNOSIS — M48.062 SPINAL STENOSIS OF LUMBAR REGION WITH NEUROGENIC CLAUDICATION: Primary | ICD-10-CM

## 2023-02-14 NOTE — TELEPHONE ENCOUNTER
Spoke to pt 2/14/2023 and scheduled a sooner appt with Dr. Nettles per Dr. Bravo. Pt is scheduled to see Dr. Nettles 3/13/2023. Pt aware that appt on 7/17/23 with Dr. Whitman will be canceled.

## 2023-02-14 NOTE — TELEPHONE ENCOUNTER
To schedulers : please  move forward on the schedule to open non-call week SADIE space with Dr Nettles, using 2 back to back 30 minute SADIE spaces such as are currently available on 3/2, 3/13, 3//14, 3/16, 3/27, 3/30    Nicolle Bravo MD  Endocrine triage

## 2023-02-14 NOTE — TELEPHONE ENCOUNTER
Called patient to review imaging results as stated below. Patient reports continued low back pain that radiates to right > left posterior legs. She reports difficulty walking and continues to use a walker for ambulation. We discussed imaging, symptoms, and next steps.     - Placed order for L4-5 BO  - Continue with PT   - Follow-up with PCP regarding kidney lesion noted on MRI  - Advised patient to call clinic if symptoms persist, change, or worsen. Recommend follow-up with Dr. Aguilar if patient would like to discuss possible surgical options.   - Patient voiced understanding and agreement with this plan.     MR LUMBAR SPINE WITHOUT CONTRAST  2/8/2023 1:37 PM  IMPRESSION:  1.  Chronic compression fracture deformity affecting the upper L2  endplate unchanged in appearance versus prior.  2.  Moderate disc and severe facet degeneration at L4-L5 causes grade  1 anterolisthesis of L4 and severe spinal canal stenosis. Mild to  moderate foraminal narrowing.  3.  At L1-L2 there is moderate spinal canal stenosis and mild to  moderate left foraminal narrowing.  4.  At L3-L4 there is mild to moderate right and mild left lateral  recess and foraminal narrowing.  5.  Moderate right worse than left degenerative foraminal narrowing at  L5-S1.  6.  Less pronounced degenerative changes elsewhere as detailed above.  7.  Incompletely characterized lesion lower pole right kidney does not  appear to reflect a simple cyst. Recommend further evaluation with  renal ultrasound.    Padmini Ring CNP  [facility]  [address] [address]  Tel [phone]  Pager [phone]

## 2023-02-15 NOTE — TELEPHONE ENCOUNTER
RECORDS RECEIVED FROM: internal    DATE RECEIVED: 3.13.23   NOTES (FOR ALL VISITS) STATUS DETAILS   OFFICE NOTES from referring provider internal  Ziyad Isaac MD   OFFICE NOTES from other specialist internal  1.9.23 Rust        MEDICATION LIST internal     IMAGING      DEXASCAN internal  12.1.22, 2.21.21,      LABS     DIABETES: HBGA1C, CREATININE, FASTING LIPIDS, MICROALBUMIN URINE, POTASSIUM, TSH, T4    THYROID: TSH, T4, CBC, THYRODLONULIN, TOTAL T3, FREE T4, CALCITONIN, CEA internal  HBGA1C- 1.2.23  Parathyroid- 1.2.23  Vitamin D- 1.2.23  BMP- 1.2.23  TSH/T4- 7/7/22  Cbc- 7/7/22  Lipid- 7/7/22

## 2023-02-16 ENCOUNTER — APPOINTMENT (OUTPATIENT)
Dept: URBAN - METROPOLITAN AREA CLINIC 252 | Age: 88
Setting detail: DERMATOLOGY
End: 2023-02-16

## 2023-02-16 DIAGNOSIS — Z48.02 ENCOUNTER FOR REMOVAL OF SUTURES: ICD-10-CM

## 2023-02-16 PROCEDURE — 99024 POSTOP FOLLOW-UP VISIT: CPT

## 2023-02-16 PROCEDURE — OTHER SUTURE REMOVAL (GLOBAL PERIOD): OTHER

## 2023-02-16 PROCEDURE — OTHER COUNSELING: OTHER

## 2023-02-16 ASSESSMENT — LOCATION ZONE DERM: LOCATION ZONE: FACE

## 2023-02-16 ASSESSMENT — LOCATION SIMPLE DESCRIPTION DERM: LOCATION SIMPLE: LEFT TEMPLE

## 2023-02-16 ASSESSMENT — LOCATION DETAILED DESCRIPTION DERM: LOCATION DETAILED: LEFT MID TEMPLE

## 2023-02-16 NOTE — PROCEDURE: SUTURE REMOVAL (GLOBAL PERIOD)
Detail Level: Detailed
Add 54738 Cpt? (Important Note: In 2017 The Use Of 91162 Is Being Tracked By Cms To Determine Future Global Period Reimbursement For Global Periods): yes

## 2023-02-20 ENCOUNTER — LAB (OUTPATIENT)
Dept: LAB | Facility: CLINIC | Age: 88
End: 2023-02-20
Payer: MEDICARE

## 2023-02-20 ENCOUNTER — OFFICE VISIT (OUTPATIENT)
Dept: PHARMACY | Facility: CLINIC | Age: 88
End: 2023-02-20
Payer: COMMERCIAL

## 2023-02-20 VITALS — DIASTOLIC BLOOD PRESSURE: 64 MMHG | SYSTOLIC BLOOD PRESSURE: 134 MMHG | WEIGHT: 241 LBS | BODY MASS INDEX: 38.32 KG/M2

## 2023-02-20 DIAGNOSIS — I25.708 ATHEROSCLEROSIS OF CORONARY ARTERY BYPASS GRAFT(S), UNSPECIFIED, WITH OTHER FORMS OF ANGINA PECTORIS (H): ICD-10-CM

## 2023-02-20 DIAGNOSIS — Z17.0 MALIGNANT NEOPLASM OF UPPER-OUTER QUADRANT OF LEFT BREAST IN FEMALE, ESTROGEN RECEPTOR POSITIVE (H): ICD-10-CM

## 2023-02-20 DIAGNOSIS — F41.9 ANXIETY: Primary | ICD-10-CM

## 2023-02-20 DIAGNOSIS — M25.551 HIP PAIN, RIGHT: ICD-10-CM

## 2023-02-20 DIAGNOSIS — C50.412 MALIGNANT NEOPLASM OF UPPER-OUTER QUADRANT OF LEFT BREAST IN FEMALE, ESTROGEN RECEPTOR POSITIVE (H): ICD-10-CM

## 2023-02-20 DIAGNOSIS — I10 ESSENTIAL HYPERTENSION WITH GOAL BLOOD PRESSURE LESS THAN 130/80: ICD-10-CM

## 2023-02-20 DIAGNOSIS — M19.90 DJD (DEGENERATIVE JOINT DISEASE): ICD-10-CM

## 2023-02-20 LAB
ANION GAP SERPL CALCULATED.3IONS-SCNC: 4 MMOL/L (ref 3–14)
BUN SERPL-MCNC: 19 MG/DL (ref 7–30)
CALCIUM SERPL-MCNC: 10.9 MG/DL (ref 8.5–10.1)
CHLORIDE BLD-SCNC: 108 MMOL/L (ref 94–109)
CO2 SERPL-SCNC: 29 MMOL/L (ref 20–32)
CREAT SERPL-MCNC: 0.87 MG/DL (ref 0.52–1.04)
GFR SERPL CREATININE-BSD FRML MDRD: 65 ML/MIN/1.73M2
GLUCOSE BLD-MCNC: 111 MG/DL (ref 70–99)
POTASSIUM BLD-SCNC: 4.3 MMOL/L (ref 3.4–5.3)
SODIUM SERPL-SCNC: 141 MMOL/L (ref 133–144)

## 2023-02-20 PROCEDURE — 36415 COLL VENOUS BLD VENIPUNCTURE: CPT

## 2023-02-20 PROCEDURE — 99207 PR NO CHARGE LOS: CPT | Performed by: PHARMACIST

## 2023-02-20 PROCEDURE — 80048 BASIC METABOLIC PNL TOTAL CA: CPT

## 2023-02-20 NOTE — PATIENT INSTRUCTIONS
"Recommendations from today's MTM visit:                                                         1. Continue sertraline 25 mg daily.     Follow-up: Return in about 6 weeks (around 4/3/2023).    It was great speaking with you today.  I value your experience and would be very thankful for your time in providing feedback in our clinic survey. In the next few days, you may receive an email or text message from [email or web address] with a link to a survey related to your  clinical pharmacist.\"     To schedule another MTM appointment, please call the clinic directly or you may call the MTM scheduling line at [phone] or toll-free at [phone].     My Clinical Pharmacist's contact information:                                                      Please feel free to contact me with any questions or concerns you have.      Cyndie Breaux, PharmD  Medication Therapy Management Pharmacist  [phone]   "

## 2023-02-20 NOTE — PROGRESS NOTES
Medication Therapy Management (MTM) Encounter    ASSESSMENT:                            Medication Adherence/Access: No issues identified    Anxiety:  May benefit from maintaining current therapy, continue to monitor for worsened tremor. Efficacy may not be seen for another 2-6 weeks.  Will also check sodium in another month as well.     If needed in future could also consider duloxetine or venlafaxine for added pain benefit. Would avoid bupropion for anxiety as this can be more activating.     Hip pain/muscle cramps in legs: Stable.     Double mastectomy 2022: Previously discussed can consider alternate therapy, such as tamoxifen, which is another class of medications. Understand both risk of cancer recurrence and side effects. Patient will follow-up with her oncologist.    Hypertension/CAD: blood pressure slightly elevated at last visit, but at goal of < 150/90 today.    PLAN:                            1. Continue sertraline 25 mg daily.     Follow-up: Return in about 6 weeks (around 4/3/2023).    SUBJECTIVE/OBJECTIVE:                          Lisa Ferguson is a 87 year old female coming in for a follow-up visit.  Today's visit is a follow-up MTM visit from 1/23/23.     Reason for visit: follow-up on sertraline.    Allergies/ADRs: Reviewed in chart  Past Medical History: Reviewed in chart  Tobacco: She reports that she quit smoking about 44 years ago. Her smoking use included cigarettes. She has never used smokeless tobacco.  Alcohol: Less than 1 beverage / month  Caffeine: 2-3 cups coffee/day    Medication Adherence/Access:   Patient uses pill box(es).  Per patient, misses medication 0 times per week.    The patient fills medications at [facility]: NO, fills medications at [facility].    Anxiety:    Sertraline 25 mg daily every evening x 6 weeks.    Things have been pretty good, she does have diarrhea usually every other day.  It doesn't cover everything mood-wise, she still feels like she's have a semi-panic attack, but  it's not as fierce as it was before. Right away she slept better, but now 1-2 nights a week she has a restless night.  She is noticing some tremors in her hands, more purposeful tremors when she goes to do something, when she's still it doesn't bother her. She'd like to continue the same dose for now and see how things go.     She has essential tremors at baseline, citalopram worsened these in the past), but she's not sure if she can attribute this to the medication. She also has a family history of essential tremor and would like to avoid anything that could contribute to that. Buspirone was terrible (she can't recall exactly, but whatever it was was terrible).     She will have follow-up for her parathyroid and hypercalcemia with Endocrinology in March.     Hip pain/muscle cramps in legs:    Acetaminophen 650 mg three times daily - helps take the edge off. A few times she took an additional tablet in the morning and it was very helpful.  Cortisone injection in her right hip recently and tolerated this well.  Had a recent MRI.     Double mastectomy 2022:    Vitamin D 2000 international unit(s) daily    Anastrozole was recommended for five years, but she had terrible side effects: sweating (had to lay on a beach towel at night), fungus in her skin and hair due to the sweating/wetness. 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 To put the anastrozole on top of that would increase the pain and she doesn't want that, she'd rather not take it and at her age she'd prefer the increase risk of recurrence vs the side effects. She has an appointment coming up with her oncologist and will discuss the option of tamoxifen.     Hypertension/CAD:   Losartan 50 mg daily   Metoprolol tartrate 50 mg every morning and 25 mg every evening  Torsemide 20 mg daily  Aspirin 81 mg daily    She's had triple bypass in the past. History of stomach  bleed related to H pylori in the distant past.  She has a blood pressure cuff at home but not routinely using. Patient reports the following medication side effects: increased urination with torsemide.  BP Readings from Last 3 Encounters:   02/20/23 134/64   02/02/23 (!) 160/75   01/02/23 (!) 156/84     Today's Vitals: /64   Wt 241 lb (109.3 kg)   BMI 38.32 kg/m    ----------------      I spent 20 minutes with this patient today. All changes were made via collaborative practice agreement with Ziyad Isaac MD. A copy of the visit note was provided to the patient's provider(s).    A summary of these recommendations was given to the patient.    Cyndie Breaux, PharmD  Medication Therapy Management Pharmacist  [phone]     Medication Therapy Recommendations  No medication therapy recommendations to display

## 2023-02-20 NOTE — Clinical Note
EVERTON GAMBOA note, thanks!  Cyndei Breaux, PharmD Medication Therapy Management Pharmacist [phone]

## 2023-02-21 ENCOUNTER — THERAPY VISIT (OUTPATIENT)
Dept: PHYSICAL THERAPY | Facility: CLINIC | Age: 88
End: 2023-02-21
Payer: MEDICARE

## 2023-02-21 DIAGNOSIS — M25.551 HIP PAIN, RIGHT: Primary | ICD-10-CM

## 2023-02-21 PROCEDURE — 97110 THERAPEUTIC EXERCISES: CPT | Mod: GP | Performed by: PHYSICAL THERAPIST

## 2023-02-21 PROCEDURE — 97140 MANUAL THERAPY 1/> REGIONS: CPT | Mod: GP | Performed by: PHYSICAL THERAPIST

## 2023-02-21 NOTE — PROGRESS NOTES
Subjective:  HPI  Physical Exam                    Objective:  System    Physical Exam    General     ROS    Assessment/Plan:    PROGRESS  REPORT    Progress reporting period is from 1/20/23 to 2/21/23.       SUBJECTIVE    Subjective: Pt states taht she feels very small incremental improvements in her hip sicne last visit. Prolonged walking is still quite limited depending on the day. does feel like she is walking better and overall pain levels have improved. No issues after last visit.     Current Pain level: 2/10.      Initial Pain level: 3/10.   Changes in function:  Yes (See Goal flowsheet attached for changes in current functional level)  Adverse reaction to treatment or activity: None    OBJECTIVE  Changes noted in objective findings:  Yes,   Objective: minimally antalgic gait with SPC, increased step length bu twise base of support.     ASSESSMENT/PLAN  Updated problem list and treatment plan: Diagnosis 1:  Hip pain, right  Pain -  hot/cold therapy, self management and education  Decreased ROM/flexibility - manual therapy and therapeutic exercise  Decreased joint mobility - manual therapy and therapeutic exercise  Decreased strength - therapeutic exercise and therapeutic activities  Impaired balance - neuro re-education and therapeutic activities  Impaired gait - gait training  Impaired muscle performance - neuro re-education  Decreased function - therapeutic activities  STG/LTGs have been met or progress has been made towards goals:  Yes (See Goal flow sheet completed today.)  Assessment of Progress: The patient's condition is improving.  The patient's condition has potential to improve.  Self Management Plans:  Patient has been instructed in a home treatment program.  Patient is independent in a home treatment program.  Patient  has been instructed in self management of symptoms.  I have re-evaluated this patient and find that the nature, scope, duration and intensity of the therapy is appropriate for the  medical condition of the patient.  Lisa continues to require the following intervention to meet STG and LTG's:  PT    Recommendations:  This patient would benefit from continued therapy.     Frequency:  2-3 X a month, once daily  Duration:  for 2 months        Please refer to the daily flowsheet for treatment today, total treatment time and time spent performing 1:1 timed codes.

## 2023-02-21 NOTE — PROGRESS NOTES
[facility]    OUTPATIENT Physical Therapy ORTHOPEDIC EVALUATION  PLAN OF TREATMENT FOR OUTPATIENT REHABILITATION  (COMPLETE FOR INITIAL CLAIMS ONLY)  Patient's Last Name, First Name, M.I.  YOB: 1935  Lisa Ferguson    Provider s Name:  [facility]   Medical Record No.  [id number]   Start of Care Date:  08/30/22   Onset Date:    7/7/22   Treatment Diagnosis:  Hip pain, right Medical Diagnosis:  Hip pain, right       Goals:     02/21/23 0500   Body Part   Goals listed below are for Rigth hip   Goal #1   Goal #1 stairs   Previous Functional Level Ascend/descend stairs;in a normal reciprocal pattern;with a railing   Current Functional Level Ascend/descend stairs;one step at a time;with a railing   STG Target Performance Ascend/descend stairs;in a normal reciprocal pattern;with a railing   Rationale to enter/leave the house safely;to reach lower level of home safely;for safe community access to buildings;for safe community ambulaton   Due Date 09/20/22   If goal not met, Why? 10/26/22   LTG Target Performance Ascend/descend stairs;in a normal reciprocal pattern;without a railing   Rationale to enter/leave the house safely;to reach lower level of home safely;for safe community access to buildings;for safe community ambulaton   Due Date 11/25/22   Date Goal Met 01/20/23   If goal not met, Why? able to perform bu twas quite unsteady   Goal #2   Goal #2 driving/transportation  (pain with entering/exiting vehicle)   Previous Functional Level No restrictions   Current Functional Level   (pain 5/10 with entering/exiting vehicle)   STG Target Performance   (pain 4/10 with entering/exiting vehicle)   Rationale for safe driving   Due date 09/20/22   Date Goal Met 09/27/22   LTG Target Performance   (pain 3/10 with entering/exiting vehicle)   Rationale for safe driving   Due date  10/25/22   Date Goal Met 10/26/22         Therapy Frequency:  1x/week  Predicted Duration of Therapy Intervention:  8 weeks    Riley Clinton, PT                 I CERTIFY THE NEED FOR THESE SERVICES FURNISHED UNDER        THIS PLAN OF TREATMENT AND WHILE UNDER MY CARE     (Physician attestation of this document indicates review and certification of the therapy plan).                     Certification Date From:  01/31/23   Certification Date To:  03/28/23    Referring Provider:  Ziyad Isaac    Initial Assessment        See Epic Evaluation SOC Date: 08/30/22

## 2023-02-28 ENCOUNTER — MYC MEDICAL ADVICE (OUTPATIENT)
Dept: NEUROSURGERY | Facility: CLINIC | Age: 88
End: 2023-02-28
Payer: MEDICARE

## 2023-02-28 ENCOUNTER — MYC MEDICAL ADVICE (OUTPATIENT)
Dept: INTERNAL MEDICINE | Facility: CLINIC | Age: 88
End: 2023-02-28
Payer: MEDICARE

## 2023-02-28 DIAGNOSIS — N28.9 KIDNEY LESION, NATIVE, RIGHT: Primary | ICD-10-CM

## 2023-02-28 NOTE — CONFIDENTIAL NOTE
Patient saw Padmini Ring CNP on 2/2/23 for low back pain and leg pain. Padmini recommended lumbar MRI and possible LESI pending MRI results.     Padmini Ring CNP called patient with imaging results and recommendations on 2/14/23. Order was placed for L4-5 BO, recommend PT, recommend follow-up with PCP for kidney lesion. Recommend follow-up with Dr. Aguilar if patient would like to discuss surgical options.     Pain management scheduling team LV for aptient to schedule injection.    [employer] message sent to patient with phone number to schedule with pain and review of recommendations from Padmini Ring CNP above. Advised to contact clinic with any additional questions or concerns.

## 2023-03-01 NOTE — TELEPHONE ENCOUNTER
I called patient and we reviewed her situation 4:32 PM 03/01/23     Regarding the results of the lumbar mri evaluation and possible treatment options for this patient will need to come from the neurosurgery     The incidental finding of a right sided renal lesion is concerning as renal cell cancer is entirely possible. I have ordered a renal ultrasound study and this , I will follow up on with patient .    Ziyad Isaac MD

## 2023-03-02 NOTE — TELEPHONE ENCOUNTER
Patient is following up with her doctor on a kidney lesion and will not be moving forward with the injection right now.

## 2023-03-06 ENCOUNTER — THERAPY VISIT (OUTPATIENT)
Dept: PHYSICAL THERAPY | Facility: CLINIC | Age: 88
End: 2023-03-06
Payer: MEDICARE

## 2023-03-06 DIAGNOSIS — M25.551 HIP PAIN, RIGHT: Primary | ICD-10-CM

## 2023-03-06 PROCEDURE — 97110 THERAPEUTIC EXERCISES: CPT | Mod: GP | Performed by: PHYSICAL THERAPIST

## 2023-03-06 PROCEDURE — 97140 MANUAL THERAPY 1/> REGIONS: CPT | Mod: GP | Performed by: PHYSICAL THERAPIST

## 2023-03-09 ENCOUNTER — ANCILLARY PROCEDURE (OUTPATIENT)
Dept: ULTRASOUND IMAGING | Facility: CLINIC | Age: 88
End: 2023-03-09
Attending: INTERNAL MEDICINE
Payer: MEDICARE

## 2023-03-09 DIAGNOSIS — N28.9 KIDNEY LESION, NATIVE, RIGHT: ICD-10-CM

## 2023-03-09 PROCEDURE — 76770 US EXAM ABDO BACK WALL COMP: CPT | Mod: TC | Performed by: RADIOLOGY

## 2023-03-10 ASSESSMENT — ENCOUNTER SYMPTOMS
TREMORS: 1
TINGLING: 0
INCREASED ENERGY: 1
NAIL CHANGES: 0
ARTHRALGIAS: 1
SKIN CHANGES: 0
DECREASED APPETITE: 0
CONSTIPATION: 1
SYNCOPE: 0
FEVER: 0
STIFFNESS: 1
DIARRHEA: 1
HYPERTENSION: 1
BACK PAIN: 1
SEIZURES: 0
MUSCLE WEAKNESS: 0
SMELL DISTURBANCE: 0
TROUBLE SWALLOWING: 0
DECREASED CONCENTRATION: 1
RECTAL PAIN: 0
NECK PAIN: 0
SORE THROAT: 0
ABDOMINAL PAIN: 0
PANIC: 1
POLYDIPSIA: 0
WEAKNESS: 0
JAUNDICE: 0
NERVOUS/ANXIOUS: 1
LOSS OF CONSCIOUSNESS: 0
SINUS PAIN: 0
NUMBNESS: 1
BLOATING: 0
HEARTBURN: 0
ORTHOPNEA: 0
VOMITING: 0
EYE IRRITATION: 1
LEG PAIN: 1
NAUSEA: 0
SINUS CONGESTION: 0
CHILLS: 0
DISTURBANCES IN COORDINATION: 0
POLYPHAGIA: 0
PARALYSIS: 0
DIZZINESS: 1
INSOMNIA: 1
EYE PAIN: 0
DOUBLE VISION: 0
NECK MASS: 0
BLOOD IN STOOL: 0
PALPITATIONS: 0
ALTERED TEMPERATURE REGULATION: 0
SLEEP DISTURBANCES DUE TO BREATHING: 0
MUSCLE CRAMPS: 1
EYE REDNESS: 0
HALLUCINATIONS: 0
HOARSE VOICE: 0
POOR WOUND HEALING: 0
SPEECH CHANGE: 1
EXERCISE INTOLERANCE: 1
BOWEL INCONTINENCE: 1
HYPOTENSION: 0
EYE WATERING: 1
LIGHT-HEADEDNESS: 0
NIGHT SWEATS: 0
DEPRESSION: 0
HEADACHES: 0
TASTE DISTURBANCE: 0
WEIGHT LOSS: 0
JOINT SWELLING: 0
MYALGIAS: 1
WEIGHT GAIN: 0
MEMORY LOSS: 1
FATIGUE: 1

## 2023-03-13 ENCOUNTER — VIRTUAL VISIT (OUTPATIENT)
Dept: ENDOCRINOLOGY | Facility: CLINIC | Age: 88
End: 2023-03-13
Payer: MEDICARE

## 2023-03-13 ENCOUNTER — PRE VISIT (OUTPATIENT)
Dept: ENDOCRINOLOGY | Facility: CLINIC | Age: 88
End: 2023-03-13

## 2023-03-13 DIAGNOSIS — E83.52 HYPERCALCEMIA: ICD-10-CM

## 2023-03-13 DIAGNOSIS — C50.412 MALIGNANT NEOPLASM OF UPPER-OUTER QUADRANT OF LEFT BREAST IN FEMALE, ESTROGEN RECEPTOR POSITIVE (H): ICD-10-CM

## 2023-03-13 DIAGNOSIS — Z17.0 MALIGNANT NEOPLASM OF UPPER-OUTER QUADRANT OF LEFT BREAST IN FEMALE, ESTROGEN RECEPTOR POSITIVE (H): ICD-10-CM

## 2023-03-13 DIAGNOSIS — Z90.89 H/O PARATHYROIDECTOMY: ICD-10-CM

## 2023-03-13 DIAGNOSIS — Z98.890 H/O PARATHYROIDECTOMY: ICD-10-CM

## 2023-03-13 PROCEDURE — 99205 OFFICE O/P NEW HI 60 MIN: CPT | Mod: VID | Performed by: STUDENT IN AN ORGANIZED HEALTH CARE EDUCATION/TRAINING PROGRAM

## 2023-03-13 NOTE — PROGRESS NOTES
Endocrinology Clinic Visit 3/13/2023      Video-Visit Details    Type of service:  Video Visit    Joined the call at 3/13/2023, 3:39:24 pm.  Left the call at 3/13/2023, 4:26:19 pm.    Originating Location (pt. Location): Home        Distant Location (provider location):  Off-site    Mode of Communication:  Video Conference via [employer]    Physician has received verbal consent for a Video Visit from the patient? Yes    I spent a total of 60 minutes on the date of encounter reviewing medical records, evaluating the patient, coordinating care and documenting in the EHR, as detailed above.      NAME:  Lisa Ferguson  PCP:  Ziyad Isaac  MRN:  [id number]  Reason for Consult:  Hypercalcemia   Requesting Provider:  Ziyad Isaac    Chief Complaint     No chief complaint on file.      History of Present Illness     Lisa Ferguson is a 87 year old female who is seen in video visit for hypercalcemia    She had hyperparathyroidism s/p parathyroidectomy 2002 at [facility]. No records. Care everywhere reviewed: 11/12/2002 NM parathyroid: Early images show prominent uptake at the salivary glands and thyroid which is normal.  On the delayed images thyroid activity should have largely diminished, however, in this particular case the thyroid activity is still quite prominent just a little less than was seen on the early images.  Because of this poor washout, it is very difficult to evaluate for parathyroid adenoma.  I don't see any suspicious focal lesion, although the expected positions of the parathyroid are obscured by the thyroid uptake.     She reported that she had a 24 hour urine calcium of > 490 mg at that time  She had kidney stone in 2015. 24 hour urine calcium at that time was 150 mg    Calcium started to trend up in 2014 around 10.4-10.6 with periods of normal caclium. Up until recently calcium trended up to high 10s and highest of 11.1.    ENDO CALCIUM LABS-[facility] Latest Ref Rng & Units 1/2/2023   VITAMIN D  DEFICIENCY SCREENING 20 - 75 ug/L 32   ALKPHOS 40 - 150 U/L    CALCIUM, TOTAL 8.5 - 10.1 mg/dL 11.1 (H)   UREA NITROGEN 7 - 30 mg/dL 18   CREATININE 0.52 - 1.04 mg/dL 0.79   PARATHYROID HORMONE INTACT 15 - 65 pg/mL 101 (H)     Symptoms of hypercalcemia: chronic constipation, no dyspepsia, no mental status changes/lethargy, no polyuria.    Dexa scan most recent one 12/2022: no evidence of low bone density.    Calcium intake: eats cheese and yogurt. Supplements: no    Vitamin D intake: Supplements: 2000 international unit(s)     - Previous fractures: shoulder related injury ? Fracture after falling, around 2015. Recent fall , no fracture. FTH chronic compression fracture on L2.  - Family history of fragility fracture in parent: no  - History of kidney stones: Yes: one time kidney stone in 2015.  - History of kidney disease: no  - Family history of any calcium problems or kidney stones: Yes: sister had same hyperparathyroid issues.  - History of vitamin D deficiency: No  - History of HCTZ use: yes, she does not recall. It is on her chart, stopped back in 2020.  - History of Lithium use: No  - History of malabsorption (IBD, Celiac, gastric bypass ): No  - History of thyroid disease: Yes: controlled   - Weight history: lost 20 lbs since she fell in the past few years.    Problem List     Patient Active Problem List   Diagnosis     PUD (peptic ulcer disease)     Hyperlipidemia with target LDL less than 70     Mitral regurgitation     DJD (degenerative joint disease)     Sciatica neuralgia     IBS (irritable bowel syndrome)     RBBB (right bundle branch block)     GLEN (obstructive sleep apnea)     Balance disorder     Anxiety     Ventricular tachycardia (paroxysmal)     Blepharitis     Hypercalcemia     OA (osteoarthritis) of knee - bilateral     CHF (congestive heart failure) (H)     Abnormal glucose     S/P CABG (coronary artery bypass graft)     S/p total knee replacement, bilateral     Acquired hypothyroidism      Essential hypertension with goal blood pressure less than 130/80     Morbid obesity (H)     H/O parathyroidectomy (H)     Atherosclerosis of coronary artery bypass graft(s), unspecified, with other forms of angina pectoris (H)     Malignant neoplasm of upper-outer quadrant of left breast in female, estrogen receptor positive (H)     Secondary hyperparathyroidism of renal origin (H)     Acute idiopathic gout of right foot     Hip pain, right     Closed compression fracture of L2 vertebra (H)     Degenerative joint disease of right hip     Lumbar spinal stenosis        Medications     Current Outpatient Medications   Medication     acetaminophen (TYLENOL) 650 MG CR tablet     allopurinol (ZYLOPRIM) 100 MG tablet     allopurinol (ZYLOPRIM) 300 MG tablet     aspirin 81 MG EC tablet     betamethasone dipropionate (DIPROSONE) 0.05 % external lotion     levothyroxine (SYNTHROID/LEVOTHROID) 125 MCG tablet     losartan (COZAAR) 50 MG tablet     metoprolol tartrate (LOPRESSOR) 25 MG tablet     order for DME     rosuvastatin (CRESTOR) 5 MG tablet     sertraline (ZOLOFT) 25 MG tablet     torsemide (DEMADEX) 10 MG tablet     vitamin D3 (CHOLECALCIFEROL) 50 mcg (2000 units) tablet     Current Facility-Administered Medications   Medication     betamethasone acet & sod phos (CELESTONE) injection 6 mg     ropivacaine (NAROPIN) injection 2 mL        Allergies     Allergies   Allergen Reactions     Adhesive Tape      Atorvastatin Other (See Comments) and Muscle Pain (Myalgia)     lipitor  Myalgia     Buspar [Buspirone]      Nickel Other (See Comments)     Raw weepy skin  rash     Vicodin [Hydrocodone-Acetaminophen] Other (See Comments)     Hallucinations     Liquid Adhesive      Other reaction(s): Contact Dermatitis       Medical / Surgical History     Past Medical History:   Diagnosis Date     Anxiety      CAD (coronary artery disease)     angio 2002, h/o cabg, seelayne Nelson NP, they follow the cholesterol     CHF (congestive  heart failure) (H) 07/08/2014     Closed compression fracture of L2 vertebra (H)      Degenerative joint disease of right hip      DJD (degenerative joint disease)      History of colonoscopy 01/01/2000    due jan 2010     Hyperlipidemia LDL goal < 70     sees [facility]     Hypertension goal BP (blood pressure) < 140/90      Hypothyroidism      IBS (irritable bowel syndrome)      Lumbar spinal stenosis      Mitral regurgitation      Obesity      GLEN (obstructive sleep apnea) 07/11/2011     PUD (peptic ulcer disease)     h/o duodenal ulcer     RBBB (right bundle branch block)      Sciatica neuralgia november 209    MRI shows spinal stenosis and a cyst on the spine, has received an epidural steroid injection     Past Surgical History:   Procedure Laterality Date     ARTHROSCOPY KNEE RT/LT      bilateral     COLONOSCOPY  6/2010    negative     HC DILATION/CURETTAGE DIAG/THER NON OB       HC EXCIS INTERDIGITAL NEUROMA,EA      mortons neuroma excision     HC REMOVAL GALLBLADDER  1994     HERNIA REPAIR, INGUINAL RT/LT       MASTECTOMY SIMPLE BILATERAL, SENTINEL NODE BILATERAL, COMBINED Bilateral 1/6/2021    Procedure: BILATERAL SIMPLE MASTECTOMY WITH LEFT SENTINEL LYMPH NODE BIOPSY;  Surgeon: Beata Garcia MD;  Location: [facility] OR     SURGICAL HISTORY OF -       bilateral meniscal tears and surgery on these     SURGICAL HISTORY OF -   1999 or so    one parathyroid removed     TONSILLECTOMY       TUBAL LIGATION       ZZC APPENDECTOMY       ZZC CABG, ARTERIAL, THREE  1999    LIMA-LAD, SVG-DIagnoal, SVG-OM, previous LAD-PCI, sees Dr Banuelos       Social History     Social History     Socioeconomic History     Marital status:      Spouse name: Not on file     Number of children: Not on file     Years of education: Not on file     Highest education level: Not on file   Occupational History     Not on file   Tobacco Use     Smoking status: Former     Years: 20.00     Types: Cigarettes     Quit date: 7/11/1978      Years since quittin.7     Smokeless tobacco: Never   Vaping Use     Vaping Use: Never used   Substance and Sexual Activity     Alcohol use: Yes     Comment: Rare      Drug use: No     Sexual activity: Not Currently     Partners: Male   Other Topics Concern     Parent/sibling w/ CABG, MI or angioplasty before 65F 55M? Not Asked   Social History Narrative     Not on file     Social Determinants of Health     Financial Resource Strain: Not on file   Food Insecurity: Not on file   Transportation Needs: Not on file   Physical Activity: Not on file   Stress: Not on file   Social Connections: Not on file   Intimate Partner Violence: Not on file   Housing Stability: Not on file       Family History     Family History   Problem Relation Age of Onset     C.A.D. Mother      Arthritis Mother      Cardiovascular Mother      Heart Disease Mother      Obesity Mother      Thyroid Disease Mother      C.A.D. Father      Diabetes Father      Hypertension Father      Alcohol/Drug Father      Alzheimer Disease Father      Cardiovascular Father      Circulatory Father      Gastrointestinal Disease Father      Heart Disease Father      Lipids Father      Obesity Father      Cardiovascular Maternal Grandmother      Depression Maternal Grandmother      Obesity Maternal Grandmother      Thyroid Disease Maternal Grandmother      Cardiovascular Maternal Grandfather      Heart Disease Maternal Grandfather      Obesity Maternal Grandfather      Obesity Paternal Grandmother      Diabetes Paternal Grandfather      Alcohol/Drug Paternal Grandfather      Obesity Paternal Grandfather      Breast Cancer Sister      Cancer Sister      Obesity Sister      Thyroid Disease Sister      Alcohol/Drug Son      Allergies Son      Allergies Daughter      Depression Sister      Eye Disorder Sister      Gastrointestinal Disease Sister      Thyroid Disease Sister        ROS     12 ROS completed, pertinent positive and negative in HPI    Physical Exam   There  were no vitals taken for this visit.   GENERAL: Healthy, alert and no distress  EYES: Eyes grossly normal to inspection.  No discharge or erythema, or obvious scleral/conjunctival abnormalities.  RESP: No audible wheeze, cough, or visible cyanosis.  No visible retractions or increased work of breathing.    SKIN: Visible skin clear. No significant rash, abnormal pigmentation or lesions.  NEURO: Cranial nerves grossly intact.  Mentation and speech appropriate for age.  PSYCH: Mentation appears normal, affect normal/bright, judgement and insight intact, normal speech and appearance well-groomed.     Labs/Imaging     Pertinent Labs were reviewed and updated in EPIC and discussed briefly.  Radiology Results were  reviewed and updated in EPIC and discussed briefly.    Summary of recent findings:   Lab Results   Component Value Date    A1C 5.8 01/02/2023    A1C 6.0 07/07/2022    A1C 5.7 12/29/2020    A1C 5.7 09/13/2018    A1C 5.4 08/01/2017    A1C 5.7 01/03/2017    A1C 5.5 02/12/2016       TSH   Date Value Ref Range Status   07/07/2022 2.94 0.40 - 4.00 mU/L Final   02/17/2021 3.80 0.40 - 4.00 mU/L Final   12/29/2020 23.61 (H) 0.40 - 4.00 mU/L Final   10/28/2019 1.75 0.40 - 4.00 mU/L Final   09/13/2018 2.66 0.40 - 4.00 mU/L Final   08/01/2017 2.66 0.40 - 4.00 mU/L Final     T4 Free   Date Value Ref Range Status   12/29/2020 0.59 (L) 0.76 - 1.46 ng/dL Final   10/21/2016 1.02 0.76 - 1.46 ng/dL Final   03/23/2015 1.07 0.76 - 1.46 ng/dL Final     Comment:     Effective 7/30/2014, the reference range for this assay has changed to reflect   new instrumentation/methodology.         Creatinine   Date Value Ref Range Status   02/20/2023 0.87 0.52 - 1.04 mg/dL Final   01/07/2021 0.79 0.52 - 1.04 mg/dL Final       Recent Labs   Lab Test 07/07/22  1125 12/29/20  1227 02/12/16  1108 03/23/15  1213   CHOL 175 296*   < > 162   HDL 49* 51   < > 50*   LDL 94 200*   < > 69   TRIG 160* 227*   < > 217*   CHOLHDLRATIO  --   --   --  3.2    < >  = values in this interval not displayed.       No results found for: [id number], [id number], [id number]    I personally reviewed the patient's outside records from [facility] EMR and Care Everywhere. Summary of pertinent findings in HPI.    Impression / Plan       1. Hypercalcemia.   2. Hyperparathyroidism   With elevated PTH, the most likely diagnosis is primary hyperparathyroidism. Two other less likely etiologies include parathyroid hyperplasia from MEN syndrome, and Familial Hypocalciuric Hypercalemia. There is no family history to suggest any of the latter two diagnoses. To differentiate primary HPTH from FHH, a 24 hr urine collection is to be done (low calcium excretion in FHH, vs. Normal to high in primary HPTH).     She is s/p parathyroidectomy in 2002, no records.    We reviewed the latest guidelines conclude that surgery for hyperparathyroidism is indicated in symptomatic patients, or in asymptomatic patients who meet any one of the following conditions:  Serum calcium concentration of 1.0 mg/dL (0.25 mmol/L) or more above the upper limit of normal   Creatnine clearance reduced to < 60 mL/min, or 24-hour urine for calcium > 400 mg/day and increased stone risk by biochemical stone risk analysis, or nephrolithiasis or nephrocalcinosis on imaging studyCreatinine clearance that is reduced to <60 mL/min   Bone density at the hip, lumbar spine, or distal radius that is more than 2.5 standard deviations below peak bone mass (T score <-2.5) and/or previous fragility fracture   Age less than 50 years  Operative intervention can be delayed in patients over 50 years of age who are asymptomatic or minimally symptomatic and who have serum calcium concentrations <1.0 mg/dL (0.2 mmol/L) above the upper limit of normal, and in patients who are medically unfit for surgery. We discussed that given her age and underlying medical problems it is reasonable to avoid surgery. We will monitor labs, symptoms and complication if  happened. We will plan on getting 24 hour urine collection for calcium and repeat labs in 3m.     As far as calcium intake, even in cases of hyperparathyroidism, it is recommended to keep up calcium intake to about 1000 mg daily, to prevent further increase in PTH and bone disease. I advised that to the patient.      3. Chronic hypothyroidism  Controlled on stable lt4. Managed by her PCP.     Test and/or medications prescribed today:  Orders Placed This Encounter   Procedures     Calcium timed urine     Creatinine timed urine     25 Hydroxyvitamin D2 and D3     Albumin level     Calcium     Magnesium     Parathyroid Hormone Intact     Phosphorus     Creatinine         Follow up: 3 month      Ida Nettles MD  Endocrinology, Diabetes and Metabolism  [facility]    Answers for HPI/ROS submitted by the patient on 3/10/2023  General Symptoms: Yes  Skin Symptoms: Yes  HENT Symptoms: Yes  EYE SYMPTOMS: Yes  HEART SYMPTOMS: Yes  LUNG SYMPTOMS: No  INTESTINAL SYMPTOMS: Yes  URINARY SYMPTOMS: No  GYNECOLOGIC SYMPTOMS: No  BREAST SYMPTOMS: No  SKELETAL SYMPTOMS: Yes  BLOOD SYMPTOMS: No  NERVOUS SYSTEM SYMPTOMS: Yes  MENTAL HEALTH SYMPTOMS: Yes  Ear pain: No  Ear discharge: No  Hearing loss: No  Tinnitus: Yes  Nosebleeds: No  Congestion: No  Sinus pain: No  Trouble swallowing: No   Voice hoarseness: No  Mouth sores: No  Sore throat: No  Tooth pain: No  Gum tenderness: No  Bleeding gums: No  Change in taste: No  Change in sense of smell: No  Dry mouth: Yes  Hearing aid used: No  Neck lump: No  Fever: No  Loss of appetite: No  Weight loss: No  Weight gain: No  Fatigue: Yes  Night sweats: No  Chills: No  Increased stress: Yes  Excessive hunger: No  Excessive thirst: No  Feeling hot or cold when others believe the temperature is normal: No  Loss of height: Yes  Post-operative complications: No  Surgical site pain: No  Hallucinations: No  Change in or Loss of Energy: Yes  Hyperactivity: No  Confusion: No  Changes in  hair: No  Changes in moles/birth marks: No  Itching: Yes  Rashes: No  Changes in nails: No  Acne: No  Hair in places you don't want it: No  Change in facial hair: No  Warts: Yes  Non-healing sores: No  Scarring: No  Flaking of skin: No  Color changes of hands/feet in cold : No  Sun sensitivity: No  Skin thickening: No  Eye pain: No  Dry eyes: Yes  Watery eyes: Yes  Eye bulging: No  Double vision: No  Flashing of lights: No  Spots: No  Floaters: Yes  Redness: No  Crossed eyes: No  Tunnel Vision: No  Yellowing of eyes: No  Eye irritation: Yes  Chest pain or pressure: No  Fast or irregular heartbeat: No  Pain in legs with walking: Yes  Trouble breathing while lying down: No  Fingers or toes appear blue: No  High blood pressure: Yes  Low blood pressure: No  Fainting: No  Murmurs: No  Pacemaker: No  Varicose veins: No  Edema or swelling: Yes  Wake up at night with shortness of breath: No  Light-headedness: No  Exercise intolerance: Yes  Heart burn or indigestion: No  Nausea: No  Vomiting: No  Abdominal pain: No  Bloating: No  Constipation: Yes  Diarrhea: Yes  Blood in stool: No  Black stools: No  Rectal or Anal pain: No  Fecal incontinence: Yes  Yellowing of skin or eyes: No  Vomit with blood: No  Change in stools: Yes  Back pain: Yes  Muscle aches: Yes  Neck pain: No  Swollen joints: No  Joint pain: Yes  Bone pain: Yes  Muscle cramps: Yes  Muscle weakness: No  Joint stiffness: Yes  Bone fracture: No  Trouble with coordination: No  Dizziness or trouble with balance: Yes  Fainting or black-out spells: No  Memory loss: Yes  Headache: No  Seizures: No  Speech problems: Yes  Tingling: No  Tremor: Yes  Weakness: No  Difficulty walking: Yes  Paralysis: No  Numbness: Yes  Nervous or Anxious: Yes  Depression: No  Trouble sleeping: Yes  Trouble thinking or concentrating: Yes  Mood changes: No  Panic attacks: Yes

## 2023-03-13 NOTE — PATIENT INSTRUCTIONS
24 Hour Urine Collection instruction    Decide a day you want to collect the urine for 24 hours.   On the day of collection, discard the first void urine in the morning.   Start collecting all the urine in the provided container for the entire day and over night.   The next morning when you wake up collect the first void urine in the container and then submit the container to the lab.  Store in the refrigerator    --------------------------------------------------------------------------    CALCIUM Recommendation 1000 mg/day including supplements.    Dietary sources of calcium: These also contain vitamin D  Milk / buttermilk              8 oz            300 mg  Dry milk powder       5 Tbsp             300 mg  Yogurt                          1 cup           400 mg  Ice cream   1/2 cup          100 mg  Hard cheese                     1.5 oz          300 mg  Cottage cheese                1/2 cup        65 mg  Spinach, cooked  1 cup  240 mg  Tofu, soft regular           4 oz          120 to 390 mg  Sardines                       3 oz               370 mg  Mackerel canned         3 oz                250 mg  Canned salmon with bones 3 oz        170-210 mg  Calcium fortified cereals are available  SILK soy and almond milk products are calcium fortified  Orange juice  Fortified with Calcium       8 oz            300 mg  Low fat dairy sources are recommended

## 2023-03-13 NOTE — NURSING NOTE
Is the patient currently in the state of [place]? YES    Visit mode:TELEPHONE    If the visit is dropped, the patient can be reconnected by: VIDEO VISIT: Text to cell phone: [phone]    Will anyone else be joining the visit? NO      How would you like to obtain your AVS? MyChart    Are changes needed to the allergy or medication list? NO    Reason for visit: Follow up

## 2023-03-15 ENCOUNTER — APPOINTMENT (OUTPATIENT)
Dept: URGENT CARE | Facility: CLINIC | Age: 88
End: 2023-03-15
Payer: MEDICARE

## 2023-03-15 ENCOUNTER — TELEPHONE (OUTPATIENT)
Dept: INTERNAL MEDICINE | Facility: CLINIC | Age: 88
End: 2023-03-15
Payer: MEDICARE

## 2023-03-15 NOTE — LETTER
[facility]  [address]  [place] [address]  [phone]          March 16, 2023    [facility]  Fax:  [phone]    Re:  Lisa Ferguson    YOB: 1935                                                                                                                     To Whom it May Concern:    It is okay for the above named patient to use the CPAP face mask with the magnets in it.     Patient has been wearing/using this over 3 years and it works well for her.    Please feel free to contact us with any questions or concerns.    Sincerely,         Ziyad Iasac MD/carol ann

## 2023-03-15 NOTE — TELEPHONE ENCOUNTER
General Call    Contacts       Type Contact Phone/Fax    03/15/2023 03:26 PM CDT Phone (Incoming) Lisa Ferguson (Self) [phone] (M)        Reason for Call:  She is requesting and needs a letter/ medical documentation to be sent to [employer] saying it is ok that Lisa can use or it is ok for her to use the CPAP face mask with the magnets in it.     Fax: [phone]    She has been wearing one for 3 year or more. This is a new requirement. She said she is using it and it works well for her.      Could we send this information to you in [employer] or would you prefer to receive a phone call?:   Patient would prefer a phone call   Okay to leave a detailed message?: Yes at Cell number on file:    Telephone Information:   Mobile [phone]

## 2023-03-16 NOTE — TELEPHONE ENCOUNTER
Letter signed by Dr. Isaac and faxed back to [facility] (f: [phone]).  Patient called and informed.  Hanane Tobar,

## 2023-03-20 ENCOUNTER — THERAPY VISIT (OUTPATIENT)
Dept: PHYSICAL THERAPY | Facility: CLINIC | Age: 88
End: 2023-03-20
Payer: MEDICARE

## 2023-03-20 DIAGNOSIS — M25.551 HIP PAIN, RIGHT: Primary | ICD-10-CM

## 2023-03-20 PROCEDURE — 97112 NEUROMUSCULAR REEDUCATION: CPT | Mod: GP | Performed by: PHYSICAL THERAPIST

## 2023-03-20 PROCEDURE — 97110 THERAPEUTIC EXERCISES: CPT | Mod: GP | Performed by: PHYSICAL THERAPIST

## 2023-03-23 ENCOUNTER — LAB (OUTPATIENT)
Dept: LAB | Facility: CLINIC | Age: 88
End: 2023-03-23
Payer: MEDICARE

## 2023-03-23 DIAGNOSIS — E83.52 HYPERCALCEMIA: ICD-10-CM

## 2023-03-23 LAB
ALBUMIN SERPL-MCNC: 4.2 G/DL (ref 3.4–5)
CALCIUM SERPL-MCNC: 10.6 MG/DL (ref 8.5–10.1)
CREAT SERPL-MCNC: 0.86 MG/DL (ref 0.52–1.04)
GFR SERPL CREATININE-BSD FRML MDRD: 66 ML/MIN/1.73M2
MAGNESIUM SERPL-MCNC: 2 MG/DL (ref 1.6–2.3)
PHOSPHATE SERPL-MCNC: 3 MG/DL (ref 2.5–4.5)
PTH-INTACT SERPL-MCNC: 63 PG/ML (ref 15–65)

## 2023-03-23 PROCEDURE — 83970 ASSAY OF PARATHORMONE: CPT

## 2023-03-23 PROCEDURE — 82306 VITAMIN D 25 HYDROXY: CPT

## 2023-03-23 PROCEDURE — 82565 ASSAY OF CREATININE: CPT

## 2023-03-23 PROCEDURE — 82310 ASSAY OF CALCIUM: CPT

## 2023-03-23 PROCEDURE — 82040 ASSAY OF SERUM ALBUMIN: CPT

## 2023-03-23 PROCEDURE — 83735 ASSAY OF MAGNESIUM: CPT

## 2023-03-23 PROCEDURE — 36415 COLL VENOUS BLD VENIPUNCTURE: CPT

## 2023-03-23 PROCEDURE — 84100 ASSAY OF PHOSPHORUS: CPT

## 2023-03-27 ENCOUNTER — LAB (OUTPATIENT)
Dept: LAB | Facility: CLINIC | Age: 88
End: 2023-03-27
Payer: MEDICARE

## 2023-03-27 LAB
CALCIUM 24H UR-MRATE: 0.19 G/SPEC (ref 0.1–0.3)
CALCIUM UR-MCNC: 12.7 MG/DL
COLLECT DURATION TIME UR: 24 H
COLLECT DURATION TIME UR: 24 H
CREAT 24H UR-MRATE: 1.32 G/SPEC (ref 0.8–1.8)
CREAT UR-MCNC: 88 MG/DL
DEPRECATED CALCIDIOL+CALCIFEROL SERPL-MC: <45 UG/L (ref 20–75)
SPECIMEN VOL UR: 1500 ML
SPECIMEN VOL UR: 1500 ML
VITAMIN D2 SERPL-MCNC: <5 UG/L
VITAMIN D3 SERPL-MCNC: 40 UG/L

## 2023-03-27 PROCEDURE — 81050 URINALYSIS VOLUME MEASURE: CPT

## 2023-03-27 PROCEDURE — 82570 ASSAY OF URINE CREATININE: CPT

## 2023-03-27 PROCEDURE — 82340 ASSAY OF CALCIUM IN URINE: CPT

## 2023-04-03 ENCOUNTER — VIRTUAL VISIT (OUTPATIENT)
Dept: PHARMACY | Facility: CLINIC | Age: 88
End: 2023-04-03
Payer: COMMERCIAL

## 2023-04-03 ENCOUNTER — THERAPY VISIT (OUTPATIENT)
Dept: PHYSICAL THERAPY | Facility: CLINIC | Age: 88
End: 2023-04-03
Payer: MEDICARE

## 2023-04-03 DIAGNOSIS — F41.9 ANXIETY: Primary | ICD-10-CM

## 2023-04-03 DIAGNOSIS — M25.551 HIP PAIN, RIGHT: Primary | ICD-10-CM

## 2023-04-03 DIAGNOSIS — N25.81 SECONDARY HYPERPARATHYROIDISM OF RENAL ORIGIN (H): ICD-10-CM

## 2023-04-03 PROCEDURE — 97140 MANUAL THERAPY 1/> REGIONS: CPT | Mod: GP | Performed by: PHYSICAL THERAPIST

## 2023-04-03 PROCEDURE — 99207 PR NO CHARGE LOS: CPT | Performed by: PHARMACIST

## 2023-04-03 PROCEDURE — 97110 THERAPEUTIC EXERCISES: CPT | Mod: GP | Performed by: PHYSICAL THERAPIST

## 2023-04-03 NOTE — Clinical Note
EVERTON GAMBOA note, thanks!  Cyndie Breaux, PharmD Medication Therapy Management Pharmacist [phone]

## 2023-04-03 NOTE — PROGRESS NOTES
[facility]    OUTPATIENT Physical Therapy ORTHOPEDIC EVALUATION  PLAN OF TREATMENT FOR OUTPATIENT REHABILITATION  (COMPLETE FOR INITIAL CLAIMS ONLY)  Patient's Last Name, First Name, M.I.  YOB: 1935  Lisa Ferguson    Provider s Name:  [facility]   Medical Record No.  [id number]   Start of Care Date:  08/30/22   Onset Date:    7/7/22 (referral date)    Treatment Diagnosis:  Hip pain, right Medical Diagnosis:  Hip pain, right       Goals:     04/03/23 0500   Body Part   Goals listed below are for Rigth hip   Goal #1   Goal #1 stairs   Previous Functional Level Ascend/descend stairs;in a normal reciprocal pattern;with a railing   Current Functional Level Ascend/descend stairs;one step at a time;with a railing   STG [employer] Ascend/descend stairs;in a normal reciprocal pattern;with a railing   Rationale to enter/leave the house safely;to reach lower level of home safely;for safe community access to buildings;for safe community ambulaton   Due Date 09/20/22   If goal not met, Why? 10/26/22   LTG [employer] Ascend/descend stairs;in a normal reciprocal pattern;without a railing   Rationale to enter/leave the house safely;to reach lower level of home safely;for safe community access to buildings;for safe community ambulaton   Due Date 11/25/22   Date Goal Met 01/20/23   If goal not met, Why? able to perform bu twas quite unsteady   Goal #2   Goal #2 driving/transportation  (pain with entering/exiting vehicle)   Previous Functional Level No restrictions   Current Functional Level   (pain 5/10 with entering/exiting vehicle)   STG [employer]   (pain 4/10 with entering/exiting vehicle)   Rationale for safe driving   Due date 09/20/22   Date Goal Met 09/27/22   LTG [employer]   (pain <2/10 with entering/exiting vehicle)   Rationale for safe  driving   Due date 04/20/23         Therapy Frequency:  1x/week  Predicted Duration of Therapy Intervention:  8 weeks    Riley Clinton, PT                 I CERTIFY THE NEED FOR THESE SERVICES FURNISHED UNDER        THIS PLAN OF TREATMENT AND WHILE UNDER MY CARE     (Physician attestation of this document indicates review and certification of the therapy plan).                     Certification Date From:  03/28/23   Certification Date To:  05/09/23    Referring Provider:  Ziyad Isaac    Initial Assessment        See Epic Evaluation SOC Date: 08/30/22

## 2023-04-03 NOTE — PROGRESS NOTES
Medication Therapy Management (MTM) Encounter    ASSESSMENT:                            Medication Adherence/Access: No issues identified    Anxiety:  Sertraline is effective, may benefit from maintaining current therapy, continue to monitor for worsened tremor. Sodium is within normal limits.     If needed in future could also consider duloxetine or venlafaxine for added pain benefit. Would avoid bupropion for anxiety as this can be more activating.     Hyperparathyroidism: Education on calcium needed, see plan.    PLAN:                              1. Dr. Patrick recommended you get 1000 mg calcium/day - that can be from diet or supplements, or a combination. Sounds like you'll plan to drink three glasses of skim milk/day (there is about 300 mg of calcium in a glass of milk)    Follow-up: Return in about 3 months (around 7/3/2023) for Medication Therapy Management.    SUBJECTIVE/OBJECTIVE:                          Lisa Ferguson is a 87 year old female called for a follow-up visit.  Today's visit is a follow-up MTM visit from 2/20/23.     Reason for visit: follow-up on sertraline.    Allergies/ADRs: Reviewed in chart  Past Medical History: Reviewed in chart  Tobacco: She reports that she quit smoking about 44 years ago. Her smoking use included cigarettes. She has never used smokeless tobacco.  Alcohol: Less than 1 beverage / month  Caffeine: 2-3 cups coffee/day    Medication Adherence/Access:   Patient uses pill box(es).  Per patient, misses medication 0 times per week.    The patient fills medications at [facility]: NO, fills medications at [facility].    Anxiety:    Sertraline 25 mg daily every evening x 12 weeks.    She's sleeping a good six hours now. It seems to have taken a lot of the anxiety feeling away. She is having some looser stools, and noticing some shakiness in her hands, it's different than the essential tremors. Has a hard time controling or calming down her hands. She'd like to continue the  sertraline,. The shakiness concerns her but not to the point of her wanting to stop it. It took a long time to get to the place where she isn't having the anxiety and finally sleeping well.     She has essential tremors at baseline, citalopram worsened these in the past, but she's not sure if she can attribute this to the medication. She also has a family history of essential tremor and would like to avoid anything that could contribute to that. Buspirone was terrible (she can't recall exactly, but whatever it was was terrible).     Sodium   Date Value Ref Range Status   02/20/2023 141 133 - 144 mmol/L Final   01/07/2021 133 133 - 144 mmol/L Final     Hyperparathyroidism:   None - she doesn't know if she should be getting calcium from diet or supplements. Following with endocrinology, Dr. Nettles. From 3/13/23 note:  As far as calcium intake, even in cases of hyperparathyroidism, it is recommended to keep up calcium intake to about 1000 mg daily, to prevent further increase in PTH and bone disease. I advised that to the patient.  She likes milk, hasn't been drinking much lately but really likes milk. Occassional yogurt and cottage cheese (2x/week).     Today's Vitals: There were no vitals taken for this visit.  ----------------      I spent 12 minutes with this patient today. All changes were made via collaborative practice agreement with Ziyad Isaac MD. A copy of the visit note was provided to the patient's provider(s).    A summary of these recommendations was sent via [employer].    Cyndie Breaux, LakeishaD  Medication Therapy Management Pharmacist  [phone]    Telemedicine Visit Details  Type of service:  Telephone visit  Start Time: 1:32 PM  End Time: 1:44 PM     Medication Therapy Recommendations  No medication therapy recommendations to display

## 2023-04-03 NOTE — PATIENT INSTRUCTIONS
"Recommendations from today's MTM visit:                                                         1. Dr. Patrick recommended you get 1000 mg calcium/day - that can be from diet or supplements, or a combination. Sounds like you'll plan to drink three glasses of skim milk/day (there is about 300 mg of calcium in a glass of milk)    Follow-up: Return in about 3 months (around 7/3/2023) for Medication Therapy Management.    It was great speaking with you today.  I value your experience and would be very thankful for your time in providing feedback in our clinic survey. In the next few days, you may receive an email or text message from [employer] with a link to a survey related to your  clinical pharmacist.\"     To schedule another MTM appointment, please call the clinic directly or you may call the MTM scheduling line at [phone] or toll-free at [phone].     My Clinical Pharmacist's contact information:                                                      Please feel free to contact me with any questions or concerns you have.      Cyndie Breaux, PharmD  Medication Therapy Management Pharmacist  [phone]     "

## 2023-04-06 DIAGNOSIS — F41.9 ANXIETY: ICD-10-CM

## 2023-04-06 RX ORDER — SERTRALINE HYDROCHLORIDE 25 MG/1
TABLET, FILM COATED ORAL
Qty: 90 TABLET | Refills: 0 | Status: SHIPPED | OUTPATIENT
Start: 2023-04-06 | End: 2023-07-07

## 2023-04-16 NOTE — RESULT ENCOUNTER NOTE
Lisa,    You have a compression fracture of your L2 (second lumbar) vertebrae. You also have back arthritis, called lumbar spinal stenosis. The pain from a compression fracture generally resolves over time. Please schedule a follow-up appointment with your provider to discuss pain management and possible physical therapy. In some cases, referral to a specialist can be considered.     Virginia Villagomez MD
Adult

## 2023-04-17 ENCOUNTER — THERAPY VISIT (OUTPATIENT)
Dept: PHYSICAL THERAPY | Facility: CLINIC | Age: 88
End: 2023-04-17
Payer: MEDICARE

## 2023-04-17 DIAGNOSIS — M25.551 HIP PAIN, RIGHT: Primary | ICD-10-CM

## 2023-04-17 PROCEDURE — 97140 MANUAL THERAPY 1/> REGIONS: CPT | Mod: GP | Performed by: PHYSICAL THERAPIST

## 2023-04-17 PROCEDURE — 97110 THERAPEUTIC EXERCISES: CPT | Mod: GP | Performed by: PHYSICAL THERAPIST

## 2023-04-20 ENCOUNTER — OFFICE VISIT (OUTPATIENT)
Dept: ORTHOPEDICS | Facility: CLINIC | Age: 88
End: 2023-04-20
Payer: MEDICARE

## 2023-04-20 DIAGNOSIS — M16.11 ARTHRITIS OF RIGHT HIP: Primary | ICD-10-CM

## 2023-04-20 PROCEDURE — 20611 DRAIN/INJ JOINT/BURSA W/US: CPT | Mod: RT | Performed by: FAMILY MEDICINE

## 2023-04-20 RX ORDER — ROPIVACAINE HYDROCHLORIDE 5 MG/ML
2 INJECTION, SOLUTION EPIDURAL; INFILTRATION; PERINEURAL
Status: DISCONTINUED | OUTPATIENT
Start: 2023-04-20 | End: 2023-05-25

## 2023-04-20 RX ORDER — BETAMETHASONE SODIUM PHOSPHATE AND BETAMETHASONE ACETATE 3; 3 MG/ML; MG/ML
6 INJECTION, SUSPENSION INTRA-ARTICULAR; INTRALESIONAL; INTRAMUSCULAR; SOFT TISSUE
Status: DISCONTINUED | OUTPATIENT
Start: 2023-04-20 | End: 2023-05-25

## 2023-04-20 RX ADMIN — ROPIVACAINE HYDROCHLORIDE 2 ML: 5 INJECTION, SOLUTION EPIDURAL; INFILTRATION; PERINEURAL at 15:11

## 2023-04-20 RX ADMIN — BETAMETHASONE SODIUM PHOSPHATE AND BETAMETHASONE ACETATE 6 MG: 3; 3 INJECTION, SUSPENSION INTRA-ARTICULAR; INTRALESIONAL; INTRAMUSCULAR; SOFT TISSUE at 15:11

## 2023-04-20 NOTE — LETTER
4/20/2023         RE: Lisa Ferguson  [address]  [place] [address]        Dear Colleague,    Thank you for referring your patient, Lisa Ferguson, to the [facility]. Please see a copy of my visit note below.    ASSESSMENT & PLAN    Lisa was seen today for follow up.    Diagnoses and all orders for this visit:    Arthritis of right hip  -     Large Joint Injection/Arthocentesis: R hip joint      # Right Hip Arthritis: Lisa Ferguson  was seen today for follow-up on right hip arthritis. Symptoms had been going on for 1+ years worse over the past few months. Last steroid injection on 12/29/22 provided for 3 mon of relief. On examination there are positive findings of tenderness to palpation over the right hip joint, tightness with hip range of motion. Imaging findings showed severe right hip arthritis . Likely cause of patient's condition due to right hip arthritis. Other possible conditions contributing to symptoms include irritated gluteal tendon.  Counseled patient on nature of condition and treatment options.  Given this plan as below, follow-up 3 mon as needed     Image Findings: severe right hip arthritis  Treatment: Activities as tolerated   Medications/Injections: Limited tylenol/ibuprofen for pain for 1-2 weeks, right hip joint steroid injection  Follow-up: In 3 month if symptoms do not improve, sooner if worsening  Can consider repeat steroid injections    -----    SUBJECTIVE:  Lisa Ferguson is a 87 year old female who is seen in follow-up for right hip pain.They were last seen 12/29/2022 and right hip joint steroid injection was performed.  Provided moderate relief for 3 months. The patient is seen by themselves.    Since their last visit reports returned right hip pain.  They indicate that their current pain level is 4/10. They have tried right hip joint steroid injection 12/29/22,  tylenol arthritis 3x a day, physical therapy (14 visits),  stretching.        Patient's past medical, surgical, social, and family histories were reviewed today and no changes are noted.    REVIEW OF SYSTEMS:  Constitutional: NEGATIVE for fever, chills, change in weight  Skin: NEGATIVE for worrisome rashes, moles or lesions  GI/: NEGATIVE for bowel or bladder changes  Neuro: NEGATIVE for weakness, dizziness or paresthesias    OBJECTIVE:  There were no vitals taken for this visit.   General: healthy, alert and in no distress  HEENT: no scleral icterus or conjunctival erythema  Skin: no suspicious lesions or rash. No jaundice.  CV: regular rhythm month by palpation, no pedal edema  Resp: normal respiratory effort without conversational dyspnea   Psych: normal mood and affect  Gait: normal steady gait with appropriate coordination and balance  Neuro: normal light touch sensory exam of the extremities.    MSK:    RIGHT HIP  Inspection:    No swelling, bruising, discoloration, or obvious deformity or asymmetry  Palpation:    Tender about the anterior groin/joint line and greater trochanteric region. Otherwise all other landmarks are nontender.    Crepitus is Absent  Active Range of Motion:     Flexion full, extension full / IR limited slightly by pain / ER  full  Strength:    Flexion 5/5 / extension 5/5 / adduction 5/5 / abduction 5/5  Special Tests:    Positive: anterior impingement (FADIR)    Negative: CRISTÓBAL, posterior impingement (EX/AB/ER)      Independent visualization of the below image:    IMPRESSION: No fracture or dislocation. Advanced degenerative changes  in the right hip.     MD Stef DAVILA MD, [facility]    Disclaimer: This note consists of symbols derived from keyboarding, dictation and/or voice recognition software. As a result, there may be errors in the script that have gone undetected. Please consider this when interpreting information found in this chart.    Large Joint Injection/Arthocentesis: R  hip joint    Date/Time: 4/20/2023 3:11 PM    Performed by: Stef Escamilla MD  Authorized by: Stef Escamilla MD    Indications:  Pain and osteoarthritis  Needle Size:  22 G  Guidance: ultrasound    Approach:  Anterior  Location:  Hip      Site:  R hip joint  Medications:  6 mg betamethasone acet & sod phos 6 (3-3) MG/ML; 2 mL ropivacaine 5 MG/ML  Outcome:  Tolerated well, no immediate complications  Procedure discussed: discussed risks, benefits, and alternatives    Consent Given by:  Patient  Timeout: timeout called immediately prior to procedure    Prep: patient was prepped and draped in usual sterile fashion     Ultrasound images of procedure were permanently stored.     Patient reported some improvement of pain after the numbing portion right hip joint steroid injection.  Ultrasound guided images were permanently stored.   Aftercare instructions given to patient.  Plan to follow-up as discussed above.     Stef Escamilla MD [facility]              Again, thank you for allowing me to participate in the care of your patient.        Sincerely,        Stef Escamilla MD

## 2023-04-20 NOTE — PATIENT INSTRUCTIONS
# Right Hip Arthritis: Lisa Ferguson  was seen today for follow-up on right hip arthritis. Symptoms had been going on for 1+ years worse over the past few months. Last steroid injection on 12/29/22 provided for 3 mon of relief. On examination there are positive findings of tenderness to palpation over the right hip joint, tightness with hip range of motion. Imaging findings showed severe right hip arthritis . Likely cause of patient's condition due to right hip arthritis. Other possible conditions contributing to symptoms include irritated gluteal tendon.  Counseled patient on nature of condition and treatment options.  Given this plan as below, follow-up 3 mon as needed     Image Findings: severe right hip arthritis  Treatment: Activities as tolerated   Medications/Injections: Limited tylenol/ibuprofen for pain for 1-2 weeks, right hip joint steroid injection  Follow-up: In 3 month if symptoms do not improve, sooner if worsening  Can consider repeat steroid injections    Please call [phone]   Ask for my team if you have any questions or concerns    If you have not yet received the influenza vaccine but would like to get one, please call  [phone] or you can schedule via [employer]    It was great seeing you again today!    Stef Escamilla MD, CAQSM     [facility] Injection Discharge Instructions    Procedure: right hip joint steroid injection    You may shower, however avoid swimming, tub baths or hot tubs for 24 hours following your procedure  You may have a mild to moderate increase in pain for several days following the injection.  It may take up to 14 days for the steroid medication to start working although you may feel the effect as early as a few days after the procedure.  You may use ice packs for 10-15 minutes, 3 to 4 times a day at the injection site for comfort  You may use anti-inflammatory medications (such as Ibuprofen or Aleve or Advil) or Tylenol for pain control if necessary  If you were  fasting, you may resume your normal diet and medications after the procedure  If you have diabetes, check your blood sugar more frequently than usual as your blood sugar may be higher than normal for 10-14 days following a steroid injection. Contact your doctor who manages your diabetes if your blood sugar is higher than usual    If you experience any of the following, call [facility] @ [phone] or [phone]  -Fever over 100 degree F  -Swelling, bleeding, redness, drainage, warmth at the injection site  - New or worsening pain

## 2023-04-20 NOTE — PROGRESS NOTES
ASSESSMENT & PLAN    Lisa was seen today for follow up.    Diagnoses and all orders for this visit:    Arthritis of right hip  -     Large Joint Injection/Arthocentesis: R hip joint      # Right Hip Arthritis: Lisa Ferguson  was seen today for follow-up on right hip arthritis. Symptoms had been going on for 1+ years worse over the past few months. Last steroid injection on 12/29/22 provided for 3 mon of relief. On examination there are positive findings of tenderness to palpation over the right hip joint, tightness with hip range of motion. Imaging findings showed severe right hip arthritis . Likely cause of patient's condition due to right hip arthritis. Other possible conditions contributing to symptoms include irritated gluteal tendon.  Counseled patient on nature of condition and treatment options.  Given this plan as below, follow-up 3 mon as needed     Image Findings: severe right hip arthritis  Treatment: Activities as tolerated   Medications/Injections: Limited tylenol/ibuprofen for pain for 1-2 weeks, right hip joint steroid injection  Follow-up: In 3 month if symptoms do not improve, sooner if worsening  Can consider repeat steroid injections    -----    SUBJECTIVE:  Lisa Ferguson is a 87 year old female who is seen in follow-up for right hip pain.They were last seen 12/29/2022 and right hip joint steroid injection was performed.  Provided moderate relief for 3 months. The patient is seen by themselves.    Since their last visit reports returned right hip pain.  They indicate that their current pain level is 4/10. They have tried right hip joint steroid injection 12/29/22,  tylenol arthritis 3x a day, physical therapy (14 visits), stretching.        Patient's past medical, surgical, social, and family histories were reviewed today and no changes are noted.    REVIEW OF SYSTEMS:  Constitutional: NEGATIVE for fever, chills, change in weight  Skin: NEGATIVE for worrisome rashes, moles or lesions  GI/:  NEGATIVE for bowel or bladder changes  Neuro: NEGATIVE for weakness, dizziness or paresthesias    OBJECTIVE:  There were no vitals taken for this visit.   General: healthy, alert and in no distress  HEENT: no scleral icterus or conjunctival erythema  Skin: no suspicious lesions or rash. No jaundice.  CV: regular rhythm month by palpation, no pedal edema  Resp: normal respiratory effort without conversational dyspnea   Psych: normal mood and affect  Gait: normal steady gait with appropriate coordination and balance  Neuro: normal light touch sensory exam of the extremities.    MSK:    RIGHT HIP  Inspection:    No swelling, bruising, discoloration, or obvious deformity or asymmetry  Palpation:    Tender about the anterior groin/joint line and greater trochanteric region. Otherwise all other landmarks are nontender.    Crepitus is Absent  Active Range of Motion:     Flexion full, extension full / IR limited slightly by pain / ER  full  Strength:    Flexion 5/5 / extension 5/5 / adduction 5/5 / abduction 5/5  Special Tests:    Positive: anterior impingement (FADIR)    Negative: CRISTÓBAL, posterior impingement (EX/AB/ER)      Independent visualization of the below image:    IMPRESSION: No fracture or dislocation. Advanced degenerative changes  in the right hip.     MD Stef DAVILA MD, [facility]    Disclaimer: This note consists of symbols derived from keyboarding, dictation and/or voice recognition software. As a result, there may be errors in the script that have gone undetected. Please consider this when interpreting information found in this chart.    Large Joint Injection/Arthocentesis: R hip joint    Date/Time: 4/20/2023 3:11 PM    Performed by: Stef Escamilla MD  Authorized by: Stef Escamilla MD    Indications:  Pain and osteoarthritis  Needle Size:  22 G  Guidance: ultrasound    Approach:  Anterior  Location:  Hip      Site:  R hip  joint  Medications:  6 mg betamethasone acet & sod phos 6 (3-3) MG/ML; 2 mL ropivacaine 5 MG/ML  Outcome:  Tolerated well, no immediate complications  Procedure discussed: discussed risks, benefits, and alternatives    Consent Given by:  Patient  Timeout: timeout called immediately prior to procedure    Prep: patient was prepped and draped in usual sterile fashion     Ultrasound images of procedure were permanently stored.     Patient reported some improvement of pain after the numbing portion right hip joint steroid injection.  Ultrasound guided images were permanently stored.   Aftercare instructions given to patient.  Plan to follow-up as discussed above.     Stef Escamilla MD [facility]

## 2023-05-08 ENCOUNTER — APPOINTMENT (OUTPATIENT)
Dept: URBAN - METROPOLITAN AREA CLINIC 252 | Age: 88
Setting detail: DERMATOLOGY
End: 2023-05-11

## 2023-05-08 VITALS — HEIGHT: 66 IN | WEIGHT: 235 LBS

## 2023-05-08 DIAGNOSIS — L57.0 ACTINIC KERATOSIS: ICD-10-CM

## 2023-05-08 DIAGNOSIS — L81.4 OTHER MELANIN HYPERPIGMENTATION: ICD-10-CM

## 2023-05-08 DIAGNOSIS — Z85.828 PERSONAL HISTORY OF OTHER MALIGNANT NEOPLASM OF SKIN: ICD-10-CM

## 2023-05-08 PROCEDURE — OTHER PRESCRIPTION: OTHER

## 2023-05-08 PROCEDURE — OTHER COUNSELING: OTHER

## 2023-05-08 PROCEDURE — 99213 OFFICE O/P EST LOW 20 MIN: CPT

## 2023-05-08 RX ORDER — FLUOROURACIL 5 MG/G
5% CREAM TOPICAL BID
Qty: 40 | Refills: 1 | Status: ERX | COMMUNITY
Start: 2023-05-08

## 2023-05-08 ASSESSMENT — LOCATION SIMPLE DESCRIPTION DERM
LOCATION SIMPLE: LEFT TEMPLE
LOCATION SIMPLE: CHEST
LOCATION SIMPLE: RIGHT FOREARM
LOCATION SIMPLE: RIGHT NOSE
LOCATION SIMPLE: LEFT FOREARM

## 2023-05-08 ASSESSMENT — LOCATION DETAILED DESCRIPTION DERM
LOCATION DETAILED: UPPER STERNUM
LOCATION DETAILED: LEFT INFERIOR TEMPLE
LOCATION DETAILED: RIGHT NASAL SIDEWALL
LOCATION DETAILED: RIGHT PROXIMAL DORSAL FOREARM
LOCATION DETAILED: LEFT DISTAL DORSAL FOREARM

## 2023-05-08 ASSESSMENT — LOCATION ZONE DERM
LOCATION ZONE: TRUNK
LOCATION ZONE: ARM
LOCATION ZONE: FACE
LOCATION ZONE: NOSE

## 2023-05-08 NOTE — PROCEDURE: COUNSELING
Patient Specific Counseling (Will Not Stick From Patient To Patient): - Recommend use of 5FU (patient to start applying post granddaughter?s wedding.
Patient Specific Counseling (Will Not Stick From Patient To Patient): - Patient will return to clinic post her granddaughter’s wedding for liquid nitrogen treatment.
Detail Level: Simple
Detail Level: Detailed
Detail Level: Zone

## 2023-05-08 NOTE — HPI: FULL BODY SKIN EXAMINATION
What Is The Reason For Today's Visit?: Full Body Skin Examination
What Is The Reason For Today's Visit? (Being Monitored For X): concerning skin lesions on a periodic basis
Additional History: Doesn?t want a gown , because of accessible\\nStopped cancer medication because of side effects hyperhidrosis

## 2023-05-12 ENCOUNTER — TELEPHONE (OUTPATIENT)
Dept: PHARMACY | Facility: CLINIC | Age: 88
End: 2023-05-12
Payer: MEDICARE

## 2023-05-12 NOTE — TELEPHONE ENCOUNTER
Returned Lisa's phone call.    She went for a skin check, six months after cancerous spots were found. She asked about some other red bumps she has throughout the body, was told they were additional precancerous lesions. She wonders if the sertraline is contributing to this.     Advised her that sertraline is not related to this, she'll continue this.     Cyndie Breaux, PharmD  Medication Therapy Management Pharmacist  [phone]

## 2023-05-15 ENCOUNTER — THERAPY VISIT (OUTPATIENT)
Dept: PHYSICAL THERAPY | Facility: CLINIC | Age: 88
End: 2023-05-15
Payer: MEDICARE

## 2023-05-15 DIAGNOSIS — M25.551 HIP PAIN, RIGHT: Primary | ICD-10-CM

## 2023-05-15 PROCEDURE — 97140 MANUAL THERAPY 1/> REGIONS: CPT | Mod: GP | Performed by: PHYSICAL THERAPIST

## 2023-05-15 PROCEDURE — 97110 THERAPEUTIC EXERCISES: CPT | Mod: GP | Performed by: PHYSICAL THERAPIST

## 2023-05-15 NOTE — PROGRESS NOTES
[facility]    OUTPATIENT Physical Therapy ORTHOPEDIC EVALUATION  PLAN OF TREATMENT FOR OUTPATIENT REHABILITATION  (COMPLETE FOR INITIAL CLAIMS ONLY)  Patient's Last Name, First Name, M.I.  YOB: 1935  Lisa Ferguson    Provider s Name:  [facility]   Medical Record No.  [id number]   Start of Care Date:  08/30/22   Onset Date:    7/7/22, referral date   Treatment Diagnosis:  Hip pain, right Medical Diagnosis:  Hip pain, right       Goals:     05/15/23 0500   Body Part   Goals listed below are for Rigth hip   Goal #1   Goal #1 stairs   Previous Functional Level Ascend/descend stairs;in a normal reciprocal pattern;with a railing   Current Functional Level Ascend/descend stairs;one step at a time;with a railing   STG [employer] Ascend/descend stairs;in a normal reciprocal pattern;with a railing   Rationale to enter/leave the house safely;to reach lower level of home safely;for safe community access to buildings;for safe community ambulaton   Due Date 09/20/22   If goal not met, Why? 10/26/22   LTG [employer] Ascend/descend stairs;in a normal reciprocal pattern;without a railing   Rationale to enter/leave the house safely;to reach lower level of home safely;for safe community access to buildings;for safe community ambulaton   Due Date 11/25/22   Date Goal Met 01/20/23   If goal not met, Why? able to perform bu twas quite unsteady   Goal #2   Goal #2 driving/transportation  (pain with entering/exiting vehicle)   Previous Functional Level No restrictions   Current Functional Level   (pain 5/10 with entering/exiting vehicle)   STG [employer]   (pain 4/10 with entering/exiting vehicle)   Rationale for safe driving   Due date 09/20/22   Date Goal Met 09/27/22   LTG [employer]   (pain <2/10 with entering/exiting vehicle)   Rationale for safe  driving   Due date 04/20/23         Therapy Frequency:  1x/week  Predicted Duration of Therapy Intervention:  8 weeks    Riley Clinton, PT                 I CERTIFY THE NEED FOR THESE SERVICES FURNISHED UNDER        THIS PLAN OF TREATMENT AND WHILE UNDER MY CARE     (Physician attestation of this document indicates review and certification of the therapy plan).                     Certification Date From:  05/09/23   Certification Date To:  06/20/23    Referring Provider:  Ziyad Isaac    Initial Assessment        See Epic Evaluation SOC Date: 08/30/22

## 2023-05-25 ENCOUNTER — ANCILLARY PROCEDURE (OUTPATIENT)
Dept: GENERAL RADIOLOGY | Facility: CLINIC | Age: 88
End: 2023-05-25
Attending: ORTHOPAEDIC SURGERY
Payer: MEDICARE

## 2023-05-25 ENCOUNTER — OFFICE VISIT (OUTPATIENT)
Dept: ORTHOPEDICS | Facility: CLINIC | Age: 88
End: 2023-05-25
Payer: MEDICARE

## 2023-05-25 ENCOUNTER — OFFICE VISIT (OUTPATIENT)
Dept: ORTHOPEDICS | Facility: CLINIC | Age: 88
End: 2023-05-25
Attending: FAMILY MEDICINE
Payer: MEDICARE

## 2023-05-25 VITALS — BODY MASS INDEX: 38.32 KG/M2 | HEIGHT: 67 IN

## 2023-05-25 DIAGNOSIS — M16.11 ARTHRITIS OF RIGHT HIP: ICD-10-CM

## 2023-05-25 DIAGNOSIS — M16.11 ARTHRITIS OF RIGHT HIP: Primary | ICD-10-CM

## 2023-05-25 DIAGNOSIS — G89.29 CHRONIC RIGHT HIP PAIN: Primary | ICD-10-CM

## 2023-05-25 DIAGNOSIS — M25.551 CHRONIC RIGHT HIP PAIN: Primary | ICD-10-CM

## 2023-05-25 DIAGNOSIS — M16.11 PRIMARY OSTEOARTHRITIS OF RIGHT HIP: ICD-10-CM

## 2023-05-25 PROCEDURE — 99204 OFFICE O/P NEW MOD 45 MIN: CPT | Performed by: ORTHOPAEDIC SURGERY

## 2023-05-25 PROCEDURE — 73502 X-RAY EXAM HIP UNI 2-3 VIEWS: CPT | Mod: TC | Performed by: RADIOLOGY

## 2023-05-25 PROCEDURE — 99214 OFFICE O/P EST MOD 30 MIN: CPT | Performed by: FAMILY MEDICINE

## 2023-05-25 NOTE — LETTER
5/25/2023         RE: Lisa Ferguson  [address]  [place] [address]        Dear Colleague,    Thank you for referring your patient, Lisa Ferguson, to the [facility]. Please see a copy of my visit note below.    ASSESSMENT & PLAN    Lisa was seen today for pain.    Diagnoses and all orders for this visit:    Arthritis of right hip  -     Cancel: Orthopedic  Referral; Future  -     Orthopedic  Referral; Future      # Chronic Right Hip Pain: Lisa Ferguson  was seen today for follow-up on right hip arthritis. Symptoms had been going on for 1+ years worse over the past few months. Last steroid injection on 4/20/23 provided no significant relief. On examination there are positive findings of tenderness to palpation over the right hip joint, tightness with hip range of motion. Imaging findings showed severe right hip arthritis. Likely cause of patient's condition due to right hip arthritis. Other possible conditions contributing to symptoms include irritated gluteal tendon spinal stenosis. She is a little higher risk due to history of heart failure and age though is still pretty active. She is trying to do physical therapy, home exercises with pain persisting.  Last steroid injection didn't help at all. Counseled patient on nature of condition and treatment options.      Given this plan as below, follow-up with hip surgeon Dr. Whatley.     Image Findings: severe right hip arthritis  Treatment: Activities as tolerated   Medications/Injections: Limited tylenol for pain for 1-2 weeks, none today  Follow-up: Hip surgeon referral placed  -----    SUBJECTIVE:  Lisa Ferguson is a 87 year old female who is seen in follow-up for right hip pain. They were last seen 4/20/2023 and right hip joint steroid injection.  Does not feel like the injection provided any relief. The patient is seen by themselves.    Since their last visit reports persisting right  "groin pain.  They indicate that their current pain level is 4/10. They have tried right hip joint steroid injection 4/20/23, 12/29/22,tylenol arthritis 3x a day, physical therapy (14 visits), stretching, cane, walker.        Patient's past medical, surgical, social, and family histories were reviewed today and no changes are noted.    REVIEW OF SYSTEMS:  Constitutional: NEGATIVE for fever, chills, change in weight  Skin: NEGATIVE for worrisome rashes, moles or lesions  GI/: NEGATIVE for bowel or bladder changes  Neuro: NEGATIVE for weakness, dizziness or paresthesias    OBJECTIVE:  Ht 1.689 m (5' 6.5\")   BMI 38.32 kg/m     General: healthy, alert and in no distress  HEENT: no scleral icterus or conjunctival erythema  Skin: no suspicious lesions or rash. No jaundice.  CV: regular rhythm by palpation, no pedal edema  Resp: normal respiratory effort without conversational dyspnea   Psych: normal mood and affect  Gait: normal steady gait with appropriate coordination and balance  Neuro: normal light touch sensory exam of the extremities.    MSK:    RIGHT HIP  Inspection:    No swelling, bruising, discoloration, or obvious deformity or asymmetry  Palpation:    Tender about the anterior groin/joint line and greater trochanteric region. Otherwise all other landmarks are nontender.    Crepitus is Absent  Active Range of Motion:     Flexion full, extension full / IR limited slightly by pain / ER  limited slightly by pain  Strength:    Flexion 5/5 / extension 5/5 / adduction 5/5 / abduction 5/5  Special Tests:    Positive: anterior impingement (FADIR)    Negative: CRISTÓBAL      Independent visualization of the below image:  Right hip arthritis on x-rays, reviewed lumbar MRI and CT    Stef Escamilla MD, [facility]    Disclaimer: This note consists of symbols derived from keyboarding, dictation and/or voice recognition software. As a result, there may be errors in the script that have gone " undetected. Please consider this when interpreting information found in this chart.          Again, thank you for allowing me to participate in the care of your patient.        Sincerely,        Stef Escamilla MD

## 2023-05-25 NOTE — PROGRESS NOTES
ASSESSMENT & PLAN    Lisa was seen today for pain.    Diagnoses and all orders for this visit:    Arthritis of right hip  -     Cancel: Orthopedic  Referral; Future  -     Orthopedic  Referral; Future      # Chronic Right Hip Pain: Lisa Ferguson  was seen today for follow-up on right hip arthritis. Symptoms had been going on for 1+ years worse over the past few months. Last steroid injection on 4/20/23 provided no significant relief. On examination there are positive findings of tenderness to palpation over the right hip joint, tightness with hip range of motion. Imaging findings showed severe right hip arthritis. Likely cause of patient's condition due to right hip arthritis. Other possible conditions contributing to symptoms include irritated gluteal tendon spinal stenosis. She is a little higher risk due to history of heart failure and age though is still pretty active. She is trying to do physical therapy, home exercises with pain persisting.  Last steroid injection didn't help at all. Counseled patient on nature of condition and treatment options.      Given this plan as below, follow-up with hip surgeon Dr. Whatley.     Image Findings: severe right hip arthritis  Treatment: Activities as tolerated   Medications/Injections: Limited tylenol for pain for 1-2 weeks, none today  Follow-up: Hip surgeon referral placed  -----    SUBJECTIVE:  Lisa Ferguson is a 87 year old female who is seen in follow-up for right hip pain. They were last seen 4/20/2023 and right hip joint steroid injection.  Does not feel like the injection provided any relief. The patient is seen by themselves.    Since their last visit reports persisting right groin pain.  They indicate that their current pain level is 4/10. They have tried right hip joint steroid injection 4/20/23, 12/29/22,tylenol arthritis 3x a day, physical therapy (14 visits), stretching, cane, walker.        Patient's past medical, surgical, social, and  "family histories were reviewed today and no changes are noted.    REVIEW OF SYSTEMS:  Constitutional: NEGATIVE for fever, chills, change in weight  Skin: NEGATIVE for worrisome rashes, moles or lesions  GI/: NEGATIVE for bowel or bladder changes  Neuro: NEGATIVE for weakness, dizziness or paresthesias    OBJECTIVE:  Ht 1.689 m (5' 6.5\")   BMI 38.32 kg/m     General: healthy, alert and in no distress  HEENT: no scleral icterus or conjunctival erythema  Skin: no suspicious lesions or rash. No jaundice.  CV: regular rhythm by palpation, no pedal edema  Resp: normal respiratory effort without conversational dyspnea   Psych: normal mood and affect  Gait: normal steady gait with appropriate coordination and balance  Neuro: normal light touch sensory exam of the extremities.    MSK:    RIGHT HIP  Inspection:    No swelling, bruising, discoloration, or obvious deformity or asymmetry  Palpation:    Tender about the anterior groin/joint line and greater trochanteric region. Otherwise all other landmarks are nontender.    Crepitus is Absent  Active Range of Motion:     Flexion full, extension full / IR limited slightly by pain / ER  limited slightly by pain  Strength:    Flexion 5/5 / extension 5/5 / adduction 5/5 / abduction 5/5  Special Tests:    Positive: anterior impingement (FADIR)    Negative: CRISTÓBAL      Independent visualization of the below image:  Right hip arthritis on x-rays, reviewed lumbar MRI and CT    Stef Escamilla MD, [facility]    Disclaimer: This note consists of symbols derived from keyboarding, dictation and/or voice recognition software. As a result, there may be errors in the script that have gone undetected. Please consider this when interpreting information found in this chart.      "

## 2023-05-25 NOTE — LETTER
5/25/2023         RE: Lisa Ferguson  [address] [facility]  [place] [address]        Dear Colleague,    Thank you for referring your patient, Lisa Ferguson, to the [facility] [place]. Please see a copy of my visit note below.    Chief Complaint:   Chief Complaint   Patient presents with     Right Hip - Pain     Last injection with Dr. Escamilla 4/20/23. The first injection she had provided some relief but this last injection did not help at all. Onset: 9/2022. Patient notes she had a fall on cement on the right hip. She has done physical therapy weekly since 9/2022. Pain with walking or standing a long time. If she has to walk a distance she will use a walker. Pain is in the groin area and lateral hip.      Lisa Ferguson is seen today in the [facility] for evaluation of right hip pain at the request of Dr. Stef Escamilla       HISTORY OF PRESENT ILLNESS    Lisa Ferguson is a 87 year old female seen for evaluation of ongoing right hip pain without an injury for about a year or longer. Was having pain in the hip and mentioned it to Dr Isaac, so he sent her to therapy. A few weeks later she fell landing on the right hip on cement, 9/2022.    Pain into the groin, side of the right hip, buttock, travels down the back of the thigh to the knee.    Difficulties lifting right leg, in/out of car, donning shoes/socks. Ok at rest. Ok at night.    Treatment with Physical Therapy weekly since 9/2022. Takes 2 tylenol twice daily, helps temporarily.    Last steroid injection on 4/20/23 provided no significant relief    Feels right leg may be slight shorter than the left, but not for sure. Has never really thought about it.    She does have some low back pain, arthritis.    History of bilateral total knee arthroplasty.    Not sure if has numbness and tingling down the legs, but has pain in the bottom of her feet (prickly, numbness type feeling/pain).    Present  symptoms: moderate pain.    Pain severity: 4/10  Pain quality: dull, aching, sharp and burning  Frequency of symptoms: frequently  Exacerbating Factors: weight bearing, stairs, [id number], in and out of car  Relieving Factors: rest, sitting  Night Pain: No  Pain while at rest: No   Associated numbness or tingling: No       Other PMH:  has a past medical history of Anxiety, CAD (coronary artery disease), CHF (congestive heart failure) (H) (07/08/2014), Closed compression fracture of L2 vertebra (H), Degenerative joint disease of right hip, DJD (degenerative joint disease), History of colonoscopy (01/01/2000), Hyperlipidemia LDL goal < 70, Hypertension goal BP (blood pressure) < 140/90, Hypothyroidism, IBS (irritable bowel syndrome), Lumbar spinal stenosis, Mitral regurgitation, Obesity, GLEN (obstructive sleep apnea) (07/11/2011), PUD (peptic ulcer disease), RBBB (right bundle branch block), and Sciatica neuralgia (november 209).    She has no past medical history of Asthma, Blood transfusion, COPD (chronic obstructive pulmonary disease) (H), Diabetes mellitus (H), Malignant neoplasm (H), or Unspecified cerebral artery occlusion with cerebral infarction.  Patient Active Problem List   Diagnosis     PUD (peptic ulcer disease)     Hyperlipidemia with target LDL less than 70     Mitral regurgitation     DJD (degenerative joint disease)     Sciatica neuralgia     IBS (irritable bowel syndrome)     RBBB (right bundle branch block)     GLEN (obstructive sleep apnea)     Balance disorder     Anxiety     Ventricular tachycardia (paroxysmal) (H)     Blepharitis     Hypercalcemia     OA (osteoarthritis) of knee - bilateral     CHF (congestive heart failure) (H)     Abnormal glucose     S/P CABG (coronary artery bypass graft)     S/p total knee replacement, bilateral     Acquired hypothyroidism     Essential hypertension with goal blood pressure less than 130/80     Morbid obesity (H)     H/O parathyroidectomy (H)      Atherosclerosis of coronary artery bypass graft(s), unspecified, with other forms of angina pectoris (H)     Malignant neoplasm of upper-outer quadrant of left breast in female, estrogen receptor positive (H)     Secondary hyperparathyroidism of renal origin (H)     Acute idiopathic gout of right foot     Hip pain, right     Closed compression fracture of L2 vertebra (H)     Degenerative joint disease of right hip     Lumbar spinal stenosis       Surgical Hx:  has a past surgical history that includes surgical history of - ; DILATION/CURETTAGE DIAG/THER NON OB; EXCIS INTERDIGITAL NEUROMA,EA; CABG, ARTERIAL, THREE (1999); surgical history of -  (1999 or so); APPENDECTOMY; tonsillectomy; tubal ligation; REMOVAL GALLBLADDER (1994); hernia repair, inguinal rt/lt; arthroscopy knee rt/lt; colonoscopy (6/2010); and Mastectomy simple bilateral, sentinel node bilateral, combined (Bilateral, 1/6/2021).    Medications:   Current Outpatient Medications:      acetaminophen (TYLENOL) 650 MG CR tablet, Take 1300 mg every morning, 650 mg every afternoon and 650 mg at bedtime., Disp: , Rfl:      allopurinol (ZYLOPRIM) 100 MG tablet, TAKE 1 TABLET BY MOUTH DAILY ALONG WITH 300MG TABLET FOR TOTAL DAILY DOSE OF 400MG, Disp: 90 tablet, Rfl: 1     allopurinol (ZYLOPRIM) 300 MG tablet, TAKE 1 TABLET BY MOUTH DAILY ALONG WITH 100MG TABLET FOR TOTAL DAILY DOSE OF 400MG, Disp: 90 tablet, Rfl: 1     aspirin 81 MG EC tablet, Take 81 mg by mouth every morning, Disp: , Rfl:      betamethasone dipropionate (DIPROSONE) 0.05 % external lotion, Apply topically daily as needed (seborrhea), Disp: 60 mL, Rfl: 0     levothyroxine (SYNTHROID/LEVOTHROID) 125 MCG tablet, TAKE 1 TABLET (125 MCG) BY MOUTH EVERY MORNING, Disp: 90 tablet, Rfl: 1     losartan (COZAAR) 50 MG tablet, Take 50 mg by mouth daily, Disp: , Rfl:      metoprolol tartrate (LOPRESSOR) 25 MG tablet, Take 2 tablets (50 mg) by mouth every morning and 1 tablet (25 mg) every evening, Disp: ,  Rfl:      order for DME, Equipment being ordered: CPAP, Disp: 1 each, Rfl: 0     rosuvastatin (CRESTOR) 5 MG tablet, Take 1 tablet (5 mg) by mouth every other day, Disp: 90 tablet, Rfl: 0     sertraline (ZOLOFT) 25 MG tablet, TAKE 1 TABLET BY MOUTH EVERY DAY, Disp: 90 tablet, Rfl: 0     torsemide (DEMADEX) 10 MG tablet, Take 2 tablets (20 mg) by mouth daily, Disp: 90 tablet, Rfl: 0     vitamin D3 (CHOLECALCIFEROL) 50 mcg (2000 units) tablet, Take 1 tablet by mouth daily, Disp: , Rfl:     Allergies:   Allergies   Allergen Reactions     Adhesive Tape      Atorvastatin Other (See Comments) and Muscle Pain (Myalgia)     lipitor  Myalgia     Buspar [Buspirone]      Nickel Other (See Comments)     Raw weepy skin  rash     Vicodin [Hydrocodone-Acetaminophen] Other (See Comments)     Hallucinations     Liquid Adhesive      Other reaction(s): Contact Dermatitis       Social Hx: retired nurse.  reports that she quit smoking about 44 years ago. Her smoking use included cigarettes. She has never used smokeless tobacco. She reports current alcohol use. She reports that she does not use drugs.    Family Hx: family history includes Alcohol/Drug in her father, paternal grandfather, and son; Allergies in her daughter and son; Alzheimer Disease in her father; Arthritis in her mother; Breast Cancer in her sister; C.A.D. in her father and mother; Cancer in her sister; Cardiovascular in her father, maternal grandfather, maternal grandmother, and mother; Circulatory in her father; Depression in her maternal grandmother and sister; Diabetes in her father and paternal grandfather; Eye Disorder in her sister; Gastrointestinal Disease in her father and sister; Heart Disease in her father, maternal grandfather, and mother; Hypertension in her father; Lipids in her father; Obesity in her father, maternal grandfather, maternal grandmother, mother, paternal grandfather, paternal grandmother, and sister; Thyroid Disease in her maternal  "grandmother, mother, sister, and sister.    REVIEW OF SYSTEMS:  10 point ROS neg other than the symptoms noted above in the HPI and PAST MEDICAL HISTORY. Notables,  CONSTITUTIONAL:NEGATIVE for fever, chills, change in weight  INTEGUMENTARY/SKIN: NEGATIVE for worrisome rashes, moles or lesions  MUSCULOSKELETAL:See HPI above  NEURO: NEGATIVE for weakness, dizziness or paresthesias    PHYSICAL EXAM:  Ht 1.689 m (5' 6.5\")   BMI 38.32 kg/m     GENERAL APPEARANCE: healthy, alert, no distress  SKIN: no suspicious lesions or rashes  NEURO: Normal strength and tone, mentation intact and speech normal  PSYCH:  mentation appears normal and affect normal  RESPIRATORY: No increased work of breathing.  LYMPH: no palpable inguinal lymph nodes.    BILATERAL LOWER EXTREMITIES:  Gait: antalgic favoring right , hunched  Neutral to slight varus alignment.  No gross deformities or masses.  No Quad atrophy, strength weak  Intact sensation deep peroneal nerve, superficial peroneal nerve, med/lat tibial nerve, sural nerve, saphenous nerve  Intact EHL, EDL, TA, FHL, GS, quadriceps hamstrings and hip flexors  Bilateral calf soft and nttp or squeeze.  Edema: trace  Leg lengths: grossly symmetric at medial malleolus.      BACK EXAM  Lumbar range of motion: decreased in all directions, causes right hip and low back pain.  Seated SLR: negative , bilateral.  Supine SLR: negative , bilateral.  Sciatic Notch tenderness: positive without radiation    LEFT HIP EXAM:      Strength:  4/5  Special tests:  Irritability (flexion/adduction/internal rotation) negative.  Hip range of motion: Internal rotation: 10, External rotation: 50, Flexion 100      RIGHT HIP EXAM:    Palpation: Tender:   right greater trochanter, right gluteus medius, right iliotibial band to the knee, sciatic notch, buttock, low back, groin  Strength:  4/5  Special tests:  Irritability (flexion/adduction/internal rotation) positive.  Hip range of motion: Internal rotation: 5, External " "rotation: 40, Flexion 90        X-RAY: AP pelvis and Lateral views right hip from 5/25/2023 , AP/lateral views right hip 11/23/2022 were reviewed in clinic today. No obvious fractures or dislocations. Advanced degenerative changes of the right hip with complete loss of superior joint space. Marginal osteophytes femoral head and acetabulum. Mild left hip degenerative changes. Low lumbar degenerative changes.        Impression: 86yo female with chronic right hip pain, advanced right hip primary osteoarthritis.    Plan:   * discussed pain in groin/hip likely from advanced hip arthritis. This is wearing of the cartilage within the hip joint either due to normal \"wear and tear\" or following an injury. Any low back / buttock / radiating pain likely coming from the low back.    *  treatment options for hip arthritis and pain include: do nothing, NSAIDS, activity modification, Physical Therapy, injections, total hip arthroplasty. Risks and benefits of each discussed.   * did discuss patient is a candidate for total hip arthroplasty, and offered total hip arthroplasty to patient. Total hip arthroplasty will only attempt to provide pain relief from hip related arthritis only and not any pain from the low back. Any low back pain likely to continue.  *  Discussed risks of surgery include, but not limited to: bleeding, infection, pain, scar, damage to adjacent structures (e.g. Nerves, blood vessels, bone, cartilage), temporary or permanent nerve damage, recurrence of symptoms, implant dislocation, implant failure, implant infection, unequal limb lengths, stiffness, need for further surgery, blood clots, pulmonary embolism, risks of anesthesia, and death.    * understanding the risks of surgery, as a quality of life decision, patient would like to think about her options.    * discussed patient and their  will plan hospitalization overnight with discharge home the next morning, daily physical therapy starting either the day " of or day after surgery.  * will plan postoperative deep vein thrombosis prophylaxis x4 weeks.  Additionally, graduated compression stockings x4 weeks, and SCDs while in the hospital.  * postoperative pain control will be oral medications upon discharge from hospital  * postoperative Physical Therapy will be discussed, if needed, at first postoperative visit at 2 weeks  * patient will need longterm (at least 2 years) prophylactic antibiotics use with dental procedures or other invasive procedures (2 g amoxicilin or  2g Keflex or 500mg azithromycin or clarithromycin or 100mg doxycycline) 30-60 minutes prior to dental procedures.  * patient will need preoperative H+P prior to surgery from primary care provider as well as cardiac clearance.      * All questions were addressed and answered prior to discharge from clinic today. The patient acknowledges an understanding of and agreement with the plan set forth during today's visit. Patient was advised to call our office or MyChart us if any further questions arise upon leaving our office today.        Simone Whatley M.D., M.S.  Dept. of Orthopaedic Surgery  [facility]              Again, thank you for allowing me to participate in the care of your patient.        Sincerely,        Simone Whatley MD

## 2023-05-25 NOTE — PROGRESS NOTES
Chief Complaint:   Chief Complaint   Patient presents with     Right Hip - Pain     Last injection with Dr. Escamilla 4/20/23. The first injection she had provided some relief but this last injection did not help at all. Onset: 9/2022. Patient notes she had a fall on cement on the right hip. She has done physical therapy weekly since 9/2022. Pain with walking or standing a long time. If she has to walk a distance she will use a walker. Pain is in the groin area and lateral hip.      Lisa Ferguson is seen today in the [facility] for evaluation of right hip pain at the request of Dr. Stef Escamilla       HISTORY OF PRESENT ILLNESS    Lisa Ferguson is a 87 year old female seen for evaluation of ongoing right hip pain without an injury for about a year or longer. Was having pain in the hip and mentioned it to Dr Isaac, so he sent her to therapy. A few weeks later she fell landing on the right hip on cement, 9/2022.    Pain into the groin, side of the right hip, buttock, travels down the back of the thigh to the knee.    Difficulties lifting right leg, in/out of car, donning shoes/socks. Ok at rest. Ok at night.    Treatment with Physical Therapy weekly since 9/2022. Takes 2 tylenol twice daily, helps temporarily.    Last steroid injection on 4/20/23 provided no significant relief    Feels right leg may be slight shorter than the left, but not for sure. Has never really thought about it.    She does have some low back pain, arthritis.    History of bilateral total knee arthroplasty.    Not sure if has numbness and tingling down the legs, but has pain in the bottom of her feet (prickly, numbness type feeling/pain).    Present symptoms: moderate pain.    Pain severity: 4/10  Pain quality: dull, aching, sharp and burning  Frequency of symptoms: frequently  Exacerbating Factors: weight bearing, stairs, [id number], in and out of car  Relieving Factors: rest, sitting  Night Pain:  No  Pain while at rest: No   Associated numbness or tingling: No       Other PMH:  has a past medical history of Anxiety, CAD (coronary artery disease), CHF (congestive heart failure) (H) (07/08/2014), Closed compression fracture of L2 vertebra (H), Degenerative joint disease of right hip, DJD (degenerative joint disease), History of colonoscopy (01/01/2000), Hyperlipidemia LDL goal < 70, Hypertension goal BP (blood pressure) < 140/90, Hypothyroidism, IBS (irritable bowel syndrome), Lumbar spinal stenosis, Mitral regurgitation, Obesity, GLEN (obstructive sleep apnea) (07/11/2011), PUD (peptic ulcer disease), RBBB (right bundle branch block), and Sciatica neuralgia (november 209).    She has no past medical history of Asthma, Blood transfusion, COPD (chronic obstructive pulmonary disease) (H), Diabetes mellitus (H), Malignant neoplasm (H), or Unspecified cerebral artery occlusion with cerebral infarction.  Patient Active Problem List   Diagnosis     PUD (peptic ulcer disease)     Hyperlipidemia with target LDL less than 70     Mitral regurgitation     DJD (degenerative joint disease)     Sciatica neuralgia     IBS (irritable bowel syndrome)     RBBB (right bundle branch block)     GLEN (obstructive sleep apnea)     Balance disorder     Anxiety     Ventricular tachycardia (paroxysmal) (H)     Blepharitis     Hypercalcemia     OA (osteoarthritis) of knee - bilateral     CHF (congestive heart failure) (H)     Abnormal glucose     S/P CABG (coronary artery bypass graft)     S/p total knee replacement, bilateral     Acquired hypothyroidism     Essential hypertension with goal blood pressure less than 130/80     Morbid obesity (H)     H/O parathyroidectomy (H)     Atherosclerosis of coronary artery bypass graft(s), unspecified, with other forms of angina pectoris (H)     Malignant neoplasm of upper-outer quadrant of left breast in female, estrogen receptor positive (H)     Secondary hyperparathyroidism of renal origin (H)      Acute idiopathic gout of right foot     Hip pain, right     Closed compression fracture of L2 vertebra (H)     Degenerative joint disease of right hip     Lumbar spinal stenosis       Surgical Hx:  has a past surgical history that includes surgical history of - ; DILATION/CURETTAGE DIAG/THER NON OB; EXCIS INTERDIGITAL NEUROMA,EA; CABG, ARTERIAL, THREE (1999); surgical history of -  (1999 or so); APPENDECTOMY; tonsillectomy; tubal ligation; REMOVAL GALLBLADDER (1994); hernia repair, inguinal rt/lt; arthroscopy knee rt/lt; colonoscopy (6/2010); and Mastectomy simple bilateral, sentinel node bilateral, combined (Bilateral, 1/6/2021).    Medications:   Current Outpatient Medications:      acetaminophen (TYLENOL) 650 MG CR tablet, Take 1300 mg every morning, 650 mg every afternoon and 650 mg at bedtime., Disp: , Rfl:      allopurinol (ZYLOPRIM) 100 MG tablet, TAKE 1 TABLET BY MOUTH DAILY ALONG WITH 300MG TABLET FOR TOTAL DAILY DOSE OF 400MG, Disp: 90 tablet, Rfl: 1     allopurinol (ZYLOPRIM) 300 MG tablet, TAKE 1 TABLET BY MOUTH DAILY ALONG WITH 100MG TABLET FOR TOTAL DAILY DOSE OF 400MG, Disp: 90 tablet, Rfl: 1     aspirin 81 MG EC tablet, Take 81 mg by mouth every morning, Disp: , Rfl:      betamethasone dipropionate (DIPROSONE) 0.05 % external lotion, Apply topically daily as needed (seborrhea), Disp: 60 mL, Rfl: 0     levothyroxine (SYNTHROID/LEVOTHROID) 125 MCG tablet, TAKE 1 TABLET (125 MCG) BY MOUTH EVERY MORNING, Disp: 90 tablet, Rfl: 1     losartan (COZAAR) 50 MG tablet, Take 50 mg by mouth daily, Disp: , Rfl:      metoprolol tartrate (LOPRESSOR) 25 MG tablet, Take 2 tablets (50 mg) by mouth every morning and 1 tablet (25 mg) every evening, Disp: , Rfl:      order for DME, Equipment being ordered: CPAP, Disp: 1 each, Rfl: 0     rosuvastatin (CRESTOR) 5 MG tablet, Take 1 tablet (5 mg) by mouth every other day, Disp: 90 tablet, Rfl: 0     sertraline (ZOLOFT) 25 MG tablet, TAKE 1 TABLET BY MOUTH EVERY DAY,  Disp: 90 tablet, Rfl: 0     torsemide (DEMADEX) 10 MG tablet, Take 2 tablets (20 mg) by mouth daily, Disp: 90 tablet, Rfl: 0     vitamin D3 (CHOLECALCIFEROL) 50 mcg (2000 units) tablet, Take 1 tablet by mouth daily, Disp: , Rfl:     Allergies:   Allergies   Allergen Reactions     Adhesive Tape      Atorvastatin Other (See Comments) and Muscle Pain (Myalgia)     lipitor  Myalgia     Buspar [Buspirone]      Nickel Other (See Comments)     Raw weepy skin  rash     Vicodin [Hydrocodone-Acetaminophen] Other (See Comments)     Hallucinations     Liquid Adhesive      Other reaction(s): Contact Dermatitis       Social Hx: retired nurse.  reports that she quit smoking about 44 years ago. Her smoking use included cigarettes. She has never used smokeless tobacco. She reports current alcohol use. She reports that she does not use drugs.    Family Hx: family history includes Alcohol/Drug in her father, paternal grandfather, and son; Allergies in her daughter and son; Alzheimer Disease in her father; Arthritis in her mother; Breast Cancer in her sister; C.A.D. in her father and mother; Cancer in her sister; Cardiovascular in her father, maternal grandfather, maternal grandmother, and mother; Circulatory in her father; Depression in her maternal grandmother and sister; Diabetes in her father and paternal grandfather; Eye Disorder in her sister; Gastrointestinal Disease in her father and sister; Heart Disease in her father, maternal grandfather, and mother; Hypertension in her father; Lipids in her father; Obesity in her father, maternal grandfather, maternal grandmother, mother, paternal grandfather, paternal grandmother, and sister; Thyroid Disease in her maternal grandmother, mother, sister, and sister.    REVIEW OF SYSTEMS:  10 point ROS neg other than the symptoms noted above in the HPI and PAST MEDICAL HISTORY. Notables,  CONSTITUTIONAL:NEGATIVE for fever, chills, change in weight  INTEGUMENTARY/SKIN: NEGATIVE for worrisome  "rashes, moles or lesions  MUSCULOSKELETAL:See HPI above  NEURO: NEGATIVE for weakness, dizziness or paresthesias    PHYSICAL EXAM:  Ht 1.689 m (5' 6.5\")   BMI 38.32 kg/m     GENERAL APPEARANCE: healthy, alert, no distress  SKIN: no suspicious lesions or rashes  NEURO: Normal strength and tone, mentation intact and speech normal  PSYCH:  mentation appears normal and affect normal  RESPIRATORY: No increased work of breathing.  LYMPH: no palpable inguinal lymph nodes.    BILATERAL LOWER EXTREMITIES:  Gait: antalgic favoring right , hunched  Neutral to slight varus alignment.  No gross deformities or masses.  No Quad atrophy, strength weak  Intact sensation deep peroneal nerve, superficial peroneal nerve, med/lat tibial nerve, sural nerve, saphenous nerve  Intact EHL, EDL, TA, FHL, GS, quadriceps hamstrings and hip flexors  Bilateral calf soft and nttp or squeeze.  Edema: trace  Leg lengths: grossly symmetric at medial malleolus.      BACK EXAM  Lumbar range of motion: decreased in all directions, causes right hip and low back pain.  Seated SLR: negative , bilateral.  Supine SLR: negative , bilateral.  Sciatic Notch tenderness: positive without radiation    LEFT HIP EXAM:      Strength:  4/5  Special tests:  Irritability (flexion/adduction/internal rotation) negative.  Hip range of motion: Internal rotation: 10, External rotation: 50, Flexion 100      RIGHT HIP EXAM:    Palpation: Tender:   right greater trochanter, right gluteus medius, right iliotibial band to the knee, sciatic notch, buttock, low back, groin  Strength:  4/5  Special tests:  Irritability (flexion/adduction/internal rotation) positive.  Hip range of motion: Internal rotation: 5, External rotation: 40, Flexion 90        X-RAY: AP pelvis and Lateral views right hip from 5/25/2023 , AP/lateral views right hip 11/23/2022 were reviewed in clinic today. No obvious fractures or dislocations. Advanced degenerative changes of the right hip with complete loss " "of superior joint space. Marginal osteophytes femoral head and acetabulum. Mild left hip degenerative changes. Low lumbar degenerative changes.        Impression: 88yo female with chronic right hip pain, advanced right hip primary osteoarthritis.    Plan:   * discussed pain in groin/hip likely from advanced hip arthritis. This is wearing of the cartilage within the hip joint either due to normal \"wear and tear\" or following an injury. Any low back / buttock / radiating pain likely coming from the low back.    *  treatment options for hip arthritis and pain include: do nothing, NSAIDS, activity modification, Physical Therapy, injections, total hip arthroplasty. Risks and benefits of each discussed.   * did discuss patient is a candidate for total hip arthroplasty, and offered total hip arthroplasty to patient. Total hip arthroplasty will only attempt to provide pain relief from hip related arthritis only and not any pain from the low back. Any low back pain likely to continue.  *  Discussed risks of surgery include, but not limited to: bleeding, infection, pain, scar, damage to adjacent structures (e.g. Nerves, blood vessels, bone, cartilage), temporary or permanent nerve damage, recurrence of symptoms, implant dislocation, implant failure, implant infection, unequal limb lengths, stiffness, need for further surgery, blood clots, pulmonary embolism, risks of anesthesia, and death.    * understanding the risks of surgery, as a quality of life decision, patient would like to think about her options.    * discussed patient and their  will plan hospitalization overnight with discharge home the next morning, daily physical therapy starting either the day of or day after surgery.  * will plan postoperative deep vein thrombosis prophylaxis x4 weeks.  Additionally, graduated compression stockings x4 weeks, and SCDs while in the hospital.  * postoperative pain control will be oral medications upon discharge from " hospital  * postoperative Physical Therapy will be discussed, if needed, at first postoperative visit at 2 weeks  * patient will need longterm (at least 2 years) prophylactic antibiotics use with dental procedures or other invasive procedures (2 g amoxicilin or  2g Keflex or 500mg azithromycin or clarithromycin or 100mg doxycycline) 30-60 minutes prior to dental procedures.  * patient will need preoperative H+P prior to surgery from primary care provider as well as cardiac clearance.      * All questions were addressed and answered prior to discharge from clinic today. The patient acknowledges an understanding of and agreement with the plan set forth during today's visit. Patient was advised to call our office or MyChart us if any further questions arise upon leaving our office today.        Simone Whatley M.D., M.S.  Dept. of Orthopaedic Surgery  [facility]

## 2023-05-25 NOTE — PATIENT INSTRUCTIONS
# Chronic Right Hip Pain: Lisa Ferguson  was seen today for follow-up on right hip arthritis. Symptoms had been going on for 1+ years worse over the past few months. Last steroid injection on 4/20/23 provided no significant relief. On examination there are positive findings of tenderness to palpation over the right hip joint, tightness with hip range of motion. Imaging findings showed severe right hip arthritis. Likely cause of patient's condition due to right hip arthritis. Other possible conditions contributing to symptoms include irritated gluteal tendon.  Last steroid injection didn't help at all. Counseled patient on nature of condition and treatment options.  Given this plan as below, follow-up with hip surgeon Dr. Whatley.     Image Findings: severe right hip arthritis  Treatment: Activities as tolerated   Medications/Injections: Limited tylenol for pain for 1-2 weeks, none today  Follow-up: Hip surgeon referral placed    It was great seeing you again today!    Stef Escamilla

## 2023-05-30 ENCOUNTER — APPOINTMENT (OUTPATIENT)
Dept: URBAN - METROPOLITAN AREA CLINIC 252 | Age: 88
Setting detail: DERMATOLOGY
End: 2023-05-31

## 2023-05-30 VITALS — RESPIRATION RATE: 18 BRPM | HEIGHT: 66 IN | WEIGHT: 235 LBS

## 2023-05-30 DIAGNOSIS — L57.0 ACTINIC KERATOSIS: ICD-10-CM

## 2023-05-30 DIAGNOSIS — D485 NEOPLASM OF UNCERTAIN BEHAVIOR OF SKIN: ICD-10-CM

## 2023-05-30 PROBLEM — D48.5 NEOPLASM OF UNCERTAIN BEHAVIOR OF SKIN: Status: ACTIVE | Noted: 2023-05-30

## 2023-05-30 PROCEDURE — OTHER COUNSELING: OTHER

## 2023-05-30 PROCEDURE — OTHER ADDITIONAL NOTES: OTHER

## 2023-05-30 PROCEDURE — 17004 DESTROY PREMAL LESIONS 15/>: CPT

## 2023-05-30 PROCEDURE — 99212 OFFICE O/P EST SF 10 MIN: CPT | Mod: 25

## 2023-05-30 PROCEDURE — OTHER LIQUID NITROGEN: OTHER

## 2023-05-30 ASSESSMENT — LOCATION DETAILED DESCRIPTION DERM
LOCATION DETAILED: LEFT PROXIMAL DORSAL FOREARM
LOCATION DETAILED: LEFT DISTAL POSTERIOR UPPER ARM
LOCATION DETAILED: LEFT CLAVICULAR SKIN
LOCATION DETAILED: STERNAL NOTCH
LOCATION DETAILED: LEFT LATERAL ELBOW
LOCATION DETAILED: RIGHT INFERIOR CENTRAL MALAR CHEEK
LOCATION DETAILED: NASAL DORSUM
LOCATION DETAILED: RIGHT INFERIOR LATERAL NECK
LOCATION DETAILED: RIGHT ELBOW
LOCATION DETAILED: RIGHT DISTAL DORSAL FOREARM
LOCATION DETAILED: LEFT RADIAL DORSAL HAND
LOCATION DETAILED: RIGHT UPPER CUTANEOUS LIP
LOCATION DETAILED: RIGHT ULNAR DORSAL HAND
LOCATION DETAILED: LEFT DISTAL DORSAL FOREARM
LOCATION DETAILED: RIGHT PROXIMAL DORSAL FOREARM

## 2023-05-30 ASSESSMENT — LOCATION ZONE DERM
LOCATION ZONE: NOSE
LOCATION ZONE: FACE
LOCATION ZONE: ARM
LOCATION ZONE: LIP
LOCATION ZONE: HAND
LOCATION ZONE: NECK
LOCATION ZONE: TRUNK

## 2023-05-30 ASSESSMENT — LOCATION SIMPLE DESCRIPTION DERM
LOCATION SIMPLE: RIGHT FOREARM
LOCATION SIMPLE: RIGHT ANTERIOR NECK
LOCATION SIMPLE: LEFT POSTERIOR UPPER ARM
LOCATION SIMPLE: RIGHT CHEEK
LOCATION SIMPLE: LEFT FOREARM
LOCATION SIMPLE: RIGHT ELBOW
LOCATION SIMPLE: CHEST
LOCATION SIMPLE: NOSE
LOCATION SIMPLE: LEFT HAND
LOCATION SIMPLE: LEFT CLAVICULAR SKIN
LOCATION SIMPLE: RIGHT LIP
LOCATION SIMPLE: RIGHT HAND
LOCATION SIMPLE: LEFT ELBOW

## 2023-05-30 NOTE — PROCEDURE: COUNSELING
Detail Level: Detailed
Detail Level: Zone
Patient Specific Counseling (Will Not Stick From Patient To Patient): Patient decided to wait until fall to use the Efudex to the arms, hands, but will treat the larger lesions today with LN2

## 2023-05-30 NOTE — PROCEDURE: ADDITIONAL NOTES
Detail Level: Simple
Additional Notes: ****Pt defers biopsy today, she would like to wait and see if it resolves
Render Risk Assessment In Note?: no

## 2023-06-05 ENCOUNTER — NURSE TRIAGE (OUTPATIENT)
Dept: FAMILY MEDICINE | Facility: CLINIC | Age: 88
End: 2023-06-05
Payer: MEDICARE

## 2023-06-05 NOTE — TELEPHONE ENCOUNTER
Called patient and relayed message. She would like to have a visit with Dr. Isaac first to discuss. She is unsure if she would need to see neurologist or neurosurgeon. Appt scheduled for the soonest opening I could find which was 06/15/2023. Advised ED or to call back if symptoms worsening.    Tanya Oneill RN   [facility]

## 2023-06-05 NOTE — TELEPHONE ENCOUNTER
This has the sound of beng not a stroke but being related to her spine. She's still welcomed to go to the emergency room but she can also see me instead if she wishes.    Seeing a neurologist is also fine and I can place referral orders for her - would she rather go to her own neurosurgeon versus a [facility] neurologist versus [facility] or [facility] ? All these neurological consultations require typically at the very least a few weeks to get it.    If she wants to start with me that's fine and we can find a time for her. Ask Team [facility] health unit coordinator to help with this is necessary     Ziyad Isaac MD

## 2023-06-05 NOTE — TELEPHONE ENCOUNTER
S-(situation): Patient is calling with sudden new weakness in right leg and would like to get a referral to neurology.     B-(background): Pt states that she has a complicated history of issues with back pain, knee pain and leg pain. States that she had a fall last year and currently going to PT for therapy. She has seen seen Dr. Whatley, Dr. Escamilla for injection, and neurosurgery Dr. Ring for these issues. Has had history of groin and right hip pain.    A-(assessment): Pt reports that everything began Sat night with mary horses in hamstring area on right leg, then became completely numb in right side of leg and foot stinging. She could not feel or raise the leg. She was out of town. This lasted into Sun morning a little bit. It really scared her. She found someone to drive her back due to being scared to drive/feeling unsafe driving. The deadness feeling is gone, but there is weakness in the muscle and she cannot raise the leg any more than 6 inches. Used to raise it at about a 90 degree angle. Now the numbness is fully resolved, but she still has weakness. She also has continued pain in groin and right hip area, pain is mostly with movement. Rates pain as 4-5/10.   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 She did not lose bladder or bowel control, but the leg felt dead and the foot was stinging. She did not have any other neurological symptoms such as facial droop, so not concerned enough to go to ED.   She did She did have some vertigo, but that was the night after. She has had that a couple of times since the hip issue started    R-(recommendations): Writer advised ED for assessment based on her symptoms. Pt would like PCP to review and she is wondering if she should she go to ER to have this checked? She would like to go to neurologist instead but is not sure about how she would get an appointment with them.   Please advise if OV would be possible for assessment and possible referral or if patient needs to  "proceed to ED for assessment.    Forwarding message to PCP to review/advise.    Triage disposition for new neurological deficit that is present now (sudden weakness in leg) is to call EMS now.    1. SYMPTOM: \"What is the main symptom you are concerned about?\" (e.g., weakness, numbness)  Weakness in right leg.  2. ONSET: \"When did this start?\" (minutes, hours, days; while sleeping)  While sleeping Sat night  3. LAST NORMAL: \"When was the last time you (the patient) were normal (no symptoms)?\"  Saturday  4. PATTERN \"Does this come and go, or has it been constant since it started?\" \"Is it present now?\"  Numbness completely gone, weakness is gradually improved.  5. CARDIAC SYMPTOMS: \"Have you had any of the following symptoms: chest pain, difficulty breathing, palpitations?\"  None of these symptoms  6. NEUROLOGIC SYMPTOMS: \"Have you had any of the following symptoms: headache, dizziness, vision loss, double vision, changes in speech, unsteady on your feet?\"  Pt denies any symptoms except did have some vertigo, but that was the night after. She has had that a couple of times since the hip issue.  7. OTHER SYMPTOMS: \"Do you have any other symptoms?\"  No pain in the back, pain in right hip and groin, weakness.   8. PREGNANCY: \"Is there any chance you are pregnant?\" \"When was your last menstrual period?\"  N/A  Rates pain in hip/groin as 4-5/10.    Reason for Disposition    New neurologic deficit that is present NOW, sudden onset of ANY of the following: * Weakness of the face, arm, or leg on one side of the body* Numbness of the face, arm, or leg on one side of the body* Loss of speech or garbled speech    Additional Information    Negative: Difficult to awaken or acting confused (e.g., disoriented, slurred speech)    Protocols used: NEUROLOGIC DEFICIT-A-[place]    Tanya Oneill RN   [facility]  "

## 2023-06-05 NOTE — TELEPHONE ENCOUNTER
Order/Referral Request    Who is requesting: Nuerology and Dr. Lewis advice on who else they should see regarrding HIP pain and numbness    Orders being requested: referral to nuerology     Reason service is needed/diagnosis: hip pain and numbness     When are orders needed by: asap     Has this been discussed with Provider: Yes    Does patient have a preference on a Group/Provider/Facility? [facility]     Does patient have an appointment scheduled?: No    Where to send orders: Please put in [employer]EPIC and call pt     Could we send this information to you in [employer] or would you prefer to receive a phone call?:   Patient would prefer a phone call   Okay to leave a detailed message?: Yes at Cell number on file:    Telephone Information:   Mobile [phone]

## 2023-06-07 ENCOUNTER — PATIENT OUTREACH (OUTPATIENT)
Dept: CARE COORDINATION | Facility: CLINIC | Age: 88
End: 2023-06-07
Payer: MEDICARE

## 2023-06-12 ENCOUNTER — THERAPY VISIT (OUTPATIENT)
Dept: PHYSICAL THERAPY | Facility: CLINIC | Age: 88
End: 2023-06-12
Payer: MEDICARE

## 2023-06-12 DIAGNOSIS — M25.551 HIP PAIN, RIGHT: Primary | ICD-10-CM

## 2023-06-12 PROCEDURE — 97110 THERAPEUTIC EXERCISES: CPT | Mod: GP | Performed by: PHYSICAL THERAPIST

## 2023-06-15 ENCOUNTER — OFFICE VISIT (OUTPATIENT)
Dept: INTERNAL MEDICINE | Facility: CLINIC | Age: 88
End: 2023-06-15
Payer: MEDICARE

## 2023-06-15 VITALS
SYSTOLIC BLOOD PRESSURE: 142 MMHG | HEART RATE: 58 BPM | BODY MASS INDEX: 38.32 KG/M2 | OXYGEN SATURATION: 98 % | RESPIRATION RATE: 18 BRPM | TEMPERATURE: 97.7 F | WEIGHT: 241 LBS | DIASTOLIC BLOOD PRESSURE: 72 MMHG

## 2023-06-15 DIAGNOSIS — I50.9 CONGESTIVE HEART FAILURE, UNSPECIFIED HF CHRONICITY, UNSPECIFIED HEART FAILURE TYPE (H): ICD-10-CM

## 2023-06-15 DIAGNOSIS — M25.551 HIP PAIN, RIGHT: Primary | ICD-10-CM

## 2023-06-15 DIAGNOSIS — I47.29 VENTRICULAR TACHYCARDIA (PAROXYSMAL) (H): ICD-10-CM

## 2023-06-15 DIAGNOSIS — I25.708 ATHEROSCLEROSIS OF CORONARY ARTERY BYPASS GRAFT(S), UNSPECIFIED, WITH OTHER FORMS OF ANGINA PECTORIS (H): ICD-10-CM

## 2023-06-15 DIAGNOSIS — E78.5 HYPERLIPIDEMIA WITH TARGET LDL LESS THAN 70: ICD-10-CM

## 2023-06-15 DIAGNOSIS — E03.9 ACQUIRED HYPOTHYROIDISM: ICD-10-CM

## 2023-06-15 DIAGNOSIS — Z23 NEED FOR DIPHTHERIA-TETANUS-PERTUSSIS (TDAP) VACCINE: ICD-10-CM

## 2023-06-15 PROCEDURE — 99214 OFFICE O/P EST MOD 30 MIN: CPT | Performed by: INTERNAL MEDICINE

## 2023-06-15 NOTE — PROGRESS NOTES
Assessment & Plan     Hip pain, right  This patient has seen orthopedist surgeon and has established end stage osteoarthritic joint with her hip. This is based on a regular hip xrays and not yet an MRI but this doesn't seem necessary based on the report of xrays and patient felt she was being dissuaded from having a total hip replacement by her last orthopedic consultation. We spent quite a bit of time go over a lot of questions she had. It s true that a total hip replacement would be, for this patient. Something what we would need to look very carefully at but I don't think she has any specific contraindications and it's more of a general fear because of her age and co-morbidities. Nevertheless she is a clear cut candidate and she is planning on seeing an orthopedist surgeon with [facility] for essentially a second opinion. I give my blessing for this plan. In fact we agreed to postpone any laboratory studies today as we may be seeing her for a preoperative history and physical examination within the next month or so and can do the tests then.    EXAM: PELVIS AND HIP RIGHT TWO VIEWS  DATE/TIME: 5/25/2023 3:11 PM      INDICATION: Right hip pain.   COMPARISON: 11/23/2022.                                                                      IMPRESSION:  1.  Advanced right hip degenerative arthrosis, progressed. This  includes advanced superior joint space narrowing, mild subchondral  sclerosis, and mild marginal osteophytosis.  2.  No fracture or joint malalignment.        MICHELLE RIVERS MD         Hyperlipidemia with target LDL less than 70  Laboratory studies postponed , problem is stable and ongoing monitoring    - ALT; Future  - REVIEW OF HEALTH MAINTENANCE PROTOCOL ORDERS; Future    Atherosclerosis of coronary artery bypass graft(s), unspecified, with other forms of angina pectoris (H)  Problem is stable and ongoing monitoring    - Lipid panel reflex to direct LDL Non-fasting; Future  - REVIEW OF  HEALTH MAINTENANCE PROTOCOL ORDERS; Future    Acquired hypothyroidism  Problem is stable and ongoing monitoring    - TSH WITH FREE T4 REFLEX; Future  - REVIEW OF HEALTH MAINTENANCE PROTOCOL ORDERS; Future    Congestive heart failure, unspecified HF chronicity, unspecified heart failure type (H)  Problem is stable and ongoing monitoring    - REVIEW OF HEALTH MAINTENANCE PROTOCOL ORDERS; Future  - CBC with Platelets; Future  - Basic metabolic panel  (Ca, Cl, CO2, Creat, Gluc, K, Na, BUN); Future    Need for diphtheria-tetanus-pertussis (Tdap) vaccine  Recommended   - REVIEW OF HEALTH MAINTENANCE PROTOCOL ORDERS; Future  - Tdap, tetanus-diptheria-acell pertussis, (BOOSTRIX) 5-2.5-18.5 LF-MCG/0.5 CROW injection; Inject 0.5 mLs into the muscle once for 1 dose    Ventricular tachycardia (paroxysmal) (H)  Problem is stable and onmo , see cardiology  Office visit notes       Review of the result(s) of each unique test - recent xrays  Prescription drug management  28 minutes spent by me on the date of the encounter doing chart review, history and exam, documentation and further activities per the note      Ziyad Isaac MD  [facility] ANRDA Gonzalez is a 87 year old, presenting for the following health issues:  Follow Up (Follow up 10 months, hip and groin pain from a fall, referral /)         View : No data to display.              History of Present Illness       Back Pain:  She presents for follow up of back pain. Patient's back pain is a recurring problem.  Location of back pain:  Right hip  Description of back pain: cramping and stabbing  Back pain spreads: right buttocks    Since patient first noticed back pain, pain is: unchanged  Does back pain interfere with her job:  Not applicable      Reason for visit:  I would like to update the doctor on this 10month problem with severe artritis in my right hip and the pain involved and what he suggests to do next.Also recommend a   nuerologist.    She eats 2-3 servings of fruits and vegetables daily.She consumes 0 sweetened beverage(s) daily.She exercises with enough effort to increase her heart rate 30 to 60 minutes per day.  She exercises with enough effort to increase her heart rate 7 days per week.   She is taking medications regularly.    This was really not back, it's more hip associated and Piriformis syndrome was diagnosed by the physical therapist and based on history and symptoms this seems accurate to me. She also has a very bad hip and saw Simone Whatley MD, orthopedic surgeon with [facility] who dissuaded her from a total hip replacement according to patient.    A Sunday night 2 weeks ago , she awoke with severe cramping with her right buttock / thigh muscle and at this point she developed right lower extremity tingling and numbess type sensation , at this point she also had some profound weakness this was 2 am she managed to get back to sleep and in the mornings she had improved leg symptoms . This flare-up of symptoms were discussed with her physical therapist and suggested her 4 hours of working of cleaning out the winter's debris associated with her front porch.was a precipitant           Review of Systems   Constitutional, HEENT, cardiovascular, pulmonary, gi and gu systems are negative, except as otherwise noted.      Objective    BP (!) 142/72   Pulse 58   Temp 97.7  F (36.5  C) (Temporal)   Resp 18   Wt 109.3 kg (241 lb)   SpO2 98%   BMI 38.32 kg/m    Body mass index is 38.32 kg/m .  Physical Exam   GENERAL: healthy, alert and no distress  EYES: Eyes grossly normal to inspection, PERRL and conjunctivae and sclerae normal  RESP: lungs clear to auscultation - no rales, rhonchi or wheezes  CV: regular rate and rhythm, normal S1 S2, no S3 or S4, no murmur, click or rub, no peripheral edema and peripheral pulses strong  MS: no gross musculoskeletal defects noted, no edema  NEURO: Normal strength  and tone, mentation intact and speech normal  PSYCH: mentation appears normal, affect normal/bright    Orders Placed This Encounter   Procedures     REVIEW OF HEALTH MAINTENANCE PROTOCOL ORDERS     ALT     Lipid panel reflex to direct LDL Non-fasting     TSH WITH FREE T4 REFLEX     CBC with Platelets     Basic metabolic panel  (Ca, Cl, CO2, Creat, Gluc, K, Na, BUN)

## 2023-06-18 ASSESSMENT — ENCOUNTER SYMPTOMS
DISTURBANCES IN COORDINATION: 0
MUSCLE CRAMPS: 1
DIZZINESS: 1
SINUS PAIN: 0
FEVER: 0
TROUBLE SWALLOWING: 0
INSOMNIA: 0
DECREASED APPETITE: 0
ALTERED TEMPERATURE REGULATION: 0
CHILLS: 0
POLYPHAGIA: 0
FATIGUE: 1
SEIZURES: 0
MUSCLE WEAKNESS: 1
SPEECH CHANGE: 1
SORE THROAT: 0
ARTHRALGIAS: 1
NECK MASS: 0
TINGLING: 1
DEPRESSION: 0
WEIGHT LOSS: 0
STIFFNESS: 1
LOSS OF CONSCIOUSNESS: 0
EYE PAIN: 0
EYE IRRITATION: 1
DOUBLE VISION: 0
SLEEP DISTURBANCES DUE TO BREATHING: 0
INCREASED ENERGY: 1
EXERCISE INTOLERANCE: 1
JOINT SWELLING: 0
NECK PAIN: 0
LEG PAIN: 1
HYPERTENSION: 1
WEIGHT GAIN: 0
MYALGIAS: 1
LIGHT-HEADEDNESS: 0
EYE REDNESS: 0
NIGHT SWEATS: 0
HEADACHES: 0
NAIL CHANGES: 0
PANIC: 1
HOARSE VOICE: 0
PALPITATIONS: 0
TREMORS: 1
HALLUCINATIONS: 0
POLYDIPSIA: 0
SYNCOPE: 0
POOR WOUND HEALING: 0
TASTE DISTURBANCE: 0
PARALYSIS: 0
WEAKNESS: 1
NERVOUS/ANXIOUS: 1
SINUS CONGESTION: 0
BACK PAIN: 1
HYPOTENSION: 0
MEMORY LOSS: 1
SKIN CHANGES: 0
NUMBNESS: 1
DECREASED CONCENTRATION: 1
EYE WATERING: 0
ORTHOPNEA: 0
SMELL DISTURBANCE: 0

## 2023-06-19 ENCOUNTER — VIRTUAL VISIT (OUTPATIENT)
Dept: ENDOCRINOLOGY | Facility: CLINIC | Age: 88
End: 2023-06-19
Payer: MEDICARE

## 2023-06-19 DIAGNOSIS — E83.52 HYPERCALCEMIA: Primary | ICD-10-CM

## 2023-06-19 PROCEDURE — 99215 OFFICE O/P EST HI 40 MIN: CPT | Mod: VID | Performed by: STUDENT IN AN ORGANIZED HEALTH CARE EDUCATION/TRAINING PROGRAM

## 2023-06-19 NOTE — LETTER
"6/19/2023       RE: Lisa Ferguson  [address]  [place] [address]     Dear Colleague,    Thank you for referring your patient, Lisa Ferguson, to the [facility] [place] at [facility]. Please see a copy of my visit note below.    Endocrinology Clinic Visit       Video-Visit Details    Type of service:  Video Visit    Joined the call at 6/19/2023, 11:40 am.  Left the call at 6/19/2023, 12:01 pm.    Originating Location (pt. Location): Home        Distant Location (provider location):  Off-site    Mode of Communication:  Video Conference via [email or web address]    Physician has received verbal consent for a Video Visit from the patient? Yes    I spent a total of 40 minutes on the date of encounter reviewing medical records, evaluating the patient, coordinating care and documenting in the EHR, as detailed above.      NAME:  Lisa Ferguson  PCP:  Ziyad Isaac  MRN:  [id number]  Reason for Consult:  Hypercalcemia   Requesting Provider:  Ziyad Isaac    Chief Complaint     No chief complaint on file.      History of Present Illness     Lisa Ferguson is a 87 year old female who is seen in video visit for hypercalcemia.last seen 3/2023.    She had hyperparathyroidism s/p parathyroidectomy 2002 at [facility]. No records. Care everywhere reviewed: 11/12/2002 NM parathyroid: \"Early images show prominent uptake at the salivary glands and thyroid which is normal.  On the delayed images thyroid activity should have largely diminished, however, in this particular case the thyroid activity is still quite prominent just a little less than was seen on the early images.  Because of this poor washout, it is very difficult to evaluate for parathyroid adenoma.  I don't see any suspicious focal lesion, although the expected positions of the parathyroid are obscured by the thyroid uptake.\"     She reported that she had a 24 hour urine calcium of > " 490 mg at that time.  She had kidney stone in 2015. 24 hour urine calcium at that time was 150 mg  Repeat 3/2023 was 190 mg, confirmed today that she was on adequate calcium intake.    Calcium started to trend up in 2014 around 10.4-10.6 with periods of normal caclium. Up until recently calcium trended up to high 10s and highest of 11.1 onetime otherwise has been ranging in 10.6-10.8 range.    ENDO CALCIUM LABS-[facility] Latest Ref Rng & Units 1/2/2023   VITAMIN D DEFICIENCY SCREENING 20 - 75 ug/L 32   ALKPHOS 40 - 150 U/L    CALCIUM, TOTAL 8.5 - 10.1 mg/dL 11.1 (H)   UREA NITROGEN 7 - 30 mg/dL 18   CREATININE 0.52 - 1.04 mg/dL 0.79   PARATHYROID HORMONE INTACT 15 - 65 pg/mL 101 (H)     Symptoms of hypercalcemia: chronic constipation, no dyspepsia, no mental status changes/lethargy, no polyuria.    Dexa scan most recent one 12/2022: no evidence of low bone density.    Calcium intake: eats cheese and yogurt. Supplements: no    Vitamin D intake: Supplements: 2000 international unit(s)     Previous fractures: shoulder related injury ? Fracture after falling, around 2015. Recent fall , no fracture. FTH chronic compression fracture on L2.  Family history of fragility fracture in parent: no  History of kidney stones: Yes: one time kidney stone in 2015.  History of kidney disease: no  Family history of any calcium problems or kidney stones: Yes: sister had same hyperparathyroid issues.  History of vitamin D deficiency: No  History of HCTZ use: yes, she does not recall. It is on her chart, stopped back in 2020.  History of Lithium use: No  History of malabsorption (IBD, Celiac, gastric bypass ): No  History of thyroid disease: Yes: controlled   Weight history: lost 20 lbs since she fell in the past few years.    Interval hx since last visit: no new fractures. Has hip osteoarthritis and looking for surgery.     Problem List     Patient Active Problem List   Diagnosis    PUD (peptic ulcer disease)    Hyperlipidemia with target LDL  less than 70    Mitral regurgitation    DJD (degenerative joint disease)    Sciatica neuralgia    IBS (irritable bowel syndrome)    RBBB (right bundle branch block)    GLEN (obstructive sleep apnea)    Balance disorder    Anxiety    Ventricular tachycardia (paroxysmal) (H)    Blepharitis    Hypercalcemia    OA (osteoarthritis) of knee - bilateral    CHF (congestive heart failure) (H)    Abnormal glucose    S/P CABG (coronary artery bypass graft)    S/p total knee replacement, bilateral    Acquired hypothyroidism    Essential hypertension with goal blood pressure less than 130/80    Morbid obesity (H)    H/O parathyroidectomy (H)    Atherosclerosis of coronary artery bypass graft(s), unspecified, with other forms of angina pectoris (H)    Malignant neoplasm of upper-outer quadrant of left breast in female, estrogen receptor positive (H)    Secondary hyperparathyroidism of renal origin (H)    Acute idiopathic gout of right foot    Hip pain, right    Closed compression fracture of L2 vertebra (H)    Degenerative joint disease of right hip    Lumbar spinal stenosis        Medications     Current Outpatient Medications   Medication    acetaminophen (TYLENOL) 650 MG CR tablet    allopurinol (ZYLOPRIM) 100 MG tablet    allopurinol (ZYLOPRIM) 300 MG tablet    aspirin 81 MG EC tablet    betamethasone dipropionate (DIPROSONE) 0.05 % external lotion    levothyroxine (SYNTHROID/LEVOTHROID) 125 MCG tablet    losartan (COZAAR) 50 MG tablet    metoprolol tartrate (LOPRESSOR) 25 MG tablet    order for DME    rosuvastatin (CRESTOR) 5 MG tablet    sertraline (ZOLOFT) 25 MG tablet    torsemide (DEMADEX) 10 MG tablet    vitamin D3 (CHOLECALCIFEROL) 50 mcg (2000 units) tablet     No current facility-administered medications for this visit.        Allergies     Allergies   Allergen Reactions    Adhesive Tape     Atorvastatin Other (See Comments) and Muscle Pain (Myalgia)     lipitor  Myalgia    Buspar [Buspirone]     Nickel Other (See  Comments)     Raw weepy skin  rash    Vicodin [Hydrocodone-Acetaminophen] Other (See Comments)     Hallucinations    Liquid Adhesive      Other reaction(s): Contact Dermatitis       Medical / Surgical History     Past Medical History:   Diagnosis Date    Anxiety     CAD (coronary artery disease)     angio 2002, h/o cabg, sees Luisana Nelson NP, they follow the cholesterol    CHF (congestive heart failure) (H) 07/08/2014    Closed compression fracture of L2 vertebra (H)     Degenerative joint disease of right hip     DJD (degenerative joint disease)     History of colonoscopy 01/01/2000    due jan 2010    Hyperlipidemia LDL goal < 70     sees [facility] lipid clinic    Hypertension goal BP (blood pressure) < 140/90     Hypothyroidism     IBS (irritable bowel syndrome)     Lumbar spinal stenosis     Mitral regurgitation     Obesity     GLEN (obstructive sleep apnea) 07/11/2011    PUD (peptic ulcer disease)     h/o duodenal ulcer    RBBB (right bundle branch block)     Sciatica neuralgia november 209    MRI shows spinal stenosis and a cyst on the spine, has received an epidural steroid injection     Past Surgical History:   Procedure Laterality Date    ARTHROSCOPY KNEE RT/LT      bilateral    COLONOSCOPY  6/2010    negative    HC DILATION/CURETTAGE DIAG/THER NON OB      HC EXCIS INTERDIGITAL NEUROMA,EA      mortons neuroma excision    HC REMOVAL GALLBLADDER  1994    HERNIA REPAIR, INGUINAL RT/LT      MASTECTOMY SIMPLE BILATERAL, SENTINEL NODE BILATERAL, COMBINED Bilateral 1/6/2021    Procedure: BILATERAL SIMPLE MASTECTOMY WITH LEFT SENTINEL LYMPH NODE BIOPSY;  Surgeon: Beata Garcia MD;  Location: [facility] OR    SURGICAL HISTORY OF -       bilateral meniscal tears and surgery on these    SURGICAL HISTORY OF -   1999 or so    one parathyroid removed    TONSILLECTOMY      TUBAL LIGATION      ZZC APPENDECTOMY      ZZC CABG, ARTERIAL, THREE  1999    LIMA-LAD, SVG-DIagnoal, SVG-OM, previous LAD-PCI, sees Dr Banuelos       Social  History     Social History     Socioeconomic History    Marital status:      Spouse name: Not on file    Number of children: Not on file    Years of education: Not on file    Highest education level: Not on file   Occupational History    Not on file   Tobacco Use    Smoking status: Former     Years: 20.00     Types: Cigarettes     Quit date: 1978     Years since quittin.9    Smokeless tobacco: Never   Vaping Use    Vaping status: Never Used   Substance and Sexual Activity    Alcohol use: Yes     Comment: Rare     Drug use: No    Sexual activity: Not Currently     Partners: Male   Other Topics Concern    Parent/sibling w/ CABG, MI or angioplasty before 65F 55M? Not Asked   Social History Narrative    Not on file     Social Determinants of Health     Financial Resource Strain: Not on file   Food Insecurity: Not on file   Transportation Needs: Not on file   Physical Activity: Not on file   Stress: Not on file   Social Connections: Not on file   Intimate Partner Violence: Not on file   Housing Stability: Not on file       Family History     Family History   Problem Relation Age of Onset    C.A.D. Mother     Arthritis Mother     Cardiovascular Mother     Heart Disease Mother     Obesity Mother     Thyroid Disease Mother     C.A.D. Father     Diabetes Father     Hypertension Father     Alcohol/Drug Father     Alzheimer Disease Father     Cardiovascular Father     Circulatory Father     Gastrointestinal Disease Father     Heart Disease Father     Lipids Father     Obesity Father     Cardiovascular Maternal Grandmother     Depression Maternal Grandmother     Obesity Maternal Grandmother     Thyroid Disease Maternal Grandmother     Cardiovascular Maternal Grandfather     Heart Disease Maternal Grandfather     Obesity Maternal Grandfather     Obesity Paternal Grandmother     Diabetes Paternal Grandfather     Alcohol/Drug Paternal Grandfather     Obesity Paternal Grandfather     Breast Cancer Sister      Cancer Sister     Obesity Sister     Thyroid Disease Sister     Alcohol/Drug Son     Allergies Son     Allergies Daughter     Depression Sister     Eye Disorder Sister     Gastrointestinal Disease Sister     Thyroid Disease Sister        ROS     12 ROS completed, pertinent positive and negative in HPI    Physical Exam   There were no vitals taken for this visit.   GENERAL: Healthy, alert and no distress  EYES: Eyes grossly normal to inspection.  No discharge or erythema, or obvious scleral/conjunctival abnormalities.  RESP: No audible wheeze, cough, or visible cyanosis.  No visible retractions or increased work of breathing.    SKIN: Visible skin clear. No significant rash, abnormal pigmentation or lesions.  NEURO: Cranial nerves grossly intact.  Mentation and speech appropriate for age.  PSYCH: Mentation appears normal, affect normal/bright, judgement and insight intact, normal speech and appearance well-groomed.     Labs/Imaging     Pertinent Labs were reviewed and updated in EPIC and discussed briefly.  Radiology Results were  reviewed and updated in EPIC and discussed briefly.    Summary of recent findings:   Lab Results   Component Value Date    A1C 5.8 01/02/2023    A1C 6.0 07/07/2022    A1C 5.7 12/29/2020    A1C 5.7 09/13/2018    A1C 5.4 08/01/2017    A1C 5.7 01/03/2017    A1C 5.5 02/12/2016       TSH   Date Value Ref Range Status   07/07/2022 2.94 0.40 - 4.00 mU/L Final   02/17/2021 3.80 0.40 - 4.00 mU/L Final   12/29/2020 23.61 (H) 0.40 - 4.00 mU/L Final   10/28/2019 1.75 0.40 - 4.00 mU/L Final   09/13/2018 2.66 0.40 - 4.00 mU/L Final   08/01/2017 2.66 0.40 - 4.00 mU/L Final     T4 Free   Date Value Ref Range Status   12/29/2020 0.59 (L) 0.76 - 1.46 ng/dL Final   10/21/2016 1.02 0.76 - 1.46 ng/dL Final   03/23/2015 1.07 0.76 - 1.46 ng/dL Final     Comment:     Effective 7/30/2014, the reference range for this assay has changed to reflect   new instrumentation/methodology.         Creatinine   Date Value Ref  Range Status   03/23/2023 0.86 0.52 - 1.04 mg/dL Final   01/07/2021 0.79 0.52 - 1.04 mg/dL Final       Recent Labs   Lab Test 07/07/22  1125 12/29/20  1227 02/12/16  1108 03/23/15  1213   CHOL 175 296*   < > 162   HDL 49* 51   < > 50*   LDL 94 200*   < > 69   TRIG 160* 227*   < > 217*   CHOLHDLRATIO  --   --   --  3.2    < > = values in this interval not displayed.       No results found for: [id number], [id number], [id number]    I personally reviewed the patient's outside records from [employer] EMR and Care Everywhere. Summary of pertinent findings in HPI.    Impression / Plan       1. Hypercalcemia.   2. Hyperparathyroidism   3. s/p parathyroidectomy in 2002, no records.  4. Hx of chronic compression fracture  With elevated PTH, the most likely diagnosis is primary hyperparathyroidism. Her 24 hour urine collection is <250 mg but her clinical presentation makes FHH unlikely.    We reviewed the latest guidelines conclude that surgery for hyperparathyroidism is indicated in symptomatic patients, or in asymptomatic patients who meet any one of the following conditions:  Serum calcium concentration of 1.0 mg/dL (0.25 mmol/L) or more above the upper limit of normal   Creatnine clearance reduced to < 60 mL/min, or 24-hour urine for calcium > 400 mg/day and increased stone risk by biochemical stone risk analysis, or nephrolithiasis or nephrocalcinosis on imaging studyCreatinine clearance that is reduced to <60 mL/min   Bone density at the hip, lumbar spine, or distal radius that is more than 2.5 standard deviations below peak bone mass (T score <-2.5) and/or previous fragility fracture   Age less than 50 years  Operative intervention can be delayed in patients over 50 years of age who are asymptomatic or minimally symptomatic and who have serum calcium concentrations <1.0 mg/dL (0.2 mmol/L) above the upper limit of normal, and in patients who are medically unfit for surgery. We discussed that given her age and underlying  medical problems it is reasonable to avoid surgery. We will monitor labs at 6 month interval, symptoms and complication if happened.   As far as calcium intake, even in cases of hyperparathyroidism, it is recommended to keep up calcium intake to about 1000 mg daily, to prevent further increase in PTH and bone disease. I advised that to the patient.    Given her normal bone density and unknown onset of her compression fracture, it is reasonable to hold off on any osteoporosis medication. Will plan on repeat dexa 12/2024.     3. Chronic hypothyroidism  Controlled on stable lt4. Managed by her PCP. Due for TSH check.    Test and/or medications prescribed today:  No orders of the defined types were placed in this encounter.        Follow up: 6 month      Ida Nettles MD  Endocrinology, Diabetes and Metabolism  [facility]    Answers for HPI/ROS submitted by the patient on 6/18/2023  General Symptoms: Yes  Skin Symptoms: Yes  HENT Symptoms: Yes  EYE SYMPTOMS: Yes  HEART SYMPTOMS: Yes  LUNG SYMPTOMS: No  INTESTINAL SYMPTOMS: No  URINARY SYMPTOMS: No  GYNECOLOGIC SYMPTOMS: No  BREAST SYMPTOMS: No  SKELETAL SYMPTOMS: Yes  BLOOD SYMPTOMS: No  NERVOUS SYSTEM SYMPTOMS: Yes  MENTAL HEALTH SYMPTOMS: Yes  Ear pain: No  Ear discharge: No  Hearing loss: No  Tinnitus: Yes  Nosebleeds: No  Congestion: No  Sinus pain: No  Trouble swallowing: No   Voice hoarseness: No  Mouth sores: No  Sore throat: No  Tooth pain: No  Gum tenderness: No  Bleeding gums: No  Change in taste: No  Change in sense of smell: No  Dry mouth: Yes  Hearing aid used: No  Neck lump: No  Fever: No  Loss of appetite: No  Weight loss: No  Weight gain: No  Fatigue: Yes  Night sweats: No  Chills: No  Increased stress: Yes  Excessive hunger: No  Excessive thirst: No  Feeling hot or cold when others believe the temperature is normal: No  Loss of height: Yes  Post-operative complications: No  Surgical site pain: No  Hallucinations: No  Change in or Loss of  Energy: Yes  Hyperactivity: No  Confusion: No  Changes in hair: No  Changes in moles/birth marks: No  Itching: No  Rashes: Yes  Changes in nails: No  Acne: No  Hair in places you don't want it: No  Change in facial hair: No  Warts: No  Non-healing sores: No  Scarring: No  Flaking of skin: No  Color changes of hands/feet in cold : No  Sun sensitivity: No  Skin thickening: No  Eye pain: No  Vision loss: Yes  Dry eyes: Yes  Watery eyes: No  Eye bulging: No  Double vision: No  Flashing of lights: No  Spots: No  Floaters: No  Redness: No  Crossed eyes: No  Tunnel Vision: No  Yellowing of eyes: No  Eye irritation: Yes  Chest pain or pressure: No  Fast or irregular heartbeat: No  Pain in legs with walking: Yes  Trouble breathing while lying down: No  Fingers or toes appear blue: No  High blood pressure: Yes  Low blood pressure: No  Fainting: No  Murmurs: No  Pacemaker: No  Varicose veins: No  Edema or swelling: Yes  Wake up at night with shortness of breath: No  Light-headedness: No  Exercise intolerance: Yes  Back pain: Yes  Muscle aches: Yes  Neck pain: No  Swollen joints: No  Joint pain: Yes  Bone pain: Yes  Muscle cramps: Yes  Muscle weakness: Yes  Joint stiffness: Yes  Bone fracture: No  Trouble with coordination: No  Dizziness or trouble with balance: Yes  Fainting or black-out spells: No  Memory loss: Yes  Headache: No  Seizures: No  Speech problems: Yes  Tingling: Yes  Tremor: Yes  Weakness: Yes  Difficulty walking: Yes  Paralysis: No  Numbness: Yes  Nervous or Anxious: Yes  Depression: No  Trouble sleeping: No  Trouble thinking or concentrating: Yes  Mood changes: No  Panic attacks: Yes

## 2023-06-19 NOTE — PROGRESS NOTES
"Endocrinology Clinic Visit       Video-Visit Details    Type of service:  Video Visit    Joined the call at 6/19/2023, 11:40 am.  Left the call at 6/19/2023, 12:01 pm.    Originating Location (pt. Location): Home        Distant Location (provider location):  Off-site    Mode of Communication:  Video Conference via Jinni    Physician has received verbal consent for a Video Visit from the patient? Yes    I spent a total of 40 minutes on the date of encounter reviewing medical records, evaluating the patient, coordinating care and documenting in the EHR, as detailed above.      NAME:  Lisa Ferguson  PCP:  Ziyad Isaac  MRN:  [id number]  Reason for Consult:  Hypercalcemia   Requesting Provider:  Ziyad Isaac    Chief Complaint     No chief complaint on file.      History of Present Illness     Lisa Ferguson is a 87 year old female who is seen in video visit for hypercalcemia.last seen 3/2023.    She had hyperparathyroidism s/p parathyroidectomy 2002 at [facility]. No records. Care everywhere reviewed: 11/12/2002 NM parathyroid: \"Early images show prominent uptake at the salivary glands and thyroid which is normal.  On the delayed images thyroid activity should have largely diminished, however, in this particular case the thyroid activity is still quite prominent just a little less than was seen on the early images.  Because of this poor washout, it is very difficult to evaluate for parathyroid adenoma.  I don't see any suspicious focal lesion, although the expected positions of the parathyroid are obscured by the thyroid uptake.\"     She reported that she had a 24 hour urine calcium of > 490 mg at that time.  She had kidney stone in 2015. 24 hour urine calcium at that time was 150 mg  Repeat 3/2023 was 190 mg, confirmed today that she was on adequate calcium intake.    Calcium started to trend up in 2014 around 10.4-10.6 with periods of normal caclium. Up until recently calcium trended up to high 10s and " highest of 11.1 onetime otherwise has been ranging in 10.6-10.8 range.    ENDO CALCIUM LABS-[facility] Latest Ref Rng & Units 1/2/2023   VITAMIN D DEFICIENCY SCREENING 20 - 75 ug/L 32   ALKPHOS 40 - 150 U/L    CALCIUM, TOTAL 8.5 - 10.1 mg/dL 11.1 (H)   UREA NITROGEN 7 - 30 mg/dL 18   CREATININE 0.52 - 1.04 mg/dL 0.79   PARATHYROID HORMONE INTACT 15 - 65 pg/mL 101 (H)     Symptoms of hypercalcemia: chronic constipation, no dyspepsia, no mental status changes/lethargy, no polyuria.    Dexa scan most recent one 12/2022: no evidence of low bone density.    Calcium intake: eats cheese and yogurt. Supplements: no    Vitamin D intake: Supplements: 2000 international unit(s)     - Previous fractures: shoulder related injury ? Fracture after falling, around 2015. Recent fall , no fracture. FTH chronic compression fracture on L2.  - Family history of fragility fracture in parent: no  - History of kidney stones: Yes: one time kidney stone in 2015.  - History of kidney disease: no  - Family history of any calcium problems or kidney stones: Yes: sister had same hyperparathyroid issues.  - History of vitamin D deficiency: No  - History of HCTZ use: yes, she does not recall. It is on her chart, stopped back in 2020.  - History of Lithium use: No  - History of malabsorption (IBD, Celiac, gastric bypass ): No  - History of thyroid disease: Yes: controlled   - Weight history: lost 20 lbs since she fell in the past few years.    Interval hx since last visit: no new fractures. Has hip osteoarthritis and looking for surgery.     Problem List     Patient Active Problem List   Diagnosis     PUD (peptic ulcer disease)     Hyperlipidemia with target LDL less than 70     Mitral regurgitation     DJD (degenerative joint disease)     Sciatica neuralgia     IBS (irritable bowel syndrome)     RBBB (right bundle branch block)     GLEN (obstructive sleep apnea)     Balance disorder     Anxiety     Ventricular tachycardia (paroxysmal) (H)     Blepharitis      Hypercalcemia     OA (osteoarthritis) of knee - bilateral     CHF (congestive heart failure) (H)     Abnormal glucose     S/P CABG (coronary artery bypass graft)     S/p total knee replacement, bilateral     Acquired hypothyroidism     Essential hypertension with goal blood pressure less than 130/80     Morbid obesity (H)     H/O parathyroidectomy (H)     Atherosclerosis of coronary artery bypass graft(s), unspecified, with other forms of angina pectoris (H)     Malignant neoplasm of upper-outer quadrant of left breast in female, estrogen receptor positive (H)     Secondary hyperparathyroidism of renal origin (H)     Acute idiopathic gout of right foot     Hip pain, right     Closed compression fracture of L2 vertebra (H)     Degenerative joint disease of right hip     Lumbar spinal stenosis        Medications     Current Outpatient Medications   Medication     acetaminophen (TYLENOL) 650 MG CR tablet     allopurinol (ZYLOPRIM) 100 MG tablet     allopurinol (ZYLOPRIM) 300 MG tablet     aspirin 81 MG EC tablet     betamethasone dipropionate (DIPROSONE) 0.05 % external lotion     levothyroxine (SYNTHROID/LEVOTHROID) 125 MCG tablet     losartan (COZAAR) 50 MG tablet     metoprolol tartrate (LOPRESSOR) 25 MG tablet     order for DME     rosuvastatin (CRESTOR) 5 MG tablet     sertraline (ZOLOFT) 25 MG tablet     torsemide (DEMADEX) 10 MG tablet     vitamin D3 (CHOLECALCIFEROL) 50 mcg (2000 units) tablet     No current facility-administered medications for this visit.        Allergies     Allergies   Allergen Reactions     Adhesive Tape      Atorvastatin Other (See Comments) and Muscle Pain (Myalgia)     lipitor  Myalgia     Buspar [Buspirone]      Nickel Other (See Comments)     Raw weepy skin  rash     Vicodin [Hydrocodone-Acetaminophen] Other (See Comments)     Hallucinations     Liquid Adhesive      Other reaction(s): Contact Dermatitis       Medical / Surgical History     Past Medical History:   Diagnosis Date      Anxiety      CAD (coronary artery disease)     angio 2002, h/o cabg, sees Luisana Nelson NP, they follow the cholesterol     CHF (congestive heart failure) (H) 07/08/2014     Closed compression fracture of L2 vertebra (H)      Degenerative joint disease of right hip      DJD (degenerative joint disease)      History of colonoscopy 01/01/2000    due jan 2010     Hyperlipidemia LDL goal < 70     sees [facility] lipid [facility]     Hypertension goal BP (blood pressure) < 140/90      Hypothyroidism      IBS (irritable bowel syndrome)      Lumbar spinal stenosis      Mitral regurgitation      Obesity      GLEN (obstructive sleep apnea) 07/11/2011     PUD (peptic ulcer disease)     h/o duodenal ulcer     RBBB (right bundle branch block)      Sciatica neuralgia november 209    MRI shows spinal stenosis and a cyst on the spine, has received an epidural steroid injection     Past Surgical History:   Procedure Laterality Date     ARTHROSCOPY KNEE RT/LT      bilateral     COLONOSCOPY  6/2010    negative     HC DILATION/CURETTAGE DIAG/THER NON OB       HC EXCIS INTERDIGITAL NEUROMA,EA      mortons neuroma excision     HC REMOVAL GALLBLADDER  1994     HERNIA REPAIR, INGUINAL RT/LT       MASTECTOMY SIMPLE BILATERAL, SENTINEL NODE BILATERAL, COMBINED Bilateral 1/6/2021    Procedure: BILATERAL SIMPLE MASTECTOMY WITH LEFT SENTINEL LYMPH NODE BIOPSY;  Surgeon: Beata Garcia MD;  Location: [facility] OR     SURGICAL HISTORY OF -       bilateral meniscal tears and surgery on these     SURGICAL HISTORY OF -   1999 or so    one parathyroid removed     TONSILLECTOMY       TUBAL LIGATION       ZZC APPENDECTOMY       ZZC CABG, ARTERIAL, THREE  1999    LIMA-LAD, SVG-DIagnoal, SVG-OM, previous LAD-PCI, sees Dr Banuelos       Social History     Social History     Socioeconomic History     Marital status:      Spouse name: Not on file     Number of children: Not on file     Years of education: Not on file     Highest education level: Not on file    Occupational History     Not on file   Tobacco Use     Smoking status: Former     Years: 20.00     Types: Cigarettes     Quit date: 1978     Years since quittin.9     Smokeless tobacco: Never   Vaping Use     Vaping status: Never Used   Substance and Sexual Activity     Alcohol use: Yes     Comment: Rare      Drug use: No     Sexual activity: Not Currently     Partners: Male   Other Topics Concern     Parent/sibling w/ CABG, MI or angioplasty before 65F 55M? Not Asked   Social History Narrative     Not on file     Social Determinants of Health     Financial Resource Strain: Not on file   Food Insecurity: Not on file   Transportation Needs: Not on file   Physical Activity: Not on file   Stress: Not on file   Social Connections: Not on file   Intimate Partner Violence: Not on file   Housing Stability: Not on file       Family History     Family History   Problem Relation Age of Onset     C.A.D. Mother      Arthritis Mother      Cardiovascular Mother      Heart Disease Mother      Obesity Mother      Thyroid Disease Mother      C.A.D. Father      Diabetes Father      Hypertension Father      Alcohol/Drug Father      Alzheimer Disease Father      Cardiovascular Father      Circulatory Father      Gastrointestinal Disease Father      Heart Disease Father      Lipids Father      Obesity Father      Cardiovascular Maternal Grandmother      Depression Maternal Grandmother      Obesity Maternal Grandmother      Thyroid Disease Maternal Grandmother      Cardiovascular Maternal Grandfather      Heart Disease Maternal Grandfather      Obesity Maternal Grandfather      Obesity Paternal Grandmother      Diabetes Paternal Grandfather      Alcohol/Drug Paternal Grandfather      Obesity Paternal Grandfather      Breast Cancer Sister      Cancer Sister      Obesity Sister      Thyroid Disease Sister      Alcohol/Drug Son      Allergies Son      Allergies Daughter      Depression Sister      Eye Disorder Sister       Gastrointestinal Disease Sister      Thyroid Disease Sister        ROS     12 ROS completed, pertinent positive and negative in HPI    Physical Exam   There were no vitals taken for this visit.   GENERAL: Healthy, alert and no distress  EYES: Eyes grossly normal to inspection.  No discharge or erythema, or obvious scleral/conjunctival abnormalities.  RESP: No audible wheeze, cough, or visible cyanosis.  No visible retractions or increased work of breathing.    SKIN: Visible skin clear. No significant rash, abnormal pigmentation or lesions.  NEURO: Cranial nerves grossly intact.  Mentation and speech appropriate for age.  PSYCH: Mentation appears normal, affect normal/bright, judgement and insight intact, normal speech and appearance well-groomed.     Labs/Imaging     Pertinent Labs were reviewed and updated in EPIC and discussed briefly.  Radiology Results were  reviewed and updated in EPIC and discussed briefly.    Summary of recent findings:   Lab Results   Component Value Date    A1C 5.8 01/02/2023    A1C 6.0 07/07/2022    A1C 5.7 12/29/2020    A1C 5.7 09/13/2018    A1C 5.4 08/01/2017    A1C 5.7 01/03/2017    A1C 5.5 02/12/2016       TSH   Date Value Ref Range Status   07/07/2022 2.94 0.40 - 4.00 mU/L Final   02/17/2021 3.80 0.40 - 4.00 mU/L Final   12/29/2020 23.61 (H) 0.40 - 4.00 mU/L Final   10/28/2019 1.75 0.40 - 4.00 mU/L Final   09/13/2018 2.66 0.40 - 4.00 mU/L Final   08/01/2017 2.66 0.40 - 4.00 mU/L Final     T4 Free   Date Value Ref Range Status   12/29/2020 0.59 (L) 0.76 - 1.46 ng/dL Final   10/21/2016 1.02 0.76 - 1.46 ng/dL Final   03/23/2015 1.07 0.76 - 1.46 ng/dL Final     Comment:     Effective 7/30/2014, the reference range for this assay has changed to reflect   new instrumentation/methodology.         Creatinine   Date Value Ref Range Status   03/23/2023 0.86 0.52 - 1.04 mg/dL Final   01/07/2021 0.79 0.52 - 1.04 mg/dL Final       Recent Labs   Lab Test 07/07/22  1125 12/29/20  1227 02/12/16  1108  03/23/15  1213   CHOL 175 296*   < > 162   HDL 49* 51   < > 50*   LDL 94 200*   < > 69   TRIG 160* 227*   < > 217*   CHOLHDLRATIO  --   --   --  3.2    < > = values in this interval not displayed.       No results found for: [id number], [id number], [id number]    I personally reviewed the patient's outside records from [facility] EMR and Care Everywhere. Summary of pertinent findings in HPI.    Impression / Plan       1. Hypercalcemia.   2. Hyperparathyroidism   3. s/p parathyroidectomy in 2002, no records.  4. Hx of chronic compression fracture  With elevated PTH, the most likely diagnosis is primary hyperparathyroidism. Her 24 hour urine collection is <250 mg but her clinical presentation makes FHH unlikely.    We reviewed the latest guidelines conclude that surgery for hyperparathyroidism is indicated in symptomatic patients, or in asymptomatic patients who meet any one of the following conditions:  Serum calcium concentration of 1.0 mg/dL (0.25 mmol/L) or more above the upper limit of normal   Creatnine clearance reduced to < 60 mL/min, or 24-hour urine for calcium > 400 mg/day and increased stone risk by biochemical stone risk analysis, or nephrolithiasis or nephrocalcinosis on imaging studyCreatinine clearance that is reduced to <60 mL/min   Bone density at the hip, lumbar spine, or distal radius that is more than 2.5 standard deviations below peak bone mass (T score <-2.5) and/or previous fragility fracture   Age less than 50 years  Operative intervention can be delayed in patients over 50 years of age who are asymptomatic or minimally symptomatic and who have serum calcium concentrations <1.0 mg/dL (0.2 mmol/L) above the upper limit of normal, and in patients who are medically unfit for surgery. We discussed that given her age and underlying medical problems it is reasonable to avoid surgery. We will monitor labs at 6 month interval, symptoms and complication if happened.   As far as calcium intake, even in cases  of hyperparathyroidism, it is recommended to keep up calcium intake to about 1000 mg daily, to prevent further increase in PTH and bone disease. I advised that to the patient.    Given her normal bone density and unknown onset of her compression fracture, it is reasonable to hold off on any osteoporosis medication. Will plan on repeat dexa 12/2024.     3. Chronic hypothyroidism  Controlled on stable lt4. Managed by her PCP. Due for TSH check.    Test and/or medications prescribed today:  No orders of the defined types were placed in this encounter.        Follow up: 6 month      Ida Nettles MD  Endocrinology, Diabetes and Metabolism  [facility]    Answers for HPI/ROS submitted by the patient on 6/18/2023  General Symptoms: Yes  Skin Symptoms: Yes  HENT Symptoms: Yes  EYE SYMPTOMS: Yes  HEART SYMPTOMS: Yes  LUNG SYMPTOMS: No  INTESTINAL SYMPTOMS: No  URINARY SYMPTOMS: No  GYNECOLOGIC SYMPTOMS: No  BREAST SYMPTOMS: No  SKELETAL SYMPTOMS: Yes  BLOOD SYMPTOMS: No  NERVOUS SYSTEM SYMPTOMS: Yes  MENTAL HEALTH SYMPTOMS: Yes  Ear pain: No  Ear discharge: No  Hearing loss: No  Tinnitus: Yes  Nosebleeds: No  Congestion: No  Sinus pain: No  Trouble swallowing: No   Voice hoarseness: No  Mouth sores: No  Sore throat: No  Tooth pain: No  Gum tenderness: No  Bleeding gums: No  Change in taste: No  Change in sense of smell: No  Dry mouth: Yes  Hearing aid used: No  Neck lump: No  Fever: No  Loss of appetite: No  Weight loss: No  Weight gain: No  Fatigue: Yes  Night sweats: No  Chills: No  Increased stress: Yes  Excessive hunger: No  Excessive thirst: No  Feeling hot or cold when others believe the temperature is normal: No  Loss of height: Yes  Post-operative complications: No  Surgical site pain: No  Hallucinations: No  Change in or Loss of Energy: Yes  Hyperactivity: No  Confusion: No  Changes in hair: No  Changes in moles/birth marks: No  Itching: No  Rashes: Yes  Changes in nails: No  Acne: No  Hair in places  you don't want it: No  Change in facial hair: No  Warts: No  Non-healing sores: No  Scarring: No  Flaking of skin: No  Color changes of hands/feet in cold : No  Sun sensitivity: No  Skin thickening: No  Eye pain: No  Vision loss: Yes  Dry eyes: Yes  Watery eyes: No  Eye bulging: No  Double vision: No  Flashing of lights: No  Spots: No  Floaters: No  Redness: No  Crossed eyes: No  Tunnel Vision: No  Yellowing of eyes: No  Eye irritation: Yes  Chest pain or pressure: No  Fast or irregular heartbeat: No  Pain in legs with walking: Yes  Trouble breathing while lying down: No  Fingers or toes appear blue: No  High blood pressure: Yes  Low blood pressure: No  Fainting: No  Murmurs: No  Pacemaker: No  Varicose veins: No  Edema or swelling: Yes  Wake up at night with shortness of breath: No  Light-headedness: No  Exercise intolerance: Yes  Back pain: Yes  Muscle aches: Yes  Neck pain: No  Swollen joints: No  Joint pain: Yes  Bone pain: Yes  Muscle cramps: Yes  Muscle weakness: Yes  Joint stiffness: Yes  Bone fracture: No  Trouble with coordination: No  Dizziness or trouble with balance: Yes  Fainting or black-out spells: No  Memory loss: Yes  Headache: No  Seizures: No  Speech problems: Yes  Tingling: Yes  Tremor: Yes  Weakness: Yes  Difficulty walking: Yes  Paralysis: No  Numbness: Yes  Nervous or Anxious: Yes  Depression: No  Trouble sleeping: No  Trouble thinking or concentrating: Yes  Mood changes: No  Panic attacks: Yes

## 2023-06-19 NOTE — NURSING NOTE
Is the patient currently in the state of [place]? YES    Visit mode:VIDEO    If the visit is dropped, the patient can be reconnected by: VIDEO VISIT: Send to e-mail at: [email or web address]    Will anyone else be joining the visit? NO      How would you like to obtain your AVS? MyChart    Are changes needed to the allergy or medication list? NO  Patient notes medications and allergy list are up to date. Patient did complete e-check in for this visit. Medications/allergies not reviewed again as patient declined.    Reason for visit: RECHECK

## 2023-06-20 ENCOUNTER — ONCOLOGY VISIT (OUTPATIENT)
Dept: ONCOLOGY | Facility: CLINIC | Age: 88
End: 2023-06-20
Attending: INTERNAL MEDICINE
Payer: MEDICARE

## 2023-06-20 VITALS
TEMPERATURE: 98.5 F | RESPIRATION RATE: 16 BRPM | OXYGEN SATURATION: 94 % | DIASTOLIC BLOOD PRESSURE: 75 MMHG | BODY MASS INDEX: 38.16 KG/M2 | SYSTOLIC BLOOD PRESSURE: 150 MMHG | HEART RATE: 59 BPM | WEIGHT: 240 LBS

## 2023-06-20 DIAGNOSIS — Z17.0 MALIGNANT NEOPLASM OF UPPER-OUTER QUADRANT OF LEFT BREAST IN FEMALE, ESTROGEN RECEPTOR POSITIVE (H): Primary | ICD-10-CM

## 2023-06-20 DIAGNOSIS — C50.412 MALIGNANT NEOPLASM OF UPPER-OUTER QUADRANT OF LEFT BREAST IN FEMALE, ESTROGEN RECEPTOR POSITIVE (H): Primary | ICD-10-CM

## 2023-06-20 PROCEDURE — 99213 OFFICE O/P EST LOW 20 MIN: CPT | Performed by: INTERNAL MEDICINE

## 2023-06-20 NOTE — LETTER
6/20/2023         RE: Lisa Ferguson  [address]  [place] [address]        Dear Colleague,    Thank you for referring your patient, Lisa Ferguson, to the [facility]. Please see a copy of my visit note below.    [facility] PHYSICIANS  MEDICAL ONCOLOGY    RETURN PATIENT VISIT NOTE    Reason for visit: breast cancer    Oncology Treatment Summary  1.  Patient self palpated abnormality in left breast in November 2020.  11/16/2020 diagnostic mammogram and ultrasound were done which found 2 lesions within the left breast 1 measuring approximately 1.5 cm and the second 1.9 cm.  Both were biopsied the one at 3:00 was a grade 2 invasive lobular cancer.  The one at 11:00 was a grade 2 invasive ductal cancer.  It was estrogen receptor positive progesterone receptor positive HER-2/kena indeterminate by IHC and negative by ALBERTO  2.  1/6/2021 patient underwent bilateral mastectomies.  Right mastectomy was unremarkable.  Left mastectomy found a multifocal breast cancer the first measuring 4.5 cm is a grade 2 invasive lobular carcinoma, the second was a 2.5 cm grade 2 malignancy.  2 sentinel lymph nodes were removed 1 of which was negative the other 1 had immunohistochemistry positive only cells for a T M2N0i+M0 ER/[place] positive HER-2/kena negative disease  3.  Oncotype RS: one was 22 and other was 6 both low risk  4.  Genetic testing was done that was unremarkable  5. Anastrozole started march 2021 - quit in the fall 2022 due to side effects and declined taking any other.      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 She has been off her arimidex since last fall which she has side effects and has declined to restart.  She has been having problems with her right hip. This has been bothersome since august of last year. She has had xrays that are unremarkable and has seen ortho about having her hip  replaced. She wasn't satisfied with her consult and is getting a second opinion.     She has been feeling better since off her arimidex and does not want to restart it.   She has not had any new medical issues since.     PAST MEDICAL HISTORY  Coronary artery disease, peptic ulcer disease, anxiety, hyperlipidemia, mitral regurgitation, hypertension, degenerative joint disease, right bundle branch block, irritable bowel, sciatica, hypothyroidism, obstructive sleep apnea, congestive heart failure, gout, nephrolithiasis      CURRENT OUTPATIENT MEDICATIONS  Current Outpatient Medications   Medication     acetaminophen (TYLENOL) 650 MG CR tablet     allopurinol (ZYLOPRIM) 100 MG tablet     allopurinol (ZYLOPRIM) 300 MG tablet     aspirin 81 MG EC tablet     betamethasone dipropionate (DIPROSONE) 0.05 % external lotion     levothyroxine (SYNTHROID/LEVOTHROID) 125 MCG tablet     losartan (COZAAR) 50 MG tablet     metoprolol tartrate (LOPRESSOR) 25 MG tablet     order for DME     rosuvastatin (CRESTOR) 5 MG tablet     sertraline (ZOLOFT) 25 MG tablet     torsemide (DEMADEX) 10 MG tablet     vitamin D3 (CHOLECALCIFEROL) 50 mcg (2000 units) tablet     No current facility-administered medications for this visit.        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  She has a history of smoking but quit this 40 years ago.  Occasional alcohol use     FAMILY HISTORY  Her sister  of breast cancer at 62 and did have a BRCA mutation.  Patient's niece also carries a BRCA mutation.  She was tested for this and does not..  Her genetic  testing was negative     REVIEW OF SYSTEMS  Review Of Systems  Skin: negative  Eyes: negative  Ears/Nose/Throat: negative  Respiratory: No shortness of breath, dyspnea on exertion, cough, or hemoptysis  Cardiovascular: negative  Gastrointestinal: negative  Genitourinary: negative  Musculoskeletal: negative  Neurologic: negative  Psychiatric: negative  Hematologic/Lymphatic/Immunologic: negative  Endocrine: negative      PHYSICAL EXAM  B/P: Data Unavailable, T: Data Unavailable, P: Data Unavailable, R: Data Unavailable  Wt Readings from Last 3 Encounters:   06/15/23 109.3 kg (241 lb)   02/20/23 109.3 kg (241 lb)   02/02/23 104.3 kg (230 lb)       Physical Exam  Vitals reviewed.   Constitutional:       Appearance: Normal appearance.   HENT:      Head: Normocephalic and atraumatic.   Eyes:      Extraocular Movements: Extraocular movements intact.      Pupils: Pupils are equal, round, and reactive to light.   Cardiovascular:      Rate and Rhythm: Normal rate and regular rhythm.   Pulmonary:      Effort: Pulmonary effort is normal.      Breath sounds: Normal breath sounds.   Abdominal:      Palpations: Abdomen is soft.   Musculoskeletal:         General: Normal range of motion.      Cervical back: Normal range of motion and neck supple.   Skin:     General: Skin is warm.   Neurological:      General: No focal deficit present.      Mental Status: She is alert and oriented to person, place, and time. Mental status is at baseline.   Psychiatric:         Mood and Affect: Mood normal.         Behavior: Behavior normal.         Thought Content: Thought content normal.         Judgment: Judgment normal.               LABORATORY AND IMAGING STUDIES  Recent Labs   Lab Test 03/23/23  0906 02/20/23  1106 01/02/23  1453 07/07/22  1125 01/07/21  0739 01/06/21  1116 12/29/20  1227   NA  --  141 140 140 133  --  136   POTASSIUM  --  4.3 3.6 3.8 3.7 3.6 4.2   CHLORIDE  --  108 104 106 101  --  102   CO2  --  29 31 27 28  --  29    ANIONGAP  --  4 5 7 4  --  5   BUN  --  19 18 20 18  --  20   CR 0.86 0.87 0.79 0.82 0.79  --  0.80   GLC  --  111* 98 115* 120*  --  97   LUCIANO 10.6* 10.9* 11.1* 10.8* 9.3  --  9.9     Recent Labs   Lab Test 03/23/23  0906   MAG 2.0   PHOS 3.0     Recent Labs   Lab Test 07/07/22  1125 01/07/21  0738 12/29/20  1227 10/28/19  1135 10/17/18  1220 09/13/18  1122 08/01/17  1117   WBC 10.4  --  10.8 8.9 11.9* 9.1 8.5   HGB 13.1 11.2* 12.9 13.1 13.4 13.8 14.4     --  337 362 392 291 311   MCV 88  --  91 91 89 87 87   NEUTROPHIL  --   --  58.1 48.1 60.6 45.1 46.3     Recent Labs   Lab Test 03/23/23  0906 07/07/22  1125 10/28/19  1135 09/13/18  1122   ALT  --  34 35 35   ALBUMIN 4.2  --   --   --      TSH   Date Value Ref Range Status   07/07/2022 2.94 0.40 - 4.00 mU/L Final   02/17/2021 3.80 0.40 - 4.00 mU/L Final   12/29/2020 23.61 (H) 0.40 - 4.00 mU/L Final   10/28/2019 1.75 0.40 - 4.00 mU/L Final     No results for input(s): CEA in the last 36001 hours.  Results for orders placed or performed in visit on 05/25/23   XR Pelvis w Hip Right G/E 2 Views    Narrative    EXAM: PELVIS AND HIP RIGHT TWO VIEWS  DATE/TIME: 5/25/2023 3:11 PM     INDICATION: Right hip pain.   COMPARISON: 11/23/2022.      Impression    IMPRESSION:  1.  Advanced right hip degenerative arthrosis, progressed. This  includes advanced superior joint space narrowing, mild subchondral  sclerosis, and mild marginal osteophytosis.  2.  No fracture or joint malalignment.       MICHELLE RIVERS MD         SYSTEM ID:  [id number]          ASSESSMENT  AND RECOMMENDATIONS:    1. T2(m2)N0i+M0 ER positive [place] positive HER-2/kena negative breast cancer status post left mastectomy with both tumors having a low risk Oncotype  2. L2 chronic compression fracture  3. Numerous other medical problems as delineated above    Plan    1.We discussed trying another antiestrogen however she declined and is not interested.   2. She will be having a 2nd opinion with another  orthopedic surgeon about her hip.   3. rtc 6 months for exam    Tamar Georges MD on 6/20/2023 at 2:02 PM                      Again, thank you for allowing me to participate in the care of your patient.        Sincerely,        Tamar Georges MD

## 2023-06-20 NOTE — PROGRESS NOTES
[facility] PHYSICIANS  MEDICAL ONCOLOGY    RETURN PATIENT VISIT NOTE    Reason for visit: breast cancer    Oncology Treatment Summary  1.  Patient self palpated abnormality in left breast in November 2020.  11/16/2020 diagnostic mammogram and ultrasound were done which found 2 lesions within the left breast 1 measuring approximately 1.5 cm and the second 1.9 cm.  Both were biopsied the one at 3:00 was a grade 2 invasive lobular cancer.  The one at 11:00 was a grade 2 invasive ductal cancer.  It was estrogen receptor positive progesterone receptor positive HER-2/kena indeterminate by IHC and negative by ALBERTO  2.  1/6/2021 patient underwent bilateral mastectomies.  Right mastectomy was unremarkable.  Left mastectomy found a multifocal breast cancer the first measuring 4.5 cm is a grade 2 invasive lobular carcinoma, the second was a 2.5 cm grade 2 malignancy.  2 sentinel lymph nodes were removed 1 of which was negative the other 1 had immunohistochemistry positive only cells for a T M2N0i+M0 [place] positive HER-2/kena negative disease  3.  Oncotype RS: one was 22 and other was 6 both low risk  4.  Genetic testing was done that was unremarkable  5. Anastrozole started march 2021 - quit in the fall 2022 due to side effects and declined taking any other.      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 She has been off her arimidex since last fall which she has side effects and has declined to restart.  She has been having problems with her right hip. This has been bothersome since august of last year. She has had xrays that are unremarkable and has seen ortho about having her hip replaced. She wasn't satisfied with her consult and is getting a second opinion.     She has been feeling better since off her arimidex and does not want to restart it.   She has not had any new medical issues since.     PAST MEDICAL HISTORY  Coronary artery  disease, peptic ulcer disease, anxiety, hyperlipidemia, mitral regurgitation, hypertension, degenerative joint disease, right bundle branch block, irritable bowel, sciatica, hypothyroidism, obstructive sleep apnea, congestive heart failure, gout, nephrolithiasis      CURRENT OUTPATIENT MEDICATIONS  Current Outpatient Medications   Medication     acetaminophen (TYLENOL) 650 MG CR tablet     allopurinol (ZYLOPRIM) 100 MG tablet     allopurinol (ZYLOPRIM) 300 MG tablet     aspirin 81 MG EC tablet     betamethasone dipropionate (DIPROSONE) 0.05 % external lotion     levothyroxine (SYNTHROID/LEVOTHROID) 125 MCG tablet     losartan (COZAAR) 50 MG tablet     metoprolol tartrate (LOPRESSOR) 25 MG tablet     order for DME     rosuvastatin (CRESTOR) 5 MG tablet     sertraline (ZOLOFT) 25 MG tablet     torsemide (DEMADEX) 10 MG tablet     vitamin D3 (CHOLECALCIFEROL) 50 mcg (2000 units) tablet     No current facility-administered medications for this visit.        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  She has a history of smoking but quit this 40 years ago.  Occasional alcohol use     FAMILY HISTORY  Her sister  of breast cancer at 62 and did have a BRCA mutation.  Patient's niece also carries a BRCA mutation.  She was tested for this and does not..  Her genetic testing was negative     REVIEW OF SYSTEMS  Review Of Systems  Skin: negative  Eyes: negative  Ears/Nose/Throat: negative  Respiratory: No shortness of breath, dyspnea on exertion, cough, or hemoptysis  Cardiovascular: negative  Gastrointestinal:  negative  Genitourinary: negative  Musculoskeletal: negative  Neurologic: negative  Psychiatric: negative  Hematologic/Lymphatic/Immunologic: negative  Endocrine: negative      PHYSICAL EXAM  B/P: Data Unavailable, T: Data Unavailable, P: Data Unavailable, R: Data Unavailable  Wt Readings from Last 3 Encounters:   06/15/23 109.3 kg (241 lb)   02/20/23 109.3 kg (241 lb)   02/02/23 104.3 kg (230 lb)       Physical Exam  Vitals reviewed.   Constitutional:       Appearance: Normal appearance.   HENT:      Head: Normocephalic and atraumatic.   Eyes:      Extraocular Movements: Extraocular movements intact.      Pupils: Pupils are equal, round, and reactive to light.   Cardiovascular:      Rate and Rhythm: Normal rate and regular rhythm.   Pulmonary:      Effort: Pulmonary effort is normal.      Breath sounds: Normal breath sounds.   Abdominal:      Palpations: Abdomen is soft.   Musculoskeletal:         General: Normal range of motion.      Cervical back: Normal range of motion and neck supple.   Skin:     General: Skin is warm.   Neurological:      General: No focal deficit present.      Mental Status: She is alert and oriented to person, place, and time. Mental status is at baseline.   Psychiatric:         Mood and Affect: Mood normal.         Behavior: Behavior normal.         Thought Content: Thought content normal.         Judgment: Judgment normal.               LABORATORY AND IMAGING STUDIES  Recent Labs   Lab Test 03/23/23  0906 02/20/23  1106 01/02/23  1453 07/07/22  1125 01/07/21  0739 01/06/21  1116 12/29/20  1227   NA  --  141 140 140 133  --  136   POTASSIUM  --  4.3 3.6 3.8 3.7 3.6 4.2   CHLORIDE  --  108 104 106 101  --  102   CO2  --  29 31 27 28  --  29   ANIONGAP  --  4 5 7 4  --  5   BUN  --  19 18 20 18  --  20   CR 0.86 0.87 0.79 0.82 0.79  --  0.80   GLC  --  111* 98 115* 120*  --  97   LUCIANO 10.6* 10.9* 11.1* 10.8* 9.3  --  9.9     Recent Labs   Lab Test 03/23/23  0906   MAG 2.0   PHOS 3.0     Recent  Labs   Lab Test 07/07/22  1125 01/07/21  0738 12/29/20  1227 10/28/19  1135 10/17/18  1220 09/13/18  1122 08/01/17  1117   WBC 10.4  --  10.8 8.9 11.9* 9.1 8.5   HGB 13.1 11.2* 12.9 13.1 13.4 13.8 14.4     --  337 362 392 291 311   MCV 88  --  91 91 89 87 87   NEUTROPHIL  --   --  58.1 48.1 60.6 45.1 46.3     Recent Labs   Lab Test 03/23/23  0906 07/07/22  1125 10/28/19  1135 09/13/18  1122   ALT  --  34 35 35   ALBUMIN 4.2  --   --   --      TSH   Date Value Ref Range Status   07/07/2022 2.94 0.40 - 4.00 mU/L Final   02/17/2021 3.80 0.40 - 4.00 mU/L Final   12/29/2020 23.61 (H) 0.40 - 4.00 mU/L Final   10/28/2019 1.75 0.40 - 4.00 mU/L Final     No results for input(s): CEA in the last 68041 hours.  Results for orders placed or performed in visit on 05/25/23   XR Pelvis w Hip Right G/E 2 Views    Narrative    EXAM: PELVIS AND HIP RIGHT TWO VIEWS  DATE/TIME: 5/25/2023 3:11 PM     INDICATION: Right hip pain.   COMPARISON: 11/23/2022.      Impression    IMPRESSION:  1.  Advanced right hip degenerative arthrosis, progressed. This  includes advanced superior joint space narrowing, mild subchondral  sclerosis, and mild marginal osteophytosis.  2.  No fracture or joint malalignment.       MICHELLE RIVERS MD         SYSTEM ID:  [id number]          ASSESSMENT  AND RECOMMENDATIONS:    1. T2(m2)N0i+M0 ER positive [place] positive HER-2/kena negative breast cancer status post left mastectomy with both tumors having a low risk Oncotype  2. L2 chronic compression fracture  3. Numerous other medical problems as delineated above    Plan    1.We discussed trying another antiestrogen however she declined and is not interested.   2. She will be having a 2nd opinion with another orthopedic surgeon about her hip.   3. rtc 6 months for exam    Tamar Georges MD on 6/20/2023 at 2:02 PM

## 2023-06-20 NOTE — NURSING NOTE
"Oncology Rooming Note    June 20, 2023 2:45 PM   Lisa Ferguson is a 87 year old female who presents for:    Chief Complaint   Patient presents with     Oncology Clinic Visit     6 month follow up      Initial Vitals: BP (!) 150/75 (BP Location: Right arm, Patient Position: Sitting, Cuff Size: Adult Large)   Pulse 59   Temp 98.5  F (36.9  C) (Oral)   Resp 16   Wt 108.9 kg (240 lb)   SpO2 94%   BMI 38.16 kg/m   Estimated body mass index is 38.16 kg/m  as calculated from the following:    Height as of 5/25/23: 1.689 m (5' 6.5\").    Weight as of this encounter: 108.9 kg (240 lb). Body surface area is 2.26 meters squared.  Data Unavailable Comment: Data Unavailable   No LMP recorded. Patient is postmenopausal.  Allergies reviewed: Yes  Medications reviewed: Yes    Medications: Medication refills not needed today.  Pharmacy name entered into [employer]: [id number] - [place], [place] - [address] [place] [address] AT INTERSECTION [address] [place] [address]    Clinical concerns: No new changes reported.        Selam Chaves LPN June 20, 2023 2:46 PM            "

## 2023-06-21 ENCOUNTER — PATIENT OUTREACH (OUTPATIENT)
Dept: CARE COORDINATION | Facility: CLINIC | Age: 88
End: 2023-06-21
Payer: MEDICARE

## 2023-06-22 ENCOUNTER — MEDICAL CORRESPONDENCE (OUTPATIENT)
Dept: HEALTH INFORMATION MANAGEMENT | Facility: CLINIC | Age: 88
End: 2023-06-22

## 2023-06-22 ENCOUNTER — LAB (OUTPATIENT)
Dept: LAB | Facility: CLINIC | Age: 88
End: 2023-06-22
Payer: MEDICARE

## 2023-06-22 DIAGNOSIS — I50.9 CONGESTIVE HEART FAILURE, UNSPECIFIED HF CHRONICITY, UNSPECIFIED HEART FAILURE TYPE (H): ICD-10-CM

## 2023-06-22 DIAGNOSIS — I25.708 ATHEROSCLEROSIS OF CORONARY ARTERY BYPASS GRAFT(S), UNSPECIFIED, WITH OTHER FORMS OF ANGINA PECTORIS (H): ICD-10-CM

## 2023-06-22 DIAGNOSIS — E83.52 HYPERCALCEMIA: ICD-10-CM

## 2023-06-22 DIAGNOSIS — E03.9 ACQUIRED HYPOTHYROIDISM: ICD-10-CM

## 2023-06-22 DIAGNOSIS — E78.5 HYPERLIPIDEMIA WITH TARGET LDL LESS THAN 70: ICD-10-CM

## 2023-06-22 LAB
ALT SERPL W P-5'-P-CCNC: 18 U/L (ref 0–50)
ANION GAP SERPL CALCULATED.3IONS-SCNC: 13 MMOL/L (ref 7–15)
BUN SERPL-MCNC: 21.2 MG/DL (ref 8–23)
CALCIUM SERPL-MCNC: 10.9 MG/DL (ref 8.8–10.2)
CALCIUM SERPL-MCNC: 10.9 MG/DL (ref 8.8–10.2)
CHLORIDE SERPL-SCNC: 101 MMOL/L (ref 98–107)
CHOLEST SERPL-MCNC: 190 MG/DL
CREAT SERPL-MCNC: 0.73 MG/DL (ref 0.51–0.95)
DEPRECATED HCO3 PLAS-SCNC: 23 MMOL/L (ref 22–29)
ERYTHROCYTE [DISTWIDTH] IN BLOOD BY AUTOMATED COUNT: 14.5 % (ref 10–15)
GFR SERPL CREATININE-BSD FRML MDRD: 80 ML/MIN/1.73M2
GLUCOSE SERPL-MCNC: 104 MG/DL (ref 70–99)
HCT VFR BLD AUTO: 38 % (ref 35–47)
HDLC SERPL-MCNC: 43 MG/DL
HGB BLD-MCNC: 12.5 G/DL (ref 11.7–15.7)
LDLC SERPL CALC-MCNC: 98 MG/DL
MCH RBC QN AUTO: 29.5 PG (ref 26.5–33)
MCHC RBC AUTO-ENTMCNC: 32.9 G/DL (ref 31.5–36.5)
MCV RBC AUTO: 90 FL (ref 78–100)
NONHDLC SERPL-MCNC: 147 MG/DL
PLATELET # BLD AUTO: 298 10E3/UL (ref 150–450)
POTASSIUM SERPL-SCNC: 4.2 MMOL/L (ref 3.4–5.3)
RBC # BLD AUTO: 4.24 10E6/UL (ref 3.8–5.2)
SODIUM SERPL-SCNC: 137 MMOL/L (ref 136–145)
T4 FREE SERPL-MCNC: 1.28 NG/DL (ref 0.9–1.7)
TRIGL SERPL-MCNC: 244 MG/DL
TSH SERPL DL<=0.005 MIU/L-ACNC: 5.48 UIU/ML (ref 0.3–4.2)
WBC # BLD AUTO: 10.3 10E3/UL (ref 4–11)

## 2023-06-22 PROCEDURE — 84443 ASSAY THYROID STIM HORMONE: CPT

## 2023-06-22 PROCEDURE — 84439 ASSAY OF FREE THYROXINE: CPT

## 2023-06-22 PROCEDURE — 80061 LIPID PANEL: CPT

## 2023-06-22 PROCEDURE — 36415 COLL VENOUS BLD VENIPUNCTURE: CPT

## 2023-06-22 PROCEDURE — 80048 BASIC METABOLIC PNL TOTAL CA: CPT

## 2023-06-22 PROCEDURE — 85027 COMPLETE CBC AUTOMATED: CPT

## 2023-06-22 PROCEDURE — 84460 ALANINE AMINO (ALT) (SGPT): CPT

## 2023-06-22 NOTE — LETTER
June 23, 2023    Lisa Ferguson  [address] [place] [address]          Dear ,    We are writing to inform you of your test results.  All of these tests are within acceptable limits , things look good !        Resulted Orders   ALT   Result Value Ref Range    ALT 18 0 - 50 U/L      Comment:      Reference intervals for this test were updated on 6/12/2023 to more accurately reflect our healthy population. There may be differences in the flagging of prior results with similar values performed with this method. Interpretation of those prior results can be made in the context of the updated reference intervals.     Lipid panel reflex to direct LDL Non-fasting   Result Value Ref Range    Cholesterol 190 <200 mg/dL    Triglycerides 244 (H) <150 mg/dL    Direct Measure HDL 43 (L) >=50 mg/dL    LDL Cholesterol Calculated 98 <=100 mg/dL    Non HDL Cholesterol 147 (H) <130 mg/dL    Narrative    Cholesterol  Desirable:  <200 mg/dL    Triglycerides  Normal:  Less than 150 mg/dL  Borderline High:  150-199 mg/dL  High:  200-499 mg/dL  Very High:  Greater than or equal to 500 mg/dL    Direct Measure HDL  Female:  Greater than or equal to 50 mg/dL   Male:  Greater than or equal to 40 mg/dL    LDL Cholesterol  Desirable:  <100mg/dL  Above Desirable:  100-129 mg/dL   Borderline High:  130-159 mg/dL   High:  160-189 mg/dL   Very High:  >= 190 mg/dL    Non HDL Cholesterol  Desirable:  130 mg/dL  Above Desirable:  130-159 mg/dL  Borderline High:  160-189 mg/dL  High:  190-219 mg/dL  Very High:  Greater than or equal to 220 mg/dL   TSH WITH FREE T4 REFLEX   Result Value Ref Range    TSH 5.48 (H) 0.30 - 4.20 uIU/mL   CBC with Platelets   Result Value Ref Range    WBC Count 10.3 4.0 - 11.0 10e3/uL    RBC Count 4.24 3.80 - 5.20 10e6/uL    Hemoglobin 12.5 11.7 - 15.7 g/dL    Hematocrit 38.0 35.0 - 47.0 %    MCV 90 78 - 100 fL    MCH 29.5 26.5 - 33.0 pg    MCHC 32.9 31.5 - 36.5 g/dL    RDW 14.5 10.0 - 15.0 %    Platelet  Count 298 150 - 450 10e3/uL   Basic metabolic panel  (Ca, Cl, CO2, Creat, Gluc, K, Na, BUN)   Result Value Ref Range    Sodium 137 136 - 145 mmol/L    Potassium 4.2 3.4 - 5.3 mmol/L    Chloride 101 98 - 107 mmol/L    Carbon Dioxide (CO2) 23 22 - 29 mmol/L    Anion Gap 13 7 - 15 mmol/L    Urea Nitrogen 21.2 8.0 - 23.0 mg/dL    Creatinine 0.73 0.51 - 0.95 mg/dL    Calcium 10.9 (H) 8.8 - 10.2 mg/dL    Glucose 104 (H) 70 - 99 mg/dL    GFR Estimate 80 >60 mL/min/1.73m2   T4 free   Result Value Ref Range    Free T4 1.28 0.90 - 1.70 ng/dL       If you have any questions or concerns, please call the clinic at the number listed above.       Sincerely,      Ziyad Isaac MD

## 2023-06-26 ENCOUNTER — TELEPHONE (OUTPATIENT)
Dept: ENDOCRINOLOGY | Facility: CLINIC | Age: 88
End: 2023-06-26
Payer: MEDICARE

## 2023-06-26 NOTE — TELEPHONE ENCOUNTER
Dr Nettles didn't order any labs to be done but a calcium was ordered which remains the same . My chart message sent to have the standing orders done one week before the December visit  to discuss results at the visit. Angelita Gao RN on 6/26/2023 at 2:39 PM

## 2023-06-26 NOTE — TELEPHONE ENCOUNTER
Provider: Ida Nettles MD Department: [facility] DIABETES     Visit Disposition    Dispositions   0 Return in about 6 months (around 2023).     Writer contacted patient to schedule follow up per check out notes above from provider. Patient's name and  verified during call to ensure right patient.  Patient scheduled appointment with writer. Patient verbalized agreement with time/date/mode of appointment.    Patient wants to know if lab work was taken care of correctly. Patient noted she had a lab appointment last week and wants to make sure the labs Dr. Nettles ordered were taken care of. Writer routed this encounter to clinic pool to address concern. Patient verbalized that she is ok with either call back regarding this or being sent a [facility] message regarding her concern.   Renee LYONS Virtual Visit Facilitator 11:10 AM 2023

## 2023-06-28 ENCOUNTER — TRANSFERRED RECORDS (OUTPATIENT)
Dept: HEALTH INFORMATION MANAGEMENT | Facility: CLINIC | Age: 88
End: 2023-06-28

## 2023-07-03 DIAGNOSIS — E03.9 ACQUIRED HYPOTHYROIDISM: ICD-10-CM

## 2023-07-03 RX ORDER — LEVOTHYROXINE SODIUM 125 UG/1
125 TABLET ORAL EVERY MORNING
Qty: 90 TABLET | Refills: 3 | Status: SHIPPED | OUTPATIENT
Start: 2023-07-03 | End: 2024-06-07

## 2023-07-07 DIAGNOSIS — F41.9 ANXIETY: ICD-10-CM

## 2023-07-07 RX ORDER — SERTRALINE HYDROCHLORIDE 25 MG/1
25 TABLET, FILM COATED ORAL DAILY
Qty: 90 TABLET | Refills: 0 | Status: SHIPPED | OUTPATIENT
Start: 2023-07-07 | End: 2023-10-03

## 2023-07-07 NOTE — TELEPHONE ENCOUNTER
She reports [facility] is not [facility] her [facility]ertraline (ZOLOFT) 25 MG tablet.     She is scheduled with Vicky Breaux next week 7/12/23. She is wondering if she can get a refill. She took her last pill today.     She was under the impression that Vicky prescribed this. It appears Dr. Isaac manages this medication. RN updated patient.     She states she only wants 30 days worth of this because she wants to discuss this medication with Vicky Breaux next week.       Mohini Fritz RN on 7/7/2023 at 12:17 PM

## 2023-07-12 ENCOUNTER — VIRTUAL VISIT (OUTPATIENT)
Dept: PHARMACY | Facility: CLINIC | Age: 88
End: 2023-07-12
Payer: COMMERCIAL

## 2023-07-12 DIAGNOSIS — F41.9 ANXIETY: Primary | ICD-10-CM

## 2023-07-12 DIAGNOSIS — N25.81 SECONDARY HYPERPARATHYROIDISM OF RENAL ORIGIN (H): ICD-10-CM

## 2023-07-12 PROCEDURE — 99207 PR NO CHARGE LOS: CPT | Performed by: PHARMACIST

## 2023-07-12 RX ORDER — TORSEMIDE 20 MG/1
1 TABLET ORAL DAILY
COMMUNITY
Start: 2023-06-22

## 2023-07-12 NOTE — PROGRESS NOTES
Medication Therapy Management (MTM) Encounter    ASSESSMENT:                            Medication Adherence/Access: No issues identified    Anxiety:  Sertraline is effective, may benefit from maintaining current therapy, continue to monitor for worsened tremor. Sodium is within normal limits.     If needed in future could also consider duloxetine or venlafaxine for added pain benefit. Would avoid bupropion for anxiety as this can be more activating.     Hyperparathyroidism: Stable.     PLAN:                            1. Continue current medications.    Follow-up: Return in about 4 months (around 11/12/2023) for Medication Therapy Management.    SUBJECTIVE/OBJECTIVE:                          Lisa Ferguson is a 87 year old female called for a follow-up visit from 4/3/23..       Reason for visit: med review.    Allergies/ADRs: Reviewed in chart  Past Medical History: Reviewed in chart  Tobacco: She reports that she quit smoking about 45 years ago. Her smoking use included cigarettes. She has never used smokeless tobacco.  Alcohol: Less than 1 beverage / month  Caffeine: 2-3 cups coffee/day    Medication Adherence/Access:   Patient uses pill box(es).  Per patient, misses medication 0 times per week.    The patient fills medications at [place]: NO, fills medications at [facility].    Anxiety:    Sertraline 25 mg daily every evening since February.    Still getting the hand shaking, but it doesn't last long. Sleep is great really, she doesn't sleep long, 6 hours, but sleeps very well and 1-2 naps during the day. Such an improvement from the past. Anxiety is better too, not as many doctor appointments right now either.   She has essential tremors at baseline, citalopram worsened these in the past, but she's not sure if she can attribute this to the medication. She also has a family history of essential tremor and would like to avoid anything that could contribute to that. Buspirone was terrible (she can't recall exactly, but  whatever it was was terrible).   Sodium   Date Value Ref Range Status   06/22/2023 137 136 - 145 mmol/L Final   01/07/2021 133 133 - 144 mmol/L Final     Hyperparathyroidism:   None-she is drinking 3 glasses of milk a day .   Following with endocrinology, Dr. Nettles, who recommended 1000 mg of calcium/day.    Today's Vitals: There were no vitals taken for this visit.  ----------------      I spent 14 minutes with this patient today. All changes were made via collaborative practice agreement with Ziyad Isaac MD. A copy of the visit note was provided to the patient's provider(s).    A summary of these recommendations was sent via [employer].    Cynide Breaux, LakeishaD  Medication Therapy Management Pharmacist  [phone]    Telemedicine Visit Details  Type of service:  Telephone visit  Start Time: 1:34 PM  End Time: 1:48 PM     Medication Therapy Recommendations  No medication therapy recommendations to display

## 2023-07-12 NOTE — Clinical Note
EVERTON GAMBOA note, thanks!  Cyndie Breaux, PharmD Medication Therapy Management Pharmacist [phone]

## 2023-07-12 NOTE — PATIENT INSTRUCTIONS
"Recommendations from today's MTM visit:                                                         1. Continue current medications.    Follow-up: Return in about 4 months (around 11/12/2023) for Medication Therapy Management.    It was great speaking with you today.  I value your experience and would be very thankful for your time in providing feedback in our clinic survey. In the next few days, you may receive an email or text message from [employer] with a link to a survey related to your  clinical pharmacist.\"     To schedule another MTM appointment, please call the clinic directly or you may call the MTM scheduling line at [phone] or toll-free at [phone].     My Clinical Pharmacist's contact information:                                                      Please feel free to contact me with any questions or concerns you have.           "

## 2023-08-11 ENCOUNTER — THERAPY VISIT (OUTPATIENT)
Dept: PHYSICAL THERAPY | Facility: CLINIC | Age: 88
End: 2023-08-11
Payer: MEDICARE

## 2023-08-11 DIAGNOSIS — M25.551 HIP PAIN, RIGHT: Primary | ICD-10-CM

## 2023-08-11 PROCEDURE — 97110 THERAPEUTIC EXERCISES: CPT | Mod: GP | Performed by: PHYSICAL THERAPIST

## 2023-08-11 PROCEDURE — 97140 MANUAL THERAPY 1/> REGIONS: CPT | Mod: GP | Performed by: PHYSICAL THERAPIST

## 2023-08-11 NOTE — PROGRESS NOTES
"   08/11/23 0500   Appointment Info   Signing clinician's name / credentials Riley Clinton, PT, DPT, Cert. DN   Total/Authorized Visits E&T 8 per POC + 16 additional   Visits Used 27   Medical Diagnosis Hip pain, right   PT Tx Diagnosis rigth hip arthritis   Quick Adds Certification   Progress Note/Certification   Start of Care Date 08/30/22   Therapy Frequency 1x/week   Predicted Duration 8 weeks   Certification date from 06/20/23   Certification date to 09/12/23   PT Goal 1   Goal Identifier goal 1   Goal Description improved ambualtory tolerance to 1/2 mile   Rationale to maximize safety and independence with performance of ADLs and functional tasks;to maximize safety and independence within the home;to maximize safety and independence within the community;to maximize safety and independence with self cares   Subjective Report   Subjective Report Pt reports \"marginally better\" since last visit. still soreness thought the hip but less of the sharp stabbing pains with daily activities. Pt has remained consistent with her HEP 6 days a week. Does feel like her balance has improved some as well and is up to about 15 seconds for her balance exercises.   Objective Measures   Objective Measures Objective Measure 1;Objective Measure 2   Objective Measure 1   Objective Measure ambulation   Details improved luna with near equal step length with use of SPC, not offloading with cane as much today more for balance/safety.   Objective Measure 2   Objective Measure r hip PROM   Details flexion 100, abduction 20, ER 30, IR 10   Therapeutic Procedure/Exercise   Therapeutic Procedures: strength, endurance, ROM, flexibillity minutes (18494) 20   PTRx Ther Proc 1 recumbent bike   PTRx Ther Proc 1 - Details x 5 min for warm up.   PTRx Ther Proc 2 passive stretching right hip all planes to tolerance   PTRx Ther Proc 2 - Details x 15 min   Manual Therapy   Manual Therapy: Mobilization, MFR, MLD, friction massage minutes (37821) 15 "   Manual Therapy Manual Therapy 2   Manual Therapy 1 right hip long axis distraction in open pack   Manual Therapy 1 - Details x 5 min intermittent   Manual Therapy 2 light STM to right anterior hip mm   Manual Therapy 2 - Details x 5 min   Skilled Intervention joint mobs/STM for pain modulation and mobility   Patient Response/Progress tolerated well, tender with STM to TFL and glute medius   Comments   Comments improveed hip stiffness following manual and improved gait following tx.   Total Session Time   Timed Code Treatment Minutes 35   Total Treatment Time (sum of timed and untimed services) 35     Pt currently being seen monthly for gradual progression of HEP and to continue to work on manual interventisons for mobility and pain modulation prior to scheduled DORCAS this October.     [facility]                                                                                   OUTPATIENT PHYSICAL THERAPY    PLAN OF TREATMENT FOR OUTPATIENT REHABILITATION   Patient's Last Name, First Name, Lisa Domínguez YOB: 1935   Provider's Name   [facility]   Medical Record No.  [id number]     Onset Date:    Start of Care Date: 08/30/22     Medical Diagnosis:  Hip pain, right      PT Treatment Diagnosis:  rigth hip arthritis Plan of Treatment  Frequency/Duration: 1x/week/ 8 weeks    Certification date from 06/20/23 to 09/12/23         See note for plan of treatment details and functional goals     Riley Clinton, PT                         I CERTIFY THE NEED FOR THESE SERVICES FURNISHED UNDER        THIS PLAN OF TREATMENT AND WHILE UNDER MY CARE     (Physician attestation of this document indicates review and certification of the therapy plan).                Referring Provider:  Ziyad Isaac      Initial Assessment  See Epic Evaluation- Start of Care Date: 08/30/22

## 2023-09-13 ENCOUNTER — THERAPY VISIT (OUTPATIENT)
Dept: PHYSICAL THERAPY | Facility: CLINIC | Age: 88
End: 2023-09-13
Payer: MEDICARE

## 2023-09-13 DIAGNOSIS — M25.551 HIP PAIN, RIGHT: Primary | ICD-10-CM

## 2023-09-13 PROCEDURE — 97110 THERAPEUTIC EXERCISES: CPT | Mod: GP | Performed by: PHYSICAL THERAPIST

## 2023-09-13 PROCEDURE — 97140 MANUAL THERAPY 1/> REGIONS: CPT | Mod: GP | Performed by: PHYSICAL THERAPIST

## 2023-09-13 NOTE — PROGRESS NOTES
09/13/23 0500   Appointment Info   Signing clinician's name / credentials Riley Clinton, PT, DPT, Cert. DN   Total/Authorized Visits E&T 8 per POC + 16 additional   Visits Used 28   Medical Diagnosis Hip pain, right   PT Tx Diagnosis rigth hip arthritis   Quick Adds Certification   Progress Note/Certification   Start of Care Date 08/30/22   Onset of illness/injury or Date of Surgery 10/04/22  (date of order)   Therapy Frequency 1x/week   Predicted Duration 8 weeks   Certification date from 09/12/23   Certification date to 10/18/23   PT Goal 1   Goal Identifier goal 1   Goal Description improved ambualtory tolerance to 1/2 mile   Rationale to maximize safety and independence with performance of ADLs and functional tasks;to maximize safety and independence within the home;to maximize safety and independence within the community;to maximize safety and independence with self cares   Subjective Report   Subjective Report Pt reports she has been more sore and painful through the right leg over the past few weeks. No increasedd pain or activity after last visit. Seems to be worse with prolonged walking.   Objective Measure 2   Objective Measure r hip PROM   Details flexion 90, abduction 20, ER 28, IR 6   Therapeutic Procedure/Exercise   Therapeutic Procedures: strength, endurance, ROM, flexibillity minutes (98043) 20   PTRx Ther Proc 1 recumbent bike   PTRx Ther Proc 1 - Details x 5 min for warm up.   PTRx Ther Proc 2 passive stretching right hip all planes to tolerance   PTRx Ther Proc 2 - Details x 15 min   Manual Therapy   Manual Therapy: Mobilization, MFR, MLD, friction massage minutes (79905) 15   Manual Therapy Manual Therapy 2   Manual Therapy 1 right hip long axis distraction in open pack   Manual Therapy 1 - Details x 5 min intermittent   Manual Therapy 2 light STM to right anterior hip mm   Manual Therapy 2 - Details x 5 min   Skilled Intervention joint mobs/STM for pain modulation and mobility   Patient  Response/Progress tolerated well, tender with STM to TFL and glute medius   Total Session Time   Timed Code Treatment Minutes 35   Total Treatment Time (sum of timed and untimed services) 35     Pt with scheduled right DORCAS to be performed on 10/24/23. One more visit pre-op to discuss opertaion and appropriate aftercare and plan of care for after surgery.     [facility]                                                                                   OUTPATIENT PHYSICAL THERAPY    PLAN OF TREATMENT FOR OUTPATIENT REHABILITATION   Patient's Last Name, First Name, RAFATJESS ChristianLisa ron  DORY YOB: 1935   Provider's Name   [facility]   Medical Record No.  [id number]     Onset Date: 10/04/22 (date of order)  Start of Care Date: 08/30/22     Medical Diagnosis:  Hip pain, right      PT Treatment Diagnosis:  rigth hip arthritis Plan of Treatment  Frequency/Duration: 1x/week/ 8 weeks    Certification date from 09/12/23 to 10/18/23         See note for plan of treatment details and functional goals     Riley Clinton, PT                         I CERTIFY THE NEED FOR THESE SERVICES FURNISHED UNDER        THIS PLAN OF TREATMENT AND WHILE UNDER MY CARE     (Physician attestation of this document indicates review and certification of the therapy plan).                Referring Provider:  Ziyad Isaac      Initial Assessment  See Epic Evaluation- Start of Care Date: 08/30/22

## 2023-09-16 ENCOUNTER — HEALTH MAINTENANCE LETTER (OUTPATIENT)
Age: 88
End: 2023-09-16

## 2023-09-29 ENCOUNTER — OFFICE VISIT (OUTPATIENT)
Dept: INTERNAL MEDICINE | Facility: CLINIC | Age: 88
End: 2023-09-29
Payer: MEDICARE

## 2023-09-29 VITALS
DIASTOLIC BLOOD PRESSURE: 69 MMHG | WEIGHT: 240 LBS | SYSTOLIC BLOOD PRESSURE: 118 MMHG | RESPIRATION RATE: 18 BRPM | HEART RATE: 58 BPM | HEIGHT: 66 IN | OXYGEN SATURATION: 99 % | BODY MASS INDEX: 38.57 KG/M2

## 2023-09-29 DIAGNOSIS — Z23 NEEDS FLU SHOT: ICD-10-CM

## 2023-09-29 DIAGNOSIS — Z23 NEED FOR COVID-19 VACCINE: ICD-10-CM

## 2023-09-29 DIAGNOSIS — Z01.818 PREOP GENERAL PHYSICAL EXAM: Primary | ICD-10-CM

## 2023-09-29 DIAGNOSIS — M16.11 PRIMARY OSTEOARTHRITIS OF RIGHT HIP: ICD-10-CM

## 2023-09-29 LAB
ALBUMIN UR-MCNC: NEGATIVE MG/DL
ANION GAP SERPL CALCULATED.3IONS-SCNC: 13 MMOL/L (ref 7–15)
APPEARANCE UR: CLEAR
BACTERIA #/AREA URNS HPF: ABNORMAL /HPF
BASOPHILS # BLD AUTO: 0.1 10E3/UL (ref 0–0.2)
BASOPHILS NFR BLD AUTO: 1 %
BILIRUB UR QL STRIP: NEGATIVE
BUN SERPL-MCNC: 20.5 MG/DL (ref 8–23)
CALCIUM SERPL-MCNC: 10.7 MG/DL (ref 8.8–10.2)
CHLORIDE SERPL-SCNC: 105 MMOL/L (ref 98–107)
COLOR UR AUTO: YELLOW
CREAT SERPL-MCNC: 0.81 MG/DL (ref 0.51–0.95)
DEPRECATED HCO3 PLAS-SCNC: 22 MMOL/L (ref 22–29)
EGFRCR SERPLBLD CKD-EPI 2021: 70 ML/MIN/1.73M2
EOSINOPHIL # BLD AUTO: 0.6 10E3/UL (ref 0–0.7)
EOSINOPHIL NFR BLD AUTO: 6 %
ERYTHROCYTE [DISTWIDTH] IN BLOOD BY AUTOMATED COUNT: 14.4 % (ref 10–15)
GLUCOSE SERPL-MCNC: 107 MG/DL (ref 70–99)
GLUCOSE UR STRIP-MCNC: NEGATIVE MG/DL
HCT VFR BLD AUTO: 38.9 % (ref 35–47)
HGB BLD-MCNC: 12.7 G/DL (ref 11.7–15.7)
HGB UR QL STRIP: NEGATIVE
IMM GRANULOCYTES # BLD: 0 10E3/UL
IMM GRANULOCYTES NFR BLD: 0 %
KETONES UR STRIP-MCNC: NEGATIVE MG/DL
LEUKOCYTE ESTERASE UR QL STRIP: ABNORMAL
LYMPHOCYTES # BLD AUTO: 2.7 10E3/UL (ref 0.8–5.3)
LYMPHOCYTES NFR BLD AUTO: 28 %
MCH RBC QN AUTO: 29.5 PG (ref 26.5–33)
MCHC RBC AUTO-ENTMCNC: 32.6 G/DL (ref 31.5–36.5)
MCV RBC AUTO: 90 FL (ref 78–100)
MONOCYTES # BLD AUTO: 1 10E3/UL (ref 0–1.3)
MONOCYTES NFR BLD AUTO: 11 %
NEUTROPHILS # BLD AUTO: 5.1 10E3/UL (ref 1.6–8.3)
NEUTROPHILS NFR BLD AUTO: 54 %
NITRATE UR QL: NEGATIVE
PH UR STRIP: 5.5 [PH] (ref 5–7)
PLATELET # BLD AUTO: 294 10E3/UL (ref 150–450)
POTASSIUM SERPL-SCNC: 4.5 MMOL/L (ref 3.4–5.3)
RBC # BLD AUTO: 4.31 10E6/UL (ref 3.8–5.2)
RBC #/AREA URNS AUTO: ABNORMAL /HPF
SODIUM SERPL-SCNC: 140 MMOL/L (ref 135–145)
SP GR UR STRIP: 1.02 (ref 1–1.03)
SQUAMOUS #/AREA URNS AUTO: ABNORMAL /LPF
TSH SERPL DL<=0.005 MIU/L-ACNC: 3.72 UIU/ML (ref 0.3–4.2)
UROBILINOGEN UR STRIP-ACNC: 0.2 E.U./DL
WBC # BLD AUTO: 9.4 10E3/UL (ref 4–11)
WBC #/AREA URNS AUTO: ABNORMAL /HPF

## 2023-09-29 PROCEDURE — 84443 ASSAY THYROID STIM HORMONE: CPT | Performed by: INTERNAL MEDICINE

## 2023-09-29 PROCEDURE — 36415 COLL VENOUS BLD VENIPUNCTURE: CPT | Performed by: INTERNAL MEDICINE

## 2023-09-29 PROCEDURE — 99214 OFFICE O/P EST MOD 30 MIN: CPT | Performed by: INTERNAL MEDICINE

## 2023-09-29 PROCEDURE — 85025 COMPLETE CBC W/AUTO DIFF WBC: CPT | Performed by: INTERNAL MEDICINE

## 2023-09-29 PROCEDURE — 81001 URINALYSIS AUTO W/SCOPE: CPT | Performed by: INTERNAL MEDICINE

## 2023-09-29 PROCEDURE — 80048 BASIC METABOLIC PNL TOTAL CA: CPT | Performed by: INTERNAL MEDICINE

## 2023-09-29 RX ORDER — AMOXICILLIN 500 MG/1
500 CAPSULE ORAL 2 TIMES DAILY
COMMUNITY
End: 2024-03-25

## 2023-09-29 ASSESSMENT — PAIN SCALES - GENERAL: PAINLEVEL: SEVERE PAIN (7)

## 2023-09-29 NOTE — PATIENT INSTRUCTIONS
Please take a dose of the amoxicillin prophylactic antibiotics morning of the surgery   Skip aspirin starting 7 days prior to the planned procedure   Skip the Demadex (Torsemide) also morning of the surgery

## 2023-09-29 NOTE — PROGRESS NOTES
[facility]  [address]  [place] [address]  Phone: [phone]  Primary Provider: Ziyad Isaac  Pre-op Performing Provider: ZIYAD ISAAC      PREOPERATIVE EVALUATION:  Today's date: 9/29/2023    Lisa is a 88 year old female who presents for a preoperative evaluation.      9/29/2023     9:56 AM   Additional Questions   Roomed by Palak RODRIGUEZ   Accompanied by self     Surgical Information:  Surgery/Procedure: RIGHT TOTAL HIP ARTHROPLASTY   Surgery Location: [facility]  Surgeon: Denver Adam   Surgery Date: 10/24/2023  Time of Surgery: about 1030   Where patient plans to recover: At home alone discuss short term stay rehab - I recommended she discuss this with her orthopedist surgeon   Fax number for surgical facility:     Assessment & Plan     The proposed surgical procedure is considered INTERMEDIATE risk.     Preop general physical exam  This is a fully suitable operative candidate  , see separate cardiac clearance note also approving her for surgery     Primary osteoarthritis of right hip  This is the underlying condition leading to need for total hip replacement            Risks and Recommendations:  The patient has the following additional risks and recommendations for perioperative complications:   - Consult Hospitalist / IM to assist with post-op medical management  Cardiovascular:   - Cardiology consulted and patient sees cardiologist ongoing and has been cleared  Obstructive Sleep Apnea:     TSH   Date Value Ref Range Status   06/22/2023 5.48 (H) 0.30 - 4.20 uIU/mL Final   07/07/2022 2.94 0.40 - 4.00 mU/L Final   02/17/2021 3.80 0.40 - 4.00 mU/L Final         Antiplatelet or Anticoagulation Medication Instructions:   - aspirin: Discontinue aspirin 7-10 days prior to procedure to reduce bleeding risk. It should be resumed postoperatively.     Additional Medication Instructions:  Patient is to take all scheduled medications on the day of surgery  EXCEPT for modifications listed below:    Please take a dose of the amoxicillin prophylactic antibiotics morning of the surgery   Skip aspirin starting 7 days prior to the planned procedure   Skip the Demadex (Torsemide) also morning of the surgery     RECOMMENDATION:  APPROVAL GIVEN to proceed with proposed procedure, without further diagnostic evaluation.    Review of the result(s) of each unique test - today's tests.  .order  Prescription drug management  36 minutes spent by me on the date of the encounter doing chart review, history and exam, documentation and further activities per the note      Subjective     HPI related to upcoming procedure: her right hip has been deteriorating for around a year with progressive difficulty         9/25/2023    12:18 PM   Preop Questions   1. Have you ever had a heart attack or stroke? No   2. Have you ever had surgery on your heart or blood vessels, such as a stent placement, a coronary artery bypass, or surgery on an artery in your head, neck, heart, or legs? YES - history of 3 vessel coronary artery bypass surgery with a prior 2 intraluminal coronary stents, 1999. Does not have active angina pectoralis symptoms and takes no nitroglycerin at this point    3. Do you have chest pain with activity? No   4. Do you have a history of  heart failure? YES - uncertain on this point, had echocardiogram with normal % ejection fraction 2020, see [facility] medical records on care everywhere    5. Do you currently have a cold, bronchitis or symptoms of other infection? No   6. Do you have a cough, shortness of breath, or wheezing? No   7. Do you or anyone in your family have previous history of blood clots? No   8. Do you or does anyone in your family have a serious bleeding problem such as prolonged bleeding following surgeries or cuts? No   9. Have you ever had problems with anemia or been told to take iron pills? Yes, in the 1970's was diagnosed with a  gastric ulcer with some clinical event with symptoms of gastroenterological bleed , but this was treated and did not require transfusions and patient and is now asymptomatic and takes no proton pump inhibitors and also takes no Tums chewable tablets or h2 blockers   10. Have you had any abnormal blood loss such as black, tarry or bloody stools, or abnormal vaginal bleeding? No   11. Have you ever had a blood transfusion? No   12. Are you willing to have a blood transfusion if it is medically needed before, during, or after your surgery? Yes   13. Have you or any of your relatives ever had problems with anesthesia? UNKNOWN - has had post operative confusion after has had many many surgeries and anaesthesia with no troubles    14. Do you have sleep apnea, excessive snoring or daytime drowsiness? YES -    14a. Do you have a CPAP machine? Yes- advised to bring machine to hospital    15. Do you have any artifical heart valves or other implanted medical devices like a pacemaker, defibrillator, or continuous glucose monitor? No   16. Do you have artificial joints? YES - bilateral total knee replacements from 2008 and 2009   17. Are you allergic to latex? No       Health Care Directive:  Patient has a Health Care Directive on file      Preoperative Review of :   reviewed - no record of controlled substances prescribed.      Status of Chronic Conditions:  See problem list for active medical problems.  Problems all longstanding and stable, except as noted/documented.  See ROS for pertinent symptoms related to these conditions.    Review of Systems  CONSTITUTIONAL: NEGATIVE for fever, chills, change in weight  ENT/MOUTH: NEGATIVE for ear, mouth and throat problems  RESP: NEGATIVE for significant cough or SOB  CV: NEGATIVE for chest pain, palpitations or peripheral edema  HEME/ALLERGY/IMMUNE: NEGATIVE for bleeding problems  PSYCHIATRIC: NEGATIVE for changes in mood or affect  ROS otherwise negative    Patient Active  Problem List    Diagnosis Date Noted    Lumbar spinal stenosis 12/01/2022     Priority: Medium    Degenerative joint disease of right hip 11/23/2022     Priority: Medium    Closed compression fracture of L2 vertebra (H) 10/06/2022     Priority: Medium    Hip pain, right 08/30/2022     Priority: Medium    Acute idiopathic gout of right foot 07/06/2022     Priority: Medium    Secondary hyperparathyroidism of renal origin (H) 06/28/2021     Priority: Medium    Malignant neoplasm of upper-outer quadrant of left breast in female, estrogen receptor positive (H) 12/11/2020     Priority: Medium     Added automatically from request for surgery [id number]      Atherosclerosis of coronary artery bypass graft(s), unspecified, with other forms of angina pectoris (H) 07/23/2019     Priority: Medium    H/O parathyroidectomy (H) 09/13/2018     Priority: Medium     prior to 2008      Morbid obesity (H) 08/25/2017     Priority: Medium    Essential hypertension with goal blood pressure less than 130/80 09/28/2016     Priority: Medium    Acquired hypothyroidism 09/19/2016     Priority: Medium    S/p total knee replacement, bilateral 03/15/2016     Priority: Medium    S/P CABG (coronary artery bypass graft) 02/12/2016     Priority: Medium     Coronary angiogram in 2002 and a total of 3 coronary angiograms in all. History of 3 vessel coronary artery bypass surgery in 1998 , preceded by 2 stents [ they wanted to do 4 bypasses but had only 3 due to technical problems ]. So there is always worry about angina pectoralis and recurrent ischemic coronary artery disease symptoms so despite several workups for chest symptoms , no documented recurrence of ischemic coronary artery disease lesions have been noted. See care everywhere for [facility] office visit notes.      Abnormal glucose 03/23/2015     Priority: Medium     Problem list name updated by automated process. Provider to review      CHF (congestive heart failure) (H)  07/08/2014     Priority: Medium     Exam Date: 02/20/2014 10:17 Gender: F Sonographer: ADELE  Accession #: [id number] Height: 66 in BSA: 2.25 m  BP: 128 / 62  Weight: 263 lbs BMI: 42.4 kg/m   Location: Outpatient  Procedure: ECHO COMPLETE W CONTRAST Rhythm: Normal Sinus Rhythm  Indications: Mitral regurgitation  Technical Quality: Fair Contrast: Definity    Final Conclusion  Technically difficult study. Definity contrast was used to enhance endocardial definition due   to suboptimal image quality.  Normal LV size and systolic function with estimated ejection fraction of 60-65%.  Mild concentric LVH.  Moderate left atrial dilatation.  Mild mitral regurgitation.  Mild right ventricular dilatation; unable to accurately assess RV systolic function due to   image quality.  Right ventricular systolic pressure estimated to be elevated at 35 mmHg + RAP.  Estimated EF: 60-65%        OA (osteoarthritis) of knee - bilateral 11/11/2013     Priority: Medium    Blepharitis 09/03/2013     Priority: Medium     Imo Update utility  Problem list name updated by automated process. Provider to review      Hypercalcemia 09/03/2013     Priority: Medium     Diagnosis updated by automated process. Provider to review and confirm.      Ventricular tachycardia (paroxysmal) (H) 10/08/2012     Priority: Medium     Had an Electrophysiology study 2013 and her ventricular tachycardia was not inducible so no indication for automatic implanted cardio-defibrillator , per Dr. Bernard Rolle, [facility], electrophysiology cardiologist        Balance disorder 07/12/2012     Priority: Medium     Patient blames this on her spinal stenosis  History. We mostly reviewed this at the office visit from 7/12/2012      Anxiety 07/12/2012     Priority: Medium    GLEN (obstructive sleep apnea) 07/11/2011     Priority: Medium     Last visit with a sleep disorder specialist was about 2010      Sciatica neuralgia      Priority: Medium     MRI shows spinal  stenosis and a cyst on the spine, has received an epidural steroid injection      IBS (irritable bowel syndrome)      Priority: Medium     Chronic recurrent gastrointestinal symptoms , but negative workups      RBBB (right bundle branch block)      Priority: Medium    DJD (degenerative joint disease)      Priority: Medium     Ankles and feet cause problems , especially since the knee surgery [ she had a medial meniscus tear surgery bilateral , see past surgical history ]. Also has spinal stenosis and some chronic low back pain issues and slight affect on her balance      PUD (peptic ulcer disease)      Priority: Medium     h/o duodenal ulcer      Hyperlipidemia with target LDL less than 70      Priority: Medium     CHOL      153   1/17/2011  HDL       42   1/17/2011  LDL       75   1/17/2011  TRIG      179   1/17/2011  CHOLHDLRATIO      3.6   1/17/2011    Diagnosis updated by automated process. Provider to review and confirm.      Mitral regurgitation      Priority: Medium      Past Medical History:   Diagnosis Date    Anxiety     CAD (coronary artery disease)     angio 2002, h/o cabg, sees Luisana Nelson NP, they follow the cholesterol    CHF (congestive heart failure) (H) 07/08/2014    Closed compression fracture of L2 vertebra (H)     Degenerative joint disease of right hip     DJD (degenerative joint disease)     History of colonoscopy 01/01/2000    due jan 2010    Hyperlipidemia LDL goal < 70     sees [facility]    Hypertension goal BP (blood pressure) < 140/90     Hypothyroidism     IBS (irritable bowel syndrome)     Lumbar spinal stenosis     Mitral regurgitation     Obesity     GLEN (obstructive sleep apnea) 07/11/2011    PUD (peptic ulcer disease)     h/o duodenal ulcer    RBBB (right bundle branch block)     Sciatica neuralgia november 209    MRI shows spinal stenosis and a cyst on the spine, has received an epidural steroid injection     Past Surgical History:   Procedure Laterality Date     ARTHROSCOPY KNEE RT/LT      bilateral    COLONOSCOPY  6/2010    negative    HC DILATION/CURETTAGE DIAG/THER NON OB      HC EXCIS INTERDIGITAL NEUROMA,EA      mortons neuroma excision    HC REMOVAL GALLBLADDER  1994    HERNIA REPAIR, INGUINAL RT/LT      MASTECTOMY SIMPLE BILATERAL, SENTINEL NODE BILATERAL, COMBINED Bilateral 1/6/2021    Procedure: BILATERAL SIMPLE MASTECTOMY WITH LEFT SENTINEL LYMPH NODE BIOPSY;  Surgeon: Beata Garcia MD;  Location: [facility] OR    SURGICAL HISTORY OF -       bilateral meniscal tears and surgery on these    SURGICAL HISTORY OF -   1999 or so    one parathyroid removed    TONSILLECTOMY      TUBAL LIGATION      ZZC APPENDECTOMY      ZC CABG, ARTERIAL, THREE  1999    LIMA-LAD, SVG-DIagnoal, SVG-OM, previous LAD-PCI, sees Dr Banuelos     Current Outpatient Medications   Medication Sig Dispense Refill    acetaminophen (TYLENOL) 650 MG CR tablet Take 1300 mg every morning, 1300 mg at bedtime.      allopurinol (ZYLOPRIM) 100 MG tablet TAKE 1 TABLET BY MOUTH DAILY ALONG WITH 300MG TABLET FOR TOTAL DAILY DOSE OF 400MG 90 tablet 1    allopurinol (ZYLOPRIM) 300 MG tablet TAKE 1 TABLET BY MOUTH DAILY ALONG WITH 100MG TABLET FOR TOTAL DAILY DOSE OF 400MG 90 tablet 1    amoxicillin (AMOXIL) 500 MG capsule Take 500 mg by mouth 2 times daily      aspirin 81 MG EC tablet Take 81 mg by mouth every morning      betamethasone dipropionate (DIPROSONE) 0.05 % external lotion Apply topically daily as needed (seborrhea) 60 mL 0    levothyroxine (SYNTHROID/LEVOTHROID) 125 MCG tablet TAKE 1 TABLET (125 MCG) BY MOUTH EVERY MORNING 90 tablet 3    losartan (COZAAR) 50 MG tablet Take 50 mg by mouth daily      metoprolol tartrate (LOPRESSOR) 25 MG tablet Take 2 tablets (50 mg) by mouth every morning and 1 tablet (25 mg) every evening      order for DME Equipment being ordered: CPAP 1 each 0    rosuvastatin (CRESTOR) 5 MG tablet Take 1 tablet (5 mg) by mouth every other day 90 tablet 0    sertraline (ZOLOFT) 25 MG  tablet Take 1 tablet (25 mg) by mouth daily 90 tablet 0    torsemide (DEMADEX) 20 MG tablet Take 1 tablet by mouth daily      vitamin D3 (CHOLECALCIFEROL) 50 mcg (2000 units) tablet Take 1 tablet by mouth daily         Allergies   Allergen Reactions    Adhesive Tape     Atorvastatin Other (See Comments) and Muscle Pain (Myalgia)     lipitor  Myalgia    Buspar [Buspirone]     Nickel Other (See Comments)     Raw weepy skin  rash    Vicodin [Hydrocodone-Acetaminophen] Other (See Comments)     Hallucinations    Liquid Adhesive      Other reaction(s): Contact Dermatitis        Social History     Tobacco Use    Smoking status: Former     Years: 20.00     Types: Cigarettes     Quit date: 1978     Years since quittin.2    Smokeless tobacco: Never   Substance Use Topics    Alcohol use: Yes     Comment: Rare      Family History   Problem Relation Age of Onset    C.A.D. Mother     Arthritis Mother     Cardiovascular Mother     Heart Disease Mother     Obesity Mother     Thyroid Disease Mother     C.A.D. Father     Diabetes Father     Hypertension Father     Alcohol/Drug Father     Alzheimer Disease Father     Cardiovascular Father     Circulatory Father     Gastrointestinal Disease Father     Heart Disease Father     Lipids Father     Obesity Father     Cardiovascular Maternal Grandmother     Depression Maternal Grandmother     Obesity Maternal Grandmother     Thyroid Disease Maternal Grandmother     Cardiovascular Maternal Grandfather     Heart Disease Maternal Grandfather     Obesity Maternal Grandfather     Obesity Paternal Grandmother     Diabetes Paternal Grandfather     Alcohol/Drug Paternal Grandfather     Obesity Paternal Grandfather     Breast Cancer Sister     Cancer Sister     Obesity Sister     Thyroid Disease Sister     Alcohol/Drug Son     Allergies Son     Allergies Daughter     Depression Sister     Eye Disorder Sister     Gastrointestinal Disease Sister     Thyroid Disease Sister      History   Drug  "Use No         Objective     /69 (BP Location: Left arm)   Pulse 58   Resp 18   Ht 1.675 m (5' 5.95\")   Wt 108.9 kg (240 lb)   SpO2 99%   BMI 38.80 kg/m      Physical Exam    GENERAL APPEARANCE: healthy, alert and no distress     EYES: EOMI, PERRL     HENT: ear canals and TM's normal and nose and mouth without ulcers or lesions     NECK: no adenopathy, no asymmetry, masses, or scars and thyroid normal to palpation     RESP: lungs clear to auscultation - no rales, rhonchi or wheezes     CV: regular rates and rhythm, normal S1 S2, no S3 or S4 and no murmur, click or rub     ABDOMEN:  soft, nontender, no HSM or masses and bowel sounds normal     MS: extremities normal- no gross deformities noted, no evidence of inflammation in joints, FROM in all extremities.     SKIN: no suspicious lesions or rashes     NEURO: Normal strength and tone, sensory exam grossly normal, mentation intact and speech normal     PSYCH: mentation appears normal. and affect normal/bright     LYMPHATICS: No cervical adenopathy    Recent Labs   Lab Test 06/22/23  1025 03/23/23  0906 02/20/23  1106 01/02/23  1453 07/07/22  1125   HGB 12.5  --   --   --  13.1     --   --   --  326     --  141 140 140   POTASSIUM 4.2  --  4.3 3.6 3.8   CR 0.73 0.86 0.87 0.79 0.82   A1C  --   --   --  5.8* 6.0*        Diagnostics:  Orders Placed This Encounter   Procedures    Basic metabolic panel  (Ca, Cl, CO2, Creat, Gluc, K, Na, BUN)    UA with Microscopic reflex to Culture - lab collect    TSH with free T4 reflex    CBC with platelets and differential    UA Microscopic with Reflex to Culture    CBC with platelets and differential        No EKG this visit, completed in the last 90 days.    Revised Cardiac Risk Index (RCRI):  The patient has the following serious cardiovascular risks for perioperative complications:   - Coronary Artery Disease (MI, positive stress test, angina, Qs on EKG) = 1 point     RCRI Interpretation: 1 point: Class II " (low risk - 0.9% complication rate)         Signed Electronically by: Ziyad Isaac MD  Copy of this evaluation report is provided to requesting physician.

## 2023-09-29 NOTE — LETTER
October 2, 2023    Lisa Ferguson  [id number]  [place] [address]          Dear ,    We are writing to inform you of your test results.    All of these tests are within acceptable limits , things look good and ok for surgery.      Resulted Orders   Basic metabolic panel  (Ca, Cl, CO2, Creat, Gluc, K, Na, BUN)   Result Value Ref Range    Sodium 140 135 - 145 mmol/L      Comment:      Reference intervals for this test were updated on 09/26/2023 to more accurately reflect our healthy population. There may be differences in the flagging of prior results with similar values performed with this method. Interpretation of those prior results can be made in the context of the updated reference intervals.     Potassium 4.5 3.4 - 5.3 mmol/L    Chloride 105 98 - 107 mmol/L    Carbon Dioxide (CO2) 22 22 - 29 mmol/L    Anion Gap 13 7 - 15 mmol/L    Urea Nitrogen 20.5 8.0 - 23.0 mg/dL    Creatinine 0.81 0.51 - 0.95 mg/dL    GFR Estimate 70 >60 mL/min/1.73m2    Calcium 10.7 (H) 8.8 - 10.2 mg/dL    Glucose 107 (H) 70 - 99 mg/dL   UA with Microscopic reflex to Culture - lab collect   Result Value Ref Range    Color Urine Yellow Colorless, Straw, Light Yellow, Yellow    Appearance Urine Clear Clear    Glucose Urine Negative Negative mg/dL    Bilirubin Urine Negative Negative    Ketones Urine Negative Negative mg/dL    Specific Gravity Urine 1.020 1.003 - 1.035    Blood Urine Negative Negative    pH Urine 5.5 5.0 - 7.0    Protein Albumin Urine Negative Negative mg/dL    Urobilinogen Urine 0.2 0.2, 1.0 E.U./dL    Nitrite Urine Negative Negative    Leukocyte Esterase Urine Trace (A) Negative   TSH with free T4 reflex   Result Value Ref Range    TSH 3.72 0.30 - 4.20 uIU/mL   CBC with platelets and differential   Result Value Ref Range    WBC Count 9.4 4.0 - 11.0 10e3/uL    RBC Count 4.31 3.80 - 5.20 10e6/uL    Hemoglobin 12.7 11.7 - 15.7 g/dL    Hematocrit 38.9 35.0 - 47.0 %    MCV 90 78 - 100 fL    MCH 29.5 26.5 -  33.0 pg    MCHC 32.6 31.5 - 36.5 g/dL    RDW 14.4 10.0 - 15.0 %    Platelet Count 294 150 - 450 10e3/uL    % Neutrophils 54 %    % Lymphocytes 28 %    % Monocytes 11 %    % Eosinophils 6 %    % Basophils 1 %    % Immature Granulocytes 0 %    Absolute Neutrophils 5.1 1.6 - 8.3 10e3/uL    Absolute Lymphocytes 2.7 0.8 - 5.3 10e3/uL    Absolute Monocytes 1.0 0.0 - 1.3 10e3/uL    Absolute Eosinophils 0.6 0.0 - 0.7 10e3/uL    Absolute Basophils 0.1 0.0 - 0.2 10e3/uL    Absolute Immature Granulocytes 0.0 <=0.4 10e3/uL   UA Microscopic with Reflex to Culture   Result Value Ref Range    Bacteria Urine Few (A) None Seen /HPF    RBC Urine None Seen 0-2 /HPF /HPF    WBC Urine 0-5 0-5 /HPF /HPF    Squamous Epithelials Urine Few (A) None Seen /LPF    Narrative    Urine Culture not indicated       If you have any questions or concerns, please call the clinic at the number listed above.       Sincerely,      Ziyad Isaac MD

## 2023-10-02 ENCOUNTER — APPOINTMENT (OUTPATIENT)
Dept: URBAN - METROPOLITAN AREA CLINIC 252 | Age: 88
Setting detail: DERMATOLOGY
End: 2023-10-02

## 2023-10-02 VITALS — RESPIRATION RATE: 16 BRPM | WEIGHT: 240 LBS | HEIGHT: 66 IN

## 2023-10-02 DIAGNOSIS — D49.2 NEOPLASM OF UNSPECIFIED BEHAVIOR OF BONE, SOFT TISSUE, AND SKIN: ICD-10-CM

## 2023-10-02 DIAGNOSIS — L57.0 ACTINIC KERATOSIS: ICD-10-CM

## 2023-10-02 PROCEDURE — OTHER LIQUID NITROGEN: OTHER

## 2023-10-02 PROCEDURE — 99212 OFFICE O/P EST SF 10 MIN: CPT | Mod: 25

## 2023-10-02 PROCEDURE — 17110 DESTRUCT B9 LESION 1-14: CPT

## 2023-10-02 PROCEDURE — OTHER COUNSELING: OTHER

## 2023-10-02 PROCEDURE — 17003 DESTRUCT PREMALG LES 2-14: CPT | Mod: 59

## 2023-10-02 PROCEDURE — 17000 DESTRUCT PREMALG LESION: CPT | Mod: 59

## 2023-10-02 ASSESSMENT — LOCATION DETAILED DESCRIPTION DERM
LOCATION DETAILED: LEFT CLAVICULAR SKIN
LOCATION DETAILED: LEFT SUPERIOR LATERAL MALAR CHEEK
LOCATION DETAILED: NASAL DORSUM
LOCATION DETAILED: LEFT DISTAL PRETIBIAL REGION
LOCATION DETAILED: RIGHT PROXIMAL RADIAL DORSAL FOREARM
LOCATION DETAILED: RIGHT PROXIMAL PRETIBIAL REGION
LOCATION DETAILED: RIGHT VENTRAL PROXIMAL FOREARM
LOCATION DETAILED: NASAL SUPRATIP
LOCATION DETAILED: RIGHT INFERIOR LATERAL BUCCAL CHEEK

## 2023-10-02 ASSESSMENT — LOCATION SIMPLE DESCRIPTION DERM
LOCATION SIMPLE: RIGHT CHEEK
LOCATION SIMPLE: RIGHT FOREARM
LOCATION SIMPLE: RIGHT PRETIBIAL REGION
LOCATION SIMPLE: LEFT PRETIBIAL REGION
LOCATION SIMPLE: LEFT CLAVICULAR SKIN
LOCATION SIMPLE: NOSE
LOCATION SIMPLE: LEFT CHEEK

## 2023-10-02 ASSESSMENT — LOCATION ZONE DERM
LOCATION ZONE: FACE
LOCATION ZONE: NOSE
LOCATION ZONE: LEG
LOCATION ZONE: TRUNK
LOCATION ZONE: ARM

## 2023-10-02 NOTE — PROCEDURE: LIQUID NITROGEN
Medical Necessity Information: It is in your best interest to select a reason for this procedure from the list below. All of these items fulfill various CMS LCD requirements except the new and changing color options.
Add 52 Modifier (Optional): no
Spray Paint Text: The liquid nitrogen was applied to the skin utilizing a spray paint frosting technique.
Medical Necessity Clause: This procedure was medically necessary because the lesions that were treated were:
Show Topical Anesthesia Variable?: Yes
Consent: The patient's consent was obtained including but not limited to risks of crusting, scabbing, blistering, scarring, darker or lighter pigmentary change, recurrence, incomplete removal and infection.
Detail Level: Detailed
Post-Care Instructions: I reviewed with the patient in detail post-care instructions. Patient is to wear sunprotection, and avoid picking at any of the treated lesions. Pt may apply Vaseline to crusted or scabbing areas.
Duration Of Freeze Thaw-Cycle (Seconds): 0

## 2023-10-02 NOTE — HPI: SKIN LESION
What Type Of Note Output Would You Prefer (Optional)?: Bullet Format
How Severe Is Your Skin Lesion?: mild
Has Your Skin Lesion Been Treated?: not been treated
Is This A New Presentation, Or A Follow-Up?: Skin Lesions
Additional History: Hip surgery in 3 weeks would like to avoid any procedures that may delay her surgery

## 2023-10-02 NOTE — PROCEDURE: COUNSELING
Patient Specific Counseling (Will Not Stick From Patient To Patient): Pt is having hip surgery on October 24th. Pt would like to wait to have any biopsies done until after surgery.
Detail Level: Zone
Patient Specific Counseling (Will Not Stick From Patient To Patient): If not resolved at follow up appointment with plan for biopsy
Detail Level: Detailed
Patient Specific Counseling (Will Not Stick From Patient To Patient): Will plan for biopsy at follow up appointment

## 2023-10-03 DIAGNOSIS — F41.9 ANXIETY: ICD-10-CM

## 2023-10-03 RX ORDER — SERTRALINE HYDROCHLORIDE 25 MG/1
25 TABLET, FILM COATED ORAL DAILY
Qty: 90 TABLET | Refills: 0 | Status: SHIPPED | OUTPATIENT
Start: 2023-10-03 | End: 2023-12-26

## 2023-10-18 ENCOUNTER — THERAPY VISIT (OUTPATIENT)
Dept: PHYSICAL THERAPY | Facility: CLINIC | Age: 88
End: 2023-10-18
Payer: MEDICARE

## 2023-10-18 DIAGNOSIS — M25.551 HIP PAIN, RIGHT: Primary | ICD-10-CM

## 2023-10-18 PROCEDURE — 97110 THERAPEUTIC EXERCISES: CPT | Mod: GP | Performed by: PHYSICAL THERAPIST

## 2023-10-28 ENCOUNTER — NURSE TRIAGE (OUTPATIENT)
Dept: NURSING | Facility: CLINIC | Age: 88
End: 2023-10-28
Payer: MEDICARE

## 2023-10-29 NOTE — TELEPHONE ENCOUNTER
Home Care is calling regarding an established patient with [facility].       Requesting orders from: Ziyad Isaac  Provider is following patient: Yes  Is this a 60-day recertification request?  No    Orders Requested    Skilled Nursing  Request for initial evaluation and treatment (one time)   -2x visit in the next 8 day    Skilled Nursing  Request for initial certification (first set of orders)   -1x for one week  -three PRN visits    Physical Therapy  Request for initial evaluation and treatment (one time)     Verbal orders given for Skilled Nursing and PT (treat and eval only).  Information was gathered for Skilled Nursing -first set of ordered.  Provider review needed.  RN will contact Home Care with information after provider review.  Confirmed ok to leave a detailed message with call back ([phone]).  Contact information confirmed and updated as needed.    THELMA CHOI RN on 10/28/2023 at 7:09 PM

## 2023-11-07 ENCOUNTER — TELEPHONE (OUTPATIENT)
Dept: INTERNAL MEDICINE | Facility: CLINIC | Age: 88
End: 2023-11-07
Payer: MEDICARE

## 2023-11-07 NOTE — TELEPHONE ENCOUNTER
Home Care is calling regarding an established patient with [facility].       Requesting orders from: Ziyad Isaac   Provider is following patient: Yes  Is this a 60-day recertification request?  No    Orders Requested    Physical Therapy  Request for initial certification (first set of orders)   Frequency:  1 visit in 4 days, then 3 times a week for 1 week for strengthening and gait training       Confirmed ok to leave a detailed message with call back.  Contact information confirmed and updated as needed.      Ana Rosa Hassan RN  [facility]

## 2023-11-08 ENCOUNTER — TRANSFERRED RECORDS (OUTPATIENT)
Dept: HEALTH INFORMATION MANAGEMENT | Facility: CLINIC | Age: 88
End: 2023-11-08
Payer: MEDICARE

## 2023-11-08 NOTE — TELEPHONE ENCOUNTER
Called Manuel ([facility]) and left DVM with ok for requested home care orders per Dr. Ziyad Isaac and to please call back to [phone] with any further questions/concerns.    ALEIDA CorleyN RN  [facility]

## 2023-11-10 ENCOUNTER — VIRTUAL VISIT (OUTPATIENT)
Dept: PHARMACY | Facility: CLINIC | Age: 88
End: 2023-11-10
Payer: COMMERCIAL

## 2023-11-10 DIAGNOSIS — F41.9 ANXIETY: ICD-10-CM

## 2023-11-10 DIAGNOSIS — M25.551 HIP PAIN, RIGHT: Primary | ICD-10-CM

## 2023-11-10 DIAGNOSIS — N25.81 SECONDARY HYPERPARATHYROIDISM OF RENAL ORIGIN (H): ICD-10-CM

## 2023-11-10 PROCEDURE — 99207 PR NO CHARGE LOS: CPT | Performed by: PHARMACIST

## 2023-11-10 RX ORDER — OXYCODONE HYDROCHLORIDE 5 MG/1
2.5 TABLET ORAL EVERY 4 HOURS
COMMUNITY
Start: 2023-10-24 | End: 2024-02-16

## 2023-11-10 RX ORDER — ASPIRIN 81 MG/1
81 TABLET ORAL DAILY
COMMUNITY
End: 2023-11-10

## 2023-11-10 NOTE — Clinical Note
EVERTON GAMBOA note, thanks!  Cyndie Breaux, PharmD Medication Therapy Management Pharmacist [phone]

## 2023-11-10 NOTE — PATIENT INSTRUCTIONS
"Recommendations from today's MTM visit:                                                         1. Continue current medications.      Follow-up: Return in about 3 months (around 2/10/2024) for Medication Therapy Management.    It was great speaking with you today.  I value your experience and would be very thankful for your time in providing feedback in our clinic survey. In the next few days, you may receive an email or text message from [employer] with a link to a survey related to your  clinical pharmacist.\"     To schedule another MTM appointment, please call the clinic directly or you may call the MTM scheduling line at [phone] or toll-free at [phone].     My Clinical Pharmacist's contact information:                                                      Please feel free to contact me with any questions or concerns you have.      Cyndie Braeux, PharmD  Medication Therapy Management Pharmacist  [phone]     "

## 2023-11-10 NOTE — PROGRESS NOTES
Medication Therapy Management (MTM) Encounter    ASSESSMENT:                            Medication Adherence/Access: No issues identified    Post-hip replacement surgery:  Anxiety:  Sertraline is effective, may benefit from maintaining current therapy, continue to monitor for worsened tremor. Sodium was lower while inpatient, but did improve, plan in place to monitor.     If needed in future could also consider duloxetine or venlafaxine for added pain benefit. Would avoid bupropion for anxiety as this can be more activating.     Hyperparathyroidism: Stable.     PLAN:                            1. Continue current medications.    Follow-up: Return in about 3 months (around 2/10/2024) for Medication Therapy Management.    SUBJECTIVE/OBJECTIVE:                          Lisa Ferguson is a 88 year old female called for a follow-up visit from 7/12/23.       Reason for visit: med review.    Allergies/ADRs: Reviewed in chart  Past Medical History: Reviewed in chart  Tobacco: She reports that she quit smoking about 45 years ago. Her smoking use included cigarettes. She has never used smokeless tobacco.  Alcohol: Less than 1 beverage / month  Caffeine: 2-3 cups coffee/day    Medication Adherence/Access:   Patient uses pill box(es).  Per patient, misses medication 0 times per week.    The patient fills medications at [facility]: NO, fills medications at [facility].    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   She is slowly improving, was reassured her x-ray looked good.  No night sweats the last  three days, started happening after surgery.    Anxiety:    Sertraline 25 mg daily every evening since February.    Still getting the hand shaking, but it doesn't last long. Sleep has been pretty good (not as good after surgery but getting back to normal). It really seems to have been exteremly effective for her anxiety.  She has essential tremors at baseline, citalopram worsened these in the past, but she's not sure if she can attribute this to the medication. She also has a family history of essential tremor and would like to avoid anything that could contribute to that. Buspirone was terrible (she can't recall exactly, but whatever it was was terrible).   She reports she'll have sodium labs rechecked within the next month.     Hyperparathyroidism:   Vitamin D 2000 international unit(s) daily     She is drinking 24 oz of milk a day .   Following with endocrinology, Dr. Nettles, who recommended 1000 mg of calcium/day.    Today's Vitals: There were no vitals taken for this visit.  ----------------  Post Discharge Medication Reconciliation Status: discharge medications reconciled, continue medications without change.    I spent 18 minutes with this patient today. All changes were made via collaborative practice agreement with Ziyad Isaac MD. A copy of the visit note was provided to the patient's provider(s).    A summary of these recommendations was sent via [employer].    Cyndie Breaux, PharmD  Medication Therapy Management Pharmacist  [phone]      Telemedicine Visit Details  Type of service:  Telephone visit  Start Time: 9:01 AM  End Time: 9:19 AM     Medication Therapy Recommendations  No medication therapy recommendations to display

## 2023-11-13 ENCOUNTER — TELEPHONE (OUTPATIENT)
Dept: INTERNAL MEDICINE | Facility: CLINIC | Age: 88
End: 2023-11-13
Payer: MEDICARE

## 2023-11-13 NOTE — TELEPHONE ENCOUNTER
Forms/Letter Request    Type of form/letter:  Home health plan of care     Have you been seen for this request: N/A    Do we have the form/letter: Yes:     Who is the form from? Home care    Where did/will the form come from? form was faxed in    When is form/letter needed by: 3-5 day     How would you like the form/letter returned: Fax : [phone]    Patient Notified form requests are processed in 3-5 business days:Yes    Could we send this information to you in [employer] or would you prefer to receive a phone call?:   Patient would like to be contacted via [employer]

## 2023-11-21 ENCOUNTER — APPOINTMENT (OUTPATIENT)
Dept: URBAN - METROPOLITAN AREA CLINIC 252 | Age: 88
Setting detail: DERMATOLOGY
End: 2023-11-22

## 2023-11-21 VITALS — HEIGHT: 66 IN | WEIGHT: 235 LBS

## 2023-11-21 DIAGNOSIS — D49.2 NEOPLASM OF UNSPECIFIED BEHAVIOR OF BONE, SOFT TISSUE, AND SKIN: ICD-10-CM

## 2023-11-21 PROCEDURE — 11102 TANGNTL BX SKIN SINGLE LES: CPT | Mod: 59

## 2023-11-21 PROCEDURE — OTHER SNIP BIOPSY: OTHER

## 2023-11-21 PROCEDURE — OTHER LIQUID NITROGEN: OTHER

## 2023-11-21 PROCEDURE — 17110 DESTRUCT B9 LESION 1-14: CPT

## 2023-11-21 PROCEDURE — OTHER COUNSELING: OTHER

## 2023-11-21 ASSESSMENT — LOCATION DETAILED DESCRIPTION DERM
LOCATION DETAILED: RIGHT NASAL DORSUM
LOCATION DETAILED: RIGHT NASAL SIDEWALL

## 2023-11-21 ASSESSMENT — LOCATION SIMPLE DESCRIPTION DERM
LOCATION SIMPLE: RIGHT NOSE
LOCATION SIMPLE: NOSE

## 2023-11-21 ASSESSMENT — LOCATION ZONE DERM: LOCATION ZONE: NOSE

## 2023-11-21 NOTE — PROCEDURE: LIQUID NITROGEN
Medical Necessity Clause: This procedure was medically necessary because the lesions that were treated were:
Add 52 Modifier (Optional): no
Consent: The patient's consent was obtained including but not limited to risks of crusting, scabbing, blistering, scarring, darker or lighter pigmentary change, recurrence, incomplete removal and infection.
Show Topical Anesthesia Variable?: Yes
Detail Level: Detailed
Post-Care Instructions: I reviewed with the patient in detail post-care instructions. Patient is to wear sunprotection, and avoid picking at any of the treated lesions. Pt may apply Vaseline to crusted or scabbing areas.
Medical Necessity Information: It is in your best interest to select a reason for this procedure from the list below. All of these items fulfill various CMS LCD requirements except the new and changing color options.
Spray Paint Text: The liquid nitrogen was applied to the skin utilizing a spray paint frosting technique.

## 2023-11-21 NOTE — PROCEDURE: SNIP BIOPSY
Detail Level: Detailed
Depth Of Biopsy: dermis
Size Of Lesion In Cm: 0
Biopsy Type: H and E
Biopsy Method: scissors
Anesthesia Type: 1% lidocaine with epinephrine
Anesthesia Volume In Cc (Will Not Render If 0): 0.5
Hemostasis: Drysol
Wound Care: Petrolatum
Dressing: bandage
Destruction After The Procedure: No
Type Of Destruction Used: Curettage
Curettage Text: The wound bed was treated with curettage after the biopsy was performed.
Cryotherapy Text: The wound bed was treated with cryotherapy after the biopsy was performed.
Electrodesiccation Text: The wound bed was treated with electrodesiccation after the biopsy was performed.
Electrodesiccation And Curettage Text: The wound bed was treated with electrodesiccation and curettage after the biopsy was performed.
Silver Nitrate Text: The wound bed was treated with silver nitrate after the biopsy was performed.
Lab: [id number]
Consent: Written consent was obtained and risks were reviewed including but not limited to scarring, infection, bleeding, scabbing, incomplete removal, nerve damage and allergy to anesthesia.
Post-Care Instructions: I reviewed with the patient in detail post-care instructions. Patient is to keep the biopsy site dry overnight, and then apply bacitracin twice daily until healed. Patient may apply hydrogen peroxide soaks to remove any crusting.
Notification Instructions: Patient will be notified of biopsy results. However, patient instructed to call the office if not contacted within 2 weeks.
Billing Type: Third-Party Bill
Information: Selecting Yes will display possible errors in your note based on the variables you have selected. This validation is only offered as a suggestion for you. PLEASE NOTE THAT THE VALIDATION TEXT WILL BE REMOVED WHEN YOU FINALIZE YOUR NOTE. IF YOU WANT TO FAX A PRELIMINARY NOTE YOU WILL NEED TO TOGGLE THIS TO 'NO' IF YOU DO NOT WANT IT IN YOUR FAXED NOTE.

## 2023-11-21 NOTE — PROCEDURE: COUNSELING
Detail Level: Detailed
Patient Specific Counseling (Will Not Stick From Patient To Patient): ** unable to any invasive procedure due to pt having hip surgery 1 month ago and advised to have no other procedures for 6 months post surgery.\\nSpecimen was able to be snipped off with no lidocaine and no bleeding occurred.

## 2023-11-21 NOTE — HPI: SKIN LESION (ACTINIC KERATOSES)
Is This A New Presentation, Or A Follow-Up?: Follow Up Actinic Keratoses
Additional History: “Hanging on”\\nRecent knee surgery \\nOrtho recommended no biopsies

## 2023-11-27 ENCOUNTER — MEDICAL CORRESPONDENCE (OUTPATIENT)
Dept: HEALTH INFORMATION MANAGEMENT | Facility: CLINIC | Age: 88
End: 2023-11-27
Payer: MEDICARE

## 2023-11-30 ENCOUNTER — MEDICAL CORRESPONDENCE (OUTPATIENT)
Dept: HEALTH INFORMATION MANAGEMENT | Facility: CLINIC | Age: 88
End: 2023-11-30
Payer: MEDICARE

## 2023-12-07 ENCOUNTER — LAB (OUTPATIENT)
Dept: LAB | Facility: CLINIC | Age: 88
End: 2023-12-07
Payer: MEDICARE

## 2023-12-07 DIAGNOSIS — E83.52 HYPERCALCEMIA: ICD-10-CM

## 2023-12-07 LAB
ALBUMIN SERPL BCG-MCNC: 4.4 G/DL (ref 3.5–5.2)
CALCIUM SERPL-MCNC: 11 MG/DL (ref 8.8–10.2)
CREAT SERPL-MCNC: 0.84 MG/DL (ref 0.51–0.95)
EGFRCR SERPLBLD CKD-EPI 2021: 67 ML/MIN/1.73M2
MAGNESIUM SERPL-MCNC: 2.1 MG/DL (ref 1.7–2.3)
PHOSPHATE SERPL-MCNC: 2.8 MG/DL (ref 2.5–4.5)

## 2023-12-07 PROCEDURE — 82306 VITAMIN D 25 HYDROXY: CPT

## 2023-12-07 PROCEDURE — 82565 ASSAY OF CREATININE: CPT

## 2023-12-07 PROCEDURE — 82040 ASSAY OF SERUM ALBUMIN: CPT

## 2023-12-07 PROCEDURE — 36415 COLL VENOUS BLD VENIPUNCTURE: CPT

## 2023-12-07 PROCEDURE — 83970 ASSAY OF PARATHORMONE: CPT

## 2023-12-07 PROCEDURE — 82310 ASSAY OF CALCIUM: CPT

## 2023-12-07 PROCEDURE — 84100 ASSAY OF PHOSPHORUS: CPT

## 2023-12-07 PROCEDURE — 83735 ASSAY OF MAGNESIUM: CPT

## 2023-12-08 LAB — PTH-INTACT SERPL-MCNC: 52 PG/ML (ref 15–65)

## 2023-12-11 LAB
DEPRECATED CALCIDIOL+CALCIFEROL SERPL-MC: <38 UG/L (ref 20–75)
VITAMIN D2 SERPL-MCNC: <5 UG/L
VITAMIN D3 SERPL-MCNC: 33 UG/L

## 2023-12-13 ENCOUNTER — TRANSFERRED RECORDS (OUTPATIENT)
Dept: HEALTH INFORMATION MANAGEMENT | Facility: CLINIC | Age: 88
End: 2023-12-13
Payer: MEDICARE

## 2023-12-15 DIAGNOSIS — M10.071 ACUTE IDIOPATHIC GOUT OF RIGHT FOOT: ICD-10-CM

## 2023-12-15 RX ORDER — ALLOPURINOL 300 MG/1
TABLET ORAL
Qty: 90 TABLET | Refills: 1 | Status: SHIPPED | OUTPATIENT
Start: 2023-12-15 | End: 2024-06-07

## 2023-12-18 ENCOUNTER — VIRTUAL VISIT (OUTPATIENT)
Dept: ENDOCRINOLOGY | Facility: CLINIC | Age: 88
End: 2023-12-18
Payer: MEDICARE

## 2023-12-18 VITALS — BODY MASS INDEX: 38.32 KG/M2 | WEIGHT: 237 LBS

## 2023-12-18 DIAGNOSIS — Z98.890 H/O PARATHYROIDECTOMY: ICD-10-CM

## 2023-12-18 DIAGNOSIS — Z90.89 H/O PARATHYROIDECTOMY: ICD-10-CM

## 2023-12-18 DIAGNOSIS — E83.52 HYPERCALCEMIA: Primary | ICD-10-CM

## 2023-12-18 PROCEDURE — 99215 OFFICE O/P EST HI 40 MIN: CPT | Mod: VID | Performed by: STUDENT IN AN ORGANIZED HEALTH CARE EDUCATION/TRAINING PROGRAM

## 2023-12-18 NOTE — NURSING NOTE
Is the patient currently in the state of [place]? YES    Visit mode:VIDEO    If the visit is dropped, the patient can be reconnected by: VIDEO VISIT: Text to cell phone:   Telephone Information:   Mobile [phone]       Will anyone else be joining the visit? NO  (If patient encounters technical issues they should call [phone]    How would you like to obtain your AVS? MyChart    Are changes needed to the allergy or medication list? No    Reason for visit: RECHECK and Video Visit    Sisi CRUZ

## 2023-12-18 NOTE — LETTER
12/18/2023       RE: Lisa Ferguson  [address]  [place] [address]     Dear Colleague,    Thank you for referring your patient, Lisa Ferguson, to the [facility] ENDOCRINOLOG[facility] [place] at [facility]. Please see a copy of my visit note below.          Endocrinology Clinic Visit       Video-Visit Details    Type of service:  Video Visit  Joined the call at 12/18/2023, 10:08:51 am.  Left the call at 12/18/2023, 10:34:22 am.    Originating Location (pt. Location): Home        Distant Location (provider location):  Off-site    Mode of Communication:  Video Conference via [employer]    Physician has received verbal consent for a Video Visit from the patient? Yes    I spent a total of 50 minutes on the date of encounter reviewing medical records, evaluating the patient, coordinating care and documenting in the EHR, as detailed above.      NAME:  Lisa Ferguson  PCP:  Ziyad Isaac  MRN:  [id number]  Reason for Consult:  Hypercalcemia   Requesting Provider:  Ziyad Isaac    Chief Complaint     Chief Complaint   Patient presents with    RECHECK    Video Visit       History of Present Illness     Lisa Ferguson is a 87 year old female who is seen in video visit for hypercalcemia. Last seen 3/2023.    She had hyperparathyroidism s/p parathyroidectomy 2002 at [facility]. No records. Care everywhere reviewed: 11/12/2002 NM parathyroid: Early images show prominent uptake at the salivary glands and thyroid which is normal.  On the delayed images thyroid activity should have largely diminished, however, in this particular case the thyroid activity is still quite prominent just a little less than was seen on the early images.  Because of this poor washout, it is very difficult to evaluate for parathyroid adenoma.  no suspicious focal lesion, although the expected positions of the parathyroid are obscured by the thyroid uptake.     She reported that  she had a 24 hour urine calcium of > 490 mg at that time  She had kidney stone in 2015. 24 hour urine calcium at that time was 150 mg  Repeat on 3/2023 was 190 mg while on torsemide    Calcium started to trend up in 2014 around 10.4-10.6 with periods of normal caclium. Up until recently calcium trended up to high 10s and highest of 11.1.    ENDO CALCIUM LABS-[facility] Latest Ref Rng & Units 1/2/2023   VITAMIN D DEFICIENCY SCREENING 20 - 75 ug/L 32   ALKPHOS 40 - 150 U/L    CALCIUM, TOTAL 8.5 - 10.1 mg/dL 11.1 (H)   UREA NITROGEN 7 - 30 mg/dL 18   CREATININE 0.52 - 1.04 mg/dL 0.79   PARATHYROID HORMONE INTACT 15 - 65 pg/mL 101 (H)    Most recent labs 12/2023 ca 11 with alb of 4.4.    Symptoms of hypercalcemia: chronic constipation, no dyspepsia, no mental status changes/lethargy, no polyuria. She has chronic dry mouth related to torsemide.     Dexa scan most recent one 12/2022: no evidence of low bone density.    Calcium intake: eats cheese and yogurt. Supplements: no    Vitamin D intake: Supplements: 2000 international unit(s)     Previous fractures: shoulder related injury ? Fracture after falling, around 2015. Recent fall , no fracture. FTH chronic compression fracture on L2.  Family history of fragility fracture in parent: no  History of kidney stones: Yes: one time kidney stone in 2015.  History of kidney disease: no  Family history of any calcium problems or kidney stones: Yes: sister had same hyperparathyroid issues.  History of vitamin D deficiency: No  History of HCTZ use: yes, she does not recall. It is on her chart, stopped back in 2020.  History of Lithium use: No  History of malabsorption (IBD, Celiac, gastric bypass ): No  History of thyroid disease: Yes: controlled   Weight history: lost 20 lbs since she fell in the past few years.    She fell 9/2022 had hip pain but no fractures. She is now s/p right hip replacement 10/2023. Recovering well      Problem List     Patient Active Problem List   Diagnosis     PUD (peptic ulcer disease)    Hyperlipidemia with target LDL less than 70    Mitral regurgitation    DJD (degenerative joint disease)    Sciatica neuralgia    IBS (irritable bowel syndrome)    RBBB (right bundle branch block)    GLEN (obstructive sleep apnea)    Balance disorder    Anxiety    Ventricular tachycardia (paroxysmal) (H)    Blepharitis    Hypercalcemia    OA (osteoarthritis) of knee - bilateral    CHF (congestive heart failure) (H)    Abnormal glucose    S/P CABG (coronary artery bypass graft)    S/p total knee replacement, bilateral    Acquired hypothyroidism    Essential hypertension with goal blood pressure less than 130/80    Morbid obesity (H)    H/O parathyroidectomy (H24)    Atherosclerosis of coronary artery bypass graft(s), unspecified, with other forms of angina pectoris (H24)    Malignant neoplasm of upper-outer quadrant of left breast in female, estrogen receptor positive (H)    Secondary hyperparathyroidism of renal origin (H24)    Acute idiopathic gout of right foot    Hip pain, right    Closed compression fracture of L2 vertebra (H)    Degenerative joint disease of right hip    Lumbar spinal stenosis        Medications     Current Outpatient Medications   Medication    acetaminophen (TYLENOL) 650 MG CR tablet    allopurinol (ZYLOPRIM) 100 MG tablet    allopurinol (ZYLOPRIM) 300 MG tablet    aspirin 81 MG EC tablet    betamethasone dipropionate (DIPROSONE) 0.05 % external lotion    levothyroxine (SYNTHROID/LEVOTHROID) 125 MCG tablet    losartan (COZAAR) 50 MG tablet    metoprolol tartrate (LOPRESSOR) 25 MG tablet    order for DME    oxyCODONE (ROXICODONE) 5 MG tablet    rosuvastatin (CRESTOR) 5 MG tablet    sertraline (ZOLOFT) 25 MG tablet    torsemide (DEMADEX) 20 MG tablet    vitamin D3 (CHOLECALCIFEROL) 50 mcg (2000 units) tablet    amoxicillin (AMOXIL) 500 MG capsule     No current facility-administered medications for this visit.        Allergies     Allergies   Allergen Reactions     Adhesive Tape     Atorvastatin Other (See Comments) and Muscle Pain (Myalgia)     lipitor  Myalgia    Buspar [Buspirone]     Nickel Other (See Comments)     Raw weepy skin  rash    Vicodin [Hydrocodone-Acetaminophen] Other (See Comments)     Hallucinations    Liquid Adhesive      Other reaction(s): Contact Dermatitis       Medical / Surgical History     Past Medical History:   Diagnosis Date    Anxiety     CAD (coronary artery disease)     angio 2002, h/o cabg, sees Luisana Nelson NP, they follow the cholesterol    CHF (congestive heart failure) (H) 07/08/2014    Closed compression fracture of L2 vertebra (H)     Degenerative joint disease of right hip     DJD (degenerative joint disease)     History of colonoscopy 01/01/2000    due jan 2010    Hyperlipidemia LDL goal < 70     sees [facility] lipid clinic    Hypertension goal BP (blood pressure) < 140/90     Hypothyroidism     IBS (irritable bowel syndrome)     Lumbar spinal stenosis     Mitral regurgitation     Obesity     GLEN (obstructive sleep apnea) 07/11/2011    PUD (peptic ulcer disease)     h/o duodenal ulcer    RBBB (right bundle branch block)     Sciatica neuralgia november 209    MRI shows spinal stenosis and a cyst on the spine, has received an epidural steroid injection     Past Surgical History:   Procedure Laterality Date    ARTHROSCOPY KNEE RT/LT      bilateral    COLONOSCOPY  6/2010    negative    HC DILATION/CURETTAGE DIAG/THER NON OB      HC EXCIS INTERDIGITAL NEUROMA,EA      mortons neuroma excision    HC REMOVAL GALLBLADDER  1994    HERNIA REPAIR, INGUINAL RT/LT      MASTECTOMY SIMPLE BILATERAL, SENTINEL NODE BILATERAL, COMBINED Bilateral 1/6/2021    Procedure: BILATERAL SIMPLE MASTECTOMY WITH LEFT SENTINEL LYMPH NODE BIOPSY;  Surgeon: Beata Garcia MD;  Location: [facility] OR    SURGICAL HISTORY OF -       bilateral meniscal tears and surgery on these    SURGICAL HISTORY OF -   1999 or so    one parathyroid removed    TONSILLECTOMY      TUBAL LIGATION       ZZC APPENDECTOMY      ZZC CABG, ARTERIAL, THREE      LIMA-LAD, SVG-DIagnoal, SVG-OM, previous LAD-PCI, sees Dr Banuelos       Social History     Social History     Socioeconomic History    Marital status:      Spouse name: Not on file    Number of children: Not on file    Years of education: Not on file    Highest education level: Not on file   Occupational History    Not on file   Tobacco Use    Smoking status: Former     Years: 20     Types: Cigarettes     Quit date: 1978     Years since quittin.4    Smokeless tobacco: Never   Vaping Use    Vaping Use: Never used   Substance and Sexual Activity    Alcohol use: Yes     Comment: Rare     Drug use: No    Sexual activity: Not Currently     Partners: Male   Other Topics Concern    Parent/sibling w/ CABG, MI or angioplasty before 65F 55M? Not Asked   Social History Narrative    Not on file     Social Determinants of Health     Financial Resource Strain: Low Risk  (2023)    Financial Resource Strain     Within the past 12 months, have you or your family members you live with been unable to get utilities (heat, electricity) when it was really needed?: No   Food Insecurity: Low Risk  (2023)    Food Insecurity     Within the past 12 months, did you worry that your food would run out before you got money to buy more?: No     Within the past 12 months, did the food you bought just not last and you didn t have money to get more?: No   Transportation Needs: Low Risk  (2023)    Transportation Needs     Within the past 12 months, has lack of transportation kept you from medical appointments, getting your medicines, non-medical meetings or appointments, work, or from getting things that you need?: No   Physical Activity: Not on file   Stress: Not on file   Social Connections: Not on file   Interpersonal Safety: Not on file   Housing Stability: Low Risk  (2023)    Housing Stability     Do you have housing? : Yes     Are you worried about  losing your housing?: No       Family History     Family History   Problem Relation Age of Onset    C.A.D. Mother     Arthritis Mother     Cardiovascular Mother     Heart Disease Mother     Obesity Mother     Thyroid Disease Mother     C.A.D. Father     Diabetes Father     Hypertension Father     Alcohol/Drug Father     Alzheimer Disease Father     Cardiovascular Father     Circulatory Father     Gastrointestinal Disease Father     Heart Disease Father     Lipids Father     Obesity Father     Cardiovascular Maternal Grandmother     Depression Maternal Grandmother     Obesity Maternal Grandmother     Thyroid Disease Maternal Grandmother     Cardiovascular Maternal Grandfather     Heart Disease Maternal Grandfather     Obesity Maternal Grandfather     Obesity Paternal Grandmother     Diabetes Paternal Grandfather     Alcohol/Drug Paternal Grandfather     Obesity Paternal Grandfather     Breast Cancer Sister     Cancer Sister     Obesity Sister     Thyroid Disease Sister     Alcohol/Drug Son     Allergies Son     Allergies Daughter     Depression Sister     Eye Disorder Sister     Gastrointestinal Disease Sister     Thyroid Disease Sister        ROS     12 ROS completed, pertinent positive and negative in HPI    Physical Exam   Wt 107.5 kg (237 lb)   BMI 38.32 kg/m     GENERAL: Healthy, alert and no distress  EYES: Eyes grossly normal to inspection.  No discharge or erythema, or obvious scleral/conjunctival abnormalities.  RESP: No audible wheeze, cough, or visible cyanosis.  No visible retractions or increased work of breathing.    SKIN: Visible skin clear. No significant rash, abnormal pigmentation or lesions.  NEURO: Cranial nerves grossly intact.  Mentation and speech appropriate for age.  PSYCH: Mentation appears normal, affect normal/bright, judgement and insight intact, normal speech and appearance well-groomed.     Labs/Imaging     Pertinent Labs were reviewed and updated in EPIC and discussed  "briefly.  Radiology Results were  reviewed and updated in EPIC and discussed briefly.    Summary of recent findings:   Lab Results   Component Value Date    A1C 5.8 01/02/2023    A1C 6.0 07/07/2022    A1C 5.7 12/29/2020    A1C 5.7 09/13/2018    A1C 5.4 08/01/2017    A1C 5.7 01/03/2017    A1C 5.5 02/12/2016       TSH   Date Value Ref Range Status   09/29/2023 3.72 0.30 - 4.20 uIU/mL Final   06/22/2023 5.48 (H) 0.30 - 4.20 uIU/mL Final   07/07/2022 2.94 0.40 - 4.00 mU/L Final   02/17/2021 3.80 0.40 - 4.00 mU/L Final   12/29/2020 23.61 (H) 0.40 - 4.00 mU/L Final   10/28/2019 1.75 0.40 - 4.00 mU/L Final   09/13/2018 2.66 0.40 - 4.00 mU/L Final   08/01/2017 2.66 0.40 - 4.00 mU/L Final     T4 Free   Date Value Ref Range Status   12/29/2020 0.59 (L) 0.76 - 1.46 ng/dL Final   10/21/2016 1.02 0.76 - 1.46 ng/dL Final   03/23/2015 1.07 0.76 - 1.46 ng/dL Final     Comment:     Effective 7/30/2014, the reference range for this assay has changed to reflect   new instrumentation/methodology.       Free T4   Date Value Ref Range Status   06/22/2023 1.28 0.90 - 1.70 ng/dL Final       Creatinine   Date Value Ref Range Status   12/07/2023 0.84 0.51 - 0.95 mg/dL Final   01/07/2021 0.79 0.52 - 1.04 mg/dL Final       Recent Labs   Lab Test 07/07/22  1125 12/29/20  1227 02/12/16  1108 03/23/15  1213   CHOL 175 296*   < > 162   HDL 49* 51   < > 50*   LDL 94 200*   < > 69   TRIG 160* 227*   < > 217*   CHOLHDLRATIO  --   --   --  3.2    < > = values in this interval not displayed.       No results found for: \"[id number]\", \"[id number]\", \"[id number]\"    I personally reviewed the patient's outside records from [facility] EMR and Care Everywhere. Summary of pertinent findings in HPI.    Impression / Plan       1. Hypercalcemia.   2. Hyperparathyroidism   3. Chronic compression fracture  With elevated or normal PTH, the most likely diagnosis is recurrence of primary hyperparathyroidism. Her 24 hour urine collection for calcium was <220 mg despite " being on torsemide which makes raise the concern for FHH.    She is s/p parathyroidectomy in 2002, no records.    We reviewed the latest guidelines conclude that surgery for hyperparathyroidism is indicated in symptomatic patients, or in asymptomatic patients who meet any one of the following conditions:  Serum calcium concentration of 1.0 mg/dL (0.25 mmol/L) or more above the upper limit of normal   Creatnine clearance reduced to < 60 mL/min, or 24-hour urine for calcium > 400 mg/day and increased stone risk by biochemical stone risk analysis, or nephrolithiasis or nephrocalcinosis on imaging studyCreatinine clearance that is reduced to <60 mL/min   Bone density at the hip, lumbar spine, or distal radius that is more than 2.5 standard deviations below peak bone mass (T score <-2.5) and/or previous fragility fracture   Age less than 50 years    Operative intervention can be delayed in patients over 50 years of age who are asymptomatic or minimally symptomatic and who have serum calcium concentrations <1.0 mg/dL (0.2 mmol/L) above the upper limit of normal, and in patients who are medically unfit for surgery. She prefers not to persue surgery even if PHPTH is confirmed. We discussed that given her age and underlying medical problems it is reasonable to avoid surgery. We will repeat labs in 6month, if stable and ca<11 no need for medical therapy.    As far as calcium intake, even in cases of hyperparathyroidism, it is recommended to keep up calcium intake to about 1000 mg daily, to prevent further increase in PTH and bone disease. I advised that to the patient.    It is unlikely that her chronic compression fx is related to diagnosis above, given normal dexa scan. Unable to confirm fragility fracture as this is chronic , she reported significant falls over the years. ( Old MRI lumbar 2017 no mention of fracture). We discussed OP medication for possible fragility fx, she prefers to avoid new meds for now.    3. Chronic  hypothyroidism  Controlled on stable lt4. Managed by her PCP.     Test and/or medications prescribed today:  Orders Placed This Encounter   Procedures    25 Hydroxyvitamin D2 and D3    Albumin level    Alkaline phosphatase    Calcium    Magnesium    Parathyroid Hormone Intact    Phosphorus    Creatinine         Follow up:6 month      Ida Nettles MD  Endocrinology, Diabetes and Metabolism  [facility]

## 2023-12-18 NOTE — PROGRESS NOTES
Endocrinology Clinic Visit       Video-Visit Details    Type of service:  Video Visit  Joined the call at 12/18/2023, 10:08:51 am.  Left the call at 12/18/2023, 10:34:22 am.    Originating Location (pt. Location): Home        Distant Location (provider location):  Off-site    Mode of Communication:  Video Conference via [employer]    Physician has received verbal consent for a Video Visit from the patient? Yes    I spent a total of 50 minutes on the date of encounter reviewing medical records, evaluating the patient, coordinating care and documenting in the EHR, as detailed above.      NAME:  Lisa Ferguson  PCP:  Ziyad Isaac  MRN:  [id number]  Reason for Consult:  Hypercalcemia   Requesting Provider:  Ziyad Isaac    Chief Complaint     Chief Complaint   Patient presents with    RECHECK    Video Visit       History of Present Illness     Lisa Ferguson is a 87 year old female who is seen in video visit for hypercalcemia. Last seen 3/2023.    She had hyperparathyroidism s/p parathyroidectomy 2002 at [facility]. No records. Care everywhere reviewed: 11/12/2002 NM parathyroid: Early images show prominent uptake at the salivary glands and thyroid which is normal.  On the delayed images thyroid activity should have largely diminished, however, in this particular case the thyroid activity is still quite prominent just a little less than was seen on the early images.  Because of this poor washout, it is very difficult to evaluate for parathyroid adenoma.  no suspicious focal lesion, although the expected positions of the parathyroid are obscured by the thyroid uptake.     She reported that she had a 24 hour urine calcium of > 490 mg at that time  She had kidney stone in 2015. 24 hour urine calcium at that time was 150 mg  Repeat on 3/2023 was 190 mg while on torsemide    Calcium started to trend up in 2014 around 10.4-10.6 with periods of normal caclium. Up until recently calcium trended up to high 10s and  highest of 11.1.    ENDO CALCIUM LABS-[facility] Latest Ref Rng & Units 1/2/2023   VITAMIN D DEFICIENCY SCREENING 20 - 75 ug/L 32   ALKPHOS 40 - 150 U/L    CALCIUM, TOTAL 8.5 - 10.1 mg/dL 11.1 (H)   UREA NITROGEN 7 - 30 mg/dL 18   CREATININE 0.52 - 1.04 mg/dL 0.79   PARATHYROID HORMONE INTACT 15 - 65 pg/mL 101 (H)    Most recent labs 12/2023 ca 11 with alb of 4.4.    Symptoms of hypercalcemia: chronic constipation, no dyspepsia, no mental status changes/lethargy, no polyuria. She has chronic dry mouth related to torsemide.     Dexa scan most recent one 12/2022: no evidence of low bone density.    Calcium intake: eats cheese and yogurt. Supplements: no    Vitamin D intake: Supplements: 2000 international unit(s)     Previous fractures: shoulder related injury ? Fracture after falling, around 2015. Recent fall , no fracture. FTH chronic compression fracture on L2.  Family history of fragility fracture in parent: no  History of kidney stones: Yes: one time kidney stone in 2015.  History of kidney disease: no  Family history of any calcium problems or kidney stones: Yes: sister had same hyperparathyroid issues.  History of vitamin D deficiency: No  History of HCTZ use: yes, she does not recall. It is on her chart, stopped back in 2020.  History of Lithium use: No  History of malabsorption (IBD, Celiac, gastric bypass ): No  History of thyroid disease: Yes: controlled   Weight history: lost 20 lbs since she fell in the past few years.    She fell 9/2022 had hip pain but no fractures. She is now s/p right hip replacement 10/2023. Recovering well      Problem List     Patient Active Problem List   Diagnosis    PUD (peptic ulcer disease)    Hyperlipidemia with target LDL less than 70    Mitral regurgitation    DJD (degenerative joint disease)    Sciatica neuralgia    IBS (irritable bowel syndrome)    RBBB (right bundle branch block)    GLEN (obstructive sleep apnea)    Balance disorder    Anxiety    Ventricular tachycardia  (paroxysmal) (H)    Blepharitis    Hypercalcemia    OA (osteoarthritis) of knee - bilateral    CHF (congestive heart failure) (H)    Abnormal glucose    S/P CABG (coronary artery bypass graft)    S/p total knee replacement, bilateral    Acquired hypothyroidism    Essential hypertension with goal blood pressure less than 130/80    Morbid obesity (H)    H/O parathyroidectomy (H24)    Atherosclerosis of coronary artery bypass graft(s), unspecified, with other forms of angina pectoris (H24)    Malignant neoplasm of upper-outer quadrant of left breast in female, estrogen receptor positive (H)    Secondary hyperparathyroidism of renal origin (H24)    Acute idiopathic gout of right foot    Hip pain, right    Closed compression fracture of L2 vertebra (H)    Degenerative joint disease of right hip    Lumbar spinal stenosis        Medications     Current Outpatient Medications   Medication    acetaminophen (TYLENOL) 650 MG CR tablet    allopurinol (ZYLOPRIM) 100 MG tablet    allopurinol (ZYLOPRIM) 300 MG tablet    aspirin 81 MG EC tablet    betamethasone dipropionate (DIPROSONE) 0.05 % external lotion    levothyroxine (SYNTHROID/LEVOTHROID) 125 MCG tablet    losartan (COZAAR) 50 MG tablet    metoprolol tartrate (LOPRESSOR) 25 MG tablet    order for DME    oxyCODONE (ROXICODONE) 5 MG tablet    rosuvastatin (CRESTOR) 5 MG tablet    sertraline (ZOLOFT) 25 MG tablet    torsemide (DEMADEX) 20 MG tablet    vitamin D3 (CHOLECALCIFEROL) 50 mcg (2000 units) tablet    amoxicillin (AMOXIL) 500 MG capsule     No current facility-administered medications for this visit.        Allergies     Allergies   Allergen Reactions    Adhesive Tape     Atorvastatin Other (See Comments) and Muscle Pain (Myalgia)     lipitor  Myalgia    Buspar [Buspirone]     Nickel Other (See Comments)     Raw weepy skin  rash    Vicodin [Hydrocodone-Acetaminophen] Other (See Comments)     Hallucinations    Liquid Adhesive      Other reaction(s): Contact Dermatitis        Medical / Surgical History     Past Medical History:   Diagnosis Date    Anxiety     CAD (coronary artery disease)     angio 2002, h/o cabg, sees Luisana Nelson NP, they follow the cholesterol    CHF (congestive heart failure) (H) 07/08/2014    Closed compression fracture of L2 vertebra (H)     Degenerative joint disease of right hip     DJD (degenerative joint disease)     History of colonoscopy 01/01/2000    due jan 2010    Hyperlipidemia LDL goal < 70     [place] [facility]    Hypertension goal BP (blood pressure) < 140/90     Hypothyroidism     IBS (irritable bowel syndrome)     Lumbar spinal stenosis     Mitral regurgitation     Obesity     GLEN (obstructive sleep apnea) 07/11/2011    PUD (peptic ulcer disease)     h/o duodenal ulcer    RBBB (right bundle branch block)     Sciatica neuralgia november 209    MRI shows spinal stenosis and a cyst on the spine, has received an epidural steroid injection     Past Surgical History:   Procedure Laterality Date    ARTHROSCOPY KNEE RT/LT      bilateral    COLONOSCOPY  6/2010    negative    HC DILATION/CURETTAGE DIAG/THER NON OB      HC EXCIS INTERDIGITAL NEUROMA,EA      mortons neuroma excision    HC REMOVAL GALLBLADDER  1994    HERNIA REPAIR, INGUINAL RT/LT      MASTECTOMY SIMPLE BILATERAL, SENTINEL NODE BILATERAL, COMBINED Bilateral 1/6/2021    Procedure: BILATERAL SIMPLE MASTECTOMY WITH LEFT SENTINEL LYMPH NODE BIOPSY;  Surgeon: Beata Garcia MD;  Location: [facility] OR    SURGICAL HISTORY OF -       bilateral meniscal tears and surgery on these    SURGICAL HISTORY OF -   1999 or so    one parathyroid removed    TONSILLECTOMY      TUBAL LIGATION      ZZC APPENDECTOMY      ZZC CABG, ARTERIAL, THREE  1999    LIMA-LAD, SVG-DIagnoal, SVG-OM, previous LAD-PCI, sees Dr Banuelos       Social History     Social History     Socioeconomic History    Marital status:      Spouse name: Not on file    Number of children: Not on file    Years of education: Not on file     Highest education level: Not on file   Occupational History    Not on file   Tobacco Use    Smoking status: Former     Years: 20     Types: Cigarettes     Quit date: 1978     Years since quittin.4    Smokeless tobacco: Never   Vaping Use    Vaping Use: Never used   Substance and Sexual Activity    Alcohol use: Yes     Comment: Rare     Drug use: No    Sexual activity: Not Currently     Partners: Male   Other Topics Concern    Parent/sibling w/ CABG, MI or angioplasty before 65F 55M? Not Asked   Social History Narrative    Not on file     Social Determinants of Health     Financial Resource Strain: Low Risk  (2023)    Financial Resource Strain     Within the past 12 months, have you or your family members you live with been unable to get utilities (heat, electricity) when it was really needed?: No   Food Insecurity: Low Risk  (2023)    Food Insecurity     Within the past 12 months, did you worry that your food would run out before you got money to buy more?: No     Within the past 12 months, did the food you bought just not last and you didn t have money to get more?: No   Transportation Needs: Low Risk  (2023)    Transportation Needs     Within the past 12 months, has lack of transportation kept you from medical appointments, getting your medicines, non-medical meetings or appointments, work, or from getting things that you need?: No   Physical Activity: Not on file   Stress: Not on file   Social Connections: Not on file   Interpersonal Safety: Not on file   Housing Stability: Low Risk  (2023)    Housing Stability     Do you have housing? : Yes     Are you worried about losing your housing?: No       Family History     Family History   Problem Relation Age of Onset    C.A.D. Mother     Arthritis Mother     Cardiovascular Mother     Heart Disease Mother     Obesity Mother     Thyroid Disease Mother     C.A.D. Father     Diabetes Father     Hypertension Father     Alcohol/Drug Father      Alzheimer Disease Father     Cardiovascular Father     Circulatory Father     Gastrointestinal Disease Father     Heart Disease Father     Lipids Father     Obesity Father     Cardiovascular Maternal Grandmother     Depression Maternal Grandmother     Obesity Maternal Grandmother     Thyroid Disease Maternal Grandmother     Cardiovascular Maternal Grandfather     Heart Disease Maternal Grandfather     Obesity Maternal Grandfather     Obesity Paternal Grandmother     Diabetes Paternal Grandfather     Alcohol/Drug Paternal Grandfather     Obesity Paternal Grandfather     Breast Cancer Sister     Cancer Sister     Obesity Sister     Thyroid Disease Sister     Alcohol/Drug Son     Allergies Son     Allergies Daughter     Depression Sister     Eye Disorder Sister     Gastrointestinal Disease Sister     Thyroid Disease Sister        ROS     12 ROS completed, pertinent positive and negative in HPI    Physical Exam   Wt 107.5 kg (237 lb)   BMI 38.32 kg/m     GENERAL: Healthy, alert and no distress  EYES: Eyes grossly normal to inspection.  No discharge or erythema, or obvious scleral/conjunctival abnormalities.  RESP: No audible wheeze, cough, or visible cyanosis.  No visible retractions or increased work of breathing.    SKIN: Visible skin clear. No significant rash, abnormal pigmentation or lesions.  NEURO: Cranial nerves grossly intact.  Mentation and speech appropriate for age.  PSYCH: Mentation appears normal, affect normal/bright, judgement and insight intact, normal speech and appearance well-groomed.     Labs/Imaging     Pertinent Labs were reviewed and updated in EPIC and discussed briefly.  Radiology Results were  reviewed and updated in EPIC and discussed briefly.    Summary of recent findings:   Lab Results   Component Value Date    A1C 5.8 01/02/2023    A1C 6.0 07/07/2022    A1C 5.7 12/29/2020    A1C 5.7 09/13/2018    A1C 5.4 08/01/2017    A1C 5.7 01/03/2017    A1C 5.5 02/12/2016       TSH   Date Value Ref  "Range Status   09/29/2023 3.72 0.30 - 4.20 uIU/mL Final   06/22/2023 5.48 (H) 0.30 - 4.20 uIU/mL Final   07/07/2022 2.94 0.40 - 4.00 mU/L Final   02/17/2021 3.80 0.40 - 4.00 mU/L Final   12/29/2020 23.61 (H) 0.40 - 4.00 mU/L Final   10/28/2019 1.75 0.40 - 4.00 mU/L Final   09/13/2018 2.66 0.40 - 4.00 mU/L Final   08/01/2017 2.66 0.40 - 4.00 mU/L Final     T4 Free   Date Value Ref Range Status   12/29/2020 0.59 (L) 0.76 - 1.46 ng/dL Final   10/21/2016 1.02 0.76 - 1.46 ng/dL Final   03/23/2015 1.07 0.76 - 1.46 ng/dL Final     Comment:     Effective 7/30/2014, the reference range for this assay has changed to reflect   new instrumentation/methodology.       Free T4   Date Value Ref Range Status   06/22/2023 1.28 0.90 - 1.70 ng/dL Final       Creatinine   Date Value Ref Range Status   12/07/2023 0.84 0.51 - 0.95 mg/dL Final   01/07/2021 0.79 0.52 - 1.04 mg/dL Final       Recent Labs   Lab Test 07/07/22  1125 12/29/20  1227 02/12/16  1108 03/23/15  1213   CHOL 175 296*   < > 162   HDL 49* 51   < > 50*   LDL 94 200*   < > 69   TRIG 160* 227*   < > 217*   CHOLHDLRATIO  --   --   --  3.2    < > = values in this interval not displayed.       No results found for: \"[id number]\", \"[id number]\", \"[id number]\"    I personally reviewed the patient's outside records from [facility] and Care Everywhere. Summary of pertinent findings in HPI.    Impression / Plan       1. Hypercalcemia.   2. Hyperparathyroidism   3. Chronic compression fracture  With elevated or normal PTH, the most likely diagnosis is recurrence of primary hyperparathyroidism. Her 24 hour urine collection for calcium was <220 mg despite being on torsemide which makes raise the concern for FHH.    She is s/p parathyroidectomy in 2002, no records.    We reviewed the latest guidelines conclude that surgery for hyperparathyroidism is indicated in symptomatic patients, or in asymptomatic patients who meet any one of the following conditions:  Serum calcium concentration of " 1.0 mg/dL (0.25 mmol/L) or more above the upper limit of normal   Creatnine clearance reduced to < 60 mL/min, or 24-hour urine for calcium > 400 mg/day and increased stone risk by biochemical stone risk analysis, or nephrolithiasis or nephrocalcinosis on imaging studyCreatinine clearance that is reduced to <60 mL/min   Bone density at the hip, lumbar spine, or distal radius that is more than 2.5 standard deviations below peak bone mass (T score <-2.5) and/or previous fragility fracture   Age less than 50 years    Operative intervention can be delayed in patients over 50 years of age who are asymptomatic or minimally symptomatic and who have serum calcium concentrations <1.0 mg/dL (0.2 mmol/L) above the upper limit of normal, and in patients who are medically unfit for surgery. She prefers not to persue surgery even if PHPTH is confirmed. We discussed that given her age and underlying medical problems it is reasonable to avoid surgery. We will repeat labs in 6month, if stable and ca<11 no need for medical therapy.    As far as calcium intake, even in cases of hyperparathyroidism, it is recommended to keep up calcium intake to about 1000 mg daily, to prevent further increase in PTH and bone disease. I advised that to the patient.    It is unlikely that her chronic compression fx is related to diagnosis above, given normal dexa scan. Unable to confirm fragility fracture as this is chronic , she reported significant falls over the years. ( Old MRI lumbar 2017 no mention of fracture). We discussed OP medication for possible fragility fx, she prefers to avoid new meds for now.    3. Chronic hypothyroidism  Controlled on stable lt4. Managed by her PCP.     Test and/or medications prescribed today:  Orders Placed This Encounter   Procedures    25 Hydroxyvitamin D2 and D3    Albumin level    Alkaline phosphatase    Calcium    Magnesium    Parathyroid Hormone Intact    Phosphorus    Creatinine         Follow up:6  nichelle Nettles MD  Endocrinology, Diabetes and Metabolism  [facility]

## 2023-12-24 DIAGNOSIS — F41.9 ANXIETY: ICD-10-CM

## 2023-12-26 RX ORDER — SERTRALINE HYDROCHLORIDE 25 MG/1
25 TABLET, FILM COATED ORAL DAILY
Qty: 90 TABLET | Refills: 0 | Status: SHIPPED | OUTPATIENT
Start: 2023-12-26 | End: 2024-03-25

## 2023-12-29 DIAGNOSIS — M10.071 ACUTE IDIOPATHIC GOUT OF RIGHT FOOT: ICD-10-CM

## 2023-12-29 RX ORDER — ALLOPURINOL 100 MG/1
TABLET ORAL
Qty: 90 TABLET | Refills: 1 | Status: SHIPPED | OUTPATIENT
Start: 2023-12-29 | End: 2024-06-07

## 2023-12-29 NOTE — TELEPHONE ENCOUNTER
Uric Acid   Date Value Ref Range Status   01/02/2023 4.0 2.6 - 6.0 mg/dL Final   12/29/2020 4.3 2.6 - 6.0 mg/dL Final

## 2024-01-05 ENCOUNTER — ONCOLOGY VISIT (OUTPATIENT)
Dept: ONCOLOGY | Facility: CLINIC | Age: 89
End: 2024-01-05
Attending: INTERNAL MEDICINE
Payer: MEDICARE

## 2024-01-05 VITALS
DIASTOLIC BLOOD PRESSURE: 68 MMHG | OXYGEN SATURATION: 95 % | HEIGHT: 66 IN | SYSTOLIC BLOOD PRESSURE: 121 MMHG | RESPIRATION RATE: 18 BRPM | HEART RATE: 63 BPM | WEIGHT: 242 LBS | BODY MASS INDEX: 38.89 KG/M2

## 2024-01-05 DIAGNOSIS — C50.412 MALIGNANT NEOPLASM OF UPPER-OUTER QUADRANT OF LEFT BREAST IN FEMALE, ESTROGEN RECEPTOR POSITIVE (H): Primary | ICD-10-CM

## 2024-01-05 DIAGNOSIS — Z17.0 MALIGNANT NEOPLASM OF UPPER-OUTER QUADRANT OF LEFT BREAST IN FEMALE, ESTROGEN RECEPTOR POSITIVE (H): Primary | ICD-10-CM

## 2024-01-05 PROCEDURE — 99212 OFFICE O/P EST SF 10 MIN: CPT | Performed by: INTERNAL MEDICINE

## 2024-01-05 PROCEDURE — G0463 HOSPITAL OUTPT CLINIC VISIT: HCPCS | Performed by: INTERNAL MEDICINE

## 2024-01-05 ASSESSMENT — PAIN SCALES - GENERAL: PAINLEVEL: MILD PAIN (3)

## 2024-01-05 NOTE — NURSING NOTE
"Oncology Rooming Note    January 5, 2024 9:52 AM   Lisa Ferguson is a 88 year old female who presents for:    Chief Complaint   Patient presents with    Oncology Clinic Visit     6 month follow up     Initial Vitals: /68 (BP Location: Left arm)   Pulse 63   Resp 18   Ht 1.675 m (5' 5.95\")   Wt 109.8 kg (242 lb)   SpO2 95%   BMI 39.13 kg/m   Estimated body mass index is 39.13 kg/m  as calculated from the following:    Height as of this encounter: 1.675 m (5' 5.95\").    Weight as of this encounter: 109.8 kg (242 lb). Body surface area is 2.26 meters squared.  Mild Pain (3) Comment: Data Unavailable   No LMP recorded. Patient is postmenopausal.  Allergies reviewed: Yes  Medications reviewed: Yes    Medications: Medication refills not needed today.  Pharmacy name entered into [employer]: [id number] - [place], [place] - [address] [place] [address] [address]    Frailty Screening:   Is the patient here for a new oncology consult visit in cancer care? 2. No      Clinical concerns: No new concerns         Carol Eilas LPN              "

## 2024-01-05 NOTE — LETTER
1/5/2024         RE: Lisa Ferguson  [address]  [place] [address]        Dear Colleague,    Thank you for referring your patient, Lisa Ferguson, to the [facility]. Please see a copy of my visit note below.    [facility]  MEDICAL ONCOLOGY    RETURN PATIENT VISIT NOTE    Reason for visit: breast cancer    Oncology Treatment Summary  1.  Patient self palpated abnormality in left breast in November 2020.  11/16/2020 diagnostic mammogram and ultrasound were done which found 2 lesions within the left breast 1 measuring approximately 1.5 cm and the second 1.9 cm.  Both were biopsied the one at 3:00 was a grade 2 invasive lobular cancer.  The one at 11:00 was a grade 2 invasive ductal cancer.  It was estrogen receptor positive progesterone receptor positive HER-2/kena indeterminate by IHC and negative by ALBERTO  2.  1/6/2021 patient underwent bilateral mastectomies.  Right mastectomy was unremarkable.  Left mastectomy found a multifocal breast cancer the first measuring 4.5 cm is a grade 2 invasive lobular carcinoma, the second was a 2.5 cm grade 2 malignancy.  2 sentinel lymph nodes were removed 1 of which was negative the other 1 had immunohistochemistry positive only cells for a T M2N0i+M0 ER/NE positive HER-2/kena negative disease  3.  Oncotype RS: one was 22 and other was 6 both low risk  4.  Genetic testing was done that was unremarkable  5. Anastrozole started march 2021 - quit in the fall 2022 due to side effects and declined taking any other.      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 She has been off her arimidex since last fall which she has side effects and has declined to restart.    She had a right hip replacement this fall and has been recovering well from it.     She has no new aches or pains, She is still not interested in retrying antiestrogen therapy. No  other new medical problems. She is up to date with her vaccinations.     PAST MEDICAL HISTORY  Coronary artery disease, peptic ulcer disease, anxiety, hyperlipidemia, mitral regurgitation, hypertension, degenerative joint disease, right bundle branch block, irritable bowel, sciatica, hypothyroidism, obstructive sleep apnea, congestive heart failure, gout, nephrolithiasis      CURRENT OUTPATIENT MEDICATIONS  Current Outpatient Medications   Medication     acetaminophen (TYLENOL) 650 MG CR tablet     allopurinol (ZYLOPRIM) 100 MG tablet     allopurinol (ZYLOPRIM) 300 MG tablet     amoxicillin (AMOXIL) 500 MG capsule     aspirin 81 MG EC tablet     betamethasone dipropionate (DIPROSONE) 0.05 % external lotion     levothyroxine (SYNTHROID/LEVOTHROID) 125 MCG tablet     losartan (COZAAR) 50 MG tablet     metoprolol tartrate (LOPRESSOR) 25 MG tablet     order for DME     oxyCODONE (ROXICODONE) 5 MG tablet     rosuvastatin (CRESTOR) 5 MG tablet     sertraline (ZOLOFT) 25 MG tablet     torsemide (DEMADEX) 20 MG tablet     vitamin D3 (CHOLECALCIFEROL) 50 mcg (2000 units) tablet     No current facility-administered medications for this visit.        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  She has a history of smoking but quit this 40 years ago.  Occasional alcohol use     FAMILY HISTORY  Her sister  of breast cancer at 62 and did have a BRCA mutation.  Patient's niece also carries a BRCA mutation.  She was tested for this and does not..  Her genetic testing was negative       PHYSICAL EXAM  B/P: Data Unavailable, T:  Data Unavailable, P: Data Unavailable, R: Data Unavailable  Wt Readings from Last 3 Encounters:   12/18/23 107.5 kg (237 lb)   09/29/23 108.9 kg (240 lb)   06/20/23 108.9 kg (240 lb)       Physical Exam  Vitals reviewed.   Constitutional:       Appearance: Normal appearance.   HENT:      Head: Normocephalic and atraumatic.   Eyes:      Extraocular Movements: Extraocular movements intact.      Pupils: Pupils are equal, round, and reactive to light.   Cardiovascular:      Rate and Rhythm: Normal rate and regular rhythm.   Pulmonary:      Effort: Pulmonary effort is normal.      Breath sounds: Normal breath sounds.   Abdominal:      Palpations: Abdomen is soft.   Musculoskeletal:         General: Normal range of motion.      Cervical back: Normal range of motion and neck supple.   Skin:     General: Skin is warm.   Neurological:      General: No focal deficit present.      Mental Status: She is alert and oriented to person, place, and time. Mental status is at baseline.   Psychiatric:         Mood and Affect: Mood normal.         Behavior: Behavior normal.         Thought Content: Thought content normal.         Judgment: Judgment normal.       Breasts- bilaterally surgically absent. No evidence of local recurrence.        LABORATORY AND IMAGING STUDIES  Recent Labs   Lab Test 12/07/23  1120 09/29/23  1055 06/22/23  1025 03/23/23  0906 02/20/23  1106 01/02/23  1453 07/07/22  1125   NA  --  140 137  --  141 140 140   POTASSIUM  --  4.5 4.2  --  4.3 3.6 3.8   CHLORIDE  --  105 101  --  108 104 106   CO2  --  22 23  --  29 31 27   ANIONGAP  --  13 13  --  4 5 7   BUN  --  20.5 21.2  --  19 18 20   CR 0.84 0.81 0.73 0.86 0.87 0.79 0.82   GLC  --  107* 104*  --  111* 98 115*   LUCIANO 11.0* 10.7* 10.9*  10.9* 10.6* 10.9* 11.1* 10.8*     Recent Labs   Lab Test 12/07/23  1120 03/23/23  0906   MAG 2.1 2.0   PHOS 2.8 3.0     Recent Labs   Lab Test 09/29/23  1055 06/22/23  1025 07/07/22  1125 01/07/21  0738 12/29/20  1223  "10/28/19  1135 10/17/18  1220 09/13/18  1122   WBC 9.4 10.3 10.4  --  10.8 8.9 11.9* 9.1   HGB 12.7 12.5 13.1 11.2* 12.9 13.1 13.4 13.8    298 326  --  337 362 392 291   MCV 90 90 88  --  91 91 89 87   NEUTROPHIL 54  --   --   --  58.1 48.1 60.6 45.1     Recent Labs   Lab Test 12/07/23  1120 06/22/23  1025 03/23/23  0906 07/07/22  1125 10/28/19  1135   ALT  --  18  --  34 35   ALBUMIN 4.4  --  4.2  --   --      TSH   Date Value Ref Range Status   09/29/2023 3.72 0.30 - 4.20 uIU/mL Final   06/22/2023 5.48 (H) 0.30 - 4.20 uIU/mL Final   07/07/2022 2.94 0.40 - 4.00 mU/L Final   02/17/2021 3.80 0.40 - 4.00 mU/L Final   12/29/2020 23.61 (H) 0.40 - 4.00 mU/L Final   10/28/2019 1.75 0.40 - 4.00 mU/L Final     No results for input(s): \"CEA\" in the last 80390 hours.  Results for orders placed or performed in visit on 05/25/23   XR Pelvis w Hip Right G/E 2 Views    Narrative    EXAM: PELVIS AND HIP RIGHT TWO VIEWS  DATE/TIME: 5/25/2023 3:11 PM     INDICATION: Right hip pain.   COMPARISON: 11/23/2022.      Impression    IMPRESSION:  1.  Advanced right hip degenerative arthrosis, progressed. This  includes advanced superior joint space narrowing, mild subchondral  sclerosis, and mild marginal osteophytosis.  2.  No fracture or joint malalignment.       MICHELLE RIVERS MD         SYSTEM ID:  [id number]          ASSESSMENT  AND RECOMMENDATIONS:    1. T2(m2)N0i+M0 ER positive [place] positive HER-2/kena negative breast cancer status post left mastectomy with both tumors having a low risk Oncotype  2. L2 chronic compression fracture  3. Numerous other medical problems as delineated above  4. Probable hyperparathyroidism - follows with endocrinology    Plan    We discussed again,  trying another antiestrogen however she declined and is not interested.   2.  Otherwise no evidence of disease  3. RTC 6 months for exam    Tamar Georges MD on 1/5/2024 at 10:12 AM                      Again, thank you for allowing me to " participate in the care of your patient.        Sincerely,        Tamar Georges MD

## 2024-01-05 NOTE — PROGRESS NOTES
[facility]  MEDICAL ONCOLOGY    RETURN PATIENT VISIT NOTE    Reason for visit: breast cancer    Oncology Treatment Summary  1.  Patient self palpated abnormality in left breast in November 2020.  11/16/2020 diagnostic mammogram and ultrasound were done which found 2 lesions within the left breast 1 measuring approximately 1.5 cm and the second 1.9 cm.  Both were biopsied the one at 3:00 was a grade 2 invasive lobular cancer.  The one at 11:00 was a grade 2 invasive ductal cancer.  It was estrogen receptor positive progesterone receptor positive HER-2/kena indeterminate by IHC and negative by ALBERTO  2.  1/6/2021 patient underwent bilateral mastectomies.  Right mastectomy was unremarkable.  Left mastectomy found a multifocal breast cancer the first measuring 4.5 cm is a grade 2 invasive lobular carcinoma, the second was a 2.5 cm grade 2 malignancy.  2 sentinel lymph nodes were removed 1 of which was negative the other 1 had immunohistochemistry positive only cells for a T M2N0i+M0 ER/ME positive HER-2/kena negative disease  3.  Oncotype RS: one was 22 and other was 6 both low risk  4.  Genetic testing was done that was unremarkable  5. Anastrozole started march 2021 - quit in the fall 2022 due to side effects and declined taking any other.      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 She has been off her arimidex since last fall which she has side effects and has declined to restart.    She had a right hip replacement this fall and has been recovering well from it.     She has no new aches or pains, She is still not interested in retrying antiestrogen therapy. No other new medical problems. She is up to date with her vaccinations.     PAST MEDICAL HISTORY  Coronary artery disease, peptic ulcer disease, anxiety, hyperlipidemia, mitral regurgitation, hypertension, degenerative joint disease, right bundle branch block,  irritable bowel, sciatica, hypothyroidism, obstructive sleep apnea, congestive heart failure, gout, nephrolithiasis      CURRENT OUTPATIENT MEDICATIONS  Current Outpatient Medications   Medication    acetaminophen (TYLENOL) 650 MG CR tablet    allopurinol (ZYLOPRIM) 100 MG tablet    allopurinol (ZYLOPRIM) 300 MG tablet    amoxicillin (AMOXIL) 500 MG capsule    aspirin 81 MG EC tablet    betamethasone dipropionate (DIPROSONE) 0.05 % external lotion    levothyroxine (SYNTHROID/LEVOTHROID) 125 MCG tablet    losartan (COZAAR) 50 MG tablet    metoprolol tartrate (LOPRESSOR) 25 MG tablet    order for DME    oxyCODONE (ROXICODONE) 5 MG tablet    rosuvastatin (CRESTOR) 5 MG tablet    sertraline (ZOLOFT) 25 MG tablet    torsemide (DEMADEX) 20 MG tablet    vitamin D3 (CHOLECALCIFEROL) 50 mcg (2000 units) tablet     No current facility-administered medications for this visit.        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  She has a history of smoking but quit this 40 years ago.  Occasional alcohol use     FAMILY HISTORY  Her sister  of breast cancer at 62 and did have a BRCA mutation.  Patient's niece also carries a BRCA mutation.  She was tested for this and does not..  Her genetic testing was negative       PHYSICAL EXAM  B/P: Data Unavailable, T: Data Unavailable, P: Data Unavailable, R: Data Unavailable  Wt Readings from Last 3 Encounters:   23 107.5 kg (237 lb)   23 108.9 kg (240 lb)   23 108.9 kg (240 lb)       Physical Exam  Vitals reviewed.   Constitutional:       Appearance: Normal appearance.    HENT:      Head: Normocephalic and atraumatic.   Eyes:      Extraocular Movements: Extraocular movements intact.      Pupils: Pupils are equal, round, and reactive to light.   Cardiovascular:      Rate and Rhythm: Normal rate and regular rhythm.   Pulmonary:      Effort: Pulmonary effort is normal.      Breath sounds: Normal breath sounds.   Abdominal:      Palpations: Abdomen is soft.   Musculoskeletal:         General: Normal range of motion.      Cervical back: Normal range of motion and neck supple.   Skin:     General: Skin is warm.   Neurological:      General: No focal deficit present.      Mental Status: She is alert and oriented to person, place, and time. Mental status is at baseline.   Psychiatric:         Mood and Affect: Mood normal.         Behavior: Behavior normal.         Thought Content: Thought content normal.         Judgment: Judgment normal.       Breasts- bilaterally surgically absent. No evidence of local recurrence.        LABORATORY AND IMAGING STUDIES  Recent Labs   Lab Test 12/07/23  1120 09/29/23  1055 06/22/23  1025 03/23/23  0906 02/20/23  1106 01/02/23  1453 07/07/22  1125   NA  --  140 137  --  141 140 140   POTASSIUM  --  4.5 4.2  --  4.3 3.6 3.8   CHLORIDE  --  105 101  --  108 104 106   CO2  --  22 23  --  29 31 27   ANIONGAP  --  13 13  --  4 5 7   BUN  --  20.5 21.2  --  19 18 20   CR 0.84 0.81 0.73 0.86 0.87 0.79 0.82   GLC  --  107* 104*  --  111* 98 115*   LUCIANO 11.0* 10.7* 10.9*  10.9* 10.6* 10.9* 11.1* 10.8*     Recent Labs   Lab Test 12/07/23  1120 03/23/23  0906   MAG 2.1 2.0   PHOS 2.8 3.0     Recent Labs   Lab Test 09/29/23  1055 06/22/23  1025 07/07/22  1125 01/07/21  0738 12/29/20  1227 10/28/19  1135 10/17/18  1220 09/13/18  1122   WBC 9.4 10.3 10.4  --  10.8 8.9 11.9* 9.1   HGB 12.7 12.5 13.1 11.2* 12.9 13.1 13.4 13.8    298 326  --  337 362 392 291   MCV 90 90 88  --  91 91 89 87   NEUTROPHIL 54  --   --   --  58.1 48.1 60.6 45.1     Recent Labs   Lab Test  "12/07/23  1120 06/22/23  1025 03/23/23  0906 07/07/22  1125 10/28/19  1135   ALT  --  18  --  34 35   ALBUMIN 4.4  --  4.2  --   --      TSH   Date Value Ref Range Status   09/29/2023 3.72 0.30 - 4.20 uIU/mL Final   06/22/2023 5.48 (H) 0.30 - 4.20 uIU/mL Final   07/07/2022 2.94 0.40 - 4.00 mU/L Final   02/17/2021 3.80 0.40 - 4.00 mU/L Final   12/29/2020 23.61 (H) 0.40 - 4.00 mU/L Final   10/28/2019 1.75 0.40 - 4.00 mU/L Final     No results for input(s): \"CEA\" in the last 60478 hours.  Results for orders placed or performed in visit on 05/25/23   XR Pelvis w Hip Right G/E 2 Views    Narrative    EXAM: PELVIS AND HIP RIGHT TWO VIEWS  DATE/TIME: 5/25/2023 3:11 PM     INDICATION: Right hip pain.   COMPARISON: 11/23/2022.      Impression    IMPRESSION:  1.  Advanced right hip degenerative arthrosis, progressed. This  includes advanced superior joint space narrowing, mild subchondral  sclerosis, and mild marginal osteophytosis.  2.  No fracture or joint malalignment.       MICHELLE RIVERS MD         SYSTEM ID:  [id number]          ASSESSMENT  AND RECOMMENDATIONS:    1. T2(m2)N0i+M0 ER positive [place] positive HER-2/kena negative breast cancer status post left mastectomy with both tumors having a low risk Oncotype  2. L2 chronic compression fracture  3. Numerous other medical problems as delineated above  4. Probable hyperparathyroidism - follows with endocrinology    Plan    We discussed again,  trying another antiestrogen however she declined and is not interested.   2.  Otherwise no evidence of disease  3. RTC 6 months for exam    Tamar Georges MD on 1/5/2024 at 10:12 AM                    "

## 2024-01-06 NOTE — TELEPHONE ENCOUNTER
Printed old certificate put new form in providers bin to finish filling out. Or advise.  Ariana Rollins,      no

## 2024-02-16 ENCOUNTER — VIRTUAL VISIT (OUTPATIENT)
Dept: PHARMACY | Facility: CLINIC | Age: 89
End: 2024-02-16
Payer: COMMERCIAL

## 2024-02-16 DIAGNOSIS — I25.708 ATHEROSCLEROSIS OF CORONARY ARTERY BYPASS GRAFT(S), UNSPECIFIED, WITH OTHER FORMS OF ANGINA PECTORIS (H): ICD-10-CM

## 2024-02-16 DIAGNOSIS — Z78.9 TAKES DIETARY SUPPLEMENTS: ICD-10-CM

## 2024-02-16 DIAGNOSIS — N25.81 SECONDARY HYPERPARATHYROIDISM OF RENAL ORIGIN (H): ICD-10-CM

## 2024-02-16 DIAGNOSIS — E78.5 HYPERLIPIDEMIA WITH TARGET LDL LESS THAN 70: ICD-10-CM

## 2024-02-16 DIAGNOSIS — E03.9 ACQUIRED HYPOTHYROIDISM: ICD-10-CM

## 2024-02-16 DIAGNOSIS — I10 ESSENTIAL HYPERTENSION WITH GOAL BLOOD PRESSURE LESS THAN 130/80: ICD-10-CM

## 2024-02-16 DIAGNOSIS — M10.071 ACUTE IDIOPATHIC GOUT OF RIGHT FOOT: ICD-10-CM

## 2024-02-16 DIAGNOSIS — M19.90 DJD (DEGENERATIVE JOINT DISEASE): ICD-10-CM

## 2024-02-16 DIAGNOSIS — M25.551 HIP PAIN, RIGHT: ICD-10-CM

## 2024-02-16 DIAGNOSIS — F41.9 ANXIETY: Primary | ICD-10-CM

## 2024-02-16 PROCEDURE — 99207 PR NO CHARGE LOS: CPT | Mod: 93 | Performed by: PHARMACIST

## 2024-02-16 RX ORDER — SODIUM FLUORIDE 6.1 MG/ML
PASTE, DENTIFRICE DENTAL
COMMUNITY
Start: 2024-02-06

## 2024-02-16 RX ORDER — CLOBETASOL PROPIONATE 0.5 MG/ML
SOLUTION TOPICAL 2 TIMES DAILY
COMMUNITY

## 2024-02-16 NOTE — Clinical Note
EVERTON GAMBOA note, thanks!  Cyndie Breaux, PharmD Medication Therapy Management Pharmacist [phone]

## 2024-02-16 NOTE — PATIENT INSTRUCTIONS
"Recommendations from today's MTM visit:                                                         Decrease sertraline to 12.5 mg daily (half of a 25 mg pill).    Follow-up: Return in about 4 weeks (around 3/15/2024) for Medication Therapy Management.    It was great speaking with you today.  I value your experience and would be very thankful for your time in providing feedback in our clinic survey. In the next few days, you may receive an email or text message from [employer] with a link to a survey related to your  clinical pharmacist.\"     To schedule another MTM appointment, please call the clinic directly or you may call the MTM scheduling line at [phone] or toll-free at [phone].     My Clinical Pharmacist's contact information:                                                      Please feel free to contact me with any questions or concerns you have.      Cyndie Breaux, PharmD  Medication Therapy Management Pharmacist  [phone]     "

## 2024-02-16 NOTE — PROGRESS NOTES
Medication Therapy Management (MTM) Encounter    ASSESSMENT:                            Medication Adherence/Access: No issues identified    Anxiety: Okay to trial lower dose of sertraline.    Hyperparathyroidism: Stable.    Hip pain: Stable.    Hypertension /CAD: Stable.    Hyperlipidemia stable.    Hypothyroidism stable.  TSH is within normal limits.    Gout:   Stable. Patient is at goal of uric acid <6mg/dl.    Itching: Stable.    Supplements: Discussed she can continue this for another month and if not effective she will stop.    PLAN:                            Decrease sertraline to 12.5 mg daily (half of a 25 mg pill).    Follow-up: Return in about 4 weeks (around 3/15/2024) for Medication Therapy Management.    SUBJECTIVE/OBJECTIVE:                          Lisa Ferguson is a 88 year old female called for a follow-up visit from 11/10/23.       Reason for visit: med review.    Allergies/ADRs: Reviewed in chart  Past Medical History: Reviewed in chart  Tobacco: She reports that she quit smoking about 45 years ago. Her smoking use included cigarettes. She has never used smokeless tobacco.  Alcohol: Less than 1 beverage / month  Caffeine: 2-3 cups coffee/day    Medication Adherence/Access:   Patient uses pill box(es).  Per patient, misses medication 0 times per week.    The patient fills medications at [facility]: NO, fills medications at [facility].    Anxiety:    Sertraline 25 mg daily     Has more shaking and hand rolling, she is wondering if she can decrease the dose, but she also doesn't want to get off it completely, it has really helped her sleep and anxiety.   She has essential tremors at baseline, citalopram worsened these in the past, but she's not sure if she can attribute this to the medication. She also has a family history of essential tremor and would like to avoid anything that could contribute to that. Buspirone was terrible (she can't recall exactly, but whatever it was was terrible).   Sodium   Date  Value Ref Range Status   09/29/2023 140 135 - 145 mmol/L Final     Comment:     Reference intervals for this test were updated on 09/26/2023 to more accurately reflect our healthy population. There may be differences in the flagging of prior results with similar values performed with this method. Interpretation of those prior results can be made in the context of the updated reference intervals.    01/07/2021 133 133 - 144 mmol/L Final     Hyperparathyroidism:   Vitamin D 2000 international unit(s) daily     She is drinking 24 oz of milk a day .   Following with endocrinology, Dr. Nettles, who recommended 1000 mg of calcium/day.    Hip pain:    Acetaminophen 650-1300 mg twice daily as needed    States this/these are effective. Denies side effects.     Hypertension /CAD:  Losartan 50 mg daily   Metoprolol tartrate 50 mg every morning and 25 mg every evening  Torsemide 20 mg daily  Aspirin 81 mg daily    She's had triple bypass in the past. History of stomach bleed related to H pylori in the distant past.  She has a blood pressure cuff at home but not routinely using. Patient reports the following medication side effects: increased urination with torsemide.      BP Readings from Last 3 Encounters:   01/05/24 121/68   09/29/23 118/69   06/20/23 (!) 150/75     Pulse Readings from Last 3 Encounters:   01/05/24 63   09/29/23 58   06/20/23 59     Hyperlipidemia   Rosuvastatin 5 mg every other day    Patient reports no significant myalgias or other side effects.     Recent Labs   Lab Test 06/22/23  1025 07/07/22  1125   CHOL 190 175   HDL 43* 49*   LDL 98 94   TRIG 244* 160*     Hypothyroidism    Levothyroxine 125 mcg daily    Patient is having the following symptoms: none.      TSH   Date Value Ref Range Status   09/29/2023 3.72 0.30 - 4.20 uIU/mL Final   07/07/2022 2.94 0.40 - 4.00 mU/L Final   02/17/2021 3.80 0.40 - 4.00 mU/L Final     Gout:   allopurinol 400 mg daily     Patient reports no current pain concerns.  Patient is experiencing the following medication side effects: none.   Uric Acid   Date Value Ref Range Status   01/02/2023 4.0 2.6 - 6.0 mg/dL Final   12/29/2020 4.3 2.6 - 6.0 mg/dL Final     Itching:    Clobetasol 0.05% solution for back of scalp  Betamethasone 0.05% lotion as needed    States this/these are effective. Denies side effects.     Supplements:    Hair skin and nails, [employer] for her nails    States this/these are effective. Denies side effects.     Today's Vitals: There were no vitals taken for this visit.  ----------------      I spent 18 minutes with this patient today. All changes were made via collaborative practice agreement with Ziyad Isaac MD. A copy of the visit note was provided to the patient's provider(s).    A summary of these recommendations was sent via [employer].    Cyndie Breaux, LakeishaD  Medication Therapy Management Pharmacist  [phone]    Telemedicine Visit Details  Type of service:  Telephone visit  Start Time: 12:32 PM  End Time: 12:50 PM     Medication Therapy Recommendations  Anxiety    Current Medication: sertraline (ZOLOFT) 25 MG tablet   Rationale: Dose too high - Dosage too high - Safety   Recommendation: Decrease Dose   Status: Accepted per CPA

## 2024-03-20 ENCOUNTER — VIRTUAL VISIT (OUTPATIENT)
Dept: PHARMACY | Facility: CLINIC | Age: 89
End: 2024-03-20
Payer: MEDICARE

## 2024-03-20 DIAGNOSIS — N25.81 SECONDARY HYPERPARATHYROIDISM OF RENAL ORIGIN (H): ICD-10-CM

## 2024-03-20 DIAGNOSIS — I10 ESSENTIAL HYPERTENSION WITH GOAL BLOOD PRESSURE LESS THAN 130/80: ICD-10-CM

## 2024-03-20 DIAGNOSIS — F41.9 ANXIETY: Primary | ICD-10-CM

## 2024-03-20 DIAGNOSIS — M19.90 DJD (DEGENERATIVE JOINT DISEASE): ICD-10-CM

## 2024-03-20 DIAGNOSIS — E78.5 HYPERLIPIDEMIA WITH TARGET LDL LESS THAN 70: ICD-10-CM

## 2024-03-20 DIAGNOSIS — M10.071 ACUTE IDIOPATHIC GOUT OF RIGHT FOOT: ICD-10-CM

## 2024-03-20 DIAGNOSIS — E03.9 ACQUIRED HYPOTHYROIDISM: ICD-10-CM

## 2024-03-20 DIAGNOSIS — M25.551 HIP PAIN, RIGHT: ICD-10-CM

## 2024-03-20 DIAGNOSIS — Z78.9 TAKES DIETARY SUPPLEMENTS: ICD-10-CM

## 2024-03-20 DIAGNOSIS — I25.708 ATHEROSCLEROSIS OF CORONARY ARTERY BYPASS GRAFT(S), UNSPECIFIED, WITH OTHER FORMS OF ANGINA PECTORIS (H): ICD-10-CM

## 2024-03-20 PROCEDURE — 99207 PR NO CHARGE LOS: CPT | Mod: 93 | Performed by: PHARMACIST

## 2024-03-20 NOTE — Clinical Note
EVERTON GAMBOA note, thanks!  Cyndie Breaux, PharmD Medication Therapy Management Pharmacist [phone]

## 2024-03-20 NOTE — PROGRESS NOTES
Medication Therapy Management (MTM) Encounter    ASSESSMENT:                            Medication Adherence/Access: No issues identified    Anxiety:  Ok to continue sertraline 12.5 mg daily.    Hyperparathyroidism: plan in place    Hip pain:  Stable.    Gout:   Stable. Patient is at goal of uric acid <6mg/dl.    Seborrhea dermatitis:  Stable.    Hypertension /CAD: Stable. Bblood pressure at goal < 140/90.    Hyperlipidemia Stable.    Hypothyroidism:   Stable. Last TSH is within normal limits.     Supplements/Other Stable.    PLAN:                            1. Continue current medications.    Follow-up: Return in about 2 months (around 5/20/2024) for Medication Therapy Management.    SUBJECTIVE/OBJECTIVE:                          Lisa Ferguson is a 88 year old female called for a follow-up visit.       Reason for visit: med review.    Allergies/ADRs: Reviewed in chart  Past Medical History: Reviewed in chart  Tobacco: She reports that she quit smoking about 45 years ago. Her smoking use included cigarettes. She has never used smokeless tobacco.  Alcohol: Less than 1 beverage / month  Caffeine: 2-3 cups coffee/day    Medication Adherence/Access:   Patient uses pill box(es).  Per patient, misses medication 0 times per week.    The patient fills medications at [place]: NO, fills medications at [facility].    Anxiety:    Sertraline 12.5 mg daily (reduced from 25 mg daily)    She thought the lower dose was going well, is sleeping good, but did have two days with some more nervousness, stress type symptoms. Shaking and hand rolling is the same, didn't decrease any with reducing sertraline dose. She thinks she's starting to feel her age, she notes she's had that essential tremor for a long time, a neurologist noticed it a long time ago, and her mom and grandma had this as well. She has it way less than they do. She'd like to continue 12.5 mg and see how that goes.  Medication History:  Citalopram worsened tremors in the past,  but she's not sure if she can attribute this to the medication. She also has a family history of essential tremor and would like to avoid anything that could contribute to that. Buspirone was terrible (she can't recall exactly, but whatever it was was terrible).     Hyperparathyroidism:   Vitamin D 2000 international unit(s) daily     She is drinking 24-36 oz of milk a day, usually 24 oz, has cut down since her last calcium level was high.   Following with endocrinology, Dr. Nettles, who recommended 1000 mg of calcium/day.     Hip pain:    Acetaminophen 650-1300 mg twice daily as needed    States this/these are effective. Denies side effects.     Gout:   allopurinol 400 mg at bedtime     Patient reports no current pain concerns, usually affects big toe. Patient is experiencing the following medication side effects: none.   Uric Acid   Date Value Ref Range Status   01/02/2023 4.0 2.6 - 6.0 mg/dL Final   12/29/2020 4.3 2.6 - 6.0 mg/dL Final     Seborrhea dermatitis:    Clobetasol 0.05% solution as needed  Betamethasone 0.05% lotion as needed    These are somewhat effective.    Hypertension /CAD:  Losartan 50 mg daily   Metoprolol tartrate 50 mg every morning and 25 mg every evening  Torsemide 20 mg daily  Aspirin 81 mg daily    She's had triple bypass in the past. History of stomach bleed related to H pylori in the distant past.  She has a blood pressure cuff at home but not routinely using. Patient reports the following medication side effects: increased urination with torsemide.      BP Readings from Last 3 Encounters:   01/05/24 121/68   09/29/23 118/69   06/20/23 (!) 150/75     Pulse Readings from Last 3 Encounters:   01/05/24 63   09/29/23 58   06/20/23 59     Hyperlipidemia   Rosuvastatin 5 mg every other day    Patient reports no significant myalgias or other side effects.     Recent Labs   Lab Test 06/22/23  1025 07/07/22  1125   CHOL 190 175   HDL 43* 49*   LDL 98 94   TRIG 244* 160*     Hypothyroidism    Levothyroxine 125 mcg daily    Patient is having the following symptoms: none.      TSH   Date Value Ref Range Status   09/29/2023 3.72 0.30 - 4.20 uIU/mL Final   07/07/2022 2.94 0.40 - 4.00 mU/L Final   02/17/2021 3.80 0.40 - 4.00 mU/L Final     Supplements /Other:  Biotin complex 2 daily for finger nails, she does feel like right hand fingernails are getting stronger  Sodium flouride toothpaste  .  No reported issues at this time.        Today's Vitals: There were no vitals taken for this visit.  ----------------      I spent 18 minutes with this patient today. All changes were made via collaborative practice agreement with Ziyad Isaac MD. A copy of the visit note was provided to the patient's provider(s).    A summary of these recommendations was sent via [employer].    Cyndie Breaux PharmD  Medication Therapy Management Pharmacist      Telemedicine Visit Details  Type of service:  Telephone visit  Start Time: 10:05 AM  End Time: 10:23 AM     Medication Therapy Recommendations  No medication therapy recommendations to display

## 2024-03-20 NOTE — PATIENT INSTRUCTIONS
"Recommendations from today's MTM visit:                                                         1. Continue current medications.    Follow-up: Return in about 2 months (around 5/20/2024) for Medication Therapy Management.    It was great speaking with you today.  I value your experience and would be very thankful for your time in providing feedback in our clinic survey. In the next few days, you may receive an email or text message from [employer] with a link to a survey related to your  clinical pharmacist.\"     To schedule another MTM appointment, please call the clinic directly or you may call the MTM scheduling line at [phone] or toll-free at [phone].     My Clinical Pharmacist's contact information:                                                      Please feel free to contact me with any questions or concerns you have.      Cyndie Breaux, PharmD  Medication Therapy Management Pharmacist     "

## 2024-03-24 DIAGNOSIS — F41.9 ANXIETY: ICD-10-CM

## 2024-03-25 RX ORDER — SERTRALINE HYDROCHLORIDE 25 MG/1
12.5 TABLET, FILM COATED ORAL DAILY
Qty: 45 TABLET | Refills: 1 | Status: SHIPPED | OUTPATIENT
Start: 2024-03-25 | End: 2024-05-22

## 2024-04-18 ENCOUNTER — MYC MEDICAL ADVICE (OUTPATIENT)
Dept: INTERNAL MEDICINE | Facility: CLINIC | Age: 89
End: 2024-04-18
Payer: MEDICARE

## 2024-04-18 DIAGNOSIS — Z96.653 S/P TOTAL KNEE REPLACEMENT, BILATERAL: Primary | ICD-10-CM

## 2024-04-18 RX ORDER — AMOXICILLIN 500 MG/1
2000 CAPSULE ORAL ONCE
Qty: 4 CAPSULE | Refills: 0 | Status: SHIPPED | OUTPATIENT
Start: 2024-04-18 | End: 2024-04-18

## 2024-04-18 NOTE — TELEPHONE ENCOUNTER
Patient requesting prophylactic Amoxicillin prior to dental work.    Pharmacy: [facility]/pharmacy #[id number] - [place] - [phone] [place]

## 2024-05-22 ENCOUNTER — VIRTUAL VISIT (OUTPATIENT)
Dept: PHARMACY | Facility: CLINIC | Age: 89
End: 2024-05-22
Payer: COMMERCIAL

## 2024-05-22 DIAGNOSIS — F41.9 ANXIETY: Primary | ICD-10-CM

## 2024-05-22 PROCEDURE — 99207 PR NO CHARGE LOS: CPT | Mod: 93 | Performed by: PHARMACIST

## 2024-05-22 RX ORDER — SERTRALINE HYDROCHLORIDE 25 MG/1
25 TABLET, FILM COATED ORAL DAILY
Qty: 90 TABLET | Refills: 1 | Status: SHIPPED | OUTPATIENT
Start: 2024-05-22 | End: 2024-06-26

## 2024-05-22 NOTE — Clinical Note
EVERTON GAMBOA note, thanks!  Cyndie Breaux, PharmD Medication Therapy Management Pharmacist [phone]

## 2024-05-22 NOTE — PROGRESS NOTES
Medication Therapy Management (MTM) Encounter    ASSESSMENT:                            Medication Adherence/Access: No issues identified    Anxiety:  Ok to increase sertraline to 25 mg daily and monitor for worsening tremor, however this likely is unrelated. Discussed she could see neurologist for tremor but she prefers to think about this, not ready to do that yet.     If needed in future could also consider duloxetine or venlafaxine for added pain benefit. Would avoid bupropion for anxiety as this can be more activating.     PLAN:                            Increase sertraline to 25 mg daily.    Follow-up: Return in about 5 weeks (around 6/26/2024) for Medication Therapy Management.    SUBJECTIVE/OBJECTIVE:                          Lisa Ferguson is a 88 year old female called for a follow-up visit.       Reason for visit: anxiety follow-up.    Allergies/ADRs: Reviewed in chart  Past Medical History: Reviewed in chart  Tobacco: She reports that she quit smoking about 45 years ago. Her smoking use included cigarettes. She started smoking about 65 years ago. She has never used smokeless tobacco.  Alcohol: Less than 1 beverage / month  Caffeine: 2-3 cups coffee/day    Medication Adherence/Access:   Patient uses pill box(es).  Per patient, misses medication 0 times per week.    The patient fills medications at [place]: NO, fills medications at [facility].    Anxiety:    Sertraline 12.5 mg daily (reduced from 25 mg daily)    Still has the tremors, Shaking and hand rolling is the same, didn't decrease any with reducing sertraline dose.  She doesn't think it's caused by the medicine, now she's pretty sure it's just familial history (mom and grandma). Hard to know. Still sleeping well with the sertraline at this lower dose, but getting more of the anxious feelings. Interested in going back up on the sertraline or trying something else.   A neurologist years ago initially recognized her essential tremor, she hasn't continued to  follow with neurology.   Medication History:  Citalopram worsened tremors in the past, but she's not sure if she can attribute this to the medication. She also has a family history of essential tremor and would like to avoid anything that could contribute to that. Buspirone was terrible (she can't recall exactly, but whatever it was was terrible).     Today's Vitals: There were no vitals taken for this visit.  ----------------      I spent 9 minutes with this patient today. All changes were made via collaborative practice agreement with Ziyad Isaac MD. A copy of the visit note was provided to the patient's provider(s).    A summary of these recommendations was sent via [employer].    Cyndie Breaux, LakeishaD  Medication Therapy Management Pharmacist      Telemedicine Visit Details  Type of service:  Telephone visit  Start Time: 11:34 AM  End Time: 11:43 AM     Medication Therapy Recommendations  Anxiety    Current Medication: sertraline (ZOLOFT) 25 MG tablet (Discontinued)   Rationale: Dose too low - Dosage too low - Effectiveness   Recommendation: Increase Dose   Status: Accepted per CPA

## 2024-05-22 NOTE — PATIENT INSTRUCTIONS
"Recommendations from today's MTM visit:                                                         Increase sertraline to 25 mg daily.    Follow-up: Return in about 5 weeks (around 6/26/2024) for Medication Therapy Management.    It was great speaking with you today.  I value your experience and would be very thankful for your time in providing feedback in our clinic survey. In the next few days, you may receive an email or text message from [employer] with a link to a survey related to your  clinical pharmacist.\"     To schedule another MTM appointment, please call the clinic directly or you may call the MTM scheduling line at [phone] or toll-free at [phone].     My Clinical Pharmacist's contact information:                                                      Please feel free to contact me with any questions or concerns you have.      Cyndie Breaux, PharmD  Medication Therapy Management Pharmacist     "

## 2024-06-06 SDOH — HEALTH STABILITY: PHYSICAL HEALTH: ON AVERAGE, HOW MANY MINUTES DO YOU ENGAGE IN EXERCISE AT THIS LEVEL?: 30 MIN

## 2024-06-06 SDOH — HEALTH STABILITY: PHYSICAL HEALTH: ON AVERAGE, HOW MANY DAYS PER WEEK DO YOU ENGAGE IN MODERATE TO STRENUOUS EXERCISE (LIKE A BRISK WALK)?: 3 DAYS

## 2024-06-06 ASSESSMENT — SOCIAL DETERMINANTS OF HEALTH (SDOH): HOW OFTEN DO YOU GET TOGETHER WITH FRIENDS OR RELATIVES?: THREE TIMES A WEEK

## 2024-06-07 ENCOUNTER — OFFICE VISIT (OUTPATIENT)
Dept: INTERNAL MEDICINE | Facility: CLINIC | Age: 89
End: 2024-06-07
Payer: MEDICARE

## 2024-06-07 VITALS
DIASTOLIC BLOOD PRESSURE: 66 MMHG | SYSTOLIC BLOOD PRESSURE: 138 MMHG | HEIGHT: 66 IN | RESPIRATION RATE: 16 BRPM | HEART RATE: 76 BPM | OXYGEN SATURATION: 96 % | WEIGHT: 244.4 LBS | BODY MASS INDEX: 39.28 KG/M2 | TEMPERATURE: 98 F

## 2024-06-07 DIAGNOSIS — I25.708 ATHEROSCLEROSIS OF CORONARY ARTERY BYPASS GRAFT(S), UNSPECIFIED, WITH OTHER FORMS OF ANGINA PECTORIS (H): ICD-10-CM

## 2024-06-07 DIAGNOSIS — I47.29 VENTRICULAR TACHYCARDIA (PAROXYSMAL) (H): ICD-10-CM

## 2024-06-07 DIAGNOSIS — I10 ESSENTIAL HYPERTENSION WITH GOAL BLOOD PRESSURE LESS THAN 130/80: ICD-10-CM

## 2024-06-07 DIAGNOSIS — E66.01 MORBID OBESITY (H): ICD-10-CM

## 2024-06-07 DIAGNOSIS — I50.9 CONGESTIVE HEART FAILURE, UNSPECIFIED HF CHRONICITY, UNSPECIFIED HEART FAILURE TYPE (H): ICD-10-CM

## 2024-06-07 DIAGNOSIS — Z00.00 WELLNESS EXAMINATION: Primary | ICD-10-CM

## 2024-06-07 DIAGNOSIS — M10.071 ACUTE IDIOPATHIC GOUT OF RIGHT FOOT: ICD-10-CM

## 2024-06-07 DIAGNOSIS — Z29.11 NEED FOR VACCINATION AGAINST RESPIRATORY SYNCYTIAL VIRUS: ICD-10-CM

## 2024-06-07 DIAGNOSIS — E03.9 ACQUIRED HYPOTHYROIDISM: ICD-10-CM

## 2024-06-07 DIAGNOSIS — E78.5 HYPERLIPIDEMIA WITH TARGET LDL LESS THAN 70: ICD-10-CM

## 2024-06-07 PROCEDURE — 99214 OFFICE O/P EST MOD 30 MIN: CPT | Mod: 25 | Performed by: INTERNAL MEDICINE

## 2024-06-07 PROCEDURE — G0439 PPPS, SUBSEQ VISIT: HCPCS | Performed by: INTERNAL MEDICINE

## 2024-06-07 RX ORDER — ALLOPURINOL 300 MG/1
TABLET ORAL
Qty: 90 TABLET | Refills: 3 | Status: SHIPPED | OUTPATIENT
Start: 2024-06-07

## 2024-06-07 RX ORDER — ALLOPURINOL 100 MG/1
100 TABLET ORAL DAILY
Qty: 90 TABLET | Refills: 3 | Status: SHIPPED | OUTPATIENT
Start: 2024-06-07

## 2024-06-07 RX ORDER — RESPIRATORY SYNCYTIAL VIRUS VACCINE 120MCG/0.5
0.5 KIT INTRAMUSCULAR ONCE
Qty: 1 EACH | Refills: 0 | Status: CANCELLED | OUTPATIENT
Start: 2024-06-07 | End: 2024-06-07

## 2024-06-07 RX ORDER — LEVOTHYROXINE SODIUM 125 UG/1
125 TABLET ORAL EVERY MORNING
Qty: 90 TABLET | Refills: 3 | Status: SHIPPED | OUTPATIENT
Start: 2024-06-07

## 2024-06-07 RX ORDER — ROSUVASTATIN CALCIUM 5 MG/1
5 TABLET, COATED ORAL EVERY OTHER DAY
Qty: 45 TABLET | Refills: 3 | Status: SHIPPED | OUTPATIENT
Start: 2024-06-07

## 2024-06-07 NOTE — PROGRESS NOTES
Preventive Care Visit  [facility] NARDA Isaac MD, Internal Medicine  Jun 7, 2024      Assessment & Plan     Wellness examination  Routine screening issues, see orders section of this encounter , this is a highly complex challenging patient with multiple medical problems. Multiple issues and concerns addressed , see as detailed below     Acquired hypothyroidism  Due for recheck , follow up as indicated on results   - levothyroxine (SYNTHROID/LEVOTHROID) 125 MCG tablet; Take 1 tablet (125 mcg) by mouth every morning  - TSH with free T4 reflex; Future    Hyperlipidemia with target LDL less than 70  Lab Results   Component Value Date    CHOL 190 06/22/2023    CHOL 296 12/29/2020     Lab Results   Component Value Date    HDL 43 06/22/2023    HDL 51 12/29/2020     Lab Results   Component Value Date    LDL 98 06/22/2023     12/29/2020     Lab Results   Component Value Date    TRIG 244 06/22/2023    TRIG 227 12/29/2020     Lab Results   Component Value Date    CHOLHDLRATIO 3.2 03/23/2015   Recheck fasting lipid panel from today is pending.     - ALT; Future  - rosuvastatin (CRESTOR) 5 MG tablet; Take 1 tablet (5 mg) by mouth every other day    Acute idiopathic gout of right foot  No recent gout flare-ups , problem is stable and ongoing monitoring    - Uric acid; Future  - allopurinol (ZYLOPRIM) 100 MG tablet; Take 1 tablet (100 mg) by mouth daily TAKE 1 TABLET BY MOUTH DAILY ALONG WITH 300MG TABLET FOR TOTAL DAILY DOSE OF 400MG  - allopurinol (ZYLOPRIM) 300 MG tablet; TAKE 1 TABLET BY MOUTH DAILY ALONG WITH 100MG TABLET FOR TOTAL DAILY DOSE OF 400MG    Need for vaccination against respiratory syncytial virus  Already current , see [place] Immunization Information Connection (MIIC)     Essential hypertension with goal blood pressure less than 130/80  Controlled within acceptable limits, problem is stable and ongoing monitoring    - BASIC METABOLIC PANEL; Future    Atherosclerosis of coronary  "artery bypass graft(s), unspecified, with other forms of angina pectoris (H24)  She continues in follow up with her cardiologist regarding these cardiac diagnoses   - Lipid panel reflex to direct LDL Non-fasting; Future    Congestive heart failure, unspecified HF chronicity, unspecified heart failure type (H)  As above     Ventricular tachycardia (paroxysmal) (H)  As above     Morbid obesity (H)  Weight loss measures reviewed , not realistic as an actual goal in this patient     Patient has been advised of split billing requirements and indicates understanding: Yes        BMI  Estimated body mass index is 39.94 kg/m  as calculated from the following:    Height as of this encounter: 1.666 m (5' 5.59\").    Weight as of this encounter: 110.9 kg (244 lb 6.4 oz).   Weight management plan: Discussed healthy diet and exercise guidelines    Wt Readings from Last 5 Encounters:   06/07/24 110.9 kg (244 lb 6.4 oz)   01/05/24 109.8 kg (242 lb)   12/18/23 107.5 kg (237 lb)   09/29/23 108.9 kg (240 lb)   06/20/23 108.9 kg (240 lb)         Counseling  Appropriate preventive services were discussed with this patient, including applicable screening as appropriate for fall prevention, nutrition, physical activity, Tobacco-use cessation, weight loss and cognition.  Checklist reviewing preventive services available has been given to the patient.  Reviewed patient's diet, addressing concerns and/or questions.   She is at risk for lack of exercise and has been provided with information to increase physical activity for the benefit of her well-being.   Updated plan of care.  Patient reported difficulty with activities of daily living were addressed today.Patient reported safety concerns were addressed today.The patient was provided with written information regarding signs of hearing loss.   I have reviewed Opioid Use Disorder and Substance Use Disorder risk factors and made any needed referrals.     I suggested discuss her ideas of trying " an Ativan (Lorazepam) during her nighttime symptoms       Subjective   Lisa is a 88 year old, presenting for the following:  Wellness Visit        6/7/2024     2:30 PM   Additional Questions   Roomed by An V.         6/7/2024     2:30 PM   Patient Reported Additional Medications   Patient reports taking the following new medications none         Health Care Directive  Patient has a Health Care Directive on file  Advance care planning document is on file and is current.    HPI        6/6/2024   General Health   How would you rate your overall physical health? (!) FAIR   Feel stress (tense, anxious, or unable to sleep) To some extent   (!) STRESS CONCERN - symptoms come and go . She has an medication therapy management pharmacist and meets almost monthly , taking sertraline [ Zoloft ] at this point and takes 1/2 a pill with some nighttime chest pain through the night , there is significant evidence of no angina pectoralis here. She has some accompanying night sweats , she is awoken b these symptoms, to the point that she has to sit up in a chair and this seems to help     A Dr. Nettles [ endocrinologist ] says it may be her parathyroid gland  and is a possible candidate for a parathyroidectomy . This can be done locally according to patient in reading [facility] data. According to patient she has been advised to have the surgery . I will copy Dr. Nettles on today's office visit notes and request to be included in this discussion.. especially explain to me why the surgery is advocated and I can discuss with patient and we can have a conference      Sees Dr. Heath for ongoing cancer care. She had a breast cancer diagnosis in around 2020, see most recent previous office visit notes with Dr. Georges from 1-5-2024    We agree to take a posture of observation  regarding these nighttime symptoms     At patient request we reviewed the concept of the metabolic syndrome     Troubles with essential tremor        6/6/2024   Nutrition   Diet: Regular (no restrictions)         6/6/2024   Exercise   Days per week of moderate/strenous exercise 3 days   Average minutes spent exercising at this level 30 min     Limited by shortness of breath and degenerative joint disease issues , muscle cramps       6/6/2024   Social Factors   Frequency of gathering with friends or relatives Three times a week   Worry food won't last until get money to buy more No   Food not last or not have enough money for food? No   Do you have housing?  Yes   Are you worried about losing your housing? No   Lack of transportation? No   Unable to get utilities (heat,electricity)? No         6/7/2024   Fall Risk   Gait Speed Test (Document in seconds) 5.65   Gait Speed Test Interpretation Greater than 5.01 seconds - ABNORMAL          6/6/2024   Activities of Daily Living- Home Safety   Needs help with the following daily activites Housework   Safety concerns in the home Throw rugs in the hallway         6/6/2024   Dental   Dentist two times every year? Yes         6/6/2024   Hearing Screening   Hearing concerns? (!) IT'S HARD TO FOLLOW A CONVERSATION IN A NOISY RESTAURANT OR CROWDED ROOM.   Moderate hearing loss and tinnitus, not desiring audiologic consultation at this point       6/6/2024   Driving Risk Screening   Patient/family members have concerns about driving No         6/6/2024   General Alertness/Fatigue Screening   Have you been more tired than usual lately? No         6/6/2024   Urinary Incontinence Screening   Bothered by leaking urine in past 6 months No         6/6/2024   TB Screening   Were you born outside of the [place]? No         Today's PHQ-2 Score:       6/7/2024     2:01 PM   PHQ-2 ( 1999 [employer])   Q1: Little interest or pleasure in doing things 1   Q2: Feeling down, depressed or hopeless 0   PHQ-2 Score 1   Q1: Little interest or pleasure in doing things Several days   Q2: Feeling down, depressed or hopeless Not at all   PHQ-2 Score 1            2024   Substance Use   Alcohol more than 3/day or more than 7/wk Not Applicable   Do you have a current opioid prescription? (!) YES   How severe/bad is pain from 1 to 10? 4/10   Do you use any other substances recreationally? No       Social History     Tobacco Use    Smoking status: Former     Current packs/day: 0.00     Types: Cigarettes     Start date: 1958     Quit date: 1978     Years since quittin.9    Smokeless tobacco: Never   Vaping Use    Vaping status: Never Used   Substance Use Topics    Alcohol use: Yes     Comment: Rare     Drug use: No          Mammogram Screening - Mammography discussed and declined - due to documented past surgical history of bilateral mastectomy     History of kidney stone ?          Reviewed and updated as needed this visit by Provider                    Past Medical History:   Diagnosis Date    Anxiety     CAD (coronary artery disease)     angio , h/o cabg, sees Luisana Nelson NP, they follow the cholesterol    CHF (congestive heart failure) (H) 2014    Closed compression fracture of L2 vertebra (H)     Degenerative joint disease of right hip     DJD (degenerative joint disease)     History of colonoscopy 2000    due 2010    Hyperlipidemia LDL goal < 70     sees [facility] lipid clinic    Hypertension goal BP (blood pressure) < 140/90     Hypothyroidism     IBS (irritable bowel syndrome)     Lumbar spinal stenosis     Mitral regurgitation     Obesity     GLEN (obstructive sleep apnea) 2011    PUD (peptic ulcer disease)     h/o duodenal ulcer    RBBB (right bundle branch block)     Sciatica neuralgia     MRI shows spinal stenosis and a cyst on the spine, has received an epidural steroid injection     Past Surgical History:   Procedure Laterality Date    ARTHROSCOPY KNEE RT/LT      bilateral    COLONOSCOPY  2010    negative    HC DILATION/CURETTAGE DIAG/THER NON OB      HC EXCIS INTERDIGITAL NEUROMA,EA      mortons neuroma  excision    HC REMOVAL GALLBLADDER  1994    HERNIA REPAIR, INGUINAL RT/LT      MASTECTOMY SIMPLE BILATERAL, SENTINEL NODE BILATERAL, COMBINED Bilateral 1/6/2021    Procedure: BILATERAL SIMPLE MASTECTOMY WITH LEFT SENTINEL LYMPH NODE BIOPSY;  Surgeon: Beata Garcia MD;  Location: [facility] OR    SURGICAL HISTORY OF -       bilateral meniscal tears and surgery on these    SURGICAL HISTORY OF -   1999 or so    one parathyroid removed    TONSILLECTOMY      TUBAL LIGATION      ZZC APPENDECTOMY      ZZC CABG, ARTERIAL, THREE  1999    LIMA-LAD, SVG-DIagnoal, SVG-OM, previous LAD-PCI, sees Dr Banuelos     OB History   No obstetric history on file.     Lab work is in process  Labs reviewed in EPIC  BP Readings from Last 3 Encounters:   06/07/24 138/66   01/05/24 121/68   09/29/23 118/69    Wt Readings from Last 3 Encounters:   06/07/24 110.9 kg (244 lb 6.4 oz)   01/05/24 109.8 kg (242 lb)   12/18/23 107.5 kg (237 lb)                  Patient Active Problem List   Diagnosis    PUD (peptic ulcer disease)    Hyperlipidemia with target LDL less than 70    Mitral regurgitation    DJD (degenerative joint disease)    Sciatica neuralgia    IBS (irritable bowel syndrome)    RBBB (right bundle branch block)    GLEN (obstructive sleep apnea)    Balance disorder    Anxiety    Ventricular tachycardia (paroxysmal) (H)    Blepharitis    Hypercalcemia    OA (osteoarthritis) of knee - bilateral    CHF (congestive heart failure) (H)    Abnormal glucose    S/P CABG (coronary artery bypass graft)    S/p total knee replacement, bilateral    Acquired hypothyroidism    Essential hypertension with goal blood pressure less than 130/80    Morbid obesity (H)    H/O parathyroidectomy    Atherosclerosis of coronary artery bypass graft(s), unspecified, with other forms of angina pectoris (H24)    Malignant neoplasm of upper-outer quadrant of left breast in female, estrogen receptor positive (H)    Secondary hyperparathyroidism of renal origin (H24)    Acute  idiopathic gout of right foot    Hip pain, right    Closed compression fracture of L2 vertebra (H)    Degenerative joint disease of right hip    Lumbar spinal stenosis     Past Surgical History:   Procedure Laterality Date    ARTHROSCOPY KNEE RT/LT      bilateral    COLONOSCOPY  2010    negative    HC DILATION/CURETTAGE DIAG/THER NON OB      HC EXCIS INTERDIGITAL NEUROMA,EA      mortons neuroma excision    HC REMOVAL GALLBLADDER      HERNIA REPAIR, INGUINAL RT/LT      MASTECTOMY SIMPLE BILATERAL, SENTINEL NODE BILATERAL, COMBINED Bilateral 2021    Procedure: BILATERAL SIMPLE MASTECTOMY WITH LEFT SENTINEL LYMPH NODE BIOPSY;  Surgeon: Beata Garcia MD;  Location: [facility] OR    SURGICAL HISTORY OF -       bilateral meniscal tears and surgery on these    SURGICAL HISTORY OF -    or so    one parathyroid removed    TONSILLECTOMY      TUBAL LIGATION      ZZC APPENDECTOMY      ZZC CABG, ARTERIAL, THREE      LIMA-LAD, SVG-DIagnoal, SVG-OM, previous LAD-PCI, sees Dr Banuelos       Social History     Tobacco Use    Smoking status: Former     Current packs/day: 0.00     Types: Cigarettes     Start date: 1958     Quit date: 1978     Years since quittin.9    Smokeless tobacco: Never   Substance Use Topics    Alcohol use: Yes     Comment: Rare      Family History   Problem Relation Age of Onset    C.A.D. Mother     Arthritis Mother     Cardiovascular Mother     Heart Disease Mother     Obesity Mother     Thyroid Disease Mother     C.A.D. Father     Diabetes Father     Hypertension Father     Alcohol/Drug Father     Alzheimer Disease Father     Cardiovascular Father     Circulatory Father     Gastrointestinal Disease Father     Heart Disease Father     Lipids Father     Obesity Father     Cardiovascular Maternal Grandmother     Depression Maternal Grandmother     Obesity Maternal Grandmother     Thyroid Disease Maternal Grandmother     Cardiovascular Maternal Grandfather     Heart Disease Maternal  Grandfather     Obesity Maternal Grandfather     Obesity Paternal Grandmother     Diabetes Paternal Grandfather     Alcohol/Drug Paternal Grandfather     Obesity Paternal Grandfather     Breast Cancer Sister     Cancer Sister     Obesity Sister     Thyroid Disease Sister     Alcohol/Drug Son     Allergies Son     Allergies Daughter     Depression Sister     Eye Disorder Sister     Gastrointestinal Disease Sister     Thyroid Disease Sister          Current Outpatient Medications   Medication Sig Dispense Refill    acetaminophen (TYLENOL) 650 MG CR tablet Take 1,300 mg by mouth 2 times daily as needed for pain      allopurinol (ZYLOPRIM) 100 MG tablet Take 1 tablet (100 mg) by mouth daily TAKE 1 TABLET BY MOUTH DAILY ALONG WITH 300MG TABLET FOR TOTAL DAILY DOSE OF 400MG 90 tablet 3    allopurinol (ZYLOPRIM) 300 MG tablet TAKE 1 TABLET BY MOUTH DAILY ALONG WITH 100MG TABLET FOR TOTAL DAILY DOSE OF 400MG 90 tablet 3    aspirin 81 MG EC tablet Take 81 mg by mouth every morning      betamethasone dipropionate (DIPROSONE) 0.05 % external lotion Apply topically daily as needed (seborrhea) 60 mL 0    clobetasol (TEMOVATE) 0.05 % external solution Apply topically 2 times daily As needed for scalp      levothyroxine (SYNTHROID/LEVOTHROID) 125 MCG tablet Take 1 tablet (125 mcg) by mouth every morning 90 tablet 3    losartan (COZAAR) 50 MG tablet Take 50 mg by mouth daily      metoprolol tartrate (LOPRESSOR) 25 MG tablet Take 2 tablets (50 mg) by mouth every morning and 1 tablet (25 mg) every evening      Multiple Vitamins-Minerals (HAIR SKIN AND NAILS FORMULA PO) Take 2 each by mouth daily      order for DME Equipment being ordered: CPAP 1 each 0    rosuvastatin (CRESTOR) 5 MG tablet Take 1 tablet (5 mg) by mouth every other day 45 tablet 3    sertraline (ZOLOFT) 25 MG tablet Take 1 tablet (25 mg) by mouth daily 90 tablet 1    SODIUM FLUORIDE 5000 PPM 1.1 % GEL topical gel BRUSH ON FOR 2-3 MINUTES AND SPIT OUT EXCESS 2 TIMES  DAILY      torsemide (DEMADEX) 20 MG tablet Take 1 tablet by mouth daily      vitamin D3 (CHOLECALCIFEROL) 50 mcg (2000 units) tablet Take 1 tablet by mouth daily       Allergies   Allergen Reactions    Adhesive Tape     Atorvastatin Other (See Comments) and Muscle Pain (Myalgia)     lipitor  Myalgia    Buspar [Buspirone]     Nickel Other (See Comments)     Raw weepy skin  rash    Vicodin [Hydrocodone-Acetaminophen] Other (See Comments)     Hallucinations    Liquid Adhesive      Other reaction(s): Contact Dermatitis     Recent Labs   Lab Test 12/07/23  1120 09/29/23  1055 06/22/23  1025 02/20/23  1106 01/02/23  1453 07/07/22  1125 02/17/21  1150 01/07/21  0739 01/06/21  1116 12/29/20  1227 10/28/19  1135   A1C  --   --   --   --  5.8* 6.0*  --   --   --  5.7*  --    LDL  --   --  98  --   --  94  --   --   --  200*  --    HDL  --   --  43*  --   --  49*  --   --   --  51  --    TRIG  --   --  244*  --   --  160*  --   --   --  227*  --    ALT  --   --  18  --   --  34  --   --   --   --  35   CR 0.84 0.81 0.73   < > 0.79 0.82   < > 0.79  --  0.80  --    GFRESTIMATED 67 70 80   < > 73 70   < > 69  --  68  --    GFRESTBLACK  --   --   --   --   --   --   --  80  --  79  --    POTASSIUM  --  4.5 4.2   < > 3.6 3.8   < > 3.7   < > 4.2  --    TSH  --  3.72 5.48*  --   --  2.94   < >  --   --  23.61* 1.75    < > = values in this interval not displayed.      Current providers sharing in care for this patient include:  Patient Care Team:  Ziyad Isaac MD as PCP - General (Internal Medicine)  Ziyad Isaac MD as Assigned PCP  Tamar Georges MD as MD (Medical Oncology)  Tamar Georges MD as Assigned Cancer Care Provider  Stef Escamilla MD as Assigned Musculoskeletal Provider  Vicky Breaux RPH as Pharmacist (Pharmacist Ambulatory Care)  Vicky Breaux RPH as Assigned MT Pharmacist  Lore Whitman MD as MD (Endocrinology, Diabetes, and Metabolism)  Padmini Ring NP as Assigned Neuroscience  "Provider  Ida Nettles MD as Assigned Endocrinology Provider    The following health maintenance items are reviewed in Epic and correct as of today:  Health Maintenance   Topic Date Due    RSV VACCINE (Pregnancy & 60+) (1 - 1-dose 60+ series) Never done    HF ACTION PLAN  07/23/2022    MEDICARE ANNUAL WELLNESS VISIT  07/07/2023    ANNUAL REVIEW OF HM ORDERS  07/07/2023    URIC ACID  01/02/2024    COVID-19 Vaccine (6 - 2023-24 season) 02/05/2024    BMP  03/29/2024    ALT  06/22/2024    LIPID  06/22/2024    CBC  09/29/2024    DEXA  12/01/2024    FALL RISK ASSESSMENT  06/07/2025    ADVANCE CARE PLANNING  07/07/2027    DTAP/TDAP/TD IMMUNIZATION (3 - Td or Tdap) 06/22/2033    TSH W/FREE T4 REFLEX  Completed    PHQ-2 (once per calendar year)  Completed    INFLUENZA VACCINE  Completed    Pneumococcal Vaccine: 65+ Years  Completed    ZOSTER IMMUNIZATION  Completed    IPV IMMUNIZATION  Aged Out    HPV IMMUNIZATION  Aged Out    MENINGITIS IMMUNIZATION  Aged Out    RSV MONOCLONAL ANTIBODY  Aged Out     Health Maintenance Due   Topic Date Due    RSV VACCINE (Pregnancy & 60+) (1 - 1-dose 60+ series) Never done    HF ACTION PLAN  07/23/2022    MEDICARE ANNUAL WELLNESS VISIT  07/07/2023    ANNUAL REVIEW OF HM ORDERS  07/07/2023    URIC ACID  01/02/2024    COVID-19 Vaccine (6 - 2023-24 season) 02/05/2024    BMP  03/29/2024    ALT  06/22/2024    LIPID  06/22/2024      TSH   Date Value Ref Range Status   09/29/2023 3.72 0.30 - 4.20 uIU/mL Final   07/07/2022 2.94 0.40 - 4.00 mU/L Final   02/17/2021 3.80 0.40 - 4.00 mU/L Final           Review of Systems  Constitutional, HEENT, cardiovascular, pulmonary, gi and gu systems are negative, except as otherwise noted.     Objective    Exam  /66   Pulse 76   Temp 98  F (36.7  C) (Oral)   Resp 16   Ht 1.666 m (5' 5.59\")   Wt 110.9 kg (244 lb 6.4 oz)   SpO2 96%   BMI 39.94 kg/m     Estimated body mass index is 39.94 kg/m  as calculated from the following:    Height as of this " "encounter: 1.666 m (5' 5.59\").    Weight as of this encounter: 110.9 kg (244 lb 6.4 oz).    Physical Exam  GENERAL: alert and no distress, appears her stated age , answers all questions appropriately, talkative and appropriate, normal grooming and affect   EYES: Eyes grossly normal to inspection, PERRL and conjunctivae and sclerae normal  HENT: ear canals and TM's normal, nose and mouth without ulcers or lesions  NECK: no adenopathy, no asymmetry, masses, or scars  RESP: lungs clear to auscultation - no rales, rhonchi or wheezes  CV: regular rate and rhythm, normal S1 S2, no S3 or S4, no murmur, click or rub, no peripheral edema  ABDOMEN: soft, nontender, no hepatosplenomegaly, no masses and bowel sounds normal  MS: no gross musculoskeletal defects noted, no edema, she is a bit weak on her feet, secondary to her total hip replacement from which she is still rebounding, albeit rather slowly [ see orthopedic consultation office visit notes ]   SKIN: no suspicious lesions or rashes  NEURO: Normal strength and tone, mentation intact and speech normal  PSYCH: mentation appears normal, affect normal/bright        6/7/2024   Mini Cog   Clock Draw Score 2 Normal   3 Item Recall 2 objects recalled   Mini Cog Total Score 4              Signed Electronically by: Ziyad Isaac MD    "

## 2024-06-07 NOTE — Clinical Note
Hello, I am this patients primary health care provider and she's sharing with me your opinions from meeting her regarding hypercalcemia and assumed to be recurrence of primary hyperparathyroidism. I would really appreciate a few words explaining why you would recommend a parathyroidectomy as far as risk versus benefit analysis , any feedback would be greatly appreciated. Patient has suggested I contact you. Thank you so very much !   Ziyad Isaac MD

## 2024-06-13 ENCOUNTER — LAB (OUTPATIENT)
Dept: LAB | Facility: CLINIC | Age: 89
End: 2024-06-13
Payer: MEDICARE

## 2024-06-13 DIAGNOSIS — I10 ESSENTIAL HYPERTENSION WITH GOAL BLOOD PRESSURE LESS THAN 130/80: ICD-10-CM

## 2024-06-13 DIAGNOSIS — E03.9 ACQUIRED HYPOTHYROIDISM: ICD-10-CM

## 2024-06-13 DIAGNOSIS — E78.5 HYPERLIPIDEMIA WITH TARGET LDL LESS THAN 70: ICD-10-CM

## 2024-06-13 DIAGNOSIS — M10.071 ACUTE IDIOPATHIC GOUT OF RIGHT FOOT: ICD-10-CM

## 2024-06-13 DIAGNOSIS — I25.708 ATHEROSCLEROSIS OF CORONARY ARTERY BYPASS GRAFT(S), UNSPECIFIED, WITH OTHER FORMS OF ANGINA PECTORIS (H): ICD-10-CM

## 2024-06-13 DIAGNOSIS — E83.52 HYPERCALCEMIA: ICD-10-CM

## 2024-06-13 LAB
ALBUMIN SERPL BCG-MCNC: 4.6 G/DL (ref 3.5–5.2)
ALP SERPL-CCNC: 94 U/L (ref 40–150)
ALT SERPL W P-5'-P-CCNC: 23 U/L (ref 0–50)
ANION GAP SERPL CALCULATED.3IONS-SCNC: 11 MMOL/L (ref 7–15)
BUN SERPL-MCNC: 23.6 MG/DL (ref 8–23)
CALCIUM SERPL-MCNC: 11.2 MG/DL (ref 8.8–10.2)
CALCIUM SERPL-MCNC: 11.2 MG/DL (ref 8.8–10.2)
CHLORIDE SERPL-SCNC: 104 MMOL/L (ref 98–107)
CHOLEST SERPL-MCNC: 172 MG/DL
CREAT SERPL-MCNC: 0.78 MG/DL (ref 0.51–0.95)
DEPRECATED HCO3 PLAS-SCNC: 24 MMOL/L (ref 22–29)
EGFRCR SERPLBLD CKD-EPI 2021: 74 ML/MIN/1.73M2
FASTING STATUS PATIENT QL REPORTED: YES
FASTING STATUS PATIENT QL REPORTED: YES
GLUCOSE SERPL-MCNC: 106 MG/DL (ref 70–99)
HDLC SERPL-MCNC: 41 MG/DL
LDLC SERPL CALC-MCNC: 92 MG/DL
MAGNESIUM SERPL-MCNC: 2.3 MG/DL (ref 1.7–2.3)
NONHDLC SERPL-MCNC: 131 MG/DL
PHOSPHATE SERPL-MCNC: 3.2 MG/DL (ref 2.5–4.5)
POTASSIUM SERPL-SCNC: 5 MMOL/L (ref 3.4–5.3)
PTH-INTACT SERPL-MCNC: 86 PG/ML (ref 15–65)
SODIUM SERPL-SCNC: 139 MMOL/L (ref 135–145)
TRIGL SERPL-MCNC: 197 MG/DL
TSH SERPL DL<=0.005 MIU/L-ACNC: 3.8 UIU/ML (ref 0.3–4.2)
URATE SERPL-MCNC: 3.8 MG/DL (ref 2.4–5.7)

## 2024-06-13 PROCEDURE — 84443 ASSAY THYROID STIM HORMONE: CPT

## 2024-06-13 PROCEDURE — 82306 VITAMIN D 25 HYDROXY: CPT

## 2024-06-13 PROCEDURE — 80061 LIPID PANEL: CPT

## 2024-06-13 PROCEDURE — 83970 ASSAY OF PARATHORMONE: CPT

## 2024-06-13 PROCEDURE — 80069 RENAL FUNCTION PANEL: CPT

## 2024-06-13 PROCEDURE — 84075 ASSAY ALKALINE PHOSPHATASE: CPT

## 2024-06-13 PROCEDURE — 84550 ASSAY OF BLOOD/URIC ACID: CPT

## 2024-06-13 PROCEDURE — 83735 ASSAY OF MAGNESIUM: CPT

## 2024-06-13 PROCEDURE — 36415 COLL VENOUS BLD VENIPUNCTURE: CPT

## 2024-06-13 PROCEDURE — 84460 ALANINE AMINO (ALT) (SGPT): CPT

## 2024-06-15 LAB
DEPRECATED CALCIDIOL+CALCIFEROL SERPL-MC: <47 UG/L (ref 20–75)
VITAMIN D2 SERPL-MCNC: <5 UG/L
VITAMIN D3 SERPL-MCNC: 42 UG/L

## 2024-06-26 ENCOUNTER — VIRTUAL VISIT (OUTPATIENT)
Dept: PHARMACY | Facility: CLINIC | Age: 89
End: 2024-06-26
Payer: COMMERCIAL

## 2024-06-26 DIAGNOSIS — F41.9 ANXIETY: Primary | ICD-10-CM

## 2024-06-26 PROCEDURE — 99207 PR NO CHARGE LOS: CPT | Mod: 93 | Performed by: PHARMACIST

## 2024-06-26 RX ORDER — SERTRALINE HYDROCHLORIDE 25 MG/1
12.5 TABLET, FILM COATED ORAL DAILY
Qty: 45 TABLET | Refills: 1 | Status: SHIPPED | OUTPATIENT
Start: 2024-06-26

## 2024-06-26 NOTE — PATIENT INSTRUCTIONS
"Recommendations from today's MTM visit:                                                         1. Continue current medications.      Follow-up: Return in about 6 months (around 12/26/2024) for Medication Therapy Management.    It was great speaking with you today.  I value your experience and would be very thankful for your time in providing feedback in our clinic survey. In the next few days, you may receive an email or text message from [employer] with a link to a survey related to your  clinical pharmacist.\"     To schedule another MTM appointment, please call the clinic directly or you may call the MTM scheduling line at [phone] or toll-free at [phone].     My Clinical Pharmacist's contact information:                                                      Please feel free to contact me with any questions or concerns you have.      Cyndie Breaux, PharmD  Medication Therapy Management Pharmacist     "

## 2024-06-26 NOTE — Clinical Note
EVERTON GAMBOA note, thanks!  Cyndie Breaux, PharmD Medication Therapy Management Pharmacist [phone]

## 2024-06-26 NOTE — PROGRESS NOTES
Medication Therapy Management (MTM) Encounter    ASSESSMENT:                            Medication Adherence/Access: No issues identified    Anxiety:  Unclear if tremor is worsened at higher doses of sertraline. Stable on lower dose of sertraline, okay to maintain current therapy.    If needed in future could also consider duloxetine or venlafaxine for added pain benefit. Would avoid bupropion for anxiety as this can be more activating.  She declines any changes today.    PLAN:                            1. Continue current medications.    Follow-up: Return in about 6 months (around 12/26/2024) for Medication Therapy Management.    SUBJECTIVE/OBJECTIVE:                          Lisa Ferguson is a 88 year old female seen for a follow-up visit.       Reason for visit: med review.    Allergies/ADRs: Reviewed in chart  Past Medical History: Reviewed in chart  Tobacco: She reports that she quit smoking about 45 years ago. Her smoking use included cigarettes. She started smoking about 66 years ago. She has never used smokeless tobacco.  Alcohol: Less than 1 beverage / month  Caffeine: 2-3 cups coffee/day    Medication Adherence/Access:   Patient uses pill box(es).  Per patient, misses medication 0 times per week.    The patient fills medications at [facility]: NO, fills medications at [facility].    Plan from last time:  Increase sertraline to 25 mg daily.    Anxiety:    Sertraline 12.5 mg daily    She had increased to 25 mg but within a week tremor was worse, couldn't sign a check.  Still has some remnants of it but her right hand has calmed down quite a bit. Sleep was actually worse at higher 25 mg dose too, which is the opposite of what she experienced last time she tried 25 mg. Sleeping pretty good now though on the 12.5 mg dose (12:30 am to 6:30 am, plus a couple of naps) Anxiety has been good too.    A neurologist years ago initially recognized her essential tremor, she hasn't continued to follow with neurology.   Medication  History:  Citalopram worsened tremors in the past, but she's not sure if she can attribute this to the medication. She also has a family history of essential tremor and would like to avoid anything that could contribute to that. Buspirone was terrible (she can't recall exactly, but whatever it was was terrible).     Today's Vitals: There were no vitals taken for this visit.  ----------------      I spent 7 minutes with this patient today. All changes were made via collaborative practice agreement with Ziyad Isaac MD. A copy of the visit note was provided to the patient's provider(s).    A summary of these recommendations was sent via [employer].    Cyndie Breaux PharmD  Medication Therapy Management Pharmacist      Telemedicine Visit Details  Type of service:  Telephone visit  Start Time: 1:04 PM  End Time: 1:11 PM     Medication Therapy Recommendations  No medication therapy recommendations to display

## 2024-06-28 NOTE — PROGRESS NOTES
Oncology Follow Up Visit: July 1, 2024    Oncologist: Dr Tamar Georges  PCP: Ziyad Isaac    Diagnosis: Left Breast Cancer  Lisa Ferguson is an 89 yo female who self palpated abnormality in left breast in November 2020.  11/16/2020 diagnostic mammogram and ultrasound were done which found 2 lesions within the left breast 1 measuring approximately 1.5 cm and the second 1.9 cm.  Both were biopsied the one at 3:00 was a grade 2 invasive lobular cancer.  The one at 11:00 was a grade 2 invasive ductal cancer.  It was estrogen receptor positive progesterone receptor positive HER-2/kena indeterminate by IHC and negative by ALBERTO  Genetic testing negative for findings  Oncotype DX= 22-considered low risk  Treatment:   1/6/2021 patient underwent bilateral mastectomies.  Right mastectomy was unremarkable.  Left mastectomy found a multifocal breast cancer the first measuring 4.5 cm is a grade 2 invasive lobular carcinoma, the second was a 2.5 cm grade 2 malignancy.  2 sentinel lymph nodes were removed 1 of which was negative the other 1 had immunohistochemistry positive only cells for a T M2N0i+M0 ER/VT positive HER-2/kena negative disease  2/2021 Arimidex x 3 months- quit due to person decision with hot flashes.    Interval History:Ms. Ferguson is see in today for follow for her breast cancer and mastectomy Pt denies issues with chest post yuly mastectomies. Reports she is feeling well except for right hip pain and some radiation from the back.  Admits she is not very active and uses a cane to get around -has not had any falls.  Also admits to ongoing anxiety she is working with her PCP to find medications that may be appropriate and currently on sertraline and feels the higher dose helps the anxiety but causes her to shake in the lower once or not effective  Denies any recent falls .  Admits to eating which she feels like and has seen some mild weight gain rather than weight loss.    Rest of comprehensive and complete ROS is  reviewed and is negative.   Past Medical History:   Diagnosis Date    Anxiety     CAD (coronary artery disease)     angio 2002, h/o cabg, sees Luisana Nelson NP, they follow the cholesterol    CHF (congestive heart failure) (H) 07/08/2014    Closed compression fracture of L2 vertebra (H)     Degenerative joint disease of right hip     DJD (degenerative joint disease)     History of colonoscopy 01/01/2000    due jan 2010    Hyperlipidemia LDL goal < 70     [place] [facility]    Hypertension goal BP (blood pressure) < 140/90     Hypothyroidism     IBS (irritable bowel syndrome)     Lumbar spinal stenosis     Mitral regurgitation     Obesity     GLEN (obstructive sleep apnea) 07/11/2011    PUD (peptic ulcer disease)     h/o duodenal ulcer    RBBB (right bundle branch block)     Sciatica neuralgia november 209    MRI shows spinal stenosis and a cyst on the spine, has received an epidural steroid injection     Current Outpatient Medications   Medication Sig Dispense Refill    acetaminophen (TYLENOL) 650 MG CR tablet Take 1,300 mg by mouth 2 times daily as needed for pain      allopurinol (ZYLOPRIM) 100 MG tablet Take 1 tablet (100 mg) by mouth daily TAKE 1 TABLET BY MOUTH DAILY ALONG WITH 300MG TABLET FOR TOTAL DAILY DOSE OF 400MG 90 tablet 3    allopurinol (ZYLOPRIM) 300 MG tablet TAKE 1 TABLET BY MOUTH DAILY ALONG WITH 100MG TABLET FOR TOTAL DAILY DOSE OF 400MG 90 tablet 3    aspirin 81 MG EC tablet Take 81 mg by mouth every morning      betamethasone dipropionate (DIPROSONE) 0.05 % external lotion Apply topically daily as needed (seborrhea) 60 mL 0    clobetasol (TEMOVATE) 0.05 % external solution Apply topically 2 times daily As needed for scalp      levothyroxine (SYNTHROID/LEVOTHROID) 125 MCG tablet Take 1 tablet (125 mcg) by mouth every morning 90 tablet 3    losartan (COZAAR) 50 MG tablet Take 50 mg by mouth daily      metoprolol tartrate (LOPRESSOR) 25 MG tablet Take 2 tablets (50 mg) by mouth every morning  "and 1 tablet (25 mg) every evening      Multiple Vitamins-Minerals (HAIR SKIN AND NAILS FORMULA PO) Take 2 each by mouth daily      order for DME Equipment being ordered: CPAP 1 each 0    rosuvastatin (CRESTOR) 5 MG tablet Take 1 tablet (5 mg) by mouth every other day 45 tablet 3    sertraline (ZOLOFT) 25 MG tablet Take 0.5 tablets (12.5 mg) by mouth daily 45 tablet 1    SODIUM FLUORIDE 5000 PPM 1.1 % GEL topical gel BRUSH ON FOR 2-3 MINUTES AND SPIT OUT EXCESS 2 TIMES DAILY      torsemide (DEMADEX) 20 MG tablet Take 1 tablet by mouth daily      vitamin D3 (CHOLECALCIFEROL) 50 mcg (2000 units) tablet Take 1 tablet by mouth daily       No current facility-administered medications for this visit.     Allergies   Allergen Reactions    Adhesive Tape     Atorvastatin Other (See Comments) and Muscle Pain (Myalgia)     lipitor  Myalgia    Buspar [Buspirone]     Nickel Other (See Comments)     Raw weepy skin  rash    Vicodin [Hydrocodone-Acetaminophen] Other (See Comments)     Hallucinations    Liquid Adhesive      Other reaction(s): Contact Dermatitis   - pt is former nurse.     Physical Exam:BP (!) 141/54 (BP Location: Left arm, Patient Position: Chair, Cuff Size: Adult Large)   Pulse 65   Ht 1.666 m (5' 5.59\")   Wt 111.6 kg (246 lb)   SpO2 95%   BMI 40.20 kg/m     Constitutional: Alert, cooperative, and in no distress. Obese but moves well on own - has cane for help due to right hip pain and radiation from back .   ENT: Eyes bright, No mouth sores  Neck: Supple, No adenopathy.  Cardiac: Heart rate and rhythm is regular and strong without murmur  Respiratory: Breathing easy. Lung sounds clear to auscultation  Chest: note to have pea size hard area to lateral side of incision of the left breast mastectomy- non painful. Right chest has no issues.   GI: Abdomen is soft, non-tender, BS normal. No masses or organomegaly  MS: Muscle tone normal, extremities normal with no edema.   Skin: No rashes-has appearance of rash to " chest but pt states she was at dermatologist and no concerns there but has been screened and treated for skin cancer- seen in derm regularly.   Neuro: Sensory grossly WNL, gait normal.   Lymph: Normal ant/post cervical, axillary, supraclavicular nodes  Psych: Mentation appears normal and affect normal/bright and smiling    Laboratory Results: No results found for any visits on 07/01/24.   Latest Reference Range & Units 06/13/24 10:01   Sodium 135 - 145 mmol/L 139   Potassium 3.4 - 5.3 mmol/L 5.0   Chloride 98 - 107 mmol/L 104   Carbon Dioxide (CO2) 22 - 29 mmol/L 24   Urea Nitrogen 8.0 - 23.0 mg/dL 23.6 (H)   Creatinine 0.51 - 0.95 mg/dL 0.78   GFR Estimate >60 mL/min/1.73m2 74   Calcium 8.8 - 10.2 mg/dL  8.8 - 10.2 mg/dL 11.2 (H)  11.2 (H)   Anion Gap 7 - 15 mmol/L 11   Magnesium 1.7 - 2.3 mg/dL 2.3   Phosphorus 2.5 - 4.5 mg/dL 3.2   Albumin 3.5 - 5.2 g/dL 4.6   Alkaline Phosphatase 40 - 150 U/L 94   ALT 0 - 50 U/L 23   25 OH Vit D total 20 - 75 ug/L <47   25 OH Vit D2 ug/L <5   25 OH Vit D3 ug/L 42   Cholesterol <200 mg/dL 172   Patient Fasting?  Yes  Yes   Glucose 70 - 99 mg/dL 106 (H)   HDL Cholesterol >=50 mg/dL 41 (L)   LDL Cholesterol Calculated <=100 mg/dL 92   Non HDL Cholesterol <130 mg/dL 131 (H)   Triglycerides <150 mg/dL 197 (H)   TSH 0.30 - 4.20 uIU/mL 3.80   Uric Acid 2.4 - 5.7 mg/dL 3.8   Parathyroid Hormone Intact 15 - 65 pg/mL 86 (H)   (H): Data is abnormally high  (L): Data is abnormally low    Assessment and Plan:   Left Breast Cancer-patient completed bilateral mastectomies and completed Oncotype diagnosis: 22 for low risk.  Pt began use of Arimidex but stopped shortly after starting -at least within 3 months due to hot flashes.  No new signs of recurrence of disease with review or exam  She will return in 6 months to see Dr. Georges no labs necessary.  -Did give prescription for new breast prosthesis and bras for patient.    Elevated calcium level-being followed by endocrinology due to  parathyroid hormone issue causing the elevated calcium level.  Patient is not on oral calcium but states she drinks 2 gallons of milk weekly.    Bone health-most recent DEXA scan completed 12/1/2022 showing normal bone density.  She is not on calcium supplement as above but encouraged regular walks short distances more often to help with weightbearing and prevention of osteoporosis..     Obesity-the patient with this is a risk factor for breast cancer as well and highly recommend that she work with a diet low in fat and rich in fruits and vegetables.  Patient states she is not interested this at her age and has actually seen some weight gain.    The total time of this encounter amounted to 40 minutes. This time included face-to-face time spent with the patient, prep work, ordering tests, and performing post visit documentation.  Linda Huertas,Cnp

## 2024-07-01 ENCOUNTER — ONCOLOGY VISIT (OUTPATIENT)
Dept: ONCOLOGY | Facility: CLINIC | Age: 89
End: 2024-07-01
Attending: INTERNAL MEDICINE
Payer: MEDICARE

## 2024-07-01 VITALS
DIASTOLIC BLOOD PRESSURE: 54 MMHG | OXYGEN SATURATION: 95 % | WEIGHT: 246 LBS | SYSTOLIC BLOOD PRESSURE: 141 MMHG | HEIGHT: 66 IN | HEART RATE: 65 BPM | BODY MASS INDEX: 39.53 KG/M2

## 2024-07-01 DIAGNOSIS — Z17.0 MALIGNANT NEOPLASM OF UPPER-OUTER QUADRANT OF LEFT BREAST IN FEMALE, ESTROGEN RECEPTOR POSITIVE (H): Primary | ICD-10-CM

## 2024-07-01 DIAGNOSIS — E66.01 MORBID OBESITY (H): ICD-10-CM

## 2024-07-01 DIAGNOSIS — Z90.13 STATUS POST MASTECTOMY, BILATERAL: ICD-10-CM

## 2024-07-01 DIAGNOSIS — C50.412 MALIGNANT NEOPLASM OF UPPER-OUTER QUADRANT OF LEFT BREAST IN FEMALE, ESTROGEN RECEPTOR POSITIVE (H): Primary | ICD-10-CM

## 2024-07-01 DIAGNOSIS — M81.8 OTHER OSTEOPOROSIS WITHOUT CURRENT PATHOLOGICAL FRACTURE: ICD-10-CM

## 2024-07-01 DIAGNOSIS — E83.52 HYPERCALCEMIA: ICD-10-CM

## 2024-07-01 PROCEDURE — 99215 OFFICE O/P EST HI 40 MIN: CPT | Performed by: NURSE PRACTITIONER

## 2024-07-01 PROCEDURE — G0463 HOSPITAL OUTPT CLINIC VISIT: HCPCS | Performed by: NURSE PRACTITIONER

## 2024-07-01 ASSESSMENT — PAIN SCALES - GENERAL: PAINLEVEL: NO PAIN (0)

## 2024-07-01 NOTE — LETTER
7/1/2024      Lisa Ferguson  [address]  [place] [address]      Dear Colleague,    Thank you for referring your patient, Lisa Ferguson, to the [facility]. Please see a copy of my visit note below.    Oncology Follow Up Visit: July 1, 2024    Oncologist: Dr Tamar Georges  PCP: Ziyad Isaac    Diagnosis: Left Breast Cancer  Lisa Ferguson is an 87 yo female who self palpated abnormality in left breast in November 2020.  11/16/2020 diagnostic mammogram and ultrasound were done which found 2 lesions within the left breast 1 measuring approximately 1.5 cm and the second 1.9 cm.  Both were biopsied the one at 3:00 was a grade 2 invasive lobular cancer.  The one at 11:00 was a grade 2 invasive ductal cancer.  It was estrogen receptor positive progesterone receptor positive HER-2/kena indeterminate by IHC and negative by ALBERTO  Genetic testing negative for findings  Oncotype DX= 22-considered low risk  Treatment:   1/6/2021 patient underwent bilateral mastectomies.  Right mastectomy was unremarkable.  Left mastectomy found a multifocal breast cancer the first measuring 4.5 cm is a grade 2 invasive lobular carcinoma, the second was a 2.5 cm grade 2 malignancy.  2 sentinel lymph nodes were removed 1 of which was negative the other 1 had immunohistochemistry positive only cells for a T M2N0i+M0 ER/MT positive HER-2/kena negative disease  2/2021 Arimidex x 3 months- quit due to person decision with hot flashes.    Interval History:Ms. Ferguson is see in today for follow for her breast cancer and mastectomy Pt denies issues with chest post yuly mastectomies. Reports she is feeling well except for right hip pain and some radiation from the back.  Admits she is not very active and uses a cane to get around -has not had any falls.  Also admits to ongoing anxiety she is working with her PCP to find medications that may be appropriate and currently on sertraline and feels the higher dose  helps the anxiety but causes her to shake in the lower once or not effective  Denies any recent falls .  Admits to eating which she feels like and has seen some mild weight gain rather than weight loss.    Rest of comprehensive and complete ROS is reviewed and is negative.   Past Medical History:   Diagnosis Date     Anxiety      CAD (coronary artery disease)     angio 2002, h/o cabg, sees Luisana Nelson NP, they follow the cholesterol     CHF (congestive heart failure) (H) 07/08/2014     Closed compression fracture of L2 vertebra (H)      Degenerative joint disease of right hip      DJD (degenerative joint disease)      History of colonoscopy 01/01/2000    due jan 2010     Hyperlipidemia LDL goal < 70     [place] [facility]     Hypertension goal BP (blood pressure) < 140/90      Hypothyroidism      IBS (irritable bowel syndrome)      Lumbar spinal stenosis      Mitral regurgitation      Obesity      GLEN (obstructive sleep apnea) 07/11/2011     PUD (peptic ulcer disease)     h/o duodenal ulcer     RBBB (right bundle branch block)      Sciatica neuralgia november 209    MRI shows spinal stenosis and a cyst on the spine, has received an epidural steroid injection     Current Outpatient Medications   Medication Sig Dispense Refill     acetaminophen (TYLENOL) 650 MG CR tablet Take 1,300 mg by mouth 2 times daily as needed for pain       allopurinol (ZYLOPRIM) 100 MG tablet Take 1 tablet (100 mg) by mouth daily TAKE 1 TABLET BY MOUTH DAILY ALONG WITH 300MG TABLET FOR TOTAL DAILY DOSE OF 400MG 90 tablet 3     allopurinol (ZYLOPRIM) 300 MG tablet TAKE 1 TABLET BY MOUTH DAILY ALONG WITH 100MG TABLET FOR TOTAL DAILY DOSE OF 400MG 90 tablet 3     aspirin 81 MG EC tablet Take 81 mg by mouth every morning       betamethasone dipropionate (DIPROSONE) 0.05 % external lotion Apply topically daily as needed (seborrhea) 60 mL 0     clobetasol (TEMOVATE) 0.05 % external solution Apply topically 2 times daily As needed for scalp    "    levothyroxine (SYNTHROID/LEVOTHROID) 125 MCG tablet Take 1 tablet (125 mcg) by mouth every morning 90 tablet 3     losartan (COZAAR) 50 MG tablet Take 50 mg by mouth daily       metoprolol tartrate (LOPRESSOR) 25 MG tablet Take 2 tablets (50 mg) by mouth every morning and 1 tablet (25 mg) every evening       Multiple Vitamins-Minerals (HAIR SKIN AND NAILS FORMULA PO) Take 2 each by mouth daily       order for DME Equipment being ordered: CPAP 1 each 0     rosuvastatin (CRESTOR) 5 MG tablet Take 1 tablet (5 mg) by mouth every other day 45 tablet 3     sertraline (ZOLOFT) 25 MG tablet Take 0.5 tablets (12.5 mg) by mouth daily 45 tablet 1     SODIUM FLUORIDE 5000 PPM 1.1 % GEL topical gel BRUSH ON FOR 2-3 MINUTES AND SPIT OUT EXCESS 2 TIMES DAILY       torsemide (DEMADEX) 20 MG tablet Take 1 tablet by mouth daily       vitamin D3 (CHOLECALCIFEROL) 50 mcg (2000 units) tablet Take 1 tablet by mouth daily       No current facility-administered medications for this visit.     Allergies   Allergen Reactions     Adhesive Tape      Atorvastatin Other (See Comments) and Muscle Pain (Myalgia)     lipitor  Myalgia     Buspar [Buspirone]      Nickel Other (See Comments)     Raw weepy skin  rash     Vicodin [Hydrocodone-Acetaminophen] Other (See Comments)     Hallucinations     Liquid Adhesive      Other reaction(s): Contact Dermatitis   - pt is former nurse.     Physical Exam:BP (!) 141/54 (BP Location: Left arm, Patient Position: Chair, Cuff Size: Adult Large)   Pulse 65   Ht 1.666 m (5' 5.59\")   Wt 111.6 kg (246 lb)   SpO2 95%   BMI 40.20 kg/m     Constitutional: Alert, cooperative, and in no distress. Obese but moves well on own - has cane for help due to right hip pain and radiation from back .   ENT: Eyes bright, No mouth sores  Neck: Supple, No adenopathy.  Cardiac: Heart rate and rhythm is regular and strong without murmur  Respiratory: Breathing easy. Lung sounds clear to auscultation  Chest: note to have pea " size hard area to lateral side of incision of the left breast mastectomy- non painful. Right chest has no issues.   GI: Abdomen is soft, non-tender, BS normal. No masses or organomegaly  MS: Muscle tone normal, extremities normal with no edema.   Skin: No rashes-has appearance of rash to chest but pt states she was at dermatologist and no concerns there but has been screened and treated for skin cancer- seen in derm regularly.   Neuro: Sensory grossly WNL, gait normal.   Lymph: Normal ant/post cervical, axillary, supraclavicular nodes  Psych: Mentation appears normal and affect normal/bright and smiling    Laboratory Results: No results found for any visits on 07/01/24.   Latest Reference Range & Units 06/13/24 10:01   Sodium 135 - 145 mmol/L 139   Potassium 3.4 - 5.3 mmol/L 5.0   Chloride 98 - 107 mmol/L 104   Carbon Dioxide (CO2) 22 - 29 mmol/L 24   Urea Nitrogen 8.0 - 23.0 mg/dL 23.6 (H)   Creatinine 0.51 - 0.95 mg/dL 0.78   GFR Estimate >60 mL/min/1.73m2 74   Calcium 8.8 - 10.2 mg/dL  8.8 - 10.2 mg/dL 11.2 (H)  11.2 (H)   Anion Gap 7 - 15 mmol/L 11   Magnesium 1.7 - 2.3 mg/dL 2.3   Phosphorus 2.5 - 4.5 mg/dL 3.2   Albumin 3.5 - 5.2 g/dL 4.6   Alkaline Phosphatase 40 - 150 U/L 94   ALT 0 - 50 U/L 23   25 OH Vit D total 20 - 75 ug/L <47   25 OH Vit D2 ug/L <5   25 OH Vit D3 ug/L 42   Cholesterol <200 mg/dL 172   Patient Fasting?  Yes  Yes   Glucose 70 - 99 mg/dL 106 (H)   HDL Cholesterol >=50 mg/dL 41 (L)   LDL Cholesterol Calculated <=100 mg/dL 92   Non HDL Cholesterol <130 mg/dL 131 (H)   Triglycerides <150 mg/dL 197 (H)   TSH 0.30 - 4.20 uIU/mL 3.80   Uric Acid 2.4 - 5.7 mg/dL 3.8   Parathyroid Hormone Intact 15 - 65 pg/mL 86 (H)   (H): Data is abnormally high  (L): Data is abnormally low    Assessment and Plan:   Left Breast Cancer-patient completed bilateral mastectomies and completed Oncotype diagnosis: 22 for low risk.  Pt began use of Arimidex but stopped shortly after starting -at least within 3 months  due to hot flashes.  No new signs of recurrence of disease with review or exam  She will return in 6 months to see Dr. Georges no labs necessary.  -Did give prescription for new breast prosthesis and bras for patient.    Elevated calcium level-being followed by endocrinology due to parathyroid hormone issue causing the elevated calcium level.  Patient is not on oral calcium but states she drinks 2 gallons of milk weekly.    Bone health-most recent DEXA scan completed 12/1/2022 showing normal bone density.  She is not on calcium supplement as above but encouraged regular walks short distances more often to help with weightbearing and prevention of osteoporosis..     Obesity-the patient with this is a risk factor for breast cancer as well and highly recommend that she work with a diet low in fat and rich in fruits and vegetables.  Patient states she is not interested this at her age and has actually seen some weight gain.    The total time of this encounter amounted to 40 minutes. This time included face-to-face time spent with the patient, prep work, ordering tests, and performing post visit documentation.  Linda Huertas Cnp      Again, thank you for allowing me to participate in the care of your patient.        Sincerely,        Linda Huertas, BHUMIKA, APRN CNP

## 2024-07-01 NOTE — NURSING NOTE
"Oncology Rooming Note    July 1, 2024 10:38 AM   Lisa Ferguson is a 88 year old female who presents for:    Chief Complaint   Patient presents with    Oncology Clinic Visit     Follow up     Initial Vitals: BP (!) 141/54 (BP Location: Left arm, Patient Position: Chair, Cuff Size: Adult Large)   Pulse 65   Ht 1.666 m (5' 5.59\")   Wt 111.6 kg (246 lb)   SpO2 95%   BMI 40.20 kg/m   Estimated body mass index is 40.2 kg/m  as calculated from the following:    Height as of this encounter: 1.666 m (5' 5.59\").    Weight as of this encounter: 111.6 kg (246 lb). Body surface area is 2.27 meters squared.  No Pain (0) Comment: Data Unavailable   No LMP recorded. Patient is postmenopausal.  Allergies reviewed: Yes  Medications reviewed: Yes    Medications: Medication refills not needed today.  Pharmacy name entered into Mobile Safe Case: [id number] - [place], [place] - [address] [address]    Frailty Screening:   Is the patient here for a new oncology consult visit in cancer care? 2. No      Clinical concerns: NO       Shira Hutton CMA              "

## 2024-07-11 ENCOUNTER — VIRTUAL VISIT (OUTPATIENT)
Dept: ENDOCRINOLOGY | Facility: CLINIC | Age: 89
End: 2024-07-11
Payer: MEDICARE

## 2024-07-11 VITALS — BODY MASS INDEX: 39.74 KG/M2 | WEIGHT: 243.17 LBS

## 2024-07-11 DIAGNOSIS — Z90.89 H/O PARATHYROIDECTOMY: ICD-10-CM

## 2024-07-11 DIAGNOSIS — E83.52 HYPERCALCEMIA: Primary | ICD-10-CM

## 2024-07-11 DIAGNOSIS — Z98.890 H/O PARATHYROIDECTOMY: ICD-10-CM

## 2024-07-11 PROCEDURE — 99214 OFFICE O/P EST MOD 30 MIN: CPT | Mod: 95 | Performed by: STUDENT IN AN ORGANIZED HEALTH CARE EDUCATION/TRAINING PROGRAM

## 2024-07-11 NOTE — NURSING NOTE
Current patient location: Patient declined to provide     Is the patient currently in the state of [place]? YES    Visit mode:VIDEO    If the visit is dropped, the patient can be reconnected by: VIDEO VISIT: Text to cell phone:   Telephone Information:   Mobile [phone]       Will anyone else be joining the visit? NO  (If patient encounters technical issues they should call [phone]    How would you like to obtain your AVS? MyChart    Are changes needed to the allergy or medication list? No    Are refills needed on medications prescribed by this physician? NO    Reason for visit: RECHECK and Video Visit    Sisi CRUZ

## 2024-07-11 NOTE — LETTER
7/11/2024       RE: Lisa Ferguson  [address]  [place] [address]     Dear Colleague,    Thank you for referring your patient, Lisa Ferguson, to the [facility] [place] [facility]. Please see a copy of my visit note below.    Error      Endocrinology Clinic Visit       Video-Visit Details    Type of service:  Video Visit    Joined the call at 7/11/2024, 10:03:14 am.  Left the call at 7/11/2024, 10:19:48 am.    Originating Location (pt. Location): Home        Distant Location (provider location):  Off-site    Mode of Communication:  Video Conference via [employer]    Physician has received verbal consent for a Video Visit from the patient? Yes    I spent a total of 30 minutes on the date of encounter reviewing medical records, evaluating the patient, coordinating care and documenting in the EHR, as detailed above.      NAME:  Lisa Ferguson  PCP:  Ziyad Isaac  MRN:  [id number]  Reason for Consult:  Hypercalcemia   Requesting Provider:  Ziyad Isaac    Chief Complaint     No chief complaint on file.      History of Present Illness     Lisa Ferguson is a 87 year old female who is seen in video visit for hypercalcemia. Last seen 12/2023.    She had hyperparathyroidism s/p parathyroidectomy 2002 at [facility]. No records. Care everywhere reviewed: 11/12/2002 NM parathyroid: difficult to evaluate for parathyroid adenoma.  no suspicious focal lesion, although the expected positions of the parathyroid are obscured by the thyroid uptake.     She reported that she had a 24 hour urine calcium of > 490 mg at that time  She had kidney stone in 2015. 24 hour urine calcium at that time was 150 mg  Repeat on 3/2023 was 190 mg while on torsemide    Calcium started to trend up in 2014 around 10.4-10.6 with periods of normal caclium. Up until recently calcium trended up to high 10s and highest of 11.1.    No overt symptoms of  hypercalcemia: chronic constipation, no dyspepsia, no mental status changes/lethargy, no polyuria. She has chronic dry mouth related to torsemide.     Dexa scan most recent one 12/2022: no evidence of low bone density.    Calcium intake: drinks milk regularly, eats cheese and yogurt. Supplements: no    Vitamin D intake: Supplements: 2000 international unit(s)     Previous fractures: shoulder related injury ? Fracture after falling, around 2015. Recent fall , no fracture. FTH chronic compression fracture on L2.  She fell 9/2022 had hip pain but no fractures. She is now s/p right hip replacement 10/2023  Family history of fragility fracture in parent: no  History of kidney stones: Yes: one time kidney stone in 2015.  History of kidney disease: no  Family history of any calcium problems or kidney stones: Yes: sister had same hyperparathyroid issues.  History of vitamin D deficiency: No  History of HCTZ use: yes, she does not recall. It is on her chart, stopped back in 2020.  History of Lithium use: No  History of malabsorption (IBD, Celiac, gastric bypass ): No  History of thyroid disease: Yes: controlled   Weight history: lost 20 lbs since she fell in the past few years.         Problem List     Patient Active Problem List   Diagnosis    PUD (peptic ulcer disease)    Hyperlipidemia with target LDL less than 70    Mitral regurgitation    DJD (degenerative joint disease)    Sciatica neuralgia    IBS (irritable bowel syndrome)    RBBB (right bundle branch block)    GLEN (obstructive sleep apnea)    Balance disorder    Anxiety    Ventricular tachycardia (paroxysmal) (H)    Blepharitis    Hypercalcemia    OA (osteoarthritis) of knee - bilateral    CHF (congestive heart failure) (H)    Abnormal glucose    S/P CABG (coronary artery bypass graft)    S/p total knee replacement, bilateral    Acquired hypothyroidism    Essential hypertension with goal blood pressure less than 130/80    Morbid obesity (H)    H/O parathyroidectomy     Atherosclerosis of coronary artery bypass graft(s), unspecified, with other forms of angina pectoris (H24)    Malignant neoplasm of upper-outer quadrant of left breast in female, estrogen receptor positive (H)    Secondary hyperparathyroidism of renal origin (H24)    Acute idiopathic gout of right foot    Hip pain, right    Closed compression fracture of L2 vertebra (H)    Degenerative joint disease of right hip    Lumbar spinal stenosis        Medications     Current Outpatient Medications   Medication Sig Dispense Refill    acetaminophen (TYLENOL) 650 MG CR tablet Take 1,300 mg by mouth 2 times daily as needed for pain      allopurinol (ZYLOPRIM) 100 MG tablet Take 1 tablet (100 mg) by mouth daily TAKE 1 TABLET BY MOUTH DAILY ALONG WITH 300MG TABLET FOR TOTAL DAILY DOSE OF 400MG 90 tablet 3    allopurinol (ZYLOPRIM) 300 MG tablet TAKE 1 TABLET BY MOUTH DAILY ALONG WITH 100MG TABLET FOR TOTAL DAILY DOSE OF 400MG 90 tablet 3    aspirin 81 MG EC tablet Take 81 mg by mouth every morning      betamethasone dipropionate (DIPROSONE) 0.05 % external lotion Apply topically daily as needed (seborrhea) 60 mL 0    clobetasol (TEMOVATE) 0.05 % external solution Apply topically 2 times daily As needed for scalp      levothyroxine (SYNTHROID/LEVOTHROID) 125 MCG tablet Take 1 tablet (125 mcg) by mouth every morning 90 tablet 3    losartan (COZAAR) 50 MG tablet Take 50 mg by mouth daily      metoprolol tartrate (LOPRESSOR) 25 MG tablet Take 2 tablets (50 mg) by mouth every morning and 1 tablet (25 mg) every evening      Multiple Vitamins-Minerals (HAIR SKIN AND NAILS FORMULA PO) Take 2 each by mouth daily      order for DME Equipment being ordered: CPAP 1 each 0    rosuvastatin (CRESTOR) 5 MG tablet Take 1 tablet (5 mg) by mouth every other day 45 tablet 3    sertraline (ZOLOFT) 25 MG tablet Take 0.5 tablets (12.5 mg) by mouth daily 45 tablet 1    SODIUM FLUORIDE 5000 PPM 1.1 % GEL topical gel BRUSH ON FOR 2-3 MINUTES AND SPIT  OUT EXCESS 2 TIMES DAILY      torsemide (DEMADEX) 20 MG tablet Take 1 tablet by mouth daily      vitamin D3 (CHOLECALCIFEROL) 50 mcg (2000 units) tablet Take 1 tablet by mouth daily       No current facility-administered medications for this visit.        Allergies     Allergies   Allergen Reactions    Adhesive Tape     Atorvastatin Other (See Comments) and Muscle Pain (Myalgia)     lipitor  Myalgia    Buspar [Buspirone]     Nickel Other (See Comments)     Raw weepy skin  rash    Vicodin [Hydrocodone-Acetaminophen] Other (See Comments)     Hallucinations    Liquid Adhesive      Other reaction(s): Contact Dermatitis       Medical / Surgical History     Past Medical History:   Diagnosis Date    Anxiety     CAD (coronary artery disease)     angio 2002, h/o cabg, sees Luisana Nelson NP, they follow the cholesterol    CHF (congestive heart failure) (H) 07/08/2014    Closed compression fracture of L2 vertebra (H)     Degenerative joint disease of right hip     DJD (degenerative joint disease)     History of colonoscopy 01/01/2000    due jan 2010    Hyperlipidemia LDL goal < 70     [place] [facility]    Hypertension goal BP (blood pressure) < 140/90     Hypothyroidism     IBS (irritable bowel syndrome)     Lumbar spinal stenosis     Mitral regurgitation     Obesity     GLEN (obstructive sleep apnea) 07/11/2011    PUD (peptic ulcer disease)     h/o duodenal ulcer    RBBB (right bundle branch block)     Sciatica neuralgia november 209    MRI shows spinal stenosis and a cyst on the spine, has received an epidural steroid injection     Past Surgical History:   Procedure Laterality Date    ARTHROSCOPY KNEE RT/LT      bilateral    COLONOSCOPY  6/2010    negative    HC DILATION/CURETTAGE DIAG/THER NON OB      HC EXCIS INTERDIGITAL NEUROMA,EA      mortons neuroma excision    HC REMOVAL GALLBLADDER  1994    HERNIA REPAIR, INGUINAL RT/LT      MASTECTOMY SIMPLE BILATERAL, SENTINEL NODE BILATERAL, COMBINED Bilateral 1/6/2021     Procedure: BILATERAL SIMPLE MASTECTOMY WITH LEFT SENTINEL LYMPH NODE BIOPSY;  Surgeon: Beata Garcia MD;  Location: [facility] OR    SURGICAL HISTORY OF -       bilateral meniscal tears and surgery on these    SURGICAL HISTORY OF -    or so    one parathyroid removed    TONSILLECTOMY      TUBAL LIGATION      ZZC APPENDECTOMY      ZZC CABG, ARTERIAL, THREE      LIMA-LAD, SVG-DIagnoal, SVG-OM, previous LAD-PCI, sees Dr Banuelos       Social History     Social History     Socioeconomic History    Marital status:      Spouse name: Not on file    Number of children: Not on file    Years of education: Not on file    Highest education level: Not on file   Occupational History    Not on file   Tobacco Use    Smoking status: Former     Current packs/day: 0.00     Types: Cigarettes     Start date: 1958     Quit date: 1978     Years since quittin.0    Smokeless tobacco: Never   Vaping Use    Vaping status: Never Used   Substance and Sexual Activity    Alcohol use: Yes     Comment: Rare     Drug use: No    Sexual activity: Not Currently     Partners: Male   Other Topics Concern    Parent/sibling w/ CABG, MI or angioplasty before 65F 55M? Not Asked   Social History Narrative    Not on file     Social Determinants of Health     Financial Resource Strain: Low Risk  (2024)    Financial Resource Strain     Within the past 12 months, have you or your family members you live with been unable to get utilities (heat, electricity) when it was really needed?: No   Food Insecurity: Low Risk  (2024)    Food Insecurity     Within the past 12 months, did you worry that your food would run out before you got money to buy more?: No     Within the past 12 months, did the food you bought just not last and you didn t have money to get more?: No   Transportation Needs: Low Risk  (2024)    Transportation Needs     Within the past 12 months, has lack of transportation kept you from medical appointments, getting  your medicines, non-medical meetings or appointments, work, or from getting things that you need?: No   Physical Activity: Insufficiently Active (6/6/2024)    Exercise Vital Sign     Days of Exercise per Week: 3 days     Minutes of Exercise per Session: 30 min   Stress: Stress Concern Present (6/6/2024)    [employer] - Occupational Stress Questionnaire     Feeling of Stress : To some extent   Social Connections: Unknown (6/6/2024)    Social Connection and Isolation Panel [NHANES]     Frequency of Communication with Friends and Family: Not on file     Frequency of Social Gatherings with Friends and Family: Three times a week     Attends Oriental orthodox Services: Not on file     Active Member of Clubs or Organizations: Not on file     Attends Club or Organization Meetings: Not on file     Marital Status: Not on file   Interpersonal Safety: Low Risk  (6/7/2024)    Interpersonal Safety     Do you feel physically and emotionally safe where you currently live?: Yes     Within the past 12 months, have you been hit, slapped, kicked or otherwise physically hurt by someone?: No     Within the past 12 months, have you been humiliated or emotionally abused in other ways by your partner or ex-partner?: No   Housing Stability: Low Risk  (6/6/2024)    Housing Stability     Do you have housing? : Yes     Are you worried about losing your housing?: No       Family History     Family History   Problem Relation Age of Onset    C.A.D. Mother     Arthritis Mother     Cardiovascular Mother     Heart Disease Mother     Obesity Mother     Thyroid Disease Mother     C.A.D. Father     Diabetes Father     Hypertension Father     Alcohol/Drug Father     Alzheimer Disease Father     Cardiovascular Father     Circulatory Father     Gastrointestinal Disease Father     Heart Disease Father     Lipids Father     Obesity Father     Cardiovascular Maternal Grandmother     Depression Maternal Grandmother     Obesity Maternal  Grandmother     Thyroid Disease Maternal Grandmother     Cardiovascular Maternal Grandfather     Heart Disease Maternal Grandfather     Obesity Maternal Grandfather     Obesity Paternal Grandmother     Diabetes Paternal Grandfather     Alcohol/Drug Paternal Grandfather     Obesity Paternal Grandfather     Breast Cancer Sister     Cancer Sister     Obesity Sister     Thyroid Disease Sister     Alcohol/Drug Son     Allergies Son     Allergies Daughter     Depression Sister     Eye Disorder Sister     Gastrointestinal Disease Sister     Thyroid Disease Sister        ROS     12 ROS completed, pertinent positive and negative in HPI    Physical Exam   There were no vitals taken for this visit.   GENERAL: Healthy, alert and no distress  EYES: Eyes grossly normal to inspection.  No discharge or erythema, or obvious scleral/conjunctival abnormalities.  RESP: No audible wheeze, cough, or visible cyanosis.  No visible retractions or increased work of breathing.    SKIN: Visible skin clear. No significant rash, abnormal pigmentation or lesions.  NEURO: Cranial nerves grossly intact.  Mentation and speech appropriate for age.  PSYCH: Mentation appears normal, affect normal/bright, judgement and insight intact, normal speech and appearance well-groomed.     Labs/Imaging     Pertinent Labs were reviewed and updated in EPIC and discussed briefly.  Radiology Results were  reviewed and updated in EPIC and discussed briefly.    Summary of recent findings:   Lab Results   Component Value Date    A1C 5.8 01/02/2023    A1C 6.0 07/07/2022    A1C 5.7 12/29/2020    A1C 5.7 09/13/2018    A1C 5.4 08/01/2017    A1C 5.7 01/03/2017    A1C 5.5 02/12/2016       TSH   Date Value Ref Range Status   06/13/2024 3.80 0.30 - 4.20 uIU/mL Final   09/29/2023 3.72 0.30 - 4.20 uIU/mL Final   06/22/2023 5.48 (H) 0.30 - 4.20 uIU/mL Final   07/07/2022 2.94 0.40 - 4.00 mU/L Final   02/17/2021 3.80 0.40 - 4.00 mU/L Final   12/29/2020 23.61 (H) 0.40 - 4.00 mU/L  "Final   10/28/2019 1.75 0.40 - 4.00 mU/L Final   09/13/2018 2.66 0.40 - 4.00 mU/L Final   08/01/2017 2.66 0.40 - 4.00 mU/L Final     T4 Free   Date Value Ref Range Status   12/29/2020 0.59 (L) 0.76 - 1.46 ng/dL Final   10/21/2016 1.02 0.76 - 1.46 ng/dL Final   03/23/2015 1.07 0.76 - 1.46 ng/dL Final     Comment:     Effective 7/30/2014, the reference range for this assay has changed to reflect   new instrumentation/methodology.       Free T4   Date Value Ref Range Status   06/22/2023 1.28 0.90 - 1.70 ng/dL Final       Creatinine   Date Value Ref Range Status   06/13/2024 0.78 0.51 - 0.95 mg/dL Final   01/07/2021 0.79 0.52 - 1.04 mg/dL Final       Recent Labs   Lab Test 07/07/22  1125 12/29/20  1227 02/12/16  1108 03/23/15  1213   CHOL 175 296*   < > 162   HDL 49* 51   < > 50*   LDL 94 200*   < > 69   TRIG 160* 227*   < > 217*   CHOLHDLRATIO  --   --   --  3.2    < > = values in this interval not displayed.       No results found for: \"[id number]\", \"[id number]\", \"[id number]\"    I personally reviewed the patient's outside records from [facility] EMR and Care Everywhere. Summary of pertinent findings in HPI.    Impression / Plan       1. Hypercalcemia.   2. Hyperparathyroidism   3. Chronic compression fracture  With elevated or normal PTH, the most likely diagnosis is recurrence of primary hyperparathyroidism. Her 24 hour urine collection for calcium was <220 mg despite being on torsemide which may raise the concern for FHH.    She is s/p parathyroidectomy in 2002, no records.    We reviewed again the indication for surgery in PHPTH. She prefers not to persue surgery even if PHPTH is confirmed, which is very reasonable, given that her only indication for surgery would be ca>11. We discussed that given her age and underlying medical problems it is reasonable to avoid surgery. We discussed monitoring labs at 6month interval, if stable calcium no need for medical therapy. We discussed today medical therapy with cinacalcet " would be indicated if symptomatic hypercalcemia or in mod to severe hypercalcemia ( which does not usually happen with PHPTH). We discussed graduation to PCP, monitoring ca/alb/cr every 6 month to 12 month. Dexa scan every 2 years. She prefers to get one more set of lab by me and if stable we will plan to graduate to PCP.    As far as calcium intake, even in cases of hyperparathyroidism, it is recommended to keep up calcium intake to about 1000 mg daily, to prevent further increase in PTH and bone disease. I advised that to the patient. It is also important to maintain adequate vitd intake and normal vitd level.    It is unlikely that her chronic compression fx is related to diagnosis above, given normal dexa scan. Unable to confirm fragility fracture as this is chronic , she reported significant falls over the years. ( Old MRI lumbar 2017 no mention of fracture). We discussed OP medication in the past for possible fragility fx, she prefers to avoid new meds for now.    3. Chronic hypothyroidism  Controlled on stable lt4. Managed by her PCP.     Test and/or medications prescribed today:  No orders of the defined types were placed in this encounter.            Ida Nettles MD  Endocrinology, Diabetes and Metabolism  [facility]

## 2024-07-11 NOTE — PROGRESS NOTES
Endocrinology Clinic Visit       Video-Visit Details    Type of service:  Video Visit    Joined the call at 7/11/2024, 10:03:14 am.  Left the call at 7/11/2024, 10:19:48 am.    Originating Location (pt. Location): Home        Distant Location (provider location):  Off-site    Mode of Communication:  Video Conference via Jumia    Physician has received verbal consent for a Video Visit from the patient? Yes    I spent a total of 30 minutes on the date of encounter reviewing medical records, evaluating the patient, coordinating care and documenting in the EHR, as detailed above.      NAME:  Lisa Ferguson  PCP:  Ziyad Isaac  MRN:  [id number]  Reason for Consult:  Hypercalcemia   Requesting Provider:  Ziyad Isaac    Chief Complaint     No chief complaint on file.      History of Present Illness     Lisa Ferguson is a 87 year old female who is seen in video visit for hypercalcemia. Last seen 12/2023.    She had hyperparathyroidism s/p parathyroidectomy 2002 at [facility]. No records. Care everywhere reviewed: 11/12/2002 NM parathyroid: difficult to evaluate for parathyroid adenoma.  no suspicious focal lesion, although the expected positions of the parathyroid are obscured by the thyroid uptake.     She reported that she had a 24 hour urine calcium of > 490 mg at that time  She had kidney stone in 2015. 24 hour urine calcium at that time was 150 mg  Repeat on 3/2023 was 190 mg while on torsemide    Calcium started to trend up in 2014 around 10.4-10.6 with periods of normal caclium. Up until recently calcium trended up to high 10s and highest of 11.1.    No overt symptoms of hypercalcemia: chronic constipation, no dyspepsia, no mental status changes/lethargy, no polyuria. She has chronic dry mouth related to torsemide.     Dexa scan most recent one 12/2022: no evidence of low bone density.    Calcium intake: drinks milk regularly, eats cheese and yogurt. Supplements: no    Vitamin D intake: Supplements:  2000 international unit(s)     Previous fractures: shoulder related injury ? Fracture after falling, around 2015. Recent fall , no fracture. FTH chronic compression fracture on L2.  She fell 9/2022 had hip pain but no fractures. She is now s/p right hip replacement 10/2023  Family history of fragility fracture in parent: no  History of kidney stones: Yes: one time kidney stone in 2015.  History of kidney disease: no  Family history of any calcium problems or kidney stones: Yes: sister had same hyperparathyroid issues.  History of vitamin D deficiency: No  History of HCTZ use: yes, she does not recall. It is on her chart, stopped back in 2020.  History of Lithium use: No  History of malabsorption (IBD, Celiac, gastric bypass ): No  History of thyroid disease: Yes: controlled   Weight history: lost 20 lbs since she fell in the past few years.         Problem List     Patient Active Problem List   Diagnosis    PUD (peptic ulcer disease)    Hyperlipidemia with target LDL less than 70    Mitral regurgitation    DJD (degenerative joint disease)    Sciatica neuralgia    IBS (irritable bowel syndrome)    RBBB (right bundle branch block)    GLEN (obstructive sleep apnea)    Balance disorder    Anxiety    Ventricular tachycardia (paroxysmal) (H)    Blepharitis    Hypercalcemia    OA (osteoarthritis) of knee - bilateral    CHF (congestive heart failure) (H)    Abnormal glucose    S/P CABG (coronary artery bypass graft)    S/p total knee replacement, bilateral    Acquired hypothyroidism    Essential hypertension with goal blood pressure less than 130/80    Morbid obesity (H)    H/O parathyroidectomy    Atherosclerosis of coronary artery bypass graft(s), unspecified, with other forms of angina pectoris (H24)    Malignant neoplasm of upper-outer quadrant of left breast in female, estrogen receptor positive (H)    Secondary hyperparathyroidism of renal origin (H24)    Acute idiopathic gout of right foot    Hip pain, right    Closed  compression fracture of L2 vertebra (H)    Degenerative joint disease of right hip    Lumbar spinal stenosis        Medications     Current Outpatient Medications   Medication Sig Dispense Refill    acetaminophen (TYLENOL) 650 MG CR tablet Take 1,300 mg by mouth 2 times daily as needed for pain      allopurinol (ZYLOPRIM) 100 MG tablet Take 1 tablet (100 mg) by mouth daily TAKE 1 TABLET BY MOUTH DAILY ALONG WITH 300MG TABLET FOR TOTAL DAILY DOSE OF 400MG 90 tablet 3    allopurinol (ZYLOPRIM) 300 MG tablet TAKE 1 TABLET BY MOUTH DAILY ALONG WITH 100MG TABLET FOR TOTAL DAILY DOSE OF 400MG 90 tablet 3    aspirin 81 MG EC tablet Take 81 mg by mouth every morning      betamethasone dipropionate (DIPROSONE) 0.05 % external lotion Apply topically daily as needed (seborrhea) 60 mL 0    clobetasol (TEMOVATE) 0.05 % external solution Apply topically 2 times daily As needed for scalp      levothyroxine (SYNTHROID/LEVOTHROID) 125 MCG tablet Take 1 tablet (125 mcg) by mouth every morning 90 tablet 3    losartan (COZAAR) 50 MG tablet Take 50 mg by mouth daily      metoprolol tartrate (LOPRESSOR) 25 MG tablet Take 2 tablets (50 mg) by mouth every morning and 1 tablet (25 mg) every evening      Multiple Vitamins-Minerals (HAIR SKIN AND NAILS FORMULA PO) Take 2 each by mouth daily      order for DME Equipment being ordered: CPAP 1 each 0    rosuvastatin (CRESTOR) 5 MG tablet Take 1 tablet (5 mg) by mouth every other day 45 tablet 3    sertraline (ZOLOFT) 25 MG tablet Take 0.5 tablets (12.5 mg) by mouth daily 45 tablet 1    SODIUM FLUORIDE 5000 PPM 1.1 % GEL topical gel BRUSH ON FOR 2-3 MINUTES AND SPIT OUT EXCESS 2 TIMES DAILY      torsemide (DEMADEX) 20 MG tablet Take 1 tablet by mouth daily      vitamin D3 (CHOLECALCIFEROL) 50 mcg (2000 units) tablet Take 1 tablet by mouth daily       No current facility-administered medications for this visit.        Allergies     Allergies   Allergen Reactions    Adhesive Tape     Atorvastatin  Other (See Comments) and Muscle Pain (Myalgia)     lipitor  Myalgia    Buspar [Buspirone]     Nickel Other (See Comments)     Raw weepy skin  rash    Vicodin [Hydrocodone-Acetaminophen] Other (See Comments)     Hallucinations    Liquid Adhesive      Other reaction(s): Contact Dermatitis       Medical / Surgical History     Past Medical History:   Diagnosis Date    Anxiety     CAD (coronary artery disease)     angio 2002, h/o cabg, sees Luisana Nelson NP, they follow the cholesterol    CHF (congestive heart failure) (H) 07/08/2014    Closed compression fracture of L2 vertebra (H)     Degenerative joint disease of right hip     DJD (degenerative joint disease)     History of colonoscopy 01/01/2000    due jan 2010    Hyperlipidemia LDL goal < 70     se[facility]    Hypertension goal BP (blood pressure) < 140/90     Hypothyroidism     IBS (irritable bowel syndrome)     Lumbar spinal stenosis     Mitral regurgitation     Obesity     GLEN (obstructive sleep apnea) 07/11/2011    PUD (peptic ulcer disease)     h/o duodenal ulcer    RBBB (right bundle branch block)     Sciatica neuralgia november 209    MRI shows spinal stenosis and a cyst on the spine, has received an epidural steroid injection     Past Surgical History:   Procedure Laterality Date    ARTHROSCOPY KNEE RT/LT      bilateral    COLONOSCOPY  6/2010    negative    HC DILATION/CURETTAGE DIAG/THER NON OB      HC EXCIS INTERDIGITAL NEUROMA,EA      mortons neuroma excision    HC REMOVAL GALLBLADDER  1994    HERNIA REPAIR, INGUINAL RT/LT      MASTECTOMY SIMPLE BILATERAL, SENTINEL NODE BILATERAL, COMBINED Bilateral 1/6/2021    Procedure: BILATERAL SIMPLE MASTECTOMY WITH LEFT SENTINEL LYMPH NODE BIOPSY;  Surgeon: Beata Garcia MD;  Location: [facility] OR    SURGICAL HISTORY OF -       bilateral meniscal tears and surgery on these    SURGICAL HISTORY OF -   1999 or so    one parathyroid removed    TONSILLECTOMY      TUBAL LIGATION      ZZC APPENDECTOMY      Z  CABG, ARTERIAL, THREE      LIMA-LAD, SVG-DIagnoal, SVG-OM, previous LAD-PCI, sees Dr Banuelos       Social History     Social History     Socioeconomic History    Marital status:      Spouse name: Not on file    Number of children: Not on file    Years of education: Not on file    Highest education level: Not on file   Occupational History    Not on file   Tobacco Use    Smoking status: Former     Current packs/day: 0.00     Types: Cigarettes     Start date: 1958     Quit date: 1978     Years since quittin.0    Smokeless tobacco: Never   Vaping Use    Vaping status: Never Used   Substance and Sexual Activity    Alcohol use: Yes     Comment: Rare     Drug use: No    Sexual activity: Not Currently     Partners: Male   Other Topics Concern    Parent/sibling w/ CABG, MI or angioplasty before 65F 55M? Not Asked   Social History Narrative    Not on file     Social Determinants of Health     Financial Resource Strain: Low Risk  (2024)    Financial Resource Strain     Within the past 12 months, have you or your family members you live with been unable to get utilities (heat, electricity) when it was really needed?: No   Food Insecurity: Low Risk  (2024)    Food Insecurity     Within the past 12 months, did you worry that your food would run out before you got money to buy more?: No     Within the past 12 months, did the food you bought just not last and you didn t have money to get more?: No   Transportation Needs: Low Risk  (2024)    Transportation Needs     Within the past 12 months, has lack of transportation kept you from medical appointments, getting your medicines, non-medical meetings or appointments, work, or from getting things that you need?: No   Physical Activity: Insufficiently Active (2024)    Exercise Vital Sign     Days of Exercise per Week: 3 days     Minutes of Exercise per Session: 30 min   Stress: Stress Concern Present (2024)    [employer]  [employer]upational [employer] - Occupational Stress Questionnaire     Feeling of Stress : To some extent   Social Connections: Unknown (6/6/2024)    Social Connection and Isolation Panel [NHANES]     Frequency of Communication with Friends and Family: Not on file     Frequency of Social Gatherings with Friends and Family: Three times a week     Attends [employer] Services: Not on file     Active Member of Clubs or Organizations: Not on file     Attends Club or Organization Meetings: Not on file     Marital Status: Not on file   Interpersonal Safety: Low Risk  (6/7/2024)    Interpersonal Safety     Do you feel physically and emotionally safe where you currently live?: Yes     Within the past 12 months, have you been hit, slapped, kicked or otherwise physically hurt by someone?: No     Within the past 12 months, have you been humiliated or emotionally abused in other ways by your partner or ex-partner?: No   Housing Stability: Low Risk  (6/6/2024)    Housing Stability     Do you have housing? : Yes     Are you worried about losing your housing?: No       Family History     Family History   Problem Relation Age of Onset    C.A.D. Mother     Arthritis Mother     Cardiovascular Mother     Heart Disease Mother     Obesity Mother     Thyroid Disease Mother     C.A.D. Father     Diabetes Father     Hypertension Father     Alcohol/Drug Father     Alzheimer Disease Father     Cardiovascular Father     Circulatory Father     Gastrointestinal Disease Father     Heart Disease Father     Lipids Father     Obesity Father     Cardiovascular Maternal Grandmother     Depression Maternal Grandmother     Obesity Maternal Grandmother     Thyroid Disease Maternal Grandmother     Cardiovascular Maternal Grandfather     Heart Disease Maternal Grandfather     Obesity Maternal Grandfather     Obesity Paternal Grandmother     Diabetes Paternal Grandfather     Alcohol/Drug Paternal Grandfather     Obesity Paternal Grandfather     Breast Cancer Sister      Cancer Sister     Obesity Sister     Thyroid Disease Sister     Alcohol/Drug Son     Allergies Son     Allergies Daughter     Depression Sister     Eye Disorder Sister     Gastrointestinal Disease Sister     Thyroid Disease Sister        ROS     12 ROS completed, pertinent positive and negative in HPI    Physical Exam   There were no vitals taken for this visit.   GENERAL: Healthy, alert and no distress  EYES: Eyes grossly normal to inspection.  No discharge or erythema, or obvious scleral/conjunctival abnormalities.  RESP: No audible wheeze, cough, or visible cyanosis.  No visible retractions or increased work of breathing.    SKIN: Visible skin clear. No significant rash, abnormal pigmentation or lesions.  NEURO: Cranial nerves grossly intact.  Mentation and speech appropriate for age.  PSYCH: Mentation appears normal, affect normal/bright, judgement and insight intact, normal speech and appearance well-groomed.     Labs/Imaging     Pertinent Labs were reviewed and updated in EPIC and discussed briefly.  Radiology Results were  reviewed and updated in EPIC and discussed briefly.    Summary of recent findings:   Lab Results   Component Value Date    A1C 5.8 01/02/2023    A1C 6.0 07/07/2022    A1C 5.7 12/29/2020    A1C 5.7 09/13/2018    A1C 5.4 08/01/2017    A1C 5.7 01/03/2017    A1C 5.5 02/12/2016       TSH   Date Value Ref Range Status   06/13/2024 3.80 0.30 - 4.20 uIU/mL Final   09/29/2023 3.72 0.30 - 4.20 uIU/mL Final   06/22/2023 5.48 (H) 0.30 - 4.20 uIU/mL Final   07/07/2022 2.94 0.40 - 4.00 mU/L Final   02/17/2021 3.80 0.40 - 4.00 mU/L Final   12/29/2020 23.61 (H) 0.40 - 4.00 mU/L Final   10/28/2019 1.75 0.40 - 4.00 mU/L Final   09/13/2018 2.66 0.40 - 4.00 mU/L Final   08/01/2017 2.66 0.40 - 4.00 mU/L Final     T4 Free   Date Value Ref Range Status   12/29/2020 0.59 (L) 0.76 - 1.46 ng/dL Final   10/21/2016 1.02 0.76 - 1.46 ng/dL Final   03/23/2015 1.07 0.76 - 1.46 ng/dL Final     Comment:     Effective  "7/30/2014, the reference range for this assay has changed to reflect   new instrumentation/methodology.       Free T4   Date Value Ref Range Status   06/22/2023 1.28 0.90 - 1.70 ng/dL Final       Creatinine   Date Value Ref Range Status   06/13/2024 0.78 0.51 - 0.95 mg/dL Final   01/07/2021 0.79 0.52 - 1.04 mg/dL Final       Recent Labs   Lab Test 07/07/22  1125 12/29/20  1227 02/12/16  1108 03/23/15  1213   CHOL 175 296*   < > 162   HDL 49* 51   < > 50*   LDL 94 200*   < > 69   TRIG 160* 227*   < > 217*   CHOLHDLRATIO  --   --   --  3.2    < > = values in this interval not displayed.       No results found for: \"[id number]\", \"[id number]\", \"[id number]\"    I personally reviewed the patient's outside records from [facility] EMR and Care Everywhere. Summary of pertinent findings in HPI.    Impression / Plan       1. Hypercalcemia.   2. Hyperparathyroidism   3. Chronic compression fracture  With elevated or normal PTH, the most likely diagnosis is recurrence of primary hyperparathyroidism. Her 24 hour urine collection for calcium was <220 mg despite being on torsemide which may raise the concern for FHH.    She is s/p parathyroidectomy in 2002, no records.    We reviewed again the indication for surgery in PHPTH. She prefers not to persue surgery even if PHPTH is confirmed, which is very reasonable, given that her only indication for surgery would be ca>11. We discussed that given her age and underlying medical problems it is reasonable to avoid surgery. We discussed monitoring labs at 6month interval, if stable calcium no need for medical therapy. We discussed today medical therapy with cinacalcet would be indicated if symptomatic hypercalcemia or in mod to severe hypercalcemia ( which does not usually happen with PHPTH). We discussed graduation to PCP, monitoring ca/alb/cr every 6 month to 12 month. Dexa scan every 2 years. She prefers to get one more set of lab by me and if stable we will plan to graduate to PCP.    As " far as calcium intake, even in cases of hyperparathyroidism, it is recommended to keep up calcium intake to about 1000 mg daily, to prevent further increase in PTH and bone disease. I advised that to the patient. It is also important to maintain adequate vitd intake and normal vitd level.    It is unlikely that her chronic compression fx is related to diagnosis above, given normal dexa scan. Unable to confirm fragility fracture as this is chronic , she reported significant falls over the years. ( Old MRI lumbar 2017 no mention of fracture). We discussed OP medication in the past for possible fragility fx, she prefers to avoid new meds for now.    3. Chronic hypothyroidism  Controlled on stable lt4. Managed by her PCP.     Test and/or medications prescribed today:  No orders of the defined types were placed in this encounter.            Ida Nettles MD  Endocrinology, Diabetes and Metabolism  [facility]

## 2024-09-16 ENCOUNTER — APPOINTMENT (OUTPATIENT)
Dept: URBAN - METROPOLITAN AREA CLINIC 252 | Age: 89
Setting detail: DERMATOLOGY
End: 2024-09-19

## 2024-09-16 VITALS — HEIGHT: 66 IN | WEIGHT: 239 LBS

## 2024-09-16 DIAGNOSIS — L21.8 OTHER SEBORRHEIC DERMATITIS: ICD-10-CM

## 2024-09-16 DIAGNOSIS — D49.2 NEOPLASM OF UNSPECIFIED BEHAVIOR OF BONE, SOFT TISSUE, AND SKIN: ICD-10-CM

## 2024-09-16 DIAGNOSIS — L57.0 ACTINIC KERATOSIS: ICD-10-CM

## 2024-09-16 PROCEDURE — 11103 TANGNTL BX SKIN EA SEP/ADDL: CPT

## 2024-09-16 PROCEDURE — OTHER LIQUID NITROGEN: OTHER

## 2024-09-16 PROCEDURE — 99213 OFFICE O/P EST LOW 20 MIN: CPT | Mod: 25

## 2024-09-16 PROCEDURE — 17000 DESTRUCT PREMALG LESION: CPT | Mod: 59

## 2024-09-16 PROCEDURE — OTHER BIOPSY BY SHAVE METHOD: OTHER

## 2024-09-16 PROCEDURE — 11102 TANGNTL BX SKIN SINGLE LES: CPT

## 2024-09-16 PROCEDURE — OTHER COUNSELING: OTHER

## 2024-09-16 ASSESSMENT — LOCATION SIMPLE DESCRIPTION DERM
LOCATION SIMPLE: LEFT SCALP
LOCATION SIMPLE: RIGHT FOREARM
LOCATION SIMPLE: CHEST
LOCATION SIMPLE: LEFT EAR
LOCATION SIMPLE: RIGHT CHEEK
LOCATION SIMPLE: LEFT FOREARM
LOCATION SIMPLE: RIGHT EAR

## 2024-09-16 ASSESSMENT — LOCATION DETAILED DESCRIPTION DERM
LOCATION DETAILED: RIGHT INFERIOR LATERAL MALAR CHEEK
LOCATION DETAILED: RIGHT PROXIMAL DORSAL FOREARM
LOCATION DETAILED: LEFT MEDIAL FRONTAL SCALP
LOCATION DETAILED: LEFT MEDIAL SUPERIOR CHEST
LOCATION DETAILED: RIGHT CRUS OF HELIX
LOCATION DETAILED: LEFT PROXIMAL DORSAL FOREARM
LOCATION DETAILED: LEFT CRUS OF HELIX

## 2024-09-16 ASSESSMENT — LOCATION ZONE DERM
LOCATION ZONE: TRUNK
LOCATION ZONE: SCALP
LOCATION ZONE: ARM
LOCATION ZONE: EAR
LOCATION ZONE: FACE

## 2024-09-16 NOTE — PROCEDURE: BIOPSY BY SHAVE METHOD
Notification Instructions: - It can take up to 2 -3 weeks to get a biopsy result. \\n- Please refrain from calling to request results until after 2 weeks.
Depth Of Biopsy: dermis
Wound Care: Aquaphor
Hide Additional Size Dimension?: Yes
Validate That Anesthesia Is Not 0: No
Curettage Text: The wound bed was treated with curettage after the biopsy was performed.
Anesthesia Volume In Cc: 0.3
Cryotherapy Text: The wound bed was treated with cryotherapy after the biopsy was performed.
Dressing: bandage
Additional Anesthesia Volume In Cc (Will Not Render If 0): 0
Consent: - Consent was obtained and risks were reviewed prior to procedure today. All questions were answered prior to procedure today.\\n- Risks discussed include but are not limited to scarring, infection, bleeding, scabbing, incomplete removal, nerve damage, pain, and allergy to anesthesia.
Electrodesiccation Text: The wound bed was treated with electrodesiccation after the biopsy was performed.
Billing Type: Third-Party Bill
Lab: -[id number]
Electrodesiccation And Curettage Text: The wound bed was treated with electrodesiccation and curettage after the biopsy was performed.
Information: Selecting Yes will display possible errors in your note based on the variables you have selected. This validation is only offered as a suggestion for you. PLEASE NOTE THAT THE VALIDATION TEXT WILL BE REMOVED WHEN YOU FINALIZE YOUR NOTE. IF YOU WANT TO FAX A PRELIMINARY NOTE YOU WILL NEED TO TOGGLE THIS TO 'NO' IF YOU DO NOT WANT IT IN YOUR FAXED NOTE.
Biopsy Type: H and E
Hemostasis: Drysol
Anesthesia Type: 1% lidocaine with epinephrine
Detail Level: Detailed
Silver Nitrate Text: The wound bed was treated with silver nitrate after the biopsy was performed.
Type Of Destruction Used: Curettage
Post-Care Instructions: WOUND CARE:\\nDo NOT submerge wound in a bath, swimming pool, or hot tub for at least 1 week or for as long as there is an open wound. Gently cleanse the site daily with mild soap and water. Be careful NOT to allow a forceful stream of water to hit the biopsy site. After cleaning/showering, apply a thin layer of petrolatum ointment or Aquaphor in the wound followed by an adhesive bandage. Continue this process daily until healed. \\n\\nBLEEDING:\\nIf you develop persistent bleeding, apply firm and steady pressure over the dressing with gauze for 15 minutes. If bleeding persists, reapply pressure with an ice pack over the gauze for 15 minutes. NEVER APPLY ICE DIRECTLY TO THE SKIN. Do NOT peek under the gauze during these 15 minutes of pressure.  Call our office at [phone] if you cannot get the bleeding to stop. \\n\\nINFECTION:\\nSigns of infection may include increasing rather than decreasing pain (after the first few days), increasing redness/swelling/heat, draining pus, pink/red streaks around the wound, and fever or chills.  Please call our office immediately at [phone] if infection is suspected. It is normal to have some mild redness on or around the wound edges; this will lighten day by day and will become less tender.\\n\\nPAIN:\\nPain is usually minimal, but if needed you may take acetaminophen (Tylenol) every four hours as needed. Applying an ice pack over the dressing for 15-20 minutes every 2-3 hours will relieve swelling, lessen pain, and help minimize bruising. NEVER APPLY ICE DIRECTLY TO THE SKIN. Avoid ibuprofen (Advil, Motrin) and naproxen (Aleve) for the first 48 hours as these can increase bleeding.\\n\\nSWELLING AND BRUISING:\\nSwelling and bruising are common and temporary, usually lasting 1 - 2 weeks. It is more common in areas treated around the eyes, hands, and feet. In the days following the procedure, swelling and bruising can be minimized by keeping the affected areas elevated when possible, reducing salty foods, and applying ice packs over the dressing for 15-20 minutes every 2-3 hours. NEVER APPLY ICE DIRECTLY TO THE SKIN.\\n\\nITCHING:\\nItchiness on and around the wound is very common and can last several days to weeks after surgery. Mild itch is normal as the wound is healing. \\n\\nNERVE CHANGES:\\nNumbness is usually temporary, but it may last for several weeks to months. You may also experience sharp pains at the wound sight as it heals.  Mild pain is normal and will gradually improve with time.\\n \\nNO SMOKING:\\nSmoking will delay the healing process. If you smoke, we recommend trying to quit or at minimum reduce how much you smoke following a procedure.
Biopsy Method: Dermablade
Anesthesia Volume In Cc: 0.5
Wound Care: Petrolatum
Consent: Written consent was obtained and risks were reviewed including but not limited to scarring, infection, bleeding, scabbing, incomplete removal, nerve damage and allergy to anesthesia.
Post-Care Instructions: I reviewed with the patient in detail post-care instructions. Patient is to keep the biopsy site dry overnight, and then apply bacitracin twice daily until healed. Patient may apply hydrogen peroxide soaks to remove any crusting.
Notification Instructions: Patient will be notified of biopsy results. However, patient instructed to call the office if not contacted within 2 weeks.

## 2024-09-16 NOTE — PROCEDURE: LIQUID NITROGEN
Render In Bullet Format When Appropriate: No
Post-Care Instructions: I reviewed with the patient in detail post-care instructions. Patient is to wear sunprotection, and avoid picking at any of the treated lesions. Pt may apply Vaseline to crusted or scabbing areas.
Duration Of Freeze Thaw-Cycle (Seconds): 0
Detail Level: Detailed
Consent: The patient's consent was obtained including but not limited to risks of crusting, scabbing, blistering, scarring, darker or lighter pigmentary change, recurrence, incomplete removal and infection.

## 2024-09-16 NOTE — HPI: SKIN LESION
What Type Of Note Output Would You Prefer (Optional)?: Bullet Format
treated_been_treated
Is This A New Presentation, Or A Follow-Up?: Skin Lesions
Additional History: Discussed field treatment last office visit\\nPatients return in the clinic to have the biopsy done of her chest if she wanted to wait until after her surgery

## 2024-10-14 NOTE — PROGRESS NOTES
Alia Sage returned call to office 10/26/17 stating that pt has not been compliant with machine and Alia Sage wants to speak with Charles Parent. Called [facility] to speak with Alia Sage at ext [phone] had to leave message asking for call back. Called and spoke with nurse, Mary Lewis, whom states that she gave the RT the orders, but RT does not know specifically what type of mask pt is needing. Mary Lewis transferred me to Sohan Velásquez, RT.  L/M asking for Sohan Velásquez to call office to clarify confusion and make sure pt can get a full face mask. Compliance card has not been send to office. L/M with Jose Antonio Roger at [facility] to check status. Genaro from [facility] returned call to office and states that nurses have advised RT that pt has been non-compliant with use of BIPAP. Darlene Heart is going to talk to pt and try to get pt to wear machine. Please be aware. OV 10/4/17:    ASSESSMENT AND PLAN:     HILDA (obstructive sleep apnea)   - Continue BIPAP 6-8 hrs at night.   - get compliance download  Get new full face mask  - May need repeat titration study  - Weight loss and exercise. - Sleep hygiene   - Avoid sedatives, alcohol and caffeinated drinks at bed time. - Avoid driving while sleepy. - Weight loss is also recommended as a long-term intervention.   - Complications of HILDA if not treated were discussed with patient patient, including: systemic hypertension, pulmonary hypertension, cardiovascular morbidities, car accidents and all cause mortality.      Edema extremities   - f/u with PCP       Morbid obesity  - advised weight loss L/M asking Estefany Sanchez to return call to office to discuss pt compliance. Lisa is a 85 year old who is being evaluated via a billable video visit.      How would you like to obtain your AVS? MyChart  Will anyone else be joining your video visit? No      Video Start Time: 1015  Video-Visit Details    Type of service:  Video Visit    Video End Time:1055    Originating Location (pt. Location): Home    Distant Location (provider location):  [facility]     Platform used for Video Visit: [facility]  MEDICAL ONCOLOGY    NEW PATIENT VISIT NOTE    Reason for visit: breast cancer    Oncology Treatment Summary  1.  Patient self palpated abnormality in left breast in November 2020.  11/16/2020 diagnostic mammogram and ultrasound were done which found 2 lesions within the left breast 1 measuring approximately 1.5 cm and the second 1.9 cm.  Both were biopsied the one at 3:00 was a grade 2 invasive lobular cancer.  The one at 11:00 was a grade 2 invasive ductal cancer.  It was estrogen receptor positive progesterone receptor positive HER-2/kena indeterminate by IHC and negative by ALBERTO  2.  1/6/2021 patient underwent bilateral mastectomies.  Right mastectomy was unremarkable.  Left mastectomy found a multifocal breast cancer the first measuring 4.5 cm is a grade 2 invasive lobular carcinoma, the second was a 2.5 cm grade 2 malignancy.  2 sentinel lymph nodes were removed 1 of which was negative the other 1 had immunohistochemistry positive only cells for a T M2N0i+M0 ER/CA positive HER-2/kena negative disease  3.  Oncotype is currently pending  4.  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  She is here to review her pathology results.  She is doing well postmastectomy and has a follow-up today with her surgeon.  Overall she is feeling well and denies  Returned call to Eder White, and Eder White stated that since he set the pt up on the BIPAP, the pt has been using a full face mask. Eder White will make sure the pt mask has not been changed, unbeknownst to him. Eder White is also going to bring SD card to office for compliance download to be done on pt. [place] called back and said patient is currently using and has been using a full face mask. He said he needs to know why the patient needs a new mask (ie., broken? ?). He said it comes out of his budget and needs more information. Anderson insisted that he talk with Teodoro Main only and that she call him back later at [phone]. Spoke with nurse, Aquiles Lynn, and she requested to have recommendation faxed to their office again at [phone]. Reba Garcia Faxed office note requesting for pt to get Full face mask and compliance download. F/u in 1 week. any nausea vomiting abdominal pain fevers chills or night sweats.  She does have numerous questions regarding treatment and has a friend with her today.     PAST MEDICAL HISTORY  Coronary artery disease, peptic ulcer disease, anxiety, hyperlipidemia, mitral regurgitation, hypertension, degenerative joint disease, right bundle branch block, irritable bowel, sciatica, hypothyroidism, obstructive sleep apnea, congestive heart failure, gout, nephrolithiasis      CURRENT OUTPATIENT MEDICATIONS  Current Outpatient Medications   Medication     acetaminophen (TYLENOL) 325 MG tablet     allopurinol (ZYLOPRIM) 100 MG tablet     allopurinol (ZYLOPRIM) 300 MG tablet     amoxicillin (AMOXIL) 500 MG capsule     aspirin 81 MG EC tablet     betamethasone dipropionate (DIPROSONE) 0.05 % external lotion     citalopram (CELEXA) 20 MG tablet     levothyroxine (SYNTHROID/LEVOTHROID) 125 MCG tablet     LORazepam (ATIVAN) 0.5 MG tablet     losartan (COZAAR) 100 MG tablet     metoprolol tartrate (LOPRESSOR) 25 MG tablet     metoprolol tartrate (LOPRESSOR) 25 MG tablet     multivitamin w/minerals (MULTI-VITAMIN) tablet     order for DME     rosuvastatin (CRESTOR) 5 MG tablet     torsemide (DEMADEX) 10 MG tablet     torsemide (DEMADEX) 10 MG tablet     rosuvastatin (CRESTOR) 10 MG tablet     No current facility-administered medications for this visit.         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  She has a history of smoking but quit this 40 years ago.  Occasional alcohol use      FAMILY HISTORY  Her sister  of breast cancer at 62 and did have a BRCA mutation.  Patient's niece also carries a BRCA mutation.  She was tested for this and does not..  Her genetic testing was negative     REVIEW OF SYSTEMS  Review Of Systems  Skin: negative  Eyes: negative  Ears/Nose/Throat: negative  Respiratory: No shortness of breath, dyspnea on exertion, cough, or hemoptysis  Cardiovascular: negative  Gastrointestinal: negative  Genitourinary: negative  Musculoskeletal: negative  Neurologic: negative  Psychiatric: negative  Hematologic/Lymphatic/Immunologic: negative  Endocrine: negative      PHYSICAL EXAM  B/P: Data Unavailable, T: Data Unavailable, P: Data Unavailable, R: Data Unavailable  Wt Readings from Last 3 Encounters:   21 113.4 kg (250 lb)   21 114 kg (251 lb 6.4 oz)   20 113.4 kg (250 lb)       Physical Exam  Constitutional:       Appearance: Normal appearance.   HENT:      Head: Normocephalic and atraumatic.      Nose: Nose normal.   Eyes:      Extraocular Movements: Extraocular movements intact.      Conjunctiva/sclera: Conjunctivae normal.      Pupils: Pupils are equal, round, and reactive to light.   Pulmonary:      Effort: Pulmonary effort is normal.   Neurological:      General: No focal deficit present.      Mental Status: She is alert and oriented to person, place, and time. Mental status is at baseline.   Psychiatric:         Mood and Affect: Mood normal.         Behavior: Behavior normal.         Thought Content: Thought content normal.         Judgment: Judgment normal.              LABORATORY AND IMAGING STUDIES  Recent Labs   Lab Test 21  0739 21  1116 20  1227 10/25/19  0947 10/04/19  0952 18  1122     --  136 139 138 138   POTASSIUM 3.7 3.6 4.2 4.3 4.0 3.9   CHLORIDE 101  --  102 106 103 103   CO2 28  --  29 25 26 27   ANIONGAP 4  --  5 8 9 8   BUN 18  --  20 15 22 21   CR 0.79  --  0.80 0.67 0.95 0.71   *  --  97 99 106* 106*    LUCIANO 9.3  --  9.9 10.4* 10.9* 10.0     Recent Labs   Lab Test 07/08/14  1419 09/03/13  1037   PHOS 3.9 4.0     Recent Labs   Lab Test 01/07/21  0738 12/29/20  1227 10/28/19  1135 10/17/18  1220 09/13/18  1122 08/01/17  1117   WBC  --  10.8 8.9 11.9* 9.1 8.5   HGB 11.2* 12.9 13.1 13.4 13.8 14.4   PLT  --  337 362 392 291 311   MCV  --  91 91 89 87 87   NEUTROPHIL  --  58.1 48.1 60.6 45.1 46.3     Recent Labs   Lab Test 10/28/19  1135 09/13/18  1122 01/03/17  1106 03/23/15  1213 07/08/14  1419 09/03/13  1037 04/23/13   ALKPHOS  --   --   --   --  99 92  --    ALT 35 35 42 36 43 40 23   AST  --   --   --  28  --   --  19     TSH   Date Value Ref Range Status   12/29/2020 23.61 (H) 0.40 - 4.00 mU/L Final   10/28/2019 1.75 0.40 - 4.00 mU/L Final   09/13/2018 2.66 0.40 - 4.00 mU/L Final     No results for input(s): CEA in the last 32451 hours.  Results for orders placed or performed during the hospital encounter of 01/06/21   NM Lymphoscintigraphy Injection only    Narrative    Examination:  NM LYMPHOSCINTIGRAPHY INJECTION ONLY    Date: 1/6/2021 12:53 PM     Indication:  Malignant neoplasm of upper-outer quadrant of left breast  in female, estrogen receptor positive (H).    Technique:    537uCi Lymphoseek was administered intradermally at the 2:00 position,  periareolar LEFT breast.     Images were not obtained as part of the localization procedure.    LAURENCE WOODSON MD        ASSESSMENT  AND RECOMMENDATIONS:    1. Tm2N0i+M0 ER positive OH positive HER-2/kena negative breast cancer status post left mastectomy  2.  Numerous other medical problems as delineated above    Plan    I discussed with Lisa and her friend regarding her newly diagnosed breast cancer.  We reviewed both local and systemic therapy for breast cancer and reviewed that local therapy is a lumpectomy with clear margins followed by adjuvant radiation or a mastectomy.  We discussed indications for postmastectomy radiation including Clear indications of more  "than 3 nodes positive, tumor greater than 5 cm, or positive margins.  She did have a multifocal lesion with the larger of the 2 lesions measuring 4.5 cm however was essentially node-negative.  She does not meet strict criteria for the necessity of adjuvant radiation however we discussed there is a bit of a \"gray zone\" we discussed that radiation is meant to decrease the risk of local recurrence within the chest wall and that there may well be some benefit for this in her although it is likely measured in single percentage point digits.  I told her that I would wait for her Oncotype to come back as if she is low risk this may well correlate to a low risk of recurrence in her chest wall versus if she is high risk indeed the same may be true.    We then discussed systemic therapy and have reviewed that systemic therapy is either chemotherapy or endocrine therapy.  We discussed that this is to reduce the risk of systemic recurrence.  We reviewed indications for doing Oncotype testing to help both prognosticate risk of recurrence and predictive benefit to chemotherapy.  Oncotype testing was sent on the 20th and anticipated results are around February 9.  We discussed the indications for Oncotype and the results in general.  I told her I will call her once these are available and we will go over these in more detail to help determine whether or not adjuvant chemotherapy would be of any benefit to her.    We then discussed endocrine therapy and reviewed differences between tamoxifen and Arimidex.  She is leaning towards doing Arimidex however her last bone density scan was approximately 6 years ago and she will have a new one done to see where she is at.    For the time being we will await her Oncotype test she will do her bone density scan and I will see her back once her Oncotype becomes available.    Tamar Georges MD on 1/28/2021 at 11:02 AM        " titres do not indicate the need for MMR./No

## 2024-10-23 ENCOUNTER — APPOINTMENT (OUTPATIENT)
Dept: URBAN - METROPOLITAN AREA CLINIC 252 | Age: 89
Setting detail: DERMATOLOGY
End: 2024-10-28

## 2024-10-23 VITALS — HEIGHT: 66 IN | WEIGHT: 239 LBS

## 2024-10-23 DIAGNOSIS — L57.0 ACTINIC KERATOSIS: ICD-10-CM

## 2024-10-23 DIAGNOSIS — L21.8 OTHER SEBORRHEIC DERMATITIS: ICD-10-CM

## 2024-10-23 PROBLEM — C44.519 BASAL CELL CARCINOMA OF SKIN OF OTHER PART OF TRUNK: Status: ACTIVE | Noted: 2024-10-23

## 2024-10-23 PROCEDURE — 12032 INTMD RPR S/A/T/EXT 2.6-7.5: CPT

## 2024-10-23 PROCEDURE — 11602 EXC TR-EXT MAL+MARG 1.1-2 CM: CPT

## 2024-10-23 PROCEDURE — OTHER COUNSELING: OTHER

## 2024-10-23 PROCEDURE — 99214 OFFICE O/P EST MOD 30 MIN: CPT | Mod: 25

## 2024-10-23 PROCEDURE — OTHER EXCISION: OTHER

## 2024-10-23 PROCEDURE — OTHER PRESCRIPTION: OTHER

## 2024-10-23 PROCEDURE — OTHER MIPS QUALITY: OTHER

## 2024-10-23 RX ORDER — BETAMETHASONE DIPROPIONATE 0.5 MG/ML
LOTION TOPICAL
Qty: 60 | Refills: 4 | Status: ERX | COMMUNITY
Start: 2024-10-23

## 2024-10-23 RX ORDER — FLUOROURACIL 5 MG/G
CREAM TOPICAL BID
Qty: 40 | Refills: 1 | Status: ERX | COMMUNITY
Start: 2024-10-23

## 2024-10-23 ASSESSMENT — LOCATION ZONE DERM: LOCATION ZONE: ARM

## 2024-10-23 ASSESSMENT — LOCATION DETAILED DESCRIPTION DERM
LOCATION DETAILED: LEFT PROXIMAL DORSAL FOREARM
LOCATION DETAILED: RIGHT PROXIMAL DORSAL FOREARM

## 2024-10-23 ASSESSMENT — LOCATION SIMPLE DESCRIPTION DERM
LOCATION SIMPLE: RIGHT FOREARM
LOCATION SIMPLE: LEFT FOREARM

## 2024-10-23 NOTE — PROCEDURE: EXCISION
Complex Repair And Dorsal Nasal Flap Text: The defect edges were debeveled with a #15 scalpel blade.  The primary defect was closed partially with a complex linear closure.  Given the location of the remaining defect, shape of the defect and the proximity to free margins a dorsal nasal flap was deemed most appropriate for complete closure of the defect.  Using a sterile surgical marker, an appropriate flap was drawn incorporating the defect and placing the expected incisions within the relaxed skin tension lines where possible. The area thus outlined was incised deep to adipose tissue with a #15 scalpel blade. The skin margins were undermined to an appropriate distance in all directions utilizing iris scissors and carried over to close the primary defect.
Validate That Simple Repair Length Is Not Zero (If You Leave At 0 It Will Not Render In Note): No
Alar Island Pedicle Flap Text: The defect edges were debeveled with a #15 scalpel blade. Given the location of the defect, shape of the defect and the proximity to the alar rim an island pedicle advancement flap was deemed most appropriate. Using a sterile surgical marker, an appropriate advancement flap was drawn incorporating the defect, outlining the appropriate donor tissue and placing the expected incisions within the nasal ala running parallel to the alar rim. The area thus outlined was incised with a #15 scalpel blade. The skin margins were undermined minimally to an appropriate distance in all directions around the primary defect and laterally outward around the island pedicle utilizing iris scissors.  There was minimal undermining beneath the pedicle flap. Following this, the designed flap was carried over into the primary defect and sutured into place.
Estlander Flap (Lower To Upper Lip) Text: The defect of the lower lip was assessed and measured.  Given the location and size of the defect, an Estlander flap was deemed most appropriate. Using a sterile surgical marker, an appropriate Estlander flap was measured and drawn on the upper lip. Local anesthesia was then infiltrated. A scalpel was then used to incise the lateral aspect of the flap, through skin, muscle and mucosa, leaving the flap pedicled medially.  The flap was then rotated and positioned to fill the lower lip defect.  The flap was then sutured into place with a three layer technique, closing the orbicularis oris muscle layer with subcutaneous buried sutures, followed by a mucosal layer and an epidermal layer.
Mastoid Interpolation Flap Text: A decision was made to reconstruct the defect utilizing an interpolation axial flap and a staged reconstruction.  A telfa template was made of the defect.  This telfa template was then used to outline the mastoid interpolation flap.  The donor area for the pedicle flap was then injected with anesthesia.  The flap was excised through the skin and subcutaneous tissue down to the layer of the underlying musculature.  The pedicle flap was carefully excised within this deep plane to maintain its blood supply.  The edges of the donor site were undermined.   The donor site was closed in a primary fashion.  The pedicle was then rotated into position and sutured.  Once the tube was sutured into place, adequate blood supply was confirmed with blanching and refill.  The pedicle was then wrapped with xeroform gauze and dressed appropriately with a telfa and gauze bandage to ensure continued blood supply and protect the attached pedicle.
Advancement Flap (Double) Text: The defect edges were debeveled with a #15 scalpel blade. Given the location of the defect and the proximity to free margins a double advancement flap was deemed most appropriate. Using a sterile surgical marker, the appropriate advancement flaps were drawn incorporating the defect and placing the expected incisions within the relaxed skin tension lines where possible. The area thus outlined was incised deep to adipose tissue with a #15 scalpel blade. The skin margins were undermined to an appropriate distance in all directions utilizing iris scissors. Following this, the designed flaps were advanced and carried over into the primary defect and sutured into place.
Complex Repair And Skin Substitute Graft Text: The defect edges were debeveled with a #15 scalpel blade.  The primary defect was closed partially with a complex linear closure.  Given the location of the remaining defect, shape of the defect and the proximity to free margins a skin substitute graft was deemed most appropriate to repair the remaining defect.  The graft was trimmed to fit the size of the remaining defect.  The graft was then placed in the primary defect, oriented appropriately, and sutured into place.
Complex Repair And Xenograft Text: The defect edges were debeveled with a #15 scalpel blade.  The primary defect was closed partially with a complex linear closure.  Given the location of the defect, shape of the defect and the proximity to free margins a xenograft was deemed most appropriate to repair the remaining defect.  The graft was trimmed to fit the size of the remaining defect.  The graft was then placed in the primary defect, oriented appropriately, and sutured into place.
Intermediate / Complex Repair - Final Wound Length In Cm: 4
O-Z Plasty Text: The defect edges were debeveled with a #15 scalpel blade. Given the location of the defect, shape of the defect and the proximity to free margins an O-Z plasty (double transposition flap) was deemed most appropriate. Using a sterile surgical marker, the appropriate transposition flaps were drawn incorporating the defect and placing the expected incisions within the relaxed skin tension lines where possible. The area thus outlined was incised deep to adipose tissue with a #15 scalpel blade. The skin margins were undermined to an appropriate distance in all directions utilizing iris scissors. Hemostasis was achieved with electrocautery. The flaps were then transposed and carried over into place, one clockwise and the other counterclockwise, and anchored with interrupted buried subcutaneous sutures.
Helical Rim Text: The closure involved the helical rim.
Bi-Rhombic Flap Text: The defect edges were debeveled with a #15 scalpel blade. Given the location of the defect and the proximity to free margins a bi-rhombic flap was deemed most appropriate. Using a sterile surgical marker, an appropriate rhombic flap was drawn incorporating the defect. The area thus outlined was incised deep to adipose tissue with a #15 scalpel blade. The skin margins were undermined to an appropriate distance in all directions utilizing iris scissors. Following this, the designed flap was carried over into the primary defect and sutured into place.
M-Plasty Intermediate Repair Preamble Text (Leave Blank If You Do Not Want): Undermining was performed with blunt dissection.
Suturegard Retention Suture: 2-0 Nylon
Additional Anesthesia Volume In Cc: 6
Secondary Defect Width (In Cm): 0
H Plasty Text: Given the location of the defect, shape of the defect and the proximity to free margins a H-plasty was deemed most appropriate for repair. Using a sterile surgical marker, the appropriate advancement arms of the H-plasty were drawn incorporating the defect and placing the expected incisions within the relaxed skin tension lines where possible. The area thus outlined was incised deep to adipose tissue with a #15 scalpel blade. The skin margins were undermined to an appropriate distance in all directions utilizing iris scissors.  The opposing advancement arms were then advanced and carried over into place in opposite direction and anchored with interrupted buried subcutaneous sutures.
Modified Advancement Flap Text: The defect edges were debeveled with a #15 scalpel blade. Given the location of the defect, shape of the defect and the proximity to free margins a modified advancement flap was deemed most appropriate. Using a sterile surgical marker, an appropriate advancement flap was drawn incorporating the defect and placing the expected incisions within the relaxed skin tension lines where possible. The area thus outlined was incised deep to adipose tissue with a #15 scalpel blade. The skin margins were undermined to an appropriate distance in all directions utilizing iris scissors. Following this, the designed flap was advanced and carried over into the primary defect and sutured into place.
Undermining Type: Entire Wound
Hemigard Intro: Due to skin fragility and wound tension, it was decided to use HEMIGARD adhesive retention suture devices to permit a linear closure. The skin was cleaned and dried for a 6cm distance away from the wound. Excessive hair, if present, was removed to allow for adhesion.
Show Curettage Variables: Yes
Repair Performed By Another Provider Text (Leave Blank If You Do Not Want): After the tissue was excised the defect was repaired by another provider.
Split-Thickness Skin Graft Text: The defect edges were debeveled with a #15 scalpel blade. Given the location of the defect, shape of the defect and the proximity to free margins a split thickness skin graft was deemed most appropriate. Using a sterile surgical marker, the primary defect shape was transferred to the donor site. The split thickness graft was then harvested.  The skin graft was then placed in the primary defect and oriented appropriately.
Pinch Graft Text: The defect edges were debeveled with a #15 scalpel blade. Given the location of the defect, shape of the defect and the proximity to free margins a pinch graft was deemed most appropriate. Using a sterile surgical marker, the primary defect shape was transferred to the donor site. The area thus outlined was incised deep to adipose tissue with a #15 scalpel blade.  The harvested graft was then trimmed of adipose tissue until only dermis and epidermis was left. The skin margins of the secondary defect were undermined to an appropriate distance in all directions utilizing iris scissors.  The secondary defect was closed with interrupted buried subcutaneous sutures.  The skin edges were then re-apposed with running  sutures.  The skin graft was then placed in the primary defect and oriented appropriately.
Epidermal Closure: running cuticular
Saucerization Excision Additional Text (Leave Blank If You Do Not Want): The margin was drawn around the clinically apparent lesion.  Incisions were then made along these lines, in a tangential fashion, to the appropriate tissue plane and the lesion was extirpated.
Cartilage Graft Text: The defect edges were debeveled with a #15 scalpel blade. Given the location of the defect, shape of the defect, the fact the defect involved a full thickness cartilage defect a cartilage graft was deemed most appropriate.  An appropriate donor site was identified, cleansed, and anesthetized. The cartilage graft was then harvested and transferred to the recipient site, oriented appropriately and then sutured into place.  The secondary defect was then repaired using a primary closure.
Complex Repair And O-L Flap Text: The defect edges were debeveled with a #15 scalpel blade.  The primary defect was closed partially with a complex linear closure.  Given the location of the remaining defect, shape of the defect and the proximity to free margins an O-L flap was deemed most appropriate for complete closure of the defect.  Using a sterile surgical marker, an appropriate flap was drawn incorporating the defect and placing the expected incisions within the relaxed skin tension lines where possible. The area thus outlined was incised deep to adipose tissue with a #15 scalpel blade. The skin margins were undermined to an appropriate distance in all directions utilizing iris scissors and carried over to close the primary defect.
Double M-Plasty Complex Repair Preamble Text (Leave Blank If You Do Not Want): Extensive wide undermining was performed.
Mercedes Flap Text: The defect edges were debeveled with a #15 scalpel blade. Given the location of the defect, shape of the defect and the proximity to free margins a Mercedes flap was deemed most appropriate. Using a sterile surgical marker, an appropriate advancement flap was drawn incorporating the defect and placing the expected incisions within the relaxed skin tension lines where possible. The area thus outlined was incised deep to adipose tissue with a #15 scalpel blade. The skin margins were undermined to an appropriate distance in all directions utilizing iris scissors. Following this, the designed flap was advanced and carried over into the primary defect and sutured into place.
Keystone Flap Text: The defect edges were debeveled with a #15 scalpel blade. Given the location of the defect, shape of the defect a keystone flap was deemed most appropriate. Using a sterile surgical marker, an appropriate keystone flap was drawn incorporating the defect, outlining the appropriate donor tissue and placing the expected incisions within the relaxed skin tension lines where possible. The area thus outlined was incised deep to adipose tissue with a #15 scalpel blade. The skin margins were undermined to an appropriate distance in all directions around the primary defect and laterally outward around the flap utilizing iris scissors. Following this, the designed flap was carried into the primary defect and sutured into place.
Partial Purse String (Intermediate) Text: Given the location of the defect and the characteristics of the surrounding skin an intermediate purse string closure was deemed most appropriate.  Undermining was performed circumferentially around the surgical defect.  A purse string suture was then placed and tightened. Wound tension of the circular defect prevented complete closure of the wound.
Anesthesia Type: 1% lidocaine with epinephrine
Helical Rim Advancement Flap Text: The defect edges were debeveled with a #15 blade scalpel.  Given the location of the defect and the proximity to free margins (helical rim) a double helical rim advancement flap was deemed most appropriate. Using a sterile surgical marker, the appropriate advancement flaps were drawn incorporating the defect and placing the expected incisions between the helical rim and antihelix where possible.  The area thus outlined was incised through and through with a #15 scalpel blade.  With a skin hook and iris scissors, the flaps were gently and sharply undermined and freed up. Folllowing this, the designed flaps were carried over into the primary defect and sutured into place.
Dermal Autograft Text: The defect edges were debeveled with a #15 scalpel blade. Given the location of the defect, shape of the defect and the proximity to free margins a dermal autograft was deemed most appropriate. Using a sterile surgical marker, the primary defect shape was transferred to the donor site. The area thus outlined was incised deep to adipose tissue with a #15 scalpel blade.  The harvested graft was then trimmed of adipose and epidermal tissue until only dermis was left.  The skin graft was then placed in the primary defect and oriented appropriately.
Retention Suture Text: Retention sutures were placed to support the closure and prevent dehiscence.
Retention Suture Bite Size: 3 mm
Complex Repair And Burow's Graft Text: The defect edges were debeveled with a #15 scalpel blade.  The primary defect was closed partially with a complex linear closure.  Given the location of the defect, shape of the defect, the proximity to free margins and the presence of a standing cone deformity a Burow's graft was deemed most appropriate to repair the remaining defect.  The graft was trimmed to fit the size of the remaining defect.  The graft was then placed in the primary defect, oriented appropriately, and sutured into place.
Mustarde Flap Text: The defect edges were debeveled with a #15 scalpel blade.  Given the size, depth and location of the defect and the proximity to free margins a Mustarde flap was deemed most appropriate. Using a sterile surgical marker, an appropriate flap was drawn incorporating the defect. The area thus outlined was incised with a #15 scalpel blade. The skin margins were undermined to an appropriate distance in all directions utilizing iris scissors. Following this, the designed flap was carried into the primary defect and sutured into place.
Fusiform Excision Additional Text (Leave Blank If You Do Not Want): The margin was drawn around the clinically apparent lesion.  A fusiform shape was then drawn on the skin incorporating the lesion and margins.  Incisions were then made along these lines to the appropriate tissue plane and the lesion was extirpated.
O-Z Flap Text: The defect edges were debeveled with a #15 scalpel blade. Given the location of the defect, shape of the defect and the proximity to free margins an O-Z flap was deemed most appropriate. Using a sterile surgical marker, an appropriate transposition flap was drawn incorporating the defect and placing the expected incisions within the relaxed skin tension lines where possible. The area thus outlined was incised deep to adipose tissue with a #15 scalpel blade. The skin margins were undermined to an appropriate distance in all directions utilizing iris scissors. Following this, the designed flap was carried over into the primary defect and sutured into place.
Tissue Cultured Epidermal Autograft Text: The defect edges were debeveled with a #15 scalpel blade. Given the location of the defect, shape of the defect and the proximity to free margins a tissue cultured epidermal autograft was deemed most appropriate.  The graft was then trimmed to fit the size of the defect.  The graft was then placed in the primary defect and oriented appropriately.
Hatchet Flap Text: The defect edges were debeveled with a #15 scalpel blade. Given the location of the defect, shape of the defect and the proximity to free margins a hatchet flap was deemed most appropriate. Using a sterile surgical marker, an appropriate hatchet flap was drawn incorporating the defect and placing the expected incisions within the relaxed skin tension lines where possible. The area thus outlined was incised deep to adipose tissue with a #15 scalpel blade. The skin margins were undermined to an appropriate distance in all directions utilizing iris scissors. Following this, the designed flap was carried over into the primary defect and sutured into place.
Rhombic Flap Text: The defect edges were debeveled with a #15 scalpel blade. Given the location of the defect and the proximity to free margins a rhombic flap was deemed most appropriate. Using a sterile surgical marker, an appropriate rhombic flap was drawn incorporating the defect. The area thus outlined was incised deep to adipose tissue with a #15 scalpel blade. The skin margins were undermined to an appropriate distance in all directions utilizing iris scissors. Following this, the designed flap was carried over into the primary defect and sutured into place.
Advancement Flap (Single) Text: The defect edges were debeveled with a #15 scalpel blade. Given the location of the defect and the proximity to free margins a single advancement flap was deemed most appropriate. Using a sterile surgical marker, an appropriate advancement flap was drawn incorporating the defect and placing the expected incisions within the relaxed skin tension lines where possible. The area thus outlined was incised deep to adipose tissue with a #15 scalpel blade. The skin margins were undermined to an appropriate distance in all directions utilizing iris scissors. Following this, the designed flap was advanced and carried over into the primary defect and sutured into place.
Epidermal Autograft Text: The defect edges were debeveled with a #15 scalpel blade. Given the location of the defect, shape of the defect and the proximity to free margins an epidermal autograft was deemed most appropriate. Using a sterile surgical marker, the primary defect shape was transferred to the donor site. The epidermal graft was then harvested.  The skin graft was then placed in the primary defect and oriented appropriately.
Complex Repair And Rotation Flap Text: The defect edges were debeveled with a #15 scalpel blade.  The primary defect was closed partially with a complex linear closure.  Given the location of the remaining defect, shape of the defect and the proximity to free margins a rotation flap was deemed most appropriate for complete closure of the defect.  Using a sterile surgical marker, an appropriate advancement flap was drawn incorporating the defect and placing the expected incisions within the relaxed skin tension lines where possible. The area thus outlined was incised deep to adipose tissue with a #15 scalpel blade. The skin margins were undermined to an appropriate distance in all directions utilizing iris scissors and carried over to close the primary defect.
Rhomboid Transposition Flap Text: The defect edges were debeveled with a #15 scalpel blade. Given the location of the defect and the proximity to free margins a rhomboid transposition flap was deemed most appropriate. Using a sterile surgical marker, an appropriate rhomboid flap was drawn incorporating the defect. The area thus outlined was incised deep to adipose tissue with a #15 scalpel blade. The skin margins were undermined to an appropriate distance in all directions utilizing iris scissors. Following this, the designed flap was carried over into the primary defect and sutured into place.
Epidermal Sutures: 5-0 Nylon
Suturegard Body: The suture ends were repeatedly re-tightened and re-clamped to achieve the desired tissue expansion.
Dressing: steri-strips and pressure dressing
Size Of Margin In Cm: 0.5
Banner Transposition Flap Text: The defect edges were debeveled with a #15 scalpel blade. Given the location of the defect and the proximity to free margins a Banner transposition flap was deemed most appropriate. Using a sterile surgical marker, an appropriate flap was drawn around the defect. The area thus outlined was incised deep to adipose tissue with a #15 scalpel blade. The skin margins were undermined to an appropriate distance in all directions utilizing iris scissors. Following this, the designed flap was carried into the primary defect and sutured into place.
Complex Repair And Single Advancement Flap Text: The defect edges were debeveled with a #15 scalpel blade.  The primary defect was closed partially with a complex linear closure.  Given the location of the remaining defect, shape of the defect and the proximity to free margins a single advancement flap was deemed most appropriate for complete closure of the defect.  Using a sterile surgical marker, an appropriate advancement flap was drawn incorporating the defect and placing the expected incisions within the relaxed skin tension lines where possible. The area thus outlined was incised deep to adipose tissue with a #15 scalpel blade. The skin margins were undermined to an appropriate distance in all directions utilizing iris scissors and carried over to close the primary defect.
Post-Care Instructions: - Reviewed with the patient in detail post-care instructions. \\n- Patient is not to engage in any heavy lifting, exercise, or swimming for the next 14 days. \\n- Should the patient develop any fevers, chills, bleeding, severe pain patient will contact the office immediately.\\n\\nWOUND CARE:\\nDo NOT submerge wound in a bath, swimming pool, or hot tub for at least 1 week or for as long as there is an open wound. Gently cleanse the site daily with mild soap and water. Be careful NOT to allow a forceful stream of water to hit the biopsy site. After cleaning/showering, apply a thin layer of petrolatum ointment or Aquaphor in the wound followed by an adhesive bandage. Continue this process daily until healed. \\n\\nBLEEDING:\\nIf you develop persistent bleeding, apply firm and steady pressure over the dressing with gauze for 15 minutes. If bleeding persists, reapply pressure with an ice pack over the gauze for 15 minutes. NEVER APPLY ICE DIRECTLY TO THE SKIN. Do NOT peek under the gauze during these 15 minutes of pressure.  Call our office at [phone] if you cannot get the bleeding to stop. \\n\\nINFECTION:\\nSigns of infection may include increasing rather than decreasing pain (after the first few days), increasing redness/swelling/heat, draining pus, pink/red streaks around the wound, and fever or chills.  Please call our office immediately at [phone] if infection is suspected. It is normal to have some mild redness on or around the wound edges; this will lighten day by day and will become less tender.\\n\\nPAIN:\\nPain is usually minimal, but if needed you may take acetaminophen (Tylenol) every four hours as needed. Applying an ice pack over the dressing for 15-20 minutes every 2-3 hours will relieve swelling, lessen pain, and help minimize bruising. NEVER APPLY ICE DIRECTLY TO THE SKIN. Avoid ibuprofen (Advil, Motrin) and naproxen (Aleve) for the first 48 hours as these can increase bleeding.\\n\\nSWELLING AND BRUISING:\\nSwelling and bruising are common and temporary, usually lasting 1 - 2 weeks. It is more common in areas treated around the eyes, hands, and feet. In the days following the procedure, swelling and bruising can be minimized by keeping the affected areas elevated when possible, reducing salty foods, and applying ice packs over the dressing for 15-20 minutes every 2-3 hours. NEVER APPLY ICE DIRECTLY TO THE SKIN.\\n\\nITCHING:\\nItchiness on and around the wound is very common and can last several days to weeks after surgery. Mild itch is normal as the wound is healing. \\n\\nNERVE CHANGES:\\nNumbness is usually temporary, but it may last for several weeks to months. You may also experience sharp pains at the wound sight as it heals.  Mild pain is normal and will gradually improve with time.\\n \\nNO SMOKING:\\nSmoking will delay the healing process. If you smoke, we recommend trying to quit or at minimum reduce how much you smoke following a procedure.
Epidermal Closure Graft Donor Site (Optional): simple interrupted
Elliptical Excision Additional Text (Leave Blank If You Do Not Want): The margin was drawn around the clinically apparent lesion.  An elliptical shape was then drawn on the skin incorporating the lesion and margins.  Incisions were then made along these lines to the appropriate tissue plane and the lesion was extirpated.
Information: Selecting Yes will display possible errors in your note based on the variables you have selected. This validation is only offered as a suggestion for you. PLEASE NOTE THAT THE VALIDATION TEXT WILL BE REMOVED WHEN YOU FINALIZE YOUR NOTE. IF YOU WANT TO FAX A PRELIMINARY NOTE YOU WILL NEED TO TOGGLE THIS TO 'NO' IF YOU DO NOT WANT IT IN YOUR FAXED NOTE.
Where Do You Want The Question To Include Opioid Counseling Located?: Case Summary Tab
Muscle Hinge Flap Text: The defect edges were debeveled with a #15 scalpel blade.  Given the size, depth and location of the defect and the proximity to free margins a muscle hinge flap was deemed most appropriate. Using a sterile surgical marker, an appropriate hinge flap was drawn incorporating the defect. The area thus outlined was incised with a #15 scalpel blade. The skin margins were undermined to an appropriate distance in all directions utilizing iris scissors. Following this, the designed flap was carried into the primary defect and sutured into place.
Island Pedicle Flap-Requiring Vessel Identification Text: The defect edges were debeveled with a #15 scalpel blade. Given the location of the defect, shape of the defect and the proximity to free margins an island pedicle advancement flap was deemed most appropriate. Using a sterile surgical marker, an appropriate advancement flap was drawn, based on the axial vessel mentioned above, incorporating the defect, outlining the appropriate donor tissue and placing the expected incisions within the relaxed skin tension lines where possible. The area thus outlined was incised deep to adipose tissue with a #15 scalpel blade. The skin margins were undermined to an appropriate distance in all directions around the primary defect and laterally outward around the island pedicle utilizing iris scissors.  There was minimal undermining beneath the pedicle flap. Following this, the designed flap was carried over into the primary defect and sutured into place.
A-T Advancement Flap Text: The defect edges were debeveled with a #15 scalpel blade. Given the location of the defect, shape of the defect and the proximity to free margins an A-T advancement flap was deemed most appropriate. Using a sterile surgical marker, an appropriate advancement flap was drawn incorporating the defect and placing the expected incisions within the relaxed skin tension lines where possible. The area thus outlined was incised deep to adipose tissue with a #15 scalpel blade. The skin margins were undermined to an appropriate distance in all directions utilizing iris scissors. Following this, the designed flap was advanced and carried over into the primary defect and sutured into place.
Eye Clamp Note Details: An eye clamp was used during the procedure.
Deep Sutures: 3-0 PDO
Spiral Flap Text: The defect edges were debeveled with a #15 scalpel blade. Given the location of the defect, shape of the defect and the proximity to free margins a spiral flap was deemed most appropriate. Using a sterile surgical marker, an appropriate rotation flap was drawn incorporating the defect and placing the expected incisions within the relaxed skin tension lines where possible. The area thus outlined was incised deep to adipose tissue with a #15 scalpel blade. The skin margins were undermined to an appropriate distance in all directions utilizing iris scissors. Following this, the designed flap was carried over into the primary defect and sutured into place.
Complex Repair And Split-Thickness Skin Graft Text: The defect edges were debeveled with a #15 scalpel blade.  The primary defect was closed partially with a complex linear closure.  Given the location of the defect, shape of the defect and the proximity to free margins a split thickness skin graft was deemed most appropriate to repair the remaining defect.  The graft was trimmed to fit the size of the remaining defect.  The graft was then placed in the primary defect, oriented appropriately, and sutured into place.
Paramedian Forehead Flap Text: A decision was made to reconstruct the defect utilizing an interpolation axial flap and a staged reconstruction.  A telfa template was made of the defect.  This telfa template was then used to outline the paramedian forehead pedicle flap.  The donor area for the pedicle flap was then injected with anesthesia.  The flap was excised through the skin and subcutaneous tissue down to the layer of the underlying musculature.  The pedicle flap was carefully excised within this deep plane to maintain its blood supply.  The edges of the donor site were undermined.   The donor site was closed in a primary fashion.  The pedicle was then rotated into position and sutured.  Once the tube was sutured into place, adequate blood supply was confirmed with blanching and refill.  The pedicle was then wrapped with xeroform gauze and dressed appropriately with a telfa and gauze bandage to ensure continued blood supply and protect the attached pedicle.
Double Z Plasty Text: The lesion was extirpated to the level of the fat with a #15 scalpel blade. Given the location of the defect, shape of the defect and the proximity to free margins a double Z-plasty was deemed most appropriate for repair. Using a sterile surgical marker, the appropriate transposition arms of the double Z-plasty were drawn incorporating the defect and placing the expected incisions within the relaxed skin tension lines where possible. The area thus outlined was incised deep to adipose tissue with a #15 scalpel blade. The skin margins were undermined to an appropriate distance in all directions utilizing iris scissors. The opposing transposition arms were then transposed and carried over into place in opposite direction and anchored with interrupted buried subcutaneous sutures.
Vermilion Border Text: The closure involved the vermilion border.
No Repair - Repaired With Adjacent Surgical Defect Text (Leave Blank If You Do Not Want): After the excision the defect was repaired concurrently with another surgical defect which was in close approximation.
Melolabial Transposition Flap Text: The defect edges were debeveled with a #15 scalpel blade. Given the location of the defect and the proximity to free margins a melolabial flap was deemed most appropriate. Using a sterile surgical marker, an appropriate melolabial transposition flap was drawn incorporating the defect. The area thus outlined was incised deep to adipose tissue with a #15 scalpel blade. The skin margins were undermined to an appropriate distance in all directions utilizing iris scissors. Following this, the designed flap was carried over into the primary defect and sutured into place.
Bilateral Rotation Flap Text: The defect edges were debeveled with a #15 scalpel blade. Given the location of the defect, shape of the defect and the proximity to free margins a bilateral rotation flap was deemed most appropriate. Using a sterile surgical marker, an appropriate rotation flap was drawn incorporating the defect and placing the expected incisions within the relaxed skin tension lines where possible. The area thus outlined was incised deep to adipose tissue with a #15 scalpel blade. The skin margins were undermined to an appropriate distance in all directions utilizing iris scissors. Following this, the designed flap was carried over into the primary defect and sutured into place.
V-Y Plasty Text: The defect edges were debeveled with a #15 scalpel blade. Given the location of the defect, shape of the defect and the proximity to free margins an V-Y advancement flap was deemed most appropriate. Using a sterile surgical marker, an appropriate advancement flap was drawn incorporating the defect and placing the expected incisions within the relaxed skin tension lines where possible. The area thus outlined was incised deep to adipose tissue with a #15 scalpel blade. The skin margins were undermined to an appropriate distance in all directions utilizing iris scissors. Following this, the designed flap was advanced and carried over into the primary defect and sutured into place.
O-T Advancement Flap Text: The defect edges were debeveled with a #15 scalpel blade. Given the location of the defect, shape of the defect and the proximity to free margins an O-T advancement flap was deemed most appropriate. Using a sterile surgical marker, an appropriate advancement flap was drawn incorporating the defect and placing the expected incisions within the relaxed skin tension lines where possible. The area thus outlined was incised deep to adipose tissue with a #15 scalpel blade. The skin margins were undermined to an appropriate distance in all directions utilizing iris scissors. Following this, the designed flap was advanced and carried over into the primary defect and sutured into place.
Star Wedge Flap Text: The defect edges were debeveled with a #15 scalpel blade. Given the location of the defect, shape of the defect and the proximity to free margins a star wedge flap was deemed most appropriate. Using a sterile surgical marker, an appropriate rotation flap was drawn incorporating the defect and placing the expected incisions within the relaxed skin tension lines where possible. The area thus outlined was incised deep to adipose tissue with a #15 scalpel blade. The skin margins were undermined to an appropriate distance in all directions utilizing iris scissors. Following this, the designed flap was carried over into the primary defect and sutured into place.
Double Island Pedicle Flap Text: The defect edges were debeveled with a #15 scalpel blade. Given the location of the defect, shape of the defect and the proximity to free margins a double island pedicle advancement flap was deemed most appropriate. Using a sterile surgical marker, an appropriate advancement flap was drawn incorporating the defect, outlining the appropriate donor tissue and placing the expected incisions within the relaxed skin tension lines where possible. The area thus outlined was incised deep to adipose tissue with a #15 scalpel blade. The skin margins were undermined to an appropriate distance in all directions around the primary defect and laterally outward around the island pedicle utilizing iris scissors.  There was minimal undermining beneath the pedicle flap. Following this, the flap was carried over into the primary defect and sutured into place.
Perilesional Excision Additional Text (Leave Blank If You Do Not Want): The margin was drawn around the clinically apparent lesion. Incisions were then made along these lines to the appropriate tissue plane and the lesion was extirpated.
Purse String (Simple) Text: Given the location of the defect and the characteristics of the surrounding skin a purse string simple closure was deemed most appropriate.  Undermining was performed circumferentially around the surgical defect.  A purse string suture was then placed and tightened.
Detail Level: Detailed
Debridement Text: The wound edges were debrided prior to proceeding with the closure to facilitate wound healing.
Island Pedicle Flap With Canthal Suspension Text: The defect edges were debeveled with a #15 scalpel blade. Given the location of the defect, shape of the defect and the proximity to free margins an island pedicle advancement flap was deemed most appropriate. Using a sterile surgical marker, an appropriate advancement flap was drawn incorporating the defect, outlining the appropriate donor tissue and placing the expected incisions within the relaxed skin tension lines where possible. The area thus outlined was incised deep to adipose tissue with a #15 scalpel blade. The skin margins were undermined to an appropriate distance in all directions around the primary defect and laterally outward around the island pedicle utilizing iris scissors.  There was minimal undermining beneath the pedicle flap. A suspension suture was placed in the canthal tendon to prevent tension and prevent ectropion. Following this, the designed flap was placed into the primary defect and sutured into place.
Complex Repair And Melolabial Flap Text: The defect edges were debeveled with a #15 scalpel blade.  The primary defect was closed partially with a complex linear closure.  Given the location of the remaining defect, shape of the defect and the proximity to free margins a melolabial flap was deemed most appropriate for complete closure of the defect.  Using a sterile surgical marker, an appropriate advancement flap was drawn incorporating the defect and placing the expected incisions within the relaxed skin tension lines where possible. The area thus outlined was incised deep to adipose tissue with a #15 scalpel blade. The skin margins were undermined to an appropriate distance in all directions utilizing iris scissors and carried over to close the primary defect.
Scalpel Size: 15 blade
Complex Repair And Z Plasty Text: The defect edges were debeveled with a #15 scalpel blade.  The primary defect was closed partially with a complex linear closure.  Given the location of the remaining defect, shape of the defect and the proximity to free margins a Z plasty was deemed most appropriate for complete closure of the defect.  Using a sterile surgical marker, an appropriate advancement flap was drawn incorporating the defect and placing the expected incisions within the relaxed skin tension lines where possible. The area thus outlined was incised deep to adipose tissue with a #15 scalpel blade. The skin margins were undermined to an appropriate distance in all directions utilizing iris scissors and carried over to close the primary defect.
Bilobed Flap Text: The defect edges were debeveled with a #15 scalpel blade. Given the location of the defect and the proximity to free margins a bilobe flap was deemed most appropriate. Using a sterile surgical marker, an appropriate bilobe flap drawn around the defect. The area thus outlined was incised deep to adipose tissue with a #15 scalpel blade. The skin margins were undermined to an appropriate distance in all directions utilizing iris scissors. Following this, the designed flap was carried over into the primary defect and sutured into place.
Staged Advancement Flap Text: The defect edges were debeveled with a #15 scalpel blade. Given the location of the defect, shape of the defect and the proximity to free margins a staged advancement flap was deemed most appropriate. Using a sterile surgical marker, an appropriate advancement flap was drawn incorporating the defect and placing the expected incisions within the relaxed skin tension lines where possible. The area thus outlined was incised deep to adipose tissue with a #15 scalpel blade. The skin margins were undermined to an appropriate distance in all directions utilizing iris scissors. Following this, the designed flap was carried over into the primary defect and sutured into place.
Abbe Flap (Lower To Upper Lip) Text: The defect of the upper lip was assessed and measured.  Given the location and size of the defect, an Abbe flap was deemed most appropriate. Using a sterile surgical marker, an appropriate Abbe flap was measured and drawn on the lower lip. Local anesthesia was then infiltrated. A scalpel was then used to incise the upper lip through and through the skin, vermilion, muscle and mucosa, leaving the flap pedicled on the opposite side.  The flap was then rotated and transferred to the lower lip defect.  The flap was then sutured into place with a three layer technique, closing the orbicularis oris muscle layer with subcutaneous buried sutures, followed by a mucosal layer and an epidermal layer.
Cheek-To-Nose Interpolation Flap Text: A decision was made to reconstruct the defect utilizing an interpolation axial flap and a staged reconstruction.  A telfa template was made of the defect.  This telfa template was then used to outline the Cheek-To-Nose Interpolation flap.  The donor area for the pedicle flap was then injected with anesthesia.  The flap was excised through the skin and subcutaneous tissue down to the layer of the underlying musculature.  The interpolation flap was carefully excised within this deep plane to maintain its blood supply.  The edges of the donor site were undermined.   The donor site was closed in a primary fashion.  The pedicle was then rotated into position and sutured.  Once the tube was sutured into place, adequate blood supply was confirmed with blanching and refill.  The pedicle was then wrapped with xeroform gauze and dressed appropriately with a telfa and gauze bandage to ensure continued blood supply and protect the attached pedicle.
Complex Repair And Double M Plasty Text: The defect edges were debeveled with a #15 scalpel blade.  The primary defect was closed partially with a complex linear closure.  Given the location of the remaining defect, shape of the defect and the proximity to free margins a double M plasty was deemed most appropriate for complete closure of the defect.  Using a sterile surgical marker, an appropriate advancement flap was drawn incorporating the defect and placing the expected incisions within the relaxed skin tension lines where possible. The area thus outlined was incised deep to adipose tissue with a #15 scalpel blade. The skin margins were undermined to an appropriate distance in all directions utilizing iris scissors and carried over to close the primary defect.
Nasal Turnover Hinge Flap Text: The defect edges were debeveled with a #15 scalpel blade.  Given the size, depth, location of the defect and the defect being full thickness a nasal turnover hinge flap was deemed most appropriate. Using a sterile surgical marker, an appropriate hinge flap was drawn incorporating the defect. The area thus outlined was incised with a #15 scalpel blade. The flap was designed to recreate the nasal mucosal lining and the alar rim. The skin margins were undermined to an appropriate distance in all directions utilizing iris scissors. Following this, the designed flap was carried over into the primary defect and sutured into place
Bilateral Helical Rim Advancement Flap Text: The defect edges were debeveled with a #15 blade scalpel.  Given the location of the defect and the proximity to free margins (helical rim) a bilateral helical rim advancement flap was deemed most appropriate. Using a sterile surgical marker, the appropriate advancement flaps were drawn incorporating the defect and placing the expected incisions between the helical rim and antihelix where possible.  The area thus outlined was incised through and through with a #15 scalpel blade.  With a skin hook and iris scissors, the flaps were gently and sharply undermined and freed up. Following this, the designed flaps were placed into the primary defect and sutured into place.
Mucosal Advancement Flap Text: Given the location of the defect, shape of the defect and the proximity to free margins a mucosal advancement flap was deemed most appropriate. Incisions were made with a 15 blade scalpel in the appropriate fashion along the cutaneous vermilion border and the mucosal lip. The remaining actinically damaged mucosal tissue was excised.  The mucosal advancement flap was then elevated to the gingival sulcus with care taken to preserve the neurovascular structures and advanced into the primary defect. Care was taken to ensure that precise realignment of the vermilion border was achieved.
Additional Epidermal Closure (Optional): interrupted subcuticular
Slit Excision Additional Text (Leave Blank If You Do Not Want): A linear line was drawn on the skin overlying the lesion. An incision was made slowly until the lesion was visualized.  Once visualized, the lesion was removed with blunt dissection.
Hemigard Postcare Instructions: The HEMIGARD strips are to remain completely dry for at least 5-7 days.
Trilobed Flap Text: The defect edges were debeveled with a #15 scalpel blade. Given the location of the defect and the proximity to free margins a trilobed flap was deemed most appropriate. Using a sterile surgical marker, an appropriate trilobed flap was drawn around the defect. The area thus outlined was incised deep to adipose tissue with a #15 scalpel blade. The skin margins were undermined to an appropriate distance in all directions utilizing iris scissors. Following this, the designed flap was carried into the primary defect and sutured into place.
Peng Advancement Flap Text: The defect edges were debeveled with a #15 scalpel blade. Given the location of the defect, shape of the defect and the proximity to free margins a Peng advancement flap was deemed most appropriate. Using a sterile surgical marker, an appropriate advancement flap was drawn incorporating the defect and placing the expected incisions within the relaxed skin tension lines where possible. The area thus outlined was incised deep to adipose tissue with a #15 scalpel blade. The skin margins were undermined to an appropriate distance in all directions utilizing iris scissors. Following this, the designed flap was advanced and carried over into the primary defect and sutured into place.
Undermining Location (Optional): in the superficial subcutaneous fat
Dorsal Nasal Flap Text: The defect edges were debeveled with a #15 scalpel blade. Given the location of the defect and the proximity to free margins a dorsal nasal flap was deemed most appropriate. Using a sterile surgical marker, an appropriate dorsal nasal flap was drawn around the defect. The area thus outlined was incised deep to adipose tissue with a #15 scalpel blade. The skin margins were undermined to an appropriate distance in all directions utilizing iris scissors. Following this, the designed flap was carried into the primary defect and sutured into place.
Estimated Blood Loss (Cc): minimal
Complex Repair And Epidermal Autograft Text: The defect edges were debeveled with a #15 scalpel blade.  The primary defect was closed partially with a complex linear closure.  Given the location of the defect, shape of the defect and the proximity to free margins an epidermal autograft was deemed most appropriate to repair the remaining defect.  The graft was trimmed to fit the size of the remaining defect.  The graft was then placed in the primary defect, oriented appropriately, and sutured into place.
Positioning (Leave Blank If You Do Not Want): The patient was placed in a comfortable position exposing the surgical site.
Excisional Biopsy Additional Text (Leave Blank If You Do Not Want): The margin was drawn around the clinically apparent lesion. An elliptical shape was then drawn on the skin incorporating the lesion and margins.  Incisions were then made along these lines to the appropriate tissue plane and the lesion was extirpated.
Complex Repair And Rhombic Flap Text: The defect edges were debeveled with a #15 scalpel blade.  The primary defect was closed partially with a complex linear closure.  Given the location of the remaining defect, shape of the defect and the proximity to free margins a rhombic flap was deemed most appropriate for complete closure of the defect.  Using a sterile surgical marker, an appropriate advancement flap was drawn incorporating the defect and placing the expected incisions within the relaxed skin tension lines where possible. The area thus outlined was incised deep to adipose tissue with a #15 scalpel blade. The skin margins were undermined to an appropriate distance in all directions utilizing iris scissors and carried over to close the primary defect.
Saucerization Depth: dermis and superficial adipose tissue
Curvilinear Excision Additional Text (Leave Blank If You Do Not Want): The margin was drawn around the clinically apparent lesion.  A curvilinear shape was then drawn on the skin incorporating the lesion and margins.  Incisions were then made along these lines to the appropriate tissue plane and the lesion was extirpated.
Suture Removal: 14 days
W Plasty Text: The lesion was extirpated to the level of the fat with a #15 scalpel blade. Given the location of the defect, shape of the defect and the proximity to free margins a W-plasty was deemed most appropriate for repair. Using a sterile surgical marker, the appropriate transposition arms of the W-plasty were drawn incorporating the defect and placing the expected incisions within the relaxed skin tension lines where possible. The area thus outlined was incised deep to adipose tissue with a #15 scalpel blade. The skin margins were undermined to an appropriate distance in all directions utilizing iris scissors. The opposing transposition arms were then transposed and carried over into place in opposite direction and anchored with interrupted buried subcutaneous sutures.
Consent: - Consent was obtained and risks were reviewed prior to procedure today. All questions were answered prior to procedure today.\\n- Risks discussed include but are not limited to scarring, infection, bleeding, scabbing, incomplete removal, nerve damage, pain, and allergy to anesthesia. \\n- All components of Universal Protocol/PAUSE Rule completed.
Crescentic Advancement Flap Text: The defect edges were debeveled with a #15 scalpel blade. Given the location of the defect and the proximity to free margins a crescentic advancement flap was deemed most appropriate. Using a sterile surgical marker, the appropriate advancement flap was drawn incorporating the defect and placing the expected incisions within the relaxed skin tension lines where possible. The area thus outlined was incised deep to adipose tissue with a #15 scalpel blade. The skin margins were undermined to an appropriate distance in all directions utilizing iris scissors. Following this, the designed flap was advanced and carried over into the primary defect and sutured into place.
Lab: -[id number]
Advancement-Rotation Flap Text: The defect edges were debeveled with a #15 scalpel blade. Given the location of the defect, shape of the defect and the proximity to free margins an advancement-rotation flap was deemed most appropriate. Using a sterile surgical marker, an appropriate flap was drawn incorporating the defect and placing the expected incisions within the relaxed skin tension lines where possible. The area thus outlined was incised deep to adipose tissue with a #15 scalpel blade. The skin margins were undermined to an appropriate distance in all directions utilizing iris scissors. Following this, the designed flap was carried over into the primary defect and sutured into place.
Complex Repair And W Plasty Text: The defect edges were debeveled with a #15 scalpel blade.  The primary defect was closed partially with a complex linear closure.  Given the location of the remaining defect, shape of the defect and the proximity to free margins a W plasty was deemed most appropriate for complete closure of the defect.  Using a sterile surgical marker, an appropriate advancement flap was drawn incorporating the defect and placing the expected incisions within the relaxed skin tension lines where possible. The area thus outlined was incised deep to adipose tissue with a #15 scalpel blade. The skin margins were undermined to an appropriate distance in all directions utilizing iris scissors and carried over to close the primary defect.
Hemostasis: Electrocautery
Zygomaticofacial Flap Text: Given the location of the defect, shape of the defect and the proximity to free margins a zygomaticofacial flap was deemed most appropriate for repair. Using a sterile surgical marker, the appropriate flap was drawn incorporating the defect and placing the expected incisions within the relaxed skin tension lines where possible. The area thus outlined was incised deep to adipose tissue with a #15 scalpel blade with preservation of a vascular pedicle.  The skin margins were undermined to an appropriate distance in all directions utilizing iris scissors. The flap was then carried over into the defect and anchored with interrupted buried subcutaneous sutures.
Excision Method: Elliptical
Nostril Rim Text: The closure involved the nostril rim.
Partial Purse String (Simple) Text: Given the location of the defect and the characteristics of the surrounding skin a simple purse string closure was deemed most appropriate.  Undermining was performed circumferentially around the surgical defect.  A purse string suture was then placed and tightened. Wound tension of the circular defect prevented complete closure of the wound.
Purse String (Intermediate) Text: Given the location of the defect and the characteristics of the surrounding skin a purse string intermediate closure was deemed most appropriate.  Undermining was performed circumferentially around the surgical defect.  A purse string suture was then placed and tightened.
Complex Repair And Modified Advancement Flap Text: The defect edges were debeveled with a #15 scalpel blade.  The primary defect was closed partially with a complex linear closure.  Given the location of the remaining defect, shape of the defect and the proximity to free margins a modified advancement flap was deemed most appropriate for complete closure of the defect.  Using a sterile surgical marker, an appropriate advancement flap was drawn incorporating the defect and placing the expected incisions within the relaxed skin tension lines where possible. The area thus outlined was incised deep to adipose tissue with a #15 scalpel blade. The skin margins were undermined to an appropriate distance in all directions utilizing iris scissors and carried over to close the primary defect.
Abbe Flap (Upper To Lower Lip) Text: The defect of the lower lip was assessed and measured.  Given the location and size of the defect, an Abbe flap was deemed most appropriate. Using a sterile surgical marker, an appropriate Abbe flap was measured and drawn on the upper lip. Local anesthesia was then infiltrated.  A scalpel was then used to incise the upper lip through and through the skin, vermilion, muscle and mucosa, leaving the flap pedicled on the opposite side.  The flap was then rotated and transferred to the lower lip defect.  The flap was then sutured into place with a three layer technique, closing the orbicularis oris muscle layer with subcutaneous buried sutures, followed by a mucosal layer and an epidermal layer.
Home Suture Removal Text: - Patient prefers to remove sutures themselves at home or have a friend/family member who s experienced at suture removal remove the sutures within the specified recommended removal time. \\n- Discussed the appropriate suture removal technique and the importance of cleansing the area with rubbing alcohol before removal and using clean/sterile tools to minimize risk of infection.\\n- Patient was provided a sterile factory-wrapped 11-blade to use to remove their sutures at home.  A set of clear forceps will also be necessary. \\n- If they have any questions or difficulties, call the office to set up removal in clinic with medical support staff.\\n- Patient expressed understanding.
Nasalis-Muscle-Based Myocutaneous Island Pedicle Flap Text: Using a #15 blade, an incision was made around the donor flap to the level of the nasalis muscle. Wide lateral undermining was then performed in both the subcutaneous plane above the nasalis muscle, and in a submuscular plane just above periosteum. This allowed the formation of a free nasalis muscle axial pedicle (based on the angular artery) which was still attached to the actual cutaneous flap, increasing its mobility and vascular viability. Hemostasis was obtained with pinpoint electrocoagulation. The flap was mobilized into position and the pivotal anchor points positioned and stabilized with buried interrupted sutures. Subcutaneous and dermal tissues were closed in a multilayered fashion with sutures. Tissue redundancies were excised, and the epidermal edges were apposed without significant tension and sutured with sutures.
Double O-Z Plasty Text: The defect edges were debeveled with a #15 scalpel blade. Given the location of the defect, shape of the defect and the proximity to free margins a Double O-Z plasty (double transposition flap) was deemed most appropriate. Using a sterile surgical marker, the appropriate transposition flaps were drawn incorporating the defect and placing the expected incisions within the relaxed skin tension lines where possible. The area thus outlined was incised deep to adipose tissue with a #15 scalpel blade. The skin margins were undermined to an appropriate distance in all directions utilizing iris scissors. Hemostasis was achieved with electrocautery. The flaps were then transposed and carried over into place, one clockwise and the other counterclockwise, and anchored with interrupted buried subcutaneous sutures.
Complex Repair And Double Advancement Flap Text: The defect edges were debeveled with a #15 scalpel blade.  The primary defect was closed partially with a complex linear closure.  Given the location of the remaining defect, shape of the defect and the proximity to free margins a double advancement flap was deemed most appropriate for complete closure of the defect.  Using a sterile surgical marker, an appropriate advancement flap was drawn incorporating the defect and placing the expected incisions within the relaxed skin tension lines where possible. The area thus outlined was incised deep to adipose tissue with a #15 scalpel blade. The skin margins were undermined to an appropriate distance in all directions utilizing iris scissors and carried over to close the primary defect.
Number Of Hemigard Strips Per Side: 1
Complex Repair And Bilobe Flap Text: The defect edges were debeveled with a #15 scalpel blade.  The primary defect was closed partially with a complex linear closure.  Given the location of the remaining defect, shape of the defect and the proximity to free margins a bilobe flap was deemed most appropriate for complete closure of the defect.  Using a sterile surgical marker, an appropriate advancement flap was drawn incorporating the defect and placing the expected incisions within the relaxed skin tension lines where possible. The area thus outlined was incised deep to adipose tissue with a #15 scalpel blade. The skin margins were undermined to an appropriate distance in all directions utilizing iris scissors and carried over to close the primary defect.
Xenograft Text: The defect edges were debeveled with a #15 scalpel blade. Given the location of the defect, shape of the defect and the proximity to free margins a xenograft was deemed most appropriate.  The graft was then trimmed to fit the size of the defect.  The graft was then placed in the primary defect and oriented appropriately.
Wound Care: No ointment
Complex Repair And M Plasty Text: The defect edges were debeveled with a #15 scalpel blade.  The primary defect was closed partially with a complex linear closure.  Given the location of the remaining defect, shape of the defect and the proximity to free margins an M plasty was deemed most appropriate for complete closure of the defect.  Using a sterile surgical marker, an appropriate advancement flap was drawn incorporating the defect and placing the expected incisions within the relaxed skin tension lines where possible. The area thus outlined was incised deep to adipose tissue with a #15 scalpel blade. The skin margins were undermined to an appropriate distance in all directions utilizing iris scissors and carried over to close the primary defect.
Burow's Graft Text: The defect edges were debeveled with a #15 scalpel blade. Given the location of the defect, shape of the defect, the proximity to free margins and the presence of a standing cone deformity a Burow's skin graft was deemed most appropriate. The standing cone was removed and this tissue was then trimmed to the shape of the primary defect. The adipose tissue was also removed until only dermis and epidermis were left.  The skin margins of the secondary defect were undermined to an appropriate distance in all directions utilizing iris scissors.  The secondary defect was closed with interrupted buried subcutaneous sutures.  The skin edges were then re-apposed with running  sutures.  The skin graft was then placed in the primary defect and oriented appropriately.
Lip Wedge Excision Repair Text: Given the location of the defect and the proximity to free margins a full thickness wedge repair was deemed most appropriate. Using a sterile surgical marker, the appropriate repair was drawn incorporating the defect and placing the expected incisions perpendicular to the vermilion border.  The vermilion border was also meticulously outlined to ensure appropriate reapproximation during the repair.  The area thus outlined was incised through and through with a #15 scalpel blade.  The muscularis and dermis were reaproximated with deep sutures following hemostasis. Care was taken to realign the vermilion border before proceeding with the superficial closure.  Once the vermilion was realigned the superfical and mucosal closure was finished.
Posterior Auricular Interpolation Flap Text: A decision was made to reconstruct the defect utilizing an interpolation axial flap and a staged reconstruction.  A telfa template was made of the defect.  This telfa template was then used to outline the posterior auricular interpolation flap.  The donor area for the pedicle flap was then injected with anesthesia.  The flap was excised through the skin and subcutaneous tissue down to the layer of the underlying musculature.  The pedicle flap was carefully excised within this deep plane to maintain its blood supply.  The edges of the donor site were undermined.   The donor site was closed in a primary fashion.  The pedicle was then rotated into position and sutured.  Once the tube was sutured into place, adequate blood supply was confirmed with blanching and refill.  The pedicle was then wrapped with xeroform gauze and dressed appropriately with a telfa and gauze bandage to ensure continued blood supply and protect the attached pedicle.
Excision Depth: adipose tissue
O-L Flap Text: The defect edges were debeveled with a #15 scalpel blade. Given the location of the defect, shape of the defect and the proximity to free margins an O-L flap was deemed most appropriate. Using a sterile surgical marker, an appropriate advancement flap was drawn incorporating the defect and placing the expected incisions within the relaxed skin tension lines where possible. The area thus outlined was incised deep to adipose tissue with a #15 scalpel blade. The skin margins were undermined to an appropriate distance in all directions utilizing iris scissors. Following this, the designed flap was advanced and carried over into the primary defect and sutured into place.
Ear Star Wedge Flap Text: The defect edges were debeveled with a #15 blade scalpel.  Given the location of the defect and the proximity to free margins (helical rim) an ear star wedge flap was deemed most appropriate. Using a sterile surgical marker, the appropriate flap was drawn incorporating the defect and placing the expected incisions between the helical rim and antihelix where possible.  The area thus outlined was incised through and through with a #15 scalpel blade. Following this, the designed flap was carried over into the primary defect and sutured into place.
V-Y Flap Text: The defect edges were debeveled with a #15 scalpel blade. Given the location of the defect, shape of the defect and the proximity to free margins a V-Y flap was deemed most appropriate. Using a sterile surgical marker, an appropriate advancement flap was drawn incorporating the defect and placing the expected incisions within the relaxed skin tension lines where possible. The area thus outlined was incised deep to adipose tissue with a #15 scalpel blade. The skin margins were undermined to an appropriate distance in all directions utilizing iris scissors. Following this, the designed flap was advanced and carried over into the primary defect and sutured into place.
Island Pedicle Flap Text: The defect edges were debeveled with a #15 scalpel blade. Given the location of the defect, shape of the defect and the proximity to free margins an island pedicle advancement flap was deemed most appropriate. Using a sterile surgical marker, an appropriate advancement flap was drawn incorporating the defect, outlining the appropriate donor tissue and placing the expected incisions within the relaxed skin tension lines where possible. The area thus outlined was incised deep to adipose tissue with a #15 scalpel blade. The skin margins were undermined to an appropriate distance in all directions around the primary defect and laterally outward around the island pedicle utilizing iris scissors.  There was minimal undermining beneath the pedicle flap. Following this, the flap was carried over into the primary defect and sutured into place.
Complex Repair And Tissue Cultured Epidermal Autograft Text: The defect edges were debeveled with a #15 scalpel blade.  The primary defect was closed partially with a complex linear closure.  Given the location of the defect, shape of the defect and the proximity to free margins an tissue cultured epidermal autograft was deemed most appropriate to repair the remaining defect.  The graft was trimmed to fit the size of the remaining defect.  The graft was then placed in the primary defect, oriented appropriately, and sutured into place.
O-T Plasty Text: The defect edges were debeveled with a #15 scalpel blade. Given the location of the defect, shape of the defect and the proximity to free margins an O-T plasty was deemed most appropriate. Using a sterile surgical marker, an appropriate O-T plasty was drawn incorporating the defect and placing the expected incisions within the relaxed skin tension lines where possible. The area thus outlined was incised deep to adipose tissue with a #15 scalpel blade. The skin margins were undermined to an appropriate distance in all directions utilizing iris scissors. Following this, the designed flap was carried over into the primary defect and sutured into place.
Complex Repair And O-T Advancement Flap Text: The defect edges were debeveled with a #15 scalpel blade.  The primary defect was closed partially with a complex linear closure.  Given the location of the remaining defect, shape of the defect and the proximity to free margins an O-T advancement flap was deemed most appropriate for complete closure of the defect.  Using a sterile surgical marker, an appropriate advancement flap was drawn incorporating the defect and placing the expected incisions within the relaxed skin tension lines where possible. The area thus outlined was incised deep to adipose tissue with a #15 scalpel blade. The skin margins were undermined to an appropriate distance in all directions utilizing iris scissors and carried over to close the primary defect.
Double O-Z Flap Text: The defect edges were debeveled with a #15 scalpel blade. Given the location of the defect, shape of the defect and the proximity to free margins a Double O-Z flap was deemed most appropriate. Using a sterile surgical marker, an appropriate transposition flap was drawn incorporating the defect and placing the expected incisions within the relaxed skin tension lines where possible. The area thus outlined was incised deep to adipose tissue with a #15 scalpel blade. The skin margins were undermined to an appropriate distance in all directions utilizing iris scissors. Following this, the designed flap was carried over into the primary defect and sutured into place.
Z Plasty Text: The lesion was extirpated to the level of the fat with a #15 scalpel blade. Given the location of the defect, shape of the defect and the proximity to free margins a Z-plasty was deemed most appropriate for repair. Using a sterile surgical marker, the appropriate transposition arms of the Z-plasty were drawn incorporating the defect and placing the expected incisions within the relaxed skin tension lines where possible. The area thus outlined was incised deep to adipose tissue with a #15 scalpel blade. The skin margins were undermined to an appropriate distance in all directions utilizing iris scissors. The opposing transposition arms were then transposed and carried over into place in opposite direction and anchored with interrupted buried subcutaneous sutures.
Ftsg Text: The defect edges were debeveled with a #15 scalpel blade. Given the location of the defect, shape of the defect and the proximity to free margins a full thickness skin graft was deemed most appropriate. Using a sterile surgical marker, the primary defect shape was transferred to the donor site. The area thus outlined was incised deep to adipose tissue with a #15 scalpel blade.  The harvested graft was then trimmed of adipose tissue until only dermis and epidermis was left.  The skin margins of the secondary defect were undermined to an appropriate distance in all directions utilizing iris scissors.  The secondary defect was closed with interrupted buried subcutaneous sutures.  The skin edges were then re-apposed with running  sutures.  The skin graft was then placed in the primary defect and oriented appropriately.
Orbicularis Oris Muscle Flap Text: The defect edges were debeveled with a #15 scalpel blade.  Given that the defect affected the competency of the oral sphincter an orbicularis oris muscle flap was deemed most appropriate to restore this competency and normal muscle function.  Using a sterile surgical marker, an appropriate flap was drawn incorporating the defect. The area thus outlined was incised with a #15 scalpel blade. Following this, the designed flap was carried over into the primary defect and sutured into place.
Transposition Flap Text: The defect edges were debeveled with a #15 scalpel blade. Given the location of the defect and the proximity to free margins a transposition flap was deemed most appropriate. Using a sterile surgical marker, an appropriate transposition flap was drawn incorporating the defect. The area thus outlined was incised deep to adipose tissue with a #15 scalpel blade. The skin margins were undermined to an appropriate distance in all directions utilizing iris scissors. Following this, the designed flap was carried over into the primary defect and sutured into place.
Skin Substitute Text: The defect edges were debeveled with a #15 scalpel blade. Given the location of the defect, shape of the defect and the proximity to free margins a skin substitute graft was deemed most appropriate.  The graft material was trimmed to fit the size of the defect. The graft was then placed in the primary defect and oriented appropriately.
Rotation Flap Text: The defect edges were debeveled with a #15 scalpel blade. Given the location of the defect, shape of the defect and the proximity to free margins a rotation flap was deemed most appropriate. Using a sterile surgical marker, an appropriate rotation flap was drawn incorporating the defect and placing the expected incisions within the relaxed skin tension lines where possible. The area thus outlined was incised deep to adipose tissue with a #15 scalpel blade. The skin margins were undermined to an appropriate distance in all directions utilizing iris scissors. Following this, the designed flap was carried over into the primary defect and sutured into place.
Complex Repair And A-T Advancement Flap Text: The defect edges were debeveled with a #15 scalpel blade.  The primary defect was closed partially with a complex linear closure.  Given the location of the remaining defect, shape of the defect and the proximity to free margins an A-T advancement flap was deemed most appropriate for complete closure of the defect.  Using a sterile surgical marker, an appropriate advancement flap was drawn incorporating the defect and placing the expected incisions within the relaxed skin tension lines where possible. The area thus outlined was incised deep to adipose tissue with a #15 scalpel blade. The skin margins were undermined to an appropriate distance in all directions utilizing iris scissors and carried over to close the primary defect.
Complex Repair And Transposition Flap Text: The defect edges were debeveled with a #15 scalpel blade.  The primary defect was closed partially with a complex linear closure.  Given the location of the remaining defect, shape of the defect and the proximity to free margins a transposition flap was deemed most appropriate for complete closure of the defect.  Using a sterile surgical marker, an appropriate advancement flap was drawn incorporating the defect and placing the expected incisions within the relaxed skin tension lines where possible. The area thus outlined was incised deep to adipose tissue with a #15 scalpel blade. The skin margins were undermined to an appropriate distance in all directions utilizing iris scissors and carried over to close the primary defect.
Composite Graft Text: The defect edges were debeveled with a #15 scalpel blade. Given the location of the defect, shape of the defect, the proximity to free margins and the fact the defect was full thickness a composite graft was deemed most appropriate.  The defect was outline and then transferred to the donor site.  A full thickness graft was then excised from the donor site. The graft was then placed in the primary defect, oriented appropriately and then sutured into place.  The secondary defect was then repaired using a primary closure.
Cheek Interpolation Flap Text: A decision was made to reconstruct the defect utilizing an interpolation axial flap and a staged reconstruction.  A telfa template was made of the defect.  This telfa template was then used to outline the Cheek Interpolation flap.  The donor area for the pedicle flap was then injected with anesthesia.  The flap was excised through the skin and subcutaneous tissue down to the layer of the underlying musculature.  The interpolation flap was carefully excised within this deep plane to maintain its blood supply.  The edges of the donor site were undermined.   The donor site was closed in a primary fashion.  The pedicle was then rotated into position and sutured.  Once the tube was sutured into place, adequate blood supply was confirmed with blanching and refill.  The pedicle was then wrapped with xeroform gauze and dressed appropriately with a telfa and gauze bandage to ensure continued blood supply and protect the attached pedicle.
Melolabial Interpolation Flap Text: A decision was made to reconstruct the defect utilizing an interpolation axial flap and a staged reconstruction.  A telfa template was made of the defect.  This telfa template was then used to outline the melolabial interpolation flap.  The donor area for the pedicle flap was then injected with anesthesia.  The flap was excised through the skin and subcutaneous tissue down to the layer of the underlying musculature.  The pedicle flap was carefully excised within this deep plane to maintain its blood supply.  The edges of the donor site were undermined.   The donor site was closed in a primary fashion.  The pedicle was then rotated into position and sutured.  Once the tube was sutured into place, adequate blood supply was confirmed with blanching and refill.  The pedicle was then wrapped with xeroform gauze and dressed appropriately with a telfa and gauze bandage to ensure continued blood supply and protect the attached pedicle.
Length To Time In Minutes Device Was In Place: 10
Repair Type: Intermediate
Burow's Advancement Flap Text: The defect edges were debeveled with a #15 scalpel blade. Given the location of the defect and the proximity to free margins a Burow's advancement flap was deemed most appropriate. Using a sterile surgical marker, the appropriate advancement flap was drawn incorporating the defect and placing the expected incisions within the relaxed skin tension lines where possible. The area thus outlined was incised deep to adipose tissue with a #15 scalpel blade. The skin margins were undermined to an appropriate distance in all directions utilizing iris scissors. Following this, the designed flap was advanced and carried over into the primary defect and sutured into place.
Complex Repair And Ftsg Text: The defect edges were debeveled with a #15 scalpel blade.  The primary defect was closed partially with a complex linear closure.  Given the location of the defect, shape of the defect and the proximity to free margins a full thickness skin graft was deemed most appropriate to repair the remaining defect.  The graft was trimmed to fit the size of the remaining defect.  The graft was then placed in the primary defect, oriented appropriately, and sutured into place.
Complex Repair And V-Y Plasty Text: The defect edges were debeveled with a #15 scalpel blade.  The primary defect was closed partially with a complex linear closure.  Given the location of the remaining defect, shape of the defect and the proximity to free margins a V-Y plasty was deemed most appropriate for complete closure of the defect.  Using a sterile surgical marker, an appropriate advancement flap was drawn incorporating the defect and placing the expected incisions within the relaxed skin tension lines where possible. The area thus outlined was incised deep to adipose tissue with a #15 scalpel blade. The skin margins were undermined to an appropriate distance in all directions utilizing iris scissors and carried over to close the primary defect.
Complex Repair And Dermal Autograft Text: The defect edges were debeveled with a #15 scalpel blade.  The primary defect was closed partially with a complex linear closure.  Given the location of the defect, shape of the defect and the proximity to free margins an dermal autograft was deemed most appropriate to repair the remaining defect.  The graft was trimmed to fit the size of the remaining defect.  The graft was then placed in the primary defect, oriented appropriately, and sutured into place.
Pre-Excision Curettage Text (Leave Blank If You Do Not Want): Prior to drawing the surgical margin the visible lesion was removed with curettage to clearly define the lesion size.
Adjacent Tissue Transfer Text: The defect edges were debeveled with a #15 scalpel blade. Given the location of the defect and the proximity to free margins an adjacent tissue transfer was deemed most appropriate. Using a sterile surgical marker, an appropriate flap was drawn incorporating the defect and placing the expected incisions within the relaxed skin tension lines where possible. The area thus outlined was incised deep to adipose tissue with a #15 scalpel blade. The skin margins were undermined to an appropriate distance in all directions utilizing iris scissors and carried over to close the primary defect.
Billing Type: Third-Party Bill
Bilobed Transposition Flap Text: The defect edges were debeveled with a #15 scalpel blade. Given the location of the defect and the proximity to free margins a bilobed transposition flap was deemed most appropriate. Using a sterile surgical marker, an appropriate bilobe flap drawn around the defect. The area thus outlined was incised deep to adipose tissue with a #15 scalpel blade. The skin margins were undermined to an appropriate distance in all directions utilizing iris scissors. Following this, the designed flap was carried over into the primary defect and sutured into place.
Interpolation Flap Text: A decision was made to reconstruct the defect utilizing an interpolation axial flap and a staged reconstruction.  A telfa template was made of the defect.  This telfa template was then used to outline the interpolation flap.  The donor area for the pedicle flap was then injected with anesthesia.  The flap was excised through the skin and subcutaneous tissue down to the layer of the underlying musculature.  The interpolation flap was carefully excised within this deep plane to maintain its blood supply.  The edges of the donor site were undermined.   The donor site was closed in a primary fashion.  The pedicle was then rotated into position and sutured.  Once the tube was sutured into place, adequate blood supply was confirmed with blanching and refill.  The pedicle was then wrapped with xeroform gauze and dressed appropriately with a telfa and gauze bandage to ensure continued blood supply and protect the attached pedicle.
Suturegard Intro: Intraoperative tissue expansion was performed, utilizing the SUTUREGARD device, in order to reduce wound tension.
Chonodrocutaneous Helical Advancement Flap Text: The defect edges were debeveled with a #15 scalpel blade. Given the location of the defect and the proximity to free margins a chondrocutaneous helical advancement flap was deemed most appropriate. Using a sterile surgical marker, the appropriate advancement flap was drawn incorporating the defect and placing the expected incisions within the relaxed skin tension lines where possible. The area thus outlined was incised deep to adipose tissue with a #15 scalpel blade. The skin margins were undermined to an appropriate distance in all directions utilizing iris scissors. Following this, the designed flap was advanced and carried over into the primary defect and sutured into place.
Path Notes (To The Dermatopathologist): Please check margins.
Flip-Flop Flap Text: The defect edges were debeveled with a #15 blade scalpel.  Given the location of the defect and the proximity to free margins a flip-flop flap was deemed most appropriate. Using a sterile surgical marker, the appropriate flap was drawn incorporating the defect and placing the expected incisions between the helical rim and antihelix where possible.  The area thus outlined was incised through and through with a #15 scalpel blade. Following this, the designed flap was carried over into the primary defect and sutured into place.
Nasalis Myocutaneous Flap Text: Using a #15 blade, an incision was made around the donor flap to the level of the nasalis muscle. Wide lateral undermining was then performed in both the subcutaneous plane above the nasalis muscle, and in a submuscular plane just above periosteum. This allowed the formation of a free nasalis muscle axial pedicle which was still attached to the actual cutaneous flap, increasing its mobility and vascular viability. Hemostasis was obtained with pinpoint electrocoagulation. The flap was mobilized into position and the pivotal anchor points positioned and stabilized with buried interrupted sutures. Subcutaneous and dermal tissues were closed in a multilayered fashion with sutures. Tissue redundancies were excised, and the epidermal edges were apposed without significant tension and sutured with sutures.
Nasolabial Transposition Flap Text: The defect edges were debeveled with a #15 scalpel blade.  Given the size, depth and location of the defect and the proximity to free margins a nasolabial transposition flap was deemed most appropriate. Using a sterile surgical marker, an appropriate flap was drawn incorporating the defect. The area thus outlined was incised with a #15 scalpel blade. The skin margins were undermined to an appropriate distance in all directions utilizing iris scissors. Following this, the designed flap was carried into the primary defect and sutured into place.
Rectangular Flap Text: The defect edges were debeveled with a #15 scalpel blade. Given the location of the defect and the proximity to free margins a rectangular flap was deemed most appropriate. Using a sterile surgical marker, an appropriate rectangular flap was drawn incorporating the defect. The area thus outlined was incised deep to adipose tissue with a #15 scalpel blade. The skin margins were undermined to an appropriate distance in all directions utilizing iris scissors. Following this, the designed flap was carried over into the primary defect and sutured into place.
Repair Depth: use same depth as excision depth

## 2024-10-23 NOTE — PROCEDURE: COUNSELING
Patient Specific Counseling (Will Not Stick From Patient To Patient): - Patient to start Fluoruracil on the hands and forearms. Plan to follow up with patient after MOHS surgery and consider application on the face.
Detail Level: Detailed
Patient Specific Counseling (Will Not Stick From Patient To Patient): - Basal Cell Carcinoma is the most common form of skin cancer. It has a very high cure rate and prognosis with removal is excellent.\\n- The most important recommendation to reduce the risk of future skin cancers is to wear sunscreen with SFP 30 or greater on any sun exposed areas every day. No sunscreen is effective for longer than 2 hours of sun exposure, so reapplication is key to sufficient protection.\\n- Other helpful tips are to wear a wide-brimmed hat and seek shade when possible.\\n- Return to clinic for re-evaluation should ANY lesion change in size, color, shape, texture, bleed, fail to heal, or become symptomatic.\\n- Otherwise, after a diagnosis with basal cell carcinoma, full body skin exams are generally recommended at baseline, then every 6 months for 5 years, then annually thereafter.\\n- Reviewed the pathology report and discussed the recommendation for excision today.

## 2024-11-06 ENCOUNTER — APPOINTMENT (OUTPATIENT)
Dept: URBAN - METROPOLITAN AREA CLINIC 252 | Age: 89
Setting detail: DERMATOLOGY
End: 2024-11-10

## 2024-11-06 VITALS — HEIGHT: 66 IN | WEIGHT: 240 LBS

## 2024-11-06 DIAGNOSIS — Z48.02 ENCOUNTER FOR REMOVAL OF SUTURES: ICD-10-CM

## 2024-11-06 PROCEDURE — OTHER MIPS QUALITY: OTHER

## 2024-11-06 PROCEDURE — OTHER PHOTO-DOCUMENTATION: OTHER

## 2024-11-06 PROCEDURE — OTHER SUTURE REMOVAL (NO GLOBAL PERIOD): OTHER

## 2024-11-06 ASSESSMENT — LOCATION DETAILED DESCRIPTION DERM: LOCATION DETAILED: LEFT MEDIAL SUPERIOR CHEST

## 2024-11-06 ASSESSMENT — LOCATION ZONE DERM: LOCATION ZONE: TRUNK

## 2024-11-06 ASSESSMENT — LOCATION SIMPLE DESCRIPTION DERM: LOCATION SIMPLE: CHEST

## 2024-11-06 NOTE — PROCEDURE: SUTURE REMOVAL (NO GLOBAL PERIOD)
Add 1585x Cpt? (Do Not Bill If You Billed For The Procedure Placing The Sutures. This Is An Add-On Code That Must Be Billed With An E/M Visit Code): No
Suture Removal Completed By (Optional): Irene RIOS MA
Detail Level: Detailed

## 2024-11-07 ENCOUNTER — APPOINTMENT (OUTPATIENT)
Dept: URBAN - METROPOLITAN AREA CLINIC 255 | Age: 89
Setting detail: DERMATOLOGY
End: 2024-11-10

## 2024-11-07 PROBLEM — C44.319 BASAL CELL CARCINOMA OF SKIN OF OTHER PARTS OF FACE: Status: ACTIVE | Noted: 2024-11-07

## 2024-11-07 PROCEDURE — 17311 MOHS 1 STAGE H/N/HF/G: CPT

## 2024-11-07 PROCEDURE — OTHER MIPS QUALITY: OTHER

## 2024-11-07 PROCEDURE — OTHER MOHS SURGERY: OTHER

## 2024-11-07 PROCEDURE — 13132 CMPLX RPR F/C/C/M/N/AX/G/H/F: CPT

## 2024-11-07 NOTE — PROCEDURE: MIPS QUALITY
Quality 226: Preventive Care And Screening: Tobacco Use: Screening And Cessation Intervention: Patient screened for tobacco use and is an ex/non-smoker
Quality 431: Preventive Care And Screening: Unhealthy Alcohol Use - Screening: Patient not identified as an unhealthy alcohol user when screened for unhealthy alcohol use using a systematic screening method
Quality 143: Oncology: Medical And Radiation- Pain Intensity Quantified: Pain severity quantified, no pain present
Quality 130: Documentation Of Current Medications In The Medical Record: Current Medications Documented
Detail Level: Detailed

## 2024-11-07 NOTE — PROCEDURE: MOHS SURGERY
Show Additional Anesthesia Variables In The Stage Tabs: Yes
Same Histology In Subsequent Stages Text: The pattern and morphology of the tumor is as described in the first stage.
Debridement Text: The wound edges were debrided prior to proceeding with the closure to facilitate wound healing.
Otolaryngologist Procedure Text (C): After obtaining clear surgical margins the patient was sent to otolaryngology for surgical repair.  The patient understands they will receive post-surgical care and follow-up from the referring physician's office.
Inflammation Suggestive Of Cancer Camouflage Histology Text: There was a dense lymphocytic infiltrate which prevented adequate histologic evaluation of adjacent structures.
Bilobed Flap Text: The defect edges were debeveled with a #15 scalpel blade. Given the location of the defect and the proximity to free margins a bilobe flap was deemed most appropriate. Using a sterile surgical marker, an appropriate bilobe flap drawn around the defect. The area thus outlined was incised deep to adipose tissue with a #15 scalpel blade. The skin margins were undermined to an appropriate distance in all directions utilizing iris scissors. Following this, the designed flap was carried over into the primary defect and sutured into place.
Validate Date Of Previous Biopsy (Can Hide Date Of Previous Biopsy In Settings Tab): No
Special Stains Stage 3 - Results: Base On Clearance Noted Above
Composite Graft Text: The defect edges were debeveled with a #15 scalpel blade. Given the location of the defect, shape of the defect, the proximity to free margins and the fact the defect was full thickness a composite graft was deemed most appropriate.  The defect was outline and then transferred to the donor site.  A full thickness graft was then excised from the donor site. The graft was then placed in the primary defect, oriented appropriately and then sutured into place.  The secondary defect was then repaired using a primary closure.
none
V-Y Flap Text: The defect edges were debeveled with a #15 scalpel blade. Given the location of the defect, shape of the defect and the proximity to free margins a V-Y flap was deemed most appropriate. Using a sterile surgical marker, an appropriate advancement flap was drawn incorporating the defect and placing the expected incisions within the relaxed skin tension lines where possible. The area thus outlined was incised deep to adipose tissue with a #15 scalpel blade. The skin margins were undermined to an appropriate distance in all directions utilizing iris scissors. Following this, the designed flap was advanced and carried over into the primary defect and sutured into place.
Consent 3/Introductory Paragraph: I gave the patient a chance to ask questions they had about the procedure.  Following this I explained the Mohs procedure and consent was obtained. The risks, benefits and alternatives to therapy were discussed in detail. Specifically, the risks of infection, scarring, bleeding, prolonged wound healing, incomplete removal, allergy to anesthesia, nerve injury and recurrence were addressed. Prior to the procedure, the treatment site was clearly identified and confirmed by the patient. All components of Universal Protocol/PAUSE Rule completed.
Detail Level: Detailed
Stage 1: Number Of Blocks?: 1
Hemigard Retention Suture: 2-0 Nylon
Secondary Defect Length In Cm (Required For Flaps): 0
Hatchet Flap Text: The defect edges were debeveled with a #15 scalpel blade. Given the location of the defect, shape of the defect and the proximity to free margins a hatchet flap was deemed most appropriate. Using a sterile surgical marker, an appropriate hatchet flap was drawn incorporating the defect and placing the expected incisions within the relaxed skin tension lines where possible. The area thus outlined was incised deep to adipose tissue with a #15 scalpel blade. The skin margins were undermined to an appropriate distance in all directions utilizing iris scissors. Following this, the designed flap was carried over into the primary defect and sutured into place.
Helical Rim Text: The closure involved the helical rim.
Stage 4: Additional Anesthesia Type: 1% lidocaine with epinephrine
Ear Wedge Repair Text: A wedge excision was completed by carrying down an excision through the full thickness of the ear and cartilage with an inward facing Burow's triangle. The wound was then closed in a layered fashion.
M-Plasty Intermediate Repair Preamble Text (Leave Blank If You Do Not Want): Undermining was performed with blunt dissection.
Spiral Flap Text: The defect edges were debeveled with a #15 scalpel blade. Given the location of the defect, shape of the defect and the proximity to free margins a spiral flap was deemed most appropriate. Using a sterile surgical marker, an appropriate rotation flap was drawn incorporating the defect and placing the expected incisions within the relaxed skin tension lines where possible. The area thus outlined was incised deep to adipose tissue with a #15 scalpel blade. The skin margins were undermined to an appropriate distance in all directions utilizing iris scissors. Following this, the designed flap was carried over into the primary defect and sutured into place.
Tissue Cultured Epidermal Autograft Text: The defect edges were debeveled with a #15 scalpel blade. Given the location of the defect, shape of the defect and the proximity to free margins a tissue cultured epidermal autograft was deemed most appropriate.  The graft was then trimmed to fit the size of the defect.  The graft was then placed in the primary defect and oriented appropriately.
Localized Dermabrasion With 15 Blade Text: The patient was draped in routine manner.  Localized dermabrasion using a 15 blade was performed in routine manner to papillary dermis. This spot dermabrasion is being performed to complete skin cancer reconstruction. It also will eliminate the other sun damaged precancerous cells that are known to be part of the regional effect of a lifetime's worth of sun exposure. This localized dermabrasion is therapeutic and should not be considered cosmetic in any regard.
Postop Diagnosis: same
Eyelid Full Thickness Repair - 71237: The eyelid defect was full thickness which required a wedge repair of the eyelid. Special care was taken to ensure that the eyelid margin was realligned when placing sutures.
Consent (Spinal Accessory)/Introductory Paragraph: The rationale for Mohs was explained to the patient and consent was obtained. The risks, benefits and alternatives to therapy were discussed in detail. Specifically, the risks of damage to the spinal accessory nerve, infection, scarring, bleeding, prolonged wound healing, incomplete removal, allergy to anesthesia, and recurrence were addressed. Prior to the procedure, the treatment site was clearly identified and confirmed by the patient. All components of Universal Protocol/PAUSE Rule completed.
Advancement-Rotation Flap Text: The defect edges were debeveled with a #15 scalpel blade. Given the location of the defect, shape of the defect and the proximity to free margins an advancement-rotation flap was deemed most appropriate. Using a sterile surgical marker, an appropriate flap was drawn incorporating the defect and placing the expected incisions within the relaxed skin tension lines where possible. The area thus outlined was incised deep to adipose tissue with a #15 scalpel blade. The skin margins were undermined to an appropriate distance in all directions utilizing iris scissors. Following this, the designed flap was carried over into the primary defect and sutured into place.
Surgeon/Pathologist Verbiage (Will Incorporate Name Of Surgeon From Intro If Not Blank): operated in two distinct and integrated capacities as the surgeon and pathologist.
Graft Cartilage Fenestration Text: The cartilage was fenestrated with a 2mm punch biopsy to help facilitate graft survival and healing.
Graft Donor Site Bandage (Optional-Leave Blank If You Don't Want In Note): Steri-strips and a pressure bandage were applied to the donor site.
Nasal Turnover Hinge Flap Text: The defect edges were debeveled with a #15 scalpel blade.  Given the size, depth, location of the defect and the defect being full thickness a nasal turnover hinge flap was deemed most appropriate. Using a sterile surgical marker, an appropriate hinge flap was drawn incorporating the defect. The area thus outlined was incised with a #15 scalpel blade. The flap was designed to recreate the nasal mucosal lining and the alar rim. The skin margins were undermined to an appropriate distance in all directions utilizing iris scissors. Following this, the designed flap was carried over into the primary defect and sutured into place
Partial Purse String (Simple) Text: Given the location of the defect and the characteristics of the surrounding skin a simple purse string closure was deemed most appropriate.  Undermining was performed circumferentially around the surgical defect.  A purse string suture was then placed and tightened. Wound tension only allowed a partial closure of the circular defect.
Retention Suture Text: Retention sutures were placed to support the closure and prevent dehiscence.
Oculoplastic Surgeon Procedure Text (A): After obtaining clear surgical margins the patient was sent to oculoplastics for surgical repair.  The patient understands they will receive post-surgical care and follow-up from the referring physician's office.
Anesthesia Volume In Cc: 6
Closure 2 Information: This tab is for additional flaps and grafts, including complex repair and grafts and complex repair and flaps. You can also specify a different location for the additional defect, if the location is the same you do not need to select a new one. We will insert the automated text for the repair you select below just as we do for solitary flaps and grafts. Please note that at this time if you select a location with a different insurance zone you will need to override the ICD10 and CPT if appropriate.
Asc Procedure Text (D): After obtaining clear surgical margins the patient was sent to an ASC for surgical repair.  The patient understands they will receive post-surgical care and follow-up from the ASC physician.
Provider Procedure Text (A): After obtaining clear surgical margins the defect was repaired by another provider.
Cartilage Graft Text: The defect edges were debeveled with a #15 scalpel blade. Given the location of the defect, shape of the defect, the fact the defect involved a full thickness cartilage defect a cartilage graft was deemed most appropriate.  An appropriate donor site was identified, cleansed, and anesthetized. The cartilage graft was then harvested and transferred to the recipient site, oriented appropriately and then sutured into place.  The secondary defect was then repaired using a primary closure.
Tarsorrhaphy Text: A tarsorrhaphy was performed using Frost sutures.
Interpolation Flap Text: A decision was made to reconstruct the defect utilizing an interpolation axial flap and a staged reconstruction.  A telfa template was made of the defect.  This telfa template was then used to outline the interpolation flap.  The donor area for the pedicle flap was then injected with anesthesia.  The flap was excised through the skin and subcutaneous tissue down to the layer of the underlying musculature.  The interpolation flap was carefully excised within this deep plane to maintain its blood supply.  The edges of the donor site were undermined.   The donor site was closed in a primary fashion.  The pedicle was then rotated into position and sutured.  Once the tube was sutured into place, adequate blood supply was confirmed with blanching and refill.  The pedicle was then wrapped with xeroform gauze and dressed appropriately with a telfa and gauze bandage to ensure continued blood supply and protect the attached pedicle.
Keystone Flap Text: The defect edges were debeveled with a #15 scalpel blade. Given the location of the defect, shape of the defect a keystone flap was deemed most appropriate. Using a sterile surgical marker, an appropriate keystone flap was drawn incorporating the defect, outlining the appropriate donor tissue and placing the expected incisions within the relaxed skin tension lines where possible. The area thus outlined was incised deep to adipose tissue with a #15 scalpel blade. The skin margins were undermined to an appropriate distance in all directions around the primary defect and laterally outward around the flap utilizing iris scissors. Following this, the designed flap was carried into the primary defect and sutured into place.
Area M Indication Text: Tumors in this location are included in Area M (cheek, forehead, scalp, neck, jawline and pretibial skin).  Mohs surgery is indicated for tumors in these anatomic locations.
Which Instrument Did You Use For Dermabrasion?: Wire Brush
Closure 3 Information: This tab is for additional flaps and grafts above and beyond our usual structured repairs.  Please note if you enter information here it will not currently bill and you will need to add the billing information manually.
Consent Type: Consent 2
Full Thickness Lip Wedge Repair (Flap) Text: Given the location of the defect and the proximity to free margins a full thickness wedge repair was deemed most appropriate. Using a sterile surgical marker, the appropriate repair was drawn incorporating the defect and placing the expected incisions perpendicular to the vermilion border.  The vermilion border was also meticulously outlined to ensure appropriate reapproximation during the repair.  The area thus outlined was incised through and through with a #15 scalpel blade.  The muscularis and dermis were reaproximated with deep sutures following hemostasis. Care was taken to realign the vermilion border before proceeding with the superficial closure.  Once the vermilion was realigned the superfical and mucosal closure was finished.
Plastic Surgeon Procedure Text (E): After obtaining clear surgical margins the patient was sent to plastics for surgical repair.  The patient understands they will receive post-surgical care and follow-up from the referring physician's office.
Abbe Flap (Upper To Lower Lip) Text: The defect of the lower lip was assessed and measured.  Given the location and size of the defect, an Abbe flap was deemed most appropriate. Using a sterile surgical marker, an appropriate Abbe flap was measured and drawn on the upper lip. Local anesthesia was then infiltrated.  A scalpel was then used to incise the upper lip through and through the skin, vermilion, muscle and mucosa, leaving the flap pedicled on the opposite side.  The flap was then rotated and transferred to the lower lip defect.  The flap was then sutured into place with a three layer technique, closing the orbicularis oris muscle layer with subcutaneous buried sutures, followed by a mucosal layer and an epidermal layer.
Epidermal Autograft Text: The defect edges were debeveled with a #15 scalpel blade. Given the location of the defect, shape of the defect and the proximity to free margins an epidermal autograft was deemed most appropriate. Using a sterile surgical marker, the primary defect shape was transferred to the donor site. The epidermal graft was then harvested.  The skin graft was then placed in the primary defect and oriented appropriately.
Transposition Flap Text: The defect edges were debeveled with a #15 scalpel blade. Given the location of the defect and the proximity to free margins a transposition flap was deemed most appropriate. Using a sterile surgical marker, an appropriate transposition flap was drawn incorporating the defect. The area thus outlined was incised deep to adipose tissue with a #15 scalpel blade. The skin margins were undermined to an appropriate distance in all directions utilizing iris scissors. Following this, the designed flap was carried over into the primary defect and sutured into place.
Island Pedicle Flap-Requiring Vessel Identification Text: The defect edges were debeveled with a #15 scalpel blade. Given the location of the defect, shape of the defect and the proximity to free margins an island pedicle advancement flap was deemed most appropriate. Using a sterile surgical marker, an appropriate advancement flap was drawn, based on the axial vessel mentioned above, incorporating the defect, outlining the appropriate donor tissue and placing the expected incisions within the relaxed skin tension lines where possible. The area thus outlined was incised deep to adipose tissue with a #15 scalpel blade. The skin margins were undermined to an appropriate distance in all directions around the primary defect and laterally outward around the island pedicle utilizing iris scissors.  There was minimal undermining beneath the pedicle flap. Following this, the designed flap was carried over into the primary defect and sutured into place.
Hemigard Postcare Instructions: The HEMIGARD strips are to remain completely dry for at least 5-7 days.
Area L Indication Text: Tumors in this location are included in Area L (trunk and extremities).  Mohs surgery is indicated for larger tumors, or tumors with aggressive histologic features, in these anatomic locations.
Bill 59 Modifier?: No - Continue to Bill 79 Modifier
Dermal Autograft Text: The defect edges were debeveled with a #15 scalpel blade. Given the location of the defect, shape of the defect and the proximity to free margins a dermal autograft was deemed most appropriate. Using a sterile surgical marker, the primary defect shape was transferred to the donor site. The area thus outlined was incised deep to adipose tissue with a #15 scalpel blade.  The harvested graft was then trimmed of adipose and epidermal tissue until only dermis was left.  The skin graft was then placed in the primary defect and oriented appropriately.
Bi-Rhombic Flap Text: The defect edges were debeveled with a #15 scalpel blade. Given the location of the defect and the proximity to free margins a bi-rhombic flap was deemed most appropriate. Using a sterile surgical marker, an appropriate rhombic flap was drawn incorporating the defect. The area thus outlined was incised deep to adipose tissue with a #15 scalpel blade. The skin margins were undermined to an appropriate distance in all directions utilizing iris scissors. Following this, the designed flap was carried over into the primary defect and sutured into place.
X Size Of Lesion In Cm (Optional): 0.7
Mid-Level Procedure Text (F): After obtaining clear surgical margins the patient was sent to a mid-level provider for surgical repair.  The patient understands they will receive post-surgical care and follow-up from the mid-level provider.
Advancement Flap (Double) Text: In order to preserve normal anatomic and functional relationships, a double advancement flap was deemed the most appropriate reconstructive procedure.  The beveled edges of the Mohs defect were excised with a #15 scalpel blade.    The flaps were designed to fall along relaxed skin tension lines and/or free margins, incised, and elevated using blunt curved tissue scissors.  Hemostasis was achieved.  Interrupted buried vertical mattress sutures were employed to carry over and secure the flaps in place.  Redundant cutaneous columns defined by the flaps' movements were removed to the adipose layer using the triangulation technique and repaired in similar fashion.  Final epidermal approximation along the length of the flap was achieved using epidermal sutures.  The wound was stressed in various directions to ensure the adequacy of the closure and of hemostasis.  The flap edges appeared pink and well perfused.  Reconstruction was considered complete.
Information: Selecting Yes will display possible errors in your note based on the variables you have selected. This validation is only offered as a suggestion for you. PLEASE NOTE THAT THE VALIDATION TEXT WILL BE REMOVED WHEN YOU FINALIZE YOUR NOTE. IF YOU WANT TO FAX A PRELIMINARY NOTE YOU WILL NEED TO TOGGLE THIS TO 'NO' IF YOU DO NOT WANT IT IN YOUR FAXED NOTE.
Consent (Near Eyelid Margin)/Introductory Paragraph: The rationale for Mohs was explained to the patient and consent was obtained. The risks, benefits and alternatives to therapy were discussed in detail. Specifically, the risks of ectropion or eyelid deformity, infection, scarring, bleeding, prolonged wound healing, incomplete removal, allergy to anesthesia, nerve injury and recurrence were addressed. Prior to the procedure, the treatment site was clearly identified and confirmed by the patient. All components of Universal Protocol/PAUSE Rule completed.
Cheiloplasty (Less Than 50%) Text: A decision was made to reconstruct the defect with a  cheiloplasty.  The defect was undermined extensively.  Additional orbicularis oris muscle was excised with a 15 blade scalpel.  The defect was converted into a full thickness wedge, of less than 50% of the vertical height of the lip, to facilite a better cosmetic result.  Small vessels were then tied off with 5-0 monocyrl. The orbicularis oris, superficial fascia, adipose and dermis were then reapproximated.  After the deeper layers were approximated the epidermis was reapproximated with particular care given to realign the vermilion border.
Z Plasty Text: The lesion was extirpated to the level of the fat with a #15 scalpel blade. Given the location of the defect, shape of the defect and the proximity to free margins a Z-plasty was deemed most appropriate for repair. Using a sterile surgical marker, the appropriate transposition arms of the Z-plasty were drawn incorporating the defect and placing the expected incisions within the relaxed skin tension lines where possible. The area thus outlined was incised deep to adipose tissue with a #15 scalpel blade. The skin margins were undermined to an appropriate distance in all directions utilizing iris scissors. The opposing transposition arms were then transposed and carried over into place in opposite direction and anchored with interrupted buried subcutaneous sutures.
Is There Documentation In The Chart Showing Discussion Of Changes With Another Physician?: Please Select the Appropriate Response
Suturegard Body: The suture ends were repeatedly re-tightened and re-clamped to achieve the desired tissue expansion.
Mart-1 - Positive Histology Text: MART-1 staining demonstrates areas of higher density and clustering of melanocytes with Pagetoid spread upwards within the epidermis. The surgical margins are positive for tumor cells.
Where Do You Want The Question To Include Opioid Counseling Located?: Case Summary Tab
Wound Care (No Sutures): Petrolatum
Zygomaticofacial Flap Text: Given the location of the defect, shape of the defect and the proximity to free margins a zygomaticofacial flap was deemed most appropriate for repair. Using a sterile surgical marker, the appropriate flap was drawn incorporating the defect and placing the expected incisions within the relaxed skin tension lines where possible. The area thus outlined was incised deep to adipose tissue with a #15 scalpel blade with preservation of a vascular pedicle.  The skin margins were undermined to an appropriate distance in all directions utilizing iris scissors. The flap was then carried over into the defect and anchored with interrupted buried subcutaneous sutures.
Complex Repair And Flap Additional Text (Will Appearing After The Standard Complex Repair Text): The complex repair was not sufficient to completely close the primary defect. The remaining additional defect was repaired with the flap mentioned below.
Subsequent Stages Histo Method Verbiage: Using a similar technique to that described above, a thin layer of tissue was removed from all areas where tumor was visible on the previous stage.  The tissue was again oriented, mapped, dyed, and processed as above.
Double Z Plasty Text: The lesion was extirpated to the level of the fat with a #15 scalpel blade. Given the location of the defect, shape of the defect and the proximity to free margins a double Z-plasty was deemed most appropriate for repair. Using a sterile surgical marker, the appropriate transposition arms of the double Z-plasty were drawn incorporating the defect and placing the expected incisions within the relaxed skin tension lines where possible. The area thus outlined was incised deep to adipose tissue with a #15 scalpel blade. The skin margins were undermined to an appropriate distance in all directions utilizing iris scissors. The opposing transposition arms were then transposed and carried over into place in opposite direction and anchored with interrupted buried subcutaneous sutures.
Which Eyelid Repair Cpt Are You Using?: 67808
Alternatives Discussed Intro (Do Not Add Period): I discussed alternative treatments to Mohs surgery and specifically discussed the risks and benefits of
Mart-1 - Negative Histology Text: MART-1 staining demonstrates a normal density and pattern of melanocytes along the dermal-epidermal junction. The surgical margins are negative for tumor cells.
Undermining Type: Entire Wound
Tumor Depth: Less than 6mm from granular layer and no invasion beyond the subcutaneous fat
Referring Physician (Optional): Britni Urias PA-C
Mohs Histo Method Verbiage: Each section was then chromacoded and processed in the Mohs lab using the Mohs protocol and submitted for frozen section, utilizing hematoxylin and eosin histochemical stains.
Estlander Flap (Lower To Upper Lip) Text: The defect of the lower lip was assessed and measured.  Given the location and size of the defect, an Estlander flap was deemed most appropriate. Using a sterile surgical marker, an appropriate Estlander flap was measured and drawn on the upper lip. Local anesthesia was then infiltrated. A scalpel was then used to incise the lateral aspect of the flap, through skin, muscle and mucosa, leaving the flap pedicled medially.  The flap was then rotated and positioned to fill the lower lip defect.  The flap was then sutured into place with a three layer technique, closing the orbicularis oris muscle layer with subcutaneous buried sutures, followed by a mucosal layer and an epidermal layer.
Length To Time In Minutes Device Was In Place: 10
Complex Repair And Graft Additional Text (Will Appearing After The Standard Complex Repair Text): The complex repair was not sufficient to completely close the primary defect. The remaining additional defect was repaired with the graft mentioned below.
Epidermal Closure: running and interrupted
O-T Plasty Text: The defect edges were debeveled with a #15 scalpel blade. Given the location of the defect, shape of the defect and the proximity to free margins an O-T plasty was deemed most appropriate. Using a sterile surgical marker, an appropriate O-T plasty was drawn incorporating the defect and placing the expected incisions within the relaxed skin tension lines where possible. The area thus outlined was incised deep to adipose tissue with a #15 scalpel blade. The skin margins were undermined to an appropriate distance in all directions utilizing iris scissors. Following this, the designed flap was carried over into the primary defect and sutured into place.
Hemigard Intro: Due to skin fragility and wound tension, it was decided to use HEMIGARD adhesive retention suture devices to permit a linear closure. The skin was cleaned and dried for a 6cm distance away from the wound. Excessive hair, if present, was removed to allow for adhesion.
Primary Defect Length In Cm (Final Defect Size - Required For Flaps/Grafts): 1.2
Rhombic Flap Text: In order to preserve normal anatomic and functional relationships, a rhombic flap was deemed the most appropriate reconstructive procedure.  The beveled edges of the Mohs defect were excised with a #15 scalpel blade.    The flap was designed to fall along relaxed skin tension lines and/or free margins, incised, and elevated using blunt curved tissue scissors.  Hemostasis was achieved.  Interrupted buried vertical mattress sutures were employed to carry over (transpose) and secure the flap in place.  Redundant cutaneous columns defined by the flap's movement were removed to the adipose layer using the triangulation technique and repaired in similar fashion.  Final epidermal approximation along the length of the flap was achieved using epidermal sutures.  The wound was stressed in various directions to ensure the adequacy of the closure and of hemostasis.  The flap edges appeared pink and well perfused.  Reconstruction was considered complete.
Consent (Marginal Mandibular)/Introductory Paragraph: The rationale for Mohs was explained to the patient and consent was obtained. The risks, benefits and alternatives to therapy were discussed in detail. Specifically, the risks of damage to the marginal mandibular branch of the facial nerve, infection, scarring, bleeding, prolonged wound healing, incomplete removal, allergy to anesthesia, and recurrence were addressed. Prior to the procedure, the treatment site was clearly identified and confirmed by the patient. All components of Universal Protocol/PAUSE Rule completed.
Double O-Z Plasty Text: The defect edges were debeveled with a #15 scalpel blade. Given the location of the defect, shape of the defect and the proximity to free margins a Double O-Z plasty (double transposition flap) was deemed most appropriate. Using a sterile surgical marker, the appropriate transposition flaps were drawn incorporating the defect and placing the expected incisions within the relaxed skin tension lines where possible. The area thus outlined was incised deep to adipose tissue with a #15 scalpel blade. The skin margins were undermined to an appropriate distance in all directions utilizing iris scissors. Hemostasis was achieved with electrocautery. The flaps were then transposed and carried over into place, one clockwise and the other counterclockwise, and anchored with interrupted buried subcutaneous sutures.
Xenograft Text: The defect edges were debeveled with a #15 scalpel blade. Given the location of the defect, shape of the defect and the proximity to free margins a xenograft was deemed most appropriate.  The graft was then trimmed to fit the size of the defect.  The graft was then placed in the primary defect and oriented appropriately.
Bilobed Transposition Flap Text: The defect edges were debeveled with a #15 scalpel blade. Given the location of the defect and the proximity to free margins a bilobed transposition flap was deemed most appropriate. Using a sterile surgical marker, an appropriate bilobe flap drawn around the defect. The area thus outlined was incised deep to adipose tissue with a #15 scalpel blade. The skin margins were undermined to an appropriate distance in all directions utilizing iris scissors. Following this, the designed flap was carried over into the primary defect and sutured into place.
Localized Dermabrasion With Sand Papertext: The patient was draped in routine manner.  Localized dermabrasion using sterile sand paper was performed in routine manner to papillary dermis. This spot dermabrasion is being performed to complete skin cancer reconstruction. It also will eliminate the other sun damaged precancerous cells that are known to be part of the regional effect of a lifetime's worth of sun exposure. This localized dermabrasion is therapeutic and should not be considered cosmetic in any regard.
Muscle Hinge Flap Text: The defect edges were debeveled with a #15 scalpel blade.  Given the size, depth and location of the defect and the proximity to free margins a muscle hinge flap was deemed most appropriate. Using a sterile surgical marker, an appropriate hinge flap was drawn incorporating the defect. The area thus outlined was incised with a #15 scalpel blade. The skin margins were undermined to an appropriate distance in all directions utilizing iris scissors. Following this, the designed flap was carried into the primary defect and sutured into place.
Surgeon: April Solano MPhil, MD
Rectangular Flap Text: The defect edges were debeveled with a #15 scalpel blade. Given the location of the defect and the proximity to free margins a rectangular flap was deemed most appropriate. Using a sterile surgical marker, an appropriate rectangular flap was drawn incorporating the defect. The area thus outlined was incised deep to adipose tissue with a #15 scalpel blade. The skin margins were undermined to an appropriate distance in all directions utilizing iris scissors. Following this, the designed flap was carried over into the primary defect and sutured into place.
Partial Purse String (Intermediate) Text: Given the location of the defect and the characteristics of the surrounding skin an intermediate purse string closure was deemed most appropriate.  Undermining was performed circumferentially around the surgical defect.  A purse string suture was then placed and tightened. Wound tension only allowed a partial closure of the circular defect.
Secondary Intention Text (Leave Blank If You Do Not Want): The defect will heal with secondary intention.
Rotation Flap Text: The defect edges were debeveled with a #15 scalpel blade. Given the location of the defect, shape of the defect and the proximity to free margins a rotation flap was deemed most appropriate. Using a sterile surgical marker, an appropriate rotation flap was drawn incorporating the defect and placing the expected incisions within the relaxed skin tension lines where possible. The area thus outlined was incised deep to adipose tissue with a #15 scalpel blade. The skin margins were undermined to an appropriate distance in all directions utilizing iris scissors. Following this, the designed flap was carried over into the primary defect and sutured into place.
Peng Advancement Flap Text: The defect edges were debeveled with a #15 scalpel blade. Given the location of the defect, shape of the defect and the proximity to free margins a Peng advancement flap was deemed most appropriate. Using a sterile surgical marker, an appropriate advancement flap was drawn incorporating the defect and placing the expected incisions within the relaxed skin tension lines where possible. The area thus outlined was incised deep to adipose tissue with a #15 scalpel blade. The skin margins were undermined to an appropriate distance in all directions utilizing iris scissors. Following this, the designed flap was advanced and carried over into the primary defect and sutured into place.
O-L Flap Text: The defect edges were debeveled with a #15 scalpel blade. Given the location of the defect, shape of the defect and the proximity to free margins an O-L flap was deemed most appropriate. Using a sterile surgical marker, an appropriate advancement flap was drawn incorporating the defect and placing the expected incisions within the relaxed skin tension lines where possible. The area thus outlined was incised deep to adipose tissue with a #15 scalpel blade. The skin margins were undermined to an appropriate distance in all directions utilizing iris scissors. Following this, the designed flap was advanced and carried over into the primary defect and sutured into place.
Rhomboid Transposition Flap Text: The defect edges were debeveled with a #15 scalpel blade. Given the location of the defect and the proximity to free margins a rhomboid transposition flap was deemed most appropriate. Using a sterile surgical marker, an appropriate rhomboid flap was drawn incorporating the defect. The area thus outlined was incised deep to adipose tissue with a #15 scalpel blade. The skin margins were undermined to an appropriate distance in all directions utilizing iris scissors. Following this, the designed flap was carried over into the primary defect and sutured into place.
Consent (Scalp)/Introductory Paragraph: The rationale for Mohs was explained to the patient and consent was obtained. The risks, benefits and alternatives to therapy were discussed in detail. Specifically, the risks of changes in hair growth pattern secondary to repair, infection, scarring, bleeding, prolonged wound healing, incomplete removal, allergy to anesthesia, nerve injury and recurrence were addressed. Prior to the procedure, the treatment site was clearly identified and confirmed by the patient. All components of Universal Protocol/PAUSE Rule completed.
Mustarde Flap Text: The defect edges were debeveled with a #15 scalpel blade.  Given the size, depth and location of the defect and the proximity to free margins a Mustarde flap was deemed most appropriate. Using a sterile surgical marker, an appropriate flap was drawn incorporating the defect. The area thus outlined was incised with a #15 scalpel blade. The skin margins were undermined to an appropriate distance in all directions utilizing iris scissors. Following this, the designed flap was carried into the primary defect and sutured into place.
O-T Advancement Flap Text: In order to preserve normal anatomic and functional relationships, an O-T advancement flap was deemed the most appropriate reconstructive procedure.  The beveled edges of the Mohs defect were excised with a #15 scalpel blade.    The flap was designed to fall along relaxed skin tension lines and/or free margins, incised, and elevated using blunt curved tissue scissors.  Hemostasis was achieved.  Interrupted buried vertical mattress sutures were employed to carry over and secure the flap in place.  Redundant cutaneous columns defined by the flap's movement were removed to the adipose layer using the triangulation technique and repaired in similar fashion.  Final epidermal approximation along the length of the flap was achieved using epidermal sutures.  The wound was stressed in various directions to ensure the adequacy of the closure and of hemostasis.  The flap edges appeared pink and well perfused.  Reconstruction was considered complete.
Vermilion Border Text: The closure involved the vermilion border.
Island Pedicle Flap Text: The defect edges were debeveled with a #15 scalpel blade. Given the location, size, and shape of the defect, an island pedicle advancement flap was deemed the most appropriate reconstructive option. An appropriate advancement flap was created incorporating the defect, outlining the appropriate donor tissue and placing the expected incisions within the relaxed skin tension lines where possible. The area thus outlined was incised deep to adipose tissue with a #15 scalpel blade. The skin margins were undermined to an appropriate distance in all directions around the primary defect and laterally outward around the island pedicle utilizing Ragnell scissors.  An appropriate pedicle of adipose and muscular tissue was created toward the advancing flap edge. The flap was carried over into the primary defect and sutured into place, with the secondary defect closed in V-Y fashion.  The flap was then stressed in various directions to assess the adequacy of closure and of hemostasis.  The flap was pink and well perfused.  Reconstruction was considered complete.
Pinch Graft Text: The defect edges were debeveled with a #15 scalpel blade. Given the location of the defect, shape of the defect and the proximity to free margins a pinch graft was deemed most appropriate. Using a sterile surgical marker, the primary defect shape was transferred to the donor site. The area thus outlined was incised deep to adipose tissue with a #15 scalpel blade.  The harvested graft was then trimmed of adipose tissue until only dermis and epidermis was left. The skin margins of the secondary defect were undermined to an appropriate distance in all directions utilizing iris scissors.  The secondary defect was closed with interrupted buried subcutaneous sutures.  The skin edges were then re-apposed with running  sutures.  The skin graft was then placed in the primary defect and oriented appropriately.
Retention Suture Bite Size: 3 mm
Staging Info: By selecting yes to the question above you will include information on AJCC 8 tumor staging in your Mohs note. Information on tumor staging will be automatically added for SCCs on the head and neck. AJCC 8 includes tumor size, tumor depth, perineural involvement and bone invasion.
Double M-Plasty Complex Repair Preamble Text (Leave Blank If You Do Not Want): Extensive wide undermining was performed.
Bilateral Helical Rim Advancement Flap Text: The defect edges were debeveled with a #15 blade scalpel.  Given the location of the defect and the proximity to free margins (helical rim) a bilateral helical rim advancement flap was deemed most appropriate. Using a sterile surgical marker, the appropriate advancement flaps were drawn incorporating the defect and placing the expected incisions between the helical rim and antihelix where possible.  The area thus outlined was incised through and through with a #15 scalpel blade.  With a skin hook and iris scissors, the flaps were gently and sharply undermined and freed up. Following this, the designed flaps were placed into the primary defect and sutured into place.
Double O-Z Flap Text: The defect edges were debeveled with a #15 scalpel blade. Given the location of the defect, shape of the defect and the proximity to free margins a Double O-Z flap was deemed most appropriate. Using a sterile surgical marker, an appropriate transposition flap was drawn incorporating the defect and placing the expected incisions within the relaxed skin tension lines where possible. The area thus outlined was incised deep to adipose tissue with a #15 scalpel blade. The skin margins were undermined to an appropriate distance in all directions utilizing iris scissors. Following this, the designed flap was carried over into the primary defect and sutured into place.
Intermediate Repair And Graft Additional Text (Will Appearing After The Standard Complex Repair Text): The intermediate repair was not sufficient to completely close the primary defect. The remaining additional defect was repaired with the graft mentioned below.
Star Wedge Flap Text: The defect edges were debeveled with a #15 scalpel blade. Given the location of the defect, shape of the defect and the proximity to free margins a star wedge flap was deemed most appropriate. Using a sterile surgical marker, an appropriate rotation flap was drawn incorporating the defect and placing the expected incisions within the relaxed skin tension lines where possible. The area thus outlined was incised deep to adipose tissue with a #15 scalpel blade. The skin margins were undermined to an appropriate distance in all directions utilizing iris scissors. Following this, the designed flap was carried over into the primary defect and sutured into place.
Complex Repair Preamble Text (Leave Blank If You Do Not Want): Extensive wide and differential undermining were performed.  During reconstruction, hemostasis was performed using electrofulguration.  Initial buried interrupted vertical mattress suture(s) defined redundant cutaneous columns (Burow's triangles) which were removed to the level of the adipose tissue using a #15 blade scalpel and the triangulation technique.  Hemostasis was obtained and the resulting defect was repaired with the same suture material and techniques.  The epidermis was then carefully apposed using polypropylene suture (running).  The wound was stressed in various directions to insure the adequacy of closure and of hemostasis.  The wound edges were pink and well perfused.  Reconstruction was considered complete.
Island Pedicle Flap With Canthal Suspension Text: The defect edges were debeveled with a #15 scalpel blade. Given the location of the defect, shape of the defect and the proximity to free margins an island pedicle advancement flap was deemed most appropriate. Using a sterile surgical marker, an appropriate advancement flap was drawn incorporating the defect, outlining the appropriate donor tissue and placing the expected incisions within the relaxed skin tension lines where possible. The area thus outlined was incised deep to adipose tissue with a #15 scalpel blade. The skin margins were undermined to an appropriate distance in all directions around the primary defect and laterally outward around the island pedicle utilizing iris scissors.  There was minimal undermining beneath the pedicle flap. A suspension suture was placed in the canthal tendon to prevent tension and prevent ectropion. Following this, the designed flap was placed into the primary defect and sutured into place.
Melolabial Interpolation Flap Text: A decision was made to reconstruct the defect utilizing an interpolation axial flap and a staged reconstruction.  A telfa template was made of the defect.  This telfa template was then used to outline the melolabial interpolation flap.  The donor area for the pedicle flap was then injected with anesthesia.  The flap was excised through the skin and subcutaneous tissue down to the layer of the underlying musculature.  The pedicle flap was carefully excised within this deep plane to maintain its blood supply.  The edges of the donor site were undermined.   The donor site was closed in a primary fashion.  The pedicle was then rotated into position and sutured.  Once the tube was sutured into place, adequate blood supply was confirmed with blanching and refill.  The pedicle was then wrapped with xeroform gauze and dressed appropriately with a telfa and gauze bandage to ensure continued blood supply and protect the attached pedicle.
Eyelid Partial Thickness Repair - 16270: The eyelid defect was partial thickness which required a wedge repair of the eyelid. Special care was taken to ensure that the eyelid margin was realligned when placing sutures.
Simple / Intermediate / Complex Repair - Final Wound Length In Cm: 4
Orbicularis Oris Muscle Flap Text: The defect edges were debeveled with a #15 scalpel blade.  Given that the defect affected the competency of the oral sphincter an orbicularis oris muscle flap was deemed most appropriate to restore this competency and normal muscle function.  Using a sterile surgical marker, an appropriate flap was drawn incorporating the defect. The area thus outlined was incised with a #15 scalpel blade. Following this, the designed flap was carried over into the primary defect and sutured into place.
Consent 1/Introductory Paragraph: The rationale for Mohs was explained to the patient and consent was obtained. The risks, benefits and alternatives to therapy were discussed in detail. Specifically, the risks of infection, scarring, bleeding, prolonged wound healing, incomplete removal, allergy to anesthesia, nerve injury and recurrence were addressed. Prior to the procedure, the treatment site was clearly identified and confirmed by the patient. All components of Universal Protocol/PAUSE Rule completed.
Initial Size Of Lesion: 0.9
Melolabial Transposition Flap Text: In order to preserve normal anatomic and functional relationships, a melolabial transposition flap was deemed the most appropriate reconstructive procedure.  The beveled edges of the Mohs defect were excised with a #15 scalpel blade.    The flap was designed to fall along relaxed skin tension lines and/or free margins, incised, and elevated using blunt curved tissue scissors.  Hemostasis was achieved.  Interrupted buried vertical mattress sutures were employed to carry over (transpose) and secure the flap in place.  Redundant cutaneous columns defined by the flap's movement were removed to the adipose layer using the triangulation technique and repaired in similar fashion.  Final epidermal approximation along the length of the flap was achieved using epidermal sutures.  The wound was stressed in various directions to ensure the adequacy of the closure and of hemostasis.  The flap edges appeared pink and well perfused.  Reconstruction was considered complete.
Consent (Ear)/Introductory Paragraph: The rationale for Mohs was explained to the patient and consent was obtained. The risks, benefits and alternatives to therapy were discussed in detail. Specifically, the risks of ear deformity, infection, scarring, bleeding, prolonged wound healing, incomplete removal, allergy to anesthesia, nerve injury and recurrence were addressed. Prior to the procedure, the treatment site was clearly identified and confirmed by the patient. All components of Universal Protocol/PAUSE Rule completed.
Burow's Advancement Flap Text: The defect edges were debeveled with a #15 scalpel blade. Given the location of the defect and the proximity to free margins a Burow's advancement flap was deemed most appropriate. Using a sterile surgical marker, the appropriate advancement flap was drawn incorporating the defect and placing the expected incisions within the relaxed skin tension lines where possible. The area thus outlined was incised deep to adipose tissue with a #15 scalpel blade. The skin margins were undermined to an appropriate distance in all directions utilizing iris scissors. Following this, the designed flap was advanced and carried over into the primary defect and sutured into place.
Manual Repair Warning Statement: We plan on removing the manually selected variable below in favor of our much easier automatic structured text blocks found in the previous tab. We decided to do this to help make the flow better and give you the full power of structured data. Manual selection is never going to be ideal in our platform and I would encourage you to avoid using manual selection from this point on, especially since I will be sunsetting this feature. It is important that you do one of two things with the customized text below. First, you can save all of the text in a word file so you can have it for future reference. Second, transfer the text to the appropriate area in the Library tab. Lastly, if there is a flap or graft type which we do not have you need to let us know right away so I can add it in before the variable is hidden. No need to panic, we plan to give you roughly 6 months to make the change.
Adjacent Tissue Transfer Text: The defect edges were debeveled with a #15 scalpel blade. Given the location of the defect and the proximity to free margins an adjacent tissue transfer was deemed most appropriate. Using a sterile surgical marker, an appropriate flap was drawn incorporating the defect and placing the expected incisions within the relaxed skin tension lines where possible. The area thus outlined was incised deep to adipose tissue with a #15 scalpel blade. The skin margins were undermined to an appropriate distance in all directions utilizing iris scissors and carried over to close the primary defect.
Skin Substitute Text: The defect edges were debeveled with a #15 scalpel blade. Given the location of the defect, shape of the defect and the proximity to free margins a skin substitute graft was deemed most appropriate.  The graft material was trimmed to fit the size of the defect. The graft was then placed in the primary defect and oriented appropriately.
Ear Star Wedge Flap Text: The defect edges were debeveled with a #15 blade scalpel.  Given the location of the defect and the proximity to free margins (helical rim) an ear star wedge flap was deemed most appropriate. Using a sterile surgical marker, the appropriate flap was drawn incorporating the defect and placing the expected incisions between the helical rim and antihelix where possible.  The area thus outlined was incised through and through with a #15 scalpel blade. Following this, the designed flap was carried over into the primary defect and sutured into place.
Non-Graft Cartilage Fenestration Text: The cartilage was fenestrated with a 2mm punch biopsy to help facilitate healing.
W Plasty Text: The lesion was extirpated to the level of the fat with a #15 scalpel blade. Given the location of the defect, shape of the defect and the proximity to free margins a W-plasty was deemed most appropriate for repair. Using a sterile surgical marker, the appropriate transposition arms of the W-plasty were drawn incorporating the defect and placing the expected incisions within the relaxed skin tension lines where possible. The area thus outlined was incised deep to adipose tissue with a #15 scalpel blade. The skin margins were undermined to an appropriate distance in all directions utilizing iris scissors. The opposing transposition arms were then transposed and carried over into place in opposite direction and anchored with interrupted buried subcutaneous sutures.
Suture Removal: 7 days
Consent (Lip)/Introductory Paragraph: The rationale for Mohs was explained to the patient and consent was obtained. The risks, benefits and alternatives to therapy were discussed in detail. Specifically, the risks of lip deformity, changes in the oral aperture, infection, scarring, bleeding, prolonged wound healing, incomplete removal, allergy to anesthesia, nerve injury and recurrence were addressed. Prior to the procedure, the treatment site was clearly identified and confirmed by the patient. All components of Universal Protocol/PAUSE Rule completed.
Anesthesia Type: 1% lidocaine with epinephrine and a 1:10 solution of 8.4% sodium bicarbonate
Helical Rim Advancement Flap Text: The defect edges were debeveled with a #15 blade scalpel.  Given the location of the defect and the proximity to free margins (helical rim) a double helical rim advancement flap was deemed most appropriate. Using a sterile surgical marker, the appropriate advancement flaps were drawn incorporating the defect and placing the expected incisions between the helical rim and antihelix where possible.  The area thus outlined was incised through and through with a #15 scalpel blade.  With a skin hook and iris scissors, the flaps were gently and sharply undermined and freed up. Folllowing this, the designed flaps were carried over into the primary defect and sutured into place.
Hemostasis: Electrofulguration
Posterior Auricular Interpolation Flap Text: A decision was made to reconstruct the defect utilizing an interpolation axial flap and a staged reconstruction.  A telfa template was made of the defect.  This telfa template was then used to outline the posterior auricular interpolation flap.  The donor area for the pedicle flap was then injected with anesthesia.  The flap was excised through the skin and subcutaneous tissue down to the layer of the underlying musculature.  The pedicle flap was carefully excised within this deep plane to maintain its blood supply.  The edges of the donor site were undermined.   The donor site was closed in a primary fashion.  The pedicle was then rotated into position and sutured.  Once the tube was sutured into place, adequate blood supply was confirmed with blanching and refill.  The pedicle was then wrapped with xeroform gauze and dressed appropriately with a telfa and gauze bandage to ensure continued blood supply and protect the attached pedicle.
Repair Type: Complex Repair
Post-Care Instructions: I reviewed with the patient in detail post-care instructions. Patient is not to engage in any heavy lifting, exercise, or swimming for the next 14 days. Should the patient develop any fevers, chills, bleeding, severe pain patient will contact the office immediately.
A-T Advancement Flap Text: The defect edges were debeveled with a #15 scalpel blade. Given the location of the defect, shape of the defect and the proximity to free margins an A-T advancement flap was deemed most appropriate. Using a sterile surgical marker, an appropriate advancement flap was drawn incorporating the defect and placing the expected incisions within the relaxed skin tension lines where possible. The area thus outlined was incised deep to adipose tissue with a #15 scalpel blade. The skin margins were undermined to an appropriate distance in all directions utilizing iris scissors. Following this, the designed flap was advanced and carried over into the primary defect and sutured into place.
Distance Of Undermining In Cm (Required): 1.5
Burow's Graft Text: The defect edges were debeveled with a #15 scalpel blade. Given the location of the defect, shape of the defect, the proximity to free margins and the presence of a standing cone deformity a Burow's skin graft was deemed most appropriate. The standing cone was removed and this tissue was then trimmed to the shape of the primary defect. The adipose tissue was also removed until only dermis and epidermis were left.  The skin margins of the secondary defect were undermined to an appropriate distance in all directions utilizing iris scissors.  The secondary defect was closed with interrupted buried subcutaneous sutures.  The skin edges were then re-apposed with running  sutures.  The skin graft was then placed in the primary defect and oriented appropriately.
Nostril Rim Text: The closure involved the nostril rim.
Epidermal Closure Graft Donor Site (Optional): simple interrupted
Deep Sutures: 5-0 PGLC
Consent (Nose)/Introductory Paragraph: The rationale for Mohs was explained to the patient and consent was obtained. The risks, benefits and alternatives to therapy were discussed in detail. Specifically, the risks of nasal deformity, changes in the flow of air through the nose, infection, scarring, bleeding, prolonged wound healing, incomplete removal, allergy to anesthesia, nerve injury and recurrence were addressed. Prior to the procedure, the treatment site was clearly identified and confirmed by the patient. All components of Universal Protocol/PAUSE Rule completed.
Mercedes Flap Text: The defect edges were debeveled with a #15 scalpel blade. Given the location of the defect, shape of the defect and the proximity to free margins a Mercedes flap was deemed most appropriate. Using a sterile surgical marker, an appropriate advancement flap was drawn incorporating the defect and placing the expected incisions within the relaxed skin tension lines where possible. The area thus outlined was incised deep to adipose tissue with a #15 scalpel blade. The skin margins were undermined to an appropriate distance in all directions utilizing iris scissors. Following this, the designed flap was advanced and carried over into the primary defect and sutured into place.
Nasolabial Transposition Flap Text: The defect edges were debeveled with a #15 scalpel blade.  Given the size, depth and location of the defect and the proximity to free margins a nasolabial transposition flap was deemed most appropriate. Using a sterile surgical marker, an appropriate flap was drawn incorporating the defect. The area thus outlined was incised with a #15 scalpel blade. The skin margins were undermined to an appropriate distance in all directions utilizing iris scissors. Following this, the designed flap was carried into the primary defect and sutured into place.
No Residual Tumor Seen Histology Text: There were no malignant cells seen in the sections examined.
Chonodrocutaneous Helical Advancement Flap Text: The defect edges were debeveled with a #15 scalpel blade. Given the location of the defect and the proximity to free margins a chondrocutaneous helical advancement flap was deemed most appropriate. Using a sterile surgical marker, the appropriate advancement flap was drawn incorporating the defect and placing the expected incisions within the relaxed skin tension lines where possible. The area thus outlined was incised deep to adipose tissue with a #15 scalpel blade. The skin margins were undermined to an appropriate distance in all directions utilizing iris scissors. Following this, the designed flap was advanced and carried over into the primary defect and sutured into place.
H Plasty Text: Given the location of the defect, shape of the defect and the proximity to free margins a H-plasty was deemed most appropriate for repair. Using a sterile surgical marker, the appropriate advancement arms of the H-plasty were drawn incorporating the defect and placing the expected incisions within the relaxed skin tension lines where possible. The area thus outlined was incised deep to adipose tissue with a #15 scalpel blade. The skin margins were undermined to an appropriate distance in all directions utilizing iris scissors.  The opposing advancement arms were then advanced and carried over into place in opposite direction and anchored with interrupted buried subcutaneous sutures.
V-Y Plasty Text: The defect edges were debeveled with a #15 scalpel blade. Given the location of the defect, shape of the defect and the proximity to free margins an V-Y advancement flap was deemed most appropriate. Using a sterile surgical marker, an appropriate advancement flap was drawn incorporating the defect and placing the expected incisions within the relaxed skin tension lines where possible. The area thus outlined was incised deep to adipose tissue with a #15 scalpel blade. The skin margins were undermined to an appropriate distance in all directions utilizing iris scissors. Following this, the designed flap was advanced and carried over into the primary defect and sutured into place.
Crescentic Advancement Flap Text: The defect edges were debeveled with a #15 scalpel blade. Given the location of the defect and the proximity to free margins a crescentic advancement flap was deemed most appropriate. Using a sterile surgical marker, the appropriate advancement flap was drawn incorporating the defect and placing the expected incisions within the relaxed skin tension lines where possible. The area thus outlined was incised deep to adipose tissue with a #15 scalpel blade. The skin margins were undermined to an appropriate distance in all directions utilizing iris scissors. Following this, the designed flap was advanced and carried over into the primary defect and sutured into place.
Medical Necessity Statement: Based on my medical judgement, Mohs surgery is the most appropriate treatment for this cancer compared to other treatments.
Bilateral Rotation Flap Text: The defect edges were debeveled with a #15 scalpel blade. Given the location of the defect, shape of the defect and the proximity to free margins a bilateral rotation flap was deemed most appropriate. Using a sterile surgical marker, an appropriate rotation flap was drawn incorporating the defect and placing the expected incisions within the relaxed skin tension lines where possible. The area thus outlined was incised deep to adipose tissue with a #15 scalpel blade. The skin margins were undermined to an appropriate distance in all directions utilizing iris scissors. Following this, the designed flap was carried over into the primary defect and sutured into place.
Eye Protection Verbiage: Before proceeding with the stage, a plastic scleral shield was inserted. The globe was anesthetized with a few drops of 1% lidocaine with 1:100,000 epinephrine. Then, an appropriate sized scleral shield was chosen and coated with lacrilube ointment. The shield was gently inserted and left in place for the duration of each stage. After the stage was completed, the shield was gently removed.
Modified Advancement Flap Text: The defect edges were debeveled with a #15 scalpel blade. Given the location of the defect, shape of the defect and the proximity to free margins a modified advancement flap was deemed most appropriate. Using a sterile surgical marker, an appropriate advancement flap was drawn incorporating the defect and placing the expected incisions within the relaxed skin tension lines where possible. The area thus outlined was incised deep to adipose tissue with a #15 scalpel blade. The skin margins were undermined to an appropriate distance in all directions utilizing iris scissors. Following this, the designed flap was advanced and carried over into the primary defect and sutured into place.
Pain Refusal Text: I offered to prescribe pain medication but the patient refused to take this medication.
Mohs Rapid Report Verbiage: The area of clinically evident tumor was marked with skin marking ink and appropriately hatched.  The initial incision was made following the Mohs approach through the skin.  The specimen was taken to the lab, divided into the necessary number of pieces, chromacoded and processed according to the Mohs protocol.  This was repeated in successive stages until a tumor free defect was achieved.
Mucosal Advancement Flap Text: Given the location of the defect, shape of the defect and the proximity to free margins a mucosal advancement flap was deemed most appropriate. Incisions were made with a 15 blade scalpel in the appropriate fashion along the cutaneous vermilion border and the mucosal lip. The remaining actinically damaged mucosal tissue was excised.  The mucosal advancement flap was then elevated to the gingival sulcus with care taken to preserve the neurovascular structures and advanced into the primary defect. Care was taken to ensure that precise realignment of the vermilion border was achieved.
O-Z Plasty Text: The defect edges were debeveled with a #15 scalpel blade. Given the location of the defect, shape of the defect and the proximity to free margins an O-Z plasty (double transposition flap) was deemed most appropriate. Using a sterile surgical marker, the appropriate transposition flaps were drawn incorporating the defect and placing the expected incisions within the relaxed skin tension lines where possible. The area thus outlined was incised deep to adipose tissue with a #15 scalpel blade. The skin margins were undermined to an appropriate distance in all directions utilizing iris scissors. Hemostasis was achieved with electrocautery. The flaps were then transposed and carried over into place, one clockwise and the other counterclockwise, and anchored with interrupted buried subcutaneous sutures.
Dorsal Nasal Flap Text: The defect edges were debeveled with a #15 scalpel blade. Given the location of the defect and the proximity to free margins a dorsal nasal flap was deemed most appropriate. Using a sterile surgical marker, an appropriate dorsal nasal flap was drawn around the defect. The area thus outlined was incised deep to adipose tissue with a #15 scalpel blade. The skin margins were undermined to an appropriate distance in all directions utilizing iris scissors. Following this, the designed flap was carried into the primary defect and sutured into place.
Abbe Flap (Lower To Upper Lip) Text: The defect of the upper lip was assessed and measured.  Given the location and size of the defect, an Abbe flap was deemed most appropriate. Using a sterile surgical marker, an appropriate Abbe flap was measured and drawn on the lower lip. Local anesthesia was then infiltrated. A scalpel was then used to incise the upper lip through and through the skin, vermilion, muscle and mucosa, leaving the flap pedicled on the opposite side.  The flap was then rotated and transferred to the lower lip defect.  The flap was then sutured into place with a three layer technique, closing the orbicularis oris muscle layer with subcutaneous buried sutures, followed by a mucosal layer and an epidermal layer.
Split-Thickness Skin Graft Text: The defect edges were debeveled with a #15 scalpel blade. Given the location of the defect, shape of the defect and the proximity to free margins a split thickness skin graft was deemed most appropriate. Using a sterile surgical marker, the primary defect shape was transferred to the donor site. The split thickness graft was then harvested.  The skin graft was then placed in the primary defect and oriented appropriately.
O-Z Flap Text: The defect edges were debeveled with a #15 scalpel blade. Given the location of the defect, shape of the defect and the proximity to free margins an O-Z flap was deemed most appropriate. Using a sterile surgical marker, an appropriate transposition flap was drawn incorporating the defect and placing the expected incisions within the relaxed skin tension lines where possible. The area thus outlined was incised deep to adipose tissue with a #15 scalpel blade. The skin margins were undermined to an appropriate distance in all directions utilizing iris scissors. Following this, the designed flap was carried over into the primary defect and sutured into place.
Body Location Override (Optional - Billing Will Still Be Based On Selected Body Map Location If Applicable): Right Medial Forehead
Ftsg Text: The defect edges were debeveled with a #15 scalpel blade. Given the location of the defect, shape of the defect and the proximity to free margins a full thickness skin graft was deemed most appropriate. Using a sterile surgical marker, the primary defect shape was transferred to the donor site. The area thus outlined was incised deep to adipose tissue with a #15 scalpel blade.  The harvested graft was then trimmed of adipose tissue until only dermis and epidermis was left.  The skin margins of the secondary defect were undermined to an appropriate distance in all directions utilizing iris scissors.  The secondary defect was closed with interrupted buried subcutaneous sutures.  The skin edges were then re-apposed with running  sutures.  The skin graft was then placed in the primary defect and oriented appropriately.
Purse String (Simple) Text: Given the location of the defect and the characteristics of the surrounding skin a purse string closure was deemed most appropriate.  Undermining was performed circumferentially around the surgical defect.  A purse string suture was then placed and tightened.
Localized Dermabrasion With Wire Brush Text: The patient was draped in routine manner.  Localized dermabrasion using 3 x 17 mm wire brush was performed in routine manner to papillary dermis. This spot dermabrasion is being performed to complete skin cancer reconstruction. It also will eliminate the other sun damaged precancerous cells that are known to be part of the regional effect of a lifetime's worth of sun exposure. This localized dermabrasion is therapeutic and should not be considered cosmetic in any regard.
Dressing: dry sterile dressing
Intermediate Repair And Flap Additional Text (Will Appearing After The Standard Complex Repair Text): The intermediate repair was not sufficient to completely close the primary defect. The remaining additional defect was repaired with the flap mentioned below.
Brow Lift Text: A midfrontal incision was made medially to the defect to allow access to the tissues just superior to the left eyebrow. Following careful dissection inferiorly in a supraperiosteal plane to the level of the left eyebrow, several 3-0 monocryl sutures were used to resuspend the eyebrow orbicularis oculi muscular unit to the superior frontal bone periosteum. This resulted in an appropriate reapproximation of static eyebrow symmetry and correction of the left brow ptosis.
No Repair - Repaired With Adjacent Surgical Defect Text (Leave Blank If You Do Not Want): After obtaining clear surgical margins the defect was repaired concurrently with another surgical defect which was in close approximation.
Mohs Method Verbiage: An incision at a 45 degree angle following the standard Mohs approach was done and the specimen was harvested as a microscopic controlled layer.
Mauc Instructions: By selecting yes to the question below the MAUC number will be added into the note.  This will be calculated automatically based on the diagnosis chosen, the size entered, the body zone selected (H,M,L) and the specific indications you chose. You will also have the option to override the Mohs AUC if you disagree with the automatically calculated number and this option is found in the Case Summary tab.
Mohs Case Number: [id number]
Staged Advancement Flap Text: The defect edges were debeveled with a #15 scalpel blade. Given the location of the defect, shape of the defect and the proximity to free margins a staged advancement flap was deemed most appropriate. Using a sterile surgical marker, an appropriate advancement flap was drawn incorporating the defect and placing the expected incisions within the relaxed skin tension lines where possible. The area thus outlined was incised deep to adipose tissue with a #15 scalpel blade. The skin margins were undermined to an appropriate distance in all directions utilizing iris scissors. Following this, the designed flap was carried over into the primary defect and sutured into place.
Flip-Flop Flap Text: The defect edges were debeveled with a #15 blade scalpel.  Given the location of the defect and the proximity to free margins a flip-flop flap was deemed most appropriate. Using a sterile surgical marker, the appropriate flap was drawn incorporating the defect and placing the expected incisions between the helical rim and antihelix where possible.  The area thus outlined was incised through and through with a #15 scalpel blade. Following this, the designed flap was carried over into the primary defect and sutured into place.
Double Island Pedicle Flap Text: The defect edges were debeveled with a #15 scalpel blade. Given the location of the defect, shape of the defect and the proximity to free margins a double island pedicle advancement flap was deemed most appropriate. Using a sterile surgical marker, an appropriate advancement flap was drawn incorporating the defect, outlining the appropriate donor tissue and placing the expected incisions within the relaxed skin tension lines where possible. The area thus outlined was incised deep to adipose tissue with a #15 scalpel blade. The skin margins were undermined to an appropriate distance in all directions around the primary defect and laterally outward around the island pedicle utilizing iris scissors.  There was minimal undermining beneath the pedicle flap. Following this, the flap was carried over into the primary defect and sutured into place.
Bcc Histology Text: There were numerous aggregates of basaloid cells.
Alar Island Pedicle Flap Text: The defect edges were debeveled with a #15 scalpel blade. Given the location of the defect, shape of the defect and the proximity to the alar rim an island pedicle advancement flap was deemed most appropriate. Using a sterile surgical marker, an appropriate advancement flap was drawn incorporating the defect, outlining the appropriate donor tissue and placing the expected incisions within the nasal ala running parallel to the alar rim. The area thus outlined was incised with a #15 scalpel blade. The skin margins were undermined minimally to an appropriate distance in all directions around the primary defect and laterally outward around the island pedicle utilizing iris scissors.  There was minimal undermining beneath the pedicle flap. Following this, the designed flap was carried over into the primary defect and sutured into place.
Consent (Temporal Branch)/Introductory Paragraph: The rationale for Mohs was explained to the patient and consent was obtained. The risks, benefits and alternatives to therapy were discussed in detail. Specifically, the risks of damage to the temporal branch of the facial nerve, infection, scarring, bleeding, prolonged wound healing, incomplete removal, allergy to anesthesia, and recurrence were addressed. Prior to the procedure, the treatment site was clearly identified and confirmed by the patient. All components of Universal Protocol/PAUSE Rule completed.
Estimated Blood Loss (Cc): minimal
Donor Site Anesthesia Type: same as repair anesthesia
Banner Transposition Flap Text: The defect edges were debeveled with a #15 scalpel blade. Given the location of the defect and the proximity to free margins a Banner transposition flap was deemed most appropriate. Using a sterile surgical marker, an appropriate flap was drawn around the defect. The area thus outlined was incised deep to adipose tissue with a #15 scalpel blade. The skin margins were undermined to an appropriate distance in all directions utilizing iris scissors. Following this, the designed flap was carried into the primary defect and sutured into place.
Mastoid Interpolation Flap Text: A decision was made to reconstruct the defect utilizing an interpolation axial flap and a staged reconstruction.  A telfa template was made of the defect.  This telfa template was then used to outline the mastoid interpolation flap.  The donor area for the pedicle flap was then injected with anesthesia.  The flap was excised through the skin and subcutaneous tissue down to the layer of the underlying musculature.  The pedicle flap was carefully excised within this deep plane to maintain its blood supply.  The edges of the donor site were undermined.   The donor site was closed in a primary fashion.  The pedicle was then rotated into position and sutured.  Once the tube was sutured into place, adequate blood supply was confirmed with blanching and refill.  The pedicle was then wrapped with xeroform gauze and dressed appropriately with a telfa and gauze bandage to ensure continued blood supply and protect the attached pedicle.
Unna Boot Text: An Unna boot was placed to help immobilize the limb and facilitate more rapid healing.
Nasalis Myocutaneous Flap Text: Using a #15 blade, an incision was made around the donor flap to the level of the nasalis muscle. Wide lateral undermining was then performed in both the subcutaneous plane above the nasalis muscle, and in a submuscular plane just above periosteum. This allowed the formation of a free nasalis muscle axial pedicle which was still attached to the actual cutaneous flap, increasing its mobility and vascular viability. Hemostasis was obtained with pinpoint electrocoagulation. The flap was mobilized into position and the pivotal anchor points positioned and stabilized with buried interrupted sutures. Subcutaneous and dermal tissues were closed in a multilayered fashion with sutures. Tissue redundancies were excised, and the epidermal edges were apposed without significant tension and sutured with sutures.
Cheek-To-Nose Interpolation Flap Text: A decision was made to reconstruct the defect utilizing an interpolation axial flap and a staged reconstruction.  A telfa template was made of the defect.  This telfa template was then used to outline the Cheek-To-Nose Interpolation flap.  The donor area for the pedicle flap was then injected with anesthesia.  The flap was excised through the skin and subcutaneous tissue down to the layer of the underlying musculature.  The interpolation flap was carefully excised within this deep plane to maintain its blood supply.  The edges of the donor site were undermined.   The donor site was closed in a primary fashion.  The pedicle was then rotated into position and sutured.  Once the tube was sutured into place, adequate blood supply was confirmed with blanching and refill.  The pedicle was then wrapped with xeroform gauze and dressed appropriately with a telfa and gauze bandage to ensure continued blood supply and protect the attached pedicle.
Area H Indication Text: Tumors in this location are included in Area H (eyelids, eyebrows, nose, lips, chin, ear, pre-auricular, post-auricular, temple, genitalia, hands, feet, ankles and areola).  Tissue conservation is critical in these anatomic locations.
Trilobed Flap Text: The defect edges were debeveled with a #15 scalpel blade. Given the location of the defect and the proximity to free margins a trilobed flap was deemed most appropriate. Using a sterile surgical marker, an appropriate trilobed flap was drawn around the defect. The area thus outlined was incised deep to adipose tissue with a #15 scalpel blade. The skin margins were undermined to an appropriate distance in all directions utilizing iris scissors. Following this, the designed flap was carried into the primary defect and sutured into place.
Suturegard Intro: Intraoperative tissue expansion was performed, utilizing the SUTUREGARD device, in order to reduce wound tension.
Cheek Interpolation Flap Text: A decision was made to reconstruct the defect utilizing an interpolation axial flap and a staged reconstruction.  A telfa template was made of the defect.  This telfa template was then used to outline the Cheek Interpolation flap.  The donor area for the pedicle flap was then injected with anesthesia.  The flap was excised through the skin and subcutaneous tissue down to the layer of the underlying musculature.  The interpolation flap was carefully excised within this deep plane to maintain its blood supply.  The edges of the donor site were undermined.   The donor site was closed in a primary fashion.  The pedicle was then rotated into position and sutured.  Once the tube was sutured into place, adequate blood supply was confirmed with blanching and refill.  The pedicle was then wrapped with xeroform gauze and dressed appropriately with a telfa and gauze bandage to ensure continued blood supply and protect the attached pedicle.
Bcc Infiltrative Histology Text: There were numerous aggregates of basaloid cells demonstrating an infiltrative pattern.
Home Suture Removal Text: Patient was provided instructions on removing sutures and will remove their sutures at home.  If they have any questions or difficulties they will call the office.
Consent 2/Introductory Paragraph: Mohs surgery was explained to the patient and consent was obtained. The risks, benefits and alternatives to therapy were discussed in detail. Specifically, the risks of infection, scarring, bleeding, prolonged wound healing, incomplete removal, allergy to anesthesia, nerve injury and recurrence were addressed. Prior to the procedure, the treatment site was clearly identified and confirmed by the patient. All components of Universal Protocol/PAUSE Rule completed.
Nasalis-Muscle-Based Myocutaneous Island Pedicle Flap Text: Using a #15 blade, an incision was made around the donor flap to the level of the nasalis muscle. Wide lateral undermining was then performed in both the subcutaneous plane above the nasalis muscle, and in a submuscular plane just above periosteum. This allowed the formation of a free nasalis muscle axial pedicle (based on the angular artery) which was still attached to the actual cutaneous flap, increasing its mobility and vascular viability. Hemostasis was obtained with pinpoint electrocoagulation. The flap was mobilized into position and the pivotal anchor points positioned and stabilized with buried interrupted sutures. Subcutaneous and dermal tissues were closed in a multilayered fashion with sutures. Tissue redundancies were excised, and the epidermal edges were apposed without significant tension and sutured with sutures.
Cheiloplasty (Complex) Text: A decision was made to reconstruct the defect with a  cheiloplasty.  The defect was undermined extensively.  Additional orbicularis oris muscle was excised with a 15 blade scalpel.  The defect was converted into a full thickness wedge to facilite a better cosmetic result.  Small vessels were then tied off with 5-0 monocyrl. The orbicularis oris, superficial fascia, adipose and dermis were then reapproximated.  After the deeper layers were approximated the epidermis was reapproximated with particular care given to realign the vermilion border.
Purse String (Intermediate) Text: Given the location of the defect and the characteristics of the surrounding skin a purse string intermediate closure was deemed most appropriate.  Undermining was performed circumferentially around the surgical defect.  A purse string suture was then placed and tightened.
Advancement Flap (Single) Text: In order to preserve normal anatomic and functional relationships, a single advancement flap was deemed the most appropriate reconstructive procedure.  The beveled edges of the Mohs defect were excised with a #15 scalpel blade.    The flap was designed to fall along relaxed skin tension lines and/or free margins, incised, and elevated using blunt curved tissue scissors.  Hemostasis was achieved.  Interrupted buried vertical mattress sutures were employed to carry over and secure the flap in place.  Redundant cutaneous columns defined by the flap's movement were removed to the adipose layer using the triangulation technique and repaired in similar fashion.  Final epidermal approximation along the length of the flap was achieved using epidermal sutures.  The wound was stressed in various directions to ensure the adequacy of the closure and of hemostasis.  The flap edges appeared pink and well perfused.  Reconstruction was considered complete.
Paramedian Forehead Flap Text: A decision was made to reconstruct the defect utilizing an interpolation axial flap and a staged reconstruction.  A telfa template was made of the defect.  This telfa template was then used to outline the paramedian forehead pedicle flap.  The donor area for the pedicle flap was then injected with anesthesia.  The flap was excised through the skin and subcutaneous tissue down to the layer of the underlying musculature.  The pedicle flap was carefully excised within this deep plane to maintain its blood supply.  The edges of the donor site were undermined.   The donor site was closed in a primary fashion.  The pedicle was then rotated into position and sutured.  Once the tube was sutured into place, adequate blood supply was confirmed with blanching and refill.  The pedicle was then wrapped with xeroform gauze and dressed appropriately with a telfa and gauze bandage to ensure continued blood supply and protect the attached pedicle.
Epidermal Sutures: 6-0 Polypropylene

## 2024-11-18 ENCOUNTER — APPOINTMENT (OUTPATIENT)
Dept: URBAN - METROPOLITAN AREA CLINIC 252 | Age: 89
Setting detail: DERMATOLOGY
End: 2024-11-19

## 2024-11-18 DIAGNOSIS — Z48.02 ENCOUNTER FOR REMOVAL OF SUTURES: ICD-10-CM

## 2024-11-18 PROCEDURE — OTHER SUTURE REMOVAL (GLOBAL PERIOD): OTHER

## 2024-11-18 PROCEDURE — OTHER COUNSELING: OTHER

## 2024-11-18 ASSESSMENT — LOCATION ZONE DERM: LOCATION ZONE: TRUNK

## 2024-11-18 ASSESSMENT — LOCATION DETAILED DESCRIPTION DERM: LOCATION DETAILED: LEFT SUPERIOR MEDIAL UPPER BACK

## 2024-11-18 ASSESSMENT — LOCATION SIMPLE DESCRIPTION DERM: LOCATION SIMPLE: LEFT UPPER BACK

## 2024-11-18 NOTE — PROCEDURE: SUTURE REMOVAL (GLOBAL PERIOD)
Detail Level: Detailed
Add 97466 Cpt? (Important Note: In 2017 The Use Of 57099 Is Being Tracked By Cms To Determine Future Global Period Reimbursement For Global Periods): no

## 2024-11-20 ASSESSMENT — ANXIETY QUESTIONNAIRES
5. BEING SO RESTLESS THAT IT IS HARD TO SIT STILL: NOT AT ALL
7. FEELING AFRAID AS IF SOMETHING AWFUL MIGHT HAPPEN: SEVERAL DAYS
4. TROUBLE RELAXING: NOT AT ALL
7. FEELING AFRAID AS IF SOMETHING AWFUL MIGHT HAPPEN: SEVERAL DAYS
GAD7 TOTAL SCORE: 4
6. BECOMING EASILY ANNOYED OR IRRITABLE: NOT AT ALL
1. FEELING NERVOUS, ANXIOUS, OR ON EDGE: SEVERAL DAYS
GAD7 TOTAL SCORE: 4
GAD7 TOTAL SCORE: 4
IF YOU CHECKED OFF ANY PROBLEMS ON THIS QUESTIONNAIRE, HOW DIFFICULT HAVE THESE PROBLEMS MADE IT FOR YOU TO DO YOUR WORK, TAKE CARE OF THINGS AT HOME, OR GET ALONG WITH OTHER PEOPLE: NOT DIFFICULT AT ALL
8. IF YOU CHECKED OFF ANY PROBLEMS, HOW DIFFICULT HAVE THESE MADE IT FOR YOU TO DO YOUR WORK, TAKE CARE OF THINGS AT HOME, OR GET ALONG WITH OTHER PEOPLE?: NOT DIFFICULT AT ALL
3. WORRYING TOO MUCH ABOUT DIFFERENT THINGS: SEVERAL DAYS
2. NOT BEING ABLE TO STOP OR CONTROL WORRYING: SEVERAL DAYS

## 2024-11-25 ENCOUNTER — THERAPY VISIT (OUTPATIENT)
Dept: PHYSICAL THERAPY | Facility: CLINIC | Age: 89
End: 2024-11-25
Payer: MEDICARE

## 2024-11-25 ENCOUNTER — OFFICE VISIT (OUTPATIENT)
Dept: INTERNAL MEDICINE | Facility: CLINIC | Age: 89
End: 2024-11-25
Payer: MEDICARE

## 2024-11-25 VITALS
TEMPERATURE: 97.4 F | DIASTOLIC BLOOD PRESSURE: 77 MMHG | RESPIRATION RATE: 16 BRPM | WEIGHT: 249 LBS | BODY MASS INDEX: 40.02 KG/M2 | HEART RATE: 62 BPM | HEIGHT: 66 IN | OXYGEN SATURATION: 96 % | SYSTOLIC BLOOD PRESSURE: 149 MMHG

## 2024-11-25 DIAGNOSIS — G89.29 CHRONIC RIGHT SHOULDER PAIN: Primary | ICD-10-CM

## 2024-11-25 DIAGNOSIS — M25.511 CHRONIC RIGHT SHOULDER PAIN: ICD-10-CM

## 2024-11-25 DIAGNOSIS — I73.9 PERIPHERAL VASCULAR DISEASE, UNSPECIFIED (H): ICD-10-CM

## 2024-11-25 DIAGNOSIS — E83.52 HYPERCALCEMIA: ICD-10-CM

## 2024-11-25 DIAGNOSIS — I10 ESSENTIAL HYPERTENSION WITH GOAL BLOOD PRESSURE LESS THAN 130/80: ICD-10-CM

## 2024-11-25 DIAGNOSIS — Z90.89 H/O PARATHYROIDECTOMY: ICD-10-CM

## 2024-11-25 DIAGNOSIS — G89.29 CHRONIC RIGHT SHOULDER PAIN: ICD-10-CM

## 2024-11-25 DIAGNOSIS — M81.8 OTHER OSTEOPOROSIS WITHOUT CURRENT PATHOLOGICAL FRACTURE: ICD-10-CM

## 2024-11-25 DIAGNOSIS — M25.511 CHRONIC RIGHT SHOULDER PAIN: Primary | ICD-10-CM

## 2024-11-25 DIAGNOSIS — Z85.828 HISTORY OF BASAL CELL CARCINOMA: ICD-10-CM

## 2024-11-25 DIAGNOSIS — I50.30 HEART FAILURE WITH PRESERVED EJECTION FRACTION, NYHA CLASS II (H): Primary | ICD-10-CM

## 2024-11-25 DIAGNOSIS — Z29.11 NEED FOR VACCINATION AGAINST RESPIRATORY SYNCYTIAL VIRUS: ICD-10-CM

## 2024-11-25 DIAGNOSIS — E89.2 POSTPROCEDURAL HYPOPARATHYROIDISM (H): ICD-10-CM

## 2024-11-25 DIAGNOSIS — Z98.890 H/O PARATHYROIDECTOMY: ICD-10-CM

## 2024-11-25 PROBLEM — M25.551 HIP PAIN, RIGHT: Status: RESOLVED | Noted: 2022-08-30 | Resolved: 2024-11-25

## 2024-11-25 LAB
ALBUMIN SERPL BCG-MCNC: 4.3 G/DL (ref 3.5–5.2)
ANION GAP SERPL CALCULATED.3IONS-SCNC: 9 MMOL/L (ref 7–15)
BUN SERPL-MCNC: 19.4 MG/DL (ref 8–23)
CALCIUM SERPL-MCNC: 10.7 MG/DL (ref 8.8–10.4)
CALCIUM SERPL-MCNC: 10.7 MG/DL (ref 8.8–10.4)
CHLORIDE SERPL-SCNC: 106 MMOL/L (ref 98–107)
CREAT SERPL-MCNC: 0.78 MG/DL (ref 0.51–0.95)
EGFRCR SERPLBLD CKD-EPI 2021: 73 ML/MIN/1.73M2
ERYTHROCYTE [DISTWIDTH] IN BLOOD BY AUTOMATED COUNT: 15 % (ref 10–15)
GLUCOSE SERPL-MCNC: 118 MG/DL (ref 70–99)
HCO3 SERPL-SCNC: 24 MMOL/L (ref 22–29)
HCT VFR BLD AUTO: 37.8 % (ref 35–47)
HGB BLD-MCNC: 12.3 G/DL (ref 11.7–15.7)
MCH RBC QN AUTO: 29.4 PG (ref 26.5–33)
MCHC RBC AUTO-ENTMCNC: 32.5 G/DL (ref 31.5–36.5)
MCV RBC AUTO: 90 FL (ref 78–100)
PLATELET # BLD AUTO: 303 10E3/UL (ref 150–450)
POTASSIUM SERPL-SCNC: 4.6 MMOL/L (ref 3.4–5.3)
RBC # BLD AUTO: 4.19 10E6/UL (ref 3.8–5.2)
SODIUM SERPL-SCNC: 139 MMOL/L (ref 135–145)
WBC # BLD AUTO: 10.9 10E3/UL (ref 4–11)

## 2024-11-25 PROCEDURE — 97161 PT EVAL LOW COMPLEX 20 MIN: CPT | Mod: GP | Performed by: PHYSICAL THERAPIST

## 2024-11-25 PROCEDURE — 97110 THERAPEUTIC EXERCISES: CPT | Mod: GP | Performed by: PHYSICAL THERAPIST

## 2024-11-25 PROCEDURE — G2211 COMPLEX E/M VISIT ADD ON: HCPCS | Performed by: INTERNAL MEDICINE

## 2024-11-25 PROCEDURE — 85027 COMPLETE CBC AUTOMATED: CPT | Performed by: INTERNAL MEDICINE

## 2024-11-25 PROCEDURE — 80048 BASIC METABOLIC PNL TOTAL CA: CPT | Performed by: INTERNAL MEDICINE

## 2024-11-25 PROCEDURE — 82040 ASSAY OF SERUM ALBUMIN: CPT | Performed by: INTERNAL MEDICINE

## 2024-11-25 PROCEDURE — 99214 OFFICE O/P EST MOD 30 MIN: CPT | Performed by: INTERNAL MEDICINE

## 2024-11-25 ASSESSMENT — ACTIVITIES OF DAILY LIVING (ADL)
PUTTING_ON_YOUR_PANTS: 3
PLACING_AN_OBJECT_ON_A_HIGH_SHELF: 3
PUTTING_ON_A_SHIRT_THAT_BUTTONS_DOWN_THE_FRONT: 1
WASHING_YOUR_BACK: 4
CARRYING_A_HEAVY_OBJECT_OF_10_POUNDS: 5
PLEASE_INDICATE_YOR_PRIMARY_REASON_FOR_REFERRAL_TO_THERAPY:: SHOULDER
TOUCHING_THE_BACK_OF_YOUR_NECK: 2
WHEN_LYING_ON_THE_INVOLVED_SIDE: 4
REMOVING_SOMETHING_FROM_YOUR_BACK_POCKET: 4
PUTTING_ON_AN_UNDERSHIRT_OR_A_PULLOVER_SWEATER: 3
WASHING_YOUR_HAIR?: 2
REACHING_FOR_SOMETHING_ON_A_HIGH_SHELF: 3
AT_ITS_WORST?: 7
PUSHING_WITH_THE_INVOLVED_ARM: 2

## 2024-11-25 NOTE — PROGRESS NOTES
PHYSICAL THERAPY EVALUATION  Type of Visit: Evaluation       Fall Risk Screen:  Fall screen completed by: PT  Have you fallen 2 or more times in the past year?: (Patient-Rptd) Yes  Have you fallen and had an injury in the past year?: No  Is patient a fall risk?: No    Subjective         Presenting condition or subjective complaint: (Patient-Rptd) Pain in rt arm since 8/1/24 At present has been leaa painful as I have been trying not yo use it. Nothing significant seemed to start the pain.  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    Prior diagnostic imaging/testing results:       Prior therapy history for the same diagnosis, illness or injury: (Patient-Rptd) No      Prior Level of Function  Transfers: Independent  Ambulation: Assistive equipment  ADL: Assistive equipment  IADL: Driving, Finances, Housekeeping, Laundry, Meal preparation    Living Environment  Social support: (Patient-Rptd) Alone   Type of home: (Patient-Rptd) House; 1 level   Stairs to enter the home: (Patient-Rptd) No       Ramp: (Patient-Rptd) No   Stairs inside the home: (Patient-Rptd) No       Help at home: (Patient-Rptd) Home and Yard maintenance tasks  Equipment owned: (Patient-Rptd) Straight Cane; Walker; Standard wheelchair     Employment: (Patient-Rptd) No    Hobbies/Interests: (Patient-Rptd) Quilting , card making tv book club teading  plants    Patient goals for therapy: (Patient-Rptd) Id like to not have the pain doing my usual  ADLs  . Dont know what else to say!    Pain assessment: Pain present    Pt presents to therapy today for gradually worsening right shoulder pain beginning in August of this past year. No inciting event or injury the patient can identify. Shoulder pain typically worse with reaching and lifting tasks. Denies any numbness/tingling into the upper extremity. Did ice on occasion and that seemed to help.      Objective    SHOULDER EVALUATION  PAIN: Pain Level at Rest: 0/10  Pain Level with Use: 7/10  Pain Location: shoulder  Pain Quality: Aching, Dull, and Sharp  Pain Frequency: intermittent  Pain is Worst: daytime  Pain is Exacerbated By: lifting tasks, reaching overhead, washing hair.  Pain is Relieved By: otc medications  Pain Progression: Improved  INTEGUMENTARY (edema, incisions): WNL  POSTURE: Sitting Posture: Rounded shoulders, Forward head  GAIT:   Weightbearing Status: WBAT  Assistive Device(s): Cane (single end)  Gait Deviations: WFL  BALANCE/PROPRIOCEPTION:   WEIGHTBEARING ALIGNMENT:   ROM:   (Degrees) Left AROM Left PROM Right AROM  Right PROM   Shoulder Flexion 160  150    Shoulder Extension 46  40    Shoulder Abduction 150  150    Shoulder Adduction       Shoulder Internal Rotation PSIS  PSIS    Shoulder External Rotation 75  56    Shoulder Horizontal Abduction       Shoulder Horizontal Adduction       Shoulder Flexion ER       Shoulder Flexion IR       Elbow Extension WNL  WNL    Elbow Flexion WNL  WNL    Pain:   End feel:     STRENGTH:  well maintained but painful with resisted flexion/scaption and abduction   FLEXIBILITY: Decreased upper trap L, Decreased levator L, Decreased pectoralis major L, Decreased pectoralis minor L, Decreased latissimus L, Decreased upper trap R, Decreased levator R, Decreased pectoralis major R, Decreased pectoralis minor R, Decreased latissimus R  SPECIAL TESTS:    Left Right   Impingement     Neer's  Positive   Hawkin's-Bennie  Positive   Empty/full can   Positive   Internal impingement     Labral     Anterior Slide     [place]'s     Crank     Instability     Apprehension (anterior)     Relocation (anterior)     Anterior Load & Shift     Posterior Load & Shift     Posterior instability (with 90 degrees flex)     Multi-Directional Instability      Sulcus     Biceps      Speed's     Rotator Cuff Tear     Drop Arm     Belly Press     Lift off        PALPATION:   + Tenderness At Location  Left Right   Clavicle     Sternoclavicular     Acromioclavicular     Biceps     Triceps     Supraspinatus     Infraspinatus  +   Teres minor     Subscapularis     Deltoid  +   Levator  +   Rhomboids     Upper trap  +   Incisional     Bicipital groove       JOINT MOBILITY:  hypomobile through right GHJ  CERVICAL SCREEN: WNL    Assessment & Plan   CLINICAL IMPRESSIONS  Medical Diagnosis: M25.511, G89.29 (ICD-10-CM) - Chronic right shoulder pain    Treatment Diagnosis: Right shoulder pain, rotator cuff tendinopathy   Impression/Assessment: Patient is a 89 year old female with right shoulder complaints.  The following significant findings have been identified: Pain, Decreased ROM/flexibility, Decreased joint mobility, Decreased strength, Impaired muscle performance, Decreased activity tolerance, and Impaired posture. These impairments interfere with their ability to perform self care tasks, recreational activities, and household chores as compared to previous level of function.     Clinical Decision Making (Complexity):  Clinical Presentation: Stable/Uncomplicated  Clinical Presentation Rationale: based on medical and personal factors listed in PT evaluation  Clinical Decision Making (Complexity): Low complexity    PLAN OF CARE  Treatment Interventions:  Modalities: Cryotherapy, Dry Needling, E-stim, Hot Pack, Ultrasound, Vasoneumatic Device  Interventions: Manual Therapy, Neuromuscular Re-education, Therapeutic Activity, Therapeutic Exercise, Self-Care/Home Management    Long Term Goals     PT Goal 1  Goal Identifier: goal 1  Goal Description: Pain free with outstretched reachign and lifting of 5#  Rationale: to maximize safety and independence with performance of ADLs and functional tasks;to maximize safety and independence within the home;to maximize safety and independence within the community;to maximize safety and independence with transportation;to maximize safety and independence with self cares  Goal Progress:  mark with 2-3#  Target Date: 01/06/25  PT Goal 2  Goal Identifier: goal 2  Goal Description: pain free with overhead mobility to allow for ease and comfortability with washing hair and reaching into cupboards.  Rationale: to maximize safety and independence within the home;to maximize safety and independence with performance of ADLs and functional tasks;to maximize safety and independence within the community;to maximize safety and independence with transportation;to maximize safety and independence with self cares  Target Date: 01/20/25      Frequency of Treatment: bi weekly for 2-3 months  Duration of Treatment: 2-3 months    Recommended Referrals to Other Professionals:   Education Assessment:   Learner/Method: Patient;Demonstration;Pictures/Video;No Barriers to Learning    Risks and benefits of evaluation/treatment have been explained.   Patient/Family/caregiver agrees with Plan of Care.     Evaluation Time:     RETIRED PT Eval, Low Complexity Minutes (13039): 12       Signing Clinician: JAMI Cuello [facility]                                                                                   OUTPATIENT PHYSICAL THERAPY      PLAN OF TREATMENT FOR OUTPATIENT REHABILITATION   Patient's Last Name, First Name, Lisa Domínguez YOB: 1935   Provider's Name   [facility]   Medical Record No.  [id number]     Onset Date: 11/25/24 (date of order)  Start of Care Date: 11/25/24     Medical Diagnosis:  M25.511, G89.29 (ICD-10-CM) - Chronic right shoulder pain      PT Treatment Diagnosis:  Right shoulder pain, rotator cuff tendinopathy Plan of Treatment  Frequency/Duration: bi weekly for 2-3 months/ 2-3 months    Certification date from 11/25/24 to 02/17/25         See note for plan of treatment details and functional goals     Riley Clinton, PT                         I CERTIFY THE NEED FOR THESE SERVICES FURNISHED UNDER         THIS PLAN OF TREATMENT AND WHILE UNDER MY CARE     (Physician attestation of this document indicates review and certification of the therapy plan).              Referring Provider:  Ziyad Isaac    Initial Assessment  See Epic Evaluation- Start of Care Date: 11/25/24

## 2024-11-25 NOTE — PROGRESS NOTES
"  Assessment & Plan     Heart failure with preserved ejection fraction, NYHA class II (H)  Problem is stable and ongoing monitoring  , see care everywhere for office visit notes with [facility] [place]   - REVIEW OF HEALTH MAINTENANCE PROTOCOL ORDERS  - CBC with Platelets; Future  - CBC with Platelets    Other osteoporosis without current pathological fracture  We reviewed the recommendations surrounding recheck DEXA scan and this can wait in my opinion until 2027. Changes made to the Healthcare maintenance section / Healthcare Gaps  with edited goal for repeat DEXA scan   - REVIEW OF HEALTH MAINTENANCE PROTOCOL ORDERS    Essential hypertension with goal blood pressure less than 130/80  Problem is stable and ongoing monitoring    - BASIC METABOLIC PANEL; Future  - BASIC METABOLIC PANEL    Peripheral vascular disease, unspecified (H)  Problem is stable and ongoing monitoring      Postprocedural hypoparathyroidism (H)  Problem is stable and ongoing monitoring      Need for vaccination against respiratory syncytial virus  Declines     Chronic right shoulder pain  She has probably got rotator cuff injury / sprain but not a full tear as she can easily elevate both of her arms above her head   - REVIEW OF HEALTH MAINTENANCE PROTOCOL ORDERS  - Physical Therapy  Referral; Future    History of basal cell carcinoma  Noted as a point of historical importance , see notes with a Dr. Merchant    Hypercalcemia  Ongoing follow up with endocrinologist   - Albumin level  - Calcium    H/O parathyroidectomy  As above   - Albumin level  - Calcium          BMI  Estimated body mass index is 40.31 kg/m  as calculated from the following:    Height as of this encounter: 1.674 m (5' 5.9\").    Weight as of this encounter: 112.9 kg (249 lb).   Weight management plan: Discussed healthy diet and exercise guidelines      Saúl Gonzalez is a 89 year old, presenting for the following health issues:  Recheck " Medication, Lipids, and Hypertension        11/25/2024     9:59 AM   Additional Questions   Roomed by km     History of Present Illness       Mental Health Follow-up:  Patient presents to follow-up on Anxiety.    Patient's anxiety since last visit has been:  Good  The patient is not having other symptoms associated with anxiety.  Any significant life events: health concerns  Patient is feeling anxious or having panic attacks.  Patient has no concerns about alcohol or drug use.    Heart Failure:  She presents for follow up of heart failure. She is experiencing shortness of breath with activity only, which is same as usual. She is experiencing lower extremity edema which is same as usual.   She denies orthopenea and is not coughing at night. Patient is not checking weight daily. She has recently had a None.  She has side effects from medications including fatigue.  She has had no other medical visits for heart failure since the last visit.    Hyperlipidemia:  She presents for follow up of hyperlipidemia.   She is taking medication to lower cholesterol. She is having myalgia or other side effects to statin medications.    Hypertension: She presents for follow up of hypertension.  She does not check blood pressure  regularly outside of the clinic. Outside blood pressures have been over 140/90. She does not follow a low salt diet.     Hypothyroidism:     Since last visit, patient describes the following symptoms::  Anxiety, Constipation, Fatigue, Tremors and Weight gain    Weight gain::  Less than 5 lbs.    Vascular Disease:  She presents for follow up of vascular disease.     She never takes nitroglycerin. She takes daily aspirin.    Reason for visit:  Follow up on on going health issues and problems  with    She eats 2-3 servings of fruits and vegetables daily.She consumes 1 sweetened beverage(s) daily.She exercises with enough effort to increase her heart rate 9 or less minutes per day.  She exercises with enough  "effort to increase her heart rate 3 or less days per week. She is missing 1 dose(s) of medications per week.  She is not taking prescribed medications regularly due to remembering to take.     Patient suspects that a binge with salty nuts may have led to her increased blood pressure readings. We reviewed a sheet of home blood pressure monitoring data with consistently elevated readings and also she's not been completely faithful with taking her furosemide [ Lasix ]     Wt Readings from Last 5 Encounters:   11/25/24 112.9 kg (249 lb)   07/11/24 110.3 kg (243 lb 2.7 oz)   07/01/24 111.6 kg (246 lb)   06/07/24 110.9 kg (244 lb 6.4 oz)   01/05/24 109.8 kg (242 lb)     Patient had some input with her case from endocrinology regarding elevated calcium and possible hyperparathyroidism but patient has now eschewed any surgery and her endocrinologist has stepped back with just ongoing laboratory monitoring     Last Echo: No results found.    Health Maintenance Due   Topic Date Due    RSV VACCINE (1 - 1-dose 75+ series) Never done    HF ACTION PLAN  07/23/2022    ANNUAL REVIEW OF HM ORDERS  07/07/2023    CBC  09/29/2024    DEXA  12/01/2024    BMP  12/13/2024 August 1st right shoulder began hurting and this has continued to be a major problem. She is worried about her rotator cuff health and possible rotator cuff tear, her pain has slightly improved but still has sudden pains when she reaches out.     Had 3 Moh's micrographic surgery  for basal cell skin cancers , removed in the last few months. Dr. Merchant.     Review of Systems  Constitutional, HEENT, cardiovascular, pulmonary, gi and gu systems are negative, except as otherwise noted.      Objective    BP (!) 149/77   Pulse 62   Temp 97.4  F (36.3  C) (Temporal)   Resp 16   Ht 1.674 m (5' 5.9\")   Wt 112.9 kg (249 lb)   SpO2 96%   BMI 40.31 kg/m    Body mass index is 40.31 kg/m .  Physical Exam   GENERAL: alert and no distress  EYES: Eyes grossly normal to " inspection, PERRL and conjunctivae and sclerae normal  RESP: lungs clear to auscultation - no rales, rhonchi or wheezes  CV: regular rate and rhythm, normal S1 S2, no S3 or S4, no murmur, click or rub, no peripheral edema  MS: no gross musculoskeletal defects noted, no edema  NEURO: Normal strength and tone, mentation intact and speech normal  PSYCH: mentation appears normal, affect normal/bright    Orders Placed This Encounter   Procedures    REVIEW OF HEALTH MAINTENANCE PROTOCOL ORDERS    CBC with Platelets    BASIC METABOLIC PANEL    Physical Therapy  Referral             Signed Electronically by: Ziyad Isaac MD

## 2024-12-30 NOTE — PROGRESS NOTES
Oncology Follow Up Visit: January 2, 2025    Oncologist: Dr Tamar Georges  PCP: Ziyad Isaac    Diagnosis: Left Breast Cancer  Lisa Ferguson is an 89 yo female who self palpated abnormality in left breast in November 2020.  11/16/2020 diagnostic mammogram and ultrasound were done which found 2 lesions within the left breast 1 measuring approximately 1.5 cm and the second 1.9 cm.  Both were biopsied the one at 3:00 was a grade 2 invasive lobular cancer.  The one at 11:00 was a grade 2 invasive ductal cancer.  It was estrogen receptor positive progesterone receptor positive HER-2/kena indeterminate by IHC and negative by ALBERTO  Genetic testing negative for findings  Oncotype DX= 22-considered low risk  Treatment:   1/6/2021 patient underwent bilateral mastectomies.  Right mastectomy was unremarkable.  Left mastectomy found a multifocal breast cancer the first measuring 4.5 cm is a grade 2 invasive lobular carcinoma, the second was a 2.5 cm grade 2 malignancy.  2 sentinel lymph nodes were removed 1 of which was negative the other 1 had immunohistochemistry positive only cells for a T [id number] ER/WI positive HER-2/kena negative disease  2/2021 Arimidex x 3 months- quit due to person decision with hot flashes.    Interval History:Ms. Ferguson is see in today for follow for her breast cancer and mastectomy   Left chest with some bulkiness she just noted this morning while looking in the mirror.  Has also had some pain to right shoulder that is limiting motion though is trying to keep active with daily seated exercises that does include the shoulder. No other pains noted to chest.    Right Hip affecting knee- knee has been replaced before. uses a cane to get around -has not had any falls.  Denies any recent falls .    Admits to eating well but trying to limit weight gain.   Rest of comprehensive and complete ROS is reviewed and is negative.   Past Medical History:   Diagnosis Date    Anxiety     CAD (coronary artery disease)      angio 2002, h/o cabg, sees Luisana Nelson NP, they follow the cholesterol    CHF (congestive heart failure) (H) 07/08/2014    Closed compression fracture of L2 vertebra (H)     Degenerative joint disease of right hip     DJD (degenerative joint disease)     History of colonoscopy 01/01/2000    due jan 2010    Hyperlipidemia LDL goal < 70     sees [facility]    Hypertension goal BP (blood pressure) < 140/90     Hypothyroidism     IBS (irritable bowel syndrome)     Lumbar spinal stenosis     Mitral regurgitation     Obesity     GLEN (obstructive sleep apnea) 07/11/2011    PUD (peptic ulcer disease)     h/o duodenal ulcer    RBBB (right bundle branch block)     Sciatica neuralgia november 209    MRI shows spinal stenosis and a cyst on the spine, has received an epidural steroid injection     Current Outpatient Medications   Medication Sig Dispense Refill    acetaminophen (TYLENOL) 650 MG CR tablet Take 1,300 mg by mouth 2 times daily as needed for pain      allopurinol (ZYLOPRIM) 100 MG tablet Take 1 tablet (100 mg) by mouth daily TAKE 1 TABLET BY MOUTH DAILY ALONG WITH 300MG TABLET FOR TOTAL DAILY DOSE OF 400MG 90 tablet 3    allopurinol (ZYLOPRIM) 300 MG tablet TAKE 1 TABLET BY MOUTH DAILY ALONG WITH 100MG TABLET FOR TOTAL DAILY DOSE OF 400MG 90 tablet 3    aspirin 81 MG EC tablet Take 81 mg by mouth every morning      betamethasone dipropionate (DIPROSONE) 0.05 % external lotion Apply topically daily as needed (seborrhea) 60 mL 0    clobetasol (TEMOVATE) 0.05 % external solution Apply topically 2 times daily As needed for scalp      levothyroxine (SYNTHROID/LEVOTHROID) 125 MCG tablet Take 1 tablet (125 mcg) by mouth every morning 90 tablet 3    losartan (COZAAR) 50 MG tablet Take 50 mg by mouth daily      metoprolol tartrate (LOPRESSOR) 25 MG tablet Take 2 tablets (50 mg) by mouth every morning and 1 tablet (25 mg) every evening      Multiple Vitamins-Minerals (HAIR SKIN AND NAILS FORMULA PO) Take 2 each  "by mouth daily      order for DME Equipment being ordered: CPAP 1 each 0    rosuvastatin (CRESTOR) 5 MG tablet Take 1 tablet (5 mg) by mouth every other day 45 tablet 3    sertraline (ZOLOFT) 25 MG tablet Take 0.5 tablets (12.5 mg) by mouth daily 45 tablet 1    SODIUM FLUORIDE 5000 PPM 1.1 % GEL topical gel BRUSH ON FOR 2-3 MINUTES AND SPIT OUT EXCESS 2 TIMES DAILY      torsemide (DEMADEX) 20 MG tablet Take 1 tablet by mouth daily      vitamin D3 (CHOLECALCIFEROL) 50 mcg (2000 units) tablet Take 1 tablet by mouth daily       No current facility-administered medications for this visit.     Allergies   Allergen Reactions    Adhesive Tape     Atorvastatin Other (See Comments) and Muscle Pain (Myalgia)     lipitor  Myalgia    Buspar [Buspirone]     Nickel Other (See Comments)     Raw weepy skin  rash    Vicodin [Hydrocodone-Acetaminophen] Other (See Comments)     Hallucinations    Liquid Adhesive      Other reaction(s): Contact Dermatitis   - pt is former nurse.     Physical Exam:BP (!) 160/67 (BP Location: Right arm, Patient Position: Chair, Cuff Size: Adult Large)   Pulse 60   Resp 20   Ht 1.674 m (5' 5.9\")   Wt 112.5 kg (248 lb)   SpO2 94%   BMI 40.15 kg/m     Constitutional: Alert, cooperative, and in no distress. Obese but moves well on own - has cane for help due to right hip pain and radiation from back .   ENT: Eyes bright, No mouth sores  Neck: Supple, No adenopathy.  Cardiac: Heart rate and rhythm is regular and strong without murmur  Respiratory: Breathing easy. Lung sounds clear to auscultation  Chest: note to have pea size hard area to lateral side of incision of the left breast mastectomy- non painful. Right chest has no issues.   GI: Abdomen is soft, non-tender, BS normal. No masses or organomegaly  MS: Muscle tone normal, extremities normal with no edema.   Skin: No rashes-has appearance of rash to chest but pt states she was at dermatologist and no concerns there but has been screened and treated " for skin cancer- seen in derm regularly.   Neuro: Sensory grossly WNL, gait normal.   Lymph: Normal ant/post cervical, axillary, supraclavicular nodes  Psych: Mentation appears normal and affect normal/bright and smiling    Laboratory Results: No results found for any visits on 01/02/25.   Latest Reference Range & Units 11/25/24 10:59   Sodium 135 - 145 mmol/L 139   Potassium 3.4 - 5.3 mmol/L 4.6   Chloride 98 - 107 mmol/L 106   Carbon Dioxide (CO2) 22 - 29 mmol/L 24   Urea Nitrogen 8.0 - 23.0 mg/dL 19.4   Creatinine 0.51 - 0.95 mg/dL 0.78   GFR Estimate >60 mL/min/1.73m2 73   Calcium 8.8 - 10.4 mg/dL  8.8 - 10.4 mg/dL 10.7 (H)  10.7 (H)   Anion Gap 7 - 15 mmol/L 9   Albumin 3.5 - 5.2 g/dL 4.3   Glucose 70 - 99 mg/dL 118 (H)   WBC 4.0 - 11.0 10e3/uL 10.9   Hemoglobin 11.7 - 15.7 g/dL 12.3   Hematocrit 35.0 - 47.0 % 37.8   Platelet Count 150 - 450 10e3/uL 303   RBC Count 3.80 - 5.20 10e6/uL 4.19   MCV 78 - 100 fL 90   MCH 26.5 - 33.0 pg 29.4   MCHC 31.5 - 36.5 g/dL 32.5   RDW 10.0 - 15.0 % 15.0   (H): Data is abnormally high      Assessment and Plan:   Left Breast Cancer-patient completed bilateral mastectomies and completed Oncotype diagnosis: 22 for low risk.  Pt began use of Arimidex but stopped shortly after starting -at least within 3 months due to hot flashes.  No new signs of recurrence of disease with review or exam  - though felt this morning that the left chest was bulkier than right- no new masses, tenderness or other signs of recurrent were noted.   - November labs reviewed to be within her normal   She will return in 6 months to see provider-  no labs/imaging necessary.    Elevated calcium level-being followed by endocrinology due to parathyroid hormone issue causing ongoing elevated calcium level.  Patient confirms she is not on oral calcium but states she drinks 2 gallons of milk weekly.    Bone health-most recent DEXA scan completed 12/1/2022 showing normal bone density.  She is not on calcium  supplement as above but encouraged regular walks short distances more often to help with weightbearing and prevention of osteoporosis..  No plans for DEXA since not on Aromatase inhibitor.     Obesity-the patient with this is a risk factor for breast cancer as well and highly recommend that she work with a diet low in fat and rich in fruits and vegetables. Pt s trying to exercise regularly with chair exercises. Did gain weight over holidays.     The total time of this encounter amounted to 40 minutes. This time included face-to-face time spent with the patient, prep work, ordering tests, and performing post visit documentation.  Linda Huertas,Cnp

## 2024-12-31 ENCOUNTER — THERAPY VISIT (OUTPATIENT)
Dept: PHYSICAL THERAPY | Facility: CLINIC | Age: 89
End: 2024-12-31
Payer: MEDICARE

## 2024-12-31 DIAGNOSIS — M25.511 CHRONIC RIGHT SHOULDER PAIN: ICD-10-CM

## 2024-12-31 DIAGNOSIS — G89.29 CHRONIC RIGHT SHOULDER PAIN: ICD-10-CM

## 2024-12-31 PROCEDURE — 97110 THERAPEUTIC EXERCISES: CPT | Mod: GP | Performed by: PHYSICAL THERAPIST

## 2025-01-02 ENCOUNTER — ONCOLOGY VISIT (OUTPATIENT)
Dept: ONCOLOGY | Facility: CLINIC | Age: 89
End: 2025-01-02
Attending: NURSE PRACTITIONER
Payer: MEDICARE

## 2025-01-02 VITALS
HEIGHT: 66 IN | SYSTOLIC BLOOD PRESSURE: 160 MMHG | HEART RATE: 60 BPM | DIASTOLIC BLOOD PRESSURE: 67 MMHG | RESPIRATION RATE: 20 BRPM | WEIGHT: 248 LBS | BODY MASS INDEX: 39.86 KG/M2 | OXYGEN SATURATION: 94 %

## 2025-01-02 DIAGNOSIS — M81.8 OTHER OSTEOPOROSIS WITHOUT CURRENT PATHOLOGICAL FRACTURE: ICD-10-CM

## 2025-01-02 DIAGNOSIS — Z90.13 STATUS POST MASTECTOMY, BILATERAL: ICD-10-CM

## 2025-01-02 DIAGNOSIS — E66.01 MORBID OBESITY (H): ICD-10-CM

## 2025-01-02 DIAGNOSIS — E83.52 HYPERCALCEMIA: ICD-10-CM

## 2025-01-02 DIAGNOSIS — C50.412 MALIGNANT NEOPLASM OF UPPER-OUTER QUADRANT OF LEFT BREAST IN FEMALE, ESTROGEN RECEPTOR POSITIVE (H): ICD-10-CM

## 2025-01-02 DIAGNOSIS — Z17.0 MALIGNANT NEOPLASM OF UPPER-OUTER QUADRANT OF LEFT BREAST IN FEMALE, ESTROGEN RECEPTOR POSITIVE (H): ICD-10-CM

## 2025-01-02 PROCEDURE — G0463 HOSPITAL OUTPT CLINIC VISIT: HCPCS | Performed by: NURSE PRACTITIONER

## 2025-01-02 ASSESSMENT — PAIN SCALES - GENERAL: PAINLEVEL_OUTOF10: NO PAIN (0)

## 2025-01-02 NOTE — NURSING NOTE
"Oncology Rooming Note    January 2, 2025 10:40 AM   Lisa Ferguson is a 89 year old female who presents for:    Chief Complaint   Patient presents with    Oncology Clinic Visit     Follow up     Initial Vitals: BP (!) 160/67 (BP Location: Right arm, Patient Position: Chair, Cuff Size: Adult Large)   Pulse 60   Resp 20   Ht 1.674 m (5' 5.9\")   Wt 112.5 kg (248 lb)   SpO2 94%   BMI 40.15 kg/m   Estimated body mass index is 40.15 kg/m  as calculated from the following:    Height as of this encounter: 1.674 m (5' 5.9\").    Weight as of this encounter: 112.5 kg (248 lb). Body surface area is 2.29 meters squared.  No Pain (0) Comment: Data Unavailable   No LMP recorded. Patient is postmenopausal.  Allergies reviewed: Yes  Medications reviewed: Yes    Medications: Medication refills not needed today.  Pharmacy name entered into [facility]: [id number] - [place], [place] - [address] [place] [address] AT [address] [facility]    Frailty Screening:   Is the patient here for a new oncology consult visit in cancer care? 2. No      Clinical concerns: Yes       Shira Hutton CMA              "

## 2025-01-02 NOTE — LETTER
1/2/2025      Lisa Ferguson  [address]  [place] [address]      Dear Colleague,    Thank you for referring your patient, Lisa Ferguson, to the [facility]. Please see a copy of my visit note below.    Oncology Follow Up Visit: January 2, 2025    Oncologist: Dr Tamar Georges  PCP: Ziyad Isaac    Diagnosis: Left Breast Cancer  Lisa Ferguson is an 88 yo female who self palpated abnormality in left breast in November 2020.  11/16/2020 diagnostic mammogram and ultrasound were done which found 2 lesions within the left breast 1 measuring approximately 1.5 cm and the second 1.9 cm.  Both were biopsied the one at 3:00 was a grade 2 invasive lobular cancer.  The one at 11:00 was a grade 2 invasive ductal cancer.  It was estrogen receptor positive progesterone receptor positive HER-2/kena indeterminate by IHC and negative by ALBERTO  Genetic testing negative for findings  Oncotype DX= 22-considered low risk  Treatment:   1/6/2021 patient underwent bilateral mastectomies.  Right mastectomy was unremarkable.  Left mastectomy found a multifocal breast cancer the first measuring 4.5 cm is a grade 2 invasive lobular carcinoma, the second was a 2.5 cm grade 2 malignancy.  2 sentinel lymph nodes were removed 1 of which was negative the other 1 had immunohistochemistry positive only cells for a T [id number] ER/AK positive HER-2/kena negative disease  2/2021 Arimidex x 3 months- quit due to person decision with hot flashes.    Interval History:Ms. Ferguson is see in today for follow for her breast cancer and mastectomy   Left chest with some bulkiness she just noted this morning while looking in the mirror.  Has also had some pain to right shoulder that is limiting motion though is trying to keep active with daily seated exercises that does include the shoulder. No other pains noted to chest.    Right Hip affecting knee- knee has been replaced before. uses a cane to get around -has not had  any falls.  Denies any recent falls .    Admits to eating well but trying to limit weight gain.   Rest of comprehensive and complete ROS is reviewed and is negative.   Past Medical History:   Diagnosis Date     Anxiety      CAD (coronary artery disease)     angio 2002, h/o cabg, sees Luisana Nelson NP, they follow the cholesterol     CHF (congestive heart failure) (H) 07/08/2014     Closed compression fracture of L2 vertebra (H)      Degenerative joint disease of right hip      DJD (degenerative joint disease)      History of colonoscopy 01/01/2000    due jan 2010     Hyperlipidemia LDL goal < 70     sees [facility] lipid clinic     Hypertension goal BP (blood pressure) < 140/90      Hypothyroidism      IBS (irritable bowel syndrome)      Lumbar spinal stenosis      Mitral regurgitation      Obesity      GLEN (obstructive sleep apnea) 07/11/2011     PUD (peptic ulcer disease)     h/o duodenal ulcer     RBBB (right bundle branch block)      Sciatica neuralgia november 209    MRI shows spinal stenosis and a cyst on the spine, has received an epidural steroid injection     Current Outpatient Medications   Medication Sig Dispense Refill     acetaminophen (TYLENOL) 650 MG CR tablet Take 1,300 mg by mouth 2 times daily as needed for pain       allopurinol (ZYLOPRIM) 100 MG tablet Take 1 tablet (100 mg) by mouth daily TAKE 1 TABLET BY MOUTH DAILY ALONG WITH 300MG TABLET FOR TOTAL DAILY DOSE OF 400MG 90 tablet 3     allopurinol (ZYLOPRIM) 300 MG tablet TAKE 1 TABLET BY MOUTH DAILY ALONG WITH 100MG TABLET FOR TOTAL DAILY DOSE OF 400MG 90 tablet 3     aspirin 81 MG EC tablet Take 81 mg by mouth every morning       betamethasone dipropionate (DIPROSONE) 0.05 % external lotion Apply topically daily as needed (seborrhea) 60 mL 0     clobetasol (TEMOVATE) 0.05 % external solution Apply topically 2 times daily As needed for scalp       levothyroxine (SYNTHROID/LEVOTHROID) 125 MCG tablet Take 1 tablet (125 mcg) by mouth every morning 90  "tablet 3     losartan (COZAAR) 50 MG tablet Take 50 mg by mouth daily       metoprolol tartrate (LOPRESSOR) 25 MG tablet Take 2 tablets (50 mg) by mouth every morning and 1 tablet (25 mg) every evening       Multiple Vitamins-Minerals (HAIR SKIN AND NAILS FORMULA PO) Take 2 each by mouth daily       order for DME Equipment being ordered: CPAP 1 each 0     rosuvastatin (CRESTOR) 5 MG tablet Take 1 tablet (5 mg) by mouth every other day 45 tablet 3     sertraline (ZOLOFT) 25 MG tablet Take 0.5 tablets (12.5 mg) by mouth daily 45 tablet 1     SODIUM FLUORIDE 5000 PPM 1.1 % GEL topical gel BRUSH ON FOR 2-3 MINUTES AND SPIT OUT EXCESS 2 TIMES DAILY       torsemide (DEMADEX) 20 MG tablet Take 1 tablet by mouth daily       vitamin D3 (CHOLECALCIFEROL) 50 mcg (2000 units) tablet Take 1 tablet by mouth daily       No current facility-administered medications for this visit.     Allergies   Allergen Reactions     Adhesive Tape      Atorvastatin Other (See Comments) and Muscle Pain (Myalgia)     lipitor  Myalgia     Buspar [Buspirone]      Nickel Other (See Comments)     Raw weepy skin  rash     Vicodin [Hydrocodone-Acetaminophen] Other (See Comments)     Hallucinations     Liquid Adhesive      Other reaction(s): Contact Dermatitis   - pt is former nurse.     Physical Exam:BP (!) 160/67 (BP Location: Right arm, Patient Position: Chair, Cuff Size: Adult Large)   Pulse 60   Resp 20   Ht 1.674 m (5' 5.9\")   Wt 112.5 kg (248 lb)   SpO2 94%   BMI 40.15 kg/m     Constitutional: Alert, cooperative, and in no distress. Obese but moves well on own - has cane for help due to right hip pain and radiation from back .   ENT: Eyes bright, No mouth sores  Neck: Supple, No adenopathy.  Cardiac: Heart rate and rhythm is regular and strong without murmur  Respiratory: Breathing easy. Lung sounds clear to auscultation  Chest: note to have pea size hard area to lateral side of incision of the left breast mastectomy- non painful. Right chest " has no issues.   GI: Abdomen is soft, non-tender, BS normal. No masses or organomegaly  MS: Muscle tone normal, extremities normal with no edema.   Skin: No rashes-has appearance of rash to chest but pt states she was at dermatologist and no concerns there but has been screened and treated for skin cancer- seen in derm regularly.   Neuro: Sensory grossly WNL, gait normal.   Lymph: Normal ant/post cervical, axillary, supraclavicular nodes  Psych: Mentation appears normal and affect normal/bright and smiling    Laboratory Results: No results found for any visits on 01/02/25.   Latest Reference Range & Units 11/25/24 10:59   Sodium 135 - 145 mmol/L 139   Potassium 3.4 - 5.3 mmol/L 4.6   Chloride 98 - 107 mmol/L 106   Carbon Dioxide (CO2) 22 - 29 mmol/L 24   Urea Nitrogen 8.0 - 23.0 mg/dL 19.4   Creatinine 0.51 - 0.95 mg/dL 0.78   GFR Estimate >60 mL/min/1.73m2 73   Calcium 8.8 - 10.4 mg/dL  8.8 - 10.4 mg/dL 10.7 (H)  10.7 (H)   Anion Gap 7 - 15 mmol/L 9   Albumin 3.5 - 5.2 g/dL 4.3   Glucose 70 - 99 mg/dL 118 (H)   WBC 4.0 - 11.0 10e3/uL 10.9   Hemoglobin 11.7 - 15.7 g/dL 12.3   Hematocrit 35.0 - 47.0 % 37.8   Platelet Count 150 - 450 10e3/uL 303   RBC Count 3.80 - 5.20 10e6/uL 4.19   MCV 78 - 100 fL 90   MCH 26.5 - 33.0 pg 29.4   MCHC 31.5 - 36.5 g/dL 32.5   RDW 10.0 - 15.0 % 15.0   (H): Data is abnormally high      Assessment and Plan:   Left Breast Cancer-patient completed bilateral mastectomies and completed Oncotype diagnosis: 22 for low risk.  Pt began use of Arimidex but stopped shortly after starting -at least within 3 months due to hot flashes.  No new signs of recurrence of disease with review or exam  - though felt this morning that the left chest was bulkier than right- no new masses, tenderness or other signs of recurrent were noted.   - November labs reviewed to be within her normal   She will return in 6 months to see provider-  no labs/imaging necessary.    Elevated calcium level-being followed by  endocrinology due to parathyroid hormone issue causing ongoing elevated calcium level.  Patient confirms she is not on oral calcium but states she drinks 2 gallons of milk weekly.    Bone health-most recent DEXA scan completed 12/1/2022 showing normal bone density.  She is not on calcium supplement as above but encouraged regular walks short distances more often to help with weightbearing and prevention of osteoporosis..  No plans for DEXA since not on Aromatase inhibitor.     Obesity-the patient with this is a risk factor for breast cancer as well and highly recommend that she work with a diet low in fat and rich in fruits and vegetables. Pt s trying to exercise regularly with chair exercises. Did gain weight over holidays.     The total time of this encounter amounted to 40 minutes. This time included face-to-face time spent with the patient, prep work, ordering tests, and performing post visit documentation.  Linda Huertas Cnp      Again, thank you for allowing me to participate in the care of your patient.        Sincerely,        Linda Huertas, BHUMIKA, APRN CNP    Electronically signed

## 2025-01-13 ENCOUNTER — TELEPHONE (OUTPATIENT)
Dept: INTERNAL MEDICINE | Facility: CLINIC | Age: 89
End: 2025-01-13
Payer: MEDICARE

## 2025-01-13 ENCOUNTER — MEDICAL CORRESPONDENCE (OUTPATIENT)
Dept: HEALTH INFORMATION MANAGEMENT | Facility: CLINIC | Age: 89
End: 2025-01-13
Payer: MEDICARE

## 2025-01-13 NOTE — TELEPHONE ENCOUNTER
Forms/Letter Request    Type of form/letter: OTHER:      Do we have the form/letter: Yes:     Who is the form from? [employer]     Where did/will the form come from? form was faxed in    When is form/letter needed by: ?    How would you like the form/letter returned: Fax : [phone]    Hanane Tobar,

## 2025-01-13 NOTE — TELEPHONE ENCOUNTER
Physician's Order from [facility] for Cpap machine/supplies received and given to Dr. Isaac to sign.  Hanane Tobar,

## 2025-01-29 ENCOUNTER — THERAPY VISIT (OUTPATIENT)
Dept: PHYSICAL THERAPY | Facility: CLINIC | Age: 89
End: 2025-01-29
Payer: MEDICARE

## 2025-01-29 ENCOUNTER — VIRTUAL VISIT (OUTPATIENT)
Dept: PHARMACY | Facility: CLINIC | Age: 89
End: 2025-01-29
Payer: COMMERCIAL

## 2025-01-29 DIAGNOSIS — Z96.653 S/P TOTAL KNEE REPLACEMENT, BILATERAL: ICD-10-CM

## 2025-01-29 DIAGNOSIS — F41.9 ANXIETY: Primary | ICD-10-CM

## 2025-01-29 DIAGNOSIS — M25.511 CHRONIC RIGHT SHOULDER PAIN: Primary | ICD-10-CM

## 2025-01-29 DIAGNOSIS — I25.708 ATHEROSCLEROSIS OF CORONARY ARTERY BYPASS GRAFT(S), UNSPECIFIED, WITH OTHER FORMS OF ANGINA PECTORIS: ICD-10-CM

## 2025-01-29 DIAGNOSIS — N25.81 SECONDARY HYPERPARATHYROIDISM OF RENAL ORIGIN: ICD-10-CM

## 2025-01-29 DIAGNOSIS — M10.071 ACUTE IDIOPATHIC GOUT OF RIGHT FOOT: ICD-10-CM

## 2025-01-29 DIAGNOSIS — E03.9 ACQUIRED HYPOTHYROIDISM: ICD-10-CM

## 2025-01-29 DIAGNOSIS — I10 ESSENTIAL HYPERTENSION WITH GOAL BLOOD PRESSURE LESS THAN 130/80: ICD-10-CM

## 2025-01-29 DIAGNOSIS — M25.551 HIP PAIN, RIGHT: ICD-10-CM

## 2025-01-29 DIAGNOSIS — Z78.9 TAKES DIETARY SUPPLEMENTS: ICD-10-CM

## 2025-01-29 DIAGNOSIS — E78.5 HYPERLIPIDEMIA WITH TARGET LDL LESS THAN 70: ICD-10-CM

## 2025-01-29 DIAGNOSIS — C50.412 MALIGNANT NEOPLASM OF UPPER-OUTER QUADRANT OF LEFT BREAST IN FEMALE, ESTROGEN RECEPTOR POSITIVE (H): ICD-10-CM

## 2025-01-29 DIAGNOSIS — Z17.0 MALIGNANT NEOPLASM OF UPPER-OUTER QUADRANT OF LEFT BREAST IN FEMALE, ESTROGEN RECEPTOR POSITIVE (H): ICD-10-CM

## 2025-01-29 DIAGNOSIS — G89.29 CHRONIC RIGHT SHOULDER PAIN: Primary | ICD-10-CM

## 2025-01-29 DIAGNOSIS — M19.90 DJD (DEGENERATIVE JOINT DISEASE): ICD-10-CM

## 2025-01-29 PROCEDURE — 97110 THERAPEUTIC EXERCISES: CPT | Mod: GP | Performed by: PHYSICAL THERAPIST

## 2025-01-29 RX ORDER — KETOCONAZOLE 20 MG/ML
SHAMPOO, SUSPENSION TOPICAL DAILY PRN
COMMUNITY

## 2025-01-29 RX ORDER — AMOXICILLIN 500 MG/1
CAPSULE ORAL
COMMUNITY
Start: 2024-04-20

## 2025-01-29 NOTE — Clinical Note
EVERTON GAMBOA note, thanks!  Cyndie Breaux, PharmD Medication Therapy Management Pharmacist [phone]

## 2025-01-29 NOTE — LETTER
_  Medication List        Prepared on: Jan 29, 2025     Bring your Medication List when you go to the doctor, hospital, or   emergency room. And, share it with your family or caregivers.     Note any changes to how you take your medications.  Cross out medications when you no longer use them.    Medication How I take it Why I use it Prescriber   acetaminophen (TYLENOL) 650 MG CR tablet Take 1,300 mg by mouth 2 times daily as needed for pain  pain Patient Reported   allopurinol (ZYLOPRIM) 100 MG tablet Take 1 tablet (100 mg) by mouth daily TAKE 1 TABLET BY MOUTH DAILY ALONG WITH 300MG TABLET FOR TOTAL DAILY DOSE OF 400MG Acute idiopathic gout of right foot Ziyad Isaac MD   allopurinol (ZYLOPRIM) 300 MG tablet TAKE 1 TABLET BY MOUTH DAILY ALONG WITH 100MG TABLET FOR TOTAL DAILY DOSE OF 400MG Acute idiopathic gout of right foot Ziyad Isaac MD   amoxicillin (AMOXIL) 500 MG capsule TAKE 4 CAPSULES (2,000 MG) BY MOUTH ONCE FOR 1 DOSE 1 HOUR PRIOR TO DENTAL PROCEDURE  Dental work Patient Reported   aspirin 81 MG EC tablet Take 81 mg by mouth every morning  General Health   Patient Reported   betamethasone dipropionate (DIPROSONE) 0.05 % external lotion Apply topically daily as needed (seborrhea) Seborrhea Virginia Villagomez MD   Carboxymethylcellulose Sodium (ARTIFICIAL TEARS OP) Apply to eye as needed.  General Health   Patient Reported   clobetasol (TEMOVATE) 0.05 % external solution Apply topically 2 times daily As needed for scalp  itching Patient Reported   ketoconazole (NIZORAL) 2 % external shampoo Apply topically daily as needed for itching or irritation.  itching Patient Reported   levothyroxine (SYNTHROID/LEVOTHROID) 125 MCG tablet Take 1 tablet (125 mcg) by mouth every morning Acquired Hypothyroidism Ziyad Isaac MD   losartan (COZAAR) 50 MG tablet Take 50 mg by mouth daily  Heart health Patient Reported   metoprolol tartrate (LOPRESSOR) 25 MG tablet Take 2 tablets (50 mg) by mouth every morning and 1  tablet (25 mg) every evening  Heart health Patient Reported   Multiple Vitamins-Minerals (HAIR SKIN AND NAILS FORMULA PO) Take 2 each by mouth daily  General Health   Patient Reported   order for DME Equipment being ordered: CPAP GLEN (Obstructive Sleep Apnea) Ziyad Isaac MD   rosuvastatin (CRESTOR) 5 MG tablet Take 1 tablet (5 mg) by mouth every other day Hyperlipidemia with Target LDL Less than 70 Ziyad Isaac MD   sertraline (ZOLOFT) 25 MG tablet Take 0.5 tablets (12.5 mg) by mouth daily Anxiety Ziyad Isaac MD   SODIUM FLUORIDE 5000 PPM 1.1 % GEL topical gel BRUSH ON FOR 2-3 MINUTES AND SPIT OUT EXCESS 2 TIMES DAILY  General Health   Patient Reported   torsemide (DEMADEX) 20 MG tablet Take 1 tablet by mouth daily  Heart health Patient Reported   vitamin D3 (CHOLECALCIFEROL) 50 mcg (2000 units) tablet Take 1 tablet by mouth daily  General Health   Patient Reported         Add new medications, over-the-counter drugs, herbals, vitamins, or  minerals in the blank rows below.    Medication How I take it Why I use it Prescriber                                      Allergies:      - Adhesive Tape  - Atorvastatin - Other (See Comments), Muscle Pain (Myalgia)  - Buspar [buspirone]  - Nickel - Other (See Comments)  - Vicodin [hydrocodone-acetaminophen] - Other (See Comments)  - Liquid Adhesive        Side effects I have had:      Not on File        Other Information:              My notes and questions:

## 2025-01-29 NOTE — PROGRESS NOTES
Medication Therapy Management (MTM) Encounter    ASSESSMENT:                            Medication Adherence/Access: No issues identified.    Anxiety:  Unclear if tremor is worsened at higher doses of sertraline. Stable on lower dose of sertraline, okay to maintain current therapy.    If needed in future could also consider duloxetine or venlafaxine for added pain benefit. Would avoid bupropion for anxiety as this can be more activating.  She declines any changes today.    Hyperparathyroidism: Stable.    Hip pain:  Stable.    Hypertension/CAD: Stable.    Hyperlipidemia Stable.    Hypothyroidism  Discussed interaction of Biotin with thyroid labs.     Gout: Stable.    Itching:  Plan in place with dermatology.    Supplements:  Stable.    Teeth:  Stable.    Dental prophylaxis:  Discussed amoxicillin no longer needed, has already reviewed with dentist who declines discontinuing.    PLAN:                            Stop Biotin supplement three days before thyroid labs.    Follow-up: Return in about 6 months (around 7/29/2025) for Medication Therapy Management.    SUBJECTIVE/OBJECTIVE:                          Lisa Ferguson is a 89 year old female seen for a follow-up visit.       Reason for visit: med review.    Allergies/ADRs: Reviewed in chart  Past Medical History: Reviewed in chart  Tobacco: She reports that she quit smoking about 46 years ago. Her smoking use included cigarettes. She started smoking about 66 years ago. She has never used smokeless tobacco.  Alcohol: Less than 1 beverage / month    Medication Adherence/Access:   Patient uses pill box(es).  Per patient, misses medication 0 times per week.    The patient fills medications at [facility]: NO, fills medications at [facility].    Anxiety:    Sertraline 12.5 mg daily    A little more hand shaking, right hand is a little steadier. She notes when she's not thinking about anything she'll have some slight mouth movement. The sertraline is working so well for her though,  helps her anxiety and so helpful for her sleep, she wants to remain on it.   A neurologist years ago initially recognized her essential tremor, she hasn't continued to follow with neurology.   Medication History:  Citalopram worsened tremors in the past, but she's not sure if she can attribute this to the medication. She also has a family history of essential tremor and would like to avoid anything that could contribute to that. Buspirone was terrible (she can't recall exactly, but whatever it was was terrible).     Hyperparathyroidism:   Vitamin D 2000 international unit(s) daily     She is drinking 24 oz of milk a day .   Following with endocrinology, Dr. Nettles, who recommended 1000 mg of calcium/day.    Hip pain:    Acetaminophen 650-1300 mg twice daily as needed     States this/these are effective. Denies side effects.     Hypertension/CAD:  Losartan 50 mg daily   Metoprolol tartrate 50 mg every morning and 25 mg every evening  Torsemide 20 mg daily  Aspirin 81 mg daily    Follows with cardiology.  She's had triple bypass in the past. History of stomach bleed related to H pylori in the distant past.  She has a blood pressure cuff at home, reports running better: 126/55, 116/54, 64, 138/53. Patient reports the following medication side effects: increased urination with torsemide.     Hyperlipidemia   Rosuvastatin 5 mg every other day    Patient reports no significant myalgias or other side effects.  Medication History:  Myalgias with atorvastatin    Hypothyroidism    Levothyroxine 125 mcg daily every morning before medications    Patient is having the following symptoms: none.      Gout:   allopurinol 400 mg daily     Patient reports no current pain concerns. Patient is experiencing the following medication side effects: none.     Itching:    Clobetasol 0.05% solution for back of scalp as needed (no recent use)  Betamethasone 0.05% lotion as needed  Ketoconazole 2% solution as needed     She's completed  treatment with fluorouracil cream for skin cancer and has follow-up planned with dermatology.     States this/these are effective. Denies side effects.     Supplements:    Hair skin and nails with biotin, Nature's Bounty for her nails, 2 daily  Dry eye drops as needed    States this/these are effective. Denies side effects.     Teeth:    Sodium fluoride toothpaste once daily      States this/these are effective. Denies side effects.     Dental prophylaxis:    Amoxicillin before dental work    Due to history of knee replacement, she's discussed not needing this anymore with her dentist and he disagrees, prefers she take it. Denies side effects.      Today's Vitals: There were no vitals taken for this visit.  ----------------      I spent 31 minutes with this patient today. All changes were made via collaborative practice agreement with Ziyad Isaac MD.     A summary of these recommendations was sent via [employer].    Cyndie Breaux, LakeishaD  Medication Therapy Management Pharmacist      Telemedicine Visit Details  The patient's medications can be safely assessed via a telemedicine encounter.  Type of service:  Telephone visit  Originating Location (pt. Location): Home    Distant Location (provider location):  On-site  Start Time: 8:15 AM  End Time: 8:46 AM     Medication Therapy Recommendations  No medication therapy recommendations to display

## 2025-01-29 NOTE — PATIENT INSTRUCTIONS
"Recommendations from today's MTM visit:                                                         Stop Biotin supplement three days before thyroid labs.    Follow-up: Return in about 6 months (around 7/29/2025) for Medication Therapy Management.    It was great speaking with you today.  I value your experience and would be very thankful for your time in providing feedback in our clinic survey. In the next few days, you may receive an email or text message from [employer] with a link to a survey related to your  clinical pharmacist.\"     To schedule another MTM appointment, please call the clinic directly or you may call the MTM scheduling line at [phone] or toll-free at [phone].     My Clinical Pharmacist's contact information:                                                      Please feel free to contact me with any questions or concerns you have.      Cyndie Breaux, PharmD  Medication Therapy Management Pharmacist     "

## 2025-01-29 NOTE — LETTER
"Recommended To-Do List      Prepared on: Jan 29, 2025       You can get the best results from your medications by completing the items on this \"To-Do List.\"      Bring your To-Do List when you go to your doctor. And, share it with your family or caregivers.    My To-Do List:  What we talked about: What I should do:    What my medicines are for, how to know if my medicines are working, made sure my medicines are safe for me and reviewed how to take my medicines.      Take my medicines every day                "

## 2025-01-29 NOTE — LETTER
January 29, 2025  Lisa Ferguson  [address]  [place] [address]    Dear Ms. Ferguson, [facility]     Thank you for talking with me on Jan 29, 2025 about your health and medications. As a follow-up to our conversation, I have included two documents:      Your Recommended To-Do List has steps you should take to get the best results from your medications.  Your Medication List will help you keep track of your medications and how to take them.    If you want to talk about these documents, please call Vicky Breaux RPH at phone: [phone], Monday-Friday 8-4:30pm.    I look forward to working with you and your doctors to make sure your medications work well for you.    Sincerely,  Vicky Breaux RPH  [facility] Pharmacist, [facility]

## 2025-02-05 ENCOUNTER — APPOINTMENT (OUTPATIENT)
Dept: URBAN - METROPOLITAN AREA CLINIC 252 | Age: 89
Setting detail: DERMATOLOGY
End: 2025-02-06

## 2025-02-05 DIAGNOSIS — L57.0 ACTINIC KERATOSIS: ICD-10-CM

## 2025-02-05 DIAGNOSIS — L57.8 OTHER SKIN CHANGES DUE TO CHRONIC EXPOSURE TO NONIONIZING RADIATION: ICD-10-CM

## 2025-02-05 DIAGNOSIS — L81.0 POSTINFLAMMATORY HYPERPIGMENTATION: ICD-10-CM

## 2025-02-05 PROCEDURE — 99214 OFFICE O/P EST MOD 30 MIN: CPT | Mod: 25

## 2025-02-05 PROCEDURE — 17003 DESTRUCT PREMALG LES 2-14: CPT

## 2025-02-05 PROCEDURE — OTHER COUNSELING: OTHER

## 2025-02-05 PROCEDURE — OTHER ADDITIONAL NOTES: OTHER

## 2025-02-05 PROCEDURE — OTHER LIQUID NITROGEN: OTHER

## 2025-02-05 PROCEDURE — OTHER PRESCRIPTION: OTHER

## 2025-02-05 PROCEDURE — OTHER MIPS QUALITY: OTHER

## 2025-02-05 PROCEDURE — 17000 DESTRUCT PREMALG LESION: CPT

## 2025-02-05 RX ORDER — FLUOROURACIL 5 MG/G
5% CREAM TOPICAL BID
Qty: 40 | Refills: 0 | Status: ERX

## 2025-02-05 ASSESSMENT — LOCATION DETAILED DESCRIPTION DERM
LOCATION DETAILED: LEFT CLAVICULAR SKIN
LOCATION DETAILED: LEFT PROXIMAL DORSAL FOREARM
LOCATION DETAILED: LEFT CENTRAL MALAR CHEEK
LOCATION DETAILED: RIGHT PROXIMAL DORSAL FOREARM
LOCATION DETAILED: LEFT MEDIAL FOREHEAD
LOCATION DETAILED: RIGHT CENTRAL ZYGOMA
LOCATION DETAILED: RIGHT SUPERIOR LATERAL BUCCAL CHEEK
LOCATION DETAILED: RIGHT SUPERIOR TEMPLE
LOCATION DETAILED: LEFT DISTAL ULNAR DORSAL FOREARM
LOCATION DETAILED: LEFT SUPERIOR CENTRAL MALAR CHEEK
LOCATION DETAILED: UPPER STERNUM
LOCATION DETAILED: STERNAL NOTCH

## 2025-02-05 ASSESSMENT — LOCATION SIMPLE DESCRIPTION DERM
LOCATION SIMPLE: RIGHT FOREARM
LOCATION SIMPLE: RIGHT TEMPLE
LOCATION SIMPLE: RIGHT ZYGOMA
LOCATION SIMPLE: LEFT CLAVICULAR SKIN
LOCATION SIMPLE: LEFT FOREHEAD
LOCATION SIMPLE: LEFT CHEEK
LOCATION SIMPLE: LEFT FOREARM
LOCATION SIMPLE: RIGHT CHEEK
LOCATION SIMPLE: CHEST

## 2025-02-05 ASSESSMENT — LOCATION ZONE DERM
LOCATION ZONE: ARM
LOCATION ZONE: FACE
LOCATION ZONE: TRUNK

## 2025-02-05 NOTE — PROCEDURE: ADDITIONAL NOTES
Detail Level: Simple
Additional Notes: - Recommended PDT in the near future. Pt expressed understanding, however would like to defer at this time. Handout provided for pt to call insurance for coverage. \\n- Start Fluorouracil bid to affected areas on the chest for up to 2 weeks.
Render Risk Assessment In Note?: no

## 2025-03-11 ENCOUNTER — TRANSFERRED RECORDS (OUTPATIENT)
Dept: HEALTH INFORMATION MANAGEMENT | Facility: CLINIC | Age: 89
End: 2025-03-11
Payer: MEDICARE

## 2025-04-22 ENCOUNTER — MYC MEDICAL ADVICE (OUTPATIENT)
Dept: INTERNAL MEDICINE | Facility: CLINIC | Age: 89
End: 2025-04-22
Payer: MEDICARE

## 2025-04-22 DIAGNOSIS — Z17.0 MALIGNANT NEOPLASM OF UPPER-OUTER QUADRANT OF LEFT BREAST IN FEMALE, ESTROGEN RECEPTOR POSITIVE (H): Primary | ICD-10-CM

## 2025-04-22 DIAGNOSIS — Z90.13 STATUS POST MASTECTOMY, BILATERAL: ICD-10-CM

## 2025-04-22 DIAGNOSIS — C50.412 MALIGNANT NEOPLASM OF UPPER-OUTER QUADRANT OF LEFT BREAST IN FEMALE, ESTROGEN RECEPTOR POSITIVE (H): Primary | ICD-10-CM

## 2025-04-22 NOTE — TELEPHONE ENCOUNTER
Patient Returning Call    Reason for call:  Patient would like a call back from her provider or a nurse as the pain to her tailbone has not lessened since last Thursday when she fell. She is wondering if there is anything more she should be doing other than heat packs and cold packs and taking 2 tylenol every 8 hours. Please advise.     Information relayed to patient:  message placed, await call back     Patient has additional questions:  No      Could we send this information to you in [employer] or would you prefer to receive a phone call?:   Patient would prefer a phone call   Okay to leave a detailed message?: Yes at Cell number on file:    Telephone Information:   Mobile [phone]

## 2025-04-23 ENCOUNTER — TELEPHONE (OUTPATIENT)
Dept: ONCOLOGY | Facility: CLINIC | Age: 89
End: 2025-04-23
Payer: MEDICARE

## 2025-04-23 NOTE — TELEPHONE ENCOUNTER
Clinical documents and DME prescription signed by LINH faxed to [facility] at [phone].     Carol Elias LPN on 4/23/2025 at 9:22 AM        ----- Message from Linda Huertas NP sent at 4/22/2025  7:33 AM CDT -----  Regarding: send info please.  Information to be sent is on desk - thanks for your help.   Never saw a printed request from [facility]- do not know where the requests went.   Linda  ----- Message -----  From: Bev Wahl  Sent: 4/18/2025   1:47 PM CDT  To: MARY Zaldivar CNP     [facility] called. She needs chart notes and a new prescription. They sent the prescription form and it's in your folder from 4/10. Can you fill this out and get it back to them ASAP?? She said they have faxed it 4x's    Thanks,   Bev

## 2025-05-08 ENCOUNTER — PATIENT OUTREACH (OUTPATIENT)
Dept: CARE COORDINATION | Facility: CLINIC | Age: 89
End: 2025-05-08
Payer: MEDICARE

## 2025-05-19 ENCOUNTER — THERAPY VISIT (OUTPATIENT)
Dept: PHYSICAL THERAPY | Facility: CLINIC | Age: 89
End: 2025-05-19
Payer: MEDICARE

## 2025-05-19 ENCOUNTER — MEDICAL CORRESPONDENCE (OUTPATIENT)
Dept: HEALTH INFORMATION MANAGEMENT | Facility: CLINIC | Age: 89
End: 2025-05-19

## 2025-05-19 ENCOUNTER — LAB (OUTPATIENT)
Dept: LAB | Facility: CLINIC | Age: 89
End: 2025-05-19
Payer: MEDICARE

## 2025-05-19 DIAGNOSIS — M10.071 ACUTE IDIOPATHIC GOUT OF RIGHT FOOT: ICD-10-CM

## 2025-05-19 DIAGNOSIS — M19.011 PRIMARY OSTEOARTHRITIS OF RIGHT SHOULDER: ICD-10-CM

## 2025-05-19 DIAGNOSIS — I25.708 ATHEROSCLEROSIS OF CORONARY ARTERY BYPASS GRAFT(S), UNSPECIFIED, WITH OTHER FORMS OF ANGINA PECTORIS: ICD-10-CM

## 2025-05-19 DIAGNOSIS — E78.5 HYPERLIPIDEMIA WITH TARGET LDL LESS THAN 70: ICD-10-CM

## 2025-05-19 DIAGNOSIS — G89.29 CHRONIC RIGHT SHOULDER PAIN: Primary | ICD-10-CM

## 2025-05-19 DIAGNOSIS — M25.511 CHRONIC RIGHT SHOULDER PAIN: Primary | ICD-10-CM

## 2025-05-19 DIAGNOSIS — I10 ESSENTIAL HYPERTENSION WITH GOAL BLOOD PRESSURE LESS THAN 130/80: ICD-10-CM

## 2025-05-19 PROCEDURE — 97110 THERAPEUTIC EXERCISES: CPT | Mod: GP | Performed by: PHYSICAL THERAPIST

## 2025-05-19 PROCEDURE — 80061 LIPID PANEL: CPT

## 2025-05-19 PROCEDURE — 80048 BASIC METABOLIC PNL TOTAL CA: CPT

## 2025-05-19 PROCEDURE — 36415 COLL VENOUS BLD VENIPUNCTURE: CPT

## 2025-05-19 PROCEDURE — 97164 PT RE-EVAL EST PLAN CARE: CPT | Mod: GP | Performed by: PHYSICAL THERAPIST

## 2025-05-19 PROCEDURE — 84460 ALANINE AMINO (ALT) (SGPT): CPT

## 2025-05-19 PROCEDURE — 84550 ASSAY OF BLOOD/URIC ACID: CPT

## 2025-05-19 ASSESSMENT — ACTIVITIES OF DAILY LIVING (ADL)
YOUR_USUAL_HOBBIES,_RECREATIONAL_OR_SPORTING_ACTIVITIES: A LITTLE BIT OF DIFFICULTY
PERFORMING_HEAVY_ACTIVITIES_AROUND_YOUR_HOME: QUITE A BIT OF DIFFICULTY
GETTING_INTO_OR_OUT_OF_A_CAR: A LITTLE BIT OF DIFFICULTY
ROLLING_OVER_IN_BED: A LITTLE BIT OF DIFFICULTY
STANDING_FOR_1_HOUR: QUITE A BIT OF DIFFICULTY
SHOPPING: EXTREME DIFFICULTY OR UNABLE TO PERFORM ACTIVITY
LIFTING_AN_OBJECT,_LIKE_A_BAG_OF_GROCERIES_FROM_THE_FLOOR: MODERATE DIFFICULTY
MAKING_SHARP_TURNS_WHILE_RUNNING_FAST: EXTREME DIFFICULTY OR UNABLE TO PERFORM ACTIVITY
WALKING_BETWEEN_ROOMS: MODERATE DIFFICULTY
WALKING_2_BLOCKS: QUITE A BIT OF DIFFICULTY
LEFS_SCORE(%): 0
WALKING_A_MILE: EXTREME DIFFICULTY OR UNABLE TO PERFORM ACTIVITY
PERFORMING_LIGHT_ACTIVITIES_AROUND_YOUR_HOME: MODERATE DIFFICULTY
ANY_OF_YOUR_USUAL_WORK,_HOUSEWORK_OR_SCHOOL_ACTIVITIES: QUITE A BIT OF DIFFICULTY
PUTTING_ON_YOUR_SHOES_OR_SOCKS: A LITTLE BIT OF DIFFICULTY
GETTING_INTO_AND_OUT_OF_A_BATH: A LITTLE BIT OF DIFFICULTY
RUNNING_ON_EVEN_GROUND: EXTREME DIFFICULTY OR UNABLE TO PERFORM ACTIVITY
SITTING_FOR_1_HOUR: MODERATE DIFFICULTY
PLEASE_INDICATE_YOR_PRIMARY_REASON_FOR_REFERRAL_TO_THERAPY:: FOOT AND/OR ANKLE
GOING_UP_OR_DOWN_10_STAIRS: QUITE A BIT OF DIFFICULTY
LEFS_RAW_SCORE: 0
SQUATTING: QUITE A BIT OF DIFFICULTY
RUNNING_ON_UNEVEN_GROUND: EXTREME DIFFICULTY OR UNABLE TO PERFORM ACTIVITY

## 2025-05-19 NOTE — PROGRESS NOTES
05/19/25 0500   Appointment Info   Signing clinician's name / credentials Riley Clinton, PT, DPT, Cert. DN   Total/Authorized Visits 6   Visits Used 1   Medical Diagnosis M25.511, G89.29 (ICD-10-CM) - Chronic right shoulder pain   PT Tx Diagnosis Right shoulder pain, rotator cuff tendinopathy   Progress Note/Certification   Start of Care Date 05/19/25   Onset of illness/injury or Date of Surgery 05/02/25  (date of order)   Therapy Frequency 1x/week for 4-6 weeks then bi weekly   Predicted Duration 2-3 months   Certification date from 05/19/25   Certification date to 08/11/25   PT Goal 1   Goal Identifier goal 1   Goal Description Pain free with outstretched reachign and lifting of 5#   Rationale to maximize safety and independence with performance of ADLs and functional tasks;to maximize safety and independence within the home;to maximize safety and independence within the community;to maximize safety and independence with transportation;to maximize safety and independence with self cares   Goal Progress pianful with 2-3#   Target Date 01/06/25   PT Goal 2   Goal Identifier goal 2   Goal Description pain free with overhead mobility to allow for ease and comfortability with washing hair and reaching into cupboards.   Rationale to maximize safety and independence within the home;to maximize safety and independence with performance of ADLs and functional tasks;to maximize safety and independence within the community;to maximize safety and independence with transportation;to maximize safety and independence with self cares   Target Date 01/20/25   Subjective Report   Subjective Report Pt presens to reinitiation of therapy for ongoing shoulder pain. Patient is well known to myself and always motivated to improve. sustained a fall which irritated her tailbone and right shoulder. She was diligently working on her HEP prior to the fall on 3/15/25. The tailbone pain is slowly improving but still present and worst with  sitting to standing and yard work. Has utilized ice and heat for the areas affected with some uintermittent relief. 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   Objective Measure 1   Objective Measure AROM   Details flexion 155, extension 46,  abduction 154   Therapeutic Procedure/Exercise   Therapeutic Procedures: strength, endurance, ROM, flexibility minutes (88115) 28   PTRx Ther Proc 1 Pectoral Stretch on Wall   PTRx Ther Proc 1 - Details x 30s   PTRx Ther Proc 2 Wand Shoulder Flexion in Standing   PTRx Ther Proc 2 - Details x 5 x 5s   PTRx Ther Proc 3 Wand Shoulder External Rotation in Standing   PTRx Ther Proc 3 - Details 5 x 5s   PTRx Ther Proc 4 Shoulder Theraband External Rotation   PTRx Ther Proc 4 - Details RBT x 10   PTRx Ther Proc 5 Shoulder Theraband Rows   PTRx Ther Proc 5 - Details GTB x 10   PTRx Ther Proc 6 Bridging #1   PTRx Ther Proc 6 - Details 4 x 5s   PTRx Ther Proc 7 Roll Outs Hooklying   PTRx Ther Proc 7 - Details GTB x 10   PTRx Ther Proc 8 Hip AROM Standing Extension   PTRx Ther Proc 8 - Details x 10 bilat   Skilled Intervention initiation of there to address deficits   Patient Response/Progress toelrated well today without increased pain   Neuromuscular Re-education   PTRx Neuro Re-ed 1 Scapular Stabilization/Proprioception With A Ball   PTRx Neuro Re-ed 1 - Details No Notes   Eval/Assessments   Assessments PT Re-Eval   PT Eval, Re-eval Minutes (32126) 10   Education   Learner/Method Patient;Demonstration;Pictures/Video;No Barriers to Learning   Plan   Home program PTRx - printed   Plan for next session progress scapular stability and RTC strength as able. Manual as indicated   Total Session Time   Timed Code Treatment Minutes 28   Total Treatment Time (sum of timed and untimed services) 38         [facility]                                                                                    OUTPATIENT PHYSICAL THERAPY    PLAN OF TREATMENT FOR OUTPATIENT REHABILITATION   Patient's Last Name, First Name, Lisa Domínguez YOB: 1935   Provider's Name   [facility]   Medical Record No.  [id number]     Onset Date: 05/02/25 (date of order)  Start of Care Date: 05/19/25     Medical Diagnosis:  M25.511, G89.29 (ICD-10-CM) - Chronic right shoulder pain      PT Treatment Diagnosis:  Right shoulder pain, rotator cuff tendinopathy Plan of Treatment  Frequency/Duration: 1x/week for 4-6 weeks then bi weekly/ 2-3 months    Certification date from 05/19/25 to 08/11/25         See note for plan of treatment details and functional goals     Riley Clinton, PT                         I CERTIFY THE NEED FOR THESE SERVICES FURNISHED UNDER        THIS PLAN OF TREATMENT AND WHILE UNDER MY CARE     (Physician attestation of this document indicates review and certification of the therapy plan).              Referring Provider:  Ziyad Isaac    Initial Assessment  See Epic Evaluation- Start of Care Date: 05/19/25

## 2025-05-20 ENCOUNTER — RESULTS FOLLOW-UP (OUTPATIENT)
Dept: FAMILY MEDICINE | Facility: CLINIC | Age: 89
End: 2025-05-20

## 2025-05-20 LAB
ALT SERPL W P-5'-P-CCNC: 28 U/L (ref 0–50)
ANION GAP SERPL CALCULATED.3IONS-SCNC: 11 MMOL/L (ref 7–15)
BUN SERPL-MCNC: 20 MG/DL (ref 8–23)
CALCIUM SERPL-MCNC: 10.7 MG/DL (ref 8.8–10.4)
CHLORIDE SERPL-SCNC: 105 MMOL/L (ref 98–107)
CHOLEST SERPL-MCNC: 210 MG/DL
CREAT SERPL-MCNC: 0.7 MG/DL (ref 0.51–0.95)
EGFRCR SERPLBLD CKD-EPI 2021: 83 ML/MIN/1.73M2
FASTING STATUS PATIENT QL REPORTED: NO
FASTING STATUS PATIENT QL REPORTED: NO
GLUCOSE SERPL-MCNC: 94 MG/DL (ref 70–99)
HCO3 SERPL-SCNC: 24 MMOL/L (ref 22–29)
HDLC SERPL-MCNC: 40 MG/DL
LDLC SERPL CALC-MCNC: 114 MG/DL
NONHDLC SERPL-MCNC: 170 MG/DL
POTASSIUM SERPL-SCNC: 4.4 MMOL/L (ref 3.4–5.3)
SODIUM SERPL-SCNC: 140 MMOL/L (ref 135–145)
TRIGL SERPL-MCNC: 279 MG/DL
URATE SERPL-MCNC: 4.2 MG/DL (ref 2.4–5.7)

## 2025-05-20 NOTE — LETTER
May 20, 2025      Lisayi Ferguson  [address] [facility] [place] [address]        Dear ,    We are writing to inform you of your test results. All of these tests are within acceptable limits , things look good ! You have a mildly persistent elevated calcium level but this is without change and stable. Please let me know if you have any questions for me about all of these lab tests.     Resulted Orders   BASIC METABOLIC PANEL   Result Value Ref Range    Sodium 140 135 - 145 mmol/L    Potassium 4.4 3.4 - 5.3 mmol/L    Chloride 105 98 - 107 mmol/L    Carbon Dioxide (CO2) 24 22 - 29 mmol/L    Anion Gap 11 7 - 15 mmol/L    Urea Nitrogen 20.0 8.0 - 23.0 mg/dL    Creatinine 0.70 0.51 - 0.95 mg/dL    GFR Estimate 83 >60 mL/min/1.73m2      Comment:      eGFR calculated using 2021 CKD-EPI equation.    Calcium 10.7 (H) 8.8 - 10.4 mg/dL    Glucose 94 70 - 99 mg/dL    Patient Fasting > 8hrs? No    ALT   Result Value Ref Range    ALT 28 0 - 50 U/L   Lipid panel reflex to direct LDL Non-fasting   Result Value Ref Range    Cholesterol 210 (H) <200 mg/dL    Triglycerides 279 (H) <150 mg/dL    Direct Measure HDL 40 (L) >=50 mg/dL    LDL Cholesterol Calculated 114 (H) <100 mg/dL    Non HDL Cholesterol 170 (H) <130 mg/dL    Patient Fasting > 8hrs? No     Narrative    Cholesterol  Desirable: < 200 mg/dL  Borderline High: 200 - 239 mg/dL  High: >= 240 mg/dL    Triglycerides  Normal: < 150 mg/dL  Borderline High: 150 - 199 mg/dL  High: 200-499 mg/dL  Very High: >= 500 mg/dL    Direct Measure HDL  Female: >= 50 mg/dL   Male: >= 40 mg/dL    LDL Cholesterol  Desirable: < 100 mg/dL  Above Desirable: 100 - 129 mg/dL   Borderline High: 130 - 159 mg/dL   High:  160 - 189 mg/dL   Very High: >= 190 mg/dL    Non HDL Cholesterol  Desirable: < 130 mg/dL  Above Desirable: 130 - 159 mg/dL  Borderline High: 160 - 189 mg/dL  High: 190 - 219 mg/dL  Very High: >= 220 mg/dL   Uric acid   Result Value Ref Range    Uric Acid 4.2 2.4 - 5.7 mg/dL    If you have any questions or concerns, please call the clinic at the number listed above.       Sincerely,      Ziyad Isaac MD    Electronically signed

## 2025-05-22 ENCOUNTER — PATIENT OUTREACH (OUTPATIENT)
Dept: CARE COORDINATION | Facility: CLINIC | Age: 89
End: 2025-05-22
Payer: MEDICARE

## 2025-05-31 DIAGNOSIS — M10.071 ACUTE IDIOPATHIC GOUT OF RIGHT FOOT: ICD-10-CM

## 2025-06-01 RX ORDER — ALLOPURINOL 300 MG/1
TABLET ORAL
Qty: 90 TABLET | Refills: 0 | Status: SHIPPED | OUTPATIENT
Start: 2025-06-01

## 2025-06-03 ENCOUNTER — APPOINTMENT (OUTPATIENT)
Dept: URBAN - METROPOLITAN AREA CLINIC 252 | Age: 89
Setting detail: DERMATOLOGY
End: 2025-06-03

## 2025-06-03 VITALS — RESPIRATION RATE: 18 BRPM | WEIGHT: 240 LBS | HEIGHT: 66 IN

## 2025-06-03 DIAGNOSIS — B35.1 TINEA UNGUIUM: ICD-10-CM

## 2025-06-03 DIAGNOSIS — Z85.828 PERSONAL HISTORY OF OTHER MALIGNANT NEOPLASM OF SKIN: ICD-10-CM

## 2025-06-03 DIAGNOSIS — L57.8 OTHER SKIN CHANGES DUE TO CHRONIC EXPOSURE TO NONIONIZING RADIATION: ICD-10-CM

## 2025-06-03 DIAGNOSIS — L57.0 ACTINIC KERATOSIS: ICD-10-CM

## 2025-06-03 DIAGNOSIS — D36.1 BENIGN NEOPLASM OF PERIPHERAL NERVES AND AUTONOMIC NERVOUS SYSTEM: ICD-10-CM

## 2025-06-03 DIAGNOSIS — L82.1 OTHER SEBORRHEIC KERATOSIS: ICD-10-CM

## 2025-06-03 DIAGNOSIS — Z86.007 PERSONAL HISTORY OF IN-SITU NEOPLASM OF SKIN: ICD-10-CM

## 2025-06-03 DIAGNOSIS — L82.0 INFLAMED SEBORRHEIC KERATOSIS: ICD-10-CM

## 2025-06-03 PROBLEM — D36.14 BENIGN NEOPLASM OF PERIPHERAL NERVES AND AUTONOMIC NERVOUS SYSTEM OF THORAX: Status: ACTIVE | Noted: 2025-06-03

## 2025-06-03 PROBLEM — D36.13 BENIGN NEOPLASM OF PERIPHERAL NERVES AND AUTONOMIC NERVOUS SYSTEM OF LOWER LIMB, INCLUDING HIP: Status: ACTIVE | Noted: 2025-06-03

## 2025-06-03 PROCEDURE — 99213 OFFICE O/P EST LOW 20 MIN: CPT | Mod: 25

## 2025-06-03 PROCEDURE — OTHER LIQUID NITROGEN: OTHER

## 2025-06-03 PROCEDURE — 17000 DESTRUCT PREMALG LESION: CPT

## 2025-06-03 PROCEDURE — OTHER COUNSELING: OTHER

## 2025-06-03 PROCEDURE — 17003 DESTRUCT PREMALG LES 2-14: CPT

## 2025-06-03 ASSESSMENT — LOCATION DETAILED DESCRIPTION DERM
LOCATION DETAILED: NASAL DORSUM
LOCATION DETAILED: LEFT INFERIOR TEMPLE
LOCATION DETAILED: RIGHT NASAL SIDEWALL
LOCATION DETAILED: RIGHT GREAT TOENAIL
LOCATION DETAILED: LEFT CENTRAL TEMPLE
LOCATION DETAILED: LEFT LATERAL PROXIMAL PRETIBIAL REGION
LOCATION DETAILED: RIGHT INFERIOR TEMPLE
LOCATION DETAILED: LEFT MID-UPPER BACK
LOCATION DETAILED: RIGHT POSTERIOR SHOULDER
LOCATION DETAILED: LEFT CENTRAL MALAR CHEEK
LOCATION DETAILED: RIGHT CENTRAL MALAR CHEEK
LOCATION DETAILED: LEFT INFERIOR LATERAL LOWER BACK
LOCATION DETAILED: RIGHT LATERAL FOREHEAD
LOCATION DETAILED: LEFT PROXIMAL DORSAL FOREARM
LOCATION DETAILED: RIGHT MEDIAL UPPER BACK
LOCATION DETAILED: LEFT GREAT TOENAIL
LOCATION DETAILED: RIGHT DISTAL PRETIBIAL REGION
LOCATION DETAILED: RIGHT PROXIMAL PRETIBIAL REGION
LOCATION DETAILED: LEFT LATERAL MALAR CHEEK
LOCATION DETAILED: RIGHT POPLITEAL SKIN

## 2025-06-03 ASSESSMENT — LOCATION SIMPLE DESCRIPTION DERM
LOCATION SIMPLE: RIGHT UPPER BACK
LOCATION SIMPLE: LEFT FOREARM
LOCATION SIMPLE: RIGHT NOSE
LOCATION SIMPLE: LEFT LOWER BACK
LOCATION SIMPLE: RIGHT TEMPLE
LOCATION SIMPLE: RIGHT POPLITEAL SKIN
LOCATION SIMPLE: LEFT CHEEK
LOCATION SIMPLE: RIGHT GREAT TOE
LOCATION SIMPLE: LEFT GREAT TOE
LOCATION SIMPLE: RIGHT PRETIBIAL REGION
LOCATION SIMPLE: RIGHT SHOULDER
LOCATION SIMPLE: LEFT PRETIBIAL REGION
LOCATION SIMPLE: RIGHT CHEEK
LOCATION SIMPLE: NOSE
LOCATION SIMPLE: LEFT TEMPLE
LOCATION SIMPLE: LEFT UPPER BACK
LOCATION SIMPLE: RIGHT FOREHEAD

## 2025-06-03 ASSESSMENT — LOCATION ZONE DERM
LOCATION ZONE: NOSE
LOCATION ZONE: TOENAIL
LOCATION ZONE: ARM
LOCATION ZONE: LEG
LOCATION ZONE: TRUNK
LOCATION ZONE: FACE

## 2025-06-04 ENCOUNTER — MYC REFILL (OUTPATIENT)
Dept: INTERNAL MEDICINE | Facility: CLINIC | Age: 89
End: 2025-06-04
Payer: MEDICARE

## 2025-06-04 DIAGNOSIS — M10.071 ACUTE IDIOPATHIC GOUT OF RIGHT FOOT: ICD-10-CM

## 2025-06-04 RX ORDER — ALLOPURINOL 100 MG/1
100 TABLET ORAL DAILY
Qty: 90 TABLET | Refills: 3 | OUTPATIENT
Start: 2025-06-04

## 2025-06-04 RX ORDER — ALLOPURINOL 100 MG/1
100 TABLET ORAL DAILY
Qty: 90 TABLET | Refills: 2 | Status: SHIPPED | OUTPATIENT
Start: 2025-06-04

## 2025-06-04 RX ORDER — ALLOPURINOL 100 MG/1
TABLET ORAL
Qty: 90 TABLET | Refills: 3 | OUTPATIENT
Start: 2025-06-04

## 2025-06-09 ENCOUNTER — THERAPY VISIT (OUTPATIENT)
Dept: PHYSICAL THERAPY | Facility: CLINIC | Age: 89
End: 2025-06-09
Attending: INTERNAL MEDICINE
Payer: MEDICARE

## 2025-06-09 DIAGNOSIS — M25.511 CHRONIC RIGHT SHOULDER PAIN: Primary | ICD-10-CM

## 2025-06-09 DIAGNOSIS — G89.29 CHRONIC RIGHT SHOULDER PAIN: Primary | ICD-10-CM

## 2025-06-09 PROCEDURE — 97110 THERAPEUTIC EXERCISES: CPT | Mod: GP | Performed by: PHYSICAL THERAPIST

## 2025-06-17 ENCOUNTER — THERAPY VISIT (OUTPATIENT)
Dept: PHYSICAL THERAPY | Facility: CLINIC | Age: 89
End: 2025-06-17
Attending: INTERNAL MEDICINE
Payer: MEDICARE

## 2025-06-17 DIAGNOSIS — M25.511 CHRONIC RIGHT SHOULDER PAIN: Primary | ICD-10-CM

## 2025-06-17 DIAGNOSIS — G89.29 CHRONIC RIGHT SHOULDER PAIN: Primary | ICD-10-CM

## 2025-06-17 PROCEDURE — 97110 THERAPEUTIC EXERCISES: CPT | Mod: GP | Performed by: PHYSICAL THERAPIST

## 2025-07-15 DIAGNOSIS — G62.9 PERIPHERAL POLYNEUROPATHY: ICD-10-CM

## 2025-07-15 DIAGNOSIS — M53.3 COCCYDYNIA: ICD-10-CM

## 2025-07-15 RX ORDER — GABAPENTIN 300 MG/1
300 CAPSULE ORAL 3 TIMES DAILY
Qty: 60 CAPSULE | Refills: 2 | Status: SHIPPED | OUTPATIENT
Start: 2025-07-15

## 2025-07-24 ENCOUNTER — THERAPY VISIT (OUTPATIENT)
Dept: PHYSICAL THERAPY | Facility: CLINIC | Age: 89
End: 2025-07-24
Payer: MEDICARE

## 2025-07-24 DIAGNOSIS — G89.29 CHRONIC RIGHT SHOULDER PAIN: Primary | ICD-10-CM

## 2025-07-24 DIAGNOSIS — M25.511 CHRONIC RIGHT SHOULDER PAIN: Primary | ICD-10-CM

## 2025-08-07 ENCOUNTER — ONCOLOGY VISIT (OUTPATIENT)
Dept: ONCOLOGY | Facility: CLINIC | Age: 89
End: 2025-08-07
Attending: NURSE PRACTITIONER
Payer: MEDICARE

## 2025-08-07 VITALS
HEART RATE: 61 BPM | BODY MASS INDEX: 39.89 KG/M2 | SYSTOLIC BLOOD PRESSURE: 123 MMHG | HEIGHT: 66 IN | DIASTOLIC BLOOD PRESSURE: 73 MMHG | OXYGEN SATURATION: 98 % | WEIGHT: 248.2 LBS

## 2025-08-07 DIAGNOSIS — G62.89 OTHER POLYNEUROPATHY: ICD-10-CM

## 2025-08-07 DIAGNOSIS — M81.8 OTHER OSTEOPOROSIS WITHOUT CURRENT PATHOLOGICAL FRACTURE: ICD-10-CM

## 2025-08-07 DIAGNOSIS — C50.412 MALIGNANT NEOPLASM OF UPPER-OUTER QUADRANT OF LEFT BREAST IN FEMALE, ESTROGEN RECEPTOR POSITIVE (H): ICD-10-CM

## 2025-08-07 DIAGNOSIS — E83.52 HYPERCALCEMIA: ICD-10-CM

## 2025-08-07 DIAGNOSIS — E66.01 MORBID OBESITY (H): ICD-10-CM

## 2025-08-07 DIAGNOSIS — Z90.13 STATUS POST MASTECTOMY, BILATERAL: ICD-10-CM

## 2025-08-07 DIAGNOSIS — M53.3 PAIN IN THE COCCYX: ICD-10-CM

## 2025-08-07 DIAGNOSIS — Z17.0 MALIGNANT NEOPLASM OF UPPER-OUTER QUADRANT OF LEFT BREAST IN FEMALE, ESTROGEN RECEPTOR POSITIVE (H): ICD-10-CM

## 2025-08-07 DIAGNOSIS — M25.511 ACUTE PAIN OF RIGHT SHOULDER: Primary | ICD-10-CM

## 2025-08-07 PROCEDURE — G0463 HOSPITAL OUTPT CLINIC VISIT: HCPCS | Performed by: PHYSICIAN ASSISTANT

## 2025-08-07 ASSESSMENT — PAIN SCALES - GENERAL: PAINLEVEL_OUTOF10: MILD PAIN (3)

## 2025-08-10 SDOH — HEALTH STABILITY: PHYSICAL HEALTH: ON AVERAGE, HOW MANY DAYS PER WEEK DO YOU ENGAGE IN MODERATE TO STRENUOUS EXERCISE (LIKE A BRISK WALK)?: 5 DAYS

## 2025-08-10 SDOH — HEALTH STABILITY: PHYSICAL HEALTH: ON AVERAGE, HOW MANY MINUTES DO YOU ENGAGE IN EXERCISE AT THIS LEVEL?: 40 MIN

## 2025-08-10 ASSESSMENT — SOCIAL DETERMINANTS OF HEALTH (SDOH): HOW OFTEN DO YOU GET TOGETHER WITH FRIENDS OR RELATIVES?: THREE TIMES A WEEK

## 2025-08-11 ENCOUNTER — PATIENT OUTREACH (OUTPATIENT)
Dept: CARE COORDINATION | Facility: CLINIC | Age: 89
End: 2025-08-11

## 2025-08-11 ENCOUNTER — OFFICE VISIT (OUTPATIENT)
Dept: INTERNAL MEDICINE | Facility: CLINIC | Age: 89
End: 2025-08-11
Payer: MEDICARE

## 2025-08-11 VITALS
RESPIRATION RATE: 16 BRPM | HEART RATE: 65 BPM | BODY MASS INDEX: 40.18 KG/M2 | SYSTOLIC BLOOD PRESSURE: 123 MMHG | WEIGHT: 250 LBS | DIASTOLIC BLOOD PRESSURE: 78 MMHG | HEIGHT: 66 IN | OXYGEN SATURATION: 97 % | TEMPERATURE: 99 F

## 2025-08-11 DIAGNOSIS — Z00.00 ENCOUNTER FOR MEDICARE ANNUAL WELLNESS EXAM: Primary | ICD-10-CM

## 2025-08-11 DIAGNOSIS — R73.09 ELEVATED GLUCOSE: ICD-10-CM

## 2025-08-11 PROCEDURE — 3078F DIAST BP <80 MM HG: CPT | Performed by: INTERNAL MEDICINE

## 2025-08-11 PROCEDURE — G0439 PPPS, SUBSEQ VISIT: HCPCS | Performed by: INTERNAL MEDICINE

## 2025-08-11 PROCEDURE — 3074F SYST BP LT 130 MM HG: CPT | Performed by: INTERNAL MEDICINE

## 2025-08-28 DIAGNOSIS — M10.071 ACUTE IDIOPATHIC GOUT OF RIGHT FOOT: ICD-10-CM

## 2025-08-28 RX ORDER — ALLOPURINOL 300 MG/1
TABLET ORAL
Qty: 90 TABLET | Refills: 0 | OUTPATIENT
Start: 2025-08-28

## 2025-09-03 ENCOUNTER — MYC REFILL (OUTPATIENT)
Dept: INTERNAL MEDICINE | Facility: CLINIC | Age: 89
End: 2025-09-03
Payer: MEDICARE

## 2025-09-03 DIAGNOSIS — M10.071 ACUTE IDIOPATHIC GOUT OF RIGHT FOOT: ICD-10-CM

## 2025-09-03 DIAGNOSIS — G62.9 PERIPHERAL POLYNEUROPATHY: ICD-10-CM

## 2025-09-03 DIAGNOSIS — M53.3 COCCYDYNIA: ICD-10-CM

## 2025-09-03 DIAGNOSIS — I10 ESSENTIAL HYPERTENSION WITH GOAL BLOOD PRESSURE LESS THAN 130/80: Primary | ICD-10-CM

## 2025-09-03 RX ORDER — ALLOPURINOL 300 MG/1
TABLET ORAL
Qty: 90 TABLET | Refills: 0 | Status: CANCELLED | OUTPATIENT
Start: 2025-09-03

## 2025-09-04 RX ORDER — ALLOPURINOL 100 MG/1
100 TABLET ORAL DAILY
Qty: 90 TABLET | Refills: 3 | Status: SHIPPED | OUTPATIENT
Start: 2025-09-04

## 2025-09-04 RX ORDER — METOPROLOL TARTRATE 25 MG/1
75 TABLET, FILM COATED ORAL SEE ADMIN INSTRUCTIONS
Qty: 270 TABLET | Refills: 1 | Status: SHIPPED | OUTPATIENT
Start: 2025-09-04

## 2025-09-04 RX ORDER — GABAPENTIN 300 MG/1
300 CAPSULE ORAL 3 TIMES DAILY
Qty: 270 CAPSULE | Refills: 1 | Status: SHIPPED | OUTPATIENT
Start: 2025-09-04

## 2025-09-04 RX ORDER — LOSARTAN POTASSIUM 50 MG/1
50 TABLET ORAL DAILY
Qty: 90 TABLET | Refills: 1 | Status: SHIPPED | OUTPATIENT
Start: 2025-09-04

## (undated) DEVICE — SU ETHILON 3-0 FS-1 18" 669H

## (undated) DEVICE — SYR EAR BULB 3OZ 0035830

## (undated) DEVICE — SPONGE LAP 18X18" X8435

## (undated) DEVICE — BNDG ELASTIC 6"X5YDS DOUBLE STERILE

## (undated) DEVICE — COVER ULTRASOUND PROBE 6X48" PC1290

## (undated) DEVICE — DRAIN JACKSON PRATT 15FR ROUND SU130-1323

## (undated) DEVICE — SU MONOCRYL 4-0 PS-2 18" UND Y496G

## (undated) DEVICE — BLADE KNIFE SURG 10 371110

## (undated) DEVICE — PREP SKIN SCRUB TRAY 4461A

## (undated) DEVICE — DRAIN JACKSON PRATT RESERVOIR 100ML SU130-1305

## (undated) DEVICE — GLOVE PROTEXIS BLUE W/NEU-THERA 6.5  2D73EB65

## (undated) DEVICE — ESU ELEC BLADE 2.75" COATED/INSULATED E1455

## (undated) DEVICE — SUCTION TIP YANKAUER W/O VENT K86

## (undated) DEVICE — SU VICRYL 3-0 SH 27" J316H

## (undated) DEVICE — PLUMEPEN PRO

## (undated) DEVICE — PAD CHUX UNDERPAD 23X24" 7136

## (undated) DEVICE — DRAPE SHEET REV FOLD 3/4 9349

## (undated) DEVICE — SYR 10ML FINGER CONTROL W/O NDL 309695

## (undated) DEVICE — SU VICRYL 3-0 TIE 12X18" J904T

## (undated) DEVICE — LINEN TOWEL PACK X5 5464

## (undated) DEVICE — GLOVE PROTEXIS MICRO 6.0  2D73PM60

## (undated) DEVICE — DRSG STERI STRIP 1/2X4" R1547

## (undated) DEVICE — SU SILK 2-0 FSL 18" 677G

## (undated) DEVICE — MANIFOLD NEPTUNE 4 PORT 700-20

## (undated) DEVICE — DRAPE BREAST/CHEST 29420

## (undated) DEVICE — NDL 19GA 1.5"

## (undated) DEVICE — NDL 22GA 1.5"

## (undated) DEVICE — PACK MINOR SBA15MIFSE

## (undated) DEVICE — SLEEVE PROTECTIVE BREAST BIOPSY  GMSLV001-10

## (undated) DEVICE — DRSG GAUZE 4X4" 3033

## (undated) DEVICE — SOL WATER IRRIG 1000ML BOTTLE 2F7114

## (undated) RX ORDER — ONDANSETRON 2 MG/ML
INJECTION INTRAMUSCULAR; INTRAVENOUS
Status: DISPENSED
Start: 2021-01-06

## (undated) RX ORDER — WATER 10 ML/10ML
INJECTION INTRAMUSCULAR; INTRAVENOUS; SUBCUTANEOUS
Status: DISPENSED
Start: 2021-01-06

## (undated) RX ORDER — CEFAZOLIN SODIUM 2 G/100ML
INJECTION, SOLUTION INTRAVENOUS
Status: DISPENSED
Start: 2021-01-06

## (undated) RX ORDER — DEXAMETHASONE SODIUM PHOSPHATE 4 MG/ML
INJECTION, SOLUTION INTRA-ARTICULAR; INTRALESIONAL; INTRAMUSCULAR; INTRAVENOUS; SOFT TISSUE
Status: DISPENSED
Start: 2021-01-06

## (undated) RX ORDER — CEFAZOLIN SODIUM 1 G/3ML
INJECTION, POWDER, FOR SOLUTION INTRAMUSCULAR; INTRAVENOUS
Status: DISPENSED
Start: 2021-01-06

## (undated) RX ORDER — FENTANYL CITRATE 50 UG/ML
INJECTION, SOLUTION INTRAMUSCULAR; INTRAVENOUS
Status: DISPENSED
Start: 2021-01-06

## (undated) RX ORDER — LIDOCAINE HYDROCHLORIDE 20 MG/ML
INJECTION, SOLUTION EPIDURAL; INFILTRATION; INTRACAUDAL; PERINEURAL
Status: DISPENSED
Start: 2021-01-06

## (undated) RX ORDER — HYDROMORPHONE HYDROCHLORIDE 1 MG/ML
INJECTION, SOLUTION INTRAMUSCULAR; INTRAVENOUS; SUBCUTANEOUS
Status: DISPENSED
Start: 2021-01-06

## (undated) RX ORDER — PROPOFOL 10 MG/ML
INJECTION, EMULSION INTRAVENOUS
Status: DISPENSED
Start: 2021-01-06